# Patient Record
Sex: MALE | Race: WHITE | NOT HISPANIC OR LATINO | Employment: OTHER | URBAN - METROPOLITAN AREA
[De-identification: names, ages, dates, MRNs, and addresses within clinical notes are randomized per-mention and may not be internally consistent; named-entity substitution may affect disease eponyms.]

---

## 2017-01-25 RX ORDER — FLUTICASONE PROPIONATE AND SALMETEROL XINAFOATE 230; 21 UG/1; UG/1
AEROSOL, METERED RESPIRATORY (INHALATION)
Qty: 36 G | Refills: 3 | Status: SHIPPED | OUTPATIENT
Start: 2017-01-25 | End: 2018-04-16 | Stop reason: SDUPTHER

## 2017-04-11 RX ORDER — ZOLPIDEM TARTRATE 10 MG/1
TABLET ORAL
Qty: 30 TAB | Refills: 2 | Status: SHIPPED
Start: 2017-04-11 | End: 2017-07-10 | Stop reason: SDUPTHER

## 2017-04-24 ENCOUNTER — TELEPHONE (OUTPATIENT)
Dept: MEDICAL GROUP | Facility: MEDICAL CENTER | Age: 67
End: 2017-04-24

## 2017-04-24 DIAGNOSIS — N18.30 CKD (CHRONIC KIDNEY DISEASE) STAGE 3, GFR 30-59 ML/MIN (HCC): ICD-10-CM

## 2017-04-24 NOTE — TELEPHONE ENCOUNTER
----- Message from Melissa Cordero, Med Ass't sent at 4/20/2017 12:29 PM PDT -----  Regarding: Referral needed  Dionicio,  Mr Sims has an appointment with nephrology in May and his insurance requires a referral and authorization for visits. I don't have those things on file. Could you place the request and attach the visits to the referral in Epic? [place] [address] and the ICD 10 code is CKD 3 N18.3. Thank you!    ryan

## 2017-04-24 NOTE — TELEPHONE ENCOUNTER
1. Caller Name: Melissa Cordero  ([facility])                                     Call Back Number: [phone]      Patient approves a detailed voicemail message: N\A    2. SPECIFIC Action To Be Taken: Referral pending, please sign.    3. Diagnosis/Clinical Reason for Request: [id number]    4. Specialty & Provider Name/Lab/Imaging Location: [facility]    5. Is appointment scheduled for requested order/referral: yes - 05/17    Patient informed they will receive a return phone call from the office ONLY if there are any questions before processing their request. Advised to call back if they haven't received a call from the referral department in 5 days.

## 2017-05-11 ENCOUNTER — TELEPHONE (OUTPATIENT)
Dept: MEDICAL GROUP | Facility: MEDICAL CENTER | Age: 67
End: 2017-05-11

## 2017-05-11 DIAGNOSIS — Z12.5 SCREENING FOR PROSTATE CANCER: ICD-10-CM

## 2017-05-11 DIAGNOSIS — N18.30 CKD (CHRONIC KIDNEY DISEASE) STAGE 3, GFR 30-59 ML/MIN (HCC): ICD-10-CM

## 2017-05-11 DIAGNOSIS — I73.9 PERIPHERAL VASCULAR DISEASE (HCC): Chronic | ICD-10-CM

## 2017-05-11 DIAGNOSIS — I10 HYPERTENSION GOAL BP (BLOOD PRESSURE) < 140/80: Chronic | ICD-10-CM

## 2017-05-11 NOTE — TELEPHONE ENCOUNTER
Pt.would like to get general labs ordered. I pended some lab tests, would you please approve if ok?  Thank you,

## 2017-05-11 NOTE — TELEPHONE ENCOUNTER
Future Appointments       Provider Department Center    5/15/2017 9:20 AM Erlin iLnton M.D. [facility]       ESTABLISHED PATIENT PRE-VISIT PLANNING     Note: Patient will not be contacted if there is no indication to call.     1.  Reviewed note from last office visit with PCP and/or other med group provider: Yes    2.  If any orders were placed at last visit, do we have Results/Consult Notes?        •  Labs - Labs were not ordered at last office visit.       •  Imaging - Imaging was not ordered at last office visit.       •  Referrals - Referral ordered, patient has NOT been seen.    3.  Immunizations were updated in [employer] using WebIZ?: Yes       •  Web Iz Recommendations: HEPATITIS A  HEPATITIS B TD VARICELLA (Chicken Pox)  ZOSTAVAX (Shingles)    4.  Patient is due for the following Health Maintenance Topics:   Topic   • IMM ZOSTER VACCINE    • PFT SCREENING-FEV1 AND FEV/FVC RATIO / SPIROMETRY SHOULD BE PERFORMED ANNUALLY      5.  Patient was informed to arrive 15 min prior to their scheduled appointment and bring in their medication bottles and gave the address to [facility].

## 2017-05-12 ENCOUNTER — HOSPITAL ENCOUNTER (OUTPATIENT)
Dept: LAB | Facility: MEDICAL CENTER | Age: 67
End: 2017-05-12
Attending: PHYSICIAN ASSISTANT
Payer: COMMERCIAL

## 2017-05-12 ENCOUNTER — HOSPITAL ENCOUNTER (OUTPATIENT)
Dept: LAB | Facility: MEDICAL CENTER | Age: 67
End: 2017-05-12
Attending: INTERNAL MEDICINE
Payer: COMMERCIAL

## 2017-05-12 DIAGNOSIS — I73.9 PERIPHERAL VASCULAR DISEASE (HCC): Chronic | ICD-10-CM

## 2017-05-12 DIAGNOSIS — I10 ESSENTIAL HYPERTENSION: ICD-10-CM

## 2017-05-12 DIAGNOSIS — I10 HYPERTENSION GOAL BP (BLOOD PRESSURE) < 140/80: Chronic | ICD-10-CM

## 2017-05-12 DIAGNOSIS — Z12.5 SCREENING FOR PROSTATE CANCER: ICD-10-CM

## 2017-05-12 DIAGNOSIS — N18.30 CKD (CHRONIC KIDNEY DISEASE) STAGE 3, GFR 30-59 ML/MIN (HCC): ICD-10-CM

## 2017-05-12 LAB
ALBUMIN SERPL BCP-MCNC: 4.6 G/DL (ref 3.2–4.9)
ALBUMIN/GLOB SERPL: 1.4 G/DL
ALP SERPL-CCNC: 93 U/L (ref 30–99)
ALT SERPL-CCNC: 22 U/L (ref 2–50)
ANION GAP SERPL CALC-SCNC: 6 MMOL/L (ref 0–11.9)
ANION GAP SERPL CALC-SCNC: 9 MMOL/L (ref 0–11.9)
AST SERPL-CCNC: 30 U/L (ref 12–45)
BASOPHILS # BLD AUTO: 0.6 % (ref 0–1.8)
BASOPHILS # BLD: 0.06 K/UL (ref 0–0.12)
BILIRUB SERPL-MCNC: 0.7 MG/DL (ref 0.1–1.5)
BUN SERPL-MCNC: 24 MG/DL (ref 8–22)
BUN SERPL-MCNC: 24 MG/DL (ref 8–22)
CALCIUM SERPL-MCNC: 9.5 MG/DL (ref 8.5–10.5)
CALCIUM SERPL-MCNC: 9.6 MG/DL (ref 8.5–10.5)
CHLORIDE SERPL-SCNC: 104 MMOL/L (ref 96–112)
CHLORIDE SERPL-SCNC: 104 MMOL/L (ref 96–112)
CHOLEST SERPL-MCNC: 190 MG/DL (ref 100–199)
CO2 SERPL-SCNC: 29 MMOL/L (ref 20–33)
CO2 SERPL-SCNC: 29 MMOL/L (ref 20–33)
CREAT SERPL-MCNC: 1.3 MG/DL (ref 0.5–1.4)
CREAT SERPL-MCNC: 1.33 MG/DL (ref 0.5–1.4)
CREAT UR-MCNC: 112 MG/DL
EOSINOPHIL # BLD AUTO: 0.14 K/UL (ref 0–0.51)
EOSINOPHIL NFR BLD: 1.3 % (ref 0–6.9)
ERYTHROCYTE [DISTWIDTH] IN BLOOD BY AUTOMATED COUNT: 45.9 FL (ref 35.9–50)
GFR SERPL CREATININE-BSD FRML MDRD: 54 ML/MIN/1.73 M 2
GFR SERPL CREATININE-BSD FRML MDRD: 55 ML/MIN/1.73 M 2
GLOBULIN SER CALC-MCNC: 3.2 G/DL (ref 1.9–3.5)
GLUCOSE SERPL-MCNC: 107 MG/DL (ref 65–99)
GLUCOSE SERPL-MCNC: 107 MG/DL (ref 65–99)
HCT VFR BLD AUTO: 51.4 % (ref 42–52)
HDLC SERPL-MCNC: 34 MG/DL
HGB BLD-MCNC: 16.9 G/DL (ref 14–18)
IMM GRANULOCYTES # BLD AUTO: 0.05 K/UL (ref 0–0.11)
IMM GRANULOCYTES NFR BLD AUTO: 0.5 % (ref 0–0.9)
LDLC SERPL CALC-MCNC: 109 MG/DL
LYMPHOCYTES # BLD AUTO: 1.53 K/UL (ref 1–4.8)
LYMPHOCYTES NFR BLD: 14.4 % (ref 22–41)
MCH RBC QN AUTO: 30.8 PG (ref 27–33)
MCHC RBC AUTO-ENTMCNC: 32.9 G/DL (ref 33.7–35.3)
MCV RBC AUTO: 93.6 FL (ref 81.4–97.8)
MICROALBUMIN UR-MCNC: 7.5 MG/DL
MICROALBUMIN/CREAT UR: 67 MG/G (ref 0–30)
MONOCYTES # BLD AUTO: 0.61 K/UL (ref 0–0.85)
MONOCYTES NFR BLD AUTO: 5.8 % (ref 0–13.4)
NEUTROPHILS # BLD AUTO: 8.21 K/UL (ref 1.82–7.42)
NEUTROPHILS NFR BLD: 77.4 % (ref 44–72)
NRBC # BLD AUTO: 0 K/UL
NRBC BLD AUTO-RTO: 0 /100 WBC
PLATELET # BLD AUTO: 236 K/UL (ref 164–446)
PMV BLD AUTO: 9 FL (ref 9–12.9)
POTASSIUM SERPL-SCNC: 3.8 MMOL/L (ref 3.6–5.5)
POTASSIUM SERPL-SCNC: 3.8 MMOL/L (ref 3.6–5.5)
PROT SERPL-MCNC: 7.8 G/DL (ref 6–8.2)
PSA SERPL-MCNC: 1.26 NG/ML (ref 0–4)
RBC # BLD AUTO: 5.49 M/UL (ref 4.7–6.1)
SODIUM SERPL-SCNC: 139 MMOL/L (ref 135–145)
SODIUM SERPL-SCNC: 142 MMOL/L (ref 135–145)
TRIGL SERPL-MCNC: 237 MG/DL (ref 0–149)
WBC # BLD AUTO: 10.6 K/UL (ref 4.8–10.8)

## 2017-05-12 PROCEDURE — 80061 LIPID PANEL: CPT

## 2017-05-12 PROCEDURE — 80048 BASIC METABOLIC PNL TOTAL CA: CPT

## 2017-05-12 PROCEDURE — 82043 UR ALBUMIN QUANTITATIVE: CPT

## 2017-05-12 PROCEDURE — 80053 COMPREHEN METABOLIC PANEL: CPT

## 2017-05-12 PROCEDURE — 84153 ASSAY OF PSA TOTAL: CPT

## 2017-05-12 PROCEDURE — 36415 COLL VENOUS BLD VENIPUNCTURE: CPT

## 2017-05-12 PROCEDURE — 85025 COMPLETE CBC W/AUTO DIFF WBC: CPT

## 2017-05-12 PROCEDURE — 82570 ASSAY OF URINE CREATININE: CPT

## 2017-05-15 ENCOUNTER — OFFICE VISIT (OUTPATIENT)
Dept: MEDICAL GROUP | Facility: MEDICAL CENTER | Age: 67
End: 2017-05-15
Payer: COMMERCIAL

## 2017-05-15 VITALS
HEIGHT: 69 IN | DIASTOLIC BLOOD PRESSURE: 80 MMHG | HEART RATE: 82 BPM | OXYGEN SATURATION: 91 % | RESPIRATION RATE: 18 BRPM | SYSTOLIC BLOOD PRESSURE: 138 MMHG | BODY MASS INDEX: 27.85 KG/M2 | TEMPERATURE: 97.9 F | WEIGHT: 188 LBS

## 2017-05-15 DIAGNOSIS — I73.9 PERIPHERAL VASCULAR DISEASE (HCC): Chronic | ICD-10-CM

## 2017-05-15 DIAGNOSIS — J30.1 SEASONAL ALLERGIC RHINITIS DUE TO POLLEN: ICD-10-CM

## 2017-05-15 DIAGNOSIS — J42 CHRONIC BRONCHITIS, UNSPECIFIED CHRONIC BRONCHITIS TYPE (HCC): Chronic | ICD-10-CM

## 2017-05-15 DIAGNOSIS — N18.30 CKD (CHRONIC KIDNEY DISEASE) STAGE 3, GFR 30-59 ML/MIN (HCC): ICD-10-CM

## 2017-05-15 PROCEDURE — 99214 OFFICE O/P EST MOD 30 MIN: CPT | Performed by: FAMILY MEDICINE

## 2017-05-15 RX ORDER — FLUTICASONE PROPIONATE 50 MCG
1 SPRAY, SUSPENSION (ML) NASAL DAILY
Qty: 16 G | Refills: 5 | Status: SHIPPED | OUTPATIENT
Start: 2017-05-15 | End: 2018-03-15 | Stop reason: SDUPTHER

## 2017-05-15 NOTE — PROGRESS NOTES
Chief Complaint   Patient presents with   • Results     patient is here for a 6 month follow up and discuss labs     Multiple medical problems    HISTORY OF PRESENT ILLNESS: Patient is a 66 y.o. male established patient who presents today for the following.      1. Seasonal allergic rhinitis due to pollen  Patient has had a 2 week history of nasal congestion.. We discussed the likely etiologies for this. Does not appear to be infectious.    2. Peripheral vascular disease (CMS-HCC)  Chronic and stable problem. Taking medications as prescribed.    3. Chronic bronchitis, unspecified chronic bronchitis type (CMS-HCC)  Patient taking inhalers as prescribed. Doing well. Some shortness of breath with exertion but no abnormal shortness of breath. No chest pain or palpitations. No cough. No sputum production. No URI symptoms. COPD action plan discussed and patient understands.        4. CKD (chronic kidney disease) stage 3, GFR 30-59 ml/min  Reviewed labs with the patient.  Discussed with the patient their chronic kidney disease.  Discussed need to avoid nephrotoxic drugs to include NSAIDs, certain antibiotics and contrast dye. Patient understands. Patient denies any urinary problems. No blood in the urine noted.          No problem-specific assessment & plan notes found for this encounter.      He  has a past medical history of Hypertension; Renal disorder; COPD; and Breath shortness. He also has no past medical history of Backpain or Fall.    Patient Active Problem List    Diagnosis Date Noted   • Nephrolithiasis 01/26/2016   • Skin lesion of face 06/03/2015   • Essential hypertension 11/10/2014   • CKD (chronic kidney disease) stage 3, GFR 30-59 ml/min 11/10/2014   • Mild cognitive impairment with memory loss 12/02/2013   • Peripheral vascular disease (CMS-HCC) 11/12/2012   • Claudication (CMS-HCC) 11/01/2012   • COPD (chronic obstructive pulmonary disease) (CMS-HCC) 06/15/2012   • Hypertension goal BP (blood pressure)  "< 140/80 05/04/2012   • Hydronephrosis of right kidney 02/26/2012       Allergies:Review of patient's allergies indicates no known allergies.    Current Outpatient Prescriptions   Medication Sig Dispense Refill   • fluticasone (FLONASE) 50 MCG/ACT nasal spray Spray 1 Spray in nose every day. 16 g 5   • zolpidem (AMBIEN) 10 MG Tab TAKE 1 TABLET BY MOUTH EVERY DAY AT BEDTIME AS NEEDED FOR SLEEP 30 Tab 2   • ADVAIR -21 MCG/ACT inhaler INHALE 2 PUFFS BY MOUTH TWICE DAILY 36 g 3   • SPIRIVA HANDIHALER 18 MCG Cap INHALE THE CONTENTS OF 1 CAPSULE BY MOUTH EVERY DAY USING HANDIHALER 90 Cap 3   • VENTOLIN  (90 BASE) MCG/ACT Aero Soln inhalation aerosol INHALE 2 PUFFS BY MOUTH EVERY 6 HOURS AS NEEDED FOR PAIN 18 g 11   • losartan (COZAAR) 25 MG Tab Take 1 Tab by mouth every day. 30 Tab 3   • amlodipine (NORVASC) 10 MG Tab TAKE 1 TABLET BY MOUTH EVERY DAY 90 Tab 1   • vardenafil (LEVITRA) 10 MG tablet Take 1 Tab by mouth as needed. 10 Each 3     No current facility-administered medications for this visit.       Social History   Substance Use Topics   • Smoking status: Former Smoker -- 0.50 packs/day for 20 years     Types: Cigarettes     Quit date: 02/17/2012   • Smokeless tobacco: Never Used   • Alcohol Use: 0.0 oz/week     0 Standard drinks or equivalent per week      Comment: glass a wine per month       No family history on file.    Health Maintenance:      Review of Systems   No fever, chills, nausea, vomiting, diarrhea, chest pain or shortness of breath.  See HPI    Exam:  Blood pressure 138/80, pulse 82, temperature 36.6 °C (97.9 °F), resp. rate 18, height 1.753 m (5' 9.02\"), weight 85.276 kg (188 lb), SpO2 91 %. Body mass index is 27.75 kg/(m^2).  Constitutional:  NAD, well appearing.  HEENT:   NC/AT, PERRLA, EOMI, OP clear, no lymphadenopathy, no thyromegaly.  Erythematous and boggy nasal mucosa bilaterally  Cardiovascular: RRR.   No m/r/g. No carotid bruits.       Lungs:   CTAB, no w/r/r, no respiratory " distress.  Abdomen: Soft, NT/ND + BS, no masses, no suprapubic tenderness, no hepatomegaly.  Extremities:  2+ DP and radial pulses bilaterally.  No c/c/e.  Skin:  Warm and dry.    Neurologic: Alert & oriented x 3, CN II-XII grossly intact, strength and sensation grossly intact.  No focal deficits noted.  Psychiatric:  Affect normal, mood normal, judgment normal.    Assessment/Plan:     1. Seasonal allergic rhinitis due to pollen  This is a chronic problem for the patient. Discussed the use of fluticasone. Patient is amenable.    2. Peripheral vascular disease (CMS-HCC)  Chronic and ongoing problem. Continue hypertensive management. Monitor    3. Chronic bronchitis, unspecified chronic bronchitis type (CMS-HCC)  Chronic and stable condition.  No acute exacerbation.  Patient taking inhalers as prescribed. Some shortness of breath with exertion but no abnormal shortness of breath. Continue inhalers. Monitor.  Action plan discussed and understood by the patient.      4. CKD (chronic kidney disease) stage 3, GFR 30-59 ml/min  This is a chronic and stable problem. Labs reviewed with the patient. Discussed avoidance of nephrotoxic drugs. Continue hypertensive management. Continue to monitor.            Followup: No Follow-up on file.    Please note that this dictation was created using voice recognition software. I have made every reasonable attempt to correct obvious errors, but I expect that there are errors of grammar and possibly content that I did not discover before finalizing the note.

## 2017-05-15 NOTE — MR AVS SNAPSHOT
"        Olman Sims   5/15/2017 9:20 AM   Office Visit   MRN: [id number]    Department:  [facility]   Dept Phone:  [phone]    Description:  Male : 1950   Provider:  Erlin Linotn M.D.           Reason for Visit     Results patient is here for a 6 month follow up and discuss labs      Allergies as of 5/15/2017     No Known Allergies      You were diagnosed with     Seasonal allergic rhinitis due to pollen   [[id number]]         Vital Signs     Blood Pressure Pulse Temperature Respirations Height Weight    138/80 mmHg 82 36.6 °C (97.9 °F) 18 1.753 m (5' 9.02\") 85.276 kg (188 lb)    Body Mass Index Oxygen Saturation Smoking Status             27.75 kg/m2 91% Former Smoker         Basic Information     Date Of Birth Sex Race Ethnicity Preferred Language    1950 Male White Non- English      Your appointments     2017 11:00 AM   Established Patient with Erlin Linton M.D.   [facility] (--)    [address]  [address]  [place] [address]   [phone]           You will be receiving a confirmation call a few days before your appointment from our automated call confirmation system.              Problem List              ICD-10-CM Priority Class Noted - Resolved    Hydronephrosis of right kidney N13.30   2012 - Present    Hypertension goal BP (blood pressure) < 140/80 (Chronic) I10   2012 - Present    COPD (chronic obstructive pulmonary disease) (CMS-HCC) (Chronic) J44.9   6/15/2012 - Present    Claudication (CMS-HCC) I73.9   2012 - Present    Peripheral vascular disease (CMS-HCC) (Chronic) I73.9   2012 - Present    Mild cognitive impairment with memory loss G31.84   2013 - Present    Essential hypertension I10   11/10/2014 - Present    CKD (chronic kidney disease) stage 3, GFR 30-59 ml/min N18.3   11/10/2014 - Present    Skin lesion of face L98.9   6/3/2015 - Present    Nephrolithiasis N20.0   2016 - Present   "   Health Maintenance        Date Due Completion Dates    IMM ZOSTER VACCINE 12/8/2017 (Originally 6/22/2012) ---    COLONOSCOPY 7/9/2019 7/9/2014    IMM DTaP/Tdap/Td Vaccine (2 - Td) 8/4/2024 8/4/2014            Current Immunizations     13-VALENT PCV PREVNAR 11/10/2014    Influenza TIV (IM) 12/2/2013, 10/15/2012 10:46 AM    Influenza Vaccine Adult HD 11/14/2016, 11/10/2014    Influenza Vaccine Quad Inj (Pf) 1/13/2016    Pneumococcal polysaccharide vaccine (PPSV-23) 11/12/2012 10:50 AM    Tdap Vaccine 8/4/2014      Below and/or attached are the medications your provider expects you to take. Review all of your home medications and newly ordered medications with your provider and/or pharmacist. Follow medication instructions as directed by your provider and/or pharmacist. Please keep your medication list with you and share with your provider. Update the information when medications are discontinued, doses are changed, or new medications (including over-the-counter products) are added; and carry medication information at all times in the event of emergency situations     Allergies:  No Known Allergies          Medications  Valid as of: May 15, 2017 -  9:53 AM    Generic Name Brand Name Tablet Size Instructions for use    Albuterol Sulfate (Aero Soln) VENTOLIN  (90 BASE) MCG/ACT INHALE 2 PUFFS BY MOUTH EVERY 6 HOURS AS NEEDED FOR PAIN        AmLODIPine Besylate (Tab) NORVASC 10 MG TAKE 1 TABLET BY MOUTH EVERY DAY        Fluticasone Propionate (Suspension) FLONASE 50 MCG/ACT Spray 1 Spray in nose every day.        Fluticasone-Salmeterol (Aerosol) ADVAIR -21 MCG/ACT INHALE 2 PUFFS BY MOUTH TWICE DAILY        Losartan Potassium (Tab) COZAAR 25 MG Take 1 Tab by mouth every day.        Tiotropium Bromide Monohydrate (Cap) SPIRIVA HANDIHALER 18 MCG INHALE THE CONTENTS OF 1 CAPSULE BY MOUTH EVERY DAY USING HANDIHALER        Vardenafil HCl (Tab) LEVITRA 10 MG Take 1 Tab by mouth as needed.        Zolpidem Tartrate  (Tab) AMBIEN 10 MG TAKE 1 TABLET BY MOUTH EVERY DAY AT BEDTIME AS NEEDED FOR SLEEP        .                 Medicines prescribed today were sent to:     [id number] - [place], [place] - [address] AT [facility] [address]    [address] [place] [address]    Phone: [phone] Fax: [phone]    Open 24 Hours?: No      Medication refill instructions:       If your prescription bottle indicates you have medication refills left, it is not necessary to call your provider’s office. Please contact your pharmacy and they will refill your medication.    If your prescription bottle indicates you do not have any refills left, you may request refills at any time through one of the following ways: The online iCrumz system (except Urgent Care), by calling your provider’s office, or by asking your pharmacy to contact your provider’s office with a refill request. Medication refills are processed only during regular business hours and may not be available until the next business day. Your provider may request additional information or to have a follow-up visit with you prior to refilling your medication.   *Please Note: Medication refills are assigned a new Rx number when refilled electronically. Your pharmacy may indicate that no refills were authorized even though a new prescription for the same medication is available at the pharmacy. Please request the medicine by name with the pharmacy before contacting your provider for a refill.           iCrumz Access Code: [email or web address]  Expires: 6/11/2017  7:29 AM    iCrumz  A secure, online tool to manage your health information     [employer]’s iCrumz® is a secure, online tool that connects you to your personalized health information from the privacy of your home -- day or night - making it very easy for you to manage your healthcare. Once the activation process is completed, you can even access your medical information using the iCrumz  dilcia, which is available for free in the Apple Dilcia store or Google Play store.     Ring provides the following levels of access (as shown below):   My Chart Features   [facility] Doctor [facility]  Specialists [facility]  Urgent  Care Non-[facility]  Primary Care  Doctor   Email your healthcare team securely and privately 24/7 X X X    Manage appointments: schedule your next appointment; view details of past/upcoming appointments X      Request prescription refills. X      View recent personal medical records, including lab and immunizations X X X X   View health record, including health history, allergies, medications X X X X   Read reports about your outpatient visits, procedures, consult and ER notes X X X X   See your discharge summary, which is a recap of your hospital and/or ER visit that includes your diagnosis, lab results, and care plan. X X       How to register for Ring:  1. Go to  [email or web address].  2. Click on the Sign Up Now box, which takes you to the New Member Sign Up page. You will need to provide the following information:  a. Enter your Ring Access Code exactly as it appears at the top of this page. (You will not need to use this code after you’ve completed the sign-up process. If you do not sign up before the expiration date, you must request a new code.)   b. Enter your date of birth.   c. Enter your home email address.   d. Click Submit, and follow the next screen’s instructions.  3. Create a Ring ID. This will be your Ring login ID and cannot be changed, so think of one that is secure and easy to remember.  4. Create a Ring password. You can change your password at any time.  5. Enter your Password Reset Question and Answer. This can be used at a later time if you forget your password.   6. Enter your e-mail address. This allows you to receive e-mail notifications when new information is available in Ring.  7. Click Sign Up. You can now view your health  information.    For assistance activating your [employer] account, call [phone]

## 2017-06-12 DIAGNOSIS — I10 ESSENTIAL HYPERTENSION: ICD-10-CM

## 2017-06-12 RX ORDER — AMLODIPINE BESYLATE 10 MG/1
10 TABLET ORAL
Qty: 90 TAB | Refills: 3 | Status: SHIPPED | OUTPATIENT
Start: 2017-06-12 | End: 2018-07-31 | Stop reason: SDUPTHER

## 2017-07-10 RX ORDER — ZOLPIDEM TARTRATE 10 MG/1
TABLET ORAL
Qty: 30 TAB | Refills: 2 | Status: SHIPPED
Start: 2017-07-10 | End: 2017-10-11 | Stop reason: SDUPTHER

## 2017-10-11 ENCOUNTER — OFFICE VISIT (OUTPATIENT)
Dept: MEDICAL GROUP | Facility: MEDICAL CENTER | Age: 67
End: 2017-10-11
Payer: COMMERCIAL

## 2017-10-11 VITALS
RESPIRATION RATE: 20 BRPM | HEART RATE: 91 BPM | TEMPERATURE: 99.4 F | DIASTOLIC BLOOD PRESSURE: 80 MMHG | WEIGHT: 186.6 LBS | BODY MASS INDEX: 27.64 KG/M2 | HEIGHT: 69 IN | SYSTOLIC BLOOD PRESSURE: 140 MMHG | OXYGEN SATURATION: 94 %

## 2017-10-11 DIAGNOSIS — I10 HYPERTENSION GOAL BP (BLOOD PRESSURE) < 140/80: Chronic | ICD-10-CM

## 2017-10-11 DIAGNOSIS — G47.9 SLEEP DISORDER: ICD-10-CM

## 2017-10-11 DIAGNOSIS — R73.01 ELEVATED FASTING BLOOD SUGAR: ICD-10-CM

## 2017-10-11 DIAGNOSIS — I10 ESSENTIAL HYPERTENSION: ICD-10-CM

## 2017-10-11 DIAGNOSIS — E78.1 HYPERTRIGLYCERIDEMIA: ICD-10-CM

## 2017-10-11 DIAGNOSIS — J42 CHRONIC BRONCHITIS, UNSPECIFIED CHRONIC BRONCHITIS TYPE (HCC): Chronic | ICD-10-CM

## 2017-10-11 DIAGNOSIS — J96.11 CHRONIC RESPIRATORY FAILURE WITH HYPOXIA (HCC): ICD-10-CM

## 2017-10-11 PROCEDURE — 99214 OFFICE O/P EST MOD 30 MIN: CPT | Performed by: PHYSICIAN ASSISTANT

## 2017-10-11 RX ORDER — ZOLPIDEM TARTRATE 10 MG/1
TABLET ORAL
Qty: 30 TAB | Refills: 2 | Status: SHIPPED
Start: 2017-10-11 | End: 2018-01-03 | Stop reason: SDUPTHER

## 2017-10-11 ASSESSMENT — PATIENT HEALTH QUESTIONNAIRE - PHQ9: CLINICAL INTERPRETATION OF PHQ2 SCORE: 0

## 2017-10-11 NOTE — PROGRESS NOTES
Subjective:   CC: Olman Sims is a 67 y.o. male here today for establishing care. Pt was under care of Dr. Fields    COPD (chronic obstructive pulmonary disease)  currently on Spiriva and advair, and albuterol rescue inhaler and also nocturnal oxyegen at night 2 L. Deny any new or worsening SOB, no cough, fever or CP    Hypertension goal BP (blood pressure) < 140/80  Pt has known HTN, controlled with current medicines. He denies HA, blurred vision, dizziness, shortness of breath, palpitations, or chest pain, lower extremity edema. BP averages 138-142, DBP in sanjuana 70s.      sleep disorder:  Pt has problem falling sleep at night. Take one Zolpidem 10 mg qhs, otherwise can't fall asleep until 4 am.       Current medicines (including changes today)  Current Outpatient Prescriptions   Medication Sig Dispense Refill   • zolpidem (AMBIEN) 10 MG Tab TAKE 1 TABLET BY MOUTH EVERY DAY AT BEDTIME AS NEEDED FOR SLEEP 30 Tab 2   • losartan (COZAAR) 25 MG Tab TAKE 1 TABLET BY MOUTH EVERY DAY 90 Tab 3   • amlodipine (NORVASC) 10 MG Tab Take 1 Tab by mouth every day. 90 Tab 3   • fluticasone (FLONASE) 50 MCG/ACT nasal spray Spray 1 Spray in nose every day. 16 g 5   • ADVAIR -21 MCG/ACT inhaler INHALE 2 PUFFS BY MOUTH TWICE DAILY 36 g 3   • SPIRIVA HANDIHALER 18 MCG Cap INHALE THE CONTENTS OF 1 CAPSULE BY MOUTH EVERY DAY USING HANDIHALER 90 Cap 3   • VENTOLIN  (90 BASE) MCG/ACT Aero Soln inhalation aerosol INHALE 2 PUFFS BY MOUTH EVERY 6 HOURS AS NEEDED FOR PAIN 18 g 11   • vardenafil (LEVITRA) 10 MG tablet Take 1 Tab by mouth as needed. 10 Each 3     No current facility-administered medications for this visit.          Past medical, surgical, family, and social history are reviewed in Epic chart by me today.   Medications and allergies reviewed in Epic chart by me today.         ROS   No chest pain, no shortness of breath, no abdominal pain  As documented in history of present illness above      "Objective:     Blood pressure 140/80, pulse 91, temperature 37.4 °C (99.4 °F), resp. rate 20, height 1.753 m (5' 9\"), weight 84.6 kg (186 lb 9.6 oz), SpO2 94 %. Body mass index is 27.56 kg/m².   Physical Exam:  Constitutional: Alert, oriented in no acute distress.  Psych: Eye contact is good, speech goal directed, affect calm  Neck: No cervical or supraclavicular lymphadenopathy,Trachea midline, no thyromegaly, no masses  Lungs: distant lung sounds, clear to auscultation bilaterally, no wheez or rhonci  CV: regular rate and rhythm.         Assessment and Plan:   The following treatment plan was discussed    1. Chronic bronchitis, unspecified chronic bronchitis type (CMS-HCC)  In office first pulse ox was 84 % which improved to 94% w/o oxygen  Discussed using oxygen during day if needed     2. Elevated fasting blood sugar    - HEMOGLOBIN A1C; Future  - BASIC METABOLIC PANEL; Future    3. Essential hypertension  Continue current meds  - CBC WITH DIFFERENTIAL; Future  - TSH WITH REFLEX TO FT4; Future  - MICROALBUMIN CREAT RATIO URINE; Future    4. Hypertriglyceridemia  Discussed diet and exercise   - LIPID PROFILE; Future    5. Sleep disorder  -Zolpidem   I have reviewed the Prescription Monitoring Program () today, no inconsistencies    6. Chronic respiratory failure with hypoxia (CMS-HCC)  Continue on nocturnal oxygen. I advised patient to check pulse ox during day and use oxygen during day if pulse ox are low    7. Hypertension goal BP (blood pressure) < 140/80  Controlled, continue current meds.       Followup: Return in about 3 months (around 1/11/2018).         Please note that this dictation was created using voice recognition software. I have made every reasonable attempt to correct obvious errors, but I expect that there are errors of grammar and possibly content that I did not discover before finalizing the note.    "

## 2017-10-11 NOTE — ASSESSMENT & PLAN NOTE
currently on Spiriva and advair, albuterol recue inhaler and also nocturnal oxyegen at night 2 L.

## 2017-12-13 RX ORDER — ALBUTEROL SULFATE 90 UG/1
AEROSOL, METERED RESPIRATORY (INHALATION)
Qty: 18 G | Refills: 0 | Status: SHIPPED | OUTPATIENT
Start: 2017-12-13 | End: 2018-02-20 | Stop reason: SDUPTHER

## 2017-12-29 ENCOUNTER — HOSPITAL ENCOUNTER (OUTPATIENT)
Dept: LAB | Facility: MEDICAL CENTER | Age: 67
End: 2017-12-29
Attending: PHYSICIAN ASSISTANT
Payer: COMMERCIAL

## 2017-12-29 DIAGNOSIS — E78.1 HYPERTRIGLYCERIDEMIA: ICD-10-CM

## 2017-12-29 DIAGNOSIS — R73.01 ELEVATED FASTING BLOOD SUGAR: ICD-10-CM

## 2017-12-29 DIAGNOSIS — I10 ESSENTIAL HYPERTENSION: ICD-10-CM

## 2017-12-29 LAB
ANION GAP SERPL CALC-SCNC: 9 MMOL/L (ref 0–11.9)
BASOPHILS # BLD AUTO: 0.5 % (ref 0–1.8)
BASOPHILS # BLD: 0.05 K/UL (ref 0–0.12)
BUN SERPL-MCNC: 26 MG/DL (ref 8–22)
CALCIUM SERPL-MCNC: 10.1 MG/DL (ref 8.5–10.5)
CHLORIDE SERPL-SCNC: 101 MMOL/L (ref 96–112)
CHOLEST SERPL-MCNC: 217 MG/DL (ref 100–199)
CO2 SERPL-SCNC: 27 MMOL/L (ref 20–33)
CREAT SERPL-MCNC: 1.49 MG/DL (ref 0.5–1.4)
CREAT UR-MCNC: 137.3 MG/DL
EOSINOPHIL # BLD AUTO: 0.13 K/UL (ref 0–0.51)
EOSINOPHIL NFR BLD: 1.3 % (ref 0–6.9)
ERYTHROCYTE [DISTWIDTH] IN BLOOD BY AUTOMATED COUNT: 45.9 FL (ref 35.9–50)
EST. AVERAGE GLUCOSE BLD GHB EST-MCNC: 117 MG/DL
GFR SERPL CREATININE-BSD FRML MDRD: 47 ML/MIN/1.73 M 2
GLUCOSE SERPL-MCNC: 93 MG/DL (ref 65–99)
HBA1C MFR BLD: 5.7 % (ref 0–5.6)
HCT VFR BLD AUTO: 51 % (ref 42–52)
HDLC SERPL-MCNC: 38 MG/DL
HGB BLD-MCNC: 16.7 G/DL (ref 14–18)
IMM GRANULOCYTES # BLD AUTO: 0.05 K/UL (ref 0–0.11)
IMM GRANULOCYTES NFR BLD AUTO: 0.5 % (ref 0–0.9)
LDLC SERPL CALC-MCNC: 143 MG/DL
LYMPHOCYTES # BLD AUTO: 1.76 K/UL (ref 1–4.8)
LYMPHOCYTES NFR BLD: 17.1 % (ref 22–41)
MCH RBC QN AUTO: 30.3 PG (ref 27–33)
MCHC RBC AUTO-ENTMCNC: 32.7 G/DL (ref 33.7–35.3)
MCV RBC AUTO: 92.4 FL (ref 81.4–97.8)
MICROALBUMIN UR-MCNC: 31.7 MG/DL
MICROALBUMIN/CREAT UR: 231 MG/G (ref 0–30)
MONOCYTES # BLD AUTO: 0.63 K/UL (ref 0–0.85)
MONOCYTES NFR BLD AUTO: 6.1 % (ref 0–13.4)
NEUTROPHILS # BLD AUTO: 7.66 K/UL (ref 1.82–7.42)
NEUTROPHILS NFR BLD: 74.5 % (ref 44–72)
NRBC # BLD AUTO: 0 K/UL
NRBC BLD-RTO: 0 /100 WBC
PLATELET # BLD AUTO: 255 K/UL (ref 164–446)
PMV BLD AUTO: 8.8 FL (ref 9–12.9)
POTASSIUM SERPL-SCNC: 4 MMOL/L (ref 3.6–5.5)
RBC # BLD AUTO: 5.52 M/UL (ref 4.7–6.1)
SODIUM SERPL-SCNC: 137 MMOL/L (ref 135–145)
TRIGL SERPL-MCNC: 182 MG/DL (ref 0–149)
TSH SERPL DL<=0.005 MIU/L-ACNC: 2.24 UIU/ML (ref 0.38–5.33)
WBC # BLD AUTO: 10.3 K/UL (ref 4.8–10.8)

## 2017-12-29 PROCEDURE — 85025 COMPLETE CBC W/AUTO DIFF WBC: CPT

## 2017-12-29 PROCEDURE — 80048 BASIC METABOLIC PNL TOTAL CA: CPT

## 2017-12-29 PROCEDURE — 83036 HEMOGLOBIN GLYCOSYLATED A1C: CPT

## 2017-12-29 PROCEDURE — 82570 ASSAY OF URINE CREATININE: CPT

## 2017-12-29 PROCEDURE — 80061 LIPID PANEL: CPT

## 2017-12-29 PROCEDURE — 36415 COLL VENOUS BLD VENIPUNCTURE: CPT

## 2017-12-29 PROCEDURE — 82043 UR ALBUMIN QUANTITATIVE: CPT

## 2017-12-29 PROCEDURE — 84443 ASSAY THYROID STIM HORMONE: CPT

## 2018-01-03 ENCOUNTER — OFFICE VISIT (OUTPATIENT)
Dept: MEDICAL GROUP | Facility: MEDICAL CENTER | Age: 68
End: 2018-01-03
Payer: COMMERCIAL

## 2018-01-03 VITALS
BODY MASS INDEX: 27.67 KG/M2 | OXYGEN SATURATION: 95 % | HEIGHT: 69 IN | HEART RATE: 85 BPM | RESPIRATION RATE: 16 BRPM | SYSTOLIC BLOOD PRESSURE: 130 MMHG | DIASTOLIC BLOOD PRESSURE: 68 MMHG | TEMPERATURE: 98.6 F | WEIGHT: 186.8 LBS

## 2018-01-03 DIAGNOSIS — E78.5 DYSLIPIDEMIA: ICD-10-CM

## 2018-01-03 DIAGNOSIS — J96.11 CHRONIC RESPIRATORY FAILURE WITH HYPOXIA (HCC): ICD-10-CM

## 2018-01-03 DIAGNOSIS — G47.9 SLEEP DISORDER: ICD-10-CM

## 2018-01-03 DIAGNOSIS — N18.30 CKD (CHRONIC KIDNEY DISEASE) STAGE 3, GFR 30-59 ML/MIN (HCC): ICD-10-CM

## 2018-01-03 DIAGNOSIS — R09.81 NASAL CONGESTION: ICD-10-CM

## 2018-01-03 DIAGNOSIS — Z23 NEED FOR VACCINATION: ICD-10-CM

## 2018-01-03 DIAGNOSIS — Z12.5 SCREENING FOR MALIGNANT NEOPLASM OF PROSTATE: ICD-10-CM

## 2018-01-03 DIAGNOSIS — I10 HYPERTENSION GOAL BP (BLOOD PRESSURE) < 140/80: Chronic | ICD-10-CM

## 2018-01-03 DIAGNOSIS — J42 CHRONIC BRONCHITIS, UNSPECIFIED CHRONIC BRONCHITIS TYPE (HCC): Chronic | ICD-10-CM

## 2018-01-03 PROCEDURE — 90732 PPSV23 VACC 2 YRS+ SUBQ/IM: CPT | Performed by: PHYSICIAN ASSISTANT

## 2018-01-03 PROCEDURE — 90472 IMMUNIZATION ADMIN EACH ADD: CPT | Performed by: PHYSICIAN ASSISTANT

## 2018-01-03 PROCEDURE — 99214 OFFICE O/P EST MOD 30 MIN: CPT | Mod: 25 | Performed by: PHYSICIAN ASSISTANT

## 2018-01-03 PROCEDURE — 90471 IMMUNIZATION ADMIN: CPT | Performed by: PHYSICIAN ASSISTANT

## 2018-01-03 PROCEDURE — 90662 IIV NO PRSV INCREASED AG IM: CPT | Performed by: PHYSICIAN ASSISTANT

## 2018-01-03 RX ORDER — ATORVASTATIN CALCIUM 10 MG/1
10 TABLET, FILM COATED ORAL EVERY EVENING
Qty: 90 TAB | Refills: 1 | Status: SHIPPED | OUTPATIENT
Start: 2018-01-03 | End: 2019-04-29 | Stop reason: CLARIF

## 2018-01-03 RX ORDER — ZOLPIDEM TARTRATE 10 MG/1
TABLET ORAL
Qty: 30 TAB | Refills: 2 | Status: SHIPPED | OUTPATIENT
Start: 2018-01-03 | End: 2018-04-03 | Stop reason: SDUPTHER

## 2018-01-03 NOTE — ASSESSMENT & PLAN NOTE
Pt has known HTN, controlled with current medicines, amlodipine 10 mg, losartan 25 mg He denies HA, blurred vision, dizziness, shortness of breath, palpitations, or chest pain, lower extremity edema.

## 2018-01-03 NOTE — ASSESSMENT & PLAN NOTE
The patient has COPD with chronic hypoxia and is currently using 2 L oxygen nocturnal, also sometimes he has to use it during the day because his O2 sat drops. States it is difficult to use oxygen due to the weight of the oxygen tank.  He would like a portable oxygen compressor. Currently also taking Ventolin, Spiriva, advair.

## 2018-01-03 NOTE — PROGRESS NOTES
Subjective:   CC: Olman Sims is a 67 y.o. male here today for Follow-up:    Hypertension goal BP (blood pressure) < 140/80  Pt has known HTN, controlled with current medicines, amlodipine 10 mg, losartan 25 mg He denies HA, blurred vision, dizziness, shortness of breath, palpitations, or chest pain, lower extremity edema.      Dyslipidemia  Pt has dyslipidemia w elevated LDL and Low HDL. Currently not taking any statin.        COPD (chronic obstructive pulmonary disease)  The patient has COPD with chronic hypoxia and is currently using 2 L oxygen nocturnal, also sometimes he has to use it during the day because his O2 sat drops. States it is difficult to use oxygen due to the weight of the oxygen tank. He would like a portable oxygen compressor. Currently also taking Ventolin, Spiriva, advair.     Nasal congestion  Patient has been having nasal congestion for for some time now. Has tried Flonase without any help. States Afrin helps and he takes aspirin 3 times daily. States then nasal congestion gets back his oxygen levels drop low.     Patient has been taking zolpidem 10 MG eery night for sleep. States he cannot fall asleep without medicine. No residual side effects the next day. Does not combine with alcohol.      Current medicines (including changes today)  Current Outpatient Prescriptions   Medication Sig Dispense Refill   • zolpidem (AMBIEN) 10 MG Tab TAKE 1 TABLET BY MOUTH EVERY DAY AT BEDTIME AS NEEDED FOR SLEEP 30 Tab 2   • atorvastatin (LIPITOR) 10 MG Tab Take 1 Tab by mouth every evening. 90 Tab 1   • Misc. Devices Misc Inogen One G4 oxygen compressor, si L nocturnal 1 Device 0   • VENTOLIN  (90 Base) MCG/ACT Aero Soln inhalation aerosol INHALE 2 PUFFS BY MOUTH EVERY 6 HOURS AS NEEDED FOR PAIN 18 g 0   • losartan (COZAAR) 25 MG Tab TAKE 1 TABLET BY MOUTH EVERY DAY 90 Tab 3   • amlodipine (NORVASC) 10 MG Tab Take 1 Tab by mouth every day. 90 Tab 3   • fluticasone (FLONASE) 50 MCG/ACT  "nasal spray Spray 1 Spray in nose every day. 16 g 5   • ADVAIR -21 MCG/ACT inhaler INHALE 2 PUFFS BY MOUTH TWICE DAILY 36 g 3   • SPIRIVA HANDIHALER 18 MCG Cap INHALE THE CONTENTS OF 1 CAPSULE BY MOUTH EVERY DAY USING HANDIHALER 90 Cap 3   • vardenafil (LEVITRA) 10 MG tablet Take 1 Tab by mouth as needed. 10 Each 3     No current facility-administered medications for this visit.          Past medical, surgical, family, and social history are reviewed in [facility] chart by me today.   Medications and allergies reviewed in Epic chart by me today.         ROS   No chest pain,  no abdominal pain  As documented in history of present illness above     Objective:     Blood pressure 130/68, pulse 85, temperature 37 °C (98.6 °F), resp. rate 16, height 1.753 m (5' 9\"), weight 84.7 kg (186 lb 12.8 oz), SpO2 95 %. Body mass index is 27.59 kg/m².   Physical Exam:  Constitutional: Alert, oriented in no acute distress.  Psych: Eye contact is good, speech goal directed, affect calm  Eyes: Conjunctiva non-injected, sclera non-icteric.  Nose: nasal congestion bilaterally,  L >> R  Neck: No cervical or supraclavicular lymphadenopathy,Trachea midline, no thyromegaly, no masses  Lungs: decreased breast sounds,  no wheez or rhonci  CV: regular rate and rhythm.   Ext: no edema, color normal, vascularity normal, temperature normal    Last lab results were reviewed by me today and discussed w patient.      The CVD Risk score (D'Agostino, et al., 2008) failed to calculate for the following reasons:    The patient has a prior MI, stroke, CHF, or peripheral vascular disease diagnosis      Assessment and Plan:   The following treatment plan was discussed    1. Sleep disorder  - zolpidem (AMBIEN) 10 MG Tab; TAKE 1 TABLET BY MOUTH EVERY DAY AT BEDTIME AS NEEDED FOR SLEEP  Dispense: 30 Tab; Refill: 2    2. Dyslipidemia  Going to start patient on atorvastatin 10 MG daily. Denies having any liver issues.  - atorvastatin (LIPITOR) 10 MG Tab; Take 1 " Tab by mouth every evening.  Dispense: 90 Tab; Refill: 1  - LIPID PROFILE; Future    3. Need for vaccination    - INFLUENZA VACCINE, HIGH DOSE (65+ ONLY)  - PNEUMOCOCCAL POLYSACCHARIDE VACCINE 23-VALENT =>1YO SQ/IM    4. Hypertension goal BP (blood pressure) < 140/80    - COMP METABOLIC PANEL; Future  - CBC WITH DIFFERENTIAL; Future    5. CKD (chronic kidney disease) stage 3, GFR 30-59 ml/min    - COMP METABOLIC PANEL; Future    6. Screening for malignant neoplasm of prostate    - PROSTATE SPECIFIC AG SCREENING; Future    7. Chronic bronchitis, unspecified chronic bronchitis type (CMS-HCC)    - Misc. Devices Misc; Inogen One G4 oxygen compressor, si L nocturnal  Dispense: 1 Device; Refill: 0    8. Chronic respiratory failure with hypoxia (CMS-HCC)    - Misc. Devices Misc; Inogen One G4 oxygen compressor, si L nocturnal  Dispense: 1 Device; Refill: 0    9. Nasal congestion    - REFERRAL TO ENT      Followup: Return if symptoms worsen or fail to improve.         Please note that this dictation was created using voice recognition software. I have made every reasonable attempt to correct obvious errors, but I expect that there are errors of grammar and possibly content that I did not discover before finalizing the note.

## 2018-01-05 ENCOUNTER — TELEPHONE (OUTPATIENT)
Dept: MEDICAL GROUP | Facility: MEDICAL CENTER | Age: 68
End: 2018-01-05

## 2018-01-05 NOTE — TELEPHONE ENCOUNTER
Sent order form to [facility] for Inogen One G4, received fax back that they only carry the Inogen One G3..    Left message for pt that we can either order the G3 or he will find somewhere that has the G4 and we can send them the order.

## 2018-01-11 ENCOUNTER — PATIENT MESSAGE (OUTPATIENT)
Dept: MEDICAL GROUP | Facility: MEDICAL CENTER | Age: 68
End: 2018-01-11

## 2018-01-31 RX ORDER — TIOTROPIUM BROMIDE 18 UG/1
CAPSULE ORAL; RESPIRATORY (INHALATION)
Qty: 30 CAP | Refills: 3 | Status: SHIPPED | OUTPATIENT
Start: 2018-01-31 | End: 2019-03-25 | Stop reason: SDUPTHER

## 2018-02-20 RX ORDER — ALBUTEROL SULFATE 90 UG/1
AEROSOL, METERED RESPIRATORY (INHALATION)
Qty: 18 G | Refills: 0 | Status: SHIPPED | OUTPATIENT
Start: 2018-02-20 | End: 2018-04-16 | Stop reason: SDUPTHER

## 2018-03-08 ENCOUNTER — PATIENT OUTREACH (OUTPATIENT)
Dept: HEALTH INFORMATION MANAGEMENT | Facility: OTHER | Age: 68
End: 2018-03-08

## 2018-03-08 NOTE — PROGRESS NOTES
Outcome: Left Message    Please transfer to Patient Outreach Team at [phone] when patient returns call.    WebIZ Checked & Epic Updated:  yes    Attempt # 1

## 2018-03-15 RX ORDER — FLUTICASONE PROPIONATE 50 MCG
1 SPRAY, SUSPENSION (ML) NASAL 2 TIMES DAILY
Qty: 16 G | Refills: 3 | Status: SHIPPED | OUTPATIENT
Start: 2018-03-15 | End: 2018-11-16

## 2018-04-03 DIAGNOSIS — G47.9 SLEEP DISORDER: ICD-10-CM

## 2018-04-03 RX ORDER — ZOLPIDEM TARTRATE 10 MG/1
10 TABLET ORAL NIGHTLY PRN
Qty: 30 TAB | Refills: 0 | Status: SHIPPED
Start: 2018-04-03 | End: 2018-05-07 | Stop reason: SDUPTHER

## 2018-04-16 RX ORDER — FLUTICASONE PROPIONATE AND SALMETEROL XINAFOATE 230; 21 UG/1; UG/1
AEROSOL, METERED RESPIRATORY (INHALATION)
Qty: 36 G | Refills: 6 | Status: SHIPPED | OUTPATIENT
Start: 2018-04-16 | End: 2019-05-29 | Stop reason: SDUPTHER

## 2018-04-16 RX ORDER — ALBUTEROL SULFATE 90 UG/1
AEROSOL, METERED RESPIRATORY (INHALATION)
Qty: 18 G | Refills: 1 | Status: SHIPPED | OUTPATIENT
Start: 2018-04-16 | End: 2018-10-08 | Stop reason: SDUPTHER

## 2018-04-20 DIAGNOSIS — Z01.812 PRE-OPERATIVE LABORATORY EXAMINATION: ICD-10-CM

## 2018-04-20 DIAGNOSIS — Z01.810 PRE-OPERATIVE CARDIOVASCULAR EXAMINATION: ICD-10-CM

## 2018-04-20 LAB
ANION GAP SERPL CALC-SCNC: 8 MMOL/L (ref 0–11.9)
BUN SERPL-MCNC: 18 MG/DL (ref 8–22)
CALCIUM SERPL-MCNC: 9.6 MG/DL (ref 8.5–10.5)
CHLORIDE SERPL-SCNC: 103 MMOL/L (ref 96–112)
CO2 SERPL-SCNC: 28 MMOL/L (ref 20–33)
CREAT SERPL-MCNC: 1.25 MG/DL (ref 0.5–1.4)
ERYTHROCYTE [DISTWIDTH] IN BLOOD BY AUTOMATED COUNT: 47.1 FL (ref 35.9–50)
GLUCOSE SERPL-MCNC: 102 MG/DL (ref 65–99)
HCT VFR BLD AUTO: 47.9 % (ref 42–52)
HGB BLD-MCNC: 16 G/DL (ref 14–18)
MCH RBC QN AUTO: 30.9 PG (ref 27–33)
MCHC RBC AUTO-ENTMCNC: 33.4 G/DL (ref 33.7–35.3)
MCV RBC AUTO: 92.5 FL (ref 81.4–97.8)
PLATELET # BLD AUTO: 224 K/UL (ref 164–446)
PMV BLD AUTO: 8.9 FL (ref 9–12.9)
POTASSIUM SERPL-SCNC: 3.8 MMOL/L (ref 3.6–5.5)
RBC # BLD AUTO: 5.18 M/UL (ref 4.7–6.1)
SODIUM SERPL-SCNC: 139 MMOL/L (ref 135–145)
WBC # BLD AUTO: 9.4 K/UL (ref 4.8–10.8)

## 2018-04-20 PROCEDURE — 93005 ELECTROCARDIOGRAM TRACING: CPT

## 2018-04-20 PROCEDURE — 36415 COLL VENOUS BLD VENIPUNCTURE: CPT

## 2018-04-20 PROCEDURE — 85027 COMPLETE CBC AUTOMATED: CPT

## 2018-04-20 PROCEDURE — 93010 ELECTROCARDIOGRAM REPORT: CPT | Performed by: INTERNAL MEDICINE

## 2018-04-20 PROCEDURE — 80048 BASIC METABOLIC PNL TOTAL CA: CPT

## 2018-04-21 LAB — EKG IMPRESSION: NORMAL

## 2018-05-04 ENCOUNTER — HOSPITAL ENCOUNTER (OUTPATIENT)
Facility: MEDICAL CENTER | Age: 68
End: 2018-05-04
Attending: SPECIALIST | Admitting: SPECIALIST
Payer: COMMERCIAL

## 2018-05-04 VITALS
BODY MASS INDEX: 27.76 KG/M2 | OXYGEN SATURATION: 94 % | HEART RATE: 79 BPM | TEMPERATURE: 98.5 F | HEIGHT: 69 IN | RESPIRATION RATE: 16 BRPM | WEIGHT: 187.39 LBS

## 2018-05-04 DIAGNOSIS — J30.0 CHRONIC VASOMOTOR RHINITIS: ICD-10-CM

## 2018-05-04 DIAGNOSIS — G89.18 ACUTE POSTOPERATIVE PAIN: ICD-10-CM

## 2018-05-04 DIAGNOSIS — J34.3 HYPERTROPHY OF BOTH INFERIOR NASAL TURBINATES: ICD-10-CM

## 2018-05-04 DIAGNOSIS — J34.2 NASAL SEPTAL DEFORMITY: ICD-10-CM

## 2018-05-04 PROCEDURE — 160002 HCHG RECOVERY MINUTES (STAT): Performed by: SPECIALIST

## 2018-05-04 PROCEDURE — 501409 HCHG SPLINT, REUTER BI-VALVE NASAL: Performed by: SPECIALIST

## 2018-05-04 PROCEDURE — A6402 STERILE GAUZE <= 16 SQ IN: HCPCS | Performed by: SPECIALIST

## 2018-05-04 PROCEDURE — 160048 HCHG OR STATISTICAL LEVEL 1-5: Performed by: SPECIALIST

## 2018-05-04 PROCEDURE — 700111 HCHG RX REV CODE 636 W/ 250 OVERRIDE (IP)

## 2018-05-04 PROCEDURE — 700101 HCHG RX REV CODE 250

## 2018-05-04 PROCEDURE — 500331 HCHG COTTONOID, SURG PATTIE: Performed by: SPECIALIST

## 2018-05-04 PROCEDURE — 160029 HCHG SURGERY MINUTES - 1ST 30 MINS LEVEL 4: Performed by: SPECIALIST

## 2018-05-04 PROCEDURE — 500125 HCHG BOVIE, HANDLE: Performed by: SPECIALIST

## 2018-05-04 PROCEDURE — 160036 HCHG PACU - EA ADDL 30 MINS PHASE I: Performed by: SPECIALIST

## 2018-05-04 PROCEDURE — 160009 HCHG ANES TIME/MIN: Performed by: SPECIALIST

## 2018-05-04 PROCEDURE — 501838 HCHG SUTURE GENERAL: Performed by: SPECIALIST

## 2018-05-04 PROCEDURE — 700102 HCHG RX REV CODE 250 W/ 637 OVERRIDE(OP)

## 2018-05-04 PROCEDURE — 160041 HCHG SURGERY MINUTES - EA ADDL 1 MIN LEVEL 4: Performed by: SPECIALIST

## 2018-05-04 PROCEDURE — 110454 HCHG SHELL REV 250: Performed by: SPECIALIST

## 2018-05-04 PROCEDURE — 501404 HCHG SPLINT, NASAL DOYLE AIRWAY: Performed by: SPECIALIST

## 2018-05-04 PROCEDURE — A9270 NON-COVERED ITEM OR SERVICE: HCPCS

## 2018-05-04 PROCEDURE — 502573 HCHG PACK, ENT: Performed by: SPECIALIST

## 2018-05-04 PROCEDURE — 160035 HCHG PACU - 1ST 60 MINS PHASE I: Performed by: SPECIALIST

## 2018-05-04 RX ORDER — LIDOCAINE HYDROCHLORIDE 10 MG/ML
INJECTION, SOLUTION EPIDURAL; INFILTRATION; INTRACAUDAL; PERINEURAL
Status: DISCONTINUED
Start: 2018-05-04 | End: 2018-05-04 | Stop reason: HOSPADM

## 2018-05-04 RX ORDER — BACITRACIN ZINC 500 [USP'U]/G
OINTMENT TOPICAL
Status: DISCONTINUED | OUTPATIENT
Start: 2018-05-04 | End: 2018-05-04 | Stop reason: HOSPADM

## 2018-05-04 RX ORDER — CEFDINIR 300 MG/1
300 CAPSULE ORAL 2 TIMES DAILY
Qty: 14 CAP | Refills: 0 | Status: SHIPPED | OUTPATIENT
Start: 2018-05-04 | End: 2018-11-16

## 2018-05-04 RX ORDER — OXYMETAZOLINE HYDROCHLORIDE 0.05 G/100ML
SPRAY NASAL
Status: DISCONTINUED | OUTPATIENT
Start: 2018-05-04 | End: 2018-05-04 | Stop reason: HOSPADM

## 2018-05-04 RX ORDER — BACITRACIN 50000 [IU]/1
INJECTION, POWDER, FOR SOLUTION INTRAMUSCULAR
Status: DISCONTINUED
Start: 2018-05-04 | End: 2018-05-04 | Stop reason: HOSPADM

## 2018-05-04 RX ORDER — SODIUM CHLORIDE, SODIUM LACTATE, POTASSIUM CHLORIDE, CALCIUM CHLORIDE 600; 310; 30; 20 MG/100ML; MG/100ML; MG/100ML; MG/100ML
INJECTION, SOLUTION INTRAVENOUS CONTINUOUS
Status: DISCONTINUED | OUTPATIENT
Start: 2018-05-04 | End: 2018-05-04 | Stop reason: HOSPADM

## 2018-05-04 RX ORDER — LIDOCAINE HYDROCHLORIDE AND EPINEPHRINE 10; 10 MG/ML; UG/ML
INJECTION, SOLUTION INFILTRATION; PERINEURAL
Status: DISCONTINUED | OUTPATIENT
Start: 2018-05-04 | End: 2018-05-04 | Stop reason: HOSPADM

## 2018-05-04 RX ORDER — OXYMETAZOLINE HYDROCHLORIDE 0.05 G/100ML
SPRAY NASAL
Status: DISCONTINUED
Start: 2018-05-04 | End: 2018-05-04 | Stop reason: HOSPADM

## 2018-05-04 RX ORDER — HYDROCODONE BITARTRATE AND ACETAMINOPHEN 7.5; 325 MG/1; MG/1
1 TABLET ORAL EVERY 6 HOURS PRN
Qty: 16 TAB | Refills: 0 | Status: SHIPPED | OUTPATIENT
Start: 2018-05-04 | End: 2018-05-08

## 2018-05-04 RX ORDER — OXYMETAZOLINE HYDROCHLORIDE 0.05 G/100ML
SPRAY NASAL
Status: COMPLETED
Start: 2018-05-04 | End: 2018-05-04

## 2018-05-04 RX ORDER — EPINEPHRINE 1 MG/ML
INJECTION INTRAMUSCULAR; INTRAVENOUS; SUBCUTANEOUS
Status: DISCONTINUED
Start: 2018-05-04 | End: 2018-05-04 | Stop reason: HOSPADM

## 2018-05-04 RX ORDER — BACITRACIN ZINC 500 [USP'U]/G
OINTMENT TOPICAL
Status: DISCONTINUED
Start: 2018-05-04 | End: 2018-05-04 | Stop reason: HOSPADM

## 2018-05-04 RX ADMIN — SODIUM CHLORIDE, SODIUM LACTATE, POTASSIUM CHLORIDE, CALCIUM CHLORIDE: 600; 310; 30; 20 INJECTION, SOLUTION INTRAVENOUS at 13:30

## 2018-05-04 RX ADMIN — OXYMETAZOLINE HYDROCHLORIDE 2 SPRAY: 5 SPRAY NASAL at 13:15

## 2018-05-04 ASSESSMENT — PAIN SCALES - GENERAL
PAINLEVEL_OUTOF10: 0

## 2018-05-04 NOTE — OP REPORT
DATE OF SERVICE:  05/04/2018    SURGEON:  Jesse Saavedra MD    ASSISTANT:  None.    ANESTHESIA:  General given per endotracheal tube by Dr. Jett.    PREOPERATIVE DIAGNOSES:  Nasal septal deformity, turbinate hypertrophy,   chronic rhinitis.    POSTOPERATIVE DIAGNOSES:  Nasal septal deformity, turbinate hypertrophy,   chronic rhinitis.    NAME OF THE PROCEDURE:  Nasal septoplasty, submucous resection of inferior   turbinates, bilateral.    INDICATIONS:  Patient is a 67-year-old man who has had difficulty with chronic   nasal obstruction and developed a rhinitis medicamentosa from overuse of   decongestant nasal spray.  He was weaned off of the decongestant spray, but   has persistent nasal obstruction complaints.  Septal deformity and turbinate   hypertrophy have been noted on examination.  He presents now for surgical   intervention.    DESCRIPTION OF PROCEDURE:  Patient brought in the operating room, placed in   supine position on operating table.  General anesthesia was induced and the   patient endotracheally intubated without difficulty.  Eyes are protected with   taping and the table was turned 90 degrees.    Approximately 4 mL of 4% cocaine solution applied topically in cottonoid   pledgets both sides of nose.  Vibrissae were trimmed.  The patient has some   small 1-2 mm pustules present along the nasal tip skin.  These were covered   with a Tegaderm in anticipation of surgery on the anterior nose.  A 4 mL of 4%   cocaine solution applied topically and cotton pledgets both sides of the   nose.  Vibrissae were trimmed.  After initial vasoconstriction, the cottonoid   pledgets were removed.  Approximately 12 mL of 1% Xylocaine with 1:100,000   units of epinephrine solution was used to infiltrate the nasal septum and   inferior turbinate areas bilaterally.  Cottonoid pledgets were repositioned in   the nose.  The patient was subsequently draped to expose the face for   surgery.    Following removal of the  cottonoid pledgets, the patient was noted to have   septal deflection posteriorly to the left with spur formation into the middle   meatal area.  There was a spur inferiorly, on the left anteriorly.  The septum   itself deflects anteriorly to the left side as well.  Turbinate hypertrophy   was noted bilaterally, greater on the right side.  No polypoid disease was   noted.    Attention was first directed to the nasal septum where a left hemitransfixion   incision was made.  Anterior and posterior tunnels were created in the   submucoperichondrial and submucoperiosteal planes on the left hand side.  Care   was taken with elevation over the area of the spur formation, both anteriorly   as well as posteriorly.  The bony cartilaginous junction was then divided.    The anterior septal cartilage was taken down sharply off the maxillary crest   with a very small amount of cartilage being excised from the mid to posterior   aspect of the quadrilateral cartilage inferiorly, no more than 2 mm.  A   portion of the maxillary crest, which contributed to the inferior spur   anteriorly was also divided with a 3 mm osteotome and portions were removed   with the Shayla forceps.    The posterior spur was primarily vomeric in nature and Ashia   forceps was used to fracture remove portions of the vomeric bone to remove the   spur.  The portion of the perpendicular plate of the ethmoid was also   fractured and then repositioned into the posterior septum as this was also   contributing to the deflection to the left.  A portion of the vomeric bone was   then repositioned into the posterior septum.    At this point, the septum is noted to fall anatomically in the midline.  The   hemitransfixion incision was closed using the 4-0 chromic suture in a running   locked fashion.  A 4-0 chromic was also used in a running horizontal mattress   type quilting fashion to repose mucosal flaps.    Attention was then directed to the left  inferior turbinate.  The anterior   leading edge was first cauterized and then incised.  Mucosa and submucosa were   elevated from the medial and lateral aspects of the anterior portion of the   inferior turbinate bone.  Small portions of the inferior turbinate bone was   then resected in a piecemeal fashion with Shayla forceps.  The remaining   submucosal tissue was intramurally cauterized.  There was noted to be   hypertrophy and mulberry change along the posterior aspect of the inferior   turbinate and this was also cauterized with suction cautery.  Remaining   turbinate is outfractured.    Attention then directed to the right inferior turbinate.  The anterior leading   edge was again cauterized and then incised.  Mucosa and submucosa were   elevated from the medial and lateral aspects of the anterior portion of the   inferior turbinate bone.  Small portions of the inferior turbinate bone were   then resected in a piecemeal fashion with Shayla forceps.  The remaining   submucosal tissue was intramurally cauterized.  The remaining turbinate was   outfractured.    Stef bivalved intranasal splints were placed bilaterally along the nasal   septum and secured with a 3-0 silk suture.  Surgiflo was then applied to the   inferior meatus area in the region of the submucous resection of the   turbinates.    The procedure was then terminated.  The patient is subsequently awakened from   anesthesia and extubated without difficulty.  He was taken from the operating   room to the recovery area, awake, breathing on his own in stable condition.    There are no apparent complications.  Blood loss during the procedure is felt   to be less than 30 mL.       ____________________________________     MD GUS MANUEL / DOMINIQUE    DD:  05/04/2018 14:55:13  DT:  05/04/2018 15:29:29    D#:  [id number]  Job#:  [id number]

## 2018-05-04 NOTE — OR SURGEON
Immediate Post OP Note    PreOp Diagnosis: NSD, turbinate hypertrophy, chronic rhinitis    PostOp Diagnosis: same    Procedure(s):  SEPTOPLASTY - Wound Class: Clean Contaminated  TURBINATE REDUCTION- RESECTION WITH SUBMUCOSAL APPROACH - Wound Class: Clean Contaminated    Surgeon(s):  Jesse Saavedra M.D.    Anesthesiologist/Type of Anesthesia:  Anesthesiologist: Amari Jett D.O./General    Surgical Staff:  Circulator: Terrence Lopez R.N.; Pushpa Contreras R.N.  Scrub Person: Mo Wilhelm    Specimens removed if any:  * No specimens in log *    Estimated Blood Loss: <30ml  Findings:     Complications: none        5/4/2018 2:48 PM Jesse Saavedra M.D.

## 2018-05-04 NOTE — DISCHARGE INSTRUCTIONS
ACTIVITY: Rest and take it easy for the first 24 hours.  A responsible adult is recommended to remain with you during that time.  It is normal to feel sleepy.  We encourage you to not do anything that requires balance, judgment or coordination.    MILD FLU-LIKE SYMPTOMS ARE NORMAL. YOU MAY EXPERIENCE GENERALIZED MUSCLE ACHES, THROAT IRRITATION, HEADACHE AND/OR SOME NAUSEA.    FOR 24 HOURS DO NOT:  Drive, operate machinery or run household appliances.  Drink beer or alcoholic beverages.   Make important decisions or sign legal documents.    SPECIAL INSTRUCTIONS: *Refer to septoplasty instructions**    DIET: To avoid nausea, slowly advance diet as tolerated, avoiding spicy or greasy foods for the first day.  Add more substantial food to your diet according to your physician's instructions.  Babies can be fed formula or breast milk as soon as they are hungry.  INCREASE FLUIDS AND FIBER TO AVOID CONSTIPATION.    SURGICAL DRESSING/BATHING: *May shower tomorrow**    FOLLOW-UP APPOINTMENT:  A follow-up appointment should be arranged with your doctor in *call to schedule**.    You should CALL YOUR PHYSICIAN if you develop:  Fever greater than 101 degrees F.  Pain not relieved by medication, or persistent nausea or vomiting.  Excessive bleeding (blood soaking through dressing) or unexpected drainage from the wound.  Extreme redness or swelling around the incision site, drainage of pus or foul smelling drainage.  Inability to urinate or empty your bladder within 8 hours.  Problems with breathing or chest pain.    You should call 911 if you develop problems with breathing or chest pain.  If you are unable to contact your doctor or surgical center, you should go to the nearest emergency room or urgent care center.    Physician's telephone #: *Dr. Saavedra [phone]**    If any questions arise, call your doctor.  If your doctor is not available, please feel free to call the Surgical Center at [phone].  The Center is open  Monday through Friday from 7AM to 7PM.  You can also call the HEALTH HOTLINE open 24 hours/day, 7 days/week and speak to a nurse at [phone], or toll free at [phone].    A registered nurse may call you a few days after your surgery to see how you are doing after your procedure.    MEDICATIONS: Resume taking daily medication.  Take prescribed pain medication with food.  If no medication is prescribed, you may take non-aspirin pain medication if needed.  PAIN MEDICATION CAN BE VERY CONSTIPATING.  Take a stool softener or laxative such as senokot, pericolace, or milk of magnesia if needed.    Prescription given for *_____________________________**.  Last pain medication given at *____________________________**.    If your physician has prescribed pain medication that includes Acetaminophen (Tylenol), do not take additional Acetaminophen (Tylenol) while taking the prescribed medication.    Depression / Suicide Risk    As you are discharged from this [facility] facility, it is important to learn how to keep safe from harming yourself.    Recognize the warning signs:  · Abrupt changes in personality, positive or negative- including increase in energy   · Giving away possessions  · Change in eating patterns- significant weight changes-  positive or negative  · Change in sleeping patterns- unable to sleep or sleeping all the time   · Unwillingness or inability to communicate  · Depression  · Unusual sadness, discouragement and loneliness  · Talk of wanting to die  · Neglect of personal appearance   · Rebelliousness- reckless behavior  · Withdrawal from people/activities they love  · Confusion- inability to concentrate     If you or a loved one observes any of these behaviors or has concerns about self-harm, here's what you can do:  · Talk about it- your feelings and reasons for harming yourself  · Remove any means that you might use to hurt yourself (examples: pills, rope, extension cords, firearm)  · Get  professional help from the community (Mental Health, Substance Abuse, psychological counseling)  · Do not be alone:Call your Safe Contact- someone whom you trust who will be there for you.  · Call your local CRISIS HOTLINE 001-2375 or 199-886-5007  · Call your local Children's Mobile Crisis Response Team [place] (320) 306-3723 or www.Digiboo  · Call the toll free National Suicide Prevention Hotlines   · National Suicide Prevention Lifeline 625-945-WRKI (1244)  · National Hope Line Network 800-SUICIDE (755-7843)

## 2018-05-04 NOTE — OR NURSING
1448 Received pt from OR, received report from Dr. Jett. Pt sleeping, VS WNL.     1600 Pt ambulated to BR, gait steady. Pt has scant bleeding from nose,     1615 Pt dressing with assistance from family.     1620 Pt and family given instructions for discharge, evidence of understanding demonstrated.     1625 Pt meets criteria for discharge, pt out to car in WC.

## 2018-05-07 DIAGNOSIS — G47.9 SLEEP DISORDER: ICD-10-CM

## 2018-05-07 NOTE — TELEPHONE ENCOUNTER
Was the patient seen in the last year in this department? Yes     Does patient have an active prescription for medications requested? No     Received Request Via: Pharmacy      scanned in media    Future Appointments       Provider Department Center    5/7/2018 11:56 AM Jennifer kSy P.A.-C. [facility]

## 2018-05-08 RX ORDER — ZOLPIDEM TARTRATE 10 MG/1
10 TABLET ORAL NIGHTLY PRN
Qty: 30 TAB | Refills: 2 | Status: SHIPPED
Start: 2018-05-08 | End: 2018-05-09 | Stop reason: SDUPTHER

## 2018-05-08 NOTE — TELEPHONE ENCOUNTER
Zolpidem 10 mg was sent to pharmacy w 2 refills. Please inform patient he needs appointment for further refill.     Jennifer Sky P.A.-C.

## 2018-05-09 DIAGNOSIS — G47.9 SLEEP DISORDER: ICD-10-CM

## 2018-05-09 RX ORDER — ZOLPIDEM TARTRATE 10 MG/1
10 TABLET ORAL NIGHTLY PRN
Qty: 30 TAB | Refills: 2 | Status: SHIPPED
Start: 2018-05-09 | End: 2018-06-08

## 2018-05-09 NOTE — TELEPHONE ENCOUNTER
Verified with [facility] that they did not receive faxed rx.    Was the patient seen in the last year in this department? Yes     Does patient have an active prescription for medications requested? No     Received Request Via: Pharmacy

## 2018-08-02 DIAGNOSIS — F51.01 PRIMARY INSOMNIA: ICD-10-CM

## 2018-08-02 RX ORDER — ZOLPIDEM TARTRATE 10 MG/1
TABLET ORAL
Refills: 2 | COMMUNITY
Start: 2018-07-05 | End: 2018-08-02 | Stop reason: SDUPTHER

## 2018-08-02 RX ORDER — ZOLPIDEM TARTRATE 10 MG/1
TABLET ORAL
Qty: 30 TAB | Refills: 2 | Status: SHIPPED
Start: 2018-08-02 | End: 2018-09-01

## 2018-08-02 NOTE — TELEPHONE ENCOUNTER
Was the patient seen in the last year in this department? Yes    Does patient have an active prescription for medications requested? Yes    Received Request Via: Pharmacy      in media

## 2018-08-02 NOTE — TELEPHONE ENCOUNTER
Patient should be seen in the office by PCP prior to receiving any further refills, last seen 7 months ago.

## 2018-09-17 ENCOUNTER — PATIENT OUTREACH (OUTPATIENT)
Dept: HEALTH INFORMATION MANAGEMENT | Facility: OTHER | Age: 68
End: 2018-09-17

## 2018-10-05 ENCOUNTER — PATIENT OUTREACH (OUTPATIENT)
Dept: HEALTH INFORMATION MANAGEMENT | Facility: OTHER | Age: 68
End: 2018-10-05

## 2018-11-16 ENCOUNTER — OFFICE VISIT (OUTPATIENT)
Dept: MEDICAL GROUP | Facility: MEDICAL CENTER | Age: 68
End: 2018-11-16
Payer: COMMERCIAL

## 2018-11-16 VITALS
DIASTOLIC BLOOD PRESSURE: 96 MMHG | BODY MASS INDEX: 27.28 KG/M2 | HEIGHT: 69 IN | HEART RATE: 86 BPM | OXYGEN SATURATION: 93 % | SYSTOLIC BLOOD PRESSURE: 150 MMHG | WEIGHT: 184.2 LBS

## 2018-11-16 DIAGNOSIS — I73.9 CLAUDICATION (HCC): ICD-10-CM

## 2018-11-16 DIAGNOSIS — R73.09 ELEVATED GLUCOSE: ICD-10-CM

## 2018-11-16 DIAGNOSIS — I10 HYPERTENSION GOAL BP (BLOOD PRESSURE) < 140/80: Chronic | ICD-10-CM

## 2018-11-16 DIAGNOSIS — N18.30 CKD (CHRONIC KIDNEY DISEASE) STAGE 3, GFR 30-59 ML/MIN (HCC): ICD-10-CM

## 2018-11-16 DIAGNOSIS — I73.9 PERIPHERAL VASCULAR DISEASE (HCC): Chronic | ICD-10-CM

## 2018-11-16 DIAGNOSIS — Z23 NEED FOR INFLUENZA VACCINATION: ICD-10-CM

## 2018-11-16 DIAGNOSIS — E78.5 DYSLIPIDEMIA: ICD-10-CM

## 2018-11-16 DIAGNOSIS — F51.01 PRIMARY INSOMNIA: ICD-10-CM

## 2018-11-16 PROCEDURE — 90662 IIV NO PRSV INCREASED AG IM: CPT | Performed by: PHYSICIAN ASSISTANT

## 2018-11-16 PROCEDURE — 99214 OFFICE O/P EST MOD 30 MIN: CPT | Mod: 25 | Performed by: PHYSICIAN ASSISTANT

## 2018-11-16 PROCEDURE — 90471 IMMUNIZATION ADMIN: CPT | Performed by: PHYSICIAN ASSISTANT

## 2018-11-16 RX ORDER — ZOLPIDEM TARTRATE 10 MG/1
10 TABLET ORAL NIGHTLY PRN
Qty: 30 TAB | Refills: 3 | Status: SHIPPED
Start: 2018-11-16 | End: 2018-12-16

## 2018-11-16 ASSESSMENT — PATIENT HEALTH QUESTIONNAIRE - PHQ9: CLINICAL INTERPRETATION OF PHQ2 SCORE: 0

## 2018-11-16 NOTE — PROGRESS NOTES
Subjective:   Olman Sims is a 68 y.o. male here today for follow up on chronic conditions, due for ambien refill    Hypertension goal BP (blood pressure) < 140/80  Chronic, stable on current, states he takes meds as prescribed, due for labs to check kidney function    Peripheral vascular disease  States taking meds as prescribed, no new symptoms related to this diagnosis    CKD (chronic kidney disease) stage 3, GFR 30-59 ml/min  No urinary symptoms per patient, due for labs    Dyslipidemia  Chronic, stable, due for labs    Primary insomnia  Chronic, stable on current daily ambien, due for refill,  verified, doesn't take with alcohol, denies side effects or adverse reactions       Current medicines (including changes today)  Current Outpatient Prescriptions   Medication Sig Dispense Refill   • zolpidem (AMBIEN) 10 MG Tab Take 1 Tab by mouth at bedtime as needed for Sleep for up to 30 days. 30 Tab 3   • amLODIPine (NORVASC) 10 MG Tab TAKE 1 TABLET BY MOUTH EVERY DAY 90 Tab 0   • SPIRIVA HANDIHALER 18 MCG Cap INHALE THE CONTENTS OF 1 CAPSUL BY MOUTH USING HANDI HALER EVERY DAY 30 Cap 3   • atorvastatin (LIPITOR) 10 MG Tab Take 1 Tab by mouth every evening. 90 Tab 1   • losartan (COZAAR) 25 MG Tab TAKE 1 TABLET BY MOUTH EVERY DAY (Patient taking differently: TAKE 1 TABLET BY MOUTH EVERY DAY PM) 90 Tab 3   • VENTOLIN  (90 Base) MCG/ACT Aero Soln inhalation aerosol INHALE 2 PUFFS BY MOUTH EVERY 6 HOURS AS NEEDED FOR PAIN 1 Inhaler 11   • fluticasone (FLONASE) 50 MCG/ACT nasal spray SHAKE LIQUID AND USE 1 SPRAY IN EACH NOSTRIL EVERY DAY 1 Bottle 11   • ADVAIR -21 MCG/ACT inhaler INHALE 2 PUFFS BY MOUTH TWICE DAILY 36 g 6   • Misc. Devices Misc Inogen One G4 oxygen compressor, si L nocturnal 1 Device 0     No current facility-administered medications for this visit.      He  has a past medical history of Anesthesia; Breath shortness; COPD; Dental disorder; Emphysema of lung (HCC);  "Hypertension; Oxygen dependent; and Renal disorder.    ROS   No fever/chills. No weight change. No headache/dizziness. No focal weakness. No sore throat, nasal congestion, ear pain. No chest pain, no shortness of breath, difficulty breathing. No n/v/d/c or abdominal pain. No urinary complaint. No rash or skin lesion. No joint pain or swelling.       Objective:     Blood pressure 150/96, pulse 86, height 1.753 m (5' 9\"), weight 83.6 kg (184 lb 3.2 oz), SpO2 93 %. Body mass index is 27.2 kg/m².   Physical Exam:  Constitutional: WDWN, NAD  Skin: Warm, dry, good turgor, no rashes in visible areas.  Respiratory: Unlabored respiratory effort, lungs clear to auscultation, no wheezes, no ronchi.  Cardiovascular: Normal S1, S2, no murmur, no leg edema.  Psych: Alert and oriented x3, normal affect and mood.    Assessment and Plan:   The following treatment plan was discussed    1. Primary insomnia  [facility] Aware web site evaluation: I have obtained and reviewed patient utilization report from [facility] pharmacy database prior to writing prescription for controlled substance II, III or IV. Based on the report and my clinical assessment the prescription is medically necessary.     - zolpidem (AMBIEN) 10 MG Tab; Take 1 Tab by mouth at bedtime as needed for Sleep for up to 30 days.  Dispense: 30 Tab; Refill: 3    2. Need for influenza vaccination    - INFLUENZA VACCINE, HIGH DOSE (65+ ONLY)    3. Hypertension goal BP (blood pressure) < 140/80    - COMP METABOLIC PANEL; Future    4. CKD (chronic kidney disease) stage 3, GFR 30-59 ml/min (HCC)      5. Claudication (HCC)    - Lipid Profile; Future    6. Peripheral vascular disease (HCC)    - Lipid Profile; Future    7. Elevated glucose    - HEMOGLOBIN A1C; Future    8. Dyslipidemia        Followup: Return in about 3 months (around 2/16/2019).         "

## 2018-11-16 NOTE — ASSESSMENT & PLAN NOTE
Chronic, stable on current, states he takes meds as prescribed, due for labs to check kidney function

## 2018-11-16 NOTE — ASSESSMENT & PLAN NOTE
Chronic, stable on current daily ambien, due for refill,  verified, doesn't take with alcohol, denies side effects or adverse reactions

## 2018-12-11 DIAGNOSIS — I10 ESSENTIAL HYPERTENSION: ICD-10-CM

## 2018-12-11 DIAGNOSIS — N18.30 CKD (CHRONIC KIDNEY DISEASE) STAGE 3, GFR 30-59 ML/MIN (HCC): ICD-10-CM

## 2018-12-11 RX ORDER — AMLODIPINE BESYLATE 10 MG/1
TABLET ORAL
Qty: 90 TAB | Refills: 0 | Status: SHIPPED | OUTPATIENT
Start: 2018-12-11 | End: 2019-05-16

## 2018-12-11 NOTE — TELEPHONE ENCOUNTER
Was the patient seen in the last year in this department? Yes    Does patient have an active prescription for medications requested? No     Received Request Via: Pharmacy     Jennifer Hunt.

## 2018-12-13 RX ORDER — LOSARTAN POTASSIUM 25 MG/1
TABLET ORAL
Qty: 90 TAB | Refills: 3 | Status: SHIPPED | OUTPATIENT
Start: 2018-12-13 | End: 2019-05-16

## 2019-01-08 ENCOUNTER — HOSPITAL ENCOUNTER (OUTPATIENT)
Dept: LAB | Facility: MEDICAL CENTER | Age: 69
End: 2019-01-08
Attending: PHYSICIAN ASSISTANT
Payer: COMMERCIAL

## 2019-01-08 DIAGNOSIS — I73.9 PERIPHERAL VASCULAR DISEASE (HCC): Chronic | ICD-10-CM

## 2019-01-08 DIAGNOSIS — R73.09 ELEVATED GLUCOSE: ICD-10-CM

## 2019-01-08 DIAGNOSIS — I73.9 CLAUDICATION (HCC): ICD-10-CM

## 2019-01-08 DIAGNOSIS — I10 HYPERTENSION GOAL BP (BLOOD PRESSURE) < 140/80: Chronic | ICD-10-CM

## 2019-01-08 LAB
ALBUMIN SERPL BCP-MCNC: 4.2 G/DL (ref 3.2–4.9)
ALBUMIN/GLOB SERPL: 1.5 G/DL
ALP SERPL-CCNC: 111 U/L (ref 30–99)
ALT SERPL-CCNC: 21 U/L (ref 2–50)
ANION GAP SERPL CALC-SCNC: 14 MMOL/L (ref 0–11.9)
AST SERPL-CCNC: 19 U/L (ref 12–45)
BILIRUB SERPL-MCNC: 1.5 MG/DL (ref 0.1–1.5)
BUN SERPL-MCNC: 20 MG/DL (ref 8–22)
CALCIUM SERPL-MCNC: 9.4 MG/DL (ref 8.5–10.5)
CHLORIDE SERPL-SCNC: 102 MMOL/L (ref 96–112)
CHOLEST SERPL-MCNC: 158 MG/DL (ref 100–199)
CO2 SERPL-SCNC: 27 MMOL/L (ref 20–33)
CREAT SERPL-MCNC: 1.54 MG/DL (ref 0.5–1.4)
EST. AVERAGE GLUCOSE BLD GHB EST-MCNC: 123 MG/DL
GLOBULIN SER CALC-MCNC: 2.8 G/DL (ref 1.9–3.5)
GLUCOSE SERPL-MCNC: 91 MG/DL (ref 65–99)
HBA1C MFR BLD: 5.9 % (ref 0–5.6)
HDLC SERPL-MCNC: 37 MG/DL
LDLC SERPL CALC-MCNC: 91 MG/DL
POTASSIUM SERPL-SCNC: 3.7 MMOL/L (ref 3.6–5.5)
PROT SERPL-MCNC: 7 G/DL (ref 6–8.2)
SODIUM SERPL-SCNC: 143 MMOL/L (ref 135–145)
TRIGL SERPL-MCNC: 150 MG/DL (ref 0–149)

## 2019-01-08 PROCEDURE — 80061 LIPID PANEL: CPT

## 2019-01-08 PROCEDURE — 36415 COLL VENOUS BLD VENIPUNCTURE: CPT

## 2019-01-08 PROCEDURE — 83036 HEMOGLOBIN GLYCOSYLATED A1C: CPT

## 2019-01-08 PROCEDURE — 80053 COMPREHEN METABOLIC PANEL: CPT

## 2019-01-10 ENCOUNTER — OFFICE VISIT (OUTPATIENT)
Dept: MEDICAL GROUP | Facility: MEDICAL CENTER | Age: 69
End: 2019-01-10
Payer: COMMERCIAL

## 2019-01-10 VITALS
HEART RATE: 73 BPM | SYSTOLIC BLOOD PRESSURE: 108 MMHG | HEIGHT: 69 IN | RESPIRATION RATE: 16 BRPM | WEIGHT: 183.6 LBS | BODY MASS INDEX: 27.19 KG/M2 | DIASTOLIC BLOOD PRESSURE: 66 MMHG | OXYGEN SATURATION: 89 %

## 2019-01-10 DIAGNOSIS — I10 ESSENTIAL HYPERTENSION: ICD-10-CM

## 2019-01-10 DIAGNOSIS — I73.9 PERIPHERAL VASCULAR DISEASE (HCC): Chronic | ICD-10-CM

## 2019-01-10 DIAGNOSIS — N18.30 CKD (CHRONIC KIDNEY DISEASE) STAGE 3, GFR 30-59 ML/MIN (HCC): ICD-10-CM

## 2019-01-10 DIAGNOSIS — J42 CHRONIC BRONCHITIS, UNSPECIFIED CHRONIC BRONCHITIS TYPE (HCC): Chronic | ICD-10-CM

## 2019-01-10 DIAGNOSIS — E78.5 DYSLIPIDEMIA: ICD-10-CM

## 2019-01-10 DIAGNOSIS — J96.11 CHRONIC RESPIRATORY FAILURE WITH HYPOXIA (HCC): ICD-10-CM

## 2019-01-10 DIAGNOSIS — Z12.5 SCREENING PSA (PROSTATE SPECIFIC ANTIGEN): ICD-10-CM

## 2019-01-10 DIAGNOSIS — I73.9 CLAUDICATION (HCC): ICD-10-CM

## 2019-01-10 DIAGNOSIS — R73.03 PREDIABETES: ICD-10-CM

## 2019-01-10 PROCEDURE — 99213 OFFICE O/P EST LOW 20 MIN: CPT | Performed by: PHYSICIAN ASSISTANT

## 2019-01-10 ASSESSMENT — PATIENT HEALTH QUESTIONNAIRE - PHQ9: CLINICAL INTERPRETATION OF PHQ2 SCORE: 0

## 2019-01-10 NOTE — PROGRESS NOTES
Subjective:   Olman Sims is a 68 y.o. male here today for follow up on chronic conditions    Prediabetes  A1C at 5.9, diet controlled    COPD (chronic obstructive pulmonary disease)  Taking inhalers as prescribed, no new cough or SOB. Declines PFTs or pulmonology referral at this time.    CKD (chronic kidney disease) stage 3, GFR 30-59 ml/min  Stable, patient states normal urination, not taking any NSAIDs, prefers not to see nephrology if not required    Chronic respiratory failure with hypoxia ([facility]) ([facility])  Uses oxygen at home during the day and night, doesn't take it out of the house, no new SOB, chest pain, fatigue       Current medicines (including changes today)  Current Outpatient Prescriptions   Medication Sig Dispense Refill   • losartan (COZAAR) 25 MG Tab TAKE 1 TABLET BY MOUTH EVERY DAY 90 Tab 3   • amLODIPine (NORVASC) 10 MG Tab TAKE 1 TABLET BY MOUTH EVERY DAY 90 Tab 0   • VENTOLIN  (90 Base) MCG/ACT Aero Soln inhalation aerosol INHALE 2 PUFFS BY MOUTH EVERY 6 HOURS AS NEEDED FOR PAIN 1 Inhaler 11   • fluticasone (FLONASE) 50 MCG/ACT nasal spray SHAKE LIQUID AND USE 1 SPRAY IN EACH NOSTRIL EVERY DAY 1 Bottle 11   • ADVAIR -21 MCG/ACT inhaler INHALE 2 PUFFS BY MOUTH TWICE DAILY 36 g 6   • SPIRIVA HANDIHALER 18 MCG Cap INHALE THE CONTENTS OF 1 CAPSUL BY MOUTH USING HANDI HALER EVERY DAY 30 Cap 3   • atorvastatin (LIPITOR) 10 MG Tab Take 1 Tab by mouth every evening. 90 Tab 1   • Misc. Devices Misc Inogen One G4 oxygen compressor, si L nocturnal 1 Device 0     No current facility-administered medications for this visit.      He  has a past medical history of Anesthesia; Breath shortness; COPD; Dental disorder; Emphysema of lung (HCC); Hypertension; Oxygen dependent; and Renal disorder.    ROS   No fever/chills. No weight change. No headache/dizziness. No focal weakness. No sore throat, nasal congestion, ear pain. No chest pain, no shortness of breath, difficulty breathing.  "No n/v/d/c or abdominal pain. No urinary complaint. No rash or skin lesion. No joint pain or swelling.     Objective:     Blood pressure 108/66, pulse 73, resp. rate 16, height 1.753 m (5' 9\"), weight 83.3 kg (183 lb 9.6 oz), SpO2 89 %. Body mass index is 27.11 kg/m².   Physical Exam:  Constitutional: WDWN, NAD  Skin: Warm, dry, good turgor, no rashes in visible areas.  Neck: Trachea midline, no masses, no thyromegaly. No cervical or supraclavicular lymphadenopathy  Respiratory: Unlabored respiratory effort, lungs clear to auscultation, no wheezes, no ronchi.  Cardiovascular: Normal S1, S2, no murmur, no leg edema.  Psych: Alert and oriented x3, normal affect and mood.    Assessment and Plan:   The following treatment plan was discussed: cont current meds, f/u 3 months    1. Chronic bronchitis, unspecified chronic bronchitis type (HCC)      2. Peripheral vascular disease (HCC)      3. Claudication (HCC)      4. Essential hypertension    - COMP METABOLIC PANEL; Future    5. CKD (chronic kidney disease) stage 3, GFR 30-59 ml/min (HCC)    - COMP METABOLIC PANEL; Future  - MICROALBUMIN CREAT RATIO URINE; Future    6. Chronic respiratory failure with hypoxia (HCC)      7. Dyslipidemia    - Lipid Profile; Future    8. Screening PSA (prostate specific antigen)    - PROSTATE SPECIFIC AG SCREENING; Future    9. Prediabetes        Followup: Return in about 3 months (around 4/10/2019).         "

## 2019-01-10 NOTE — ASSESSMENT & PLAN NOTE
Stable, patient states normal urination, not taking any NSAIDs, prefers not to see nephrology if not required

## 2019-01-10 NOTE — ASSESSMENT & PLAN NOTE
Taking inhalers as prescribed, no new cough or SOB. Declines PFTs or pulmonology referral at this time.

## 2019-01-10 NOTE — ASSESSMENT & PLAN NOTE
Uses oxygen at home during the day and night, doesn't take it out of the house, no new SOB, chest pain, fatigue

## 2019-03-11 DIAGNOSIS — G47.00 INSOMNIA, UNSPECIFIED TYPE: ICD-10-CM

## 2019-03-12 RX ORDER — ZOLPIDEM TARTRATE 10 MG/1
10 TABLET ORAL NIGHTLY PRN
Qty: 30 TAB | Refills: 0 | Status: SHIPPED
Start: 2019-03-12 | End: 2019-04-10 | Stop reason: SDUPTHER

## 2019-03-25 ENCOUNTER — PATIENT MESSAGE (OUTPATIENT)
Dept: MEDICAL GROUP | Facility: MEDICAL CENTER | Age: 69
End: 2019-03-25

## 2019-03-26 RX ORDER — TIOTROPIUM BROMIDE 18 UG/1
CAPSULE ORAL; RESPIRATORY (INHALATION)
Qty: 30 CAP | Refills: 3 | Status: SHIPPED | OUTPATIENT
Start: 2019-03-26 | End: 2019-12-05 | Stop reason: SDUPTHER

## 2019-04-05 ENCOUNTER — HOSPITAL ENCOUNTER (OUTPATIENT)
Dept: LAB | Facility: MEDICAL CENTER | Age: 69
End: 2019-04-05
Attending: PHYSICIAN ASSISTANT
Payer: COMMERCIAL

## 2019-04-05 DIAGNOSIS — E78.5 DYSLIPIDEMIA: ICD-10-CM

## 2019-04-05 DIAGNOSIS — I10 ESSENTIAL HYPERTENSION: ICD-10-CM

## 2019-04-05 DIAGNOSIS — N18.30 CKD (CHRONIC KIDNEY DISEASE) STAGE 3, GFR 30-59 ML/MIN (HCC): ICD-10-CM

## 2019-04-05 DIAGNOSIS — Z12.5 SCREENING PSA (PROSTATE SPECIFIC ANTIGEN): ICD-10-CM

## 2019-04-05 LAB
ALBUMIN SERPL BCP-MCNC: 4.5 G/DL (ref 3.2–4.9)
ALBUMIN/GLOB SERPL: 1.6 G/DL
ALP SERPL-CCNC: 104 U/L (ref 30–99)
ALT SERPL-CCNC: 18 U/L (ref 2–50)
ANION GAP SERPL CALC-SCNC: 9 MMOL/L (ref 0–11.9)
AST SERPL-CCNC: 19 U/L (ref 12–45)
BILIRUB SERPL-MCNC: 1.2 MG/DL (ref 0.1–1.5)
BUN SERPL-MCNC: 17 MG/DL (ref 8–22)
CALCIUM SERPL-MCNC: 9.6 MG/DL (ref 8.5–10.5)
CHLORIDE SERPL-SCNC: 104 MMOL/L (ref 96–112)
CHOLEST SERPL-MCNC: 168 MG/DL (ref 100–199)
CO2 SERPL-SCNC: 29 MMOL/L (ref 20–33)
CREAT SERPL-MCNC: 1.41 MG/DL (ref 0.5–1.4)
CREAT UR-MCNC: 151.9 MG/DL
FASTING STATUS PATIENT QL REPORTED: NORMAL
GLOBULIN SER CALC-MCNC: 2.8 G/DL (ref 1.9–3.5)
GLUCOSE SERPL-MCNC: 89 MG/DL (ref 65–99)
HDLC SERPL-MCNC: 37 MG/DL
LDLC SERPL CALC-MCNC: 97 MG/DL
MICROALBUMIN UR-MCNC: 21.9 MG/DL
MICROALBUMIN/CREAT UR: 144 MG/G (ref 0–30)
POTASSIUM SERPL-SCNC: 4.2 MMOL/L (ref 3.6–5.5)
PROT SERPL-MCNC: 7.3 G/DL (ref 6–8.2)
PSA SERPL-MCNC: 1.56 NG/ML (ref 0–4)
SODIUM SERPL-SCNC: 142 MMOL/L (ref 135–145)
TRIGL SERPL-MCNC: 168 MG/DL (ref 0–149)

## 2019-04-05 PROCEDURE — 80053 COMPREHEN METABOLIC PANEL: CPT

## 2019-04-05 PROCEDURE — 84153 ASSAY OF PSA TOTAL: CPT

## 2019-04-05 PROCEDURE — 80061 LIPID PANEL: CPT

## 2019-04-05 PROCEDURE — 82043 UR ALBUMIN QUANTITATIVE: CPT

## 2019-04-05 PROCEDURE — 36415 COLL VENOUS BLD VENIPUNCTURE: CPT

## 2019-04-05 PROCEDURE — 82570 ASSAY OF URINE CREATININE: CPT

## 2019-04-10 ENCOUNTER — OFFICE VISIT (OUTPATIENT)
Dept: MEDICAL GROUP | Facility: MEDICAL CENTER | Age: 69
End: 2019-04-10
Payer: COMMERCIAL

## 2019-04-10 VITALS
SYSTOLIC BLOOD PRESSURE: 124 MMHG | HEIGHT: 69 IN | BODY MASS INDEX: 26.51 KG/M2 | DIASTOLIC BLOOD PRESSURE: 66 MMHG | TEMPERATURE: 99.4 F | OXYGEN SATURATION: 93 % | HEART RATE: 93 BPM | WEIGHT: 179 LBS | RESPIRATION RATE: 14 BRPM

## 2019-04-10 DIAGNOSIS — G47.00 INSOMNIA, UNSPECIFIED TYPE: ICD-10-CM

## 2019-04-10 DIAGNOSIS — J42 CHRONIC BRONCHITIS, UNSPECIFIED CHRONIC BRONCHITIS TYPE (HCC): Chronic | ICD-10-CM

## 2019-04-10 DIAGNOSIS — I10 HYPERTENSION GOAL BP (BLOOD PRESSURE) < 140/80: Chronic | ICD-10-CM

## 2019-04-10 DIAGNOSIS — F51.01 PRIMARY INSOMNIA: ICD-10-CM

## 2019-04-10 DIAGNOSIS — I73.9 PERIPHERAL VASCULAR DISEASE (HCC): Chronic | ICD-10-CM

## 2019-04-10 DIAGNOSIS — J96.11 CHRONIC RESPIRATORY FAILURE WITH HYPOXIA (HCC): ICD-10-CM

## 2019-04-10 DIAGNOSIS — N18.30 CKD (CHRONIC KIDNEY DISEASE) STAGE 3, GFR 30-59 ML/MIN (HCC): ICD-10-CM

## 2019-04-10 PROCEDURE — 99214 OFFICE O/P EST MOD 30 MIN: CPT | Performed by: PHYSICIAN ASSISTANT

## 2019-04-10 RX ORDER — ZOLPIDEM TARTRATE 10 MG/1
10 TABLET ORAL NIGHTLY PRN
Qty: 30 TAB | Refills: 5 | Status: SHIPPED
Start: 2019-04-10 | End: 2019-05-10

## 2019-04-10 NOTE — ASSESSMENT & PLAN NOTE
Chronic, stable, using meds as prescribed, uses O2 at home, no cough, SOB, difficulty breathing. No recent illness

## 2019-04-10 NOTE — ASSESSMENT & PLAN NOTE
Chronic, stable on current ambien nightly, denies adverse reaction, would like to continue current    Controlled Substance Assessment:     - Is the medication, if previously prescribed, working as intended and expected to treat   the patients symptoms: yes.     - Does the patient have a history of substance abuse: no.     - has the patient demonstrated aberrant behavior or intoxication: no.     - Is there reason to believe that the patient is not using medication as prescribed or diverting the medication: no.     - Is there reason to believe that the patient is currently misusing this medication or addicted to the controlled substance: no.     - Is it necessary to verify that controlled substances, other that those authorized under the treatment plan, are not present in the body of this patient: no.    - Are there any blood or urine test that indicate inappropriate use of the medication or other controlled substances : no.     - I have reviewed the . Does the  report irregular/inconsistent medication use or indicate that the medication is being used inappropriately or that the patient is using another controlled substance not prescribed or known to me: no.     - Is there reason to believe that the patient is using other drugs, including alcohol, prescription or illicit drugs, that may interact negatively with controlled substance or have not been prescribed by a practitioner who is treating the patient: no.     - Are there any known early refill attempts or claims that medication has been lost or stolen: no.     - Are there are any major changes in the patient's mental or physical health that would affect the medical appropriateness of this medication: no.     - Has the patient increased the dose of medication without provider authorization: no.    - Has the patient been reluctant to cooperate with any exam, analysis or test recommended by me: no.     - Has the patient demonstrated reluctance to stop or reduce  use of the medicine: no.     - Has the patient requested or demanded a controlled substance that is likely to be abused or cause dependency or addiction: no.     - Is there any other evidence that the patient is chronically using opioids, misusing, abusing, illegally using or addicted to any drug or failing to comply with the instructions of the practitioner concerning the use of the controlled substance: no    - Are there any other factors that the practitioner determines is necessary to make an informed professional judgment concerning the medical appropriateness of the prescription: no.

## 2019-04-10 NOTE — PROGRESS NOTES
Subjective:   Olman Sims is a 68 y.o. male here today for f/u on chronic conditions    Primary insomnia  Chronic, stable on current ambien nightly, denies adverse reaction, would like to continue current    Controlled Substance Assessment:     - Is the medication, if previously prescribed, working as intended and expected to treat   the patients symptoms: yes.     - Does the patient have a history of substance abuse: no.     - has the patient demonstrated aberrant behavior or intoxication: no.     - Is there reason to believe that the patient is not using medication as prescribed or diverting the medication: no.     - Is there reason to believe that the patient is currently misusing this medication or addicted to the controlled substance: no.     - Is it necessary to verify that controlled substances, other that those authorized under the treatment plan, are not present in the body of this patient: no.    - Are there any blood or urine test that indicate inappropriate use of the medication or other controlled substances : no.     - I have reviewed the . Does the  report irregular/inconsistent medication use or indicate that the medication is being used inappropriately or that the patient is using another controlled substance not prescribed or known to me: no.     - Is there reason to believe that the patient is using other drugs, including alcohol, prescription or illicit drugs, that may interact negatively with controlled substance or have not been prescribed by a practitioner who is treating the patient: no.     - Are there any known early refill attempts or claims that medication has been lost or stolen: no.     - Are there are any major changes in the patient's mental or physical health that would affect the medical appropriateness of this medication: no.     - Has the patient increased the dose of medication without provider authorization: no.    - Has the patient been reluctant to cooperate  with any exam, analysis or test recommended by me: no.     - Has the patient demonstrated reluctance to stop or reduce use of the medicine: no.     - Has the patient requested or demanded a controlled substance that is likely to be abused or cause dependency or addiction: no.     - Is there any other evidence that the patient is chronically using opioids, misusing, abusing, illegally using or addicted to any drug or failing to comply with the instructions of the practitioner concerning the use of the controlled substance: no    - Are there any other factors that the practitioner determines is necessary to make an informed professional judgment concerning the medical appropriateness of the prescription: no.     Hypertension goal BP (blood pressure) < 140/80  Chronic, stable, taking meds as prescribed, no headache or dizziness, no leg swelling, recent kidney function testing stable    COPD (chronic obstructive pulmonary disease)  Chronic, stable, using meds as prescribed, uses O2 at home, no cough, SOB, difficulty breathing. No recent illness    Peripheral vascular disease  Chronic, stable, taking meds as prescribed, no new extremity pain, numbness, skin change    CKD (chronic kidney disease) stage 3, GFR 30-59 ml/min  Chronic, stable, labs up to date    Chronic respiratory failure with hypoxia (CMS[facility]HCC) ([facility])  Using O2 at home         Current medicines (including changes today)  Current Outpatient Prescriptions   Medication Sig Dispense Refill   • zolpidem (AMBIEN) 10 MG Tab Take 1 Tab by mouth at bedtime as needed for Sleep for up to 30 days. 30 Tab 5   • tiotropium (SPIRIVA HANDIHALER) 18 MCG Cap INHALE THE CONTENTS OF 1 CAPSULE BY MOUTH USING HANDI HALER EVERY DAY 30 Cap 3   • losartan (COZAAR) 25 MG Tab TAKE 1 TABLET BY MOUTH EVERY DAY 90 Tab 3   • amLODIPine (NORVASC) 10 MG Tab TAKE 1 TABLET BY MOUTH EVERY DAY 90 Tab 0   • VENTOLIN  (90 Base) MCG/ACT Aero Soln inhalation aerosol INHALE 2 PUFFS BY MOUTH  "EVERY 6 HOURS AS NEEDED FOR PAIN 1 Inhaler 11   • fluticasone (FLONASE) 50 MCG/ACT nasal spray SHAKE LIQUID AND USE 1 SPRAY IN EACH NOSTRIL EVERY DAY 1 Bottle 11   • ADVAIR -21 MCG/ACT inhaler INHALE 2 PUFFS BY MOUTH TWICE DAILY 36 g 6   • atorvastatin (LIPITOR) 10 MG Tab Take 1 Tab by mouth every evening. 90 Tab 1   • Misc. Devices Misc Inogen One G4 oxygen compressor, si L nocturnal 1 Device 0     No current facility-administered medications for this visit.      He  has a past medical history of Anesthesia; Breath shortness; COPD; Dental disorder; Emphysema of lung (HCC); Hypertension; Oxygen dependent; and Renal disorder.    ROS   No fever/chills. No weight change. No headache/dizziness. No focal weakness. No sore throat, nasal congestion, ear pain. No chest pain, no shortness of breath, difficulty breathing. No n/v/d/c or abdominal pain. No urinary complaint. No rash or skin lesion.      Objective:     /66 (BP Location: Right arm, Patient Position: Sitting, BP Cuff Size: Adult)   Pulse 93   Temp 37.4 °C (99.4 °F) (Temporal)   Resp 14   Ht 1.753 m (5' 9\")   Wt 81.2 kg (179 lb)   SpO2 93%  Body mass index is 26.43 kg/m².   Physical Exam:  Constitutional: WDWN, NAD  Skin: Warm, dry, good turgor, no rashes in visible areas.  Psych: Alert and oriented x3, normal affect and mood.    Assessment and Plan:   The following treatment plan was discussed    1. Primary insomnia    - zolpidem (AMBIEN) 10 MG Tab; Take 1 Tab by mouth at bedtime as needed for Sleep for up to 30 days.  Dispense: 30 Tab; Refill: 5  - Consent for Opiate Prescription  - Controlled Substance Treatment Agreement    2. Insomnia, unspecified type    - zolpidem (AMBIEN) 10 MG Tab; Take 1 Tab by mouth at bedtime as needed for Sleep for up to 30 days.  Dispense: 30 Tab; Refill: 5    3. Hypertension goal BP (blood pressure) < 140/80  Cont current    4. Chronic bronchitis, unspecified chronic bronchitis type (HCC)  Cont current    5. " Peripheral vascular disease (HCC)  Cont current    6. CKD (chronic kidney disease) stage 3, GFR 30-59 ml/min (HCC)  Avoid NSAIDs    7. Chronic respiratory failure with hypoxia (HCC)  Cont current      Followup: Return in about 6 months (around 10/10/2019).

## 2019-04-10 NOTE — ASSESSMENT & PLAN NOTE
Chronic, stable, taking meds as prescribed, no headache or dizziness, no leg swelling, recent kidney function testing stable

## 2019-04-29 ENCOUNTER — APPOINTMENT (OUTPATIENT)
Dept: RADIOLOGY | Facility: MEDICAL CENTER | Age: 69
DRG: 981 | End: 2019-04-29
Attending: EMERGENCY MEDICINE
Payer: COMMERCIAL

## 2019-04-29 ENCOUNTER — APPOINTMENT (OUTPATIENT)
Dept: CARDIOLOGY | Facility: MEDICAL CENTER | Age: 69
DRG: 981 | End: 2019-04-29
Attending: INTERNAL MEDICINE
Payer: COMMERCIAL

## 2019-04-29 ENCOUNTER — OFFICE VISIT (OUTPATIENT)
Dept: MEDICAL GROUP | Facility: MEDICAL CENTER | Age: 69
End: 2019-04-29
Payer: COMMERCIAL

## 2019-04-29 ENCOUNTER — HOSPITAL ENCOUNTER (INPATIENT)
Facility: MEDICAL CENTER | Age: 69
LOS: 7 days | DRG: 981 | End: 2019-05-06
Attending: EMERGENCY MEDICINE | Admitting: INTERNAL MEDICINE
Payer: COMMERCIAL

## 2019-04-29 VITALS
DIASTOLIC BLOOD PRESSURE: 74 MMHG | TEMPERATURE: 99.1 F | HEART RATE: 141 BPM | BODY MASS INDEX: 26.16 KG/M2 | OXYGEN SATURATION: 87 % | SYSTOLIC BLOOD PRESSURE: 128 MMHG | WEIGHT: 176.6 LBS | RESPIRATION RATE: 22 BRPM | HEIGHT: 69 IN

## 2019-04-29 DIAGNOSIS — I48.91 ATRIAL FIBRILLATION, UNSPECIFIED TYPE (HCC): ICD-10-CM

## 2019-04-29 DIAGNOSIS — R06.02 SOB (SHORTNESS OF BREATH): ICD-10-CM

## 2019-04-29 DIAGNOSIS — J44.1 ACUTE EXACERBATION OF CHRONIC OBSTRUCTIVE PULMONARY DISEASE (COPD) (HCC): ICD-10-CM

## 2019-04-29 PROBLEM — J96.21 ACUTE ON CHRONIC RESPIRATORY FAILURE WITH HYPOXIA (HCC): Status: ACTIVE | Noted: 2017-10-11

## 2019-04-29 PROBLEM — I24.9 ACUTE CORONARY SYNDROME (HCC): Status: ACTIVE | Noted: 2019-04-29

## 2019-04-29 LAB
ALBUMIN SERPL BCP-MCNC: 3.9 G/DL (ref 3.2–4.9)
ALBUMIN/GLOB SERPL: 1.1 G/DL
ALP SERPL-CCNC: 91 U/L (ref 30–99)
ALT SERPL-CCNC: 35 U/L (ref 2–50)
ANION GAP SERPL CALC-SCNC: 13 MMOL/L (ref 0–11.9)
APPEARANCE UR: ABNORMAL
APTT PPP: 28.9 SEC (ref 24.7–36)
AST SERPL-CCNC: 57 U/L (ref 12–45)
BACTERIA #/AREA URNS HPF: NEGATIVE /HPF
BASOPHILS # BLD AUTO: 0.5 % (ref 0–1.8)
BASOPHILS # BLD: 0.09 K/UL (ref 0–0.12)
BILIRUB SERPL-MCNC: 0.6 MG/DL (ref 0.1–1.5)
BILIRUB UR QL STRIP.AUTO: NEGATIVE
BUN SERPL-MCNC: 32 MG/DL (ref 8–22)
CALCIUM SERPL-MCNC: 9.5 MG/DL (ref 8.5–10.5)
CHLORIDE SERPL-SCNC: 102 MMOL/L (ref 96–112)
CO2 SERPL-SCNC: 25 MMOL/L (ref 20–33)
COLOR UR: YELLOW
CREAT SERPL-MCNC: 1.61 MG/DL (ref 0.5–1.4)
EKG IMPRESSION: NORMAL
EKG IMPRESSION: NORMAL
EOSINOPHIL # BLD AUTO: 0 K/UL (ref 0–0.51)
EOSINOPHIL NFR BLD: 0 % (ref 0–6.9)
EPI CELLS #/AREA URNS HPF: NEGATIVE /HPF
ERYTHROCYTE [DISTWIDTH] IN BLOOD BY AUTOMATED COUNT: 49.1 FL (ref 35.9–50)
GLOBULIN SER CALC-MCNC: 3.7 G/DL (ref 1.9–3.5)
GLUCOSE SERPL-MCNC: 117 MG/DL (ref 65–99)
GLUCOSE UR STRIP.AUTO-MCNC: NEGATIVE MG/DL
HCT VFR BLD AUTO: 47.9 % (ref 42–52)
HGB BLD-MCNC: 15.3 G/DL (ref 14–18)
HYALINE CASTS #/AREA URNS LPF: ABNORMAL /LPF
IMM GRANULOCYTES # BLD AUTO: 0.22 K/UL (ref 0–0.11)
IMM GRANULOCYTES NFR BLD AUTO: 1.2 % (ref 0–0.9)
INR PPP: 1.2 (ref 0.87–1.13)
KETONES UR STRIP.AUTO-MCNC: ABNORMAL MG/DL
LACTATE BLD-SCNC: 2 MMOL/L (ref 0.5–2)
LACTATE BLD-SCNC: 2.7 MMOL/L (ref 0.5–2)
LEUKOCYTE ESTERASE UR QL STRIP.AUTO: ABNORMAL
LV EJECT FRACT  99904: 55
LYMPHOCYTES # BLD AUTO: 1.42 K/UL (ref 1–4.8)
LYMPHOCYTES NFR BLD: 7.6 % (ref 22–41)
MAGNESIUM SERPL-MCNC: 2.1 MG/DL (ref 1.5–2.5)
MCH RBC QN AUTO: 30.4 PG (ref 27–33)
MCHC RBC AUTO-ENTMCNC: 31.9 G/DL (ref 33.7–35.3)
MCV RBC AUTO: 95 FL (ref 81.4–97.8)
MICRO URNS: ABNORMAL
MONOCYTES # BLD AUTO: 1.06 K/UL (ref 0–0.85)
MONOCYTES NFR BLD AUTO: 5.7 % (ref 0–13.4)
NEUTROPHILS # BLD AUTO: 15.91 K/UL (ref 1.82–7.42)
NEUTROPHILS NFR BLD: 85 % (ref 44–72)
NITRITE UR QL STRIP.AUTO: NEGATIVE
NRBC # BLD AUTO: 0 K/UL
NRBC BLD-RTO: 0 /100 WBC
PH UR STRIP.AUTO: 5.5 [PH]
PLATELET # BLD AUTO: 288 K/UL (ref 164–446)
PMV BLD AUTO: 9.3 FL (ref 9–12.9)
POTASSIUM SERPL-SCNC: 3.7 MMOL/L (ref 3.6–5.5)
PROT SERPL-MCNC: 7.6 G/DL (ref 6–8.2)
PROT UR QL STRIP: 100 MG/DL
PROTHROMBIN TIME: 15.3 SEC (ref 12–14.6)
RBC # BLD AUTO: 5.04 M/UL (ref 4.7–6.1)
RBC # URNS HPF: >150 /HPF
RBC UR QL AUTO: ABNORMAL
SODIUM SERPL-SCNC: 140 MMOL/L (ref 135–145)
SP GR UR STRIP.AUTO: 1.02
TROPONIN I SERPL-MCNC: 12.12 NG/ML (ref 0–0.04)
UROBILINOGEN UR STRIP.AUTO-MCNC: 1 MG/DL
WBC # BLD AUTO: 18.7 K/UL (ref 4.8–10.8)
WBC #/AREA URNS HPF: ABNORMAL /HPF

## 2019-04-29 PROCEDURE — 85610 PROTHROMBIN TIME: CPT

## 2019-04-29 PROCEDURE — 99214 OFFICE O/P EST MOD 30 MIN: CPT | Performed by: PHYSICIAN ASSISTANT

## 2019-04-29 PROCEDURE — 99285 EMERGENCY DEPT VISIT HI MDM: CPT

## 2019-04-29 PROCEDURE — 700102 HCHG RX REV CODE 250 W/ 637 OVERRIDE(OP): Performed by: EMERGENCY MEDICINE

## 2019-04-29 PROCEDURE — 80053 COMPREHEN METABOLIC PANEL: CPT

## 2019-04-29 PROCEDURE — 93005 ELECTROCARDIOGRAM TRACING: CPT | Performed by: EMERGENCY MEDICINE

## 2019-04-29 PROCEDURE — 71045 X-RAY EXAM CHEST 1 VIEW: CPT

## 2019-04-29 PROCEDURE — 99223 1ST HOSP IP/OBS HIGH 75: CPT | Performed by: INTERNAL MEDICINE

## 2019-04-29 PROCEDURE — 99254 IP/OBS CNSLTJ NEW/EST MOD 60: CPT | Performed by: INTERNAL MEDICINE

## 2019-04-29 PROCEDURE — 84484 ASSAY OF TROPONIN QUANT: CPT

## 2019-04-29 PROCEDURE — 93308 TTE F-UP OR LMTD: CPT

## 2019-04-29 PROCEDURE — 87040 BLOOD CULTURE FOR BACTERIA: CPT

## 2019-04-29 PROCEDURE — 94760 N-INVAS EAR/PLS OXIMETRY 1: CPT

## 2019-04-29 PROCEDURE — 700102 HCHG RX REV CODE 250 W/ 637 OVERRIDE(OP): Performed by: INTERNAL MEDICINE

## 2019-04-29 PROCEDURE — 93308 TTE F-UP OR LMTD: CPT | Mod: 26 | Performed by: INTERNAL MEDICINE

## 2019-04-29 PROCEDURE — 700111 HCHG RX REV CODE 636 W/ 250 OVERRIDE (IP): Performed by: EMERGENCY MEDICINE

## 2019-04-29 PROCEDURE — 85025 COMPLETE CBC W/AUTO DIFF WBC: CPT

## 2019-04-29 PROCEDURE — 93005 ELECTROCARDIOGRAM TRACING: CPT | Performed by: INTERNAL MEDICINE

## 2019-04-29 PROCEDURE — 700105 HCHG RX REV CODE 258: Performed by: INTERNAL MEDICINE

## 2019-04-29 PROCEDURE — 36415 COLL VENOUS BLD VENIPUNCTURE: CPT

## 2019-04-29 PROCEDURE — 700101 HCHG RX REV CODE 250: Performed by: EMERGENCY MEDICINE

## 2019-04-29 PROCEDURE — 96365 THER/PROPH/DIAG IV INF INIT: CPT

## 2019-04-29 PROCEDURE — 700101 HCHG RX REV CODE 250: Performed by: INTERNAL MEDICINE

## 2019-04-29 PROCEDURE — 304561 HCHG STAT O2

## 2019-04-29 PROCEDURE — 700111 HCHG RX REV CODE 636 W/ 250 OVERRIDE (IP): Performed by: INTERNAL MEDICINE

## 2019-04-29 PROCEDURE — 83735 ASSAY OF MAGNESIUM: CPT

## 2019-04-29 PROCEDURE — 83605 ASSAY OF LACTIC ACID: CPT

## 2019-04-29 PROCEDURE — 81001 URINALYSIS AUTO W/SCOPE: CPT

## 2019-04-29 PROCEDURE — 87086 URINE CULTURE/COLONY COUNT: CPT

## 2019-04-29 PROCEDURE — 85730 THROMBOPLASTIN TIME PARTIAL: CPT

## 2019-04-29 PROCEDURE — 93000 ELECTROCARDIOGRAM COMPLETE: CPT | Performed by: PHYSICIAN ASSISTANT

## 2019-04-29 PROCEDURE — 770020 HCHG ROOM/CARE - TELE (206)

## 2019-04-29 PROCEDURE — A9270 NON-COVERED ITEM OR SERVICE: HCPCS | Performed by: INTERNAL MEDICINE

## 2019-04-29 PROCEDURE — 94640 AIRWAY INHALATION TREATMENT: CPT

## 2019-04-29 PROCEDURE — 700105 HCHG RX REV CODE 258: Performed by: EMERGENCY MEDICINE

## 2019-04-29 PROCEDURE — 96375 TX/PRO/DX INJ NEW DRUG ADDON: CPT

## 2019-04-29 PROCEDURE — A9270 NON-COVERED ITEM OR SERVICE: HCPCS | Performed by: EMERGENCY MEDICINE

## 2019-04-29 RX ORDER — IPRATROPIUM BROMIDE AND ALBUTEROL SULFATE 2.5; .5 MG/3ML; MG/3ML
3 SOLUTION RESPIRATORY (INHALATION)
Status: COMPLETED | OUTPATIENT
Start: 2019-04-29 | End: 2019-04-29

## 2019-04-29 RX ORDER — DILTIAZEM HYDROCHLORIDE 5 MG/ML
10 INJECTION INTRAVENOUS
Status: DISCONTINUED | OUTPATIENT
Start: 2019-04-29 | End: 2019-05-06 | Stop reason: HOSPADM

## 2019-04-29 RX ORDER — AMOXICILLIN 250 MG
2 CAPSULE ORAL 2 TIMES DAILY
Status: DISCONTINUED | OUTPATIENT
Start: 2019-04-29 | End: 2019-05-01

## 2019-04-29 RX ORDER — HEPARIN SODIUM 1000 [USP'U]/ML
3200 INJECTION, SOLUTION INTRAVENOUS; SUBCUTANEOUS PRN
Status: DISCONTINUED | OUTPATIENT
Start: 2019-04-29 | End: 2019-05-01

## 2019-04-29 RX ORDER — DOXYCYCLINE 100 MG/1
100 TABLET ORAL EVERY 12 HOURS
Status: DISCONTINUED | OUTPATIENT
Start: 2019-04-29 | End: 2019-05-01

## 2019-04-29 RX ORDER — PREDNISONE 20 MG/1
40 TABLET ORAL DAILY
Status: DISCONTINUED | OUTPATIENT
Start: 2019-04-29 | End: 2019-05-01

## 2019-04-29 RX ORDER — HEPARIN SODIUM 1000 [USP'U]/ML
6000 INJECTION, SOLUTION INTRAVENOUS; SUBCUTANEOUS ONCE
Status: COMPLETED | OUTPATIENT
Start: 2019-04-29 | End: 2019-04-29

## 2019-04-29 RX ORDER — ATORVASTATIN CALCIUM 10 MG/1
10 TABLET, FILM COATED ORAL EVERY EVENING
Status: DISCONTINUED | OUTPATIENT
Start: 2019-04-29 | End: 2019-04-29

## 2019-04-29 RX ORDER — IPRATROPIUM BROMIDE AND ALBUTEROL SULFATE 2.5; .5 MG/3ML; MG/3ML
3 SOLUTION RESPIRATORY (INHALATION)
Status: DISCONTINUED | OUTPATIENT
Start: 2019-04-29 | End: 2019-05-01

## 2019-04-29 RX ORDER — POLYETHYLENE GLYCOL 3350 17 G/17G
1 POWDER, FOR SOLUTION ORAL
Status: DISCONTINUED | OUTPATIENT
Start: 2019-04-29 | End: 2019-05-01

## 2019-04-29 RX ORDER — AZITHROMYCIN 500 MG/1
500 INJECTION, POWDER, LYOPHILIZED, FOR SOLUTION INTRAVENOUS ONCE
Status: DISCONTINUED | OUTPATIENT
Start: 2019-04-29 | End: 2019-04-29

## 2019-04-29 RX ORDER — BISACODYL 10 MG
10 SUPPOSITORY, RECTAL RECTAL
Status: DISCONTINUED | OUTPATIENT
Start: 2019-04-29 | End: 2019-05-01

## 2019-04-29 RX ORDER — ACETAMINOPHEN 325 MG/1
650 TABLET ORAL EVERY 6 HOURS PRN
Status: DISCONTINUED | OUTPATIENT
Start: 2019-04-29 | End: 2019-05-01

## 2019-04-29 RX ORDER — SODIUM CHLORIDE 9 MG/ML
1000 INJECTION, SOLUTION INTRAVENOUS ONCE
Status: COMPLETED | OUTPATIENT
Start: 2019-04-29 | End: 2019-04-29

## 2019-04-29 RX ORDER — ATORVASTATIN CALCIUM 40 MG/1
40 TABLET, FILM COATED ORAL EVERY EVENING
Status: DISCONTINUED | OUTPATIENT
Start: 2019-04-30 | End: 2019-05-01

## 2019-04-29 RX ORDER — HEPARIN SODIUM 5000 [USP'U]/ML
5000 INJECTION, SOLUTION INTRAVENOUS; SUBCUTANEOUS EVERY 8 HOURS
Status: DISCONTINUED | OUTPATIENT
Start: 2019-04-29 | End: 2019-04-29

## 2019-04-29 RX ORDER — DOXYCYCLINE 100 MG/1
100 TABLET ORAL ONCE
Status: COMPLETED | OUTPATIENT
Start: 2019-04-29 | End: 2019-04-29

## 2019-04-29 RX ORDER — ZOLPIDEM TARTRATE 5 MG/1
10 TABLET ORAL NIGHTLY PRN
Status: DISCONTINUED | OUTPATIENT
Start: 2019-04-29 | End: 2019-05-01

## 2019-04-29 RX ORDER — ASPIRIN 325 MG
325 TABLET ORAL DAILY
Status: DISCONTINUED | OUTPATIENT
Start: 2019-04-30 | End: 2019-04-30

## 2019-04-29 RX ADMIN — PREDNISONE 40 MG: 20 TABLET ORAL at 17:59

## 2019-04-29 RX ADMIN — ENOXAPARIN SODIUM 40 MG: 100 INJECTION SUBCUTANEOUS at 17:59

## 2019-04-29 RX ADMIN — IPRATROPIUM BROMIDE AND ALBUTEROL SULFATE 3 ML: .5; 3 SOLUTION RESPIRATORY (INHALATION) at 23:18

## 2019-04-29 RX ADMIN — HEPARIN SODIUM 6000 UNITS: 1000 INJECTION, SOLUTION INTRAVENOUS; SUBCUTANEOUS at 22:53

## 2019-04-29 RX ADMIN — METOPROLOL TARTRATE 12.5 MG: 25 TABLET ORAL at 21:56

## 2019-04-29 RX ADMIN — IPRATROPIUM BROMIDE AND ALBUTEROL SULFATE 3 ML: .5; 3 SOLUTION RESPIRATORY (INHALATION) at 14:29

## 2019-04-29 RX ADMIN — IPRATROPIUM BROMIDE AND ALBUTEROL SULFATE 3 ML: .5; 3 SOLUTION RESPIRATORY (INHALATION) at 20:23

## 2019-04-29 RX ADMIN — HEPARIN SODIUM 1200 UNITS/HR: 5000 INJECTION, SOLUTION INTRAVENOUS at 22:53

## 2019-04-29 RX ADMIN — ZOLPIDEM TARTRATE 10 MG: 5 TABLET ORAL at 23:01

## 2019-04-29 RX ADMIN — AMPICILLIN AND SULBACTAM 3 G: 2; 1 INJECTION, POWDER, FOR SOLUTION INTRAMUSCULAR; INTRAVENOUS at 16:12

## 2019-04-29 RX ADMIN — SODIUM CHLORIDE 1000 ML: 9 INJECTION, SOLUTION INTRAVENOUS at 18:35

## 2019-04-29 RX ADMIN — DILTIAZEM HYDROCHLORIDE 10 MG: 5 INJECTION INTRAVENOUS at 14:11

## 2019-04-29 RX ADMIN — ATORVASTATIN CALCIUM 10 MG: 10 TABLET, FILM COATED ORAL at 21:56

## 2019-04-29 RX ADMIN — DOXYCYCLINE 100 MG: 100 TABLET, FILM COATED ORAL at 16:11

## 2019-04-29 RX ADMIN — DOXYCYCLINE 100 MG: 100 TABLET, FILM COATED ORAL at 17:58

## 2019-04-29 RX ADMIN — DILTIAZEM HYDROCHLORIDE 30 MG: 30 TABLET, FILM COATED ORAL at 17:58

## 2019-04-29 ASSESSMENT — COPD QUESTIONNAIRES
DURING THE PAST 4 WEEKS HOW MUCH DID YOU FEEL SHORT OF BREATH: SOME OF THE TIME
HAVE YOU SMOKED AT LEAST 100 CIGARETTES IN YOUR ENTIRE LIFE: YES
COPD SCREENING SCORE: 7
DO YOU EVER COUGH UP ANY MUCUS OR PHLEGM?: YES, A FEW DAYS A WEEK OR MONTH

## 2019-04-29 ASSESSMENT — COGNITIVE AND FUNCTIONAL STATUS - GENERAL
MOBILITY SCORE: 23
SUGGESTED CMS G CODE MODIFIER MOBILITY: CI
WALKING IN HOSPITAL ROOM: A LITTLE
SUGGESTED CMS G CODE MODIFIER DAILY ACTIVITY: CH
DAILY ACTIVITIY SCORE: 24

## 2019-04-29 ASSESSMENT — LIFESTYLE VARIABLES
EVER_SMOKED: YES
EVER_SMOKED: YES
ALCOHOL_USE: NO

## 2019-04-29 ASSESSMENT — PATIENT HEALTH QUESTIONNAIRE - PHQ9
1. LITTLE INTEREST OR PLEASURE IN DOING THINGS: NOT AT ALL
SUM OF ALL RESPONSES TO PHQ9 QUESTIONS 1 AND 2: 0
2. FEELING DOWN, DEPRESSED, IRRITABLE, OR HOPELESS: NOT AT ALL

## 2019-04-29 NOTE — ED PROVIDER NOTES
ED Provider Note    Scribed for Gabo Greenberg M.D. by Tj Deng. 4/29/2019  12:50 PM    Primary care provider: Cheyenne Valverde P.A.-C.  Means of arrival: EMS  History obtained from: patient  History limited by: none    CHIEF COMPLAINT  Chief Complaint   Patient presents with   • Cough   • Shortness of Breath       HPI  Olman Sims is a 68 y.o. male with a history of COPD who presents to the Emergency Department for evaluation of atrial fibrillation and complaining of gradually worsening shortness of breath for the last 3 days. He describes his shortness of breath as a feeling of congestion that was not relieved with his rescue inhalers. Patient reports associated cough, chest congestion. He states that his shortness of breath has been gradually worsening over the last 3 days, prompting him to go to urgent care. At urgent care, he was found to be in atrial fibrillation with RVR and transferred to [facility] for evaluation. He is on supplemental oxygen at home and has had to increase his flow over the last few days. He also reports a history of hypertension. Patient denies chest pain, abdominal pain, nausea, vomiting, leg swelling.      REVIEW OF SYSTEMS  Pertinent positives include shortness of breath, cough, congestion. Pertinent negatives include no chest pain, abdominal pain, nausea, vomiting, leg swelling.  All other systems reviewed and negative.    PAST MEDICAL HISTORY   has a past medical history of Anesthesia; Breath shortness; COPD; Dental disorder; Emphysema of lung (HCC); Hypertension; Oxygen dependent; and Renal disorder.    SURGICAL HISTORY   has a past surgical history that includes cystoscopy stent placement (2/24/2012); recovery (4/4/2012); percutaneous nephrostolithotomy (4/5/2012); percutaneous nephrostolithotomy (5/2/2012); recovery (11/1/2012); appendectomy; appendectomy (1987); septoplasty (N/A, 5/4/2018); and turbinate reduction (Bilateral, 5/4/2018).    SOCIAL  "HISTORY  Social History   Substance Use Topics   • Smoking status: Former Smoker     Packs/day: 0.50     Years: 15.00     Types: Cigarettes     Quit date: 2012   • Smokeless tobacco: Never Used   • Alcohol use 0.0 oz/week      Comment: 2 glass wine per month      History   Drug Use No       FAMILY HISTORY  Family History   Problem Relation Age of Onset   • Cancer Maternal Grandfather 65        colon       CURRENT MEDICATIONS  Current Outpatient Prescriptions:   •  zolpidem (AMBIEN) 10 MG Tab, Take 1 Tab by mouth at bedtime as needed for Sleep for up to 30 days., Disp: 30 Tab, Rfl: 5  •  tiotropium (SPIRIVA HANDIHALER) 18 MCG Cap, INHALE THE CONTENTS OF 1 CAPSULE BY MOUTH USING HANDI HALER EVERY DAY, Disp: 30 Cap, Rfl: 3  •  losartan (COZAAR) 25 MG Tab, TAKE 1 TABLET BY MOUTH EVERY DAY, Disp: 90 Tab, Rfl: 3  •  amLODIPine (NORVASC) 10 MG Tab, TAKE 1 TABLET BY MOUTH EVERY DAY, Disp: 90 Tab, Rfl: 0  •  VENTOLIN  (90 Base) MCG/ACT Aero Soln inhalation aerosol, INHALE 2 PUFFS BY MOUTH EVERY 6 HOURS AS NEEDED FOR PAIN, Disp: 1 Inhaler, Rfl: 11  •  fluticasone (FLONASE) 50 MCG/ACT nasal spray, SHAKE LIQUID AND USE 1 SPRAY IN EACH NOSTRIL EVERY DAY, Disp: 1 Bottle, Rfl: 11  •  ADVAIR -21 MCG/ACT inhaler, INHALE 2 PUFFS BY MOUTH TWICE DAILY, Disp: 36 g, Rfl: 6  •  atorvastatin (LIPITOR) 10 MG Tab, Take 1 Tab by mouth every evening., Disp: 90 Tab, Rfl: 1  •  Misc. Devices Misc, Inogen One G4 oxygen compressor, si L nocturnal, Disp: 1 Device, Rfl: 0    ALLERGIES  No Known Allergies    PHYSICAL EXAM  VITAL SIGNS: /68   Pulse (!) 103 Comment: variable   Resp (!) 26   Ht 1.753 m (5' 9\")   Wt 80 kg (176 lb 5.9 oz)   SpO2 97%   BMI 26.05 kg/m²     Constitutional: Well developed, Well nourished, Moderate distress, Non-toxic appearance.   HENT: Normocephalic, Atraumatic, Bilateral external ears normal, Oropharynx moist, No oral exudates.   Eyes: PERRLA, EOMI, Conjunctiva normal, No discharge. "   Neck: No tenderness, Supple, No stridor.   Lymphatic: No lymphadenopathy noted.   Cardiovascular: Irregularly irregular tachycardia.   Thorax & Lungs: Tachypnea with  breath sounds throughout. Moderate respiratory distress, No wheezing, No crackles.   Abdomen: Soft, No tenderness, No masses, No pulsatile masses.   Skin: Warm, Dry, No erythema, No rash.   Extremities:, Mild lower extremity edema. No cyanosis.   Musculoskeletal: No tenderness to palpation or major deformities noted.  Intact distal pulses  Neurologic: Awake, alert. Moves all extremities spontaneously.  Psychiatric: Affect normal, Judgment normal, Mood normal.     LABS  Labs Reviewed   LACTIC ACID - Abnormal; Notable for the following:        Result Value    Lactic Acid 2.7 (*)     All other components within normal limits    Narrative:     Indicate which anticoagulants the patient is on:->NONE   CBC WITH DIFFERENTIAL - Abnormal; Notable for the following:     WBC 18.7 (*)     MCHC 31.9 (*)     Neutrophils-Polys 85.00 (*)     Lymphocytes 7.60 (*)     Immature Granulocytes 1.20 (*)     Neutrophils (Absolute) 15.91 (*)     Monos (Absolute) 1.06 (*)     Immature Granulocytes (abs) 0.22 (*)     All other components within normal limits    Narrative:     Indicate which anticoagulants the patient is on:->NONE   COMP METABOLIC PANEL - Abnormal; Notable for the following:     Anion Gap 13.0 (*)     Glucose 117 (*)     Bun 32 (*)     Creatinine 1.61 (*)     AST(SGOT) 57 (*)     Globulin 3.7 (*)     All other components within normal limits    Narrative:     Indicate which anticoagulants the patient is on:->NONE   URINALYSIS - Abnormal; Notable for the following:     Character Cloudy (*)     Ketones Trace (*)     Protein 100 (*)     Leukocyte Esterase Moderate (*)     Occult Blood Large (*)     All other components within normal limits    Narrative:     Indication for culture:->Emergency Room Patient   PROTHROMBIN TIME - Abnormal; Notable for the  "following:     PT 15.3 (*)     INR 1.20 (*)     All other components within normal limits    Narrative:     Indicate which anticoagulants the patient is on:->NONE   ESTIMATED GFR - Abnormal; Notable for the following:     GFR If  52 (*)     GFR If Non  43 (*)     All other components within normal limits    Narrative:     Indicate which anticoagulants the patient is on:->NONE   URINE MICROSCOPIC (W/UA) - Abnormal; Notable for the following:     WBC 20-50 (*)     RBC >150 (*)     All other components within normal limits    Narrative:     Indication for culture:->Emergency Room Patient   LACTIC ACID   URINE CULTURE(NEW)    Narrative:     Indication for culture:->Emergency Room Patient   BLOOD CULTURE    Narrative:     Per Hospital Policy: Only change Specimen Src: to \"Line\" if  specified by physician order.   BLOOD CULTURE    Narrative:     Per Hospital Policy: Only change Specimen Src: to \"Line\" if  specified by physician order.   MAGNESIUM    Narrative:     Indicate which anticoagulants the patient is on:->NONE   APTT    Narrative:     Indicate which anticoagulants the patient is on:->NONE   All labs reviewed by me.    EKG  Results for orders placed or performed during the hospital encounter of 19   EKG   Result Value Ref Range    Report       [facility] Emergency Dept.    Test Date:  2019  Pt Name:    KEIRA SOTOMAYOR             Department: ER  MRN:        [id number]                      Room:        21  Gender:     Male                         Technician: [id number]  :        1950                   Requested By:ER TRIAGE PROTOCOL  Order #:    [id number]                    Reading MD: LEDA ALFORD MD    Measurements  Intervals                                Axis  Rate:       155                          P:  AZ:                                      QRS:        -60  QRSD:       84                           T:          64  QT:         308  QTc: "        495    Interpretive Statements  ATRIAL FIBRILLATION WITH RAPID V-RATE  PROBABLE INFERIOR INFARCT, OLD  Compared to ECG 2018 11:17:45  Myocardial infarct finding now present  Sinus rhythm no longer present  Left anterior fascicular block no longer present    Electronically Signed On 2019 13:01:43 PDT by LEDA ALFORD MD     EKG (NOW)   Result Value Ref Range    Report       [facility] Emergency Dept.    Test Date:  2019  Pt Name:    KEIRA SOTOMAYOR             Department: ER  MRN:        [id number]                      Room:       [facility]  Gender:     Male                         Technician: [id number]  :        1950                   Requested By:LEDA ALFORD  Order #:    [id number]                    Reading MD: LEDA ALFORD MD    Measurements  Intervals                                Axis  Rate:       101                          P:          78  KY:         160                          QRS:        -52  QRSD:       86                           T:          37  QT:         352  QTc:        457    Interpretive Statements  SINUS TACHYCARDIA  PROBABLE INFERIOR INFARCT, AGE INDETERMINATE  Compared to ECG 2019 12:52:43  Atrial fibrillation no longer present  Myocardial infarct finding still present    Electronically Signed On 2019 17:32:39 PDT by LEDA ALFORD MD       RADIOLOGY  DX-CHEST-PORTABLE (1 VIEW)   Final Result      No acute cardiac or pulmonary abnormality is noted.      EC-ECHOCARDIOGRAM COMPLETE W/O CONT    (Results Pending)   The radiologist's interpretation of all radiological studies have been reviewed by me.    COURSE & MEDICAL DECISION MAKING  Pertinent Labs & Imaging studies reviewed. (See chart for details)    I reviewed the patient's medical records which showed a history of COPD    12:50 PM - Patient seen and examined at bedside. Discussed his evaluation in the ED and admission to the hospital for evaluation of his new  onset atrial fibrillation. Patient verbalizes understanding and agreement to this plan of care. Ordered DX chest, Lactic acid x2, APTT, PT/INR, CBC with differential, CMP, Urinalysis, Urine culture, Blood culture x2, Magnesium to evaluate his symptoms. The differential diagnoses include but are not limited to: new onset atrial fibrillation, COPD exacerbation.     3:46 PM - I discussed the patient's case and the above findings with Dr. Lobato (hospitalist) who agrees to admit the patient.     Decision Making:  Patient with COPD exacerbation atrial for ablation with rapid ventricular response.  Give the patient a breathing treatment, give the patient diltiazem, the patient spontaneously converted.  Give the patient aspirin, discussed the case with the hospitalist for admission the hospital.    DISPOSITION:  Patient will be admitted to hospital in guarded condition.    FINAL IMPRESSION  1. Acute exacerbation of chronic obstructive pulmonary disease (COPD) (HCC)    2. Atrial fibrillation, unspecified type (HCC)          ITj (Scribe), am scribing for, and in the presence of, Gabo Greenberg M.D..    Electronically signed by: Tj Deng (Scribe), 4/29/2019    IGabo M.D. personally performed the services described in this documentation, as scribed by Tj Deng in my presence, and it is both accurate and complete.    The note accurately reflects work and decisions made by me.  Gabo Greenberg  4/29/2019  6:16 PM

## 2019-04-29 NOTE — DISCHARGE PLANNING
Medical Social Work    Referral: Pet at home    Intervention: SW notified that pt lives alone and has a dog that pt is worried about. SW met w/ pt at bedside and pt states that he got a hold of his niece, Gita ([phone]), who is going to take care of the pt's dog. Pt has no other needs at this time.     Plan: SW will remain available as needed.

## 2019-04-29 NOTE — PROGRESS NOTES
Subjective:      Olman Sims is a 68 y.o. male who presents with Shortness of Breath (since friday, difficulty breathing, low o2 sat)          Chief Complaint   Patient presents with   • Shortness of Breath     since friday, difficulty breathing, low o2 sat       HPIPatient presents with complaint of worsening SOB and difficulty breathing. Started on Friday with chest congestion. Continued over the weekend. Denies chest pain. Coughs to get out the chest congestion. No known fever. No sore throat, nasal congestion. No nausea, vomiting or leg swelling. Patient has COPD and is on spiriva and albuterol. No recent travel or known sick contacts.     ROSno recent weight change. No neck pain or stiffness. No rash or skin lesions    Active Ambulatory Problems     Diagnosis Date Noted   • Hydronephrosis of right kidney 02/26/2012   • Hypertension goal BP (blood pressure) < 140/80 05/04/2012   • COPD (chronic obstructive pulmonary disease) ([facility]) 06/15/2012   • Claudication ([facility]) 11/01/2012   • Peripheral vascular disease ([facility]) 11/12/2012   • Mild cognitive impairment with memory loss 12/02/2013   • Essential hypertension 11/10/2014   • CKD (chronic kidney disease) stage 3, GFR 30-59 ml/min ([facility]) 11/10/2014   • Skin lesion of face 06/03/2015   • Nephrolithiasis 01/26/2016   • Chronic respiratory failure with hypoxia ([facility]) 10/11/2017   • Dyslipidemia 01/03/2018   • Nasal septal deformity 05/04/2018   • Hypertrophy of both inferior nasal turbinates 05/04/2018   • Chronic vasomotor rhinitis 05/04/2018   • Primary insomnia 11/16/2018   • Prediabetes 01/10/2019     Resolved Ambulatory Problems     Diagnosis Date Noted   • ARF (acute renal failure) ([facility]) 02/23/2012   • HTN (hypertension), malignant 02/23/2012   • COPD with acute exacerbation ([facility]) 02/23/2012   • Pneumonia LLL 02/27/2012   • Hyperlipemia 11/10/2014     Past Medical History:   Diagnosis Date   • Anesthesia    • Breath shortness    • COPD    • Dental  "disorder    • Emphysema of lung (HCC)    • Hypertension    • Oxygen dependent    • Renal disorder      Current Outpatient Prescriptions   Medication Sig Dispense Refill   • zolpidem (AMBIEN) 10 MG Tab Take 1 Tab by mouth at bedtime as needed for Sleep for up to 30 days. 30 Tab 5   • tiotropium (SPIRIVA HANDIHALER) 18 MCG Cap INHALE THE CONTENTS OF 1 CAPSULE BY MOUTH USING HANDI HALER EVERY DAY 30 Cap 3   • losartan (COZAAR) 25 MG Tab TAKE 1 TABLET BY MOUTH EVERY DAY 90 Tab 3   • amLODIPine (NORVASC) 10 MG Tab TAKE 1 TABLET BY MOUTH EVERY DAY 90 Tab 0   • VENTOLIN  (90 Base) MCG/ACT Aero Soln inhalation aerosol INHALE 2 PUFFS BY MOUTH EVERY 6 HOURS AS NEEDED FOR PAIN 1 Inhaler 11   • fluticasone (FLONASE) 50 MCG/ACT nasal spray SHAKE LIQUID AND USE 1 SPRAY IN EACH NOSTRIL EVERY DAY 1 Bottle 11   • ADVAIR -21 MCG/ACT inhaler INHALE 2 PUFFS BY MOUTH TWICE DAILY 36 g 6   • atorvastatin (LIPITOR) 10 MG Tab Take 1 Tab by mouth every evening. 90 Tab 1   • Misc. Devices Misc Inogen One G4 oxygen compressor, si L nocturnal 1 Device 0     No current facility-administered medications for this visit.    nkda  Former smoker   Objective:     /74 (BP Location: Right arm, Patient Position: Sitting, BP Cuff Size: Adult)   Pulse (!) 141   Temp 37.3 °C (99.1 °F) (Temporal)   Resp (!) 22   Ht 1.753 m (5' 9\")   Wt 80.1 kg (176 lb 9.6 oz)   SpO2 (!) 87%   BMI 26.08 kg/m²      Physical Exam   Constitutional: He is oriented to person, place, and time. He appears well-developed and well-nourished.   Pale, sweating, working hard to breathe   Cardiovascular:   Tachy and irregular   Pulmonary/Chest:   Increased work of breathing, 22-24 breaths per minute   Neurological: He is alert and oriented to person, place, and time.   Skin: Skin is warm. He is diaphoretic.   Psychiatric: His behavior is normal. Judgment and thought content normal.   anxious   Vitals reviewed.            EKG in office: Atrial fibrillation, " rate 150  Assessment/Plan:     1. SOB (shortness of breath)    - EKG - Clinic Performed    2. Atrial fibrillation, unspecified type (HCC)    SOB and new onset a fib. Possible COPD exacerbation vs new onset a fib causing the SOB. ANTONIA called. Patient did take alb inhaler in office. I did not do alb neb because of concern for rate, O2 at 87% on 2 liters nasal cannula (which he is keeping in his mouth)

## 2019-04-29 NOTE — ED TRIAGE NOTES
Congested cough for 2d progressively worsening; went to urgent care clinic and found to be in afib RVR.  Denies CP

## 2019-04-29 NOTE — ED NOTES
States minimal improvement after neb tx.  HR improved.  Positioned for comfort.  Family at bedside.

## 2019-04-30 ENCOUNTER — APPOINTMENT (OUTPATIENT)
Dept: RADIOLOGY | Facility: MEDICAL CENTER | Age: 69
DRG: 981 | End: 2019-04-30
Attending: HOSPITALIST
Payer: COMMERCIAL

## 2019-04-30 PROBLEM — R31.0 GROSS HEMATURIA: Status: ACTIVE | Noted: 2019-04-30

## 2019-04-30 LAB
ANION GAP SERPL CALC-SCNC: 12 MMOL/L (ref 0–11.9)
APTT PPP: 43 SEC (ref 24.7–36)
APTT PPP: 46.3 SEC (ref 24.7–36)
APTT PPP: 51.7 SEC (ref 24.7–36)
APTT PPP: 55.2 SEC (ref 24.7–36)
BASOPHILS # BLD AUTO: 0.2 % (ref 0–1.8)
BASOPHILS # BLD: 0.04 K/UL (ref 0–0.12)
BUN SERPL-MCNC: 31 MG/DL (ref 8–22)
CALCIUM SERPL-MCNC: 9 MG/DL (ref 8.5–10.5)
CHLORIDE SERPL-SCNC: 106 MMOL/L (ref 96–112)
CHOLEST SERPL-MCNC: 134 MG/DL (ref 100–199)
CO2 SERPL-SCNC: 23 MMOL/L (ref 20–33)
CREAT SERPL-MCNC: 1.26 MG/DL (ref 0.5–1.4)
EKG IMPRESSION: NORMAL
EOSINOPHIL # BLD AUTO: 0.01 K/UL (ref 0–0.51)
EOSINOPHIL NFR BLD: 0.1 % (ref 0–6.9)
ERYTHROCYTE [DISTWIDTH] IN BLOOD BY AUTOMATED COUNT: 48.4 FL (ref 35.9–50)
EST. AVERAGE GLUCOSE BLD GHB EST-MCNC: 123 MG/DL
GLUCOSE SERPL-MCNC: 166 MG/DL (ref 65–99)
HBA1C MFR BLD: 5.9 % (ref 0–5.6)
HCT VFR BLD AUTO: 43.7 % (ref 42–52)
HCT VFR BLD AUTO: 44.5 % (ref 42–52)
HDLC SERPL-MCNC: 24 MG/DL
HGB BLD-MCNC: 14.1 G/DL (ref 14–18)
HGB BLD-MCNC: 14.1 G/DL (ref 14–18)
IMM GRANULOCYTES # BLD AUTO: 0.35 K/UL (ref 0–0.11)
IMM GRANULOCYTES NFR BLD AUTO: 1.9 % (ref 0–0.9)
LDLC SERPL CALC-MCNC: 88 MG/DL
LYMPHOCYTES # BLD AUTO: 0.74 K/UL (ref 1–4.8)
LYMPHOCYTES NFR BLD: 4 % (ref 22–41)
MAGNESIUM SERPL-MCNC: 1.9 MG/DL (ref 1.5–2.5)
MCH RBC QN AUTO: 30.4 PG (ref 27–33)
MCHC RBC AUTO-ENTMCNC: 32.3 G/DL (ref 33.7–35.3)
MCV RBC AUTO: 94.2 FL (ref 81.4–97.8)
MONOCYTES # BLD AUTO: 0.43 K/UL (ref 0–0.85)
MONOCYTES NFR BLD AUTO: 2.3 % (ref 0–13.4)
NEUTROPHILS # BLD AUTO: 16.97 K/UL (ref 1.82–7.42)
NEUTROPHILS NFR BLD: 91.5 % (ref 44–72)
NRBC # BLD AUTO: 0 K/UL
NRBC BLD-RTO: 0 /100 WBC
PHOSPHATE SERPL-MCNC: 2.1 MG/DL (ref 2.5–4.5)
PLATELET # BLD AUTO: 284 K/UL (ref 164–446)
PMV BLD AUTO: 9.4 FL (ref 9–12.9)
POTASSIUM SERPL-SCNC: 4.2 MMOL/L (ref 3.6–5.5)
PROCALCITONIN SERPL-MCNC: 13.73 NG/ML
RBC # BLD AUTO: 4.64 M/UL (ref 4.7–6.1)
SODIUM SERPL-SCNC: 141 MMOL/L (ref 135–145)
TRIGL SERPL-MCNC: 108 MG/DL (ref 0–149)
TROPONIN I SERPL-MCNC: 4.7 NG/ML (ref 0–0.04)
TROPONIN I SERPL-MCNC: 6.58 NG/ML (ref 0–0.04)
TROPONIN I SERPL-MCNC: 7.96 NG/ML (ref 0–0.04)
TSH SERPL DL<=0.005 MIU/L-ACNC: 0.58 UIU/ML (ref 0.38–5.33)
WBC # BLD AUTO: 18.5 K/UL (ref 4.8–10.8)

## 2019-04-30 PROCEDURE — 85014 HEMATOCRIT: CPT

## 2019-04-30 PROCEDURE — 83735 ASSAY OF MAGNESIUM: CPT

## 2019-04-30 PROCEDURE — 93010 ELECTROCARDIOGRAM REPORT: CPT | Performed by: INTERNAL MEDICINE

## 2019-04-30 PROCEDURE — 76775 US EXAM ABDO BACK WALL LIM: CPT

## 2019-04-30 PROCEDURE — 84145 PROCALCITONIN (PCT): CPT

## 2019-04-30 PROCEDURE — 700102 HCHG RX REV CODE 250 W/ 637 OVERRIDE(OP): Performed by: HOSPITALIST

## 2019-04-30 PROCEDURE — 94760 N-INVAS EAR/PLS OXIMETRY 1: CPT

## 2019-04-30 PROCEDURE — 83036 HEMOGLOBIN GLYCOSYLATED A1C: CPT

## 2019-04-30 PROCEDURE — 700111 HCHG RX REV CODE 636 W/ 250 OVERRIDE (IP): Performed by: INTERNAL MEDICINE

## 2019-04-30 PROCEDURE — 80048 BASIC METABOLIC PNL TOTAL CA: CPT

## 2019-04-30 PROCEDURE — 85730 THROMBOPLASTIN TIME PARTIAL: CPT | Mod: 91

## 2019-04-30 PROCEDURE — 94640 AIRWAY INHALATION TREATMENT: CPT

## 2019-04-30 PROCEDURE — 700111 HCHG RX REV CODE 636 W/ 250 OVERRIDE (IP): Performed by: HOSPITALIST

## 2019-04-30 PROCEDURE — 84100 ASSAY OF PHOSPHORUS: CPT

## 2019-04-30 PROCEDURE — 84484 ASSAY OF TROPONIN QUANT: CPT | Mod: 91

## 2019-04-30 PROCEDURE — 84443 ASSAY THYROID STIM HORMONE: CPT

## 2019-04-30 PROCEDURE — 700105 HCHG RX REV CODE 258: Performed by: INTERNAL MEDICINE

## 2019-04-30 PROCEDURE — A9270 NON-COVERED ITEM OR SERVICE: HCPCS | Performed by: INTERNAL MEDICINE

## 2019-04-30 PROCEDURE — 99233 SBSQ HOSP IP/OBS HIGH 50: CPT | Performed by: INTERNAL MEDICINE

## 2019-04-30 PROCEDURE — A9270 NON-COVERED ITEM OR SERVICE: HCPCS | Performed by: HOSPITALIST

## 2019-04-30 PROCEDURE — 99233 SBSQ HOSP IP/OBS HIGH 50: CPT | Performed by: HOSPITALIST

## 2019-04-30 PROCEDURE — 85018 HEMOGLOBIN: CPT

## 2019-04-30 PROCEDURE — 85025 COMPLETE CBC W/AUTO DIFF WBC: CPT

## 2019-04-30 PROCEDURE — 770020 HCHG ROOM/CARE - TELE (206)

## 2019-04-30 PROCEDURE — 700102 HCHG RX REV CODE 250 W/ 637 OVERRIDE(OP): Performed by: INTERNAL MEDICINE

## 2019-04-30 PROCEDURE — 700101 HCHG RX REV CODE 250: Performed by: INTERNAL MEDICINE

## 2019-04-30 PROCEDURE — 80061 LIPID PANEL: CPT

## 2019-04-30 RX ORDER — LABETALOL 100 MG/1
100 TABLET, FILM COATED ORAL EVERY 6 HOURS PRN
Status: DISCONTINUED | OUTPATIENT
Start: 2019-04-30 | End: 2019-05-01

## 2019-04-30 RX ORDER — HYDRALAZINE HYDROCHLORIDE 10 MG/1
5 TABLET, FILM COATED ORAL EVERY 6 HOURS PRN
Status: DISCONTINUED | OUTPATIENT
Start: 2019-04-30 | End: 2019-05-01

## 2019-04-30 RX ORDER — HYDRALAZINE HYDROCHLORIDE 20 MG/ML
10 INJECTION INTRAMUSCULAR; INTRAVENOUS ONCE
Status: COMPLETED | OUTPATIENT
Start: 2019-04-30 | End: 2019-04-30

## 2019-04-30 RX ADMIN — HYDRALAZINE HYDROCHLORIDE 10 MG: 20 INJECTION INTRAMUSCULAR; INTRAVENOUS at 00:28

## 2019-04-30 RX ADMIN — DIBASIC SODIUM PHOSPHATE, MONOBASIC POTASSIUM PHOSPHATE AND MONOBASIC SODIUM PHOSPHATE 1 TABLET: 852; 155; 130 TABLET ORAL at 17:24

## 2019-04-30 RX ADMIN — IPRATROPIUM BROMIDE AND ALBUTEROL SULFATE 3 ML: .5; 3 SOLUTION RESPIRATORY (INHALATION) at 02:04

## 2019-04-30 RX ADMIN — DIBASIC SODIUM PHOSPHATE, MONOBASIC POTASSIUM PHOSPHATE AND MONOBASIC SODIUM PHOSPHATE 1 TABLET: 852; 155; 130 TABLET ORAL at 09:32

## 2019-04-30 RX ADMIN — HEPARIN SODIUM 3200 UNITS: 1000 INJECTION, SOLUTION INTRAVENOUS; SUBCUTANEOUS at 06:58

## 2019-04-30 RX ADMIN — ATORVASTATIN CALCIUM 40 MG: 40 TABLET, FILM COATED ORAL at 17:23

## 2019-04-30 RX ADMIN — LABETALOL HYDROCHLORIDE 100 MG: 100 TABLET, FILM COATED ORAL at 19:58

## 2019-04-30 RX ADMIN — IPRATROPIUM BROMIDE AND ALBUTEROL SULFATE 3 ML: .5; 3 SOLUTION RESPIRATORY (INHALATION) at 15:38

## 2019-04-30 RX ADMIN — HEPARIN SODIUM 1450 UNITS/HR: 5000 INJECTION, SOLUTION INTRAVENOUS at 17:19

## 2019-04-30 RX ADMIN — DIBASIC SODIUM PHOSPHATE, MONOBASIC POTASSIUM PHOSPHATE AND MONOBASIC SODIUM PHOSPHATE 1 TABLET: 852; 155; 130 TABLET ORAL at 12:30

## 2019-04-30 RX ADMIN — PREDNISONE 40 MG: 20 TABLET ORAL at 05:12

## 2019-04-30 RX ADMIN — DOXYCYCLINE 100 MG: 100 TABLET, FILM COATED ORAL at 17:23

## 2019-04-30 RX ADMIN — HYDRALAZINE HYDROCHLORIDE 5 MG: 10 TABLET, FILM COATED ORAL at 21:47

## 2019-04-30 RX ADMIN — IPRATROPIUM BROMIDE AND ALBUTEROL SULFATE 3 ML: .5; 3 SOLUTION RESPIRATORY (INHALATION) at 11:07

## 2019-04-30 RX ADMIN — METOPROLOL TARTRATE 12.5 MG: 25 TABLET ORAL at 17:24

## 2019-04-30 RX ADMIN — DIBASIC SODIUM PHOSPHATE, MONOBASIC POTASSIUM PHOSPHATE AND MONOBASIC SODIUM PHOSPHATE 1 TABLET: 852; 155; 130 TABLET ORAL at 20:41

## 2019-04-30 RX ADMIN — DOXYCYCLINE 100 MG: 100 TABLET, FILM COATED ORAL at 05:13

## 2019-04-30 RX ADMIN — ZOLPIDEM TARTRATE 10 MG: 5 TABLET ORAL at 23:05

## 2019-04-30 RX ADMIN — CEFTRIAXONE SODIUM 2 G: 2 INJECTION, POWDER, FOR SOLUTION INTRAMUSCULAR; INTRAVENOUS at 05:12

## 2019-04-30 RX ADMIN — IPRATROPIUM BROMIDE AND ALBUTEROL SULFATE 3 ML: .5; 3 SOLUTION RESPIRATORY (INHALATION) at 07:39

## 2019-04-30 RX ADMIN — METOPROLOL TARTRATE 12.5 MG: 25 TABLET ORAL at 05:12

## 2019-04-30 RX ADMIN — IPRATROPIUM BROMIDE AND ALBUTEROL SULFATE 3 ML: .5; 3 SOLUTION RESPIRATORY (INHALATION) at 19:47

## 2019-04-30 RX ADMIN — ASPIRIN 325 MG: 325 TABLET, FILM COATED ORAL at 05:12

## 2019-04-30 ASSESSMENT — ENCOUNTER SYMPTOMS
SHORTNESS OF BREATH: 0
HEADACHES: 0
ABDOMINAL PAIN: 0
AGITATION: 0
BACK PAIN: 0
WHEEZING: 1
SPEECH CHANGE: 0
SORE THROAT: 0
PALPITATIONS: 0
SEIZURES: 0
EYE REDNESS: 0
MYALGIAS: 0
NAUSEA: 0
STRIDOR: 0
SPUTUM PRODUCTION: 1
SHORTNESS OF BREATH: 1
VOMITING: 0
CHILLS: 0
BLOOD IN STOOL: 0
HEMOPTYSIS: 0
DIZZINESS: 0
DIARRHEA: 0
CONFUSION: 0
HALLUCINATIONS: 0
BRUISES/BLEEDS EASILY: 0
FATIGUE: 1
FLANK PAIN: 0
LOSS OF CONSCIOUSNESS: 0
EYE PAIN: 0
CHEST TIGHTNESS: 0
NERVOUS/ANXIOUS: 0
FOCAL WEAKNESS: 0
LIGHT-HEADEDNESS: 0
FEVER: 0
COUGH: 1
EYE DISCHARGE: 0

## 2019-04-30 NOTE — PROGRESS NOTES
Paged Dr. Lobato regarding critical Trop level of 12.12. Updated physician that pt denies any chest pain or pressure. Dr. Lobato stated she will consult cards and place orders for pt.

## 2019-04-30 NOTE — ASSESSMENT & PLAN NOTE
Initial troponin level greater than 12  No current chest pain  Appreciate cardiology consultation  Continuous hemodynamic monitoring  Heparin drip stopped for hematuria  Cardiac catheterization being considered, possibly on Monday  Continue aspirin, atorvastatin, metoprolol, Cozaar

## 2019-04-30 NOTE — PROGRESS NOTES
Hospital Medicine Daily Progress Note    Date of Service  4/30/2019    Chief Complaint  Shortness of breath    Hospital Course      68-year-old male with past medical history of nephrolithiasis, chronic obstructive pulmonary disease.  Admitted April 29 with shortness of breath due to COPD exacerbation.  Was found to have atrial fibrillation with rapid ventricular response.  Was found to have elevated troponin.  Cardiology consulted plan for cath.           Interval Problem Update  Heart rate .  Systolic blood pressure 155-185.  I am adding hydralazine and labetalol as needed.  Metoprolol 12.5 mg twice daily  We will continue to hold his home losartan 25 mg until his renal function improves and after the anticipated contrast closed from angiogram.  Saturating well on 3-4 L of oxygen.  Continue steroid try to wean off as able.  Hypophosphatemia, I am replacing   Echocardiography EF of 55% without evidence of valvular abnormality.  TSH 0.58  Patient developed hematuria, reports this is chronic.  I reviewed his chart it shows that he has chronic nephrolithiasis with staghorn.  Have seen the urology before.  I am ordering a renal ultrasound.  Hemoglobin 14.1, platelets 284.  I am ordering a CBC to follow on hemoglobin given the hematuria.  Patient is on a heparin drip, high risk for bleeding.  Continue to monitor.  May get a cardiac catheterization tomorrow.  Discussed with patient, patient's nurse and with multidisciplinary team during rounds including , pharmacist and charge nurse.      Consultants/Specialty  Cardiology     Code Status  FULL     Disposition  To be determined    Review of Systems  Review of Systems   Constitutional: Positive for malaise/fatigue. Negative for chills and fever.   HENT: Negative for congestion and sore throat.    Eyes: Negative for pain, discharge and redness.   Respiratory: Positive for cough, sputum production and shortness of breath. Negative for hemoptysis and  stridor.    Cardiovascular: Negative for chest pain, palpitations and leg swelling.   Gastrointestinal: Negative for abdominal pain, blood in stool, diarrhea and vomiting.   Genitourinary: Positive for hematuria. Negative for flank pain and urgency.   Musculoskeletal: Negative for myalgias.   Skin: Negative.    Neurological: Negative for speech change, focal weakness, seizures and loss of consciousness.   Endo/Heme/Allergies: Does not bruise/bleed easily.   Psychiatric/Behavioral: Negative for hallucinations and suicidal ideas. The patient is not nervous/anxious.         Physical Exam  Temp:  [36.7 °C (98 °F)-37.1 °C (98.8 °F)] 37.1 °C (98.7 °F)  Pulse:  [] 96  Resp:  [16-22] 20  BP: (148-185)/(63-96) 162/86  SpO2:  [90 %-97 %] 96 %    Physical Exam   Constitutional: He is oriented to person, place, and time. He appears well-developed and well-nourished.   HENT:   Head: Normocephalic and atraumatic.   Right Ear: External ear normal.   Left Ear: External ear normal.   Eyes: Pupils are equal, round, and reactive to light. Right eye exhibits no discharge. Left eye exhibits no discharge. No scleral icterus.   Neck: Normal range of motion. Neck supple. No JVD present. No tracheal deviation present. No thyromegaly present.   Cardiovascular: Normal rate, regular rhythm and normal heart sounds.  Exam reveals no friction rub.    No murmur heard.  Pulmonary/Chest: Effort normal. No stridor. No respiratory distress. He has wheezes. He has no rales.   Reduced air entry bilaterally.  Wheezing bilaterally.   Abdominal: Soft. He exhibits no distension. There is no tenderness. There is no rebound.   Genitourinary:   Genitourinary Comments: Hematuria, gross   Musculoskeletal: He exhibits no edema, tenderness or deformity.   Neurological: He is alert and oriented to person, place, and time. No cranial nerve deficit. Coordination normal.   Skin: Skin is warm and dry.   Psychiatric: He has a normal mood and affect. His behavior  is normal. Judgment normal.   Nursing note and vitals reviewed.      Fluids    Intake/Output Summary (Last 24 hours) at 04/30/19 1834  Last data filed at 04/30/19 1800   Gross per 24 hour   Intake              828 ml   Output             1700 ml   Net             -872 ml       Laboratory  Recent Labs      04/29/19   1418  04/30/19   0139  04/30/19   1347   WBC  18.7*  18.5*   --    RBC  5.04  4.64*   --    HEMOGLOBIN  15.3  14.1  14.1   HEMATOCRIT  47.9  43.7  44.5   MCV  95.0  94.2   --    MCH  30.4  30.4   --    MCHC  31.9*  32.3*   --    RDW  49.1  48.4   --    PLATELETCT  288  284   --    MPV  9.3  9.4   --      Recent Labs      04/29/19   1418  04/30/19   0139   SODIUM  140  141   POTASSIUM  3.7  4.2   CHLORIDE  102  106   CO2  25  23   GLUCOSE  117*  166*   BUN  32*  31*   CREATININE  1.61*  1.26   CALCIUM  9.5  9.0     Recent Labs      04/29/19   1418  04/30/19   0446  04/30/19   1347  04/30/19   1501   APTT  28.9  51.7*  46.3*  55.2*   INR  1.20*   --    --    --          Recent Labs      04/30/19   0139   TRIGLYCERIDE  108   HDL  24*   LDL  88       Imaging  EC-ECHOCARDIOGRAM LTD W/O CONT   Final Result      DX-CHEST-PORTABLE (1 VIEW)   Final Result      No acute cardiac or pulmonary abnormality is noted.      US-RENAL    (Results Pending)        Assessment/Plan  * COPD (chronic obstructive pulmonary disease) (HCC)- (present on admission)   Assessment & Plan    With exacerbation  Ordered for duonebs, prednisone, RT protocol      Acute on chronic respiratory failure with hypoxia (HCC)- (present on admission)   Assessment & Plan    COPD exacerbation  Rocephin and doxy  Prednisone   Wean O2 as tolerated      Gross hematuria- (present on admission)   Assessment & Plan    Patient developed hematuria, reports this is chronic.    I reviewed his chart it shows that he has chronic nephrolithiasis with staghorn.  Have seen the urology before.    I am ordering a renal ultrasound.     Acute coronary syndrome (HCC)-  (present on admission)   Assessment & Plan    Markedly levated trop in setting of afib RVR   Denies CP  Aspirin, statin, diltazem, heparin drip   Cardiology consulted, cardiac cath if resp status stabilizes  Continuous cardiac monitoring.      Atrial fibrillation (HCC)- (present on admission)   Assessment & Plan    New onset.  Echocardiography EF of 55% without evidence of valvular abnormality.  TSH 0.58   Possible ACS, cardiology consulted  Metoprolol   Heparin infusion  [id number] at least 2      Dyslipidemia- (present on admission)   Assessment & Plan    Check lipid panel  Statin      CKD (chronic kidney disease) stage 3, GFR 30-59 ml/min ([facility])- (present on admission)   Assessment & Plan    Mild rise in creat, likely from A. fib with RVR  Gave gentle IVF, holding ACE inhibitor for now.      Essential hypertension- (present on admission)   Assessment & Plan    Hold home losartan  Metoprolol 12.5 mg twice daily   As needed hydralazine and labetalol  Consider restarting amlodipine and losartan when okay with cardiology          VTE prophylaxis: Heparin drip

## 2019-04-30 NOTE — PROGRESS NOTES
Patient sitting up at side of bed visiting with family, no distress noted. Heparin gtt infusing without difficulty. Patient still having red/bloody urine (no clots noted), MDs aware. Vital signs remain stable. Patient denies having any pain or shortness of breath at this time. Bed alarm on. Call light within patient's reach.

## 2019-04-30 NOTE — CARE PLAN
Problem: Safety  Goal: Will remain free from injury  Outcome: PROGRESSING AS EXPECTED  Pt educated on safety precautions and precautions in place. Treaded socks on, bed locked and in lowest position, belongings and call light within reach. Bed alarm in place and on.        Problem: Knowledge Deficit  Goal: Knowledge of disease process/condition, treatment plan, diagnostic tests, and medications will improve  Outcome: PROGRESSING AS EXPECTED  Patient educated on disease process, treatment plan, medications, and plan of care for today. Patient verbalized understanding and no additional questions at this time.

## 2019-04-30 NOTE — PROGRESS NOTES
Heparin gtt started in initiation phase. Confirmed labs drawn within 24 hrs. Initiated per protocol.     Stat EKG ordered for pt, cardiology Dr. Valverde at bedside consulted pt and reviewed EKG at bedside. Stated he wanted to keep pt NPO at midnight. Due to work of breathing Dr. Valverde states they will reassess pts ability to lay flat for procedure in the morning.

## 2019-04-30 NOTE — ED NOTES
Resp with mild WOB, intermittent pursed lip breathing noted.  Family at bedside.  Pt denies complaints at present.

## 2019-04-30 NOTE — PROGRESS NOTES
Monitor room notified this RN of 8 beats of vtach. Pt has no complaints of chest pain or pressure. Complains of feeling hot, ice pack provided with relief to pt.     Checked with monitor room and pt back in ST, current HR of 102.

## 2019-04-30 NOTE — ASSESSMENT & PLAN NOTE
Severe exacerbation, requiring intubation, he is now been extubated  Transition to prednisone  Inhaled bronchodilators as needed

## 2019-04-30 NOTE — ASSESSMENT & PLAN NOTE
Patient has been extubated  Respiratory status much improved, okay to transfer out of the ICU  Taper steroids for COPD exacerbation

## 2019-04-30 NOTE — PROGRESS NOTES
Hosp Dr. Garcias paged regarding 23 beats of vtach. Orders placed for labs. Pt remains without chest pain/ pressure, work of breathing unchanged.     Monitor room stated patient back in SR.

## 2019-04-30 NOTE — PROGRESS NOTES
Hosp paged for pts BP of 185/96. Updated Dr. Garcias on pt and  ordered one time dose of hydralazine.. Administered dose per orders,will recheck BP.     Pt remains with increase work of breathing, O2 sat 96% on 4L, no new complaints at this time and denies any additional interventions.

## 2019-04-30 NOTE — CARE PLAN
Problem: Infection  Goal: Will remain free from infection    Intervention: Assess signs and symptoms of infection  Appropriate protocols and standards utilized to minimize likelihood of infection and the spread of infection. Proper hand hygiene utilized and pt and family members educated on hand hygiene. Pt on oral ABX       Problem: Knowledge Deficit  Goal: Knowledge of disease process/condition, treatment plan, diagnostic tests, and medications will improve    Intervention: Assess knowledge level of disease process/condition, treatment plan, diagnostic tests, and medications  Pt educated on POC for the day, disease process, upcoming tests, and medication information. Will continue to answer questions and educate pt as necessary.

## 2019-04-30 NOTE — PROGRESS NOTES
Monitor Summary:  SR-ST   (r) PAC, (r) PVC  Run of 8 beats of vtach and 23 beats of vtach  .18/.08/.32

## 2019-04-30 NOTE — PROGRESS NOTES
"Care of patient assumed after report. Assessment completed. Patient alert, awake. Patient states he is feeling \"better\", still having labored breathing. Bed alarm on, call light in reach, fall precautions in place. Discussed plan of care with patient, all questions answered. Heparin gtt rate verified. Patient NPO at this time. Patient having bloody urine, states he had it before the heparin gtt started, on-call MD was made aware last night, will make sure MD is notified during rounds.   "

## 2019-04-30 NOTE — CARE PLAN
Problem: Safety  Goal: Will remain free from injury  Patient up with stand by assist. Treaded slipper socks on. Bed in low/locked position. Call light within patient's reach. Bed alarm on.    Problem: Venous Thromboembolism (VTW)/Deep Vein Thrombosis (DVT) Prevention:  Goal: Patient will participate in Venous Thrombosis (VTE)/Deep Vein Thrombosis (DVT)Prevention Measures    Intervention: Assess and monitor for anticoagulation complications  Heparin gtt infusing per protocol.

## 2019-04-30 NOTE — PROGRESS NOTES
Report received at bedside from day shift RN, pt care assumed, tele box on. Pt aaox4, no signs of distress noted at this time, on 4L of O2 via NC. Patient resting comfortably in bed. POC discussed with pt and verbalizes no questions. Pt denies any additional needs at this time. Bed in lowest position, pt educated on fall risk and verbalized understanding, call light within reach, bed alarm plugged in and on.

## 2019-04-30 NOTE — H&P
Hospital Medicine History & Physical Note    Date of Service  4/30/2019    Primary Care Physician  Cheyenne Valverde P.A.-C.    Consultants  cardiology    Code Status  Full    Chief Complaint  SOB    History of Presenting Illness  68 y.o. male who presented 4/29/2019 with shortness of breath.  Patient has a history of COPD not on oxygen.  3 days ago he developed congestion, productive cough, shortness of breath.  He denies fever chills.  He has home oxygen that he uses intermittently, and has been using it for hypoxia.  He tried to use his rescue inhalers but it did not improve his shortness of breath so he went to urgent care.  He was found with A. efraín RVR.  He denies chest pains or palpitations.  In the ED he was given IV diltiazem and converted to sinus rhythm.  Repeat EKG showed sinus rhythm with Q waves in inferior leads and poor R wave progression.  Labs showed leukocytosis, mild increase in creatinine, elevated lactate.  Troponin was 12.12. Chest x-ray was unremarkable.  He was given antibiotics and duo nebs.    Review of Systems  Review of Systems   Constitutional: Negative for chills and fever.   HENT: Positive for congestion.    Respiratory: Positive for cough, sputum production and shortness of breath.    Cardiovascular: Negative for chest pain and palpitations.   Gastrointestinal: Negative for abdominal pain, nausea and vomiting.   Genitourinary: Negative for dysuria.   Musculoskeletal: Negative for back pain.   Neurological: Negative for dizziness and headaches.       Past Medical History   has a past medical history of Anesthesia; Breath shortness; COPD; Dental disorder; Emphysema of lung (HCC); Hypertension; Oxygen dependent; and Renal disorder.    Surgical History   has a past surgical history that includes cystoscopy stent placement (2/24/2012); recovery (4/4/2012); percutaneous nephrostolithotomy (4/5/2012); percutaneous nephrostolithotomy (5/2/2012); recovery (11/1/2012); appendectomy;  appendectomy (1987); septoplasty (N/A, 5/4/2018); and turbinate reduction (Bilateral, 5/4/2018).     Family History  family history includes Cancer (age of onset: 65) in his maternal grandfather.     Social History   reports that he quit smoking about 7 years ago. His smoking use included Cigarettes. He has a 7.50 pack-year smoking history. He has never used smokeless tobacco. He reports that he drinks alcohol. He reports that he does not use drugs.    Allergies  No Known Allergies    Medications  Prior to Admission Medications   Prescriptions Last Dose Informant Patient Reported? Taking?   ADVAIR -21 MCG/ACT inhaler 4/28/2019 at PM Patient No No   Sig: INHALE 2 PUFFS BY MOUTH TWICE DAILY   VENTOLIN  (90 Base) MCG/ACT Aero Soln inhalation aerosol PRN at PRN Patient No No   Sig: INHALE 2 PUFFS BY MOUTH EVERY 6 HOURS AS NEEDED FOR PAIN   amLODIPine (NORVASC) 10 MG Tab 4/28/2019 at AM Patient No No   Sig: TAKE 1 TABLET BY MOUTH EVERY DAY   losartan (COZAAR) 25 MG Tab 4/28/2019 at AM Patient No No   Sig: TAKE 1 TABLET BY MOUTH EVERY DAY   tiotropium (SPIRIVA HANDIHALER) 18 MCG Cap 4/28/2019 at AM Patient No No   Sig: INHALE THE CONTENTS OF 1 CAPSULE BY MOUTH USING HANDI HALER EVERY DAY   zolpidem (AMBIEN) 10 MG Tab PRN at PRN Patient No No   Sig: Take 1 Tab by mouth at bedtime as needed for Sleep for up to 30 days.      Facility-Administered Medications: None       Physical Exam  Temp:  [36.9 °C (98.4 °F)-37 °C (98.6 °F)] 37 °C (98.6 °F)  Pulse:  [] 101  Resp:  [15-40] 18  BP: (125-161)/(63-76) 161/69  SpO2:  [95 %-97 %] 96 %    Physical Exam   Constitutional: He is oriented to person, place, and time. He appears well-developed.   obesity   HENT:   Head: Normocephalic.   Mouth/Throat: Oropharynx is clear and moist.   Eyes: Conjunctivae are normal. Right eye exhibits no discharge. Left eye exhibits no discharge.   Cardiovascular: Regular rhythm.    Tachycardic  Distant heart sounds   Pulmonary/Chest:  He has wheezes. He has no rales.   tachypneic  Poor air movement   Abdominal: Soft. There is no tenderness. There is no rebound and no guarding.   Musculoskeletal: He exhibits no edema.   Neurological: He is alert and oriented to person, place, and time.   Skin: Skin is warm and dry.   Nursing note and vitals reviewed.      Laboratory:  Recent Labs      04/29/19   1418   WBC  18.7*   RBC  5.04   HEMOGLOBIN  15.3   HEMATOCRIT  47.9   MCV  95.0   MCH  30.4   MCHC  31.9*   RDW  49.1   PLATELETCT  288   MPV  9.3     Recent Labs      04/29/19   1418   SODIUM  140   POTASSIUM  3.7   CHLORIDE  102   CO2  25   GLUCOSE  117*   BUN  32*   CREATININE  1.61*   CALCIUM  9.5     Recent Labs      04/29/19   1418   ALTSGPT  35   ASTSGOT  57*   ALKPHOSPHAT  91   TBILIRUBIN  0.6   GLUCOSE  117*     Recent Labs      04/29/19   1418   APTT  28.9   INR  1.20*             Recent Labs      04/29/19   2004   TROPONINI  12.12*       Urinalysis:    Recent Labs      04/29/19   1440   SPECGRAVITY  1.017   GLUCOSEUR  Negative   KETONES  Trace*   NITRITE  Negative   LEUKESTERAS  Moderate*   WBCURINE  20-50*   RBCURINE  >150*   BACTERIA  Negative   EPITHELCELL  Negative        Imaging:  EC-ECHOCARDIOGRAM LTD W/O CONT   Final Result      DX-CHEST-PORTABLE (1 VIEW)   Final Result      No acute cardiac or pulmonary abnormality is noted.            Assessment/Plan:  I anticipate this patient will require at least two midnights for appropriate medical management, necessitating inpatient admission.    * COPD (chronic obstructive pulmonary disease) (HCC)- (present on admission)   Assessment & Plan    With exacerbation  Ordered for duonebs, prednisone, RT protocol       Acute on chronic respiratory failure with hypoxia (HCC)- (present on admission)   Assessment & Plan    COPD exacerbation, on treatment  Rocephin and doxy, narrow if procalcitonin nomal  Wean O2 as tolerated     Acute coronary syndrome (HCC)   Assessment & Plan    Elevated trop in setting  of afib RVR however trop very high  Denies CP  Aspirin, statin, metop, heparin  Check A1c, lipid panel  Cardiology consulted, cardiac cath if resp status stabilizes  Trend trop and EKG, telemetry     Atrial fibrillation (HCC)   Assessment & Plan    New onset. COPD flare, being treated  TTE ordered  TSH ordered  Possible ACS, cardiology consulted  Continue on metop  Heparin infusion, transition to oral AC when stabilized  [id number] at least 2     Dyslipidemia- (present on admission)   Assessment & Plan    Check lipid panel  Statin     CKD (chronic kidney disease) stage 3, GFR 30-59 ml/min ([facility])- (present on admission)   Assessment & Plan    Mild rise in creat  Gave gentle IVF, recheck BMP tomorrow     Essential hypertension- (present on admission)   Assessment & Plan    Hold home losartan while adjusting metop for afib         VTE prophylaxis: heparin

## 2019-04-30 NOTE — PROGRESS NOTES
Patient's aPTT came back 46.3 after the rebolus and increase in large dose  6 hours prior. Repeating aPTT to double check to make sure it is that low before giving another bolus       Addendum: redraw aPTT came back within therapeutic range, no rate change required

## 2019-04-30 NOTE — PROGRESS NOTES
Pt transported to floor, tele box placed. Pt ambulated to bed, denied pain. Pt placed on 3L nasal canula. Family at bedside. Pt educated on POC. Call light with in reach. Will continue to monitor.

## 2019-04-30 NOTE — PROGRESS NOTES
Cardiology Follow Up Progress Note    Date of Service  4/30/2019    Attending Physician  Sharon Montero M.D.    Chief Complaint   Cough and SOB    HPI  Olman Sims is a 68 y.o. male admitted 4/29/2019 with cough and SOB.  He was noted to have COPD exacerbation also found to be in atrial fibrillation with RVR.  He reverted back into normal sinus rhythm with calcium channel blocker in the emergency room.  Troponin peaked at 12.    Past medical history of COPD, hypertension, renal disorder with stents.    Interim Events  4/30/19: patient sitting up in bed, using accessory muscle to breath on oxygen therapy. Denies any chest pain, sob, or palpitations. Reverted back in NSR with 24 bts nonsustained vt asymptomatic.     Review of Systems  Review of Systems   Constitutional: Positive for fatigue.   Respiratory: Positive for wheezing. Negative for chest tightness and shortness of breath.    Cardiovascular: Negative for chest pain, palpitations and leg swelling.   Gastrointestinal: Negative for blood in stool.   Genitourinary: Positive for hematuria.   Neurological: Negative for dizziness and light-headedness.   Psychiatric/Behavioral: Negative for agitation, behavioral problems and confusion.       Vital signs in last 24 hours  Temp:  [36.7 °C (98 °F)-37 °C (98.6 °F)] 36.7 °C (98 °F)  Pulse:  [] 98  Resp:  [15-40] 22  BP: (125-185)/(63-96) 157/75  SpO2:  [95 %-97 %] 95 %    Physical Exam  Physical Exam   Constitutional: He is oriented to person, place, and time. He appears well-developed and well-nourished. No distress.   HENT:   Head: Normocephalic.   Neck: Normal range of motion. No JVD present.   Cardiovascular: Normal rate, regular rhythm, normal heart sounds and intact distal pulses.    No murmur heard.  Pulmonary/Chest: Accessory muscle usage present. No respiratory distress. He has wheezes.   Abdominal: He exhibits no distension. There is no tenderness.   Musculoskeletal: He exhibits no edema or  tenderness.   Neurological: He is alert and oriented to person, place, and time.   Skin: Skin is warm and dry. No rash noted. He is not diaphoretic.   Psychiatric: His behavior is normal. Judgment normal.   Nursing note and vitals reviewed.      Lab Review  Lab Results   Component Value Date/Time    WBC 18.5 (H) 04/30/2019 01:39 AM    RBC 4.64 (L) 04/30/2019 01:39 AM    HEMOGLOBIN 14.1 04/30/2019 01:39 AM    HEMATOCRIT 43.7 04/30/2019 01:39 AM    MCV 94.2 04/30/2019 01:39 AM    MCH 30.4 04/30/2019 01:39 AM    MCHC 32.3 (L) 04/30/2019 01:39 AM    MPV 9.4 04/30/2019 01:39 AM      Lab Results   Component Value Date/Time    SODIUM 141 04/30/2019 01:39 AM    POTASSIUM 4.2 04/30/2019 01:39 AM    CHLORIDE 106 04/30/2019 01:39 AM    CO2 23 04/30/2019 01:39 AM    GLUCOSE 166 (H) 04/30/2019 01:39 AM    BUN 31 (H) 04/30/2019 01:39 AM    CREATININE 1.26 04/30/2019 01:39 AM      Lab Results   Component Value Date/Time    ASTSGOT 57 (H) 04/29/2019 02:18 PM    ALTSGPT 35 04/29/2019 02:18 PM     Lab Results   Component Value Date/Time    CHOLSTRLTOT 134 04/30/2019 01:39 AM    LDL 88 04/30/2019 01:39 AM    HDL 24 (A) 04/30/2019 01:39 AM    TRIGLYCERIDE 108 04/30/2019 01:39 AM    TROPONINI 6.58 (H) 04/30/2019 07:40 AM             Cardiac Imaging and Procedures Review  EKG:   SINUS RHYTHM   LEFT ANTERIOR FASCICULAR BLOCK   Compared to ECG 04/29/2019 16:04:24   NO SIGNIFICANT CHANGES     Electronically Signed On 4- 0:59:56 PDT by Gurpreet Valverde MD     Echocardiogram:    4/29/19  Technically difficult study incomplete information is obtained.   Normal left ventricular systolic function.  Left ventricular ejection fraction is visually estimated to be 55%.  No evidence of valvular abnormality based on Doppler evaluation.     Cardiac Catheterization:  Pending     Assessment/Plan  No new Assessment & Plan notes have been filed under this hospital service since the last note was generated.  Service: Cardiology    1. Atrial  fibrillation:  - in NSR   - continue metoprolol 12.5mg BID   - on oral anticoagulation with heparin gtt    2. NSTEMI:  - trop peaked at 7.96 trending down   - ECHO: ef 55%  - reduce aspirin 81mg qd, continue atorvastatin 40, metoprolol 12.5 mg twice daily  - continue heparin gtt, monitor very closely in with hematuria. H and H stable. Hematuria was present prior to heparin initiation     3. Nonsustained VT;  - k 4 and mag 2    Future Appointments  Date Time Provider Department Center   10/10/2019 10:00 AM ERIN Flowers         Thank you for allowing me to participate in the care of this patient.  I will continue to follow this patient    Please contact me with any questions.    JORGE Bucio   [facility]

## 2019-04-30 NOTE — CONSULTS
Reason for Consult:  Asked by Dr Lobato to see this patient with elevated troponin new atrial fibrillation  Patient's PCP: Cheyenne Valverde P.A.-C.    CC:   Chief Complaint   Patient presents with   • Cough   • Shortness of Breath       HPI: This is a 68-year-old gentleman with COPD who presents with bronchitis COPD exacerbation primary care is also found to have A. fib with rapid ventricular rate was transferred emergently to the emergency room given need for oxygen and his rapid heart rate this reverted back to sinus rhythm with calcium channel blocker and subsequent blood test shows markedly increased troponin to 12.  Denies any prior heart history he follows up regularly with his primary care he has had a stress test in the past which shows possible inferior infarction versus diaphragm attenuation more likely be prior inferior infarction.  He is still quite wheezy with very tight airways and increased work of breathing with COPD exacerbation so is unable to lie flat    Medications / Drug list prior to admission:  No current facility-administered medications on file prior to encounter.      Current Outpatient Prescriptions on File Prior to Encounter   Medication Sig Dispense Refill   • zolpidem (AMBIEN) 10 MG Tab Take 1 Tab by mouth at bedtime as needed for Sleep for up to 30 days. 30 Tab 5   • tiotropium (SPIRIVA HANDIHALER) 18 MCG Cap INHALE THE CONTENTS OF 1 CAPSULE BY MOUTH USING HANDI HALER EVERY DAY 30 Cap 3   • losartan (COZAAR) 25 MG Tab TAKE 1 TABLET BY MOUTH EVERY DAY 90 Tab 3   • amLODIPine (NORVASC) 10 MG Tab TAKE 1 TABLET BY MOUTH EVERY DAY 90 Tab 0   • VENTOLIN  (90 Base) MCG/ACT Aero Soln inhalation aerosol INHALE 2 PUFFS BY MOUTH EVERY 6 HOURS AS NEEDED FOR PAIN 1 Inhaler 11   • ADVAIR -21 MCG/ACT inhaler INHALE 2 PUFFS BY MOUTH TWICE DAILY 36 g 6       Current list of administered Medications:    Current Facility-Administered Medications:   •  DILTIAZem (CARDIZEM) injection 10 mg, 10  mg, Intravenous, Q5 MIN PRN, Gabo Greenberg M.D., 10 mg at 04/29/19 1411  •  zolpidem (AMBIEN) tablet 10 mg, 10 mg, Oral, HS PRN, Hipolito Lobato M.D.  •  Respiratory Care per Protocol, , Nebulization, Continuous RT, Hipolito Lobato M.D.  •  senna-docusate (PERICOLACE or SENOKOT S) 8.6-50 MG per tablet 2 Tab, 2 Tab, Oral, BID **AND** polyethylene glycol/lytes (MIRALAX) PACKET 1 Packet, 1 Packet, Oral, QDAY PRN **AND** magnesium hydroxide (MILK OF MAGNESIA) suspension 30 mL, 30 mL, Oral, QDAY PRN **AND** bisacodyl (DULCOLAX) suppository 10 mg, 10 mg, Rectal, QDAY PRN, Hipolito Lobato M.D.  •  acetaminophen (TYLENOL) tablet 650 mg, 650 mg, Oral, Q6HRS PRN, Hipolito Lobato M.D.  •  doxycycline monohydrate (ADOXA) tablet 100 mg, 100 mg, Oral, Q12HRS, Hipolito Lobato M.D., 100 mg at 04/29/19 1758  •  predniSONE (DELTASONE) tablet 40 mg, 40 mg, Oral, DAILY, Hipolito Lobato M.D., 40 mg at 04/29/19 1759  •  ipratropium-albuterol (DUONEB) nebulizer solution, 3 mL, Nebulization, Q4HRS (RT), Hipolito Lobato M.D., 3 mL at 04/29/19 2023  •  [START ON 4/30/2019] cefTRIAXone (ROCEPHIN) 2 g in  mL IVPB, 2 g, Intravenous, Q24HRS, Hipolito Lobato M.D.  •  [START ON 4/30/2019] aspirin (ASA) tablet 325 mg, 325 mg, Oral, DAILY, Hipolito Lobato M.D.  •  atorvastatin (LIPITOR) tablet 10 mg, 10 mg, Oral, Q EVENING, Hipolito Lobato M.D., 10 mg at 04/29/19 2156  •  metoprolol (LOPRESSOR) tablet 12.5 mg, 12.5 mg, Oral, TWICE DAILY, Hipolito Lobato M.D., 12.5 mg at 04/29/19 2156  •  heparin injection 6,000 Units, 6,000 Units, Intravenous, Once **AND** heparin injection 3,200 Units, 3,200 Units, Intravenous, PRN **AND** heparin infusion 25,000 units in 500 ml 0.45% nacl, , Intravenous, Continuous **AND** Protocol 440 Heparin Weight Based DO NOT GIVE ANY HEPARIN BOLUS TO STROKE PATIENT, , , CONTINUOUS **AND** Protocol 440 Heparin Weight Based Discontinue Enoxaparin (Lovenox), Dabigatran (Pradaxa), Rivaroxaban (Xarelto), Apixaban (Eliquis), Edoxaban (Savaysa, [place]ix[place]),  "Fondaparinux (Arixtra) and Argatroban prior to heparin administration, , , CONTINUOUS **AND** Protocol 440 Heparin Weight Based Draw baseline aPTT, PT, and platelet count if not already done, , , CONTINUOUS **AND** Protocol 440 Heparin Weight Based Draw aPTT 6 hours after beginning infusion. , , , CONTINUOUS **AND** Protocol 440 Heparin Weight Based Draw Platelet count every three days. Contact MD if platelet is 50% lower than baseline count., , , CONTINUOUS **AND** Protocol 440 Heparin Weight Based Record Patient Data, , , CONTINUOUS **AND** Protocol 440 Heparin Weight Based INSTRUCTIONS, , , CONTINUOUS **AND** Protocol 440 Heparin Weight Based Review aPTT results 6 hours after infusion is begun as detailed, , , CONTINUOUS **AND** Protocol 440 Heparin Weight Based Adjust heparin to maintain aPTT between 55-96 sec, , , CONTINUOUS **AND** Protocol 440 Heparin Weight Based Order aPTT 6 hours after any rate change or hold until aPTT is therapeutic (55-96 seconds), , , CONTINUOUS **AND** Protocol 440 Heparin Weight Based Documentation and verification, , , CONTINUOUS, Hipolito Lobato M.D.    Past Medical History:   Diagnosis Date   • Anesthesia     \"stopped breathing/elevated bp\"2012   • Breath shortness     when he quit smoking/with exertion   • COPD    • Dental disorder     lower partial   • Emphysema of lung (HCC)    • Hypertension    • Oxygen dependent     q hs prn 2 ltr   • Renal disorder     right kidney stone/stent in place       Past Surgical History:   Procedure Laterality Date   • SEPTOPLASTY N/A 5/4/2018    Procedure: SEPTOPLASTY;  Surgeon: Jesse Saavedra M.D.;  Location: SURGERY SAME DAY [facility];  Service: Ent   • TURBINATE REDUCTION Bilateral 5/4/2018    Procedure: TURBINATE REDUCTION- RESECTION WITH SUBMUCOSAL APPROACH;  Surgeon: Jesse Saavedra M.D.;  Location: SURGERY SAME DAY [facility];  Service: Ent   • RECOVERY  11/1/2012    Performed by [facility] at SURGERY SAME DAY [facility] ORS   • " PERCUTANEOUS NEPHROSTOLITHOTOMY  5/2/2012    Performed by KAREN KOLB at SURGERY [facility] ORS   • PERCUTANEOUS NEPHROSTOLITHOTOMY  4/5/2012    Performed by KAREN KOLB at SURGERY [facility] ORS   • RECOVERY  4/4/2012    Performed by THANH, [facility] at SURGERY SAME DAY [facility] ORS   • CYSTOSCOPY STENT PLACEMENT  2/24/2012    Performed by KAREN KOLB at [facility] ORS   • APPENDECTOMY  1987   • APPENDECTOMY         Family History   Problem Relation Age of Onset   • Cancer Maternal Grandfather 65        colon     Patient family history was personally reviewed, no pertinent family history to current presentation    Social History     Social History   • Marital status: Single     Spouse name: N/A   • Number of children: N/A   • Years of education: N/A     Occupational History   • Not on file.     Social History Main Topics   • Smoking status: Former Smoker     Packs/day: 0.50     Years: 15.00     Types: Cigarettes     Quit date: 2/17/2012   • Smokeless tobacco: Never Used   • Alcohol use 0.0 oz/week      Comment: 2 glass wine per month   • Drug use: No   • Sexual activity: No     Other Topics Concern   • Not on file     Social History Narrative   • No narrative on file       ALLERGIES:  No Known Allergies    Review of systems:  A complete review of symptoms was reviewed with patient. This is reviewed in H&P and PMH. ALL OTHERS reviewed and negative    Physical exam:  Patient Vitals for the past 24 hrs:   BP Temp Temp src Pulse Resp SpO2 Height Weight   04/29/19 2154 (!) 161/69 - - (!) 102 - 95 % - -   04/29/19 2024 - - - 99 18 96 % - -   04/29/19 2000 148/63 37 °C (98.6 °F) Temporal 100 20 96 % - 81.5 kg (179 lb 10.8 oz)   04/29/19 1700 152/76 36.9 °C (98.4 °F) Temporal (!) 110 20 95 % - -   04/29/19 1650 - - - (!) 101 (!) 21 95 % - -   04/29/19 1600 - - - 98 (!) 26 95 % - -   04/29/19 1530 - - - 96 15 95 % - -   04/29/19 1500 - - - (!) 107 (!) 40 96 % - -   04/29/19 1431 - - - (!) 124 (!)  "30 95 % - -   19 1400 - - - 100 (!) 28 95 % - -   19 1240 - - - (!) 144 (!) 23 97 % - -   19 1238 125/68 - - (!) 103 (!) 26 97 % - -   19 1237 - - - - - - 1.753 m (5' 9\") 80 kg (176 lb 5.9 oz)       General: Mild respiratory distress with COPD  EYES: no jaundice  HEENT: OP clear   Neck: No bruits No JVD.   CVS:  [RRR. S1 + S2. No M/R/G  Resp: CTAB. No wheezing or crackles/rhonchi.  Abdomen: Soft, NT, ND,  Skin: Grossly nothing acute no obvious rashes  Neurological: Alert, Moves all extremities, no cranial nerve defects on limited exam  Extremities: Pulse 2+ in b/l LE.  no edema. No cyanosis.       Data:  Laboratory studies personally reviewed by me:  Recent Results (from the past 24 hour(s))   EKG    Collection Time: 19 12:52 PM   Result Value Ref Range    Report       [facility] Emergency Dept.    Test Date:  2019  Pt Name:    KEIRA SOTOMAYOR             Department: ER  MRN:        [id number]                      Room:        21  Gender:     Male                         Technician: [id number]  :        1950                   Requested By:ER TRIAGE PROTOCOL  Order #:    [id number]                    Reading MD: LEDA ALFORD MD    Measurements  Intervals                                Axis  Rate:       155                          P:  DC:                                      QRS:        -60  QRSD:       84                           T:          64  QT:         308  QTc:        495    Interpretive Statements  ATRIAL FIBRILLATION WITH RAPID V-RATE  PROBABLE INFERIOR INFARCT, OLD  Compared to ECG 2018 11:17:45  Myocardial infarct finding now present  Sinus rhythm no longer present  Left anterior fascicular block no longer present    Electronically Signed On 2019 13:01:43 PDT by LEDA ALOFRD MD     LACTIC ACID    Collection Time: 19  2:18 PM   Result Value Ref Range    Lactic Acid 2.7 (H) 0.5 - 2.0 mmol/L   CBC WITH DIFFERENTIAL    " Collection Time: 04/29/19  2:18 PM   Result Value Ref Range    WBC 18.7 (H) 4.8 - 10.8 K/uL    RBC 5.04 4.70 - 6.10 M/uL    Hemoglobin 15.3 14.0 - 18.0 g/dL    Hematocrit 47.9 42.0 - 52.0 %    MCV 95.0 81.4 - 97.8 fL    MCH 30.4 27.0 - 33.0 pg    MCHC 31.9 (L) 33.7 - 35.3 g/dL    RDW 49.1 35.9 - 50.0 fL    Platelet Count 288 164 - 446 K/uL    MPV 9.3 9.0 - 12.9 fL    Neutrophils-Polys 85.00 (H) 44.00 - 72.00 %    Lymphocytes 7.60 (L) 22.00 - 41.00 %    Monocytes 5.70 0.00 - 13.40 %    Eosinophils 0.00 0.00 - 6.90 %    Basophils 0.50 0.00 - 1.80 %    Immature Granulocytes 1.20 (H) 0.00 - 0.90 %    Nucleated RBC 0.00 /100 WBC    Neutrophils (Absolute) 15.91 (H) 1.82 - 7.42 K/uL    Lymphs (Absolute) 1.42 1.00 - 4.80 K/uL    Monos (Absolute) 1.06 (H) 0.00 - 0.85 K/uL    Eos (Absolute) 0.00 0.00 - 0.51 K/uL    Baso (Absolute) 0.09 0.00 - 0.12 K/uL    Immature Granulocytes (abs) 0.22 (H) 0.00 - 0.11 K/uL    NRBC (Absolute) 0.00 K/uL   COMP METABOLIC PANEL    Collection Time: 04/29/19  2:18 PM   Result Value Ref Range    Sodium 140 135 - 145 mmol/L    Potassium 3.7 3.6 - 5.5 mmol/L    Chloride 102 96 - 112 mmol/L    Co2 25 20 - 33 mmol/L    Anion Gap 13.0 (H) 0.0 - 11.9    Glucose 117 (H) 65 - 99 mg/dL    Bun 32 (H) 8 - 22 mg/dL    Creatinine 1.61 (H) 0.50 - 1.40 mg/dL    Calcium 9.5 8.5 - 10.5 mg/dL    AST(SGOT) 57 (H) 12 - 45 U/L    ALT(SGPT) 35 2 - 50 U/L    Alkaline Phosphatase 91 30 - 99 U/L    Total Bilirubin 0.6 0.1 - 1.5 mg/dL    Albumin 3.9 3.2 - 4.9 g/dL    Total Protein 7.6 6.0 - 8.2 g/dL    Globulin 3.7 (H) 1.9 - 3.5 g/dL    A-G Ratio 1.1 g/dL   MAGNESIUM    Collection Time: 04/29/19  2:18 PM   Result Value Ref Range    Magnesium 2.1 1.5 - 2.5 mg/dL   APTT    Collection Time: 04/29/19  2:18 PM   Result Value Ref Range    APTT 28.9 24.7 - 36.0 sec   PROTHROMBIN TIME    Collection Time: 04/29/19  2:18 PM   Result Value Ref Range    PT 15.3 (H) 12.0 - 14.6 sec    INR 1.20 (H) 0.87 - 1.13   ESTIMATED GFR     Collection Time: 19  2:18 PM   Result Value Ref Range    GFR If  52 (A) >60 mL/min/1.73 m 2    GFR If Non  43 (A) >60 mL/min/1.73 m 2   URINALYSIS    Collection Time: 19  2:40 PM   Result Value Ref Range    Color Yellow     Character Cloudy (A)     Specific Gravity 1.017 <1.035    Ph 5.5 5.0 - 8.0    Glucose Negative Negative mg/dL    Ketones Trace (A) Negative mg/dL    Protein 100 (A) Negative mg/dL    Bilirubin Negative Negative    Urobilinogen, Urine 1.0 Negative    Nitrite Negative Negative    Leukocyte Esterase Moderate (A) Negative    Occult Blood Large (A) Negative    Micro Urine Req Microscopic    URINE MICROSCOPIC (W/UA)    Collection Time: 19  2:40 PM   Result Value Ref Range    WBC 20-50 (A) /hpf    RBC >150 (A) /hpf    Bacteria Negative None /hpf    Epithelial Cells Negative /hpf    Hyaline Cast 0-2 /lpf   EKG (NOW)    Collection Time: 19  4:04 PM   Result Value Ref Range    Report       [facility] Emergency Dept.    Test Date:  2019  Pt Name:    KEIRA SOTOMAYOR             Department: ER  MRN:        [id number]                      Room:       32  Gender:     Male                         Technician: [id number]  :        1950                   Requested By:LEDA ALFORD  Order #:    [id number]                    Reading MD: LEDA ALFORD MD    Measurements  Intervals                                Axis  Rate:       101                          P:          78  AZ:         160                          QRS:        -52  QRSD:       86                           T:          37  QT:         352  QTc:        457    Interpretive Statements  SINUS TACHYCARDIA  PROBABLE INFERIOR INFARCT, AGE INDETERMINATE  Compared to ECG 2019 12:52:43  Atrial fibrillation no longer present  Myocardial infarct finding still present    Electronically Signed On 2019 17:32:39 PDT by LEDA ALFORD MD     LACTIC ACID     Collection Time: 04/29/19  4:55 PM   Result Value Ref Range    Lactic Acid 2.0 0.5 - 2.0 mmol/L   TROPONIN    Collection Time: 04/29/19  8:04 PM   Result Value Ref Range    Troponin I 12.12 (H) 0.00 - 0.04 ng/mL   EC-ECHOCARDIOGRAM LTD W/O CONT    Collection Time: 04/29/19  8:19 PM   Result Value Ref Range    Left Ventrical Ejection Fraction 55        Imaging:  EC-ECHOCARDIOGRAM LTD W/O CONT   Final Result      DX-CHEST-PORTABLE (1 VIEW)   Final Result      No acute cardiac or pulmonary abnormality is noted.              EKG : personally reviewed by me initially atrial fibrillation with rapid ventricular rate then sinus rhythm with nonspecific IVCD    I read his echocardiogram which is quite limited by limited apical views but grossly normal function as well as RV function no significant valve disease    Personally reviewed the images of his stress test in 2016 and there is likely a inferior infarction less likely diaphragmatic attenuation    All pertinent features of laboratory and imaging reviewed including primary images where applicable      Principal Problem:    Atrial fibrillation (HCC) POA: Unknown  Active Problems:    COPD (chronic obstructive pulmonary disease) ([facility]) (Chronic) POA: Yes      Overview: FEV1 0.79 October 2012. Improved to 1.1 with bronchodilator. Quit smoking       February 2012    Acute on chronic respiratory failure with hypoxia ([facility]) POA: Yes    Essential hypertension POA: Yes      Overview: ICD-10 transition    CKD (chronic kidney disease) stage 3, GFR 30-59 ml/min ([facility]) POA: Yes    Dyslipidemia POA: Yes  Resolved Problems:    * No resolved hospital problems. *      Assessment / Plan:  Elevated troponin/ACS  Likely demand ischemia in the setting of hypoxia with COPD exacerbation A. fib with rapid ventricular rate  Heparin  Statin  Aspirin  EF preserved  Preferably coronary angiogram when his pulmonary status has stabilized given the significant increase in troponin    Newly diagnosed  paroxysmal atrial fibrillation with rapid ventricular rate  Due to COPD exacerbation  Heparin transition to oral anticoagulant depending on further work-up    His primary diagnosis is COPD exacerbation      I personally discussed his case with  Dr Lobato    Future Appointments  Date Time Provider Department Center   10/10/2019 10:00 AM [facility]       It is my pleasure to participate in the care of Mr. Sims.  Please do not hesitate to contact me with questions or concerns.    Gurpreet Valverde MD PhD [facility]  Cardiologist [facility]    4/29/2019    Please note that this dictation was created using voice recognition software. I have worked with consultants from the vendor as well as technical experts from [facility] to optimize the interface. I have made every reasonable attempt to correct obvious errors, but I expect that there are errors of grammar and possibly content I did not discover before finalizing the note.

## 2019-04-30 NOTE — PROGRESS NOTES
· 2 RN skin check complete with ABRAHAM Jones.  · Devices in place: Silicone oxygen tubing.  · Skin assessed under devices intact.  · Confirmed pressure ulcers found on N/A.  · New potential pressure ulcers noted on N/A.   · The following interventions in place linens kept clean and dry, patient encouraged to reposition self frequently, pt able to turn self from side to side, silicone oxygen tubing in use.     Skin is intact, scabbing on bilat upper extremities (rt forearm and lt elbow), scar on rt lower back and scar on buttock, sacram pink and blanching.

## 2019-05-01 ENCOUNTER — APPOINTMENT (OUTPATIENT)
Dept: RADIOLOGY | Facility: MEDICAL CENTER | Age: 69
DRG: 981 | End: 2019-05-01
Attending: INTERNAL MEDICINE
Payer: COMMERCIAL

## 2019-05-01 ENCOUNTER — APPOINTMENT (OUTPATIENT)
Dept: RADIOLOGY | Facility: MEDICAL CENTER | Age: 69
DRG: 981 | End: 2019-05-01
Attending: HOSPITALIST
Payer: COMMERCIAL

## 2019-05-01 LAB
ACTION RANGE TRIGGERED IACRT: YES
ACTION RANGE TRIGGERED IACRT: YES
ANION GAP SERPL CALC-SCNC: 10 MMOL/L (ref 0–11.9)
APTT PPP: 45.5 SEC (ref 24.7–36)
APTT PPP: 59 SEC (ref 24.7–36)
BACTERIA UR CULT: NORMAL
BASE EXCESS BLDA CALC-SCNC: -1 MMOL/L (ref -4–3)
BASE EXCESS BLDA CALC-SCNC: -2 MMOL/L (ref -4–3)
BASE EXCESS BLDA CALC-SCNC: 4 MMOL/L (ref -4–3)
BNP SERPL-MCNC: 102 PG/ML (ref 0–100)
BODY TEMPERATURE: ABNORMAL CENTIGRADE
BODY TEMPERATURE: ABNORMAL DEGREES
BODY TEMPERATURE: ABNORMAL DEGREES
BUN SERPL-MCNC: 35 MG/DL (ref 8–22)
CALCIUM SERPL-MCNC: 9.2 MG/DL (ref 8.5–10.5)
CHLORIDE SERPL-SCNC: 106 MMOL/L (ref 96–112)
CO2 BLDA-SCNC: 27 MMOL/L (ref 20–33)
CO2 BLDA-SCNC: 33 MMOL/L (ref 20–33)
CO2 SERPL-SCNC: 29 MMOL/L (ref 20–33)
CREAT SERPL-MCNC: 1.33 MG/DL (ref 0.5–1.4)
ERYTHROCYTE [DISTWIDTH] IN BLOOD BY AUTOMATED COUNT: 51.8 FL (ref 35.9–50)
GLUCOSE SERPL-MCNC: 115 MG/DL (ref 65–99)
GRAM STN SPEC: NORMAL
HCO3 BLDA-SCNC: 25.3 MMOL/L (ref 17–25)
HCO3 BLDA-SCNC: 26 MMOL/L (ref 17–25)
HCO3 BLDA-SCNC: 31.4 MMOL/L (ref 17–25)
HCT VFR BLD AUTO: 44.9 % (ref 42–52)
HGB BLD-MCNC: 14.2 G/DL (ref 14–18)
HOROWITZ INDEX BLDA+IHG-RTO: 266 MM[HG]
HOROWITZ INDEX BLDA+IHG-RTO: ABNORMAL MM[HG]
INHALED O2 FLOW RATE: 4 L/MIN (ref 2–10)
INST. QUALIFIED PATIENT IIQPT: YES
INST. QUALIFIED PATIENT IIQPT: YES
MAGNESIUM SERPL-MCNC: 2.1 MG/DL (ref 1.5–2.5)
MCH RBC QN AUTO: 30.7 PG (ref 27–33)
MCHC RBC AUTO-ENTMCNC: 31.6 G/DL (ref 33.7–35.3)
MCV RBC AUTO: 97.2 FL (ref 81.4–97.8)
O2/TOTAL GAS SETTING VFR VENT: 0.6 %
O2/TOTAL GAS SETTING VFR VENT: 50 %
PCO2 BLDA: 50 MMHG (ref 26–37)
PCO2 BLDA: 58.3 MMHG (ref 26–37)
PCO2 BLDA: 58.9 MMHG (ref 26–37)
PCO2 TEMP ADJ BLDA: 51.7 MMHG (ref 26–37)
PCO2 TEMP ADJ BLDA: 58.9 MMHG (ref 26–37)
PH BLDA: 7.27 [PH] (ref 7.4–7.5)
PH BLDA: 7.31 [PH] (ref 7.4–7.5)
PH BLDA: 7.33 [PH] (ref 7.4–7.5)
PH TEMP ADJ BLDA: 7.3 [PH] (ref 7.4–7.5)
PH TEMP ADJ BLDA: 7.33 [PH] (ref 7.4–7.5)
PLATELET # BLD AUTO: 299 K/UL (ref 164–446)
PMV BLD AUTO: 9.3 FL (ref 9–12.9)
PO2 BLDA: 133 MMHG (ref 64–87)
PO2 BLDA: 190 MMHG (ref 64–87)
PO2 BLDA: 87.8 MMHG (ref 64–87)
PO2 TEMP ADJ BLDA: 133 MMHG (ref 64–87)
PO2 TEMP ADJ BLDA: 194 MMHG (ref 64–87)
POTASSIUM SERPL-SCNC: 4 MMOL/L (ref 3.6–5.5)
RBC # BLD AUTO: 4.62 M/UL (ref 4.7–6.1)
SAO2 % BLDA: 100 % (ref 93–99)
SAO2 % BLDA: 95 % (ref 93–99)
SAO2 % BLDA: 99 % (ref 93–99)
SIGNIFICANT IND 70042: NORMAL
SIGNIFICANT IND 70042: NORMAL
SITE SITE: NORMAL
SITE SITE: NORMAL
SODIUM SERPL-SCNC: 145 MMOL/L (ref 135–145)
SOURCE SOURCE: NORMAL
SOURCE SOURCE: NORMAL
SPECIMEN DRAWN FROM PATIENT: ABNORMAL
SPECIMEN DRAWN FROM PATIENT: ABNORMAL
WBC # BLD AUTO: 22.4 K/UL (ref 4.8–10.8)

## 2019-05-01 PROCEDURE — 31624 DX BRONCHOSCOPE/LAVAGE: CPT | Performed by: INTERNAL MEDICINE

## 2019-05-01 PROCEDURE — 5A1935Z RESPIRATORY VENTILATION, LESS THAN 24 CONSECUTIVE HOURS: ICD-10-PCS | Performed by: INTERNAL MEDICINE

## 2019-05-01 PROCEDURE — 700105 HCHG RX REV CODE 258: Performed by: INTERNAL MEDICINE

## 2019-05-01 PROCEDURE — 85027 COMPLETE CBC AUTOMATED: CPT

## 2019-05-01 PROCEDURE — 87102 FUNGUS ISOLATION CULTURE: CPT

## 2019-05-01 PROCEDURE — 71045 X-RAY EXAM CHEST 1 VIEW: CPT

## 2019-05-01 PROCEDURE — 85730 THROMBOPLASTIN TIME PARTIAL: CPT | Mod: 91

## 2019-05-01 PROCEDURE — 36600 WITHDRAWAL OF ARTERIAL BLOOD: CPT

## 2019-05-01 PROCEDURE — 92950 HEART/LUNG RESUSCITATION CPR: CPT

## 2019-05-01 PROCEDURE — 99233 SBSQ HOSP IP/OBS HIGH 50: CPT | Performed by: INTERNAL MEDICINE

## 2019-05-01 PROCEDURE — 302136 NUTRITION PUMP: Performed by: INTERNAL MEDICINE

## 2019-05-01 PROCEDURE — 31500 INSERT EMERGENCY AIRWAY: CPT

## 2019-05-01 PROCEDURE — 82803 BLOOD GASES ANY COMBINATION: CPT

## 2019-05-01 PROCEDURE — 700111 HCHG RX REV CODE 636 W/ 250 OVERRIDE (IP): Performed by: FAMILY MEDICINE

## 2019-05-01 PROCEDURE — 87070 CULTURE OTHR SPECIMN AEROBIC: CPT

## 2019-05-01 PROCEDURE — 700101 HCHG RX REV CODE 250: Performed by: HOSPITALIST

## 2019-05-01 PROCEDURE — 99233 SBSQ HOSP IP/OBS HIGH 50: CPT | Performed by: HOSPITALIST

## 2019-05-01 PROCEDURE — 0BH18EZ INSERTION OF ENDOTRACHEAL AIRWAY INTO TRACHEA, VIA NATURAL OR ARTIFICIAL OPENING ENDOSCOPIC: ICD-10-PCS | Performed by: INTERNAL MEDICINE

## 2019-05-01 PROCEDURE — 99292 CRITICAL CARE ADDL 30 MIN: CPT | Mod: 25 | Performed by: INTERNAL MEDICINE

## 2019-05-01 PROCEDURE — 94640 AIRWAY INHALATION TREATMENT: CPT

## 2019-05-01 PROCEDURE — 87205 SMEAR GRAM STAIN: CPT

## 2019-05-01 PROCEDURE — 94002 VENT MGMT INPAT INIT DAY: CPT

## 2019-05-01 PROCEDURE — 700102 HCHG RX REV CODE 250 W/ 637 OVERRIDE(OP): Performed by: INTERNAL MEDICINE

## 2019-05-01 PROCEDURE — 87106 FUNGI IDENTIFICATION YEAST: CPT

## 2019-05-01 PROCEDURE — A9270 NON-COVERED ITEM OR SERVICE: HCPCS | Performed by: INTERNAL MEDICINE

## 2019-05-01 PROCEDURE — 700111 HCHG RX REV CODE 636 W/ 250 OVERRIDE (IP): Performed by: HOSPITALIST

## 2019-05-01 PROCEDURE — 700111 HCHG RX REV CODE 636 W/ 250 OVERRIDE (IP): Performed by: INTERNAL MEDICINE

## 2019-05-01 PROCEDURE — 700101 HCHG RX REV CODE 250: Performed by: INTERNAL MEDICINE

## 2019-05-01 PROCEDURE — 0B9B8ZZ DRAINAGE OF LEFT LOWER LOBE BRONCHUS, VIA NATURAL OR ARTIFICIAL OPENING ENDOSCOPIC: ICD-10-PCS | Performed by: INTERNAL MEDICINE

## 2019-05-01 PROCEDURE — 700102 HCHG RX REV CODE 250 W/ 637 OVERRIDE(OP): Performed by: NURSE PRACTITIONER

## 2019-05-01 PROCEDURE — 31645 BRNCHSC W/THER ASPIR 1ST: CPT | Performed by: INTERNAL MEDICINE

## 2019-05-01 PROCEDURE — 83735 ASSAY OF MAGNESIUM: CPT

## 2019-05-01 PROCEDURE — 99291 CRITICAL CARE FIRST HOUR: CPT | Mod: 25 | Performed by: INTERNAL MEDICINE

## 2019-05-01 PROCEDURE — 80048 BASIC METABOLIC PNL TOTAL CA: CPT

## 2019-05-01 PROCEDURE — 302978 HCHG BRONCHOSCOPY-DIAGNOSTIC

## 2019-05-01 PROCEDURE — 770022 HCHG ROOM/CARE - ICU (200)

## 2019-05-01 PROCEDURE — 302214 INTUBATION BOX: Performed by: INTERNAL MEDICINE

## 2019-05-01 PROCEDURE — 31500 INSERT EMERGENCY AIRWAY: CPT | Performed by: INTERNAL MEDICINE

## 2019-05-01 PROCEDURE — A9270 NON-COVERED ITEM OR SERVICE: HCPCS | Performed by: NURSE PRACTITIONER

## 2019-05-01 PROCEDURE — 83880 ASSAY OF NATRIURETIC PEPTIDE: CPT

## 2019-05-01 PROCEDURE — 700105 HCHG RX REV CODE 258

## 2019-05-01 PROCEDURE — 0B968ZZ DRAINAGE OF RIGHT LOWER LOBE BRONCHUS, VIA NATURAL OR ARTIFICIAL OPENING ENDOSCOPIC: ICD-10-PCS | Performed by: INTERNAL MEDICINE

## 2019-05-01 RX ORDER — FAMOTIDINE 20 MG/1
20 TABLET, FILM COATED ORAL EVERY 12 HOURS
Status: DISCONTINUED | OUTPATIENT
Start: 2019-05-01 | End: 2019-05-02

## 2019-05-01 RX ORDER — IPRATROPIUM BROMIDE AND ALBUTEROL SULFATE 2.5; .5 MG/3ML; MG/3ML
3 SOLUTION RESPIRATORY (INHALATION)
Status: DISCONTINUED | OUTPATIENT
Start: 2019-05-01 | End: 2019-05-02

## 2019-05-01 RX ORDER — ACETAMINOPHEN 325 MG/1
650 TABLET ORAL EVERY 6 HOURS PRN
Status: DISCONTINUED | OUTPATIENT
Start: 2019-05-01 | End: 2019-05-02

## 2019-05-01 RX ORDER — ASPIRIN 81 MG/1
81 TABLET, CHEWABLE ORAL DAILY
Status: DISCONTINUED | OUTPATIENT
Start: 2019-05-02 | End: 2019-05-02

## 2019-05-01 RX ORDER — IPRATROPIUM BROMIDE AND ALBUTEROL SULFATE 2.5; .5 MG/3ML; MG/3ML
3 SOLUTION RESPIRATORY (INHALATION)
Status: DISCONTINUED | OUTPATIENT
Start: 2019-05-01 | End: 2019-05-01

## 2019-05-01 RX ORDER — FAMOTIDINE 20 MG/1
20 TABLET, FILM COATED ORAL EVERY 12 HOURS
Status: DISCONTINUED | OUTPATIENT
Start: 2019-05-01 | End: 2019-05-01

## 2019-05-01 RX ORDER — ROCURONIUM BROMIDE 10 MG/ML
50 INJECTION, SOLUTION INTRAVENOUS ONCE
Status: COMPLETED | OUTPATIENT
Start: 2019-05-01 | End: 2019-05-01

## 2019-05-01 RX ORDER — ETOMIDATE 2 MG/ML
20 INJECTION INTRAVENOUS ONCE
Status: COMPLETED | OUTPATIENT
Start: 2019-05-01 | End: 2019-05-01

## 2019-05-01 RX ORDER — SODIUM CHLORIDE, SODIUM LACTATE, POTASSIUM CHLORIDE, CALCIUM CHLORIDE 600; 310; 30; 20 MG/100ML; MG/100ML; MG/100ML; MG/100ML
INJECTION, SOLUTION INTRAVENOUS
Status: COMPLETED
Start: 2019-05-01 | End: 2019-05-01

## 2019-05-01 RX ORDER — METHYLPREDNISOLONE SODIUM SUCCINATE 40 MG/ML
40 INJECTION, POWDER, LYOPHILIZED, FOR SOLUTION INTRAMUSCULAR; INTRAVENOUS EVERY 8 HOURS
Status: DISCONTINUED | OUTPATIENT
Start: 2019-05-01 | End: 2019-05-04

## 2019-05-01 RX ORDER — HYDRALAZINE HYDROCHLORIDE 20 MG/ML
10 INJECTION INTRAMUSCULAR; INTRAVENOUS EVERY 4 HOURS PRN
Status: DISCONTINUED | OUTPATIENT
Start: 2019-05-01 | End: 2019-05-06 | Stop reason: HOSPADM

## 2019-05-01 RX ORDER — SODIUM CHLORIDE, SODIUM LACTATE, POTASSIUM CHLORIDE, AND CALCIUM CHLORIDE .6; .31; .03; .02 G/100ML; G/100ML; G/100ML; G/100ML
500 INJECTION, SOLUTION INTRAVENOUS ONCE
Status: COMPLETED | OUTPATIENT
Start: 2019-05-01 | End: 2019-05-01

## 2019-05-01 RX ORDER — SODIUM CHLORIDE, SODIUM LACTATE, POTASSIUM CHLORIDE, AND CALCIUM CHLORIDE .6; .31; .03; .02 G/100ML; G/100ML; G/100ML; G/100ML
500 INJECTION, SOLUTION INTRAVENOUS
Status: COMPLETED | OUTPATIENT
Start: 2019-05-01 | End: 2019-05-01

## 2019-05-01 RX ORDER — LABETALOL 100 MG/1
100 TABLET, FILM COATED ORAL EVERY 6 HOURS PRN
Status: DISCONTINUED | OUTPATIENT
Start: 2019-05-01 | End: 2019-05-02

## 2019-05-01 RX ORDER — SODIUM CHLORIDE, SODIUM LACTATE, POTASSIUM CHLORIDE, AND CALCIUM CHLORIDE .6; .31; .03; .02 G/100ML; G/100ML; G/100ML; G/100ML
500 INJECTION, SOLUTION INTRAVENOUS
Status: DISCONTINUED | OUTPATIENT
Start: 2019-05-01 | End: 2019-05-04

## 2019-05-01 RX ORDER — DOXYCYCLINE 100 MG/1
100 TABLET ORAL EVERY 12 HOURS
Status: DISCONTINUED | OUTPATIENT
Start: 2019-05-01 | End: 2019-05-02

## 2019-05-01 RX ORDER — AMLODIPINE BESYLATE 10 MG/1
10 TABLET ORAL
Status: DISCONTINUED | OUTPATIENT
Start: 2019-05-01 | End: 2019-05-01

## 2019-05-01 RX ORDER — DEXMEDETOMIDINE HYDROCHLORIDE 4 UG/ML
0-1.5 INJECTION, SOLUTION INTRAVENOUS CONTINUOUS
Status: DISCONTINUED | OUTPATIENT
Start: 2019-05-01 | End: 2019-05-03

## 2019-05-01 RX ORDER — METHYLPREDNISOLONE SODIUM SUCCINATE 125 MG/2ML
62.5 INJECTION, POWDER, LYOPHILIZED, FOR SOLUTION INTRAMUSCULAR; INTRAVENOUS EVERY 6 HOURS
Status: DISCONTINUED | OUTPATIENT
Start: 2019-05-01 | End: 2019-05-01

## 2019-05-01 RX ORDER — ATORVASTATIN CALCIUM 40 MG/1
40 TABLET, FILM COATED ORAL EVERY EVENING
Status: DISCONTINUED | OUTPATIENT
Start: 2019-05-01 | End: 2019-05-02

## 2019-05-01 RX ORDER — AMLODIPINE BESYLATE 10 MG/1
10 TABLET ORAL
Status: DISCONTINUED | OUTPATIENT
Start: 2019-05-02 | End: 2019-05-02

## 2019-05-01 RX ORDER — ZOLPIDEM TARTRATE 5 MG/1
10 TABLET ORAL NIGHTLY PRN
Status: DISCONTINUED | OUTPATIENT
Start: 2019-05-01 | End: 2019-05-02

## 2019-05-01 RX ORDER — BISACODYL 10 MG
10 SUPPOSITORY, RECTAL RECTAL
Status: DISCONTINUED | OUTPATIENT
Start: 2019-05-01 | End: 2019-05-02

## 2019-05-01 RX ORDER — HEPARIN SODIUM 5000 [USP'U]/ML
5000 INJECTION, SOLUTION INTRAVENOUS; SUBCUTANEOUS EVERY 8 HOURS
Status: DISCONTINUED | OUTPATIENT
Start: 2019-05-01 | End: 2019-05-05

## 2019-05-01 RX ORDER — IPRATROPIUM BROMIDE AND ALBUTEROL SULFATE 2.5; .5 MG/3ML; MG/3ML
3 SOLUTION RESPIRATORY (INHALATION)
Status: DISCONTINUED | OUTPATIENT
Start: 2019-05-01 | End: 2019-05-06 | Stop reason: HOSPADM

## 2019-05-01 RX ORDER — POLYETHYLENE GLYCOL 3350 17 G/17G
1 POWDER, FOR SOLUTION ORAL
Status: DISCONTINUED | OUTPATIENT
Start: 2019-05-01 | End: 2019-05-02

## 2019-05-01 RX ORDER — CLONAZEPAM 1 MG/1
1 TABLET ORAL ONCE
Status: COMPLETED | OUTPATIENT
Start: 2019-05-01 | End: 2019-05-01

## 2019-05-01 RX ORDER — AMOXICILLIN 250 MG
2 CAPSULE ORAL 2 TIMES DAILY
Status: DISCONTINUED | OUTPATIENT
Start: 2019-05-01 | End: 2019-05-02

## 2019-05-01 RX ADMIN — METHYLPREDNISOLONE SODIUM SUCCINATE 62.5 MG: 125 INJECTION, POWDER, FOR SOLUTION INTRAMUSCULAR; INTRAVENOUS at 11:55

## 2019-05-01 RX ADMIN — SENNOSIDES, DOCUSATE SODIUM 2 TABLET: 50; 8.6 TABLET, FILM COATED ORAL at 17:06

## 2019-05-01 RX ADMIN — METHYLPREDNISOLONE SODIUM SUCCINATE 62.5 MG: 125 INJECTION, POWDER, FOR SOLUTION INTRAMUSCULAR; INTRAVENOUS at 09:05

## 2019-05-01 RX ADMIN — IPRATROPIUM BROMIDE AND ALBUTEROL SULFATE 3 ML: .5; 3 SOLUTION RESPIRATORY (INHALATION) at 13:00

## 2019-05-01 RX ADMIN — HYDRALAZINE HYDROCHLORIDE 10 MG: 20 INJECTION INTRAMUSCULAR; INTRAVENOUS at 02:28

## 2019-05-01 RX ADMIN — CEFTRIAXONE SODIUM 2 G: 2 INJECTION, POWDER, FOR SOLUTION INTRAMUSCULAR; INTRAVENOUS at 06:21

## 2019-05-01 RX ADMIN — IPRATROPIUM BROMIDE AND ALBUTEROL SULFATE 3 ML: .5; 3 SOLUTION RESPIRATORY (INHALATION) at 18:55

## 2019-05-01 RX ADMIN — SODIUM CHLORIDE, SODIUM LACTATE, POTASSIUM CHLORIDE, AND CALCIUM CHLORIDE 500 ML: .6; .31; .03; .02 INJECTION, SOLUTION INTRAVENOUS at 13:04

## 2019-05-01 RX ADMIN — PREDNISONE 40 MG: 20 TABLET ORAL at 06:19

## 2019-05-01 RX ADMIN — SODIUM CHLORIDE, POTASSIUM CHLORIDE, SODIUM LACTATE AND CALCIUM CHLORIDE 500 ML: 600; 310; 30; 20 INJECTION, SOLUTION INTRAVENOUS at 17:22

## 2019-05-01 RX ADMIN — METOPROLOL TARTRATE 12.5 MG: 25 TABLET ORAL at 06:18

## 2019-05-01 RX ADMIN — DOXYCYCLINE 100 MG: 100 TABLET, FILM COATED ORAL at 06:18

## 2019-05-01 RX ADMIN — ROCURONIUM BROMIDE 50 MG: 10 INJECTION, SOLUTION INTRAVENOUS at 12:17

## 2019-05-01 RX ADMIN — FENTANYL CITRATE 100 MCG: 50 INJECTION, SOLUTION INTRAMUSCULAR; INTRAVENOUS at 14:09

## 2019-05-01 RX ADMIN — ASPIRIN 81 MG: 81 TABLET, COATED ORAL at 06:19

## 2019-05-01 RX ADMIN — IPRATROPIUM BROMIDE AND ALBUTEROL SULFATE 3 ML: .5; 3 SOLUTION RESPIRATORY (INHALATION) at 07:08

## 2019-05-01 RX ADMIN — FENTANYL CITRATE 100 MCG: 50 INJECTION, SOLUTION INTRAMUSCULAR; INTRAVENOUS at 21:36

## 2019-05-01 RX ADMIN — HEPARIN SODIUM 3200 UNITS: 1000 INJECTION, SOLUTION INTRAVENOUS; SUBCUTANEOUS at 01:07

## 2019-05-01 RX ADMIN — FENTANYL CITRATE 100 MCG: 50 INJECTION, SOLUTION INTRAMUSCULAR; INTRAVENOUS at 15:27

## 2019-05-01 RX ADMIN — DOXYCYCLINE 100 MG: 100 TABLET, FILM COATED ORAL at 17:06

## 2019-05-01 RX ADMIN — ATORVASTATIN CALCIUM 40 MG: 40 TABLET, FILM COATED ORAL at 17:06

## 2019-05-01 RX ADMIN — SODIUM CHLORIDE, POTASSIUM CHLORIDE, SODIUM LACTATE AND CALCIUM CHLORIDE 500 ML: 600; 310; 30; 20 INJECTION, SOLUTION INTRAVENOUS at 13:04

## 2019-05-01 RX ADMIN — FENTANYL CITRATE 200 MCG: 50 INJECTION, SOLUTION INTRAMUSCULAR; INTRAVENOUS at 12:30

## 2019-05-01 RX ADMIN — HEPARIN SODIUM 5000 UNITS: 5000 INJECTION, SOLUTION INTRAVENOUS; SUBCUTANEOUS at 21:28

## 2019-05-01 RX ADMIN — FENTANYL CITRATE 100 MCG: 50 INJECTION, SOLUTION INTRAMUSCULAR; INTRAVENOUS at 16:48

## 2019-05-01 RX ADMIN — FAMOTIDINE 20 MG: 20 TABLET ORAL at 17:06

## 2019-05-01 RX ADMIN — METHYLPREDNISOLONE SODIUM SUCCINATE 40 MG: 40 INJECTION, POWDER, FOR SOLUTION INTRAMUSCULAR; INTRAVENOUS at 21:28

## 2019-05-01 RX ADMIN — HEPARIN SODIUM 1700 UNITS/HR: 5000 INJECTION, SOLUTION INTRAVENOUS at 09:46

## 2019-05-01 RX ADMIN — ETOMIDATE 20 MG: 2 INJECTION INTRAVENOUS at 12:17

## 2019-05-01 RX ADMIN — IPRATROPIUM BROMIDE AND ALBUTEROL SULFATE 3 ML: .5; 3 SOLUTION RESPIRATORY (INHALATION) at 22:54

## 2019-05-01 RX ADMIN — CLONAZEPAM 1 MG: 1 TABLET ORAL at 22:13

## 2019-05-01 ASSESSMENT — PULMONARY FUNCTION TESTS: EPAP_CMH2O: 8

## 2019-05-01 ASSESSMENT — ENCOUNTER SYMPTOMS
CHILLS: 0
HEMOPTYSIS: 0
AGITATION: 0
FATIGUE: 1
COUGH: 1
SHORTNESS OF BREATH: 1
STRIDOR: 0
SORE THROAT: 0
MYALGIAS: 0
EYE REDNESS: 0
VOMITING: 0
ABDOMINAL PAIN: 0
SPUTUM PRODUCTION: 1
NERVOUS/ANXIOUS: 0
LOSS OF CONSCIOUSNESS: 0
CHEST TIGHTNESS: 0
BLOOD IN STOOL: 0
FOCAL WEAKNESS: 0
SEIZURES: 0
EYE PAIN: 0
EYE DISCHARGE: 0
PALPITATIONS: 0
SPEECH CHANGE: 0
WHEEZING: 1
BRUISES/BLEEDS EASILY: 0
DIZZINESS: 0
FEVER: 0
LIGHT-HEADEDNESS: 0
FLANK PAIN: 0
HALLUCINATIONS: 0
CONFUSION: 0
DIARRHEA: 0

## 2019-05-01 NOTE — PROGRESS NOTES
Updated Dr. Montero on ABG results. Critical lab findings, pH 7.27 Co2 58.3. Will place transfer order.

## 2019-05-01 NOTE — PROGRESS NOTES
Hosp Dr. Faith paged regarding sustained systolic blood pressure in the 170's. Orders placed and administered medications per orders.

## 2019-05-01 NOTE — PROGRESS NOTES
Pt increased WOB; RR in mid 30's; requiring 5L to maintain 91%; spoke w/ RN about concern for fatigue; RN spoke w/ Dr about CXR and ABG; blood gas shows respiratory acidosis; pt getting orders placed for transfer to unit.

## 2019-05-01 NOTE — PROGRESS NOTES
2356: Patients aPTT came back at 43.0, drawn at 2137. Called down to lab because lab was not resulted at 2300, said it did not go through processing and they would now. Result came back at 2350.     Repeat aPTT ordered due to delay in time. Called down to lab, result still processing. Will update physician once resulted.

## 2019-05-01 NOTE — PROGRESS NOTES
Report received at bedside from day shift RN, pt care assumed, tele box on. Pt aaox4, moderate work of breathing but improved from yesterday. Patient sitting up in bed. POC discussed with pt and verbalizes no questions. Pt c/o of no pain. Pt denies any additional needs at this time. Bed in lowest position, pt educated on fall risk and verbalized understanding, call light within reach, bed alarm plugged in and on.

## 2019-05-01 NOTE — PROGRESS NOTES
Reason for consultation /admission : Dyspnea with elevated troponin/NSTEMI    Much more dyspnic this AM  Transferred to [facility] earlier  No improvement with BiPAP, about to be intubated  PH 7.2  Still with hematuria with some clot  Was scheduled to undergo cardiac cath today    Review of systems;    On Bi-PAP  Unable to do due to his acute condition      Temp:  [36.1 °C (96.9 °F)-37.2 °C (98.9 °F)] 36.9 °C (98.4 °F)  Pulse:  [] 95  Resp:  [16-48] 44  BP: (155-181)/(66-89) 159/76  SpO2:  [90 %-99 %] 96 %  GENERAL in acute distress, + dyspnic at rest  Head atraumatic, normocephalic  Eyes EOMI  ENT neck supple, no JVD, no carotid bruits or thyromegaly  Lung decreased breath sound, no rales + wheezing  Heart RRR, normal rate, no murmur, gallop or rub  Abd distended, no  mass   Ext no edema  Skin no ecchymosis or petechiae  Musculoskeletal no deformity  Neuro grossly intact  Psych anxious    Monitor reviewed by me showed no significant ventricular or atrial dysrhythmias.    Labs: Cr 1.3, K+ 4  Hb 14  WBC 22  Some of the findings on his EKG on admission is likely from his body habitus and COPD    Assessment and plans    1. NSTEMI  With respiratory failure likely from his pulmonary issue and hematuria, will postpone cath for now  Troponin trended down  No sig ventricular arrhythmias  Hemodynamically stable  Medical Rx for now    2. PAF probably precipitated by hypoxemia  No recurrence last 24+ hours    3.Acute on chronic resprtatory failure  NL EF and no sig valve issue by ECHO    4. Hematuria  Pt has history of kidney stone  Will d/c IV UFH  Prophylactic dose hepari once hematuria improved

## 2019-05-01 NOTE — CONSULTS
Critical Care Consultation    Date of consult: 5/1/2019    Referring Physician  Sharon Montero M.D.    Reason for Consultation  AF RVR, NSTEMI, trop 12; heparin gtt; CKD, resp distress; RRT and placed on BiPAP in [facility]    History of Presenting Illness  68 y.o. male who presented 4/29/2019 with worsening dyspnea.  He has a history of COPD with home chronic oxygen therapy.  He presented with 3 days of cough, dyspnea but denied fevers or chills.  He was found to be in atrial fibrillation with rapid ventricular response.  In the ED he received IV of diltiazem and converted to sinus rhythm.  He was found to have a non-ST elevation myocardial function and has been continued on heparin infusion.  Troponin I enzyme was elevated at 12 and cardiology is following the patient.  He is found to have leukocytosis, acute kidney injury and was admitted to the telemetry floor.  He was placed on empiric antimicrobial therapy and corticosteroid.  Today he was urgently transferred to the intensive care unit for worsening respiratory distress.  He was placed on BiPAP however continued to have increasing respiratory distress.  I semi-urgently intubated the patient for bronchoscopy.  There was copious amount of thick purulent secretions in the airways which were therapeutically lavaged and a BAL was sent for culture.    Code Status  Full Code    Review of Systems  Review of Systems   Unable to perform ROS: Acuity of condition       Past Medical History   has a past medical history of Anesthesia; Breath shortness; COPD; Dental disorder; Emphysema of lung (HCC); Hypertension; Oxygen dependent; and Renal disorder.    Surgical History   has a past surgical history that includes cystoscopy stent placement (2/24/2012); recovery (4/4/2012); percutaneous nephrostolithotomy (4/5/2012); percutaneous nephrostolithotomy (5/2/2012); recovery (11/1/2012); appendectomy; appendectomy (1987); septoplasty (N/A, 5/4/2018); and turbinate reduction  (Bilateral, 5/4/2018).    Family History  family history includes Cancer (age of onset: 65) in his maternal grandfather.    Social History   reports that he quit smoking about 7 years ago. His smoking use included Cigarettes. He has a 7.50 pack-year smoking history. He has never used smokeless tobacco. He reports that he drinks alcohol. He reports that he does not use drugs.    Medications  Home Medications     Reviewed by Saloni Levine (Pharmacy Tech) on 04/29/19 at 1558  Med List Status: Complete   Medication Last Dose Status   ADVAIR -21 MCG/ACT inhaler 4/28/2019 Active   amLODIPine (NORVASC) 10 MG Tab 4/28/2019 Active   losartan (COZAAR) 25 MG Tab 4/28/2019 Active   tiotropium (SPIRIVA HANDIHALER) 18 MCG Cap 4/28/2019 Active   VENTOLIN  (90 Base) MCG/ACT Aero Soln inhalation aerosol PRN Active   zolpidem (AMBIEN) 10 MG Tab PRN Active              Current Facility-Administered Medications   Medication Dose Route Frequency Provider Last Rate Last Dose   • hydrALAZINE (APRESOLINE) injection 10 mg  10 mg Intravenous Q4HRS PRN Trent Faith D.O.   10 mg at 05/01/19 0228   • ipratropium-albuterol (DUONEB) nebulizer solution  3 mL Nebulization 4X/DAY (RT) Sharon Montero M.D.   3 mL at 05/01/19 0708   • amLODIPine (NORVASC) tablet 10 mg  10 mg Oral QDAY Sharon Montero M.D.   Stopped at 05/01/19 0730   • methylPREDNISolone sod succ (SOLU-MEDROL) 125 MG injection 62.5 mg  62.5 mg Intravenous Q6HRS Sharon Montero M.D.   62.5 mg at 05/01/19 0905   • aspirin EC (ECOTRIN) tablet 81 mg  81 mg Oral DAILY Redet Adebayo   81 mg at 05/01/19 0619   • labetalol (NORMODYNE) tablet 100 mg  100 mg Oral Q6HRS PRN Sharon Montero M.D.   100 mg at 04/30/19 1958   • DILTIAZem (CARDIZEM) injection 10 mg  10 mg Intravenous Q5 MIN PRN Gabo Greenberg M.D.   10 mg at 04/29/19 1411   • zolpidem (AMBIEN) tablet 10 mg  10 mg Oral HS PRN Hipolito Lobato M.D.   10 mg at 04/30/19 2308   • Respiratory Care per Protocol    Nebulization Continuous RT Hipolito Lobato M.D.       • senna-docusate (PERICOLACE or SENOKOT S) 8.6-50 MG per tablet 2 Tab  2 Tab Oral BID Hipolito Lobato M.D.   Stopped at 04/30/19 1800    And   • polyethylene glycol/lytes (MIRALAX) PACKET 1 Packet  1 Packet Oral QDAY PRN Hipolito Lobato M.D.        And   • magnesium hydroxide (MILK OF MAGNESIA) suspension 30 mL  30 mL Oral QDAY PRN Hipolito Lobato M.D.        And   • bisacodyl (DULCOLAX) suppository 10 mg  10 mg Rectal QDAY PRN Hipolito Lobato M.D.       • acetaminophen (TYLENOL) tablet 650 mg  650 mg Oral Q6HRS PRN Hipolito Lobato M.D.       • doxycycline monohydrate (ADOXA) tablet 100 mg  100 mg Oral Q12HRS Hipolito Lobato M.D.   100 mg at 05/01/19 0618   • cefTRIAXone (ROCEPHIN) 2 g in  mL IVPB  2 g Intravenous Q24HRS Hipolito Lobato M.D.   Stopped at 05/01/19 0651   • metoprolol (LOPRESSOR) tablet 12.5 mg  12.5 mg Oral TWICE DAILY Hipolito Lobato M.D.   12.5 mg at 05/01/19 0618   • heparin injection 3,200 Units  3,200 Units Intravenous PRN Hipolito Lobato M.D.   3,200 Units at 05/01/19 0107    And   • heparin infusion 25,000 units in 500 ml 0.45% nacl   Intravenous Continuous Hipolito Lobato M.D. 34 mL/hr at 05/01/19 0758 1,700 Units/hr at 05/01/19 0758   • atorvastatin (LIPITOR) tablet 40 mg  40 mg Oral Q EVENING Hipolito Lobato M.D.   40 mg at 04/30/19 1723       Allergies  No Known Allergies    Vital Signs last 24 hours  Temp:  [36.1 °C (96.9 °F)-37.2 °C (98.9 °F)] 36.9 °C (98.4 °F)  Pulse:  [] 108  Resp:  [16-40] 39  BP: (155-181)/(66-89) 159/76  SpO2:  [90 %-99 %] 96 %    Physical Exam  Physical Exam   Constitutional: He is oriented to person, place, and time.   Respiratory distress on BiPAP   HENT:   Head: Normocephalic and atraumatic.   Eyes: Pupils are equal, round, and reactive to light. No scleral icterus.   Neck: Neck supple. No tracheal deviation present.   Cardiovascular:   No murmur heard.  Tachycardic, sinus   Pulmonary/Chest: He is in respiratory distress. He has wheezes. He has  rales.   Abdominal: Soft. He exhibits distension. There is no tenderness.   Musculoskeletal: Normal range of motion. He exhibits no edema.   Neurological: He is alert and oriented to person, place, and time. No cranial nerve deficit.   Skin: Skin is warm and dry.       Fluids    Intake/Output Summary (Last 24 hours) at 19 0915  Last data filed at 19 0900   Gross per 24 hour   Intake          1502.42 ml   Output             1775 ml   Net          -272.58 ml       Laboratory  Recent Results (from the past 48 hour(s))   EKG    Collection Time: 19 12:52 PM   Result Value Ref Range    Report       [facility] Emergency Dept[facility]    Test Date:  2019  Pt Name:    KEIRA SOTOMYAOR             Department: ER  MRN:        [id number]                      Room:       [facility]  Gender:     Male                         Technician: [id number]  :        1950                   Requested By:ER TRIAGE PROTOCOL  Order #:    [id number]                    Reading MD: LEDA ALFORD MD    Measurements  Intervals                                Axis  Rate:       155                          P:  TX:                                      QRS:        -60  QRSD:       84                           T:          64  QT:         308  QTc:        495    Interpretive Statements  ATRIAL FIBRILLATION WITH RAPID V-RATE  PROBABLE INFERIOR INFARCT, OLD  Compared to ECG 2018 11:17:45  Myocardial infarct finding now present  Sinus rhythm no longer present  Left anterior fascicular block no longer present    Electronically Signed On 2019 13:01:43 PDT by LEDA ALFORD MD     BLOOD CULTURE    Collection Time: 19  2:00 PM   Result Value Ref Range    Significant Indicator NEG     Source BLD     Site PERIPHERAL     Culture Result       No Growth  Note: Blood cultures are incubated for 5 days and  are monitored continuously.Positive blood cultures  are called to the RN and reported as soon as  they are  identified.     LACTIC ACID    Collection Time: 04/29/19  2:18 PM   Result Value Ref Range    Lactic Acid 2.7 (H) 0.5 - 2.0 mmol/L   CBC WITH DIFFERENTIAL    Collection Time: 04/29/19  2:18 PM   Result Value Ref Range    WBC 18.7 (H) 4.8 - 10.8 K/uL    RBC 5.04 4.70 - 6.10 M/uL    Hemoglobin 15.3 14.0 - 18.0 g/dL    Hematocrit 47.9 42.0 - 52.0 %    MCV 95.0 81.4 - 97.8 fL    MCH 30.4 27.0 - 33.0 pg    MCHC 31.9 (L) 33.7 - 35.3 g/dL    RDW 49.1 35.9 - 50.0 fL    Platelet Count 288 164 - 446 K/uL    MPV 9.3 9.0 - 12.9 fL    Neutrophils-Polys 85.00 (H) 44.00 - 72.00 %    Lymphocytes 7.60 (L) 22.00 - 41.00 %    Monocytes 5.70 0.00 - 13.40 %    Eosinophils 0.00 0.00 - 6.90 %    Basophils 0.50 0.00 - 1.80 %    Immature Granulocytes 1.20 (H) 0.00 - 0.90 %    Nucleated RBC 0.00 /100 WBC    Neutrophils (Absolute) 15.91 (H) 1.82 - 7.42 K/uL    Lymphs (Absolute) 1.42 1.00 - 4.80 K/uL    Monos (Absolute) 1.06 (H) 0.00 - 0.85 K/uL    Eos (Absolute) 0.00 0.00 - 0.51 K/uL    Baso (Absolute) 0.09 0.00 - 0.12 K/uL    Immature Granulocytes (abs) 0.22 (H) 0.00 - 0.11 K/uL    NRBC (Absolute) 0.00 K/uL   COMP METABOLIC PANEL    Collection Time: 04/29/19  2:18 PM   Result Value Ref Range    Sodium 140 135 - 145 mmol/L    Potassium 3.7 3.6 - 5.5 mmol/L    Chloride 102 96 - 112 mmol/L    Co2 25 20 - 33 mmol/L    Anion Gap 13.0 (H) 0.0 - 11.9    Glucose 117 (H) 65 - 99 mg/dL    Bun 32 (H) 8 - 22 mg/dL    Creatinine 1.61 (H) 0.50 - 1.40 mg/dL    Calcium 9.5 8.5 - 10.5 mg/dL    AST(SGOT) 57 (H) 12 - 45 U/L    ALT(SGPT) 35 2 - 50 U/L    Alkaline Phosphatase 91 30 - 99 U/L    Total Bilirubin 0.6 0.1 - 1.5 mg/dL    Albumin 3.9 3.2 - 4.9 g/dL    Total Protein 7.6 6.0 - 8.2 g/dL    Globulin 3.7 (H) 1.9 - 3.5 g/dL    A-G Ratio 1.1 g/dL   BLOOD CULTURE    Collection Time: 04/29/19  2:18 PM   Result Value Ref Range    Significant Indicator NEG     Source BLD     Site PERIPHERAL     Culture Result       No Growth  Note: Blood cultures are incubated  for 5 days and  are monitored continuously.Positive blood cultures  are called to the RN and reported as soon as  they are identified.     MAGNESIUM    Collection Time: 19  2:18 PM   Result Value Ref Range    Magnesium 2.1 1.5 - 2.5 mg/dL   APTT    Collection Time: 19  2:18 PM   Result Value Ref Range    APTT 28.9 24.7 - 36.0 sec   PROTHROMBIN TIME    Collection Time: 19  2:18 PM   Result Value Ref Range    PT 15.3 (H) 12.0 - 14.6 sec    INR 1.20 (H) 0.87 - 1.13   ESTIMATED GFR    Collection Time: 19  2:18 PM   Result Value Ref Range    GFR If  52 (A) >60 mL/min/1.73 m 2    GFR If Non  43 (A) >60 mL/min/1.73 m 2   URINALYSIS    Collection Time: 19  2:40 PM   Result Value Ref Range    Color Yellow     Character Cloudy (A)     Specific Gravity 1.017 <1.035    Ph 5.5 5.0 - 8.0    Glucose Negative Negative mg/dL    Ketones Trace (A) Negative mg/dL    Protein 100 (A) Negative mg/dL    Bilirubin Negative Negative    Urobilinogen, Urine 1.0 Negative    Nitrite Negative Negative    Leukocyte Esterase Moderate (A) Negative    Occult Blood Large (A) Negative    Micro Urine Req Microscopic    URINE CULTURE(NEW)    Collection Time: 19  2:40 PM   Result Value Ref Range    Significant Indicator NEG     Source UR     Site -     Culture Result No growth at 24 hours.    URINE MICROSCOPIC (W/UA)    Collection Time: 19  2:40 PM   Result Value Ref Range    WBC 20-50 (A) /hpf    RBC >150 (A) /hpf    Bacteria Negative None /hpf    Epithelial Cells Negative /hpf    Hyaline Cast 0-2 /lpf   EKG (NOW)    Collection Time: 19  4:04 PM   Result Value Ref Range    Report       [facility] Emergency Dept.    Test Date:  2019  Pt Name:    KEIRA SOTOMAYOR             Department: ER  MRN:        [id number]                      Room:       T732  Gender:     Male                         Technician: [id number]  :        1950                    Requested By:LEDA ALFORD  Order #:    [id number]                    Reading MD: LEDA ALFORD MD    Measurements  Intervals                                Axis  Rate:       101                          P:          78  CO:         160                          QRS:        -52  QRSD:       86                           T:          37  QT:         352  QTc:        457    Interpretive Statements  SINUS TACHYCARDIA  PROBABLE INFERIOR INFARCT, AGE INDETERMINATE  Compared to ECG 2019 12:52:43  Atrial fibrillation no longer present  Myocardial infarct finding still present    Electronically Signed On 2019 17:32:39 PDT by LEDA ALFORD MD     LACTIC ACID    Collection Time: 19  4:55 PM   Result Value Ref Range    Lactic Acid 2.0 0.5 - 2.0 mmol/L   TROPONIN    Collection Time: 19  8:04 PM   Result Value Ref Range    Troponin I 12.12 (H) 0.00 - 0.04 ng/mL   EC-ECHOCARDIOGRAM LTD W/O CONT    Collection Time: 19  8:19 PM   Result Value Ref Range    Left Ventrical Ejection Fraction 55    EKG    Collection Time: 19 10:35 PM   Result Value Ref Range    Report       [facility]    Test Date:  2019  Pt Name:    KEIRA SOTOMAYOR             Department: 171  MRN:        [id number]                      Room:       [facility]  Gender:     Male                         Technician: [facility]  :        1950                   Requested By:URIEL AVILA  Order #:    [id number]                    Reading MD: Gurpreet Valverde MD    Measurements  Intervals                                Axis  Rate:       99                           P:          83  CO:         172                          QRS:        -64  QRSD:       88                           T:          39  QT:         348  QTc:        447    Interpretive Statements  SINUS RHYTHM  LEFT ANTERIOR FASCICULAR BLOCK  Compared to ECG 2019 16:04:24  NO SIGNIFICANT CHANGES    Electronically Signed On 2019 0:59:56 PDT by Gurpreet  MD Abram     TROPONIN    Collection Time: 04/30/19  1:39 AM   Result Value Ref Range    Troponin I 7.96 (H) 0.00 - 0.04 ng/mL   Basic Metabolic Panel (BMP)    Collection Time: 04/30/19  1:39 AM   Result Value Ref Range    Sodium 141 135 - 145 mmol/L    Potassium 4.2 3.6 - 5.5 mmol/L    Chloride 106 96 - 112 mmol/L    Co2 23 20 - 33 mmol/L    Glucose 166 (H) 65 - 99 mg/dL    Bun 31 (H) 8 - 22 mg/dL    Creatinine 1.26 0.50 - 1.40 mg/dL    Calcium 9.0 8.5 - 10.5 mg/dL    Anion Gap 12.0 (H) 0.0 - 11.9   CBC with Differential    Collection Time: 04/30/19  1:39 AM   Result Value Ref Range    WBC 18.5 (H) 4.8 - 10.8 K/uL    RBC 4.64 (L) 4.70 - 6.10 M/uL    Hemoglobin 14.1 14.0 - 18.0 g/dL    Hematocrit 43.7 42.0 - 52.0 %    MCV 94.2 81.4 - 97.8 fL    MCH 30.4 27.0 - 33.0 pg    MCHC 32.3 (L) 33.7 - 35.3 g/dL    RDW 48.4 35.9 - 50.0 fL    Platelet Count 284 164 - 446 K/uL    MPV 9.4 9.0 - 12.9 fL    Neutrophils-Polys 91.50 (H) 44.00 - 72.00 %    Lymphocytes 4.00 (L) 22.00 - 41.00 %    Monocytes 2.30 0.00 - 13.40 %    Eosinophils 0.10 0.00 - 6.90 %    Basophils 0.20 0.00 - 1.80 %    Immature Granulocytes 1.90 (H) 0.00 - 0.90 %    Nucleated RBC 0.00 /100 WBC    Neutrophils (Absolute) 16.97 (H) 1.82 - 7.42 K/uL    Lymphs (Absolute) 0.74 (L) 1.00 - 4.80 K/uL    Monos (Absolute) 0.43 0.00 - 0.85 K/uL    Eos (Absolute) 0.01 0.00 - 0.51 K/uL    Baso (Absolute) 0.04 0.00 - 0.12 K/uL    Immature Granulocytes (abs) 0.35 (H) 0.00 - 0.11 K/uL    NRBC (Absolute) 0.00 K/uL   PROCALCITONIN    Collection Time: 04/30/19  1:39 AM   Result Value Ref Range    Procalcitonin 13.73 (H) <0.25 ng/mL   TSH WITH REFLEX TO FT4    Collection Time: 04/30/19  1:39 AM   Result Value Ref Range    TSH 0.580 0.380 - 5.330 uIU/mL   Lipid Profile    Collection Time: 04/30/19  1:39 AM   Result Value Ref Range    Cholesterol,Tot 134 100 - 199 mg/dL    Triglycerides 108 0 - 149 mg/dL    HDL 24 (A) >=40 mg/dL    LDL 88 <100 mg/dL   HEMOGLOBIN A1C    Collection  Time: 04/30/19  1:39 AM   Result Value Ref Range    Glycohemoglobin 5.9 (H) 0.0 - 5.6 %    Est Avg Glucose 123 mg/dL   ESTIMATED GFR    Collection Time: 04/30/19  1:39 AM   Result Value Ref Range    GFR If African American >60 >60 mL/min/1.73 m 2    GFR If Non  57 (A) >60 mL/min/1.73 m 2   MAGNESIUM    Collection Time: 04/30/19  1:39 AM   Result Value Ref Range    Magnesium 1.9 1.5 - 2.5 mg/dL   PHOSPHORUS    Collection Time: 04/30/19  1:39 AM   Result Value Ref Range    Phosphorus 2.1 (L) 2.5 - 4.5 mg/dL   APTT    Collection Time: 04/30/19  4:46 AM   Result Value Ref Range    APTT 51.7 (H) 24.7 - 36.0 sec   TROPONIN    Collection Time: 04/30/19  7:40 AM   Result Value Ref Range    Troponin I 6.58 (H) 0.00 - 0.04 ng/mL   TROPONIN    Collection Time: 04/30/19  1:47 PM   Result Value Ref Range    Troponin I 4.70 (H) 0.00 - 0.04 ng/mL   HEMOGLOBIN AND HEMATOCRIT    Collection Time: 04/30/19  1:47 PM   Result Value Ref Range    Hemoglobin 14.1 14.0 - 18.0 g/dL    Hematocrit 44.5 42.0 - 52.0 %   APTT    Collection Time: 04/30/19  1:47 PM   Result Value Ref Range    APTT 46.3 (H) 24.7 - 36.0 sec   APTT    Collection Time: 04/30/19  3:01 PM   Result Value Ref Range    APTT 55.2 (H) 24.7 - 36.0 sec   APTT    Collection Time: 04/30/19  9:37 PM   Result Value Ref Range    APTT 43.0 (H) 24.7 - 36.0 sec   CBC WITHOUT DIFFERENTIAL    Collection Time: 05/01/19 12:10 AM   Result Value Ref Range    WBC 22.4 (H) 4.8 - 10.8 K/uL    RBC 4.62 (L) 4.70 - 6.10 M/uL    Hemoglobin 14.2 14.0 - 18.0 g/dL    Hematocrit 44.9 42.0 - 52.0 %    MCV 97.2 81.4 - 97.8 fL    MCH 30.7 27.0 - 33.0 pg    MCHC 31.6 (L) 33.7 - 35.3 g/dL    RDW 51.8 (H) 35.9 - 50.0 fL    Platelet Count 299 164 - 446 K/uL    MPV 9.3 9.0 - 12.9 fL   Basic Metabolic Panel    Collection Time: 05/01/19 12:10 AM   Result Value Ref Range    Sodium 145 135 - 145 mmol/L    Potassium 4.0 3.6 - 5.5 mmol/L    Chloride 106 96 - 112 mmol/L    Co2 29 20 - 33 mmol/L     Glucose 115 (H) 65 - 99 mg/dL    Bun 35 (H) 8 - 22 mg/dL    Creatinine 1.33 0.50 - 1.40 mg/dL    Calcium 9.2 8.5 - 10.5 mg/dL    Anion Gap 10.0 0.0 - 11.9   MAGNESIUM    Collection Time: 05/01/19 12:10 AM   Result Value Ref Range    Magnesium 2.1 1.5 - 2.5 mg/dL   APTT    Collection Time: 05/01/19 12:10 AM   Result Value Ref Range    APTT 45.5 (H) 24.7 - 36.0 sec   ESTIMATED GFR    Collection Time: 05/01/19 12:10 AM   Result Value Ref Range    GFR If African American >60 >60 mL/min/1.73 m 2    GFR If Non African American 54 (A) >60 mL/min/1.73 m 2   APTT    Collection Time: 05/01/19  6:57 AM   Result Value Ref Range    APTT 59.0 (H) 24.7 - 36.0 sec   ABG - LAB    Collection Time: 05/01/19  7:50 AM   Result Value Ref Range    Ph 7.27 (LL) 7.40 - 7.50    Pco2 58.3 (HH) 26.0 - 37.0 mmHg    Po2 87.8 (H) 64.0 - 87.0 mmHg    O2 Saturation 95.0 93.0 - 99.0 %    Hco3 26 (H) 17 - 25 mmol/L    Base Excess -2 -4 - 3 mmol/L    Body Temp see below Centigrade    O2 Therapy 4.0 2.0 - 10.0 L/min       Imaging  CT-HEAD W/O   Final Result      Normal CT scan of the head without contrast.               INTERPRETING LOCATION:  [address] [place] [address], [facility]      DX-CHEST-PORTABLE (1 VIEW)   Final Result      No acute cardiopulmonary abnormality. No interval change.      DX-CHEST-PORTABLE (1 VIEW)   Final Result         1. Stable lines and tubes.   2. No new consolidation or pleural effusions.      DX-ABDOMEN FOR TUBE PLACEMENT   Final Result      1.  Cortrak nasogastric tube position consistent with intragastric placement.   2.  Right-sided double-J ureteral stent in place.      DX-CHEST-LIMITED (1 VIEW)   Final Result      1.  Interval placement of an endotracheal tube which terminates just above the level of the aortic arch in satisfactory position.   2.  Interval placement of an enteric feeding tube terminates in the right paramedian abdomen.   3.  No acute cardiopulmonary disease.      DX-CHEST-PORTABLE (1 VIEW)   Final Result       Mild bilateral basilar atelectasis and/or consolidation. Underlying infection is possible.      US-RENAL   Final Result         1. Bilateral nephrolithiasis.   2. Caliectasis of the right kidney, chronic. Right kidney is atrophic relative to the left.   3. No left hydronephrosis is identified.   4. Distal end of a pigtail ureteral stent in the bladder      EC-ECHOCARDIOGRAM LTD W/O CONT   Final Result      DX-CHEST-PORTABLE (1 VIEW)   Final Result      No acute cardiac or pulmonary abnormality is noted.          Assessment/Plan  * COPD (chronic obstructive pulmonary disease) ([facility])- (present on admission)   Assessment & Plan    IV corticosteroids, empiric antimicrobial therapy, bronchial dilators, follow  Acutely exacerbated with underlying pneumonia  Follow BAL     NSTEMI (non-ST elevated myocardial infarction) ([facility])- (present on admission)   Assessment & Plan    Will require cardiac catheterization at some point when clinically stable  Cardiology following     Acute on chronic respiratory failure with hypoxia ([facility])- (present on admission)   Assessment & Plan    Failed BiPAP and urgently intubated 5/1  Continue full mechanical ventilatory support, I have adjusted ventilator settings and details will liberate when clinically appropriate, Daily SAT/SBT, A-F bundle and light sedation     Gross hematuria- (present on admission)   Assessment & Plan    History of retained ureteral stent, consider discontinue anticoagulation, reviewed with cardiology  Lauren catheter in place, urology consulted     Atrial fibrillation ([facility])- (present on admission)   Assessment & Plan    Rate control, anticoagulation, cardiology following     CKD (chronic kidney disease) stage 3, GFR 30-59 ml/min ([facility])- (present on admission)   Assessment & Plan    Avoid nephrotoxic agents, monitor electrolytes closely     Essential hypertension- (present on admission)   Assessment & Plan    Glycemic control       Patient is critically ill.  He has been  intubated and placed on full ventilator support.  We will follow hemodynamics closely.  Bronchoscopy was consistent with pneumonia with purulent secretions noted.  I have continued antibiotic therapy and will adjust according to cultures and clinical response.  I have assessed and reassessed the patient throughout the day.  He is at high risk for further ventilatory deterioration and death.  Discussed patient condition and risk of morbidity and/or mortality with Hospitalist, RN, RT and Pharmacy.    The patient remains critically ill.  Critical care time = 75 minutes in directly providing and coordinating critical care and extensive data review.  No time overlap and excludes procedures.

## 2019-05-01 NOTE — DIETARY
"Nutrition Support Assessment:  Day 2 of admit.  Olman Sims is a 68 y.o. male with admitting DX of A-fib ([facility]), COPD (chronic obstructive pulmonary disease) ([facility])     Current problem list:  1. Rapid response this morning, transferred to CICU for BiPAP, then emergently intubated.     Assessment:  Estimated Nutritional Needs based on:   Height: 175.3 cm (5' 9\")  Weight: 78.3 kg (172 lb 9.9 oz)  Ideal Body Weight: 72.6 kg (160 lb)  Percent Ideal Body Weight: 107.9  Body mass index is 25.49 kg/m².     Calculation/Equation: REE per MSJ x1.2 = 1866 kcal/day; RMR per PSU (vent L/min 10, T max/24 hours 37.2) = 1802 kcal/day   Total Calories / day: 1800 - 2000 (Calories / k - 26)  Total Grams Protein / day: 78 - 94 (Grams Protein / k - 1.2)     Evaluation:   1. Pt intubated and unable to take PO diet.  2. Orders for TF to start. Await confirmation of Cortrak placement.   3. Labs and meds reviewed. HA1c 5.9%; prediabetes per non hospital problem list. Solumedrol to start per MAR.  4. Low-carbohydrate TF is appropriate.  5. Metabolic cart study not needed per RD judgment.     Malnutrition Risk: Negative nutritional admit screen.     Recommendations/Plan:  1. Start Diabetisource AC @ 25 ml/hr and advance per protocol to goal rate 65 ml/hr to provide 1872 kcal, 94 grams protein, and 1279 ml free water per day.  2. Fluids per MD.    RD following.           "

## 2019-05-01 NOTE — PROGRESS NOTES
APTT resulted at 45.5, called pharmacy and updated on delay in rebolus/rate change. Pharmacy said to follow protocol for result in maintenance phase and order aPTT for 6 hours from the rate change.     Updated hosp Dr. Faith on delay in results and rate change and stated to follow what Pharmacy said and resume protcol with last result of 45.5. Orders followed by protocol.

## 2019-05-01 NOTE — PROGRESS NOTES
Pt transferred to 605 CIC bed. Family called and updated. This RN left message. Bedside report completed with ICU RN's. Pt has all belongings.

## 2019-05-01 NOTE — PROGRESS NOTES
Cortrak Placement    Tube Team verified patient name and medical record number prior to tube placement.  Cortrak tube (43 inches, 10 Armenian) placed at 70   cm in right nare.  Per Cortrak picture, tube appears to be in the stomach.  Nursing Instructions: Awaiting KUB to confirm placement before use for medications or feeding. Once placement confirmed, flush tube with 30 ml of water, and then remove and save stylet, in patient medication drawer.

## 2019-05-01 NOTE — ASSESSMENT & PLAN NOTE
Chronic hematuria, likely related to renal calculi  Appreciate urology consultation  When more stable, will need treatment for kidney stones and removal of stent  Remove Lauren catheter early on 5/5/2019 for a voiding trial

## 2019-05-01 NOTE — PROGRESS NOTES
Pt intubated and bronched, Pt provided consent prior to procedure.  Time out at 1216, patient participated, all in agreement.  Sedation given at 1218, intubated at 1219, bronch at 1222.  End time at 1228.  Restraints applied bilaterally at 1230.

## 2019-05-01 NOTE — PROCEDURES
"Date of Service: 5/1/2019    Procedure:  Diagnostic and therapeutic bronchoscopy with BAL    Indication: Respiratory failure, ? pneumonia    Physician:  Dr. Jovanni Zhou MD    Post Procedure Diagnosis:  1.  Inflamed airways throughout  2.  Copious thick, purulent secretions in the proximal and distal airways  3.  Therapeutic lavage right lower lobe and left lower lobe segments  4. BAL lower lobe sent for culture    Narrative:  Appropriate consent was obtained and \"time out\" was performed.  A flexible fiberoptic bronchoscope was then inserted through the ETT without difficulty.  All airways were evaluated to the sub-segmental level.  The airway mucosa was inflamed throughout.  There was a moderate to large amount of thick, creamy, purulent secretions throughout the proximal and distal airways.  The right lower lobe and left lower lobe segments as well as the proximal airways were therapeutically lavaged with small aliquots of saline..  No endobronchial lesions were seen.  The bronchoscope was then wedged in a segment of the lower lobe bronchus.  30 cc of saline was instilled with moderate return of purulent BAL fluid.  The BAL specimen will be sent for appropriate culture.  No immediate complications.  EBL = 0.      Jovanni Zhou M.D.        "

## 2019-05-01 NOTE — DISCHARGE PLANNING
Care Transition Team Discharge Planning    Anticipated Discharge Disposition: TBD    Action: Spoke to pt's step-niece, Stephy Allen ([phone]). She reports pt's biological cousin lives in [facility], Robby Shukla (cell [phone], [phone]). Also, Stephy's sister(?) 19yo, Carli Allen will be available for medical team support ([phone]).    Lsw answered questions about AD completion. Once pt is A/O and no longer sedated, he will be able to complete the AD started prior to this admission.    Stephy is aware the medical team will be consulting pt's biological cousin, Robby, for any medical decision making during pt's intubation/sedation period.    Barriers to Discharge: TBD    Plan: f/u w/ medical team

## 2019-05-01 NOTE — PROGRESS NOTES
Pt arrived at 0859 to CICU T605, Dr Zhou updated and at bedside.  Pt has no c/o pain, but c/o SOB, RR 40-48, 1-2 words a breath to speak,  Bilateral expiratory wheezes upper and lower.  A&Ox4.  Pt placed on bipap.

## 2019-05-01 NOTE — CARE PLAN
Problem: Infection  Goal: Will remain free from infection  Outcome: PROGRESSING AS EXPECTED  Patient educated on the signs and symptoms of infection, standard precautions in place, hand hygiene performed and educated pt on importance of infection prevention.     Problem: Discharge Barriers/Planning  Goal: Patient's continuum of care needs will be met  Outcome: PROGRESSING AS EXPECTED  Discussed with patient potential barriers for discharge throughout the hospital stay. Collaborated with the patient to identify support at home and oxygen needs.

## 2019-05-01 NOTE — PROGRESS NOTES
Hospital Medicine Daily Progress Note    Date of Service  5/1/2019    Chief Complaint  Shortness of breath    Hospital Course      68-year-old male with past medical history of nephrolithiasis, chronic obstructive pulmonary disease.  Admitted April 29 with shortness of breath due to COPD exacerbation.  Was found to have atrial fibrillation with rapid ventricular response.  Was found to have elevated troponin.  Cardiology consulted plan for cath.           Interval Problem Update  Heart rate 90s-112.  Worsening respiratory status, tachypneic 30s-mid 40s.  Increased work of breathing paradoxical breathing and the use of accessory muscles of respiration.  Increased oxygen requirement up to 3-5 L.  I am ordering a stat ABG.  ABG shows respiratory acidosis.  I am transferring the patient to the intensive care unit.  I am consulting intensive care to consider BiPAP.  Patient may need to be intubated eventually if does not respond well with BiPAP.  Patient has developed worsening hematuria with a heparin drip.  I reviewed his renal ultrasound bilateral nephrolithiasis.  I am consulting urology.  The patient is still on a heparin drip, his hematuria will likely worsen.  He  will likely need a cardiac catheterization.  Cardiology is following.  Discussed with patient, patient's nurse, intensivist, urology and with multidisciplinary team during rounds including , pharmacist and charge nurse.      Consultants/Specialty  Cardiology   Intensive care  Urology.    Code Status  FULL     Disposition  Transferring to the cardiac intensive care unit    Review of Systems  Review of Systems   Constitutional: Positive for malaise/fatigue. Negative for chills and fever.   HENT: Negative for congestion and sore throat.    Eyes: Negative for pain, discharge and redness.   Respiratory: Positive for cough, sputum production and shortness of breath. Negative for hemoptysis and stridor.    Cardiovascular: Negative for chest pain,  palpitations and leg swelling.   Gastrointestinal: Negative for abdominal pain, blood in stool, diarrhea and vomiting.   Genitourinary: Positive for hematuria (worseing). Negative for flank pain and urgency.   Musculoskeletal: Negative for myalgias.   Skin: Negative.    Neurological: Negative for speech change, focal weakness, seizures and loss of consciousness.   Endo/Heme/Allergies: Does not bruise/bleed easily.   Psychiatric/Behavioral: Negative for hallucinations and suicidal ideas. The patient is not nervous/anxious.         Physical Exam  Temp:  [36.1 °C (96.9 °F)-37.2 °C (98.9 °F)] 36.9 °C (98.4 °F)  Pulse:  [] 95  Resp:  [16-48] 44  BP: (155-181)/(66-89) 159/76  SpO2:  [90 %-99 %] 96 %    Physical Exam   Constitutional: He is oriented to person, place, and time. He appears well-developed and well-nourished.   HENT:   Head: Normocephalic and atraumatic.   Right Ear: External ear normal.   Left Ear: External ear normal.   Eyes: Pupils are equal, round, and reactive to light. Right eye exhibits no discharge. Left eye exhibits no discharge. No scleral icterus.   Neck: Normal range of motion. Neck supple. No JVD present. No tracheal deviation present. No thyromegaly present.   Cardiovascular: Normal rate, regular rhythm and normal heart sounds.  Exam reveals no friction rub.    No murmur heard.  Pulmonary/Chest: Effort normal. No stridor. No respiratory distress. He has wheezes. He has no rales.   Reduced air entry bilaterally.  Wheezing bilaterally.   Abdominal: Soft. He exhibits no distension. There is no tenderness. There is no rebound.   Genitourinary:   Genitourinary Comments: Hematuria, gross   Musculoskeletal: He exhibits no edema, tenderness or deformity.   Neurological: He is alert and oriented to person, place, and time. No cranial nerve deficit. Coordination normal.   Skin: Skin is warm and dry.   Psychiatric: He has a normal mood and affect. His behavior is normal. Judgment normal.   Nursing  note and vitals reviewed.      Fluids    Intake/Output Summary (Last 24 hours) at 05/01/19 0937  Last data filed at 05/01/19 0900   Gross per 24 hour   Intake          1502.42 ml   Output             1775 ml   Net          -272.58 ml       Laboratory  Recent Labs      04/29/19   1418  04/30/19   0139  04/30/19   1347  05/01/19   0010   WBC  18.7*  18.5*   --   22.4*   RBC  5.04  4.64*   --   4.62*   HEMOGLOBIN  15.3  14.1  14.1  14.2   HEMATOCRIT  47.9  43.7  44.5  44.9   MCV  95.0  94.2   --   97.2   MCH  30.4  30.4   --   30.7   MCHC  31.9*  32.3*   --   31.6*   RDW  49.1  48.4   --   51.8*   PLATELETCT  288  284   --   299   MPV  9.3  9.4   --   9.3     Recent Labs      04/29/19   1418  04/30/19   0139  05/01/19   0010   SODIUM  140  141  145   POTASSIUM  3.7  4.2  4.0   CHLORIDE  102  106  106   CO2  25  23  29   GLUCOSE  117*  166*  115*   BUN  32*  31*  35*   CREATININE  1.61*  1.26  1.33   CALCIUM  9.5  9.0  9.2     Recent Labs      04/29/19   1418   04/30/19   2137  05/01/19   0010  05/01/19   0657   APTT  28.9   < >  43.0*  45.5*  59.0*   INR  1.20*   --    --    --    --     < > = values in this interval not displayed.         Recent Labs      04/30/19 0139   TRIGLYCERIDE  108   HDL  24*   LDL  88       Imaging  DX-CHEST-PORTABLE (1 VIEW)   Final Result      Mild bilateral basilar atelectasis and/or consolidation. Underlying infection is possible.      US-RENAL   Final Result         1. Bilateral nephrolithiasis.   2. Caliectasis of the right kidney, chronic. Right kidney is atrophic relative to the left.   3. No left hydronephrosis is identified.   4. Distal end of a pigtail ureteral stent in the bladder      EC-ECHOCARDIOGRAM LTD W/O CONT   Final Result      DX-CHEST-PORTABLE (1 VIEW)   Final Result      No acute cardiac or pulmonary abnormality is noted.           Assessment/Plan  * COPD (chronic obstructive pulmonary disease) (HCC)- (present on admission)   Assessment & Plan    As above and acute on  chronic respiratory failure with hypoxemia     Acute on chronic respiratory failure with hypoxia (HCC)- (present on admission)   Assessment & Plan    COPD exacerbation  Rocephin and doxy  Intravenous steroids  5/1/2019   Worsening respiratory status, tachypneic 30s-mid 40s.  Increased work of breathing paradoxical breathing and the use of accessory muscles of respiration.  Increased oxygen requirement up to 3-5 L.  I am ordering a stat ABG.  ABG shows respiratory acidosis.  I am transferring the patient to the intensive care unit.  I am consulting intensive care to consider BiPAP.  Patient may need to be intubated eventually if does not respond well with BiPAP.     Gross hematuria- (present on admission)   Assessment & Plan    Patient developed hematuria, reports this is chronic.    I reviewed his chart it shows that he has chronic nephrolithiasis with staghorn.  Have seen the urology before.    I reviewed his renal ultrasound bilateral nephrolithiasis.  I am consulting urology.  The patient is still on a heparin drip, his hematuria will likely worsen.       Acute coronary syndrome (HCC)- (present on admission)   Assessment & Plan    Markedly levated trop in setting of afib RVR   Denies CP  Aspirin, statin, diltazem, heparin drip   Cardiology consulted, cardiac cath if resp status stabilizes  Continuous cardiac monitoring.       Atrial fibrillation (HCC)- (present on admission)   Assessment & Plan    New onset.  Echocardiography EF of 55% without evidence of valvular abnormality.  TSH 0.58   Possible ACS, cardiology consulted  Metoprolol   Heparin infusion  [id number] at least 2       Dyslipidemia- (present on admission)   Assessment & Plan    Statin, high intensity     CKD (chronic kidney disease) stage 3, GFR 30-59 ml/min (HCC)- (present on admission)   Assessment & Plan    Mild rise in creat, likely from A. fib with RVR  Gave gentle IVF, holding ACE inhibitor for now.       Essential hypertension- (present on  admission)   Assessment & Plan    Hold home losartan  Metoprolol 12.5 mg twice daily   As needed hydralazine and labetalol  Consider restarting amlodipine and losartan when okay with cardiology           VTE prophylaxis: Heparin drip

## 2019-05-01 NOTE — PROGRESS NOTES
Pharmacist, Jaret contacted in regard to 1400 dose of solumedrol. Per Jaret, we are to hold this dose due to its proximity to his 1200 dose.

## 2019-05-01 NOTE — PROCEDURES
Date: 5/1/2019    Procedure:  Emergent orotracheal intubation    Indication: Respiratory failure    Physician:  Dr. Jovanni Zhou MD    Consent:  Emergent     Procedure:  This patient has developed increasing respiratory failure failing bipap and requires emergent intubation.  RSI given with rocuronium and etomidate.  Using a #4 glidescope, an 8.0 ETT was placed on the first attempt w/o complication.  Tube placement confirmed by direct visualization of placement, end-tidal CO2 monitor color change and equal breath sounds.  No immediate complications.  Vitals remained stable throughout the procedure.  A post-procedure CXR will be reviewed.

## 2019-05-01 NOTE — PROGRESS NOTES
Hospitalists signing off for now as patient is now on ventilator; will sign back on once weans from vent.  D/W Dr. Zhou.

## 2019-05-01 NOTE — PROGRESS NOTES
Cardiology Follow Up Progress Note    Date of Service  5/1/2019    Attending Physician  Sharon Montero M.D.    Chief Complaint   Cough and SOB    HPI  Olman Sims is a 68 y.o. male admitted 4/29/2019 with cough and SOB.  He was noted to have COPD exacerbation also found to be in atrial fibrillation with RVR.  He reverted back into normal sinus rhythm with calcium channel blocker in the emergency room.  Troponin peaked at 12.    Past medical history of COPD, hypertension, renal disorder with stents.    Interim Events  5/1/19: patient was having difficulty breath, blood gases showed decompensated respiratory acidosis. Tachypnic. Rapid response was called and getting transported to icu for bipap. He was also noted to be tachycardiac with pulse 110s.     4/30/19: patient sitting up in bed, using accessory muscle to breath on oxygen therapy. Denies any chest pain, sob, or palpitations. Reverted back in NSR with 24 bts nonsustained vt asymptomatic.     Review of Systems  Review of Systems   Constitutional: Positive for fatigue.   Respiratory: Positive for shortness of breath and wheezing. Negative for chest tightness.    Cardiovascular: Negative for chest pain, palpitations and leg swelling.   Gastrointestinal: Negative for blood in stool.   Genitourinary: Positive for hematuria.   Neurological: Negative for dizziness and light-headedness.   Psychiatric/Behavioral: Negative for agitation, behavioral problems and confusion.       Vital signs in last 24 hours  Temp:  [36.1 °C (96.9 °F)-37.1 °C (98.7 °F)] 36.1 °C (96.9 °F)  Pulse:  [] 112  Resp:  [16-40] 32  BP: (155-181)/(66-89) 172/71  SpO2:  [90 %-99 %] 90 %    Physical Exam  Physical Exam   Constitutional: He is oriented to person, place, and time. He appears well-developed and well-nourished. No distress.   HENT:   Head: Normocephalic.   Neck: Normal range of motion. No JVD present.   Cardiovascular: Normal rate, regular rhythm, normal heart sounds  and intact distal pulses.    No murmur heard.  Pulmonary/Chest: Accessory muscle usage present. Tachypnea noted. No respiratory distress. He has decreased breath sounds. He has wheezes. He has rhonchi.   Abdominal: He exhibits no distension. There is no tenderness.   Musculoskeletal: He exhibits no edema or tenderness.   Neurological: He is alert and oriented to person, place, and time.   Skin: Skin is warm and dry. No rash noted. He is not diaphoretic.   Psychiatric: His behavior is normal. Judgment normal.   Nursing note and vitals reviewed.      Lab Review  Lab Results   Component Value Date/Time    WBC 22.4 (H) 05/01/2019 12:10 AM    RBC 4.62 (L) 05/01/2019 12:10 AM    HEMOGLOBIN 14.2 05/01/2019 12:10 AM    HEMATOCRIT 44.9 05/01/2019 12:10 AM    MCV 97.2 05/01/2019 12:10 AM    MCH 30.7 05/01/2019 12:10 AM    MCHC 31.6 (L) 05/01/2019 12:10 AM    MPV 9.3 05/01/2019 12:10 AM      Lab Results   Component Value Date/Time    SODIUM 145 05/01/2019 12:10 AM    POTASSIUM 4.0 05/01/2019 12:10 AM    CHLORIDE 106 05/01/2019 12:10 AM    CO2 29 05/01/2019 12:10 AM    GLUCOSE 115 (H) 05/01/2019 12:10 AM    BUN 35 (H) 05/01/2019 12:10 AM    CREATININE 1.33 05/01/2019 12:10 AM      Lab Results   Component Value Date/Time    ASTSGOT 57 (H) 04/29/2019 02:18 PM    ALTSGPT 35 04/29/2019 02:18 PM     Lab Results   Component Value Date/Time    CHOLSTRLTOT 134 04/30/2019 01:39 AM    LDL 88 04/30/2019 01:39 AM    HDL 24 (A) 04/30/2019 01:39 AM    TRIGLYCERIDE 108 04/30/2019 01:39 AM    TROPONINI 4.70 (H) 04/30/2019 01:47 PM             Cardiac Imaging and Procedures Review  EKG:   SINUS RHYTHM   LEFT ANTERIOR FASCICULAR BLOCK   Compared to ECG 04/29/2019 16:04:24   NO SIGNIFICANT CHANGES     Electronically Signed On 4- 0:59:56 PDT by Gurpreet Valverde MD     Echocardiogram:    4/29/19  Technically difficult study incomplete information is obtained.   Normal left ventricular systolic function.  Left ventricular ejection fraction  is visually estimated to be 55%.  No evidence of valvular abnormality based on Doppler evaluation.     Cardiac Catheterization:  Pending     Assessment/Plan    1. Atrial fibrillation:  - in NSR   - continue metoprolol 12.5mg BID   - on oral anticoagulation with heparin gtt    2. NSTEMI:  - trop peaked at 12.12 trending down   - ECHO: ef 55%  - reduce aspirin 81mg qd, continue atorvastatin 40, metoprolol 12.5 mg twice daily  - continue heparin gtt, monitor very closely in with hematuria. H and H stable. Hematuria was present prior to heparin initiation     3. Nonsustained VT;  - k 4 and mag 2    4. COPD:  - in respiratory distress, getting transferred to ICU        Future Appointments  Date Time Provider Department Center   10/10/2019 10:00 AM ERIN Flowers       Thank you for allowing me to participate in the care of this patient.  I will continue to follow this patient    Please contact me with any questions.    JORGE Bucio   [facility]

## 2019-05-01 NOTE — PROGRESS NOTES
0725: Assumed care of pt. Pt sitting up side of bed, RR 36-40, feeling short of breath. This RN repositioned pt in bed. Respiratory at bedside. RT concerned for respiratory status as well. RT had care over pt and noticed significant change, pursed lip breathing, tripod position this morning, and pt feeling fatigue.    This RN called rapid response RN to assess pt.  0745: Rapid response RN at bedside assessing pt. Physician at bedside also assessing pt. ABG ordered and Chest xray. This RN addressed current Heparin gtt with physician because urine is still bloody. Physician will look at renal ultrasound results from last night. Pt repositioned in bed and resting. Tachypnea still. Will continue to monitor.

## 2019-05-02 ENCOUNTER — APPOINTMENT (OUTPATIENT)
Dept: RADIOLOGY | Facility: MEDICAL CENTER | Age: 69
DRG: 981 | End: 2019-05-02
Attending: INTERNAL MEDICINE
Payer: COMMERCIAL

## 2019-05-02 PROBLEM — I21.4 NSTEMI (NON-ST ELEVATED MYOCARDIAL INFARCTION) (HCC): Status: ACTIVE | Noted: 2019-04-29

## 2019-05-02 LAB
ACTION RANGE TRIGGERED IACRT: NO
ALBUMIN SERPL BCP-MCNC: 3.2 G/DL (ref 3.2–4.9)
ALBUMIN/GLOB SERPL: 1.1 G/DL
ALP SERPL-CCNC: 75 U/L (ref 30–99)
ALT SERPL-CCNC: 38 U/L (ref 2–50)
ANION GAP SERPL CALC-SCNC: 9 MMOL/L (ref 0–11.9)
ANISOCYTOSIS BLD QL SMEAR: ABNORMAL
AST SERPL-CCNC: 30 U/L (ref 12–45)
BASE EXCESS BLDA CALC-SCNC: 1 MMOL/L (ref -4–3)
BASOPHILS # BLD AUTO: 0 % (ref 0–1.8)
BASOPHILS # BLD: 0 K/UL (ref 0–0.12)
BILIRUB SERPL-MCNC: 0.3 MG/DL (ref 0.1–1.5)
BODY TEMPERATURE: ABNORMAL DEGREES
BUN SERPL-MCNC: 47 MG/DL (ref 8–22)
CALCIUM SERPL-MCNC: 8.9 MG/DL (ref 8.5–10.5)
CHLORIDE SERPL-SCNC: 105 MMOL/L (ref 96–112)
CO2 BLDA-SCNC: 28 MMOL/L (ref 20–33)
CO2 SERPL-SCNC: 28 MMOL/L (ref 20–33)
CREAT SERPL-MCNC: 1.51 MG/DL (ref 0.5–1.4)
CRP SERPL HS-MCNC: 7.26 MG/DL (ref 0–0.75)
EKG IMPRESSION: NORMAL
EOSINOPHIL # BLD AUTO: 0 K/UL (ref 0–0.51)
EOSINOPHIL NFR BLD: 0 % (ref 0–6.9)
ERYTHROCYTE [DISTWIDTH] IN BLOOD BY AUTOMATED COUNT: 50.4 FL (ref 35.9–50)
GLOBULIN SER CALC-MCNC: 2.8 G/DL (ref 1.9–3.5)
GLUCOSE BLD-MCNC: 129 MG/DL (ref 65–99)
GLUCOSE BLD-MCNC: 140 MG/DL (ref 65–99)
GLUCOSE BLD-MCNC: 142 MG/DL (ref 65–99)
GLUCOSE SERPL-MCNC: 205 MG/DL (ref 65–99)
HCO3 BLDA-SCNC: 26.3 MMOL/L (ref 17–25)
HCT VFR BLD AUTO: 40.5 % (ref 42–52)
HGB BLD-MCNC: 12.8 G/DL (ref 14–18)
HOROWITZ INDEX BLDA+IHG-RTO: 327 MM[HG]
HYPOCHROMIA BLD QL SMEAR: ABNORMAL
INST. QUALIFIED PATIENT IIQPT: YES
LYMPHOCYTES # BLD AUTO: 0.44 K/UL (ref 1–4.8)
LYMPHOCYTES NFR BLD: 2.6 % (ref 22–41)
MAGNESIUM SERPL-MCNC: 2.3 MG/DL (ref 1.5–2.5)
MANUAL DIFF BLD: NORMAL
MCH RBC QN AUTO: 30.6 PG (ref 27–33)
MCHC RBC AUTO-ENTMCNC: 31.6 G/DL (ref 33.7–35.3)
MCV RBC AUTO: 96.9 FL (ref 81.4–97.8)
MONOCYTES # BLD AUTO: 0.29 K/UL (ref 0–0.85)
MONOCYTES NFR BLD AUTO: 1.7 % (ref 0–13.4)
MORPHOLOGY BLD-IMP: NORMAL
MYELOCYTES NFR BLD MANUAL: 0.9 %
NEUTROPHILS # BLD AUTO: 16.02 K/UL (ref 1.82–7.42)
NEUTROPHILS NFR BLD: 94.8 % (ref 44–72)
NRBC # BLD AUTO: 0 K/UL
NRBC BLD-RTO: 0 /100 WBC
O2/TOTAL GAS SETTING VFR VENT: 60 %
PCO2 BLDA: 43.1 MMHG (ref 26–37)
PCO2 TEMP ADJ BLDA: 42 MMHG (ref 26–37)
PH BLDA: 7.39 [PH] (ref 7.4–7.5)
PH TEMP ADJ BLDA: 7.4 [PH] (ref 7.4–7.5)
PHOSPHATE SERPL-MCNC: 2.1 MG/DL (ref 2.5–4.5)
PLATELET # BLD AUTO: 261 K/UL (ref 164–446)
PLATELET BLD QL SMEAR: NORMAL
PMV BLD AUTO: 9.5 FL (ref 9–12.9)
PO2 BLDA: 196 MMHG (ref 64–87)
PO2 TEMP ADJ BLDA: 193 MMHG (ref 64–87)
POLYCHROMASIA BLD QL SMEAR: NORMAL
POTASSIUM SERPL-SCNC: 4.3 MMOL/L (ref 3.6–5.5)
PREALB SERPL-MCNC: 12 MG/DL (ref 18–38)
PROCALCITONIN SERPL-MCNC: 3.3 NG/ML
PROT SERPL-MCNC: 6 G/DL (ref 6–8.2)
RBC # BLD AUTO: 4.18 M/UL (ref 4.7–6.1)
RBC BLD AUTO: PRESENT
SAO2 % BLDA: 100 % (ref 93–99)
SODIUM SERPL-SCNC: 142 MMOL/L (ref 135–145)
SPECIMEN DRAWN FROM PATIENT: ABNORMAL
WBC # BLD AUTO: 16.9 K/UL (ref 4.8–10.8)

## 2019-05-02 PROCEDURE — 71045 X-RAY EXAM CHEST 1 VIEW: CPT

## 2019-05-02 PROCEDURE — 700111 HCHG RX REV CODE 636 W/ 250 OVERRIDE (IP): Performed by: INTERNAL MEDICINE

## 2019-05-02 PROCEDURE — 94150 VITAL CAPACITY TEST: CPT

## 2019-05-02 PROCEDURE — 82962 GLUCOSE BLOOD TEST: CPT | Mod: 91

## 2019-05-02 PROCEDURE — A9270 NON-COVERED ITEM OR SERVICE: HCPCS | Performed by: INTERNAL MEDICINE

## 2019-05-02 PROCEDURE — 700102 HCHG RX REV CODE 250 W/ 637 OVERRIDE(OP): Performed by: INTERNAL MEDICINE

## 2019-05-02 PROCEDURE — 94640 AIRWAY INHALATION TREATMENT: CPT

## 2019-05-02 PROCEDURE — 83735 ASSAY OF MAGNESIUM: CPT

## 2019-05-02 PROCEDURE — 84100 ASSAY OF PHOSPHORUS: CPT

## 2019-05-02 PROCEDURE — 80053 COMPREHEN METABOLIC PANEL: CPT

## 2019-05-02 PROCEDURE — 86140 C-REACTIVE PROTEIN: CPT

## 2019-05-02 PROCEDURE — 700105 HCHG RX REV CODE 258: Performed by: INTERNAL MEDICINE

## 2019-05-02 PROCEDURE — 770022 HCHG ROOM/CARE - ICU (200)

## 2019-05-02 PROCEDURE — 82803 BLOOD GASES ANY COMBINATION: CPT

## 2019-05-02 PROCEDURE — 93005 ELECTROCARDIOGRAM TRACING: CPT | Performed by: INTERNAL MEDICINE

## 2019-05-02 PROCEDURE — 84145 PROCALCITONIN (PCT): CPT

## 2019-05-02 PROCEDURE — 99291 CRITICAL CARE FIRST HOUR: CPT | Performed by: INTERNAL MEDICINE

## 2019-05-02 PROCEDURE — 85027 COMPLETE CBC AUTOMATED: CPT

## 2019-05-02 PROCEDURE — 36600 WITHDRAWAL OF ARTERIAL BLOOD: CPT

## 2019-05-02 PROCEDURE — 93010 ELECTROCARDIOGRAM REPORT: CPT | Performed by: INTERNAL MEDICINE

## 2019-05-02 PROCEDURE — 700111 HCHG RX REV CODE 636 W/ 250 OVERRIDE (IP): Performed by: FAMILY MEDICINE

## 2019-05-02 PROCEDURE — 84134 ASSAY OF PREALBUMIN: CPT

## 2019-05-02 PROCEDURE — 94003 VENT MGMT INPAT SUBQ DAY: CPT

## 2019-05-02 PROCEDURE — 99233 SBSQ HOSP IP/OBS HIGH 50: CPT | Performed by: INTERNAL MEDICINE

## 2019-05-02 PROCEDURE — 700101 HCHG RX REV CODE 250: Performed by: INTERNAL MEDICINE

## 2019-05-02 PROCEDURE — 85007 BL SMEAR W/DIFF WBC COUNT: CPT

## 2019-05-02 RX ORDER — ATORVASTATIN CALCIUM 40 MG/1
40 TABLET, FILM COATED ORAL EVERY EVENING
Status: DISCONTINUED | OUTPATIENT
Start: 2019-05-02 | End: 2019-05-06 | Stop reason: HOSPADM

## 2019-05-02 RX ORDER — DEXTROSE MONOHYDRATE 25 G/50ML
25 INJECTION, SOLUTION INTRAVENOUS
Status: DISCONTINUED | OUTPATIENT
Start: 2019-05-02 | End: 2019-05-02

## 2019-05-02 RX ORDER — POLYETHYLENE GLYCOL 3350 17 G/17G
1 POWDER, FOR SOLUTION ORAL
Status: DISCONTINUED | OUTPATIENT
Start: 2019-05-02 | End: 2019-05-06 | Stop reason: HOSPADM

## 2019-05-02 RX ORDER — BUDESONIDE AND FORMOTEROL FUMARATE DIHYDRATE 160; 4.5 UG/1; UG/1
2 AEROSOL RESPIRATORY (INHALATION) 2 TIMES DAILY
Status: DISCONTINUED | OUTPATIENT
Start: 2019-05-02 | End: 2019-05-06 | Stop reason: HOSPADM

## 2019-05-02 RX ORDER — LABETALOL 100 MG/1
100 TABLET, FILM COATED ORAL EVERY 6 HOURS PRN
Status: DISCONTINUED | OUTPATIENT
Start: 2019-05-02 | End: 2019-05-06 | Stop reason: HOSPADM

## 2019-05-02 RX ORDER — FAMOTIDINE 20 MG/1
20 TABLET, FILM COATED ORAL EVERY 12 HOURS
Status: DISCONTINUED | OUTPATIENT
Start: 2019-05-02 | End: 2019-05-03

## 2019-05-02 RX ORDER — BISACODYL 10 MG
10 SUPPOSITORY, RECTAL RECTAL
Status: DISCONTINUED | OUTPATIENT
Start: 2019-05-02 | End: 2019-05-06 | Stop reason: HOSPADM

## 2019-05-02 RX ORDER — AMOXICILLIN 250 MG
2 CAPSULE ORAL 2 TIMES DAILY
Status: DISCONTINUED | OUTPATIENT
Start: 2019-05-02 | End: 2019-05-06 | Stop reason: HOSPADM

## 2019-05-02 RX ORDER — ASPIRIN 81 MG/1
81 TABLET, CHEWABLE ORAL DAILY
Status: DISCONTINUED | OUTPATIENT
Start: 2019-05-03 | End: 2019-05-06 | Stop reason: HOSPADM

## 2019-05-02 RX ORDER — AMLODIPINE BESYLATE 10 MG/1
10 TABLET ORAL
Status: DISCONTINUED | OUTPATIENT
Start: 2019-05-03 | End: 2019-05-06 | Stop reason: HOSPADM

## 2019-05-02 RX ORDER — TIOTROPIUM BROMIDE 18 UG/1
1 CAPSULE ORAL; RESPIRATORY (INHALATION) DAILY
Status: DISCONTINUED | OUTPATIENT
Start: 2019-05-03 | End: 2019-05-06 | Stop reason: HOSPADM

## 2019-05-02 RX ORDER — ALBUTEROL SULFATE 90 UG/1
2 AEROSOL, METERED RESPIRATORY (INHALATION) EVERY 4 HOURS PRN
Status: DISCONTINUED | OUTPATIENT
Start: 2019-05-02 | End: 2019-05-06 | Stop reason: HOSPADM

## 2019-05-02 RX ORDER — FLUTICASONE PROPIONATE AND SALMETEROL XINAFOATE 230; 21 UG/1; UG/1
2 AEROSOL, METERED RESPIRATORY (INHALATION) 2 TIMES DAILY
Status: DISCONTINUED | OUTPATIENT
Start: 2019-05-02 | End: 2019-05-02

## 2019-05-02 RX ORDER — DOXYCYCLINE 100 MG/1
100 TABLET ORAL EVERY 12 HOURS
Status: COMPLETED | OUTPATIENT
Start: 2019-05-02 | End: 2019-05-04

## 2019-05-02 RX ORDER — ZOLPIDEM TARTRATE 5 MG/1
10 TABLET ORAL NIGHTLY PRN
Status: DISCONTINUED | OUTPATIENT
Start: 2019-05-02 | End: 2019-05-06 | Stop reason: HOSPADM

## 2019-05-02 RX ORDER — ACETAMINOPHEN 325 MG/1
650 TABLET ORAL EVERY 6 HOURS PRN
Status: DISCONTINUED | OUTPATIENT
Start: 2019-05-02 | End: 2019-05-06 | Stop reason: HOSPADM

## 2019-05-02 RX ADMIN — DOXYCYCLINE 100 MG: 100 TABLET, FILM COATED ORAL at 16:57

## 2019-05-02 RX ADMIN — FAMOTIDINE 20 MG: 10 INJECTION INTRAVENOUS at 06:00

## 2019-05-02 RX ADMIN — ASPIRIN 81 MG 81 MG: 81 TABLET ORAL at 05:16

## 2019-05-02 RX ADMIN — FAMOTIDINE 20 MG: 20 TABLET ORAL at 16:57

## 2019-05-02 RX ADMIN — CEFTRIAXONE SODIUM 2 G: 2 INJECTION, POWDER, FOR SOLUTION INTRAMUSCULAR; INTRAVENOUS at 05:16

## 2019-05-02 RX ADMIN — ZOLPIDEM TARTRATE 10 MG: 5 TABLET ORAL at 22:56

## 2019-05-02 RX ADMIN — HEPARIN SODIUM 5000 UNITS: 5000 INJECTION, SOLUTION INTRAVENOUS; SUBCUTANEOUS at 05:17

## 2019-05-02 RX ADMIN — IPRATROPIUM BROMIDE AND ALBUTEROL SULFATE 3 ML: .5; 3 SOLUTION RESPIRATORY (INHALATION) at 06:34

## 2019-05-02 RX ADMIN — BUDESONIDE AND FORMOTEROL FUMARATE DIHYDRATE 2 PUFF: 160; 4.5 AEROSOL RESPIRATORY (INHALATION) at 18:11

## 2019-05-02 RX ADMIN — HEPARIN SODIUM 5000 UNITS: 5000 INJECTION, SOLUTION INTRAVENOUS; SUBCUTANEOUS at 20:11

## 2019-05-02 RX ADMIN — IPRATROPIUM BROMIDE AND ALBUTEROL SULFATE 3 ML: .5; 3 SOLUTION RESPIRATORY (INHALATION) at 02:25

## 2019-05-02 RX ADMIN — AMLODIPINE BESYLATE 10 MG: 10 TABLET ORAL at 08:33

## 2019-05-02 RX ADMIN — HYDRALAZINE HYDROCHLORIDE 10 MG: 20 INJECTION INTRAMUSCULAR; INTRAVENOUS at 18:11

## 2019-05-02 RX ADMIN — DOXYCYCLINE 100 MG: 100 TABLET, FILM COATED ORAL at 05:16

## 2019-05-02 RX ADMIN — SODIUM PHOSPHATE, MONOBASIC, MONOHYDRATE AND SODIUM PHOSPHATE, DIBASIC, ANHYDROUS 15 MMOL: 276; 142 INJECTION, SOLUTION INTRAVENOUS at 10:24

## 2019-05-02 RX ADMIN — METHYLPREDNISOLONE SODIUM SUCCINATE 40 MG: 40 INJECTION, POWDER, FOR SOLUTION INTRAMUSCULAR; INTRAVENOUS at 20:11

## 2019-05-02 RX ADMIN — SENNOSIDES, DOCUSATE SODIUM 2 TABLET: 50; 8.6 TABLET, FILM COATED ORAL at 05:16

## 2019-05-02 RX ADMIN — HEPARIN SODIUM 5000 UNITS: 5000 INJECTION, SOLUTION INTRAVENOUS; SUBCUTANEOUS at 12:34

## 2019-05-02 RX ADMIN — METOPROLOL TARTRATE 25 MG: 25 TABLET ORAL at 16:57

## 2019-05-02 RX ADMIN — METOPROLOL TARTRATE 25 MG: 25 TABLET ORAL at 10:19

## 2019-05-02 RX ADMIN — METHYLPREDNISOLONE SODIUM SUCCINATE 40 MG: 40 INJECTION, POWDER, FOR SOLUTION INTRAMUSCULAR; INTRAVENOUS at 05:17

## 2019-05-02 RX ADMIN — METHYLPREDNISOLONE SODIUM SUCCINATE 40 MG: 40 INJECTION, POWDER, FOR SOLUTION INTRAMUSCULAR; INTRAVENOUS at 12:34

## 2019-05-02 RX ADMIN — ATORVASTATIN CALCIUM 40 MG: 40 TABLET, FILM COATED ORAL at 16:57

## 2019-05-02 ASSESSMENT — PULMONARY FUNCTION TESTS: FVC: 1

## 2019-05-02 ASSESSMENT — PATIENT HEALTH QUESTIONNAIRE - PHQ9
SUM OF ALL RESPONSES TO PHQ9 QUESTIONS 1 AND 2: 0
1. LITTLE INTEREST OR PLEASURE IN DOING THINGS: NOT AT ALL
2. FEELING DOWN, DEPRESSED, IRRITABLE, OR HOPELESS: NOT AT ALL

## 2019-05-02 NOTE — CARE PLAN
Problem: Infection  Goal: Will remain free from infection  Outcome: PROGRESSING AS EXPECTED  Pt sputum collected for testing by lab and results were negative for any growth    Problem: Fluid Volume:  Goal: Will maintain balanced intake and output  Outcome: PROGRESSING AS EXPECTED  Pt given 1000 LR during day shift and is peeing appropriately

## 2019-05-02 NOTE — CARE PLAN
Problem: Ventilation Defect:  Goal: Ability to achieve and maintain unassisted ventilation or tolerate decreased levels of ventilator support    Intervention: Support and monitor invasive and noninvasive mechanical ventilation  Adult Ventilation Update    Total Vent Days: 2  24  420  +12  60%  Duo Q4    Patient Lines/Drains/Airways Status    Active Airway     Name: Placement date: Placement time: Site: Days:    Airway ETT 8.0 05/01/19 1219      less than 1              Plateau Pressure (Q Shift): 24.5 (05/01/19 1219)  Static Compliance (ml / cm H2O): 114 (05/02/19 0225)    Sputum/Suction   Cough: Non Productive (05/02/19 0225)  Sputum Amount: Small (05/02/19 0225)  Sputum Color: Clear (05/02/19 0225)  Sputum Consistency: Thin (05/02/19 0225)      Events/Summary/Plan: Continue to monitor

## 2019-05-02 NOTE — PROGRESS NOTES
Monitor Summary     A fib 35**-110    Pt dropped to 35 bpm @ 0107 with a few beats of tachycardia prior    When pt is asleep he sustains low 50s-60 a fib    Pressures maintained stable throughout as well as pt staying asymptomatic

## 2019-05-02 NOTE — PROGRESS NOTES
· 2 RN skin check complete with ABRAHAM Harkins.  · Devices in place Lauren, Lauren temp cable, ET Tube, Cortrac.  · Wound noted on right buttocks, appears old and healing.  Scabs noted on right elbow and forearm, Forearms pink and blanching.  Bruises noted to bilateral forearms. Wound consult placed and wound reported.  · The following interventions in place [employer], q2 turns, floating heels and arms on pillows*

## 2019-05-02 NOTE — PROGRESS NOTES
Critical Care Progress Note    Date of admission  4/29/2019    Chief Complaint  AF RVR, NSTEMI, trop 12; heparin gtt; CKD, resp distress; RRT and placed on BiPAP in [facility]    Hospital Course    68 y.o. male who presented 4/29/2019 with worsening dyspnea.  He has a history of COPD with home chronic oxygen therapy.  He presented with 3 days of cough, dyspnea but denied fevers or chills.  He was found to be in atrial fibrillation with rapid ventricular response.  In the ED he received IV of diltiazem and converted to sinus rhythm.  He was found to have a non-ST elevation myocardial function and has been continued on heparin infusion.  Troponin I enzyme was elevated at 12 and cardiology is following the patient.  He is found to have leukocytosis, acute kidney injury and was admitted to the telemetry floor.  He was placed on empiric antimicrobial therapy and corticosteroid.  Today he was urgently transferred to the intensive care unit for worsening respiratory distress.  He was placed on BiPAP however continued to have increasing respiratory distress.  I semi-urgently intubated the patient for bronchoscopy.  There was copious amount of thick purulent secretions in the airways which were therapeutically lavaged and a BAL was sent for culture.         Interval Problem Update  Reviewed last 24 hour events:   Bronch yesterday after intubation; copious thick purulent secretions   samson TF   I/O =    A/o, nfe   Vent day 2   samson SBT   CXR mild basilar infs   abx day 4; BAL GPC few   PCT 3.3 down from 13.7   Solumedrol 40 q8   Cr up   Mild hematuria   Replace phos    Add SSI       Review of Systems  Review of Systems   Unable to perform ROS: Acuity of condition        Vital Signs for last 24 hours   Temp:  [36.4 °C (97.5 °F)-37.2 °C (99 °F)] 36.4 °C (97.5 °F)  Pulse:  [] 80  Resp:  [17-45] 20  SpO2:  [79 %-100 %] 96 %    Hemodynamic parameters for last 24 hours       Respiratory Information for the last 24 hours  Tomlin Shay  Mode: APVCMV  Rate (breaths/min): 24  Vt Target (mL): 420  PEEP/CPAP: 12  FiO2: 30  P MEAN: 17  Length of Weaning Trial (Hours): 1  Control VTE (exp VT): 493    Physical Exam   Physical Exam   Constitutional: He appears well-developed.   HENT:   Head: Normocephalic.   Eyes: Pupils are equal, round, and reactive to light. No scleral icterus.   Neck: Neck supple. No thyromegaly present.   Cardiovascular: Normal rate.    Pulmonary/Chest:   Diminished, scattered coarse breath sounds with few expiratory wheeze   Abdominal: Soft. He exhibits no distension. There is no tenderness.   Genitourinary:   Genitourinary Comments: Lauren in place, hematuria improving   Musculoskeletal: He exhibits no edema or tenderness.   Neurological: He is alert. No cranial nerve deficit.   Follows commands appropriately   Skin: Skin is warm and dry.       Medications  Current Facility-Administered Medications   Medication Dose Route Frequency Provider Last Rate Last Dose   • hydrALAZINE (APRESOLINE) injection 10 mg  10 mg Intravenous Q4HRS PRN Trent Faith D.O.   10 mg at 05/01/19 0228   • fentaNYL (SUBLIMAZE) injection 100 mcg  100 mcg Intravenous Q15 MIN PRN Jovanni Zhou M.D.   100 mcg at 05/01/19 2136    And   • fentaNYL (SUBLIMAZE) injection 200 mcg  200 mcg Intravenous Q15 MIN PRN Jovanni Zhou M.D.   200 mcg at 05/01/19 1230    And   • fentaNYL (SUBLIMAZE) 50 mcg/mL in 50mL (Continuous Infusion)   Intravenous Continuous Jovanni Zhou M.D.   Stopped at 05/01/19 1200    And   • dexmedetomidine (PRECEDEX) 400 mcg/100mL NS premix infusion  0-1.5 mcg/kg/hr Intravenous Continuous Jovanni Zhou M.D.   Stopped at 05/01/19 1200   • methylPREDNISolone (SOLU-MEDROL) 40 MG injection 40 mg  40 mg Intravenous Q8HRS Jovanni Zhou M.D.   40 mg at 05/02/19 0517   • Respiratory Care per Protocol   Nebulization Continuous RT Jovanni Zhou M.D.       • senna-docusate (PERICOLACE or SENOKOT S) 8.6-50 MG per tablet 2 Tab  2 Tab Enteral Tube  BID Jovanni Zhou M.D.   2 Tab at 05/02/19 0516    And   • polyethylene glycol/lytes (MIRALAX) PACKET 1 Packet  1 Packet Enteral Tube QDAY PRN Jovanni Zhou M.D.        And   • magnesium hydroxide (MILK OF MAGNESIA) suspension 30 mL  30 mL Enteral Tube QDAY PRN Jovanni Zhou M.D.        And   • bisacodyl (DULCOLAX) suppository 10 mg  10 mg Rectal QDAY PRN Jovanni Zhou M.D.       • MD Alert...ICU Electrolyte Replacement per Pharmacy   Other PHARMACY TO DOSE Jovanni Zhou M.D.       • Pharmacy Consult: Enteral tube insertion - review meds/change route/product selection   Other PHARMACY TO DOSE Jovanni Zhou M.D.       • heparin injection 5,000 Units  5,000 Units Subcutaneous Q8HRS Jovanni Zhou M.D.   5,000 Units at 05/02/19 0517   • ipratropium-albuterol (DUONEB) nebulizer solution  3 mL Nebulization Q2HRS PRN (RT) Jovanni Zhou M.D.       • ipratropium-albuterol (DUONEB) nebulizer solution  3 mL Nebulization Q4HRS (RT) Jovanni Zhou M.D.   3 mL at 05/02/19 0634   • amLODIPine (NORVASC) tablet 10 mg  10 mg Enteral Tube QDAY Jovanni Zhou M.D.   10 mg at 05/02/19 0833   • atorvastatin (LIPITOR) tablet 40 mg  40 mg Enteral Tube Q EVENING Jovanni Zhou M.D.   40 mg at 05/01/19 1706   • doxycycline monohydrate (ADOXA) tablet 100 mg  100 mg Enteral Tube Q12HRS Jovanni Zhou M.D.   100 mg at 05/02/19 0516   • famotidine (PEPCID) tablet 20 mg  20 mg Enteral Tube Q12HRS Jovanni Zhou M.D.   20 mg at 05/01/19 1706    Or   • famotidine (PEPCID) injection 20 mg  20 mg Intravenous Q12HRS Jovanni Zhou M.D.   20 mg at 05/02/19 0600   • acetaminophen (TYLENOL) tablet 650 mg  650 mg Enteral Tube Q6HRS PRN Jovanni W Abelardo, M.D.       • labetalol (NORMODYNE) tablet 100 mg  100 mg Enteral Tube Q6HRS PRN Jovanni Zhou M.D.       • zolpidem (AMBIEN) tablet 10 mg  10 mg Enteral Tube HS PRN Jovanni Zhou M.D.       • aspirin (ASA) chewable tab 81 mg  81 mg Enteral Tube DAILY Jovanni Zhou M.D.   81 mg at  05/02/19 0516   • lactated ringer BOLUS infusion  500 mL Intravenous Once PRN Jovanni Zhou M.D.       • DILTIAZem (CARDIZEM) injection 10 mg  10 mg Intravenous Q5 MIN PRN Gabo Greenberg M.D.   10 mg at 04/29/19 1411   • cefTRIAXone (ROCEPHIN) 2 g in  mL IVPB  2 g Intravenous Q24HRS Hipolito Lobato M.D.   Stopped at 05/02/19 0546       Fluids    Intake/Output Summary (Last 24 hours) at 05/02/19 0911  Last data filed at 05/02/19 0800   Gross per 24 hour   Intake          1901.53 ml   Output             1210 ml   Net           691.53 ml       Laboratory  Recent Labs      05/01/19   0750  05/01/19   0925  05/01/19   1316  05/02/19   0402   GXLRA83P  7.27*   --    --    --    [id number]  58.3*   --    --    --    [id number]  87.8*   --    --    --    OGBE0BQU  95.0   --    --    --    ARTHCO3  26*   --    --    --    [id number]  4.0   --    --    --    ARTBE  -2   --    --    --    ISTATAPH   --   7.335*  7.312*  7.394*   ISTATAPCO2   --   58.9*  50.0*  43.1*   ISTATAPO2   --   133*  190*  196*   ISTATATCO2   --   33  27  28   FREBCID3PIH   --   99  100*  100*   ISTATARTHCO3   --   31.4*  25.3*  26.3*   ISTATARTBE   --   4*  -1  1   ISTATTEMP   --   37.0 C  37.8 C  36.4 C   ISTATFIO2   --   50  .60  60   ISTATSPEC   --   Arterial  Arterial  Arterial   ISTATAPHTC   --   7.335*  7.301*  7.403   [id number]   --   133*  194*  193*     Recent Labs      04/30/19   0139  04/30/19   0740  04/30/19   1347  05/01/19   0010   TROPONINI  7.96*  6.58*  4.70*   --    BNPBTYPENAT   --    --    --   102*     Recent Labs      04/30/19   0139  05/01/19   0010 05/02/19   0333   SODIUM  141  145  142   POTASSIUM  4.2  4.0  4.3   CHLORIDE  106  106  105   CO2  23  29  28   BUN  31*  35*  47*   CREATININE  1.26  1.33  1.51*   MAGNESIUM  1.9  2.1  2.3   PHOSPHORUS  2.1*   --   2.1*   CALCIUM  9.0  9.2  8.9     Recent Labs      04/29/19   1418  04/30/19 0139 05/01/19 0010 05/02/19   0333   ALTSGPT  35   --    --   38   ASTSGOT   57*   --    --   30   ALKPHOSPHAT  91   --    --   75   TBILIRUBIN  0.6   --    --   0.3   PREALBUMIN   --    --    --   12.0*   GLUCOSE  117*  166*  115*  205*     Recent Labs      04/29/19   1418  04/30/19   0139  05/01/19   0010  05/02/19   0333   WBC  18.7*  18.5*  22.4*  16.9*   NEUTSPOLYS  85.00*  91.50*   --   94.80*   LYMPHOCYTES  7.60*  4.00*   --   2.60*   MONOCYTES  5.70  2.30   --   1.70   EOSINOPHILS  0.00  0.10   --   0.00   BASOPHILS  0.50  0.20   --   0.00   ASTSGOT  57*   --    --   30   ALTSGPT  35   --    --   38   ALKPHOSPHAT  91   --    --   75   TBILIRUBIN  0.6   --    --   0.3     Recent Labs      04/29/19   1418  04/30/19   0139   04/30/19   1347   04/30/19   2137  05/01/19   0010  05/01/19   0657  05/02/19   0333   RBC  5.04  4.64*   --    --    --    --   4.62*   --   4.18*   HEMOGLOBIN  15.3  14.1   --   14.1   --    --   14.2   --   12.8*   HEMATOCRIT  47.9  43.7   --   44.5   --    --   44.9   --   40.5*   PLATELETCT  288  284   --    --    --    --   299   --   261   PROTHROMBTM  15.3*   --    --    --    --    --    --    --    --    APTT  28.9   --    < >  46.3*   < >  43.0*  45.5*  59.0*   --    INR  1.20*   --    --    --    --    --    --    --    --     < > = values in this interval not displayed.       Imaging  X-Ray:  I have personally reviewed the images and compared with prior images.    Assessment/Plan  * COPD (chronic obstructive pulmonary disease) ([facility])- (present on admission)   Assessment & Plan    IV corticosteroids, empiric antimicrobial therapy, bronchial dilators, follow  Acutely exacerbated with underlying pneumonia  Follow BAL     NSTEMI (non-ST elevated myocardial infarction) (HCC)- (present on admission)   Assessment & Plan    Will require cardiac catheterization at some point when clinically stable  Cardiology following     Acute on chronic respiratory failure with hypoxia (HCC)- (present on admission)   Assessment & Plan    Failed BiPAP and urgently intubated  5/1  Continue full mechanical ventilatory support, I have adjusted ventilator settings and details will liberate when clinically appropriate, Daily SAT/SBT, A-F bundle and light sedation     Gross hematuria- (present on admission)   Assessment & Plan    History of retained ureteral stent, consider discontinue anticoagulation, reviewed with cardiology  Lauren catheter in place, urology consulted     Atrial fibrillation (HCC)- (present on admission)   Assessment & Plan    Rate control, anticoagulation, cardiology following     CKD (chronic kidney disease) stage 3, GFR 30-59 ml/min (HCC)- (present on admission)   Assessment & Plan    Avoid nephrotoxic agents, monitor electrolytes closely     Essential hypertension- (present on admission)   Assessment & Plan    Glycemic control          VTE:  Heparin  Ulcer: H2 Antagonist  Lines: None    I have performed a physical exam and reviewed and updated ROS and Plan today (5/2/2019). In review of yesterday's note (5/1/2019), there are no changes except as documented above.   Patient is critically ill.  Have adjusted ventilator settings.  We will proceed with SAT/SBT and liberate when clinically appropriate.  He is at risk for further deterioration and currently has multiorgan dysfunction.  Discussed patient condition and risk of morbidity and/or mortality with Hospitalist, RN, RT, Pharmacy and QA team  The patient remains critically ill.  Critical care time = 32 minutes in directly providing and coordinating critical care and extensive data review.  No time overlap and excludes procedures.

## 2019-05-02 NOTE — CARE PLAN
Problem: Venous Thromboembolism (VTW)/Deep Vein Thrombosis (DVT) Prevention:  Goal: Patient will participate in Venous Thrombosis (VTE)/Deep Vein Thrombosis (DVT)Prevention Measures  Outcome: PROGRESSING AS EXPECTED  SCDs on    Problem: Respiratory:  Goal: Respiratory status will improve  Outcome: PROGRESSING AS EXPECTED  Pt extubated this shift

## 2019-05-02 NOTE — PROGRESS NOTES
"Urology Progress Note    Patient seen and examined    Overnight Events: None    S: No fevers, chills, nausea or vomiting. Off vent.  OOB to chair    O:   /76   Pulse 74   Temp 36.4 °C (97.5 °F) (Lauren)   Resp (!) 22   Ht 1.753 m (5' 9\")   Wt 78.6 kg (173 lb 4.5 oz)   SpO2 94%   Recent Labs      04/30/19   0139  05/01/19   0010  05/02/19   0333   SODIUM  141  145  142   POTASSIUM  4.2  4.0  4.3   CHLORIDE  106  106  105   CO2  23  29  28   GLUCOSE  166*  115*  205*   BUN  31*  35*  47*   CREATININE  1.26  1.33  1.51*   CALCIUM  9.0  9.2  8.9     Recent Labs      04/30/19   0139 04/30/19   1347  05/01/19   0010  05/02/19   0333   WBC  18.5*   --   22.4*  16.9*   RBC  4.64*   --   4.62*  4.18*   HEMOGLOBIN  14.1  14.1  14.2  12.8*   HEMATOCRIT  43.7  44.5  44.9  40.5*   MCV  94.2   --   97.2  96.9   MCH  30.4   --   30.7  30.6   MCHC  32.3*   --   31.6*  31.6*   RDW  48.4   --   51.8*  50.4*   PLATELETCT  284   --   299  261   MPV  9.4   --   9.3  9.5         Intake/Output Summary (Last 24 hours) at 05/02/19 1437  Last data filed at 05/02/19 1200   Gross per 24 hour   Intake          1508.72 ml   Output             1120 ml   Net           388.72 ml       Exam:  Abdomen soft, benign.    Urine: bloody clear without clots     A/P:    Active Hospital Problems    Diagnosis   • NSTEMI (non-ST elevated myocardial infarction) ([facility]) [I21.4]     Priority: High   • Acute on chronic respiratory failure with hypoxia ([facility]) [J96.21]     Priority: High   • COPD (chronic obstructive pulmonary disease) ([facility]) [J44.9]     Priority: High     FEV1 0.79 October 2012. Improved to 1.1 with bronchodilator. Quit smoking February 2012     • Gross hematuria [R31.0]     Priority: Medium     Urolithiasis     • Atrial fibrillation (HCC) [I48.91]     Priority: Medium   • Dyslipidemia [E78.5]   • CKD (chronic kidney disease) stage 3, GFR 30-59 ml/min (HCC) [N18.3]   • Essential hypertension [I10]     ICD-10 transition         Stable. "     PLAN:  Continue coburn for now  Urology will sign off for now.  Once he is medically stable and cleared, we will plan to treat his stones and remove stent.

## 2019-05-02 NOTE — PROGRESS NOTES
Reason for consultation /admission : Acute coronary syndrome    Respiratory status is much improved  Awake and alert,still intubated but intensivist is considering extubate later today  Denies CP  BP somewhat high  Amlodipine restarted  Hematuria improved, off IV UFH  Hb down slightly (14 to 13)    Urology plan noted    Review of systems;    Unable to perform due to pt's condition    Temp:  [36.4 °C (97.5 °F)-37.2 °C (99 °F)] 36.4 °C (97.5 °F)  Pulse:  [] 80  Resp:  [17-45] 20  SpO2:  [79 %-100 %] 96 %  GENERAL not in acute distress, not dyspnic at rest, intubated  Head atraumatic, normocephalic  Eyes EOMI  ENT neck supple, no JVD, no carotid bruits or thyromegaly  Lung good expansion, distant sound, no rales or wheezing  Heart RRR, normal rate, no murmur, gallop or rub  Abd soft, no tenderness, mass or bruits  Ext no edema  Skin no ecchymosis or petechiae  Musculoskeletal no deformity  Neuro grossly intact  Psych normal mood, normal affect    Monitor reviewed by me showed no significant ventricular or atrial dysrhythmias.    Labs: Cr 1.5, K+ 4.3      Assessment and plans    1. NSTEMI  Stable hemodynamically and rhythm wise  Respiratory failure more pulmonary related than cardiac  Will resume metoprolol. Cont ASA and statin.   No need to resume full dose anticoag from our standpoint.  Plan cath in a few days  Would like input from urology regarding antiplatelet (DAPT)    2. HTN  BP now back up  Amlodipine restarted  Adding metoprolol as above    3. Hematuria  As above    4. Respiratory failure  Normal EF by echo 4/29  Prob more COPD related

## 2019-05-02 NOTE — CARE PLAN
Problem: Nutritional:  Goal: Nutrition support tolerated and meeting greater than 85% of estimated needs  Outcome: MET Date Met: 05/02/19

## 2019-05-02 NOTE — CONSULTS
DATE OF CONSULTATION: 5/1/2019    REQUESTING PHYSICIAN:  Chasity Lockwood M.D.    CONSULTING PHYSICIAN: Urology Taylor Acuña PA-C     REASON FOR CONSULTATION: Gross Hematuria      HISTORY OF PRESENT ILLNESS: This is a 69 y/o male admitted to the hospital service for MI.  He was started on Heparin and developed gross hematuria.  He is a patient of Dr Quiles with history of stones requiring treatment and stent placement in 2012.  Patient was lost to follow up and has retained stent per records on Epic.  STEPHANIE shows bilateral renal stones, no hydronephrosis and retained stent in bladder.  HPI is obtained from past medical records, RN and patients niece as patient is intubated due to respiratory distress.       PAST MEDICAL HISTORY: Obtained from [facility]  Anesthesia; Breath shortness; COPD; Dental disorder; Emphysema of lung (HCC); Hypertension; Oxygen dependent; and Renal disorder.      PAST SURGICAL HISTORY: Obtained from [facility]  cystoscopy stent placement (2/24/2012); recovery (4/4/2012); percutaneous nephrostolithotomy (4/5/2012); percutaneous nephrostolithotomy (5/2/2012); recovery (11/1/2012); appendectomy; appendectomy (1987); septoplasty (N/A, 5/4/2018); and turbinate reduction (Bilateral, 5/4/2018).      MEDICATIONS: Reviewed.     ALLERGIES: No known drug allergies    FAMILY HISTORY: Noncontributory    SOCIAL HISTORY:   Substance Use Topics   • Smoking status: Former Smoker       Packs/day: 0.50       Years: 15.00       Types: Cigarettes       Quit date: 2/17/2012   • Smokeless tobacco: Never Used   • Alcohol use 0.0 oz/week          Comment: 2 glass wine per month       REVIEW OF SYSTEMS:   unobtainable due to condition     PHYSICAL EXAMINATION:   GENERAL:  Well developed, intubated  HEENT:  Normocephalic, atraumatic  NECK: supple  LUNGS: on Vent  ABDOMEN: no distention  : Lauren catheter in place draining bloody urine, no clot  SKIN: warm and dry  NEUROLOGIC: on Vent    LABORATORY DATA: Recent labs were  reviewed.     IMAGING:  STEPHANIE  Impression         1. Bilateral nephrolithiasis.  2. Caliectasis of the right kidney, chronic. Right kidney is atrophic relative to the left.  3. No left hydronephrosis is identified.  4. Distal end of a pigtail ureteral stent in the bladder       IMPRESSION:   Gross hematuria  B/L renal stones without hydronephrosis.  Distal end of ureteral stent in bladder        PLAN:  Coburn catheter placed.  Hand irrigate decreased UOP and prn clot retention.  He may require CBI if coburn clots off  Once patient is medically stable, we will treat stones and remove stent.  Urology will follow      Plan discussed with RN, patients family and Dr Erwin who is aware of this consult and has directed this plan of care.

## 2019-05-02 NOTE — CARE PLAN
Problem: Infection  Goal: Will remain free from infection    Intervention: Assess signs and symptoms of infection  Patient afebrile, wbc elevated, per MD, there appears to be pneumonia.  Patient is not tachycardic, but has been hypotensive twice.  was responsive to two 500ml boluses, both of which resolved the hypotension.      Problem: Pain Management  Goal: Pain level will decrease to patient's comfort goal    Intervention: Follow pain managment plan developed in collaboration with patient and Interdisciplinary Team  Patient and family educated on the use of fentanyl IVP for pain, discomfort, and agitation.  Dose needed 2x on this shift.  Patient has been compliant in alerting staff to worsening anxiety/discomfort.

## 2019-05-03 ENCOUNTER — APPOINTMENT (OUTPATIENT)
Dept: RADIOLOGY | Facility: MEDICAL CENTER | Age: 69
DRG: 981 | End: 2019-05-03
Attending: INTERNAL MEDICINE
Payer: COMMERCIAL

## 2019-05-03 LAB
ACTION RANGE TRIGGERED IACRT: YES
ANION GAP SERPL CALC-SCNC: 6 MMOL/L (ref 0–11.9)
BACTERIA BRONCH AEROBE CULT: NORMAL
BASE EXCESS BLDA CALC-SCNC: 2 MMOL/L (ref -4–3)
BASOPHILS # BLD AUTO: 0.4 % (ref 0–1.8)
BASOPHILS # BLD: 0.08 K/UL (ref 0–0.12)
BODY TEMPERATURE: ABNORMAL DEGREES
BUN SERPL-MCNC: 40 MG/DL (ref 8–22)
CALCIUM SERPL-MCNC: 8.7 MG/DL (ref 8.5–10.5)
CHLORIDE SERPL-SCNC: 109 MMOL/L (ref 96–112)
CO2 BLDA-SCNC: 30 MMOL/L (ref 20–33)
CO2 SERPL-SCNC: 27 MMOL/L (ref 20–33)
CREAT SERPL-MCNC: 1.21 MG/DL (ref 0.5–1.4)
CRP SERPL HS-MCNC: 3.8 MG/DL (ref 0–0.75)
EOSINOPHIL # BLD AUTO: 0 K/UL (ref 0–0.51)
EOSINOPHIL NFR BLD: 0 % (ref 0–6.9)
ERYTHROCYTE [DISTWIDTH] IN BLOOD BY AUTOMATED COUNT: 50 FL (ref 35.9–50)
GLUCOSE BLD-MCNC: 103 MG/DL (ref 65–99)
GLUCOSE BLD-MCNC: 113 MG/DL (ref 65–99)
GLUCOSE BLD-MCNC: 137 MG/DL (ref 65–99)
GLUCOSE BLD-MCNC: 154 MG/DL (ref 65–99)
GLUCOSE SERPL-MCNC: 139 MG/DL (ref 65–99)
GRAM STN SPEC: NORMAL
HCO3 BLDA-SCNC: 28.7 MMOL/L (ref 17–25)
HCT VFR BLD AUTO: 41.6 % (ref 42–52)
HGB BLD-MCNC: 13.4 G/DL (ref 14–18)
HOROWITZ INDEX BLDA+IHG-RTO: 243 MM[HG]
IMM GRANULOCYTES # BLD AUTO: 0.79 K/UL (ref 0–0.11)
IMM GRANULOCYTES NFR BLD AUTO: 3.5 % (ref 0–0.9)
INST. QUALIFIED PATIENT IIQPT: YES
LYMPHOCYTES # BLD AUTO: 0.88 K/UL (ref 1–4.8)
LYMPHOCYTES NFR BLD: 3.9 % (ref 22–41)
MAGNESIUM SERPL-MCNC: 2.3 MG/DL (ref 1.5–2.5)
MCH RBC QN AUTO: 30.9 PG (ref 27–33)
MCHC RBC AUTO-ENTMCNC: 32.2 G/DL (ref 33.7–35.3)
MCV RBC AUTO: 95.9 FL (ref 81.4–97.8)
MONOCYTES # BLD AUTO: 0.65 K/UL (ref 0–0.85)
MONOCYTES NFR BLD AUTO: 2.9 % (ref 0–13.4)
NEUTROPHILS # BLD AUTO: 20.39 K/UL (ref 1.82–7.42)
NEUTROPHILS NFR BLD: 89.3 % (ref 44–72)
NRBC # BLD AUTO: 0 K/UL
NRBC BLD-RTO: 0 /100 WBC
O2/TOTAL GAS SETTING VFR VENT: 28 %
PCO2 BLDA: 50 MMHG (ref 26–37)
PCO2 TEMP ADJ BLDA: 49.6 MMHG (ref 26–37)
PH BLDA: 7.37 [PH] (ref 7.4–7.5)
PH TEMP ADJ BLDA: 7.37 [PH] (ref 7.4–7.5)
PHOSPHATE SERPL-MCNC: 3 MG/DL (ref 2.5–4.5)
PLATELET # BLD AUTO: 295 K/UL (ref 164–446)
PMV BLD AUTO: 9.5 FL (ref 9–12.9)
PO2 BLDA: 68 MMHG (ref 64–87)
PO2 TEMP ADJ BLDA: 67 MMHG (ref 64–87)
POTASSIUM SERPL-SCNC: 4.4 MMOL/L (ref 3.6–5.5)
PREALB SERPL-MCNC: 16 MG/DL (ref 18–38)
RBC # BLD AUTO: 4.34 M/UL (ref 4.7–6.1)
SAO2 % BLDA: 92 % (ref 93–99)
SIGNIFICANT IND 70042: NORMAL
SITE SITE: NORMAL
SODIUM SERPL-SCNC: 142 MMOL/L (ref 135–145)
SOURCE SOURCE: NORMAL
SPECIMEN DRAWN FROM PATIENT: ABNORMAL
TROPONIN I SERPL-MCNC: 1.03 NG/ML (ref 0–0.04)
WBC # BLD AUTO: 22.8 K/UL (ref 4.8–10.8)

## 2019-05-03 PROCEDURE — 99233 SBSQ HOSP IP/OBS HIGH 50: CPT | Performed by: HOSPITALIST

## 2019-05-03 PROCEDURE — 82803 BLOOD GASES ANY COMBINATION: CPT

## 2019-05-03 PROCEDURE — 71045 X-RAY EXAM CHEST 1 VIEW: CPT

## 2019-05-03 PROCEDURE — 99233 SBSQ HOSP IP/OBS HIGH 50: CPT | Performed by: INTERNAL MEDICINE

## 2019-05-03 PROCEDURE — 83735 ASSAY OF MAGNESIUM: CPT

## 2019-05-03 PROCEDURE — 700111 HCHG RX REV CODE 636 W/ 250 OVERRIDE (IP): Performed by: INTERNAL MEDICINE

## 2019-05-03 PROCEDURE — A9270 NON-COVERED ITEM OR SERVICE: HCPCS | Performed by: INTERNAL MEDICINE

## 2019-05-03 PROCEDURE — 700105 HCHG RX REV CODE 258: Performed by: INTERNAL MEDICINE

## 2019-05-03 PROCEDURE — 85025 COMPLETE CBC W/AUTO DIFF WBC: CPT

## 2019-05-03 PROCEDURE — 84134 ASSAY OF PREALBUMIN: CPT

## 2019-05-03 PROCEDURE — 700102 HCHG RX REV CODE 250 W/ 637 OVERRIDE(OP): Performed by: INTERNAL MEDICINE

## 2019-05-03 PROCEDURE — 86140 C-REACTIVE PROTEIN: CPT

## 2019-05-03 PROCEDURE — 84484 ASSAY OF TROPONIN QUANT: CPT

## 2019-05-03 PROCEDURE — 70450 CT HEAD/BRAIN W/O DYE: CPT

## 2019-05-03 PROCEDURE — 700111 HCHG RX REV CODE 636 W/ 250 OVERRIDE (IP)

## 2019-05-03 PROCEDURE — 80048 BASIC METABOLIC PNL TOTAL CA: CPT

## 2019-05-03 PROCEDURE — 700111 HCHG RX REV CODE 636 W/ 250 OVERRIDE (IP): Performed by: FAMILY MEDICINE

## 2019-05-03 PROCEDURE — 84100 ASSAY OF PHOSPHORUS: CPT

## 2019-05-03 PROCEDURE — 770022 HCHG ROOM/CARE - ICU (200)

## 2019-05-03 PROCEDURE — 82962 GLUCOSE BLOOD TEST: CPT | Mod: 91

## 2019-05-03 RX ORDER — LOSARTAN POTASSIUM 25 MG/1
25 TABLET ORAL
Status: DISCONTINUED | OUTPATIENT
Start: 2019-05-03 | End: 2019-05-04

## 2019-05-03 RX ORDER — HALOPERIDOL 5 MG/ML
INJECTION INTRAMUSCULAR
Status: COMPLETED
Start: 2019-05-03 | End: 2019-05-03

## 2019-05-03 RX ORDER — LORAZEPAM 2 MG/ML
1 INJECTION INTRAMUSCULAR ONCE
Status: DISPENSED | OUTPATIENT
Start: 2019-05-03 | End: 2019-05-04

## 2019-05-03 RX ORDER — HALOPERIDOL 5 MG/ML
5 INJECTION INTRAMUSCULAR ONCE
Status: ACTIVE | OUTPATIENT
Start: 2019-05-03 | End: 2019-05-04

## 2019-05-03 RX ADMIN — METHYLPREDNISOLONE SODIUM SUCCINATE 40 MG: 40 INJECTION, POWDER, FOR SOLUTION INTRAMUSCULAR; INTRAVENOUS at 21:34

## 2019-05-03 RX ADMIN — DOXYCYCLINE 100 MG: 100 TABLET, FILM COATED ORAL at 08:06

## 2019-05-03 RX ADMIN — METOPROLOL TARTRATE 25 MG: 25 TABLET ORAL at 17:40

## 2019-05-03 RX ADMIN — HYDRALAZINE HYDROCHLORIDE 10 MG: 20 INJECTION INTRAMUSCULAR; INTRAVENOUS at 10:38

## 2019-05-03 RX ADMIN — BUDESONIDE AND FORMOTEROL FUMARATE DIHYDRATE 2 PUFF: 160; 4.5 AEROSOL RESPIRATORY (INHALATION) at 17:41

## 2019-05-03 RX ADMIN — LOSARTAN POTASSIUM 25 MG: 25 TABLET ORAL at 09:22

## 2019-05-03 RX ADMIN — HEPARIN SODIUM 5000 UNITS: 5000 INJECTION, SOLUTION INTRAVENOUS; SUBCUTANEOUS at 21:34

## 2019-05-03 RX ADMIN — HALOPERIDOL LACTATE 5 MG: 5 INJECTION, SOLUTION INTRAMUSCULAR at 04:00

## 2019-05-03 RX ADMIN — TIOTROPIUM BROMIDE 1 CAPSULE: 18 CAPSULE ORAL; RESPIRATORY (INHALATION) at 08:14

## 2019-05-03 RX ADMIN — SENNOSIDES, DOCUSATE SODIUM 2 TABLET: 50; 8.6 TABLET, FILM COATED ORAL at 08:05

## 2019-05-03 RX ADMIN — ATORVASTATIN CALCIUM 40 MG: 40 TABLET, FILM COATED ORAL at 17:40

## 2019-05-03 RX ADMIN — FAMOTIDINE 20 MG: 10 INJECTION INTRAVENOUS at 06:29

## 2019-05-03 RX ADMIN — ZOLPIDEM TARTRATE 10 MG: 5 TABLET ORAL at 22:46

## 2019-05-03 RX ADMIN — HEPARIN SODIUM 5000 UNITS: 5000 INJECTION, SOLUTION INTRAVENOUS; SUBCUTANEOUS at 14:09

## 2019-05-03 RX ADMIN — BUDESONIDE AND FORMOTEROL FUMARATE DIHYDRATE 2 PUFF: 160; 4.5 AEROSOL RESPIRATORY (INHALATION) at 08:05

## 2019-05-03 RX ADMIN — CEFTRIAXONE SODIUM 2 G: 2 INJECTION, POWDER, FOR SOLUTION INTRAMUSCULAR; INTRAVENOUS at 06:29

## 2019-05-03 RX ADMIN — HEPARIN SODIUM 5000 UNITS: 5000 INJECTION, SOLUTION INTRAVENOUS; SUBCUTANEOUS at 06:29

## 2019-05-03 RX ADMIN — DOXYCYCLINE 100 MG: 100 TABLET, FILM COATED ORAL at 17:40

## 2019-05-03 RX ADMIN — ASPIRIN 81 MG 81 MG: 81 TABLET ORAL at 08:06

## 2019-05-03 RX ADMIN — METHYLPREDNISOLONE SODIUM SUCCINATE 40 MG: 40 INJECTION, POWDER, FOR SOLUTION INTRAMUSCULAR; INTRAVENOUS at 06:29

## 2019-05-03 RX ADMIN — LABETALOL HYDROCHLORIDE 100 MG: 100 TABLET, FILM COATED ORAL at 14:19

## 2019-05-03 RX ADMIN — AMLODIPINE BESYLATE 10 MG: 10 TABLET ORAL at 08:06

## 2019-05-03 RX ADMIN — METHYLPREDNISOLONE SODIUM SUCCINATE 40 MG: 40 INJECTION, POWDER, FOR SOLUTION INTRAMUSCULAR; INTRAVENOUS at 14:09

## 2019-05-03 ASSESSMENT — ENCOUNTER SYMPTOMS
PALPITATIONS: 0
NAUSEA: 0
CHILLS: 0
HEMOPTYSIS: 0
DIZZINESS: 0
VOMITING: 0
COUGH: 0
SPUTUM PRODUCTION: 0
ORTHOPNEA: 0

## 2019-05-03 NOTE — CARE PLAN
Problem: Nutritional:  Goal: Achieve adequate nutritional intake  Patient will consume >50% of most meals over 3-5 days.  Outcome: PROGRESSING AS EXPECTED

## 2019-05-03 NOTE — PROGRESS NOTES
Reason for consultation /admission : NSTEMI    Extubated yesterday  BP has been somewaht high lately  Back on his home dose amlodipine, not yet back on losartan  HR occ low especially during sleep (under 40 at time)  Metoprolol being held  Confused this AM, better after hladol  Denies ETOH  Blood tinged urine  Negative 1700 cc    Review of systems;    General: No fever, chills,   HENT: No ringing in the ears, no toothache or sore throat  Eyes: No blurred vision or double vision  Heart: No palpitation, + orthopnea, no leg swelling  Lung: No productive cough, no hemoptysis  Abdomen: No abdominal pain, no nausea vomiting diarrhea or constipation, no blood in stool  : No dysuria, no frequency or hesitancy, no hematuria  Musculoskeletal: No myalgia, no back pain, some joint pain  Hematology: No easy bruising  Skin: No rash or itching  Neurological: No headache, no new focal weakness or numbness  Psychological: + anxiety   All other review of systems are negative      Temp:  [36.7 °C (98 °F)-37 °C (98.6 °F)] 36.8 °C (98.3 °F)  Pulse:  [] 66  Resp:  [18-37] 24  SpO2:  [88 %-99 %] 94 % on 2 l/min NC  GENERAL mildly dyspnic at rest  Head atraumatic, normocephalic  Eyes EOMI  ENT neck supple, no JVD, no carotid bruits or thyromegaly  Lung decreased breath sound, no rales or wheezing  Heart RRR, normal rate, no murmur, gallop or rub  Abd soft, no tenderness, mass or bruits  Ext no edema  Skin no ecchymosis or petechiae  Musculoskeletal no deformity  Neuro moving all extremity, alertx1  Psych flat affect    Monitor reviewed by me showed no significant ventricular or atrial dysrhythmias.    Labs: Cr 1.2, K+ 4.4    WBC 22K, plt 295K  Hb 13    Assessment and plans    1. NSTEMI  Stable hemodynamically and rhythm wise  Respiratory failure more pulmonary related than cardiac  Metoprolol being held for occ bradycardial  Cont ASA and statin.   No need to resume full dose anticoag from our standpoint.  Plan cath in a few  days  Would like input from urology regarding antiplatelet (DAPT)     2. HTN  BP high on Amlodipine  Will resume losartan  Try resume low dose metoprolol if possible     3. Hematuria  Hb stable, on ASA     4. Respiratory failure  Normal EF by echo 4/29    Prob more COPD related    5. Confusion  Per primary team    6. Leukocytosis  On high dose steroid

## 2019-05-03 NOTE — CARE PLAN
Problem: Infection  Goal: Will remain free from infection  Outcome: PROGRESSING AS EXPECTED  Pt receiving meds to help control COPD exacerbation    Problem: Bowel/Gastric:  Goal: Normal bowel function is maintained or improved  Outcome: PROGRESSING AS EXPECTED  Pt receiving stool softeners and has had BMs

## 2019-05-03 NOTE — ASSESSMENT & PLAN NOTE
FEV1 0.79 October 2012. Improved to 1.1 with bronchodilator. Quit smoking February 2012  IV corticosteroids, empiric antimicrobial therapy, bronchial dilators, follow  Acutely exacerbated with underlying pneumonia  De-escalate to oral prednisone for short course then discontinue  Bronchodilators, RT protocols, follow

## 2019-05-03 NOTE — CARE PLAN
Problem: Safety - Medical Restraint  Goal: Remains free of injury from restraints (Restraint for Interference with Medical Device)  INTERVENTIONS:  1. Determine that other, less restrictive measures have been tried or would not be effective before applying the restraint  2. Evaluate the patient's condition at the time of restraint application  3. Inform patient/family regarding the reason for restraint  4. Q2H: Monitor safety, psychosocial status, comfort, nutrition and hydration   Outcome: PROGRESSING AS EXPECTED  Restraints discontinued this AM after patients confusion cleared

## 2019-05-03 NOTE — CARE PLAN
Problem: Safety  Goal: Will remain free from injury  Outcome: PROGRESSING AS EXPECTED  Patient has been appropriate since this AM when woken for morning assessment. He is now calm and cooperative. He was disoriented at first but was not combative or verbally aggressive in any way as he was before. Restraints were removed. He has continued to be calm and calls appropriately for assistance

## 2019-05-03 NOTE — PROGRESS NOTES
Critical Care Progress Note    Date of admission  4/29/2019    Chief Complaint  AF RVR, NSTEMI, trop 12; heparin gtt; CKD, resp distress; RRT and placed on BiPAP in [facility]    Hospital Course    68 y.o. male who presented 4/29/2019 with worsening dyspnea.  He has a history of COPD with home chronic oxygen therapy.  He presented with 3 days of cough, dyspnea but denied fevers or chills.  He was found to be in atrial fibrillation with rapid ventricular response.  In the ED he received IV of diltiazem and converted to sinus rhythm.  He was found to have a non-ST elevation myocardial function and has been continued on heparin infusion.  Troponin I enzyme was elevated at 12 and cardiology is following the patient.  He is found to have leukocytosis, acute kidney injury and was admitted to the telemetry floor.  He was placed on empiric antimicrobial therapy and corticosteroid.  Today he was urgently transferred to the intensive care unit for worsening respiratory distress.  He was placed on BiPAP however continued to have increasing respiratory distress.  I semi-urgently intubated the patient for bronchoscopy.  There was copious amount of thick purulent secretions in the airways which were therapeutically lavaged and a BAL was sent for culture.         Interval Problem Update  Reviewed last 24 hour events:   Liberated from vent 5/2 after <24 hours intubation   Agitated this am; disoriented; CT head neg; now a/o x 4   No h/o EtOH   SR/SB   Hypertensive   Afebrile   samson PO diet   3 lpm n/c   pepcid - d/c   C3/doxy; day 5 - complete today   IV solumedrol; ? De-esc to PO pred    Yesterday:   Bronch yesterday after intubation; copious thick purulent secretions   samson TF   I/O =    A/o, nfe   Vent day 2   samson SBT   CXR mild basilar infs   abx day 4; BAL GPC few   PCT 3.3 down from 13.7   Solumedrol 40 q8   Cr up   Mild hematuria   Replace phos    Add SSI       Review of Systems  Review of Systems   Unable to perform ROS: Acuity of  condition        Vital Signs for last 24 hours   Temp:  [36.7 °C (98 °F)-37 °C (98.6 °F)] 36.8 °C (98.3 °F)  Pulse:  [] 66  Resp:  [18-37] 24  SpO2:  [88 %-99 %] 94 %    Hemodynamic parameters for last 24 hours       Respiratory Information for the last 24 hours       Physical Exam   Physical Exam   Constitutional: He appears well-developed.   HENT:   Head: Normocephalic.   Eyes: Pupils are equal, round, and reactive to light. No scleral icterus.   Neck: Neck supple. No thyromegaly present.   Cardiovascular: Normal rate.    Pulmonary/Chest:   Diminished, scattered coarse breath sounds with few expiratory wheeze   Abdominal: Soft. He exhibits no distension. There is no tenderness.   Genitourinary:   Genitourinary Comments: Lauren in place, hematuria improving   Musculoskeletal: He exhibits no edema or tenderness.   Neurological: He is alert. No cranial nerve deficit.   Follows commands appropriately   Skin: Skin is warm and dry.       Medications  Current Facility-Administered Medications   Medication Dose Route Frequency Provider Last Rate Last Dose   • haloperidol lactate (HALDOL) injection 5 mg  5 mg Intravenous Once Darell Brito M.D.   Stopped at 05/03/19 0415   • LORazepam (ATIVAN) injection 1 mg  1 mg Intravenous Once Jack Fay M.D.   Stopped at 05/03/19 0515   • losartan (COZAAR) tablet 25 mg  25 mg Oral Q DAY Jamison Cortes M.D.       • acetaminophen (TYLENOL) tablet 650 mg  650 mg Oral Q6HRS PRN Jovanni Zhou M.D.       • amLODIPine (NORVASC) tablet 10 mg  10 mg Oral QDAY Jovanni Zhou M.D.   10 mg at 05/03/19 0806   • aspirin (ASA) chewable tab 81 mg  81 mg Oral DAILY Jovanni Zhou M.D.   Stopped at 05/03/19 0900   • atorvastatin (LIPITOR) tablet 40 mg  40 mg Oral Q EVENING Jovanni Zhou M.D.   40 mg at 05/02/19 1657   • labetalol (NORMODYNE) tablet 100 mg  100 mg Oral Q6HRS PRN Jovanni Zhou M.D.       • magnesium hydroxide (MILK OF MAGNESIA) suspension 30 mL  30 mL Oral QDAY  PRN Jovanni Zhou M.D.        And   • senna-docusate (PERICOLACE or SENOKOT S) 8.6-50 MG per tablet 2 Tab  2 Tab Oral BID Jovanni Zhou M.D.   Stopped at 05/03/19 0900    And   • polyethylene glycol/lytes (MIRALAX) PACKET 1 Packet  1 Packet Oral QDAY PRN Jovanni Zhou M.D.        And   • bisacodyl (DULCOLAX) suppository 10 mg  10 mg Rectal QDAY PRN Jvoanni Zhou M.D.       • famotidine (PEPCID) tablet 20 mg  20 mg Oral Q12HRS Jovanni Zhou M.D.   20 mg at 05/02/19 1657    Or   • famotidine (PEPCID) injection 20 mg  20 mg Intravenous Q12HRS Jovanni Zhou M.D.   20 mg at 05/03/19 0629   • metoprolol (LOPRESSOR) tablet 25 mg  25 mg Oral TWICE DAILY Jovanni Zhou M.D.   Stopped at 05/03/19 0600   • doxycycline monohydrate (ADOXA) tablet 100 mg  100 mg Oral Q12HRS Jovanni Zhou M.D.   Stopped at 05/03/19 0900   • zolpidem (AMBIEN) tablet 10 mg  10 mg Oral HS PRN Jovanni Zhou M.D.   10 mg at 05/02/19 2256   • tiotropium (SPIRIVA) 18 MCG inhalation capsule 1 Cap  1 Cap Inhalation DAILY Jovanni Zhou M.D.   Stopped at 05/03/19 0900   • albuterol inhaler 2 Puff  2 Puff Inhalation Q4HRS PRN Jovanni Zhou M.D.       • budesonide-formoterol (SYMBICORT) 160-4.5 MCG/ACT inhaler 2 Puff  2 Puff Inhalation BID Jovanni Zhou M.D.   Stopped at 05/03/19 0900   • insulin regular (HUMULIN R) injection 2-9 Units  2-9 Units Subcutaneous 4X/DAY ACHS Jovanni Zhou M.D.   Stopped at 05/02/19 1700    And   • glucose 4 g chewable tablet 16 g  16 g Oral Q15 MIN PRN Jovanni Zhou M.D.        And   • DEXTROSE 10% BOLUS 250 mL  250 mL Intravenous Q15 MIN PRN Jovanni Zhou M.D.       • hydrALAZINE (APRESOLINE) injection 10 mg  10 mg Intravenous Q4HRS PRN Trent Faith D.O.   10 mg at 05/02/19 1811   • fentaNYL (SUBLIMAZE) injection 100 mcg  100 mcg Intravenous Q15 MIN PRN Jovanni Zhou M.D.   100 mcg at 05/01/19 2136    And   • fentaNYL (SUBLIMAZE) injection 200 mcg  200 mcg Intravenous Q15 MIN PRN Jovanni Zhou,  M.D.   200 mcg at 05/01/19 1230    And   • fentaNYL (SUBLIMAZE) 50 mcg/mL in 50mL (Continuous Infusion)   Intravenous Continuous Jovanni Zhou M.D.   Stopped at 05/01/19 1200    And   • dexmedetomidine (PRECEDEX) 400 mcg/100mL NS premix infusion  0-1.5 mcg/kg/hr Intravenous Continuous Jovanni Zhou M.D.   Stopped at 05/01/19 1200   • methylPREDNISolone (SOLU-MEDROL) 40 MG injection 40 mg  40 mg Intravenous Q8HRS Jovanni Zhou M.D.   40 mg at 05/03/19 0629   • Respiratory Care per Protocol   Nebulization Continuous RT Jovanni Zhou M.D.       • MD Alert...ICU Electrolyte Replacement per Pharmacy   Other PHARMACY TO DOSE Jovanni Zhou M.D.       • heparin injection 5,000 Units  5,000 Units Subcutaneous Q8HRS Jovanni Zhou M.D.   5,000 Units at 05/03/19 0629   • ipratropium-albuterol (DUONEB) nebulizer solution  3 mL Nebulization Q2HRS PRN (RT) Jovanni Zhou M.D.       • lactated ringer BOLUS infusion  500 mL Intravenous Once PRN Jovanni Zhou M.D.       • DILTIAZem (CARDIZEM) injection 10 mg  10 mg Intravenous Q5 MIN PRN Gabo Greenberg M.D.   10 mg at 04/29/19 1411   • cefTRIAXone (ROCEPHIN) 2 g in  mL IVPB  2 g Intravenous Q24HRS Hipolito Lobato M.D.   Stopped at 05/03/19 0659       Fluids    Intake/Output Summary (Last 24 hours) at 05/03/19 0903  Last data filed at 05/03/19 0600   Gross per 24 hour   Intake            730.2 ml   Output             2500 ml   Net          -1769.8 ml       Laboratory  Recent Labs      05/01/19   0750   05/01/19   1316  05/02/19   0402  05/03/19   0415   [id number]  7.27*   --    --    --    --    [id number]  58.3*   --    --    --    --    [id number]  87.8*   --    --    --    --    [id number]  95.0   --    --    --    --    ARTHCO3  26*   --    --    --    --    [id number]  4.0   --    --    --    --    ARTBE  -2   --    --    --    --    ISTATAPH   --    < >  7.312*  7.394*  7.366*   ISTATAPCO2   --    < >  50.0*  43.1*  50.0*   ISTATAPO2   --    < >  190*  196*  68    ISTATATCO2   --    < >  27  28  30   [id number]   --    < >  100*  100*  92*   ISTATARTHCO3   --    < >  25.3*  26.3*  28.7*   ISTATARTBE   --    < >  -1  1  2   ISTATTEMP   --    < >  37.8 C  36.4 C  36.8 C   ISTATFIO2   --    < >  .60  60  28   ISTATSPEC   --    < >  Arterial  Arterial  Arterial   ISTATAPHTC   --    < >  7.301*  7.403  7.369*   [id number]   --    < >  194*  193*  67    < > = values in this interval not displayed.     Recent Labs      04/30/19   1347  05/01/19   0010   TROPONINI  4.70*   --    BNPBTYPENAT   --   102*     Recent Labs      05/01/19 0010 05/02/19 0333 05/03/19   0430   SODIUM  145  142  142   POTASSIUM  4.0  4.3  4.4   CHLORIDE  106  105  109   CO2  29  28  27   BUN  35*  47*  40*   CREATININE  1.33  1.51*  1.21   MAGNESIUM  2.1  2.3  2.3   PHOSPHORUS   --   2.1*  3.0   CALCIUM  9.2  8.9  8.7     Recent Labs      05/01/19 0010 05/02/19 0333 05/03/19   0430   ALTSGPT   --   38   --    ASTSGOT   --   30   --    ALKPHOSPHAT   --   75   --    TBILIRUBIN   --   0.3   --    PREALBUMIN   --   12.0*  16.0*   GLUCOSE  115*  205*  139*     Recent Labs      05/01/19   0010 05/02/19 0333 05/03/19   0430   WBC  22.4*  16.9*  22.8*   NEUTSPOLYS   --   94.80*  89.30*   LYMPHOCYTES   --   2.60*  3.90*   MONOCYTES   --   1.70  2.90   EOSINOPHILS   --   0.00  0.00   BASOPHILS   --   0.00  0.40   ASTSGOT   --   30   --    ALTSGPT   --   38   --    ALKPHOSPHAT   --   75   --    TBILIRUBIN   --   0.3   --      Recent Labs      04/30/19   2137  05/01/19   0010  05/01/19   0657  05/02/19 0333 05/03/19   0430   RBC   --   4.62*   --   4.18*  4.34*   HEMOGLOBIN   --   14.2   --   12.8*  13.4*   HEMATOCRIT   --   44.9   --   40.5*  41.6*   PLATELETCT   --   299   --   261  295   APTT  43.0*  45.5*  59.0*   --    --        Imaging  X-Ray:  I have personally reviewed the images and compared with prior images.    Assessment/Plan  * Chronic obstructive pulmonary disease with acute  exacerbation (HCC)- (present on admission)   Assessment & Plan    FEV1 0.79 October 2012. Improved to 1.1 with bronchodilator. Quit smoking February 2012  IV corticosteroids, empiric antimicrobial therapy, bronchial dilators, follow  Acutely exacerbated with underlying pneumonia  De-escalate to oral prednisone for short course then discontinue  Bronchodilators, RT protocols, follow     NSTEMI (non-ST elevated myocardial infarction) ([facility])- (present on admission)   Assessment & Plan    Will require cardiac catheterization at some point when clinically stable  Cardiology following     Acute on chronic respiratory failure with hypoxia ([facility])- (present on admission)   Assessment & Plan    Failed BiPAP and urgently intubated 5/1  Liberated 5/2  RT protocols  De-escalate steroids     Gross hematuria- (present on admission)   Assessment & Plan    History of retained ureteral stent, consider discontinue anticoagulation, reviewed with cardiology  Lauren catheter in place, urology consulted     Atrial fibrillation ([facility])- (present on admission)   Assessment & Plan    Rate control, anticoagulation, cardiology following  Remains in SR     Acute renal insufficiency- (present on admission)   Assessment & Plan    Avoid nephrotoxic agents, monitor electrolytes closely     Essential hypertension- (present on admission)   Assessment & Plan    Glycemic control          VTE:  Heparin  Ulcer: H2 Antagonist  Lines: None    I have performed a physical exam and reviewed and updated ROS and Plan today (5/3/2019). In review of yesterday's note (5/2/2019), there are no changes except as documented above.     Discussed patient condition and risk of morbidity and/or mortality with Hospitalist, RN, RT, Pharmacy and QA team

## 2019-05-03 NOTE — PROGRESS NOTES
[facility] Medicine Daily Progress Note    Date of Service  5/3/2019    Chief Complaint  68 y.o. male admitted 4/29/2019 with shortness of breath    Hospital Course    Mr Sims has a history of COPD..  Patient initially visited urgent care on 4/29/2019 for shortness of breath, at urgent care he was found to be in atrial fibrillation with RVR, he was brought to the emergency room by EMS for evaluation.   He received IV diltazem and converted to sinus rhythm.  The patient had an elevated troponin on admission which trended up, on 5/1/19 he was transferred to the ICU for respiratory distress and required intubation. Bronchoscopy was significant for inflamed airways and copious secretions.  He has been treated for COPD exacerbation and pneumonia, was extubated on 5/2/19.  The patietn was started on heparin drip for NSTEMI, this was stopped when he developed gross hematuria.      Interval Problem Update  Episode of severe confusion this morning, after xray technician woke him up, rec'd haldol, stat CT was unremarkable  He has slowly improved, now oriented x 4, no recurrence of agitation  Denies shortness of breath, coughing a little, appears to be working harder to breath this morning  No chest pain, no palpitations  Continued hematuria, no flank pain    Consultants/Specialty  Critical Care, I discussed the patient's condition with Dr. Zhou today on ICU Rounds  Cardiology  Urology    Code Status  Full Code    Disposition  Respiratory status somewhat tenuous, keep in ICU today    Review of Systems  Review of Systems   Constitutional: Negative for chills and malaise/fatigue.   Respiratory: Negative for cough, hemoptysis and sputum production.    Cardiovascular: Negative for chest pain, palpitations and orthopnea.   Gastrointestinal: Negative for nausea and vomiting.   Genitourinary: Positive for hematuria.   Skin: Negative for itching and rash.   Neurological: Negative for dizziness.   All other systems reviewed and  are negative.       Physical Exam  Temp:  [36.7 °C (98 °F)-37 °C (98.6 °F)] 36.8 °C (98.3 °F)  Pulse:  [] 66  Resp:  [18-37] 24  SpO2:  [88 %-99 %] 94 %    Physical Exam   Constitutional: He is oriented to person, place, and time. He appears well-developed and well-nourished.   HENT:   Head: Normocephalic and atraumatic.   Eyes: Conjunctivae and EOM are normal. Right eye exhibits no discharge. Left eye exhibits no discharge.   Cardiovascular: Normal rate, regular rhythm and intact distal pulses.    No murmur heard.  2+ Radial Pulses  Brisk Capillary Refill   Pulmonary/Chest: Effort normal. No respiratory distress. He has wheezes.   Prolonged expiratory phase with end expiratory wheeze   Abdominal: Soft. Bowel sounds are normal. He exhibits no distension. There is no tenderness. There is no rebound.   Musculoskeletal: Normal range of motion. He exhibits no edema.   Neurological: He is alert and oriented to person, place, and time. No cranial nerve deficit.   Skin: Skin is warm and dry. He is not diaphoretic. No erythema.   Skin is warm and well perfused   Psychiatric: He has a normal mood and affect.       Fluids    Intake/Output Summary (Last 24 hours) at 05/03/19 0705  Last data filed at 05/03/19 0600   Gross per 24 hour   Intake            860.2 ml   Output             2575 ml   Net          -1714.8 ml       Laboratory  Recent Labs      05/01/19   0010  05/02/19   0333  05/03/19   0430   WBC  22.4*  16.9*  22.8*   RBC  4.62*  4.18*  4.34*   HEMOGLOBIN  14.2  12.8*  13.4*   HEMATOCRIT  44.9  40.5*  41.6*   MCV  97.2  96.9  95.9   MCH  30.7  30.6  30.9   MCHC  31.6*  31.6*  32.2*   RDW  51.8*  50.4*  50.0   PLATELETCT  299  261  295   MPV  9.3  9.5  9.5     Recent Labs      05/01/19   0010  05/02/19   0333  05/03/19   0430   SODIUM  145  142  142   POTASSIUM  4.0  4.3  4.4   CHLORIDE  106  105  109   CO2  29  28  27   GLUCOSE  115*  205*  139*   BUN  35*  47*  40*   CREATININE  1.33  1.51*  1.21   CALCIUM  9.2   8.9  8.7     Recent Labs      04/30/19   2137  05/01/19   0010  05/01/19   0657   APTT  43.0*  45.5*  59.0*     Recent Labs      05/01/19   0010   BNPBTYPENAT  102*           Imaging  CT-HEAD W/O   Final Result      Normal CT scan of the head without contrast.               INTERPRETING LOCATION:  [address], [place], [address]      DX-CHEST-PORTABLE (1 VIEW)   Final Result      No acute cardiopulmonary abnormality. No interval change.      DX-CHEST-PORTABLE (1 VIEW)   Final Result         1. Stable lines and tubes.   2. No new consolidation or pleural effusions.      DX-ABDOMEN FOR TUBE PLACEMENT   Final Result      1.  Cortrak nasogastric tube position consistent with intragastric placement.   2.  Right-sided double-J ureteral stent in place.      DX-CHEST-LIMITED (1 VIEW)   Final Result      1.  Interval placement of an endotracheal tube which terminates just above the level of the aortic arch in satisfactory position.   2.  Interval placement of an enteric feeding tube terminates in the right paramedian abdomen.   3.  No acute cardiopulmonary disease.      DX-CHEST-PORTABLE (1 VIEW)   Final Result      Mild bilateral basilar atelectasis and/or consolidation. Underlying infection is possible.      US-RENAL   Final Result         1. Bilateral nephrolithiasis.   2. Caliectasis of the right kidney, chronic. Right kidney is atrophic relative to the left.   3. No left hydronephrosis is identified.   4. Distal end of a pigtail ureteral stent in the bladder      EC-ECHOCARDIOGRAM LTD W/O CONT   Final Result      DX-CHEST-PORTABLE (1 VIEW)   Final Result      No acute cardiac or pulmonary abnormality is noted.           Assessment/Plan  * Chronic obstructive pulmonary disease with acute exacerbation (HCC)- (present on admission)   Assessment & Plan    Severe exacerbation, requiring intubation, he is now been extubated  Continue IV Solu-Medrol  Inhaled bronchodilators as needed     NSTEMI (non-ST elevated myocardial  infarction) (HCC)- (present on admission)   Assessment & Plan    Initial troponin level greater than 12  No current chest pain  Appreciate cardiology consultation  Continuous hemodynamic monitoring  Cardiac catheterization being considered  Continue aspirin, atorvastatin, metoprolol, Cozaar       Acute on chronic respiratory failure with hypoxia (HCC)- (present on admission)   Assessment & Plan    Patient has been extubated  Respiratory status still of concern, he appears to be working hard to breathe  Keep in ICU today  Treat COPD exacerbation, wean supplemental oxygen     Gross hematuria- (present on admission)   Assessment & Plan    Chronic hematuria, likely related to renal calculi  Appreciate urology consultation  When more stable, will need treatment for kidney stones and removal of stent     Atrial fibrillation (HCC)- (present on admission)   Assessment & Plan    He is rate controlled  Holding off on anticoagulation with hematuria     Dyslipidemia- (present on admission)   Assessment & Plan    High intensity statin     Acute renal insufficiency- (present on admission)   Assessment & Plan    AM labs reviewed, his GFR is improved to 60     Essential hypertension- (present on admission)   Assessment & Plan    Metoprolol and losartan          VTE prophylaxis: SCD's

## 2019-05-03 NOTE — DIETARY
Nutrition Services: Transition to PO diet    Admit day 4.  Pt extubated 5/2, and TF D/c'd.  Pt now on diabetic diet.  No nutritional issues noted PTA.    Nutrition Representative to see daily for menu options. Snacks/supplements available.  RD will monitor PO intake for adequacy over 3-5 days.

## 2019-05-03 NOTE — ASSESSMENT & PLAN NOTE
History of retained ureteral stent, consider discontinue anticoagulation, reviewed with cardiology  Lauren catheter in place, urology consulted

## 2019-05-04 ENCOUNTER — APPOINTMENT (OUTPATIENT)
Dept: RADIOLOGY | Facility: MEDICAL CENTER | Age: 69
DRG: 981 | End: 2019-05-04
Attending: INTERNAL MEDICINE
Payer: COMMERCIAL

## 2019-05-04 PROBLEM — Z96.0 RETAINED URETERAL STENT: Status: ACTIVE | Noted: 2019-05-04

## 2019-05-04 LAB
ANION GAP SERPL CALC-SCNC: 10 MMOL/L (ref 0–11.9)
BACTERIA BLD CULT: NORMAL
BACTERIA BLD CULT: NORMAL
BASOPHILS # BLD AUTO: 0.2 % (ref 0–1.8)
BASOPHILS # BLD: 0.05 K/UL (ref 0–0.12)
BUN SERPL-MCNC: 42 MG/DL (ref 8–22)
CALCIUM SERPL-MCNC: 8.7 MG/DL (ref 8.5–10.5)
CHLORIDE SERPL-SCNC: 106 MMOL/L (ref 96–112)
CO2 SERPL-SCNC: 28 MMOL/L (ref 20–33)
CREAT SERPL-MCNC: 1.23 MG/DL (ref 0.5–1.4)
EOSINOPHIL # BLD AUTO: 0 K/UL (ref 0–0.51)
EOSINOPHIL NFR BLD: 0 % (ref 0–6.9)
ERYTHROCYTE [DISTWIDTH] IN BLOOD BY AUTOMATED COUNT: 48.8 FL (ref 35.9–50)
GLUCOSE BLD-MCNC: 123 MG/DL (ref 65–99)
GLUCOSE BLD-MCNC: 129 MG/DL (ref 65–99)
GLUCOSE BLD-MCNC: 140 MG/DL (ref 65–99)
GLUCOSE BLD-MCNC: 159 MG/DL (ref 65–99)
GLUCOSE SERPL-MCNC: 137 MG/DL (ref 65–99)
HCT VFR BLD AUTO: 43.6 % (ref 42–52)
HGB BLD-MCNC: 13.5 G/DL (ref 14–18)
IMM GRANULOCYTES # BLD AUTO: 0.76 K/UL (ref 0–0.11)
IMM GRANULOCYTES NFR BLD AUTO: 3.7 % (ref 0–0.9)
LYMPHOCYTES # BLD AUTO: 0.87 K/UL (ref 1–4.8)
LYMPHOCYTES NFR BLD: 4.2 % (ref 22–41)
MAGNESIUM SERPL-MCNC: 2.2 MG/DL (ref 1.5–2.5)
MCH RBC QN AUTO: 29.6 PG (ref 27–33)
MCHC RBC AUTO-ENTMCNC: 31 G/DL (ref 33.7–35.3)
MCV RBC AUTO: 95.6 FL (ref 81.4–97.8)
MONOCYTES # BLD AUTO: 0.52 K/UL (ref 0–0.85)
MONOCYTES NFR BLD AUTO: 2.5 % (ref 0–13.4)
NEUTROPHILS # BLD AUTO: 18.49 K/UL (ref 1.82–7.42)
NEUTROPHILS NFR BLD: 89.4 % (ref 44–72)
NRBC # BLD AUTO: 0 K/UL
NRBC BLD-RTO: 0 /100 WBC
PHOSPHATE SERPL-MCNC: 4 MG/DL (ref 2.5–4.5)
PLATELET # BLD AUTO: 304 K/UL (ref 164–446)
PMV BLD AUTO: 9.4 FL (ref 9–12.9)
POTASSIUM SERPL-SCNC: 4.5 MMOL/L (ref 3.6–5.5)
RBC # BLD AUTO: 4.56 M/UL (ref 4.7–6.1)
SIGNIFICANT IND 70042: NORMAL
SIGNIFICANT IND 70042: NORMAL
SITE SITE: NORMAL
SITE SITE: NORMAL
SODIUM SERPL-SCNC: 144 MMOL/L (ref 135–145)
SOURCE SOURCE: NORMAL
SOURCE SOURCE: NORMAL
WBC # BLD AUTO: 20.7 K/UL (ref 4.8–10.8)

## 2019-05-04 PROCEDURE — 700111 HCHG RX REV CODE 636 W/ 250 OVERRIDE (IP): Performed by: FAMILY MEDICINE

## 2019-05-04 PROCEDURE — 700111 HCHG RX REV CODE 636 W/ 250 OVERRIDE (IP): Performed by: INTERNAL MEDICINE

## 2019-05-04 PROCEDURE — 85025 COMPLETE CBC W/AUTO DIFF WBC: CPT

## 2019-05-04 PROCEDURE — A9270 NON-COVERED ITEM OR SERVICE: HCPCS | Performed by: INTERNAL MEDICINE

## 2019-05-04 PROCEDURE — 99232 SBSQ HOSP IP/OBS MODERATE 35: CPT | Performed by: INTERNAL MEDICINE

## 2019-05-04 PROCEDURE — 99232 SBSQ HOSP IP/OBS MODERATE 35: CPT | Performed by: HOSPITALIST

## 2019-05-04 PROCEDURE — 99233 SBSQ HOSP IP/OBS HIGH 50: CPT | Performed by: INTERNAL MEDICINE

## 2019-05-04 PROCEDURE — 94664 DEMO&/EVAL PT USE INHALER: CPT

## 2019-05-04 PROCEDURE — 700102 HCHG RX REV CODE 250 W/ 637 OVERRIDE(OP): Performed by: INTERNAL MEDICINE

## 2019-05-04 PROCEDURE — 82962 GLUCOSE BLOOD TEST: CPT

## 2019-05-04 PROCEDURE — 84100 ASSAY OF PHOSPHORUS: CPT

## 2019-05-04 PROCEDURE — 83735 ASSAY OF MAGNESIUM: CPT

## 2019-05-04 PROCEDURE — 700105 HCHG RX REV CODE 258: Performed by: INTERNAL MEDICINE

## 2019-05-04 PROCEDURE — 80048 BASIC METABOLIC PNL TOTAL CA: CPT

## 2019-05-04 PROCEDURE — 770020 HCHG ROOM/CARE - TELE (206)

## 2019-05-04 RX ORDER — SODIUM CHLORIDE 9 MG/ML
INJECTION, SOLUTION INTRAVENOUS CONTINUOUS
Status: DISCONTINUED | OUTPATIENT
Start: 2019-05-05 | End: 2019-05-05

## 2019-05-04 RX ORDER — LOSARTAN POTASSIUM 50 MG/1
50 TABLET ORAL
Status: DISCONTINUED | OUTPATIENT
Start: 2019-05-05 | End: 2019-05-06 | Stop reason: HOSPADM

## 2019-05-04 RX ADMIN — HEPARIN SODIUM 5000 UNITS: 5000 INJECTION, SOLUTION INTRAVENOUS; SUBCUTANEOUS at 21:21

## 2019-05-04 RX ADMIN — ATORVASTATIN CALCIUM 40 MG: 40 TABLET, FILM COATED ORAL at 18:01

## 2019-05-04 RX ADMIN — HEPARIN SODIUM 5000 UNITS: 5000 INJECTION, SOLUTION INTRAVENOUS; SUBCUTANEOUS at 14:24

## 2019-05-04 RX ADMIN — ZOLPIDEM TARTRATE 10 MG: 5 TABLET ORAL at 21:33

## 2019-05-04 RX ADMIN — BUDESONIDE AND FORMOTEROL FUMARATE DIHYDRATE 2 PUFF: 160; 4.5 AEROSOL RESPIRATORY (INHALATION) at 05:39

## 2019-05-04 RX ADMIN — HYDRALAZINE HYDROCHLORIDE 10 MG: 20 INJECTION INTRAMUSCULAR; INTRAVENOUS at 21:21

## 2019-05-04 RX ADMIN — AMLODIPINE BESYLATE 10 MG: 10 TABLET ORAL at 07:41

## 2019-05-04 RX ADMIN — BUDESONIDE AND FORMOTEROL FUMARATE DIHYDRATE 2 PUFF: 160; 4.5 AEROSOL RESPIRATORY (INHALATION) at 18:01

## 2019-05-04 RX ADMIN — LOSARTAN POTASSIUM 25 MG: 25 TABLET ORAL at 05:40

## 2019-05-04 RX ADMIN — METOPROLOL TARTRATE 25 MG: 25 TABLET ORAL at 05:40

## 2019-05-04 RX ADMIN — HYDRALAZINE HYDROCHLORIDE 10 MG: 20 INJECTION INTRAMUSCULAR; INTRAVENOUS at 03:14

## 2019-05-04 RX ADMIN — DOXYCYCLINE 100 MG: 100 TABLET, FILM COATED ORAL at 05:40

## 2019-05-04 RX ADMIN — CEFTRIAXONE SODIUM 2 G: 2 INJECTION, POWDER, FOR SOLUTION INTRAMUSCULAR; INTRAVENOUS at 05:40

## 2019-05-04 RX ADMIN — HEPARIN SODIUM 5000 UNITS: 5000 INJECTION, SOLUTION INTRAVENOUS; SUBCUTANEOUS at 05:40

## 2019-05-04 RX ADMIN — METHYLPREDNISOLONE SODIUM SUCCINATE 40 MG: 40 INJECTION, POWDER, FOR SOLUTION INTRAMUSCULAR; INTRAVENOUS at 05:41

## 2019-05-04 RX ADMIN — ASPIRIN 81 MG 81 MG: 81 TABLET ORAL at 05:40

## 2019-05-04 RX ADMIN — METOPROLOL TARTRATE 25 MG: 25 TABLET ORAL at 18:00

## 2019-05-04 RX ADMIN — PREDNISONE 30 MG: 20 TABLET ORAL at 10:39

## 2019-05-04 RX ADMIN — TIOTROPIUM BROMIDE 1 CAPSULE: 18 CAPSULE ORAL; RESPIRATORY (INHALATION) at 05:39

## 2019-05-04 ASSESSMENT — ENCOUNTER SYMPTOMS
ORTHOPNEA: 0
NAUSEA: 0
VOMITING: 0
PALPITATIONS: 0
SPUTUM PRODUCTION: 0
HEMOPTYSIS: 0
CHILLS: 0
COUGH: 0
DIZZINESS: 0

## 2019-05-04 NOTE — CARE PLAN
Problem: Urinary Elimination:  Goal: Ability to reestablish a normal urinary elimination pattern will improve  Outcome: PROGRESSING SLOWER THAN EXPECTED  Discussed with MD Haywood thoughts on discontinuing coburn catheter. Urine is still blood tinged with red flecks but no large clots. Orders to keep coburn in at this time. Urology will be re consulted for thoughts regarding coburn catheter for remainder of pts stay

## 2019-05-04 NOTE — PROGRESS NOTES
Received Bedside report. Assumed care at 1150. This pt is AOx4, ambulatory with SBA, voiding via coburn catheter, denies pain. Patient and RN discussed plan of care: questions answered. Labs noted, assessment complete, patient tolerating diabetic diet. Tele box in place. Pt is on 3.5L of O2 via NC. Call light in place, fall precautions in place, patient educated on importance of calling for assistance. No additional needs at this time. VSS

## 2019-05-04 NOTE — PROGRESS NOTES
Critical Care Progress Note    Date of admission  4/29/2019    Chief Complaint  AF RVR, NSTEMI, trop 12; heparin gtt; CKD, resp distress; RRT and placed on BiPAP in [facility]    Hospital Course    68 y.o. male who presented 4/29/2019 with worsening dyspnea.  He has a history of COPD with home chronic oxygen therapy.  He presented with 3 days of cough, dyspnea but denied fevers or chills.  He was found to be in atrial fibrillation with rapid ventricular response.  In the ED he received IV of diltiazem and converted to sinus rhythm.  He was found to have a non-ST elevation myocardial function and has been continued on heparin infusion.  Troponin I enzyme was elevated at 12 and cardiology is following the patient.  He is found to have leukocytosis, acute kidney injury and was admitted to the telemetry floor.  He was placed on empiric antimicrobial therapy and corticosteroid.  Today he was urgently transferred to the intensive care unit for worsening respiratory distress.  He was placed on BiPAP however continued to have increasing respiratory distress.  I semi-urgently intubated the patient for bronchoscopy.  There was copious amount of thick purulent secretions in the airways which were therapeutically lavaged and a BAL was sent for culture.         Interval Problem Update  Reviewed last 24 hour events:   A/O x 4; no further [place]    Afebrile   Loose stool; samson PO   I/O =    Lauren; pink   Mobilizing   3 lpn n/c   Symbicort/spiriva   C3/doxy done today   Change to PO pred   OK to tele   Cath at some point   ? DAPT with hematuria    Yesterday:   Liberated from vent 5/2 after <24 hours intubation   Agitated this am; disoriented; CT head neg; now a/o x 4   No h/o EtOH   SR/SB   Hypertensive   Afebrile   samson PO diet   3 lpm n/c   pepcid - d/c   C3/doxy; day 5 - complete today   IV solumedrol; ? De-esc to PO pred    Review of Systems  Review of Systems   Unable to perform ROS: Acuity of condition        Vital Signs for last 24  hours   Temp:  [36.7 °C (98 °F)-36.8 °C (98.3 °F)] 36.8 °C (98.2 °F)  Pulse:  [48-87] 64  Resp:  [18-35] 32  BP: (154)/(58) 154/58  SpO2:  [92 %-99 %] 98 %    Hemodynamic parameters for last 24 hours       Respiratory Information for the last 24 hours       Physical Exam   Physical Exam   Constitutional: He appears well-developed.   HENT:   Head: Normocephalic.   Eyes: Pupils are equal, round, and reactive to light. No scleral icterus.   Neck: Neck supple. No thyromegaly present.   Cardiovascular: Normal rate.    Pulmonary/Chest:   Diminished, scattered coarse breath sounds with few expiratory wheeze   Abdominal: Soft. He exhibits no distension. There is no tenderness.   Genitourinary:   Genitourinary Comments: Lauren in place, hematuria improving   Musculoskeletal: He exhibits no edema or tenderness.   Neurological: He is alert. No cranial nerve deficit.   Follows commands appropriately   Skin: Skin is warm and dry.       Medications  Current Facility-Administered Medications   Medication Dose Route Frequency Provider Last Rate Last Dose   • losartan (COZAAR) tablet 25 mg  25 mg Oral Q DAY Jamison Cortes M.D.   25 mg at 05/04/19 0540   • acetaminophen (TYLENOL) tablet 650 mg  650 mg Oral Q6HRS PRN Jovanni Zhou M.D.       • amLODIPine (NORVASC) tablet 10 mg  10 mg Oral QDAY Jovanni Zhou M.D.   10 mg at 05/04/19 0741   • aspirin (ASA) chewable tab 81 mg  81 mg Oral DAILY Jovanni Zhou M.D.   81 mg at 05/04/19 0540   • atorvastatin (LIPITOR) tablet 40 mg  40 mg Oral Q EVENING Jovanni Zhou M.D.   40 mg at 05/03/19 1740   • labetalol (NORMODYNE) tablet 100 mg  100 mg Oral Q6HRS PRN Jovanni Zhou M.D.   100 mg at 05/03/19 1419   • magnesium hydroxide (MILK OF MAGNESIA) suspension 30 mL  30 mL Oral QDAY PRN Jovanni Zhou M.D.        And   • senna-docusate (PERICOLACE or SENOKOT S) 8.6-50 MG per tablet 2 Tab  2 Tab Oral BID Jovanni Zhou M.D.   Stopped at 05/03/19 0900    And   • polyethylene  glycol/lytes (MIRALAX) PACKET 1 Packet  1 Packet Oral QDAY PRN Jovanni Zhou M.D.        And   • bisacodyl (DULCOLAX) suppository 10 mg  10 mg Rectal QDAY PRN Jovanni Zhou M.D.       • metoprolol (LOPRESSOR) tablet 25 mg  25 mg Oral TWICE DAILY Jovanni Zhou M.D.   25 mg at 05/04/19 0540   • zolpidem (AMBIEN) tablet 10 mg  10 mg Oral HS PRN Jovanni Zhou M.D.   10 mg at 05/03/19 2246   • tiotropium (SPIRIVA) 18 MCG inhalation capsule 1 Cap  1 Cap Inhalation DAILY Jovanni Zhou M.D.   1 Cap at 05/04/19 0539   • albuterol inhaler 2 Puff  2 Puff Inhalation Q4HRS PRN Jovanni Zhou M.D.       • budesonide-formoterol (SYMBICORT) 160-4.5 MCG/ACT inhaler 2 Puff  2 Puff Inhalation BID Jovanni Zhou M.D.   2 Puff at 05/04/19 0539   • insulin regular (HUMULIN R) injection 2-9 Units  2-9 Units Subcutaneous 4X/DAY ACHS Jovanni Zhou M.D.   Stopped at 05/03/19 2100    And   • glucose 4 g chewable tablet 16 g  16 g Oral Q15 MIN PRN Jovanni Zhou M.D.        And   • DEXTROSE 10% BOLUS 250 mL  250 mL Intravenous Q15 MIN PRN Jovanni Zhou M.D.       • hydrALAZINE (APRESOLINE) injection 10 mg  10 mg Intravenous Q4HRS PRN Trent Faith D.O.   10 mg at 05/04/19 0314   • methylPREDNISolone (SOLU-MEDROL) 40 MG injection 40 mg  40 mg Intravenous Q8HRS Jovanni Zhou M.D.   40 mg at 05/04/19 0541   • Respiratory Care per Protocol   Nebulization Continuous RT Jovanni Zhou M.D.       • MD Alert...ICU Electrolyte Replacement per Pharmacy   Other PHARMACY TO DOSE Jovanni Zhou M.D.       • heparin injection 5,000 Units  5,000 Units Subcutaneous Q8HRS Jovanni Zhou M.D.   5,000 Units at 05/04/19 0540   • ipratropium-albuterol (DUONEB) nebulizer solution  3 mL Nebulization Q2HRS PRN (RT) Jovanni Zhou M.D.       • lactated ringer BOLUS infusion  500 mL Intravenous Once PRN Jovanni Zhou M.D.       • DILTIAZem (CARDIZEM) injection 10 mg  10 mg Intravenous Q5 MIN PRN Gabo Greenberg M.D.   10 mg at 04/29/19  1411       Fluids    Intake/Output Summary (Last 24 hours) at 05/04/19 0902  Last data filed at 05/04/19 0600   Gross per 24 hour   Intake             1510 ml   Output             2200 ml   Net             -690 ml       Laboratory  Recent Labs      05/01/19   1316  05/02/19   0402  05/03/19   0415   ISTATAPH  7.312*  7.394*  7.366*   ISTATAPCO2  50.0*  43.1*  50.0*   ISTATAPO2  190*  196*  68   ISTATATCO2  27  28  30   JVUMVUY6AZG  100*  100*  92*   ISTATARTHCO3  25.3*  26.3*  28.7*   ISTATARTBE  -1  1  2   ISTATTEMP  37.8 C  36.4 C  36.8 C   ISTATFIO2  .60  60  28   ISTATSPEC  Arterial  Arterial  Arterial   ISTATAPHTC  7.301*  7.403  7.369*   FAWFQEDN0WO  194*  193*  67     Recent Labs      05/03/19   1645   TROPONINI  1.03*     Recent Labs      05/02/19 0333 05/03/19 0430 05/04/19 0312   SODIUM  142  142  144   POTASSIUM  4.3  4.4  4.5   CHLORIDE  105  109  106   CO2  28  27  28   BUN  47*  40*  42*   CREATININE  1.51*  1.21  1.23   MAGNESIUM  2.3  2.3  2.2   PHOSPHORUS  2.1*  3.0  4.0   CALCIUM  8.9  8.7  8.7     Recent Labs      05/02/19   0333  05/03/19   0430  05/04/19   0312   ALTSGPT  38   --    --    ASTSGOT  30   --    --    ALKPHOSPHAT  75   --    --    TBILIRUBIN  0.3   --    --    PREALBUMIN  12.0*  16.0*   --    GLUCOSE  205*  139*  137*     Recent Labs      05/02/19   0333  05/03/19 0430 05/04/19 0312   WBC  16.9*  22.8*  20.7*   NEUTSPOLYS  94.80*  89.30*  89.40*   LYMPHOCYTES  2.60*  3.90*  4.20*   MONOCYTES  1.70  2.90  2.50   EOSINOPHILS  0.00  0.00  0.00   BASOPHILS  0.00  0.40  0.20   ASTSGOT  30   --    --    ALTSGPT  38   --    --    ALKPHOSPHAT  75   --    --    TBILIRUBIN  0.3   --    --      Recent Labs      05/02/19   0333  05/03/19   0430  05/04/19   0312   RBC  4.18*  4.34*  4.56*   HEMOGLOBIN  12.8*  13.4*  13.5*   HEMATOCRIT  40.5*  41.6*  43.6   PLATELETCT  261  295  304       Imaging  X-Ray:  I have personally reviewed the images and compared with prior  images.    Assessment/Plan  * Chronic obstructive pulmonary disease with acute exacerbation (HCC)- (present on admission)   Assessment & Plan    FEV1 0.79 October 2012. Improved to 1.1 with bronchodilator. Quit smoking February 2012  IV corticosteroids, empiric antimicrobial therapy, bronchial dilators, follow  Acutely exacerbated with underlying pneumonia  De-escalate to oral prednisone for short course then discontinue  Bronchodilators, RT protocols, follow     NSTEMI (non-ST elevated myocardial infarction) ([facility])- (present on admission)   Assessment & Plan    Will require cardiac catheterization at some point when clinically stable  Cardiology following     Acute on chronic respiratory failure with hypoxia ([facility])- (present on admission)   Assessment & Plan    Failed BiPAP and urgently intubated 5/1  Liberated 5/2  RT protocols  De-escalate steroids     Gross hematuria- (present on admission)   Assessment & Plan    History of retained ureteral stent, consider discontinue anticoagulation, reviewed with cardiology  Aluren catheter in place, urology consulted     Atrial fibrillation ([facility])- (present on admission)   Assessment & Plan    Rate control, anticoagulation, cardiology following  Remains in SR     Acute renal insufficiency- (present on admission)   Assessment & Plan    Avoid nephrotoxic agents, monitor electrolytes closely     Essential hypertension- (present on admission)   Assessment & Plan    Glycemic control          VTE:  Heparin  Ulcer: H2 Antagonist  Lines: None    I have performed a physical exam and reviewed and updated ROS and Plan today (5/4/2019). In review of yesterday's note (5/3/2019), there are no changes except as documented above.   Complete antibiotics, short course oral prednisone, continue RT protocols.  Pulmonary will follow  Discussed patient condition and risk of morbidity and/or mortality with Hospitalist, RN, RT, Pharmacy and QA team

## 2019-05-04 NOTE — CARE PLAN
Problem: Communication  Goal: The ability to communicate needs accurately and effectively will improve  Outcome: PROGRESSING AS EXPECTED    Intervention: [place] patient and significant other/support system to call light to alert staff of needs   05/04/19 0117   OTHER   Oriented to: All of the Following : Location of Bathroom, Visiting Policy, Unit Routine, Call Light and Bedside Controls, Bedside Rail Policy, Smoking Policy, Rights and Responsibilities, Bedside Report, and Patient Education Notebook     Intervention: Reorient patient to environment as needed   05/04/19 0117   OTHER   Oriented to: All of the Following : Location of Bathroom, Visiting Policy, Unit Routine, Call Light and Bedside Controls, Bedside Rail Policy, Smoking Policy, Rights and Responsibilities, Bedside Report, and Patient Education Notebook     Intervention: Educate patient and significant other/support system about the plan of care, procedures, treatments, medications and allow for questions   05/04/19 0117   OTHER   Pt & Family Have Been Educated on Methods Available to Report Concerns Related to Care, Treatment, Services, and Patient Safety Issues Yes     Intervention: Use communication aids and/or /Language Line as appropriate   05/04/19 0117   Special Assistance Needs   Patient's Primary Language English         Problem: Safety  Goal: Will remain free from falls  Outcome: PROGRESSING AS EXPECTED  Pt remains free from falls, appropriate precautions and education provided. Pt understands importance of calling for assistance when mobilizing.   Intervention: Assess risk factors for falls   05/04/19 0117   OTHER   Risk for Injury-Any positive answers results in the pt being at high risk for fall related injury Not Applicable   Mobility Status Assessment 1-1 Healthcare Provider Required for Assistance with Ambulation & Transfer   History of fall 0   Pt Calls for Assistance Yes     Intervention: Implement fall precautions   05/04/19  0117   OTHER   Environmental Precautions Treaded Slipper Socks on Patient;Personal Belongings, Wastebasket, Call Bell etc. in Easy Reach;Communication Sign for Patients & Families;Bed in Low Position;Mobility Assessed & Appropriate Sign Placed;Report Given to Other Health Care Providers Regarding Fall Risk

## 2019-05-04 NOTE — WOUND TEAM
Wound consult received for right buttock. Patient seen in T708-2. Pt sitting in chair, assisted patient in standing position. Right buttock assessed, appears to be scar tissue. Patient reports falling several weeks ago. No advanced wound care needs at this time.

## 2019-05-04 NOTE — PROGRESS NOTES
Patient was very down and tearful throughout the day saying that he missed his puppy back home.  1650 MD Haywood gave permission to visit the [facility] to see his puppy with RN supervision on a transport monitor  1658 patient transported to [facility]   1711 patient returned to unit with RN and CNA. VSS baring some hypertension in 160-170's. BP on return 150's

## 2019-05-04 NOTE — PROGRESS NOTES
Reason for consultation /admission : NSTEMI    Looks less dyspnic. Able to lie flat  Denies CP  Urine less bloody, almost clear  Would likes to undergo cardiac cath as earliest as possible    Review of systems;    General: No fever, chills, no recent weight change, no weakness or fatigue  HENT: No recent head trauma, no earache or discharge, no hearing loss or ringing in the ears, no toothache or sore throat, no neck pain  Eyes: No redness, no blurred vision or double vision  Heart: No palpitation, no PND or orthopnea, no claudication, no leg swelling  Lung: No productive cough, no hemoptysis  Abdomen: No abdominal pain, no nausea vomiting diarrhea or constipation, no blood in stool  : No dysuria, no frequency or hesitancy, no hematuria  Musculoskeletal: No myalgia, no back pain, some joint pain  Hematology: No easy bruising  Skin: No rash or itching  Neurological: No headache, no new focal weakness or numbness, no new memory loss  Psychological: Denies depression, anxiety or insomnia  All other review of systems are negative      Temp:  [36.7 °C (98 °F)-36.8 °C (98.3 °F)] 36.8 °C (98.2 °F)  Pulse:  [48-87] 64  Resp:  [18-35] 32  BP: (154)/(58) 154/58  SpO2:  [92 %-99 %] 98 %  GENERAL not in acute distress, not dyspnic at rest  Head atraumatic, normocephalic  Eyes EOMI  ENT neck supple, no JVD, no carotid bruits or thyromegaly  Lung good expansion, distant sound, no rales or wheezing  Heart RRR, normal rate, no murmur, gallop or rub  Abd soft, no tenderness, mass or bruits  Ext no edema  Skin no ecchymosis or petechiae  Musculoskeletal no deformity  Neuro grossly intact  Psych normal mood, normal affect    Monitor reviewed by me showed no significant ventricular or atrial dysrhythmias.    Labs: Cr 1.2, K+ 4.5  Hb 13      Assessment and plans    1. NSTEMI  Stable hemodynamically and no sig arrhythmias  On ASA only due to hematuria  Occ bradycardia  On statin  Will arrrange for cath tomorrrow   Risks and benefits of  the procedure were discussed at length.   The patient and available family member understood, accepted the risks and wishes to proceed.     2. HTN  BP somewhat high on 10 mg/d amlodipine and 25 mg/d losartan  HR occ slow preventing use metoprolol  Will increase losartan to 50 mg/d    3. Hematuria  Hb stable    4. Respiratory failure  improved

## 2019-05-04 NOTE — PROGRESS NOTES
Bedside report given to receiving RN. Patient transported with all belongings, chart and medications.

## 2019-05-04 NOTE — PROGRESS NOTES
12 hour chart check  SB-SR-ST 's (mainly in 60-80's)  0.16 / 0.10 / 0.36  -180's PRN labetalol and hydralazine given for hypertension, losartan added to MAR

## 2019-05-04 NOTE — PROGRESS NOTES
· 2 RN skin check complete with ABRAHAM Sultana.  · Devices in place: none.  · Skin assessed under devices: N/A.  · Confirmed pressure ulcers found on: N/A.  · The following interventions in place: skin assessed appropriately, patient turns self in bed, patient ambulatory    Bilateral ears are pink and blanching.  Bilateral elbows are dry and flakey.  Scabbing to patient's right upper chest.  Sacrum is red and blanching.  Right buttock has raised scar tissue with 3 red spots around. Patient states to have had previous fall.  Bilateral heels are pink and blanching.

## 2019-05-05 ENCOUNTER — APPOINTMENT (OUTPATIENT)
Dept: CARDIOLOGY | Facility: MEDICAL CENTER | Age: 69
DRG: 981 | End: 2019-05-05
Attending: INTERNAL MEDICINE
Payer: COMMERCIAL

## 2019-05-05 LAB
ANION GAP SERPL CALC-SCNC: 5 MMOL/L (ref 0–11.9)
ANION GAP SERPL CALC-SCNC: 6 MMOL/L (ref 0–11.9)
BASOPHILS # BLD AUTO: 0.2 % (ref 0–1.8)
BASOPHILS # BLD AUTO: 0.3 % (ref 0–1.8)
BASOPHILS # BLD: 0.03 K/UL (ref 0–0.12)
BASOPHILS # BLD: 0.06 K/UL (ref 0–0.12)
BUN SERPL-MCNC: 39 MG/DL (ref 8–22)
BUN SERPL-MCNC: 39 MG/DL (ref 8–22)
CALCIUM SERPL-MCNC: 8.7 MG/DL (ref 8.5–10.5)
CALCIUM SERPL-MCNC: 8.8 MG/DL (ref 8.5–10.5)
CHLORIDE SERPL-SCNC: 105 MMOL/L (ref 96–112)
CHLORIDE SERPL-SCNC: 107 MMOL/L (ref 96–112)
CO2 SERPL-SCNC: 31 MMOL/L (ref 20–33)
CO2 SERPL-SCNC: 31 MMOL/L (ref 20–33)
CREAT SERPL-MCNC: 1.27 MG/DL (ref 0.5–1.4)
CREAT SERPL-MCNC: 1.29 MG/DL (ref 0.5–1.4)
EKG IMPRESSION: NORMAL
EOSINOPHIL # BLD AUTO: 0.01 K/UL (ref 0–0.51)
EOSINOPHIL # BLD AUTO: 0.01 K/UL (ref 0–0.51)
EOSINOPHIL NFR BLD: 0 % (ref 0–6.9)
EOSINOPHIL NFR BLD: 0.1 % (ref 0–6.9)
ERYTHROCYTE [DISTWIDTH] IN BLOOD BY AUTOMATED COUNT: 46.3 FL (ref 35.9–50)
ERYTHROCYTE [DISTWIDTH] IN BLOOD BY AUTOMATED COUNT: 46.8 FL (ref 35.9–50)
GLUCOSE BLD-MCNC: 130 MG/DL (ref 65–99)
GLUCOSE BLD-MCNC: 149 MG/DL (ref 65–99)
GLUCOSE BLD-MCNC: 84 MG/DL (ref 65–99)
GLUCOSE SERPL-MCNC: 98 MG/DL (ref 65–99)
GLUCOSE SERPL-MCNC: 99 MG/DL (ref 65–99)
HCT VFR BLD AUTO: 41.6 % (ref 42–52)
HCT VFR BLD AUTO: 43 % (ref 42–52)
HGB BLD-MCNC: 13.4 G/DL (ref 14–18)
HGB BLD-MCNC: 14 G/DL (ref 14–18)
IMM GRANULOCYTES # BLD AUTO: 0.52 K/UL (ref 0–0.11)
IMM GRANULOCYTES # BLD AUTO: 0.54 K/UL (ref 0–0.11)
IMM GRANULOCYTES NFR BLD AUTO: 2.6 % (ref 0–0.9)
IMM GRANULOCYTES NFR BLD AUTO: 2.9 % (ref 0–0.9)
LYMPHOCYTES # BLD AUTO: 1.46 K/UL (ref 1–4.8)
LYMPHOCYTES # BLD AUTO: 1.71 K/UL (ref 1–4.8)
LYMPHOCYTES NFR BLD: 7.7 % (ref 22–41)
LYMPHOCYTES NFR BLD: 8.5 % (ref 22–41)
MCH RBC QN AUTO: 30.3 PG (ref 27–33)
MCH RBC QN AUTO: 30.6 PG (ref 27–33)
MCHC RBC AUTO-ENTMCNC: 32.2 G/DL (ref 33.7–35.3)
MCHC RBC AUTO-ENTMCNC: 32.6 G/DL (ref 33.7–35.3)
MCV RBC AUTO: 94.1 FL (ref 81.4–97.8)
MCV RBC AUTO: 94.1 FL (ref 81.4–97.8)
MONOCYTES # BLD AUTO: 1.14 K/UL (ref 0–0.85)
MONOCYTES # BLD AUTO: 1.22 K/UL (ref 0–0.85)
MONOCYTES NFR BLD AUTO: 6 % (ref 0–13.4)
MONOCYTES NFR BLD AUTO: 6.1 % (ref 0–13.4)
NEUTROPHILS # BLD AUTO: 15.73 K/UL (ref 1.82–7.42)
NEUTROPHILS # BLD AUTO: 16.5 K/UL (ref 1.82–7.42)
NEUTROPHILS NFR BLD: 82.5 % (ref 44–72)
NEUTROPHILS NFR BLD: 83.1 % (ref 44–72)
NRBC # BLD AUTO: 0 K/UL
NRBC # BLD AUTO: 0 K/UL
NRBC BLD-RTO: 0 /100 WBC
NRBC BLD-RTO: 0 /100 WBC
PLATELET # BLD AUTO: 284 K/UL (ref 164–446)
PLATELET # BLD AUTO: 315 K/UL (ref 164–446)
PMV BLD AUTO: 9.2 FL (ref 9–12.9)
PMV BLD AUTO: 9.4 FL (ref 9–12.9)
POTASSIUM SERPL-SCNC: 3.9 MMOL/L (ref 3.6–5.5)
POTASSIUM SERPL-SCNC: 4 MMOL/L (ref 3.6–5.5)
RBC # BLD AUTO: 4.42 M/UL (ref 4.7–6.1)
RBC # BLD AUTO: 4.57 M/UL (ref 4.7–6.1)
SODIUM SERPL-SCNC: 142 MMOL/L (ref 135–145)
SODIUM SERPL-SCNC: 143 MMOL/L (ref 135–145)
WBC # BLD AUTO: 18.9 K/UL (ref 4.8–10.8)
WBC # BLD AUTO: 20 K/UL (ref 4.8–10.8)

## 2019-05-05 PROCEDURE — 700102 HCHG RX REV CODE 250 W/ 637 OVERRIDE(OP)

## 2019-05-05 PROCEDURE — B2111ZZ FLUOROSCOPY OF MULTIPLE CORONARY ARTERIES USING LOW OSMOLAR CONTRAST: ICD-10-PCS | Performed by: INTERNAL MEDICINE

## 2019-05-05 PROCEDURE — 85025 COMPLETE CBC W/AUTO DIFF WBC: CPT

## 2019-05-05 PROCEDURE — 92928 PRQ TCAT PLMT NTRAC ST 1 LES: CPT | Mod: RC | Performed by: INTERNAL MEDICINE

## 2019-05-05 PROCEDURE — 700101 HCHG RX REV CODE 250

## 2019-05-05 PROCEDURE — A9270 NON-COVERED ITEM OR SERVICE: HCPCS | Performed by: INTERNAL MEDICINE

## 2019-05-05 PROCEDURE — A9270 NON-COVERED ITEM OR SERVICE: HCPCS

## 2019-05-05 PROCEDURE — 99152 MOD SED SAME PHYS/QHP 5/>YRS: CPT | Performed by: INTERNAL MEDICINE

## 2019-05-05 PROCEDURE — 700105 HCHG RX REV CODE 258: Performed by: INTERNAL MEDICINE

## 2019-05-05 PROCEDURE — 700111 HCHG RX REV CODE 636 W/ 250 OVERRIDE (IP): Performed by: INTERNAL MEDICINE

## 2019-05-05 PROCEDURE — 700102 HCHG RX REV CODE 250 W/ 637 OVERRIDE(OP): Performed by: INTERNAL MEDICINE

## 2019-05-05 PROCEDURE — 770020 HCHG ROOM/CARE - TELE (206)

## 2019-05-05 PROCEDURE — 02703ZZ DILATION OF CORONARY ARTERY, ONE ARTERY, PERCUTANEOUS APPROACH: ICD-10-PCS | Performed by: INTERNAL MEDICINE

## 2019-05-05 PROCEDURE — 4A023N7 MEASUREMENT OF CARDIAC SAMPLING AND PRESSURE, LEFT HEART, PERCUTANEOUS APPROACH: ICD-10-PCS | Performed by: INTERNAL MEDICINE

## 2019-05-05 PROCEDURE — 93458 L HRT ARTERY/VENTRICLE ANGIO: CPT | Mod: 26,59 | Performed by: INTERNAL MEDICINE

## 2019-05-05 PROCEDURE — 36415 COLL VENOUS BLD VENIPUNCTURE: CPT

## 2019-05-05 PROCEDURE — 82962 GLUCOSE BLOOD TEST: CPT | Mod: 91

## 2019-05-05 PROCEDURE — 700111 HCHG RX REV CODE 636 W/ 250 OVERRIDE (IP)

## 2019-05-05 PROCEDURE — 80048 BASIC METABOLIC PNL TOTAL CA: CPT

## 2019-05-05 PROCEDURE — 99233 SBSQ HOSP IP/OBS HIGH 50: CPT | Performed by: INTERNAL MEDICINE

## 2019-05-05 PROCEDURE — 700117 HCHG RX CONTRAST REV CODE 255: Performed by: INTERNAL MEDICINE

## 2019-05-05 PROCEDURE — 93010 ELECTROCARDIOGRAM REPORT: CPT | Performed by: INTERNAL MEDICINE

## 2019-05-05 PROCEDURE — 93005 ELECTROCARDIOGRAM TRACING: CPT | Performed by: INTERNAL MEDICINE

## 2019-05-05 PROCEDURE — B2151ZZ FLUOROSCOPY OF LEFT HEART USING LOW OSMOLAR CONTRAST: ICD-10-PCS | Performed by: INTERNAL MEDICINE

## 2019-05-05 PROCEDURE — 99153 MOD SED SAME PHYS/QHP EA: CPT

## 2019-05-05 RX ORDER — CLOPIDOGREL 300 MG/1
TABLET, FILM COATED ORAL
Status: COMPLETED
Start: 2019-05-05 | End: 2019-05-05

## 2019-05-05 RX ORDER — LIDOCAINE HYDROCHLORIDE 10 MG/ML
INJECTION, SOLUTION INFILTRATION; PERINEURAL
Status: COMPLETED
Start: 2019-05-05 | End: 2019-05-05

## 2019-05-05 RX ORDER — MIDAZOLAM HYDROCHLORIDE 1 MG/ML
INJECTION INTRAMUSCULAR; INTRAVENOUS
Status: COMPLETED
Start: 2019-05-05 | End: 2019-05-05

## 2019-05-05 RX ORDER — BIVALIRUDIN 250 MG/5ML
INJECTION, POWDER, LYOPHILIZED, FOR SOLUTION INTRAVENOUS
Status: COMPLETED
Start: 2019-05-05 | End: 2019-05-05

## 2019-05-05 RX ORDER — VERAPAMIL HYDROCHLORIDE 2.5 MG/ML
INJECTION, SOLUTION INTRAVENOUS
Status: COMPLETED
Start: 2019-05-05 | End: 2019-05-05

## 2019-05-05 RX ORDER — HEPARIN SODIUM 5000 [USP'U]/ML
5000 INJECTION, SOLUTION INTRAVENOUS; SUBCUTANEOUS EVERY 8 HOURS
Status: DISCONTINUED | OUTPATIENT
Start: 2019-05-05 | End: 2019-05-06 | Stop reason: HOSPADM

## 2019-05-05 RX ORDER — CLOPIDOGREL BISULFATE 75 MG/1
75 TABLET ORAL DAILY
Status: DISCONTINUED | OUTPATIENT
Start: 2019-05-06 | End: 2019-05-06 | Stop reason: HOSPADM

## 2019-05-05 RX ORDER — SODIUM CHLORIDE 9 MG/ML
INJECTION, SOLUTION INTRAVENOUS CONTINUOUS
Status: DISPENSED | OUTPATIENT
Start: 2019-05-05 | End: 2019-05-05

## 2019-05-05 RX ORDER — CLOPIDOGREL 300 MG/1
600 TABLET, FILM COATED ORAL ONCE
Status: DISCONTINUED | OUTPATIENT
Start: 2019-05-05 | End: 2019-05-05

## 2019-05-05 RX ORDER — HEPARIN SODIUM,PORCINE 1000/ML
VIAL (ML) INJECTION
Status: COMPLETED
Start: 2019-05-05 | End: 2019-05-05

## 2019-05-05 RX ADMIN — BUDESONIDE AND FORMOTEROL FUMARATE DIHYDRATE 2 PUFF: 160; 4.5 AEROSOL RESPIRATORY (INHALATION) at 05:01

## 2019-05-05 RX ADMIN — TIOTROPIUM BROMIDE 1 CAPSULE: 18 CAPSULE ORAL; RESPIRATORY (INHALATION) at 05:01

## 2019-05-05 RX ADMIN — HEPARIN SODIUM: 1000 INJECTION, SOLUTION INTRAVENOUS; SUBCUTANEOUS at 09:45

## 2019-05-05 RX ADMIN — METOPROLOL TARTRATE 25 MG: 25 TABLET ORAL at 17:25

## 2019-05-05 RX ADMIN — FENTANYL CITRATE 100 MCG: 50 INJECTION, SOLUTION INTRAMUSCULAR; INTRAVENOUS at 10:10

## 2019-05-05 RX ADMIN — VERAPAMIL HYDROCHLORIDE 5 MG: 2.5 INJECTION, SOLUTION INTRAVENOUS at 09:45

## 2019-05-05 RX ADMIN — IOHEXOL 120 ML: 350 INJECTION, SOLUTION INTRAVENOUS at 10:15

## 2019-05-05 RX ADMIN — PREDNISONE 30 MG: 20 TABLET ORAL at 05:01

## 2019-05-05 RX ADMIN — ATORVASTATIN CALCIUM 40 MG: 40 TABLET, FILM COATED ORAL at 17:24

## 2019-05-05 RX ADMIN — METOPROLOL TARTRATE 25 MG: 25 TABLET ORAL at 05:00

## 2019-05-05 RX ADMIN — MIDAZOLAM HYDROCHLORIDE 2 MG: 1 INJECTION, SOLUTION INTRAMUSCULAR; INTRAVENOUS at 10:11

## 2019-05-05 RX ADMIN — BUDESONIDE AND FORMOTEROL FUMARATE DIHYDRATE 2 PUFF: 160; 4.5 AEROSOL RESPIRATORY (INHALATION) at 17:24

## 2019-05-05 RX ADMIN — LOSARTAN POTASSIUM 50 MG: 50 TABLET ORAL at 05:01

## 2019-05-05 RX ADMIN — SODIUM CHLORIDE: 9 INJECTION, SOLUTION INTRAVENOUS at 05:01

## 2019-05-05 RX ADMIN — HEPARIN SODIUM 5000 UNITS: 5000 INJECTION, SOLUTION INTRAVENOUS; SUBCUTANEOUS at 20:13

## 2019-05-05 RX ADMIN — NITROGLYCERIN 10 ML: 20 INJECTION INTRAVENOUS at 09:52

## 2019-05-05 RX ADMIN — HEPARIN SODIUM: 200 INJECTION, SOLUTION INTRAVENOUS at 09:15

## 2019-05-05 RX ADMIN — AMLODIPINE BESYLATE 10 MG: 10 TABLET ORAL at 07:30

## 2019-05-05 RX ADMIN — ASPIRIN 81 MG 81 MG: 81 TABLET ORAL at 05:00

## 2019-05-05 RX ADMIN — LIDOCAINE HYDROCHLORIDE: 10 INJECTION, SOLUTION INFILTRATION; PERINEURAL at 09:45

## 2019-05-05 RX ADMIN — CLOPIDOGREL BISULFATE 600 MG: 300 TABLET, FILM COATED ORAL at 10:30

## 2019-05-05 RX ADMIN — ZOLPIDEM TARTRATE 10 MG: 5 TABLET ORAL at 22:35

## 2019-05-05 RX ADMIN — BIVALIRUDIN 250 MG: 250 INJECTION, POWDER, LYOPHILIZED, FOR SOLUTION INTRAVENOUS at 10:10

## 2019-05-05 ASSESSMENT — ENCOUNTER SYMPTOMS
FALLS: 0
BACK PAIN: 0
DIARRHEA: 0
SHORTNESS OF BREATH: 0
BLURRED VISION: 0
DIZZINESS: 0
SPUTUM PRODUCTION: 0
PALPITATIONS: 0
EYE PAIN: 0
HEARTBURN: 0
VOMITING: 0
COUGH: 0
ORTHOPNEA: 0
HEADACHES: 0
NECK PAIN: 0
NAUSEA: 0
FEVER: 0
ABDOMINAL PAIN: 0
PND: 0
SPEECH CHANGE: 0
SEIZURES: 0
CONSTIPATION: 0
HEMOPTYSIS: 0
WHEEZING: 0
WEIGHT LOSS: 0
WEAKNESS: 0
TREMORS: 0
CHILLS: 0
DEPRESSION: 0

## 2019-05-05 ASSESSMENT — LIFESTYLE VARIABLES: SUBSTANCE_ABUSE: 0

## 2019-05-05 NOTE — RESPIRATORY CARE
COPD EDUCATION by COPD CLINICAL EDUCATOR  5/4/2019  at  5:07 PM by Adelia Ashley     Patient interviewed by COPD education team.  Patient unable to participate in full program.  Short intervention has been conducted. We reviewed our comprehensive packet that includes information about COPD and treatments to control his symptoms. He has been smoke-free since 2012. Congratulations given. He is currently on Advair HFA, Spiriva and ventolin MDI for rescue. He has Oxygen 24/7 at 2.5 lpm.  We reviewed use of his MDI's. Instruction on proper use completed. MDI with a spacer use was completed with return performance.

## 2019-05-05 NOTE — PROGRESS NOTES
Patient refused bed alarm for safety, educated regarding importance.  Education reinforced by ABRAHAM Bello.  Patient continues to refuse. Will continue to monitor.

## 2019-05-05 NOTE — PROGRESS NOTES
Patient was back from cath lab via bed. He is awake , alert and orient x 4. Right radial cath site is clean and dry. Informed of safety and call system. In a sinus rhythm.

## 2019-05-05 NOTE — PROGRESS NOTES
Assumed care of PT from Zara. A&O 4. Pt is in chair eating dinner. On 2L NC. Tele monitor in place, cardiac rhythm being monitored. Call light within reach, bed in lowest position, upper bed rails up. Pt was updated on plan of care for the day. Will continue to monitor.

## 2019-05-05 NOTE — PROGRESS NOTES
Hospital Medicine Daily Progress Note    Date of Service  5/5/2019    Chief Complaint  68 y.o. male admitted 4/29/2019 with shortness of breath    Hospital Course    Mr Sims has a history of COPD and nephrolysis.  He has a retained ureteral stent from prior treatment for his stones.  Patient initially visited urgent care on 4/29/2019 for shortness of breath, at urgent care he was found to be in atrial fibrillation with RVR, he was brought to the emergency room by EMS for evaluation.   He received IV diltazem and converted to sinus rhythm, he was also noted to have an elevated troponin, and was admitted to telemetry.   On 5/1/19 he was transferred to the ICU for respiratory distress and required intubation. Bronchoscopy was significant for inflamed airways and copious secretions.  He has been treated for COPD exacerbation and pneumonia, was extubated on 5/2/19.     Interval Problem Update  5/5.  Patient has been stable overnight.  Stable on the floor.  Status post coronary angiogram today.  Patient was noticed to have normal ejection fraction from angiogram.  Patient does have coronary artery disease and recommend antiplatelet therapy by cardiologist. Patient otherwise denies fever, chills, nausea, vomiting, adb pain, SOB, CP, headache, constipation, diarrhea, cough, or sputum.      Consultants/Specialty  Critical Care, I discussed the patient's condition with Dr. Zhou today on ICU Rounds  Cardiology  Urology    Code Status  Full Code    Disposition  TBD, pending PT OT    Review of Systems  Review of Systems   Constitutional: Negative for chills, fever, malaise/fatigue and weight loss.   HENT: Negative for congestion, ear discharge, ear pain, hearing loss and nosebleeds.    Eyes: Negative for blurred vision and pain.   Respiratory: Negative for cough, hemoptysis, sputum production, shortness of breath and wheezing.    Cardiovascular: Negative for chest pain, palpitations, orthopnea, leg swelling and PND.    Gastrointestinal: Negative for abdominal pain, constipation, diarrhea, heartburn, nausea and vomiting.   Genitourinary: Positive for hematuria. Negative for dysuria and frequency.   Musculoskeletal: Negative for back pain, falls, joint pain and neck pain.   Skin: Negative for itching and rash.   Neurological: Negative for dizziness, tremors, speech change, seizures, weakness and headaches.   Psychiatric/Behavioral: Negative for depression, substance abuse and suicidal ideas.   All other systems reviewed and are negative.       Physical Exam  Temp:  [36.1 °C (97 °F)-36.8 °C (98.2 °F)] 36.4 °C (97.5 °F)  Pulse:  [46-72] 46  Resp:  [16-20] 17  BP: (148-182)/(58-80) 155/69  SpO2:  [92 %-98 %] 97 %    Physical Exam   Constitutional: He is oriented to person, place, and time. He appears well-developed and well-nourished.   HENT:   Head: Normocephalic and atraumatic.   Eyes: Pupils are equal, round, and reactive to light. Conjunctivae and EOM are normal. Right eye exhibits no discharge. Left eye exhibits no discharge.   Neck: Normal range of motion. No JVD present.   Cardiovascular: Normal rate, regular rhythm, normal heart sounds and intact distal pulses.  Exam reveals no gallop and no friction rub.    No murmur heard.  2+ Radial Pulses  Brisk Capillary Refill   Pulmonary/Chest: Effort normal. No respiratory distress. He has wheezes. He has no rales.   Prolonged expiratory phase with end expiratory wheeze   Abdominal: Soft. Bowel sounds are normal. He exhibits no distension. There is no tenderness. There is no rebound and no guarding.   Musculoskeletal: Normal range of motion. He exhibits no edema.   Neurological: He is alert and oriented to person, place, and time. No cranial nerve deficit.   Skin: Skin is warm and dry. He is not diaphoretic. No erythema.   Skin is warm and well perfused   Psychiatric: He has a normal mood and affect. His behavior is normal.   Nursing note and vitals  reviewed.      Fluids    Intake/Output Summary (Last 24 hours) at 05/05/19 1348  Last data filed at 05/05/19 1100   Gross per 24 hour   Intake              240 ml   Output             2250 ml   Net            -2010 ml       Laboratory  Recent Labs      05/04/19 0312 05/05/19 0447 05/05/19   0510   WBC  20.7*  18.9*  20.0*   RBC  4.56*  4.42*  4.57*   HEMOGLOBIN  13.5*  13.4*  14.0   HEMATOCRIT  43.6  41.6*  43.0   MCV  95.6  94.1  94.1   MCH  29.6  30.3  30.6   MCHC  31.0*  32.2*  32.6*   RDW  48.8  46.8  46.3   PLATELETCT  304  284  315   MPV  9.4  9.4  9.2     Recent Labs      05/04/19 0312 05/05/19 0447 05/05/19   0510   SODIUM  144  142  143   POTASSIUM  4.5  3.9  4.0   CHLORIDE  106  105  107   CO2  28  31  31   GLUCOSE  137*  99  98   BUN  42*  39*  39*   CREATININE  1.23  1.29  1.27   CALCIUM  8.7  8.7  8.8                   Imaging  CT-HEAD W/O   Final Result      Normal CT scan of the head without contrast.               INTERPRETING LOCATION:  [address] [facility] [address], [facility], [address]      DX-ABDOMEN FOR TUBE PLACEMENT   Final Result      1.  Cortrak nasogastric tube position consistent with intragastric placement.   2.  Right-sided double-J ureteral stent in place.      DX-CHEST-LIMITED (1 VIEW)   Final Result      1.  Interval placement of an endotracheal tube which terminates just above the level of the aortic arch in satisfactory position.   2.  Interval placement of an enteric feeding tube terminates in the right paramedian abdomen.   3.  No acute cardiopulmonary disease.      DX-CHEST-PORTABLE (1 VIEW)   Final Result      Mild bilateral basilar atelectasis and/or consolidation. Underlying infection is possible.      US-RENAL   Final Result         1. Bilateral nephrolithiasis.   2. Caliectasis of the right kidney, chronic. Right kidney is atrophic relative to the left.   3. No left hydronephrosis is identified.   4. Distal end of a pigtail ureteral stent in the bladder      EC-ECHOCARDIOGRAM LTD W/O  CONT   Final Result      DX-CHEST-PORTABLE (1 VIEW)   Final Result      No acute cardiac or pulmonary abnormality is noted.      CL-LEFT HEART CATHETERIZATION WITH POSSIBLE INTERVENTION    (Results Pending)        Assessment/Plan    Patient underwent coronary angiogram today.  Tolerated.  Results showing coronary artery disease without PCI.  Cardiologist recommend patient to be on dual antiplatelet therapy.  Normal ejection fraction.  Continue PT OT.  May need urologist as outpatient.    * Chronic obstructive pulmonary disease with acute exacerbation (HCC)- (present on admission)   Assessment & Plan    Severe exacerbation, requiring intubation, he is now been extubated  Transition to prednisone  Inhaled bronchodilators as needed     NSTEMI (non-ST elevated myocardial infarction) ([facility])- (present on admission)   Assessment & Plan    Initial troponin level greater than 12  No current chest pain  Appreciate cardiology consultation  Continuous hemodynamic monitoring  Heparin drip stopped for hematuria  Cardiac catheterization being considered, possibly on Monday  Continue aspirin, atorvastatin, metoprolol, Cozaar        Acute on chronic respiratory failure with hypoxia ([facility])- (present on admission)   Assessment & Plan    Patient has been extubated  Respiratory status much improved, okay to transfer out of the ICU  Taper steroids for COPD exacerbation     Gross hematuria- (present on admission)   Assessment & Plan    Chronic hematuria, likely related to renal calculi  Appreciate urology consultation  When more stable, will need treatment for kidney stones and removal of stent  Remove Lauren catheter early on 5/5/2019 for a voiding trial     Atrial fibrillation ([facility])- (present on admission)   Assessment & Plan    He is rate controlled  Holding off on anticoagulation with hematuria     Retained ureteral stent- (present on admission)   Assessment & Plan    Will need to be removed     Dyslipidemia- (present on admission)    Assessment & Plan    High intensity statin     Acute renal insufficiency- (present on admission)   Assessment & Plan    Improved     Essential hypertension- (present on admission)   Assessment & Plan    Metoprolol and losartan          VTE prophylaxis: SCD's      Current Facility-Administered Medications:   •  NS infusion, , Intravenous, Continuous, Jamison Cortes M.D.  •  bivalirudin (ANGIOMAX) 250 mg/50mL NS infusion, 0.2 mg/kg/hr, Intravenous, Continuous, Jamison Cortes M.D., Stopped at 05/05/19 1245  •  [START ON 5/6/2019] clopidogrel (PLAVIX) tablet 75 mg, 75 mg, Oral, DAILY, Jamison Cortes M.D.  •  heparin injection 5,000 Units, 5,000 Units, Subcutaneous, Q8HRS, Natalya Hinds M.D.  •  predniSONE (DELTASONE) tablet 30 mg, 30 mg, Oral, DAILY, Jovanni Zhou M.D., 30 mg at 05/05/19 0501  •  losartan (COZAAR) tablet 50 mg, 50 mg, Oral, Q DAY, Jamison Cortes M.D., 50 mg at 05/05/19 0501  •  acetaminophen (TYLENOL) tablet 650 mg, 650 mg, Oral, Q6HRS PRN, Jovanni Zhou M.D.  •  amLODIPine (NORVASC) tablet 10 mg, 10 mg, Oral, QDAY, Jovanni Zhou M.D., 10 mg at 05/05/19 0730  •  aspirin (ASA) chewable tab 81 mg, 81 mg, Oral, DAILY, Jovanni Zhou M.D., 81 mg at 05/05/19 0500  •  atorvastatin (LIPITOR) tablet 40 mg, 40 mg, Oral, Q EVENING, Jovanni Zhou M.D., 40 mg at 05/04/19 1801  •  labetalol (NORMODYNE) tablet 100 mg, 100 mg, Oral, Q6HRS PRN, Jovanni Zhou M.D., 100 mg at 05/03/19 1419  •  senna-docusate (PERICOLACE or SENOKOT S) 8.6-50 MG per tablet 2 Tab, 2 Tab, Oral, BID, Stopped at 05/03/19 0900 **AND** polyethylene glycol/lytes (MIRALAX) PACKET 1 Packet, 1 Packet, Oral, QDAY PRN **AND** magnesium hydroxide (MILK OF MAGNESIA) suspension 30 mL, 30 mL, Oral, QDAY PRN **AND** bisacodyl (DULCOLAX) suppository 10 mg, 10 mg, Rectal, QDAY PRN, Jovanni Zhou M.D.  •  metoprolol (LOPRESSOR) tablet 25 mg, 25 mg, Oral, TWICE DAILY, Jovanni Zhou M.D., 25 mg at 05/05/19 0500  •   zolpidem (AMBIEN) tablet 10 mg, 10 mg, Oral, HS PRN, Jovanni Zhou M.D., 10 mg at 05/04/19 2133  •  tiotropium (SPIRIVA) 18 MCG inhalation capsule 1 Cap, 1 Cap, Inhalation, DAILY, Jovanni Zhou M.D., 1 Cap at 05/05/19 0501  •  albuterol inhaler 2 Puff, 2 Puff, Inhalation, Q4HRS PRN, Jovanni Zhou M.D.  •  budesonide-formoterol (SYMBICORT) 160-4.5 MCG/ACT inhaler 2 Puff, 2 Puff, Inhalation, BID, Jovanni Zhou M.D., 2 Puff at 05/05/19 0501  •  insulin regular (HUMULIN R) injection 2-9 Units, 2-9 Units, Subcutaneous, 4X/DAY ACHS, Stopped at 05/05/19 0700 **AND** Accu-Chek Q6 if NPO, , , Q AC AND BEDTIME(S) **AND** NOTIFY MD and PharmD, , , Once **AND** glucose 4 g chewable tablet 16 g, 16 g, Oral, Q15 MIN PRN **AND** DEXTROSE 10% BOLUS 250 mL, 250 mL, Intravenous, Q15 MIN PRN, Jovanni Zhou M.D.  •  hydrALAZINE (APRESOLINE) injection 10 mg, 10 mg, Intravenous, Q4HRS PRN, Trent Faith D.O., 10 mg at 05/04/19 2121  •  Respiratory Care per Protocol, , Nebulization, Continuous RT, Jovanni Zhou M.D.  •  ipratropium-albuterol (DUONEB) nebulizer solution, 3 mL, Nebulization, Q2HRS PRN (RT), Jovanni Zhou M.D.  •  DILTIAZem (CARDIZEM) injection 10 mg, 10 mg, Intravenous, Q5 MIN PRN, Gabo Greenberg M.D., 10 mg at 04/29/19 1411

## 2019-05-05 NOTE — PROGRESS NOTES
Lauren catheter with salmon-tinged urine this shift, no noted clots. Lauren catheter discontinued at this time as per order for voiding trial. Pt provided education and states understanding, encouraged to void when feels the urge. Pt tolerated removal well. Will continue to monitor.

## 2019-05-05 NOTE — PROGRESS NOTES
HTN noted this shift, pt denies pain/blurred vision/headache, prn hydralazine administered for HTN. See vital signs and emar for details. Will continue to monitor.

## 2019-05-05 NOTE — PROGRESS NOTES
Received bedside report from RN, pt care assumed, VSS, pt assessment complete. Pt AAOx4, no c/o  pain at this time. No signs of acute distress noted at this time. POC discussed with pt and verbalizes no questions. Pt denies any additional needs at this time. Bed in lowest position, bed alarm on, pt educated on fall risk and verbalized understanding, call light within reach, hourly rounding initiated. In a sinus rhythm.

## 2019-05-05 NOTE — PROGRESS NOTES
Bedside report received, pt care assumed. Pt is awake, oriented x4. Pt educated on plan of care, call bell use, NPO midnight for cath lab in AM- states understanding and questions addressed accordingly. Pt visiting with family at bedside. Will continue to monitor.

## 2019-05-05 NOTE — OP REPORT
Cardiac catheterization report    Procedure date: 5/5/2019      Pre-operative Diagnosis:  1.  Non-ST elevation myocardial infarction  2.  History of recurrent hematuria from nephrolithiasis    Post-operative Diagnosis:   1.  Non-ST elevation myocardial infarction likely secondary to 90% ulcerated eccentric stenosis in the proximal right coronary artery  2.  Normal left ventricle systolic function and wall motion  3.  Status post stenting of the right coronary artery with a 3.5 x 18 mm bare metal Vision stent with no residual stenosis and MAURA-3 flow    Procedure:  1.  Left heart catheterization with left ventriculography  2.  Selective coronary angiography  3.  Percutaneous coronary intervention of the right coronary artery  4.  Supervision of moderate conscious sedation    Complications: None    Description of Procedure:  After informed consent was obtained, the patient was brought to cardiac catheterization laboratory in fasting state.   Shaheed test was carried out on the right hand and was found to be negative.  Right wrist and right groin were then prepped and drapped in sterile fashion.  Versed and fentanyl were used for conscious sedation.  Lidocaine 2% was used to anesthetize the area.  A 6 [place] Terumo sheath was then placed in the right radial artery using Seldinger technique.  A 2.5 mg of verapamil, 100 microgram of nitroglycerine and 3000 units of heparin were administered into the radial sheath.  A 5 [place] TIG catheter was then advanced into the left ventricle.    Left ventricular pressure was then recorded.   Left ventriculography was performed using 16 cc of contrast injected over 2 seconds.  Left heart pullback was then performed.  Selective coronary angiography of the left coronary artery was then performed using the same catheter.   Selective angiography of the right coronary artery (RCA) was performed in multiple views using 6 [place] JR3.5.    After reviewing of the diagnostic angiography, it was  felt that intervention of the RCA artery was indicated.  Bivalrudin was then started for anticoagulation.  A 6 Fr JR 3.5 guide catheter was used.  A 0.014 BMW wire was then advanced pass the stenosis.  The lesion was dilated with a 3x15 mm balloon.  A 3.5x18 mm Vision bare metal stent was deployed and post dilated with 3.5 mm non-compliance balloon upto 18 ATMs.  Bare metal was chosen due to history of recurrent hematuria.  Subsequent angiography showed no significant residual stenosis with MAURA III flow.   The guide wire was subsequently removed and final angiography was performed.  It confirmed good result. The guide catheter was then removed.   The radial sheath was subsequently removed.  Hemostasis was obtained using a Terumo TR wrist band.  The patient was then given a loading dose of clopidogrel.  Bivalirudin was subsequently reduced to low dose infusion.  The patient tolerated procedure well and left cardiac catheterization laboratory in stable condition.    I supervised monitoring the patient under moderate conscious sedation beginning at 9:34 AM until the end of the case at 10:24 AM.    Findings:  There is no gradient across aortic valve.  Left ventricular end-diastolic pressure was 24 mmHg.    Left ventriculography showed normal wall motion.  Overall LV systolic function is normal .  Ejection fraction is calculated to be 70 %.    S[place]gle Coronary artery disease    This is right dominant system.    Left main is large and without flow limiting disease.  It bifurcated into left anterior descending and left circumflex artery.     Left anterior descending artery is large caliber vessel and extends to the apex.  It gives rises to several small diagonal branches.  There is no signficant disease in the left anterior descending artery or its major branches.  The antegrade flow is normal.    Left circumflex artery is large in caliber.   It gives rise to one large obtuse marginal branch.  There is no significant  disease in the LCX system.  The antegrade flow is normal.    Right coronary artery (RCA) is large caliber. It gives rise to a couple very small acute marginal branches, lkarge posterior descending artery and posterolateral branch.  There was eccentric 80-90% stenosis in the proximal of the right coronary artery at the beginning of the case.  Ther antegrade flow is normal.    After intervention, there was 0% residual stenosis in RCA artery with MAURA III flow.      Plans;  Dual antiplatelet therapy for at least one month or upto year if no bleeding.   Continue low-dose bivalirudin infusion for 4 hours.   Risk factor modification.  Limit right wrist movement for 24 hours.      Jamiosn Cortes M.D.

## 2019-05-05 NOTE — DISCHARGE PLANNING
CM met with pt and his cousin Robby at bedside to complete assessment. Pt states that he is anxious to go home, and he lives in [place] in a one story house with 2 steps up to the door. Pt has hm 02 through [employer] at 2L, and he also has an [employer] that he keeps in his car. Pt has a walker that he uses for long distances.   Pt reports that he recently completed an advance directive listing his niece Stephy Antonio, however, we do not yet have this on file.   Pt states that he is not interested in HH or SNF at d/c and feels like he is ready to return home independently, and states that if he does need help Stephy Antonio is able to help. Per chart, PT and OT have been ordered.   Plan: Await PT/OT recs.   Care Transition Team Assessment    Information Source  Orientation : Oriented x 4  Information Given By: Patient  Informant's Name: Olman  Who is responsible for making decisions for patient? : Patient    Readmission Evaluation  Is this a readmission?: No    Elopement Risk  Legal Hold: No  Ambulatory or Self Mobile in Wheelchair: Yes  Disoriented: No  Psychiatric Symptoms: None  History of Wandering: No  Elopement this Admit: No  Vocalizing Wanting to Leave: No  Displays Behaviors, Body Language Wanting to Leave: No-Not at Risk for Elopement  Elopement Risk: Not at Risk for Elopement    Interdisciplinary Discharge Planning  Primary Care Physician: SONI Valverde  Lives with - Patient's Self Care Capacity: Alone and Able to Care For Self  Patient or legal guardian wants to designate a caregiver (see row info): No  Support Systems: Family Member(s)  Housing / Facility: 1 Story House  Do You Take your Prescribed Medications Regularly: Yes  Able to Return to Previous ADL's: Yes  Mobility Issues: No  Prior Services: None  Patient Expects to be Discharged to:: Home  Assistance Needed: Unknown at this Time  Durable Medical Equipment: Home Oxygen, Walker  DME Provider / Phone: Jose G    Discharge Preparedness  What is your plan after  discharge?: Home with help  What are your discharge supports?: Other (comment) (Niece)  Prior Functional Level: Ambulatory, Drives Self, Independent with Activities of Daily Living, Independent with Medication Management, Uses Walker  Difficulity with ADLs: None  Difficulity with IADLs: None    Functional Assesment  Prior Functional Level: Ambulatory, Drives Self, Independent with Activities of Daily Living, Independent with Medication Management, Uses Walker    Finances  Financial Barriers to Discharge: No  Prescription Coverage: Yes    Vision / Hearing Impairment  Vision Impairment : No  Hearing Impairment : No    Values / Beliefs / Concerns  Values / Beliefs Concerns : No    Advance Directive  Advance Directive?: DPOA for Health Care (Pt states he believes he has an advance directive)  Durable Power of  Name and Contact : Stephy Antonio (Niece)    Domestic Abuse  Have you ever been the victim of abuse or violence?: No  Physical Abuse or Sexual Abuse: No  Verbal Abuse or Emotional Abuse: No  Possible Abuse Reported to:: Not Applicable    Psychological Assessment  History of Substance Abuse: None  History of Psychiatric Problems: No  Non-compliant with Treatment: No  Newly Diagnosed Illness: Yes    Discharge Risks or Barriers  Discharge risks or barriers?: No    Anticipated Discharge Information  Anticipated discharge disposition: Home

## 2019-05-05 NOTE — PROGRESS NOTES
Monitor summary:    SB-SR 53-72, bradycardia down to 45 nonsustained while asleep.  Rare PVC  .18/.10/.38

## 2019-05-06 ENCOUNTER — PATIENT OUTREACH (OUTPATIENT)
Dept: HEALTH INFORMATION MANAGEMENT | Facility: OTHER | Age: 69
End: 2019-05-06

## 2019-05-06 VITALS
WEIGHT: 171.96 LBS | HEART RATE: 61 BPM | TEMPERATURE: 97.8 F | RESPIRATION RATE: 18 BRPM | SYSTOLIC BLOOD PRESSURE: 148 MMHG | BODY MASS INDEX: 25.47 KG/M2 | DIASTOLIC BLOOD PRESSURE: 67 MMHG | OXYGEN SATURATION: 89 % | HEIGHT: 69 IN

## 2019-05-06 LAB
ANION GAP SERPL CALC-SCNC: 4 MMOL/L (ref 0–11.9)
BASOPHILS # BLD AUTO: 0.2 % (ref 0–1.8)
BASOPHILS # BLD: 0.03 K/UL (ref 0–0.12)
BUN SERPL-MCNC: 34 MG/DL (ref 8–22)
CALCIUM SERPL-MCNC: 8.3 MG/DL (ref 8.5–10.5)
CHLORIDE SERPL-SCNC: 107 MMOL/L (ref 96–112)
CO2 SERPL-SCNC: 31 MMOL/L (ref 20–33)
CREAT SERPL-MCNC: 1.37 MG/DL (ref 0.5–1.4)
EOSINOPHIL # BLD AUTO: 0.05 K/UL (ref 0–0.51)
EOSINOPHIL NFR BLD: 0.3 % (ref 0–6.9)
ERYTHROCYTE [DISTWIDTH] IN BLOOD BY AUTOMATED COUNT: 47.9 FL (ref 35.9–50)
GLUCOSE BLD-MCNC: 141 MG/DL (ref 65–99)
GLUCOSE BLD-MCNC: 85 MG/DL (ref 65–99)
GLUCOSE SERPL-MCNC: 105 MG/DL (ref 65–99)
HCT VFR BLD AUTO: 40.4 % (ref 42–52)
HGB BLD-MCNC: 13 G/DL (ref 14–18)
IMM GRANULOCYTES # BLD AUTO: 0.33 K/UL (ref 0–0.11)
IMM GRANULOCYTES NFR BLD AUTO: 2 % (ref 0–0.9)
LYMPHOCYTES # BLD AUTO: 1.93 K/UL (ref 1–4.8)
LYMPHOCYTES NFR BLD: 11.6 % (ref 22–41)
MCH RBC QN AUTO: 30.6 PG (ref 27–33)
MCHC RBC AUTO-ENTMCNC: 32.2 G/DL (ref 33.7–35.3)
MCV RBC AUTO: 95.1 FL (ref 81.4–97.8)
MONOCYTES # BLD AUTO: 1.05 K/UL (ref 0–0.85)
MONOCYTES NFR BLD AUTO: 6.3 % (ref 0–13.4)
NEUTROPHILS # BLD AUTO: 13.23 K/UL (ref 1.82–7.42)
NEUTROPHILS NFR BLD: 79.6 % (ref 44–72)
NRBC # BLD AUTO: 0 K/UL
NRBC BLD-RTO: 0 /100 WBC
PLATELET # BLD AUTO: 240 K/UL (ref 164–446)
PMV BLD AUTO: 9.4 FL (ref 9–12.9)
POTASSIUM SERPL-SCNC: 4.4 MMOL/L (ref 3.6–5.5)
RBC # BLD AUTO: 4.25 M/UL (ref 4.7–6.1)
SODIUM SERPL-SCNC: 142 MMOL/L (ref 135–145)
WBC # BLD AUTO: 16.6 K/UL (ref 4.8–10.8)

## 2019-05-06 PROCEDURE — 80048 BASIC METABOLIC PNL TOTAL CA: CPT

## 2019-05-06 PROCEDURE — 85025 COMPLETE CBC W/AUTO DIFF WBC: CPT

## 2019-05-06 PROCEDURE — 700111 HCHG RX REV CODE 636 W/ 250 OVERRIDE (IP): Performed by: INTERNAL MEDICINE

## 2019-05-06 PROCEDURE — 700111 HCHG RX REV CODE 636 W/ 250 OVERRIDE (IP): Performed by: FAMILY MEDICINE

## 2019-05-06 PROCEDURE — A9270 NON-COVERED ITEM OR SERVICE: HCPCS | Performed by: INTERNAL MEDICINE

## 2019-05-06 PROCEDURE — 97162 PT EVAL MOD COMPLEX 30 MIN: CPT

## 2019-05-06 PROCEDURE — 700102 HCHG RX REV CODE 250 W/ 637 OVERRIDE(OP): Performed by: INTERNAL MEDICINE

## 2019-05-06 PROCEDURE — 36415 COLL VENOUS BLD VENIPUNCTURE: CPT

## 2019-05-06 PROCEDURE — 99239 HOSP IP/OBS DSCHRG MGMT >30: CPT | Performed by: INTERNAL MEDICINE

## 2019-05-06 PROCEDURE — 99232 SBSQ HOSP IP/OBS MODERATE 35: CPT | Performed by: INTERNAL MEDICINE

## 2019-05-06 PROCEDURE — 82962 GLUCOSE BLOOD TEST: CPT | Mod: 91

## 2019-05-06 RX ORDER — CLOPIDOGREL BISULFATE 75 MG/1
75 TABLET ORAL DAILY
Qty: 30 TAB | Refills: 1 | Status: SHIPPED | OUTPATIENT
Start: 2019-05-07 | End: 2019-05-06

## 2019-05-06 RX ORDER — ASPIRIN 81 MG/1
81 TABLET, CHEWABLE ORAL DAILY
Qty: 100 TAB | Refills: 3 | Status: SHIPPED | OUTPATIENT
Start: 2019-05-07 | End: 2020-06-17

## 2019-05-06 RX ORDER — ATORVASTATIN CALCIUM 40 MG/1
40 TABLET, FILM COATED ORAL EVERY EVENING
Qty: 30 TAB | Refills: 2 | Status: SHIPPED | OUTPATIENT
Start: 2019-05-06 | End: 2019-05-16 | Stop reason: SDUPTHER

## 2019-05-06 RX ORDER — CLOPIDOGREL BISULFATE 75 MG/1
75 TABLET ORAL DAILY
Qty: 30 TAB | Refills: 11 | Status: SHIPPED | OUTPATIENT
Start: 2019-05-07 | End: 2020-06-03

## 2019-05-06 RX ORDER — CARVEDILOL 12.5 MG/1
12.5 TABLET ORAL 2 TIMES DAILY WITH MEALS
Status: DISCONTINUED | OUTPATIENT
Start: 2019-05-06 | End: 2019-05-06 | Stop reason: HOSPADM

## 2019-05-06 RX ORDER — PREDNISONE 10 MG/1
30 TABLET ORAL DAILY
Qty: 6 TAB | Refills: 0 | Status: SHIPPED | OUTPATIENT
Start: 2019-05-06 | End: 2019-05-08

## 2019-05-06 RX ORDER — BUDESONIDE AND FORMOTEROL FUMARATE DIHYDRATE 160; 4.5 UG/1; UG/1
2 AEROSOL RESPIRATORY (INHALATION) 2 TIMES DAILY
Qty: 1 INHALER | Refills: 1 | Status: SHIPPED | OUTPATIENT
Start: 2019-05-06 | End: 2019-05-06

## 2019-05-06 RX ORDER — CARVEDILOL 12.5 MG/1
12.5 TABLET ORAL 2 TIMES DAILY WITH MEALS
Qty: 60 TAB | Refills: 1 | Status: SHIPPED | OUTPATIENT
Start: 2019-05-06 | End: 2019-05-16 | Stop reason: SDUPTHER

## 2019-05-06 RX ADMIN — LOSARTAN POTASSIUM 50 MG: 50 TABLET ORAL at 05:42

## 2019-05-06 RX ADMIN — PREDNISONE 30 MG: 20 TABLET ORAL at 05:42

## 2019-05-06 RX ADMIN — CLOPIDOGREL BISULFATE 75 MG: 75 TABLET ORAL at 05:43

## 2019-05-06 RX ADMIN — ASPIRIN 81 MG 81 MG: 81 TABLET ORAL at 05:42

## 2019-05-06 RX ADMIN — BUDESONIDE AND FORMOTEROL FUMARATE DIHYDRATE 2 PUFF: 160; 4.5 AEROSOL RESPIRATORY (INHALATION) at 05:44

## 2019-05-06 RX ADMIN — AMLODIPINE BESYLATE 10 MG: 10 TABLET ORAL at 08:43

## 2019-05-06 RX ADMIN — HEPARIN SODIUM 5000 UNITS: 5000 INJECTION, SOLUTION INTRAVENOUS; SUBCUTANEOUS at 05:44

## 2019-05-06 RX ADMIN — TIOTROPIUM BROMIDE 1 CAPSULE: 18 CAPSULE ORAL; RESPIRATORY (INHALATION) at 05:44

## 2019-05-06 RX ADMIN — HYDRALAZINE HYDROCHLORIDE 10 MG: 20 INJECTION INTRAMUSCULAR; INTRAVENOUS at 01:46

## 2019-05-06 RX ADMIN — METOPROLOL TARTRATE 25 MG: 25 TABLET ORAL at 05:42

## 2019-05-06 ASSESSMENT — ENCOUNTER SYMPTOMS
FEVER: 0
COUGH: 1
CHILLS: 0
WHEEZING: 1
DIAPHORESIS: 0
ABDOMINAL PAIN: 0
CHEST TIGHTNESS: 0
LIGHT-HEADEDNESS: 0
ABDOMINAL DISTENTION: 0
FATIGUE: 0
SHORTNESS OF BREATH: 1
DIZZINESS: 0
PALPITATIONS: 0
CONFUSION: 0

## 2019-05-06 ASSESSMENT — GAIT ASSESSMENTS
DISTANCE (FEET): 75
DEVIATION: OTHER (COMMENT)
GAIT LEVEL OF ASSIST: SUPERVISED

## 2019-05-06 ASSESSMENT — COGNITIVE AND FUNCTIONAL STATUS - GENERAL
SUGGESTED CMS G CODE MODIFIER MOBILITY: CI
MOBILITY SCORE: 23
CLIMB 3 TO 5 STEPS WITH RAILING: A LITTLE

## 2019-05-06 NOTE — PROGRESS NOTES
Dr. Campbell notified of patient's BP of 183/88 at 0000 on 5/6 and patient's BP of 196/80 at 0146 on 5/6. Per MD, he will change metoprolol BID to carvedilol BID for blood pressure control.

## 2019-05-06 NOTE — PROGRESS NOTES
Patient discharged to home via car with friend. Discharge paperwork was discussed with the patient. LDAs were removed. Tele monitor disconnected. Pt prescriptions sent to pharmacy. Patient escorted out in wheelchair.

## 2019-05-06 NOTE — CARE PLAN
Problem: Nutritional:  Goal: Achieve adequate nutritional intake  Patient will consume >50% of most meals over 3-5 days.   Outcome: MET Date Met: 05/06/19  Pt on cardiac diet and is consuming >50% for all meals charted per ADLs (5/2-5/5)

## 2019-05-06 NOTE — PROGRESS NOTES
Seen and examined  Taper po prednisone as ordered   Ok to d/c home from our standpoint  Arranging out pt pulm f/u in our clinic, contact info given.   Full note to follow for today

## 2019-05-06 NOTE — PROGRESS NOTES
Patient refused bed strip alarm, educated regarding importance.  Education reinforced by ABRAHAM reyes.  Patient continues to refuse.

## 2019-05-06 NOTE — PROGRESS NOTES
Received Bedside report. Assumed care at 0700. This pt is AOx4, ambulatory with SBA, voiding appropriately, though has some hematuria, denies pain. Patient and RN discussed plan of care: questions answered. Labs noted, assessment complete, patient tolerating cardiac diet. Tele box in place. Pt is on 2L of O2 via NC which is baseline. Call light in place, fall precautions in place, patient educated on importance of calling for assistance. No additional needs at this time.

## 2019-05-06 NOTE — PROGRESS NOTES
Ongoing hematuria noted, small clot noted in urinal. Pt voiding adequate amounts of urine this shift. Pt denies difficulty or urge to void, no bladder distention noted. Will continue to monitor.

## 2019-05-06 NOTE — PROGRESS NOTES
Cardiology Follow Up Progress Note    Date of Service  5/6/2019    Attending Physician  Natalya Hinds M.D.    Chief Complaint   NSTEMI    HPI  Olman Sims is a 68 y.o. male admitted 4/29/2019 with COPD exacerbation, found to have new diagnosis of atrial fibrillation with rapid ventricular response and elevated troponin. He was initially anticoagulated with heparin drip but developed hematuria secondary to nephrolithiasis and has since been off anticoagulation.   His hospitalization has been complicated by respiratory failure requiring intubation, now extubated and on oral steroid course.   He went to cath lab 5/5/19.     Interim Events  Overnight he was hypertensive requiring IV hydral with good response. This morning he is feeling well, very anxious to go home. He denies chest pain with ambulation, his breathing is at his baseline, on 2LO2.     Presures 140s-160s. Telemetry: SB-SR 48-73    Review of Systems  Review of Systems   Constitutional: Negative for chills, diaphoresis, fatigue and fever.   Respiratory: Positive for cough, shortness of breath and wheezing. Negative for chest tightness.    Cardiovascular: Negative for chest pain, palpitations and leg swelling.   Gastrointestinal: Negative for abdominal distention and abdominal pain.   Genitourinary: Positive for hematuria. Negative for difficulty urinating and dysuria.   Neurological: Negative for dizziness and light-headedness.   Psychiatric/Behavioral: Negative for confusion.       Vital signs in last 24 hours  Temp:  [35.9 °C (96.6 °F)-37.1 °C (98.7 °F)] 35.9 °C (96.6 °F)  Pulse:  [46-80] 65  Resp:  [16-20] 16  BP: (140-196)/(62-88) 158/73  SpO2:  [93 %-98 %] 97 %    Physical Exam  Physical Exam   Constitutional: He is oriented to person, place, and time. He appears well-developed and well-nourished. No distress.   HENT:   Head: Normocephalic and atraumatic.   Mouth/Throat: Oropharynx is clear and moist.   Eyes: Conjunctivae are normal. No scleral  icterus.   Neck: Neck supple. No JVD present.   Cardiovascular: Normal rate and regular rhythm.  Exam reveals distant heart sounds.    No murmur heard.  Pulses:       Radial pulses are 2+ on the right side, and 2+ on the left side.        Dorsalis pedis pulses are 2+ on the right side, and 2+ on the left side.   Right wrist with bandage, CDI, movement, sensation intact, no hematoma, oozing, erythema around access site   Pulmonary/Chest: Effort normal. No respiratory distress. He has wheezes (diffuse bilateral). He has no rales.   Abdominal: Soft. Bowel sounds are normal. He exhibits no distension. There is no tenderness.   Genitourinary:   Genitourinary Comments: Urine is dark red, no clots    Musculoskeletal: He exhibits no edema.   Neurological: He is alert and oriented to person, place, and time. No cranial nerve deficit.   Skin: Skin is warm and dry. No pallor.   Psychiatric: Judgment normal.   Vitals reviewed.      Lab Review  Lab Results   Component Value Date/Time    WBC 16.6 (H) 05/06/2019 04:49 AM    RBC 4.25 (L) 05/06/2019 04:49 AM    HEMOGLOBIN 13.0 (L) 05/06/2019 04:49 AM    HEMATOCRIT 40.4 (L) 05/06/2019 04:49 AM    MCV 95.1 05/06/2019 04:49 AM    MCH 30.6 05/06/2019 04:49 AM    MCHC 32.2 (L) 05/06/2019 04:49 AM    MPV 9.4 05/06/2019 04:49 AM      Lab Results   Component Value Date/Time    SODIUM 142 05/06/2019 04:49 AM    POTASSIUM 4.4 05/06/2019 04:49 AM    CHLORIDE 107 05/06/2019 04:49 AM    CO2 31 05/06/2019 04:49 AM    GLUCOSE 105 (H) 05/06/2019 04:49 AM    BUN 34 (H) 05/06/2019 04:49 AM    CREATININE 1.37 05/06/2019 04:49 AM      Lab Results   Component Value Date/Time    ASTSGOT 30 05/02/2019 03:33 AM    ALTSGPT 38 05/02/2019 03:33 AM     Lab Results   Component Value Date/Time    CHOLSTRLTOT 134 04/30/2019 01:39 AM    LDL 88 04/30/2019 01:39 AM    HDL 24 (A) 04/30/2019 01:39 AM    TRIGLYCERIDE 108 04/30/2019 01:39 AM    TROPONINI 1.03 (H) 05/03/2019 04:45 PM           Results for СВЕТЛАНА,  KEIRA MARISCAL (MRN [id number]) as of 5/6/2019 10:42   Ref. Range 4/29/2019 20:04 4/30/2019 01:39 4/30/2019 07:40 4/30/2019 13:47 5/3/2019 16:45   Troponin I Latest Ref Range: 0.00 - 0.04 ng/mL 12.12 (H) 7.96 (H) 6.58 (H) 4.70 (H) 1.03 (H)     Cardiac Imaging and Procedures Review  Echocardiogram:  4/29/19  CONCLUSIONS  Technically difficult study incomplete information is obtained.   Normal left ventricular systolic function.  Left ventricular ejection fraction is visually estimated to be 55%.  No evidence of valvular abnormality based on Doppler evaluation.     Cardiac Catheterization:  5/5/519  Post-operative Diagnosis:   1.  Non-ST elevation microinfarction likely secondary to a 90% ulcerated eccentric stenosis in the proximal right coronary artery  2.  Normal left ventricle systolic function and wall motion  3.  Status post stenting of the right coronary artery with diameter 3.5 x 18 mm vision stent stent with no residual stenosis and MAURA-3 flow    S[place]gle Coronary artery disease     This is right dominant system.     Left main is large and without flow limiting disease.  It bifurcated into left anterior descending and left circumflex artery.      Left anterior descending artery is large caliber vessel and extends to the apex.  It gives rises to several small diagonal branches.  There is no signficant disease in the left anterior descending artery or its major branches.  The antegrade flow is normal.     Left circumflex artery is large in caliber.   It gives rise to one large obtuse marginal branch.  There is no significant disease in the LCX system.  The antegrade flow is normal.     Right coronary artery (RCA) is large caliber. It gives rise to a couple very small acute marginal branches, lkarge posterior descending artery and posterolateral branch.  There was eccentric 80-90% stenosis in the proximal of the right coronary artery at the beginning of the case.  Ther antegrade flow is normal.     After  intervention, there was 0% residual stenosis in RCA artery with MAURA III flow.      Assessment/Plan  Non-ST elevation MI  CAD with 90% stenosis of proximal RCA s/p 3.5x18mm Vision bare metal stent with 0% residual flow  Dyslipidemia  - DAPT: aspirin 81 + plavix 75 for at least 1 mo, 12 mo if no bleeding complications  - HR tolerating bb, replace metop with coreg for better BP control  - atorva 40mg  - educated patient on importance of medication adherence, he is aware. Meds to Beds for 30d supply plavix    HTN, not at goal  Hypertensive urgency  - Losartan 50mg daily  - amlodipine 10mg  - hydral 10mg IV PRN, required last night  - replace metop with coreg for better BP control  - I think his steroid course is not helping, states his breathing is at baseline, would recommend discontinuing this as soon as possible.     Hematuria  Proteinuria  - appreciate Urology and Primary team's management  - will see urology in follow up  - hb stable    Afib, paroxysmal, new diagnosis   - now in sinus rhythm  - beta blocker  - Heparin  ggt dc'd 5/1. Avoiding OAC due to hematuria    COPD exacerbation, now back at baseline  - chronic O2 use at 2L  - see PO steroid recommendation above    Thank you for allowing me to participate in the care of this patient. He is appropriate for discharge at this time.   I will arrange close follow up for BP check given new meds.   Staffed with Dr. Campbell    Future Appointments  Date Time Provider Department Center   5/10/2019 11:20 AM ERIN Flowers   5/14/2019 10:45 AM GIOVANNI Marino [facility] None   10/10/2019 10:00 AM ERIN Flowers

## 2019-05-06 NOTE — PROGRESS NOTES
Hematuria ongoing, pt is taking heparin SQ, plavix, asa. Dr Garcias notified, continue to administer atc medications as per order at this time. Will continue to monitor.

## 2019-05-06 NOTE — PROGRESS NOTES
[facility]    MEDS TO BEDS    Medication DELIVERED TO PATIENT IN ROOM 708 B2      Clopidogrel 75 mg #30 provided at $35.75 copay.     Pt education provided, all questions answered, including but not limited to potential side effects, what to do if a dose is missed, when to contact his physician.  Pt verbalized understanding to take this medication once daily along with an 81mg ASA, with or without regard to food,  for a duration of 12 months unless otherwise directed by cardiology.  Pt understands to begin this medication AFTER discharge from hospital. Pt verbalized understanding that he must refill this medication at his local drug outlet.     All questions answered.  Pt verbalized understanding including when to return to the ED.    Merna SchaeferD

## 2019-05-06 NOTE — PROGRESS NOTES
Spoke with Dionne at scheduling. She was able to schedule a PCP and cardiology appointment for this patient but unable to schedule a urology outpatient appointment. Per Dionne, Urology office stated that they will contact the patient with making an appointment and that they are aware this patient's case.

## 2019-05-06 NOTE — PROGRESS NOTES
Patient continues to have hematuria. Dr. Campbell from cardiology notified. Dr. Hinds, hospitalist notified. Per Dr. Campbell, okay to continue blood thinning medication for this patient.

## 2019-05-06 NOTE — PROGRESS NOTES
HTN noted again this hs, pt denies headache/pain/dizziness- asymptomatic, prn hydralazine administered. See emar and vital signs for details.

## 2019-05-06 NOTE — PROGRESS NOTES
Per Dr. Hinds, hospitalist RN was unable to schedule appointment with urology outpatient. Urology office states that they will call him to schedule an appointment. Per MD, okay to discharge if patient understands. Communicated findings to the patient. Patient indicates understanding and is agreeable.

## 2019-05-06 NOTE — PROGRESS NOTES
Bedside report received, pt care assumed. Pt is a&ox4, visiting with family at bedside. Pt and family educated on plan of care, call bell use- states understanding. Pt declines any pain or any current needs. Pt educated on need for frequent turning/repositing while in bed. Will continue to monitor.

## 2019-05-06 NOTE — PROGRESS NOTES
Critical Care Progress Note    Date of admission  4/29/2019    Chief Complaint  AF RVR, NSTEMI, trop 12; heparin gtt; CKD, resp distress; RRT and placed on BiPAP in [facility]    Hospital Course    68 y.o. male who presented 4/29/2019 with worsening dyspnea.  He has a history of COPD with home chronic oxygen therapy.  He presented with 3 days of cough, dyspnea but denied fevers or chills.  He was found to be in atrial fibrillation with rapid ventricular response.  In the ED he received IV of diltiazem and converted to sinus rhythm.  He was found to have a non-ST elevation myocardial function and has been continued on heparin infusion.  Troponin I enzyme was elevated at 12 and cardiology is following the patient.  He is found to have leukocytosis, acute kidney injury and was admitted to the telemetry floor.  He was placed on empiric antimicrobial therapy and corticosteroid.  Today he was urgently transferred to the intensive care unit for worsening respiratory distress.  He was placed on BiPAP however continued to have increasing respiratory distress.  I semi-urgently intubated the patient for bronchoscopy.  There was copious amount of thick purulent secretions in the airways which were therapeutically lavaged and a BAL was sent for culture.         Interval Problem Update  Reviewed last 24 hour events:  Transferred from ICU  Angiogram this morning with stent to the RCA  No respiratory complaints   No events overnight     Review of Systems  Review of Systems   Unable to perform ROS: Acuity of condition        Vital Signs for last 24 hours   Temp:  [36.1 °C (97 °F)-37.1 °C (98.7 °F)] 37.1 °C (98.7 °F)  Pulse:  [46-67] 62  Resp:  [16-20] 17  BP: (140-177)/(58-76) 167/76  SpO2:  [92 %-98 %] 93 %    Hemodynamic parameters for last 24 hours       Respiratory Information for the last 24 hours       Physical Exam   Physical Exam   Constitutional: He appears well-developed.   HENT:   Head: Normocephalic.   Eyes: Pupils are equal,  round, and reactive to light. No scleral icterus.   Neck: Neck supple. No thyromegaly present.   Cardiovascular: Normal rate.    Pulmonary/Chest:   Diminished, scattered coarse breath sounds with few expiratory wheeze   Abdominal: Soft. He exhibits no distension. There is no tenderness.   Genitourinary:   Genitourinary Comments: Lauren in place, hematuria improving   Musculoskeletal: He exhibits no edema or tenderness.   Neurological: He is alert. No cranial nerve deficit.   Follows commands appropriately   Skin: Skin is warm and dry.       Medications  Current Facility-Administered Medications   Medication Dose Route Frequency Provider Last Rate Last Dose   • [START ON 5/6/2019] clopidogrel (PLAVIX) tablet 75 mg  75 mg Oral DAILY Jamison Cortes M.D.       • heparin injection 5,000 Units  5,000 Units Subcutaneous Q8HRS Natalya Hinds M.D.   5,000 Units at 05/05/19 2013   • predniSONE (DELTASONE) tablet 30 mg  30 mg Oral DAILY Jovanni Zhou M.D.   30 mg at 05/05/19 0501   • losartan (COZAAR) tablet 50 mg  50 mg Oral Q DAY Jamison Cortes M.D.   50 mg at 05/05/19 0501   • acetaminophen (TYLENOL) tablet 650 mg  650 mg Oral Q6HRS PRN Jovanni Zhou M.D.       • amLODIPine (NORVASC) tablet 10 mg  10 mg Oral QDAY Jovanni Zhou M.D.   10 mg at 05/05/19 0730   • aspirin (ASA) chewable tab 81 mg  81 mg Oral DAILY Jovanni Zhou M.D.   81 mg at 05/05/19 0500   • atorvastatin (LIPITOR) tablet 40 mg  40 mg Oral Q EVENING Jovanni Zhou M.D.   40 mg at 05/05/19 1724   • labetalol (NORMODYNE) tablet 100 mg  100 mg Oral Q6HRS PRN Jovanni Zhou M.D.   100 mg at 05/03/19 1419   • magnesium hydroxide (MILK OF MAGNESIA) suspension 30 mL  30 mL Oral QDAY PRN Jovanni Zhou M.D.        And   • senna-docusate (PERICOLACE or SENOKOT S) 8.6-50 MG per tablet 2 Tab  2 Tab Oral BID Jovanni Zhou M.D.   Stopped at 05/03/19 0900    And   • polyethylene glycol/lytes (MIRALAX) PACKET 1 Packet  1 Packet Oral QDAY OTIS AMBROCIO  LORI Zhou        And   • bisacodyl (DULCOLAX) suppository 10 mg  10 mg Rectal QDAY PRN Jovanni Zhou M.D.       • metoprolol (LOPRESSOR) tablet 25 mg  25 mg Oral TWICE DAILY Jovanni Zhou M.D.   25 mg at 05/05/19 1725   • zolpidem (AMBIEN) tablet 10 mg  10 mg Oral HS PRN Jovanni Zhou M.D.   10 mg at 05/05/19 2235   • tiotropium (SPIRIVA) 18 MCG inhalation capsule 1 Cap  1 Cap Inhalation DAILY Jovanni Zhou M.D.   1 Cap at 05/05/19 0501   • albuterol inhaler 2 Puff  2 Puff Inhalation Q4HRS PRN Jovanni Zhou M.D.       • budesonide-formoterol (SYMBICORT) 160-4.5 MCG/ACT inhaler 2 Puff  2 Puff Inhalation BID Jovanni Zhou M.D.   2 Puff at 05/05/19 1724   • insulin regular (HUMULIN R) injection 2-9 Units  2-9 Units Subcutaneous 4X/DAY ACHS Jovanni Zhou M.D.   Stopped at 05/05/19 0700    And   • glucose 4 g chewable tablet 16 g  16 g Oral Q15 MIN PRN Jovanni Zhou M.D.        And   • DEXTROSE 10% BOLUS 250 mL  250 mL Intravenous Q15 MIN PRN Jovanni Zhou M.D.       • hydrALAZINE (APRESOLINE) injection 10 mg  10 mg Intravenous Q4HRS PRN Trent Faith D.O.   10 mg at 05/04/19 2121   • Respiratory Care per Protocol   Nebulization Continuous RT Jovanni Zhou M.D.       • ipratropium-albuterol (DUONEB) nebulizer solution  3 mL Nebulization Q2HRS PRN (RT) Jovanni Zhou M.D.       • DILTIAZem (CARDIZEM) injection 10 mg  10 mg Intravenous Q5 MIN PRN Gabo Greenberg M.D.   10 mg at 04/29/19 1411       Fluids    Intake/Output Summary (Last 24 hours) at 05/05/19 2333  Last data filed at 05/05/19 2200   Gross per 24 hour   Intake              240 ml   Output             2400 ml   Net            -2160 ml       Laboratory  Recent Labs      05/03/19   0415   ISTATAPH  7.366*   ISTATAPCO2  50.0*   ISTATAPO2  68   ISTATATCO2  30   [id number]  92*   ISTATARTHCO3  28.7*   ISTATARTBE  2   ISTATTEMP  36.8 C   ISTATFIO2  28   ISTATSPEC  Arterial   ISTATAPHTC  7.369*   [id number]  67     Recent Labs       05/03/19   1645   TROPONINI  1.03*     Recent Labs      05/03/19   0430 05/04/19 0312 05/05/19 0447 05/05/19   0510   SODIUM  142  144  142  143   POTASSIUM  4.4  4.5  3.9  4.0   CHLORIDE  109  106  105  107   CO2  27  28  31  31   BUN  40*  42*  39*  39*   CREATININE  1.21  1.23  1.29  1.27   MAGNESIUM  2.3  2.2   --    --    PHOSPHORUS  3.0  4.0   --    --    CALCIUM  8.7  8.7  8.7  8.8     Recent Labs      05/03/19   0430 05/04/19 0312 05/05/19 0447 05/05/19   0510   PREALBUMIN  16.0*   --    --    --    GLUCOSE  139*  137*  99  98     Recent Labs      05/04/19 0312 05/05/19 0447 05/05/19   0510   WBC  20.7*  18.9*  20.0*   NEUTSPOLYS  89.40*  83.10*  82.50*   LYMPHOCYTES  4.20*  7.70*  8.50*   MONOCYTES  2.50  6.00  6.10   EOSINOPHILS  0.00  0.10  0.00   BASOPHILS  0.20  0.20  0.30     Recent Labs      05/04/19 0312 05/05/19 0447 05/05/19   0510   RBC  4.56*  4.42*  4.57*   HEMOGLOBIN  13.5*  13.4*  14.0   HEMATOCRIT  43.6  41.6*  43.0   PLATELETCT  304  284  315       Imaging  X-Ray:  I have personally reviewed the images and compared with prior images.    Assessment/Plan  * Chronic obstructive pulmonary disease with acute exacerbation (HCC)- (present on admission)   Assessment & Plan    FEV1 0.79 October 2012. Improved to 1.1 with bronchodilator. Quit smoking February 2012  po prednisone taper , completed course of empiric antimicrobial therapy, bronchial dilators  Acutely exacerbated with underlying pneumonia  Will benefit from pulmonary follow up and repeat PFTs after d.c. On Advair and Spiriva at home      NSTEMI (non-ST elevated myocardial infarction) ([facility])- (present on admission)   Assessment & Plan    S/p  cardiac catheterization with stent to the RCA Cardiology following     Acute on chronic respiratory failure with hypoxia ([facility])- (present on admission)   Assessment & Plan    Failed BiPAP and urgently intubated 5/1  Liberated 5/2  RT protocols       Atrial fibrillation (HCC)-  (present on admission)   Assessment & Plan    Rate control, anticoagulation, cardiology following  Remains in SR          VTE:  Heparin  Ulcer: H2 Antagonist  Lines: None    I have performed a physical exam and reviewed and updated ROS and Plan today (5/5/2019). In review of yesterday's note (5/4/2019), there are no changes except as documented above.     Patrice Mandel. DM   Pulmonary and Critical Care

## 2019-05-06 NOTE — DISCHARGE INSTRUCTIONS
Discharge Instructions    Discharged to home by car with relative. Discharged via wheelchair, hospital escort: Refused.  Special equipment needed: Oxygen    Be sure to schedule a follow-up appointment with your primary care doctor or any specialists as instructed.     Discharge Plan:   Diet Plan: Discussed  Activity Level: Discussed  Confirmed Follow up Appointment: Appointment Scheduled  Confirmed Symptoms Management: Discussed  Medication Reconciliation Updated: Yes  Influenza Vaccine Indication: Not indicated: Previously immunized this influenza season and > 8 years of age    I understand that a diet low in cholesterol, fat, and sodium is recommended for good health. Unless I have been given specific instructions below for another diet, I accept this instruction as my diet prescription.   Other diet: cardiac    Special Instructions: Diagnosis:  Acute Coronary Syndrome (ACS) is a diagnosis that encompasses cardiac-related chest pain and heart attack. ACS occurs when the blood flow to the heart muscle is severely reduced or cut off completely due to a slow process called atherosclerosis.  Atherosclerosis is a disease in which the coronary arteries become narrow from a buildup of fat, cholesterol, and other substances that combine to form plaque. If the plaque breaks, a blood clot will form and block the blood flow to the heart muscle. This lack of blood flow can cause damage or death to the heart muscle which is called a heart attack or Myocardial Infarction (MI). There are two different types of MIs:  ST Elevation Myocardial Infarction or STEMI (the most severe type of heart attack) and Non-ST Elevation Myocardial Infarction or NSTEMI.    Treatment Plan:  · Cardiac Diet  - Low fat, low salt, low cholesterol   · Cardiac Rehab  - Your doctor has ordered you a referral to [facility].  Call [phone] to schedule an appointment.  · Attend my follow-up appointment with my Cardiologist.  · Take my medications as  prescribed by my doctor  · Exercise daily  · Lower my bad cholesterol and raise my good cholesterol, lower my blood pressure and Reduce stress    Medications:  Certain medications are used to treat ACS.  Remember to always take medications as prescribed and never stop talking medications unless told by your doctor.    You have been prescribed the following medicatons:    Aspirin - Aspirin is used as a blood thinning medication and you will require this medication indefinitely.  Anti-platelet/blood thinner - Your Anti-platelet/Blood thinning medication is called clopidogrel, and is used in combination with aspirin to prevent clots from forming in your heart and/or around your stent.  Your doctor will determine how long you need to be on this medicine.  Beta-Blocker - Beta-Blocker carvedilol is used to lower blood pressure and heart rate, and/or helps your heart heal after a heart attack.  Statin - Statin atorvastatin is used to lower cholesterol.    · Is patient discharged on Warfarin / Coumadin?   No     Depression / Suicide Risk    As you are discharged from this [facility] facility, it is important to learn how to keep safe from harming yourself.    Recognize the warning signs:  · Abrupt changes in personality, positive or negative- including increase in energy   · Giving away possessions  · Change in eating patterns- significant weight changes-  positive or negative  · Change in sleeping patterns- unable to sleep or sleeping all the time   · Unwillingness or inability to communicate  · Depression  · Unusual sadness, discouragement and loneliness  · Talk of wanting to die  · Neglect of personal appearance   · Rebelliousness- reckless behavior  · Withdrawal from people/activities they love  · Confusion- inability to concentrate     If you or a loved one observes any of these behaviors or has concerns about self-harm, here's what you can do:  · Talk about it- your feelings and reasons for harming  yourself  · Remove any means that you might use to hurt yourself (examples: pills, rope, extension cords, firearm)  · Get professional help from the community (Mental Health, Substance Abuse, psychological counseling)  · Do not be alone:Call your Safe Contact- someone whom you trust who will be there for you.  · Call your local CRISIS HOTLINE 436-4840 or 358-103-9482  · Call your local Children's Mobile Crisis Response Team [place] (679) 337-7900 or [employer]  · Call the toll free National Suicide Prevention Hotlines   · National Suicide Prevention Lifeline 777-734-HKAG (3091)  · "[employer] Line Network 800-SUICIDE (900-3710)      Coronary Angiogram With Stent  Coronary angiogram with stent placement is a procedure to widen or open a narrow blood vessel of the heart (coronary artery). Arteries may become blocked by cholesterol buildup (plaques) in the lining or wall. When a coronary artery becomes partially blocked, blood flow to that area decreases. This may lead to chest pain or a heart attack (myocardial infarction).  A stent is a small piece of metal that looks like mesh or a spring. Stent placement may be done as treatment for a heart attack or right after a coronary angiogram in which a blocked artery is found.  Let your health care provider know about:  · Any allergies you have.  · All medicines you are taking, including vitamins, herbs, eye drops, creams, and over-the-counter medicines.  · Any problems you or family members have had with anesthetic medicines.  · Any blood disorders you have.  · Any surgeries you have had.  · Any medical conditions you have.  · Whether you are pregnant or may be pregnant.  What are the risks?  Generally, this is a safe procedure. However, problems may occur, including:  · Damage to the heart or its blood vessels.  · A return of blockage.  · Bleeding, infection, or bruising at the insertion site.  · A collection of blood under the skin (hematoma) at the  insertion site.  · A blood clot in another part of the body.  · Kidney injury.  · Allergic reaction to the dye or contrast that is used.  · Bleeding into the abdomen (retroperitoneal bleeding).  What happens before the procedure?  Staying hydrated   Follow instructions from your health care provider about hydration, which may include:  · Up to 2 hours before the procedure - you may continue to drink clear liquids, such as water, clear fruit juice, black coffee, and plain tea.  Eating and drinking restrictions   Follow instructions from your health care provider about eating and drinking, which may include:  · 8 hours before the procedure - stop eating heavy meals or foods such as meat, fried foods, or fatty foods.  · 6 hours before the procedure - stop eating light meals or foods, such as toast or cereal.  · 2 hours before the procedure - stop drinking clear liquids.  Ask your health care provider about:  · Changing or stopping your regular medicines. This is especially important if you are taking diabetes medicines or blood thinners.  · Taking medicines such as ibuprofen. These medicines can thin your blood. Do not take these medicines before your procedure if your health care provider instructs you not to. Generally, aspirin is recommended before a procedure of passing a small, thin tube (catheter) through a blood vessel and into the heart (cardiac catheterization).  What happens during the procedure?  · An IV tube will be inserted into one of your veins.  · You will be given one or more of the following:  ¨ A medicine to help you relax (sedative).  ¨ A medicine to numb the area where the catheter will be inserted into an artery (local anesthetic).  · To reduce your risk of infection:  ¨ Your health care team will wash or sanitize their hands.  ¨ Your skin will be washed with soap.  ¨ Hair may be removed from the area where the catheter will be inserted.  · Using a guide wire, the catheter will be inserted into an  artery. The location may be in your groin, in your wrist, or in the fold of your arm (near your elbow).  · A type of X-ray (fluoroscopy) will be used to help guide the catheter to the opening of the arteries in the heart.  · A dye will be injected into the catheter, and X-rays will be taken. The dye will help to show where any narrowing or blockages are located in the arteries.  · A tiny wire will be guided to the blocked spot, and a balloon will be inflated to make the artery wider.  · The stent will be expanded and will crush the plaques into the wall of the vessel. The stent will hold the area open and improve the blood flow. Most stents have a drug coating to reduce the risk of the stent narrowing over time.  · The artery may be made wider using a drill, laser, or other tools to remove plaques.  · When the blood flow is better, the catheter will be removed. The lining of the artery will grow over the stent, which stays where it was placed.  This procedure may vary among health care providers and hospitals.  What happens after the procedure?  · If the procedure is done through the leg, you will be kept in bed lying flat for about 6 hours. You will be instructed to not bend and not cross your legs.  · The insertion site will be checked frequently.  · The pulse in your foot or wrist will be checked frequently.  · You may have additional blood tests, X-rays, and a test that records the electrical activity of your heart (electrocardiogram, or ECG).  This information is not intended to replace advice given to you by your health care provider. Make sure you discuss any questions you have with your health care provider.  Document Released: 06/23/2004 Document Revised: 08/17/2017 Document Reviewed: 07/23/2017  Elsevier Interactive Patient Education © 2017 [employer]

## 2019-05-06 NOTE — THERAPY
"Physical Therapy Cardiac Rehab Evaluation completed.   Gait: Level Of Assist: Supervised with No Equipment Needed       Plan of Care: Patient with no further skilled PT needs in the acute care setting at this time  Discharge Recommendations: Equipment: No Equipment Needed. Post-acute therapy: see below.    See \"Rehab Therapy-Acute\" Patient Summary Report for complete documentation.    Patient is a 67 YO male that presented with complaints of SOB and was found to be in Afib with RVR and with elevated troponin and is s/p NSTEMI with PCI to RCA. EF 70% via cath. Patient with PMHx significant for COPD, HTN, sedentary lifestyle. He tolerated approximately 75ft x2 of ambulation with no AD and supervision and demonstrated increased WOB, positive talk test, and HR into 90s. Provided patient education regarding cardiac risk factors, home exercise program and appropriate activity progression, and self monitoring via RPE scale/talk test/HR. Patient will benefit from outpatient cardiac rehab once medically appropriate for further education and monitoring during exercise. He is currently at safe level for DC home, no further acute PT needs.  "

## 2019-05-06 NOTE — DISCHARGE SUMMARY
Discharge Summary    CHIEF COMPLAINT ON ADMISSION  Chief Complaint   Patient presents with   • Cough   • Shortness of Breath       Reason for Admission  EMS B=21 SOB     Admission Date  4/29/2019    CODE STATUS  Full Code    HPI & HOSPITAL COURSE  This is a 68 y.o. male here with  a history of COPD and nephrolysis.  He has a retained ureteral stent from prior treatment for his stones.  Patient initially visited urgent care on 4/29/2019 for shortness of breath, at urgent care he was found to be in atrial fibrillation with RVR, he was brought to the emergency room by EMS for evaluation.   He received IV diltazem and converted to sinus rhythm, he was also noted to have an elevated troponin, and was admitted to telemetry.   On 5/1/19 he was transferred to the ICU for respiratory distress and required intubation. Bronchoscopy was significant for inflamed airways and copious secretions.  He has been treated for COPD exacerbation and pneumonia, was extubated on 5/2/19.  Patient also was noticed to have non-STEMI and by cardiologist and recommend coronary angiogram.  Patient received coronary angiogram 5/5/2019 and was noticed to have CAD received PCI.  Patient tolerated procedure and postoperatively patient has no complication.  Patient is currently cleared by cardiology, urology and pulmonology.  Patient is recommended to follow-up with urology as outpatient for gross hematuria which patient said he has this chronically without worsening signs or anemia.  Patient's DAPT was recommended to be continued.  Patient currently stable and really wants to be discharged home.  We contacted urology office and urology Dr. Quiles's  will contact patient in the next 24 hours.    Therefore, he is discharged in good and stable condition to home with close outpatient follow-up.    The patient met 2-midnight criteria for an inpatient stay at the time of discharge.    Discharge Date  5/6/2019    FOLLOW UP ITEMS POST  DISCHARGE  none    DISCHARGE DIAGNOSES      Chronic obstructive pulmonary disease with acute exacerbation (HCC) POA: Yes    Acute on chronic respiratory failure with hypoxia (HCC) POA: Yes    NSTEMI (non-ST elevated myocardial infarction) (HCC) POA: Yes    Atrial fibrillation (HCC) POA: Yes    Gross hematuria POA: Yes    Pneumonia POA: Yes    Essential hypertension POA: Yes    Acute renal insufficiency POA: Yes    Dyslipidemia POA: Yes    Retained ureteral stent POA: Yes    FOLLOW UP  Future Appointments  Date Time Provider Department Center   5/10/2019 11:20 AM ERIN FlowersVICENTE   5/14/2019 10:45 AM GIOVANNI Marino [facility] None   10/10/2019 10:00 AM ERIN Flowers     [facility]  [address]  [place] [address]  [phone]    [facility]  called office and office states that they will call you for your appointment. If you do not hear from them please call to schedule. Thank you     [facility]  [address]  [address]  [facility] [id number] [phone]  Schedule an appointment as soon as possible for a visit  Your doctor has referred you to Cardiac Rehab, which is important for your recovery.  Please call to make an appointment.      MEDICATIONS ON DISCHARGE     Medication List      START taking these medications      Instructions   aspirin 81 MG Chew chewable tablet  Start taking on:  5/7/2019  Commonly known as:  ASA   Take 1 Tab by mouth every day.  Dose:  81 mg     atorvastatin 40 MG Tabs  Commonly known as:  LIPITOR   Take 1 Tab by mouth every evening.  Dose:  40 mg     carvedilol 12.5 MG Tabs  Commonly known as:  COREG   Take 1 Tab by mouth 2 times a day, with meals.  Dose:  12.5 mg     clopidogrel 75 MG Tabs  Start taking on:  5/7/2019  Commonly known as:  PLAVIX   Take 1 Tab by mouth every day.  Dose:  75 mg     predniSONE 10 MG Tabs  Commonly known as:  DELTASONE   Take 3 Tabs by mouth every day for 2  days.  Dose:  30 mg        CONTINUE taking these medications      Instructions   ADVAIR -21 MCG/ACT inhaler  Generic drug:  fluticasone-salmeterol   INHALE 2 PUFFS BY MOUTH TWICE DAILY     amLODIPine 10 MG Tabs  Commonly known as:  NORVASC   TAKE 1 TABLET BY MOUTH EVERY DAY     losartan 25 MG Tabs  Commonly known as:  COZAAR   TAKE 1 TABLET BY MOUTH EVERY DAY     tiotropium 18 MCG Caps  Commonly known as:  SPIRIVA HANDIHALER   INHALE THE CONTENTS OF 1 CAPSULE BY MOUTH USING HANDI HALER EVERY DAY     VENTOLIN  (90 Base) MCG/ACT Aers inhalation aerosol  Generic drug:  albuterol   INHALE 2 PUFFS BY MOUTH EVERY 6 HOURS AS NEEDED FOR PAIN     zolpidem 10 MG Tabs  Commonly known as:  AMBIEN   Take 1 Tab by mouth at bedtime as needed for Sleep for up to 30 days.  Dose:  10 mg            Allergies  No Known Allergies    DIET  Orders Placed This Encounter   Procedures   • Diet Order Cardiac     Standing Status:   Standing     Number of Occurrences:   1     Order Specific Question:   Diet:     Answer:   Cardiac [6]       ACTIVITY  As tolerated.  Weight bearing as tolerated    CONSULTATIONS  Card  Urology  Pulmonology    PROCEDURES  Pre-operative Diagnosis:  1.  Non-ST elevation myocardial infarction  2.  History of recurrent hematuria from nephrolithiasis     Post-operative Diagnosis:   1.  Non-ST elevation microinfarction likely secondary to a 90% ulcerated eccentric stenosis in the proximal right coronary artery  2.  Normal left ventricle systolic function and wall motion  3.  Status post stenting of the right coronary artery with diameter 03 0.5 x 18 mm vision stent stent with no residual stenosis and MAURA-3 flow     Procedure:  1.  Left heart catheterization with left ventriculography  2.  Selective coronary angiography  3.  Percutaneous coronary intervention of the right coronary artery  4.  Supervision of moderate conscious sedation    CT-HEAD W/O   Final Result      Normal CT scan of the head without  contrast.               INTERPRETING LOCATION:  [address], [place], [address]      DX-ABDOMEN FOR TUBE PLACEMENT   Final Result      1.  Cortrak nasogastric tube position consistent with intragastric placement.   2.  Right-sided double-J ureteral stent in place.      DX-CHEST-LIMITED (1 VIEW)   Final Result      1.  Interval placement of an endotracheal tube which terminates just above the level of the aortic arch in satisfactory position.   2.  Interval placement of an enteric feeding tube terminates in the right paramedian abdomen.   3.  No acute cardiopulmonary disease.      DX-CHEST-PORTABLE (1 VIEW)   Final Result      Mild bilateral basilar atelectasis and/or consolidation. Underlying infection is possible.      US-RENAL   Final Result         1. Bilateral nephrolithiasis.   2. Caliectasis of the right kidney, chronic. Right kidney is atrophic relative to the left.   3. No left hydronephrosis is identified.   4. Distal end of a pigtail ureteral stent in the bladder      EC-ECHOCARDIOGRAM LTD W/O CONT   Final Result      DX-CHEST-PORTABLE (1 VIEW)   Final Result      No acute cardiac or pulmonary abnormality is noted.      CL-LEFT HEART CATHETERIZATION WITH POSSIBLE INTERVENTION    (Results Pending)       PE:  Gen: AAOx3, NAD  Eyes: PELLA  Neck: no JVD, no lymphadenopathy  Cardia: RRR, no mrg  Lungs: CTAB, no rales, rhonci or wheezing  Abd: NABS, soft, non extended, no mass  EXT: No C/C/E, peripheral pulse 2+ b/l  Neuro: CN II-XII intact, non focal, reflex 2+ symmetrical  Skin: Intact, no lesion, warm  Psych: Appropriate.      LABORATORY  Lab Results   Component Value Date    SODIUM 142 05/06/2019    POTASSIUM 4.4 05/06/2019    CHLORIDE 107 05/06/2019    CO2 31 05/06/2019    GLUCOSE 105 (H) 05/06/2019    BUN 34 (H) 05/06/2019    CREATININE 1.37 05/06/2019        Lab Results   Component Value Date    WBC 16.6 (H) 05/06/2019    HEMOGLOBIN 13.0 (L) 05/06/2019    HEMATOCRIT 40.4 (L) 05/06/2019    PLATELETCT 240  05/06/2019        Total time of the discharge process exceeds 38 minutes.

## 2019-05-06 NOTE — PROGRESS NOTES
Cardiovascular Nurse Navigator ([phone]) Note:     Reviewed ACS/PCI medications:  Dual Antiplatelet Therapy (DAPT):  aspirin + clopidogrel  Beta-Blocker:  carvedilol  Statin:  atorvastatin  ACEI/ARB:  losartan  Aldosterone blocking agent:   N/A (EF 70%)     Meds to Beds:  Pt referred to Meds to Beds program to obtain clopidogrel prescription prior to discharge. Prescribing provider: SONI Good . Called in to [facility] pharmacy (Thank you!)  Please call [phone] (or [phone] and request 'on-call anti-coag pharmacist' if after hours) if patient has not received medication at time of discharge.     Intensive Cardiac Rehab (ICR) Referral:  Referred on 5/5; has current inpatient orders for nutrition consult & PT for Phase I ICR ICR follow-up and contact info added to AVS:  Yes     Demographics  Patient resides in: [place]  Insurance: [employer]     Inpatient & Discharge Patient Education:  Bedside nursing to continually provide patient education on ACS meds, signs and symptoms to monitor for, and risk factor modification.      Also at discharge please complete the “Acute Coronary Syndrome” special instructions on the AVS.            Orders Placed:     [facility]  Referred     Follow up Appointment  5/14     Thank you and please call with questions.

## 2019-05-10 ENCOUNTER — OFFICE VISIT (OUTPATIENT)
Dept: MEDICAL GROUP | Facility: MEDICAL CENTER | Age: 69
End: 2019-05-10
Payer: COMMERCIAL

## 2019-05-10 VITALS
SYSTOLIC BLOOD PRESSURE: 118 MMHG | WEIGHT: 171.6 LBS | OXYGEN SATURATION: 91 % | HEART RATE: 70 BPM | TEMPERATURE: 98.2 F | HEIGHT: 69 IN | RESPIRATION RATE: 16 BRPM | BODY MASS INDEX: 25.42 KG/M2 | DIASTOLIC BLOOD PRESSURE: 52 MMHG

## 2019-05-10 DIAGNOSIS — Z09 HOSPITAL DISCHARGE FOLLOW-UP: ICD-10-CM

## 2019-05-10 PROCEDURE — 99214 OFFICE O/P EST MOD 30 MIN: CPT | Performed by: PHYSICIAN ASSISTANT

## 2019-05-10 NOTE — PROGRESS NOTES
Subjective:      Olman Sims is a 68 y.o. male who presents with Hospital Follow-up (a little fatigued today)        hospital follow up    HPI  Patient presents for hospital follow. Admitted 4/29 and discharged on 5/6/19. Feeling fatigue but otherwise no chest pain, SOB, dizziness, headache. Taking medications as prescribed. Still waiting for appt with urology. Has appt with cardiology on 5/14. No blood in urine since discharge. No new complaint today.    Discharge note:  HPI & HOSPITAL COURSE  This is a 68 y.o. male here with  a history of COPD and nephrolysis.  He has a retained ureteral stent from prior treatment for his stones.  Patient initially visited urgent care on 4/29/2019 for shortness of breath, at urgent care he was found to be in atrial fibrillation with RVR, he was brought to the emergency room by EMS for evaluation.   He received IV diltazem and converted to sinus rhythm, he was also noted to have an elevated troponin, and was admitted to telemetry.   On 5/1/19 he was transferred to the ICU for respiratory distress and required intubation. Bronchoscopy was significant for inflamed airways and copious secretions.  He has been treated for COPD exacerbation and pneumonia, was extubated on 5/2/19.  Patient also was noticed to have non-STEMI and by cardiologist and recommend coronary angiogram.  Patient received coronary angiogram 5/5/2019 and was noticed to have CAD received PCI.  Patient tolerated procedure and postoperatively patient has no complication.  Patient is currently cleared by cardiology, urology and pulmonology.  Patient is recommended to follow-up with urology as outpatient for gross hematuria which patient said he has this chronically without worsening signs or anemia.  Patient's DAPT was recommended to be continued.  Patient currently stable and really wants to be discharged home.  We contacted urology office and urology Dr. Quiles's  will contact patient in the next  24 hours.        ROS3 pound weight loss from last visit. No fever/chills. No headache/dizziness. No focal weakness. No neck pain or stiffness. No n/v/d/c or abdominal pain. No rash or skin lesion. No chest pain, SOB or difficulty breathing. No joint redness or swelling    Active Ambulatory Problems     Diagnosis Date Noted   • Hydronephrosis of right kidney 02/26/2012   • Pneumonia 02/27/2012   • Hypertension goal BP (blood pressure) < 140/80 05/04/2012   • Chronic obstructive pulmonary disease with acute exacerbation ([facility]) 06/15/2012   • Claudication ([facility]) 11/01/2012   • Peripheral vascular disease ([facility]) 11/12/2012   • Mild cognitive impairment with memory loss 12/02/2013   • Essential hypertension 11/10/2014   • Acute renal insufficiency 11/10/2014   • Skin lesion of face 06/03/2015   • Nephrolithiasis 01/26/2016   • Acute on chronic respiratory failure with hypoxia ([facility]) 10/11/2017   • Dyslipidemia 01/03/2018   • Nasal septal deformity 05/04/2018   • Hypertrophy of both inferior nasal turbinates 05/04/2018   • Chronic vasomotor rhinitis 05/04/2018   • Primary insomnia 11/16/2018   • Prediabetes 01/10/2019   • Atrial fibrillation ([facility]) 04/29/2019   • NSTEMI (non-ST elevated myocardial infarction) ([facility]) 04/29/2019   • Gross hematuria 04/30/2019   • Retained ureteral stent 05/04/2019     Resolved Ambulatory Problems     Diagnosis Date Noted   • ARF (acute renal failure) ([facility]) 02/23/2012   • HTN (hypertension), malignant 02/23/2012   • COPD with acute exacerbation ([facility]) 02/23/2012   • Hyperlipemia 11/10/2014     Past Medical History:   Diagnosis Date   • Anesthesia    • Breath shortness    • COPD    • Dental disorder    • Emphysema of lung ([facility])    • Hypertension    • Oxygen dependent    • Renal disorder      Current Outpatient Prescriptions   Medication Sig Dispense Refill   • clopidogrel (PLAVIX) 75 MG Tab Take 1 Tab by mouth every day. 30 Tab 11   • aspirin (ASA) 81 MG Chew Tab chewable tablet Take 1 Tab by mouth  "every day. 100 Tab 3   • atorvastatin (LIPITOR) 40 MG Tab Take 1 Tab by mouth every evening. 30 Tab 2   • carvedilol (COREG) 12.5 MG Tab Take 1 Tab by mouth 2 times a day, with meals. 60 Tab 1   • zolpidem (AMBIEN) 10 MG Tab Take 1 Tab by mouth at bedtime as needed for Sleep for up to 30 days. 30 Tab 5   • tiotropium (SPIRIVA HANDIHALER) 18 MCG Cap INHALE THE CONTENTS OF 1 CAPSULE BY MOUTH USING HANDI HALER EVERY DAY 30 Cap 3   • losartan (COZAAR) 25 MG Tab TAKE 1 TABLET BY MOUTH EVERY DAY 90 Tab 3   • amLODIPine (NORVASC) 10 MG Tab TAKE 1 TABLET BY MOUTH EVERY DAY 90 Tab 0   • VENTOLIN  (90 Base) MCG/ACT Aero Soln inhalation aerosol INHALE 2 PUFFS BY MOUTH EVERY 6 HOURS AS NEEDED FOR PAIN 1 Inhaler 11   • ADVAIR -21 MCG/ACT inhaler INHALE 2 PUFFS BY MOUTH TWICE DAILY 36 g 6     No current facility-administered medications for this visit.    nkda  Former smoker   Objective:     /52 (BP Location: Right arm, Patient Position: Sitting, BP Cuff Size: Adult)   Pulse 70   Temp 36.8 °C (98.2 °F) (Temporal)   Resp 16   Ht 1.753 m (5' 9\")   Wt 77.8 kg (171 lb 9.6 oz)   SpO2 91%   BMI 25.34 kg/m²      Physical Exam   Constitutional: He is oriented to person, place, and time. He appears well-developed and well-nourished. No distress.   Cardiovascular: Normal rate and regular rhythm.    Pulmonary/Chest: Effort normal and breath sounds normal.   Neurological: He is alert and oriented to person, place, and time.   Skin: Skin is warm and dry. No rash noted. He is not diaphoretic. No pallor.   Psychiatric: He has a normal mood and affect. His behavior is normal. Judgment and thought content normal.   Vitals reviewed.              Assessment/Plan:     1. Hospital discharge follow-up  BP low in office today and patient with fatigue, rec cutting amlodipine dose in 1/2 (5mg) for the next few days. Will continue to check BPs at home and f/u with cardiology on Tuesday. Otherwise continue meds as prescribed. " Advised to call urology regarding appointment. F/u with me one month. ER with chest pain, SOB, difficulty breathing, dizziness or other emergent concern.

## 2019-05-16 ENCOUNTER — OFFICE VISIT (OUTPATIENT)
Dept: CARDIOLOGY | Facility: MEDICAL CENTER | Age: 69
End: 2019-05-16
Payer: COMMERCIAL

## 2019-05-16 VITALS
SYSTOLIC BLOOD PRESSURE: 100 MMHG | WEIGHT: 170 LBS | OXYGEN SATURATION: 93 % | HEIGHT: 69 IN | DIASTOLIC BLOOD PRESSURE: 50 MMHG | BODY MASS INDEX: 25.18 KG/M2 | HEART RATE: 62 BPM

## 2019-05-16 DIAGNOSIS — I48.91 ATRIAL FIBRILLATION, UNSPECIFIED TYPE (HCC): ICD-10-CM

## 2019-05-16 DIAGNOSIS — I10 ESSENTIAL HYPERTENSION: ICD-10-CM

## 2019-05-16 DIAGNOSIS — I25.10 CORONARY ARTERY DISEASE INVOLVING NATIVE CORONARY ARTERY OF NATIVE HEART WITHOUT ANGINA PECTORIS: ICD-10-CM

## 2019-05-16 DIAGNOSIS — I21.4 NSTEMI (NON-ST ELEVATED MYOCARDIAL INFARCTION) (HCC): ICD-10-CM

## 2019-05-16 DIAGNOSIS — E78.5 DYSLIPIDEMIA: ICD-10-CM

## 2019-05-16 DIAGNOSIS — J44.1 CHRONIC OBSTRUCTIVE PULMONARY DISEASE WITH ACUTE EXACERBATION (HCC): ICD-10-CM

## 2019-05-16 DIAGNOSIS — I73.9 PERIPHERAL VASCULAR DISEASE (HCC): Chronic | ICD-10-CM

## 2019-05-16 PROBLEM — R31.0 GROSS HEMATURIA: Status: RESOLVED | Noted: 2019-04-30 | Resolved: 2019-05-16

## 2019-05-16 PROBLEM — R73.03 PREDIABETES: Status: RESOLVED | Noted: 2019-01-10 | Resolved: 2019-05-16

## 2019-05-16 PROBLEM — J96.21 ACUTE ON CHRONIC RESPIRATORY FAILURE WITH HYPOXIA (HCC): Status: RESOLVED | Noted: 2017-10-11 | Resolved: 2019-05-16

## 2019-05-16 LAB — EKG IMPRESSION: NORMAL

## 2019-05-16 PROCEDURE — 99214 OFFICE O/P EST MOD 30 MIN: CPT | Performed by: NURSE PRACTITIONER

## 2019-05-16 PROCEDURE — 93000 ELECTROCARDIOGRAM COMPLETE: CPT | Performed by: INTERNAL MEDICINE

## 2019-05-16 RX ORDER — ATORVASTATIN CALCIUM 40 MG/1
40 TABLET, FILM COATED ORAL EVERY EVENING
Qty: 90 TAB | Refills: 3 | Status: SHIPPED | OUTPATIENT
Start: 2019-05-16 | End: 2020-06-03

## 2019-05-16 RX ORDER — BUDESONIDE AND FORMOTEROL FUMARATE DIHYDRATE 160; 4.5 UG/1; UG/1
AEROSOL RESPIRATORY (INHALATION)
Refills: 1 | COMMUNITY
Start: 2019-05-06 | End: 2020-01-16

## 2019-05-16 RX ORDER — CARVEDILOL 12.5 MG/1
12.5 TABLET ORAL 2 TIMES DAILY WITH MEALS
Qty: 180 TAB | Refills: 3 | Status: SHIPPED | OUTPATIENT
Start: 2019-05-16 | End: 2020-07-28

## 2019-05-16 NOTE — PATIENT INSTRUCTIONS
Stop taking losartan and amlodipine.     Continue carvedilol, increase to 12.5 mg AM and 25 mg PM if your blood pressure trends upward with top number >140.    Continue ASA, plavix and atorvastatin for your recent stent placement in your heart.    Repeat lab work in 3 months, lab slip attached.    We will check a 2 day holter monitor to review your heart rhythm for afib.    Make an appointment with your urologist. #[phone], Dr. Quiles    We have referred you to pulmonary doctor for your lungs.

## 2019-05-16 NOTE — LETTER
"     [facility]   [address], [address]  [place] [address]  Phone: [phone]  Fax: [phone]              Olman Sims  1950    Encounter Date: 5/16/2019    GIOVANNI Marino          PROGRESS NOTE:  Chief Complaint   Patient presents with   • MI (Non ST Segment Elevation MI)     Hospital follow up     Subjective:   Olman Sims is a 68 y.o. male who presents today for hospital follow up for COPD exacerbation due to pneumonia and subsququent NSTEMI with placement of EROS to RCA and afib with RVR.    He is a new patient to our office, he will establish with Dr. Cortes in our office.    Hx of COPD on oxygen at 2L, PAD with prior R iliac stenting in '12, uretal stent, HTN, HLD, and now CAD and afib.    He is overall doing much better. He states his symptoms started with an upper chest cold and then progressed so he presented to urgent care who found him to be in afib with RVR and sent him to the hospital. He decompensated and ended up being intubated for COPD exacerbation with pneumonia. He then was noted to have NSTEMI and was sent for cardiac cath and placement of a EROS to RCA was done. Afib was controlled on medications, he converted to SR. He is unable to take OAC at this time due to hematuria and is pending urology follow up soon.    He has chronic HERMAN, but no other symptoms noted.    Past Medical History:   Diagnosis Date   • Anesthesia     \"stopped breathing/elevated bp\"2012   • Breath shortness     when he quit smoking/with exertion   • CAD (coronary artery disease)    • COPD    • Dental disorder     lower partial   • Emphysema of lung (HCC)    • Hyperlipidemia    • Hypertension    • Oxygen dependent     q hs prn 2 ltr   • Paroxysmal atrial fibrillation (HCC)    • Renal disorder     right kidney stone/stent in place     Past Surgical History:   Procedure Laterality Date   • SEPTOPLASTY N/A 5/4/2018    Procedure: " SEPTOPLASTY;  Surgeon: Jesse Saavedra M.D.;  Location: SURGERY SAME DAY [facility];  Service: Ent   • TURBINATE REDUCTION Bilateral 5/4/2018    Procedure: TURBINATE REDUCTION- RESECTION WITH SUBMUCOSAL APPROACH;  Surgeon: Jesse Saavedra M.D.;  Location: SURGERY SAME DAY [facility];  Service: Ent   • RECOVERY  11/1/2012    Performed by Ir[facility]Recovery Surgery at [facility] SAME DAY [facility] ORS   • PERCUTANEOUS NEPHROSTOLITHOTOMY  5/2/2012    Performed by KAREN KOLB at SURGERY [facility] ORS   • PERCUTANEOUS NEPHROSTOLITHOTOMY  4/5/2012    Performed by KAREN KOLB at [facility] ORS   • RECOVERY  4/4/2012    Performed by SURGERY, IR-RECOVERY at SURGERY SAME DAY [facility] ORS   • CYSTOSCOPY STENT PLACEMENT  2/24/2012    Performed by KAREN KOLB at [facility] ORS   • APPENDECTOMY  1987   • APPENDECTOMY       Family History   Problem Relation Age of Onset   • Cancer Maternal Grandfather 65        colon     Social History     Social History   • Marital status: Single     Spouse name: N/A   • Number of children: N/A   • Years of education: N/A     Occupational History   • Not on file.     Social History Main Topics   • Smoking status: Former Smoker     Packs/day: 0.50     Years: 15.00     Types: Cigarettes     Quit date: 2/17/2012   • Smokeless tobacco: Never Used   • Alcohol use 0.0 oz/week      Comment: 2 glass wine per month   • Drug use: No   • Sexual activity: No     Other Topics Concern   • Not on file     Social History Narrative   • No narrative on file     No Known Allergies  Outpatient Encounter Prescriptions as of 5/16/2019   Medication Sig Dispense Refill   • SYMBICORT 160-4.5 MCG/ACT Aerosol INL 2 PFS PO BID  1   • atorvastatin (LIPITOR) 40 MG Tab Take 1 Tab by mouth every evening. 90 Tab 3   • carvedilol (COREG) 12.5 MG Tab Take 1 Tab by mouth 2 times a day, with meals. 180 Tab 3   • clopidogrel (PLAVIX) 75 MG Tab Take 1 Tab by mouth every day. 30 Tab 11   • aspirin (ASA) 81 MG  "Chew Tab chewable tablet Take 1 Tab by mouth every day. 100 Tab 3   • tiotropium (SPIRIVA HANDIHALER) 18 MCG Cap INHALE THE CONTENTS OF 1 CAPSULE BY MOUTH USING HANDI HALER EVERY DAY 30 Cap 3   • ADVAIR -21 MCG/ACT inhaler INHALE 2 PUFFS BY MOUTH TWICE DAILY 36 g 6   • [DISCONTINUED] atorvastatin (LIPITOR) 40 MG Tab Take 1 Tab by mouth every evening. 30 Tab 2   • [DISCONTINUED] carvedilol (COREG) 12.5 MG Tab Take 1 Tab by mouth 2 times a day, with meals. 60 Tab 1   • [DISCONTINUED] losartan (COZAAR) 25 MG Tab TAKE 1 TABLET BY MOUTH EVERY DAY 90 Tab 3   • [DISCONTINUED] amLODIPine (NORVASC) 10 MG Tab TAKE 1 TABLET BY MOUTH EVERY DAY 90 Tab 0   • VENTOLIN  (90 Base) MCG/ACT Aero Soln inhalation aerosol INHALE 2 PUFFS BY MOUTH EVERY 6 HOURS AS NEEDED FOR PAIN 1 Inhaler 11     No facility-administered encounter medications on file as of 5/16/2019.      Review of Systems   Constitutional: Positive for malaise/fatigue. Negative for fever.   Respiratory: Positive for shortness of breath. Negative for cough.    Cardiovascular: Negative for chest pain, palpitations, orthopnea, claudication, leg swelling and PND.   Gastrointestinal: Negative for abdominal pain.   Musculoskeletal: Negative for myalgias.   Neurological: Negative for dizziness.        Objective:   /50 (BP Location: Left arm, Patient Position: Sitting, BP Cuff Size: Adult)   Pulse 62   Ht 1.753 m (5' 9\")   Wt 77.1 kg (170 lb)   SpO2 93%   BMI 25.10 kg/m²      Physical Exam   Constitutional: He appears well-developed and well-nourished.   HENT:   Head: Normocephalic and atraumatic.   Eyes: EOM are normal.   Neck: No JVD present.   Cardiovascular: Normal rate, regular rhythm, normal heart sounds and intact distal pulses.    Pulmonary/Chest: Effort normal. He has decreased breath sounds in the right lower field and the left lower field.   Oxygen 2L   Musculoskeletal: Normal range of motion. He exhibits no edema.   Skin: Skin is warm and dry. "   Psychiatric: He has a normal mood and affect.   Nursing note and vitals reviewed.      Assessment:     1. Chronic obstructive pulmonary disease with acute exacerbation ([facility])  REFERRAL TO PULMONOLOGY   2. NSTEMI (non-ST elevated myocardial infarction) ([facility])     3. Coronary artery disease involving native coronary artery of native heart without angina pectoris     4. Atrial fibrillation, unspecified type ([facility])  Holter Monitor Study    EKG   5. Dyslipidemia  LIPID PANEL    Comp Metabolic Panel    CBC WITHOUT DIFFERENTIAL   6. Essential hypertension  CBC WITHOUT DIFFERENTIAL   7. Peripheral vascular disease (HCC)  CBC WITHOUT DIFFERENTIAL     Medical Decision Making:  Today's Assessment / Status / Plan:     1. CAD with placement of EROS to RCA  -no angina, chronic HERMAN  -cont asa, plavix (1 year), statin  -follow clinically  -education and coronary anatomy discussed    2. PAF with recent episode of afib RVR  -asymptomatic and rate controlled  -EKG shows SR today  -check 48 holter for afib burden  -most likely cause was due to acute illness  -cont coreg and asa/plavix  -consider OAC once urology clears patient for hematuria    3. HTN  -moderate control  -increase coreg to 12.5 mg QAM and 25 mg QPM if SBP >140 at home  -follow at home    4. HLD  -statin  -LDL goal <70 with CAD and PAD  -repeat lipid and cmp in 3 months    5. PAD with R iliac stenting in '12 by Dr. Vital  -asymptomatic with no claudication  -actually wasn't aware of his iliac stent done  -cont asa, plavix, statin    6. COPD   -chronic oxygen  -refer to pulmonary for follow up    FU in clinic in 3 months with CR with labs and holter    Patient verbalizes understanding and agrees with the plan of care.     Collaborating MD: Josh PADILLA        No Recipients

## 2019-05-17 ASSESSMENT — ENCOUNTER SYMPTOMS
FEVER: 0
ORTHOPNEA: 0
SHORTNESS OF BREATH: 1
COUGH: 0
CLAUDICATION: 0
MYALGIAS: 0
ABDOMINAL PAIN: 0
PALPITATIONS: 0
DIZZINESS: 0
PND: 0

## 2019-05-17 NOTE — PROGRESS NOTES
"Chief Complaint   Patient presents with   • MI (Non ST Segment Elevation MI)     Hospital follow up     Subjective:   Olman Sims is a 68 y.o. male who presents today for hospital follow up for COPD exacerbation due to pneumonia and subsququent NSTEMI with placement of EROS to RCA and afib with RVR.    He is a new patient to our office, he will establish with Dr. Cortes in our office.    Hx of COPD on oxygen at 2L, PAD with prior R iliac stenting in '12, uretal stent, HTN, HLD, and now CAD and afib.    He is overall doing much better. He states his symptoms started with an upper chest cold and then progressed so he presented to urgent care who found him to be in afib with RVR and sent him to the hospital. He decompensated and ended up being intubated for COPD exacerbation with pneumonia. He then was noted to have NSTEMI and was sent for cardiac cath and placement of a EROS to RCA was done. Afib was controlled on medications, he converted to SR. He is unable to take OAC at this time due to hematuria and is pending urology follow up soon.    He has chronic HERMAN, but no other symptoms noted.    Past Medical History:   Diagnosis Date   • Anesthesia     \"stopped breathing/elevated bp\"2012   • Breath shortness     when he quit smoking/with exertion   • CAD (coronary artery disease)    • COPD    • Dental disorder     lower partial   • Emphysema of lung (HCC)    • Hyperlipidemia    • Hypertension    • Oxygen dependent     q hs prn 2 ltr   • Paroxysmal atrial fibrillation (HCC)    • Renal disorder     right kidney stone/stent in place     Past Surgical History:   Procedure Laterality Date   • SEPTOPLASTY N/A 5/4/2018    Procedure: SEPTOPLASTY;  Surgeon: Jesse Saavedra M.D.;  Location: SURGERY SAME DAY [facility];  Service: Ent   • TURBINATE REDUCTION Bilateral 5/4/2018    Procedure: TURBINATE REDUCTION- RESECTION WITH SUBMUCOSAL APPROACH;  Surgeon: Jesse Saavedra M.D.;  Location: SURGERY SAME DAY [facility]" ORS;  Service: Ent   • RECOVERY  11/1/2012    Performed by Ir-Recovery Surgery at SURGERY SAME DAY [facility] ORS   • PERCUTANEOUS NEPHROSTOLITHOTOMY  5/2/2012    Performed by KAREN KOLB at SURGERY [facility] ORS   • PERCUTANEOUS NEPHROSTOLITHOTOMY  4/5/2012    Performed by KAREN KOLB at SURGERY [facility] ORS   • RECOVERY  4/4/2012    Performed by SURGERY, IR-RECOVERY at SURGERY SAME DAY [facility] ORS   • CYSTOSCOPY STENT PLACEMENT  2/24/2012    Performed by KAREN KOLB at SURGERY [facility] ORS   • APPENDECTOMY  1987   • APPENDECTOMY       Family History   Problem Relation Age of Onset   • Cancer Maternal Grandfather 65        colon     Social History     Social History   • Marital status: Single     Spouse name: N/A   • Number of children: N/A   • Years of education: N/A     Occupational History   • Not on file.     Social History Main Topics   • Smoking status: Former Smoker     Packs/day: 0.50     Years: 15.00     Types: Cigarettes     Quit date: 2/17/2012   • Smokeless tobacco: Never Used   • Alcohol use 0.0 oz/week      Comment: 2 glass wine per month   • Drug use: No   • Sexual activity: No     Other Topics Concern   • Not on file     Social History Narrative   • No narrative on file     No Known Allergies  Outpatient Encounter Prescriptions as of 5/16/2019   Medication Sig Dispense Refill   • SYMBICORT 160-4.5 MCG/ACT Aerosol INL 2 PFS PO BID  1   • atorvastatin (LIPITOR) 40 MG Tab Take 1 Tab by mouth every evening. 90 Tab 3   • carvedilol (COREG) 12.5 MG Tab Take 1 Tab by mouth 2 times a day, with meals. 180 Tab 3   • clopidogrel (PLAVIX) 75 MG Tab Take 1 Tab by mouth every day. 30 Tab 11   • aspirin (ASA) 81 MG Chew Tab chewable tablet Take 1 Tab by mouth every day. 100 Tab 3   • tiotropium (SPIRIVA HANDIHALER) 18 MCG Cap INHALE THE CONTENTS OF 1 CAPSULE BY MOUTH USING HANDI HALER EVERY DAY 30 Cap 3   • ADVAIR -21 MCG/ACT inhaler INHALE 2 PUFFS BY MOUTH TWICE DAILY 36 g 6   •  "[DISCONTINUED] atorvastatin (LIPITOR) 40 MG Tab Take 1 Tab by mouth every evening. 30 Tab 2   • [DISCONTINUED] carvedilol (COREG) 12.5 MG Tab Take 1 Tab by mouth 2 times a day, with meals. 60 Tab 1   • [DISCONTINUED] losartan (COZAAR) 25 MG Tab TAKE 1 TABLET BY MOUTH EVERY DAY 90 Tab 3   • [DISCONTINUED] amLODIPine (NORVASC) 10 MG Tab TAKE 1 TABLET BY MOUTH EVERY DAY 90 Tab 0   • VENTOLIN  (90 Base) MCG/ACT Aero Soln inhalation aerosol INHALE 2 PUFFS BY MOUTH EVERY 6 HOURS AS NEEDED FOR PAIN 1 Inhaler 11     No facility-administered encounter medications on file as of 5/16/2019.      Review of Systems   Constitutional: Positive for malaise/fatigue. Negative for fever.   Respiratory: Positive for shortness of breath. Negative for cough.    Cardiovascular: Negative for chest pain, palpitations, orthopnea, claudication, leg swelling and PND.   Gastrointestinal: Negative for abdominal pain.   Musculoskeletal: Negative for myalgias.   Neurological: Negative for dizziness.        Objective:   /50 (BP Location: Left arm, Patient Position: Sitting, BP Cuff Size: Adult)   Pulse 62   Ht 1.753 m (5' 9\")   Wt 77.1 kg (170 lb)   SpO2 93%   BMI 25.10 kg/m²     Physical Exam   Constitutional: He appears well-developed and well-nourished.   HENT:   Head: Normocephalic and atraumatic.   Eyes: EOM are normal.   Neck: No JVD present.   Cardiovascular: Normal rate, regular rhythm, normal heart sounds and intact distal pulses.    Pulmonary/Chest: Effort normal. He has decreased breath sounds in the right lower field and the left lower field.   Oxygen 2L   Musculoskeletal: Normal range of motion. He exhibits no edema.   Skin: Skin is warm and dry.   Psychiatric: He has a normal mood and affect.   Nursing note and vitals reviewed.      Assessment:     1. Chronic obstructive pulmonary disease with acute exacerbation (HCC)  REFERRAL TO PULMONOLOGY   2. NSTEMI (non-ST elevated myocardial infarction) ([facility])     3. Coronary " artery disease involving native coronary artery of native heart without angina pectoris     4. Atrial fibrillation, unspecified type (HCC)  Holter Monitor Study    EKG   5. Dyslipidemia  LIPID PANEL    Comp Metabolic Panel    CBC WITHOUT DIFFERENTIAL   6. Essential hypertension  CBC WITHOUT DIFFERENTIAL   7. Peripheral vascular disease (HCC)  CBC WITHOUT DIFFERENTIAL     Medical Decision Making:  Today's Assessment / Status / Plan:     1. CAD with placement of EROS to RCA  -no angina, chronic HERMAN  -cont asa, plavix (1 year), statin  -follow clinically  -education and coronary anatomy discussed    2. PAF with recent episode of afib RVR  -asymptomatic and rate controlled  -EKG shows SR today  -check 48 holter for afib burden  -most likely cause was due to acute illness  -cont coreg and asa/plavix  -consider OAC once urology clears patient for hematuria    3. HTN  -moderate control  -increase coreg to 12.5 mg QAM and 25 mg QPM if SBP >140 at home  -follow at home    4. HLD  -statin  -LDL goal <70 with CAD and PAD  -repeat lipid and cmp in 3 months    5. PAD with R iliac stenting in '12 by Dr. Vital  -asymptomatic with no claudication  -actually wasn't aware of his iliac stent done  -cont asa, plavix, statin    6. COPD   -chronic oxygen  -refer to pulmonary for follow up    FU in clinic in 3 months with CR with labs and holter    Patient verbalizes understanding and agrees with the plan of care.     Collaborating MD: Josh PADILLA

## 2019-05-21 ENCOUNTER — TELEPHONE (OUTPATIENT)
Dept: CARDIOLOGY | Facility: MEDICAL CENTER | Age: 69
End: 2019-05-21

## 2019-05-22 NOTE — TELEPHONE ENCOUNTER
I called Urology.  Dr. Quiles will be doing the surgery.  I left a message with Trinh at Urology surgery scheduling regarding Era's response with my # to call back for questions.

## 2019-05-22 NOTE — TELEPHONE ENCOUNTER
Received urgent cardiac clearance request from [facility] [place] for pt to proceed with laser treatment of stone with cystoscopy removal and hold anticoagulants for 7 days.     Hx: CAD with EROS to RCA 5/5/19 on DAPT: plavix and ASA, PAF not on oac, HTN, PAD, COPD    To [place]

## 2019-05-25 NOTE — TELEPHONE ENCOUNTER
Clearance form was completed with [facility] PATEL instructions and faxed back to [facility] [place] at [phone]

## 2019-05-28 LAB
FUNGUS SPEC CULT: ABNORMAL
FUNGUS SPEC CULT: ABNORMAL
SIGNIFICANT IND 70042: ABNORMAL
SITE SITE: ABNORMAL
SOURCE SOURCE: ABNORMAL

## 2019-05-30 ENCOUNTER — NON-PROVIDER VISIT (OUTPATIENT)
Dept: CARDIOLOGY | Facility: MEDICAL CENTER | Age: 69
End: 2019-05-30
Payer: COMMERCIAL

## 2019-05-30 DIAGNOSIS — I48.91 ATRIAL FIBRILLATION, UNSPECIFIED TYPE (HCC): ICD-10-CM

## 2019-05-30 PROCEDURE — 93224 XTRNL ECG REC UP TO 48 HRS: CPT | Performed by: INTERNAL MEDICINE

## 2019-05-30 RX ORDER — FLUTICASONE PROPIONATE AND SALMETEROL XINAFOATE 230; 21 UG/1; UG/1
AEROSOL, METERED RESPIRATORY (INHALATION)
Qty: 36 G | Refills: 0 | Status: SHIPPED | OUTPATIENT
Start: 2019-05-30 | End: 2019-11-13 | Stop reason: SDUPTHER

## 2019-06-03 LAB — EKG IMPRESSION: NORMAL

## 2019-06-04 ENCOUNTER — TELEPHONE (OUTPATIENT)
Dept: CARDIOLOGY | Facility: MEDICAL CENTER | Age: 69
End: 2019-06-04

## 2019-06-04 NOTE — TELEPHONE ENCOUNTER
NYLA Marino.  Char Estrada, R.N.             No afib on monitor. Great news. SR with SB noted. FU as planned in August, call for symptoms concerning for symptomatic bradycardia. Continue same medications for now. SC      Call out to pt regarding above. No answer. LVM to call back.

## 2019-06-04 NOTE — TELEPHONE ENCOUNTER
Kenia Estrada, R.N.   Phone Number: [phone]             SC/[place]     Pt is returning call, can be reached at [phone].      Spoke with pt regarding monitor results. He verbalized understanding.

## 2019-06-10 ENCOUNTER — OFFICE VISIT (OUTPATIENT)
Dept: MEDICAL GROUP | Facility: MEDICAL CENTER | Age: 69
End: 2019-06-10
Payer: COMMERCIAL

## 2019-06-10 VITALS
DIASTOLIC BLOOD PRESSURE: 66 MMHG | HEIGHT: 69 IN | OXYGEN SATURATION: 92 % | TEMPERATURE: 98.8 F | RESPIRATION RATE: 16 BRPM | WEIGHT: 171.4 LBS | BODY MASS INDEX: 25.39 KG/M2 | HEART RATE: 76 BPM | SYSTOLIC BLOOD PRESSURE: 132 MMHG

## 2019-06-10 DIAGNOSIS — I48.91 ATRIAL FIBRILLATION, UNSPECIFIED TYPE (HCC): ICD-10-CM

## 2019-06-10 DIAGNOSIS — J44.1 CHRONIC OBSTRUCTIVE PULMONARY DISEASE WITH ACUTE EXACERBATION (HCC): ICD-10-CM

## 2019-06-10 DIAGNOSIS — Z96.0 RETAINED URETERAL STENT: ICD-10-CM

## 2019-06-10 DIAGNOSIS — I10 ESSENTIAL HYPERTENSION: ICD-10-CM

## 2019-06-10 DIAGNOSIS — I21.4 NSTEMI (NON-ST ELEVATED MYOCARDIAL INFARCTION) (HCC): ICD-10-CM

## 2019-06-10 PROCEDURE — 99214 OFFICE O/P EST MOD 30 MIN: CPT | Performed by: PHYSICIAN ASSISTANT

## 2019-06-10 NOTE — ASSESSMENT & PLAN NOTE
Was scheduled with Dr. Quiles to have this removed but may need to wait as he is not able to come of anti-platelets until at least 6 months after cardiac stent. Will f/u with Dr. Quiles

## 2019-06-10 NOTE — PROGRESS NOTES
Subjective:   Olman Sims is a 68 y.o. male here today for one month follow up after recent hospitalization with COPD exacerbation, NSTEMI, stent placement and a fib with rvr. Recent cardiology note reviewed. Patient doing well. Taking meds as prescribed.     Retained ureteral stent  Was scheduled with Dr. Quiles to have this removed but may need to wait as he is not able to come of anti-platelets until at least 6 months after cardiac stent. Will f/u with Dr. Quiles    Chronic obstructive pulmonary disease with acute exacerbation (HCC)  Chronic, feeling back to normal, still using oxygen prn and at night. Hasn't started exercising yet but has a home stationary bike. Referral placed by cardiology for pulmonology.    Essential hypertension  Now stable on current, no dizziness or headache, no chest pain or leg swelling    Atrial fibrillation (HCC)  Recent holter showed no a fib, thought by cardiology to have likely been triggered by illness, has f/u with cardiology in August    NSTEMI (non-ST elevated myocardial infarction) ([facility])  Followed by cardiology, taking meds as prescribed, no chest pain, SOB getting better, using oxygen prn, hasn't started cardiac rehab, has f/u with cardiology in August     Current medicines (including changes today)  Current Outpatient Prescriptions   Medication Sig Dispense Refill   • ADVAIR -21 MCG/ACT inhaler INHALE 2 PUFFS BY MOUTH TWICE DAILY 36 g 0   • SYMBICORT 160-4.5 MCG/ACT Aerosol INL 2 PFS PO BID  1   • atorvastatin (LIPITOR) 40 MG Tab Take 1 Tab by mouth every evening. 90 Tab 3   • carvedilol (COREG) 12.5 MG Tab Take 1 Tab by mouth 2 times a day, with meals. 180 Tab 3   • clopidogrel (PLAVIX) 75 MG Tab Take 1 Tab by mouth every day. 30 Tab 11   • aspirin (ASA) 81 MG Chew Tab chewable tablet Take 1 Tab by mouth every day. 100 Tab 3   • tiotropium (SPIRIVA HANDIHALER) 18 MCG Cap INHALE THE CONTENTS OF 1 CAPSULE BY MOUTH USING HANDI HALER EVERY DAY 30 Cap 3   •  "VENTOLIN  (90 Base) MCG/ACT Aero Soln inhalation aerosol INHALE 2 PUFFS BY MOUTH EVERY 6 HOURS AS NEEDED FOR PAIN 1 Inhaler 11     No current facility-administered medications for this visit.      He  has a past medical history of Anesthesia; Breath shortness; CAD (coronary artery disease); COPD; Dental disorder; Emphysema of lung ([facility]); Hyperlipidemia; Hypertension; Oxygen dependent; Paroxysmal atrial fibrillation ([facility]); PVD (peripheral vascular disease) ([facility]); and Renal disorder.    ROS   No fever/chills. No weight change. No headache/dizziness. No focal weakness. No sore throat, nasal congestion, ear pain. No chest pain, no shortness of breath, difficulty breathing. No n/v/d/c or abdominal pain. No urinary complaint. No rash or skin lesion. No joint pain or swelling.     Objective:     /66 (BP Location: Right arm, Patient Position: Sitting, BP Cuff Size: Adult)   Pulse 76   Temp 37.1 °C (98.8 °F) (Temporal)   Resp 16   Ht 1.753 m (5' 9\")   Wt 77.7 kg (171 lb 6.4 oz)   SpO2 92%  Body mass index is 25.31 kg/m².   Physical Exam:  Constitutional: WDWN, NAD  Skin: Warm, dry, good turgor, no rashes in visible areas.  Respiratory: Unlabored respiratory effort, lungs clear to auscultation, no wheezes, no ronchi.  Cardiovascular: Normal S1, S2,no leg edema.  Psych: Alert and oriented x3, normal affect and mood.    Assessment and Plan:   The following treatment plan was discussed    1. Retained ureteral stent  Will f/u with Dr. Quiles    2. Chronic obstructive pulmonary disease with acute exacerbation ([facility])  Will f/u with pulmonology    3. Essential hypertension      4. Atrial fibrillation, unspecified type ([facility])  Will f/u with cardiology    5. NSTEMI (non-ST elevated myocardial infarction) ([facility])  Will f/u with cardiology      Followup: 3 months         "

## 2019-06-10 NOTE — ASSESSMENT & PLAN NOTE
Chronic, feeling back to normal, still using oxygen prn and at night. Hasn't started exercising yet but has a home stationary bike. Referral placed by cardiology for pulmonology.

## 2019-06-10 NOTE — ASSESSMENT & PLAN NOTE
Followed by cardiology, taking meds as prescribed, no chest pain, SOB getting better, using oxygen prn, hasn't started cardiac rehab, has f/u with cardiology in August

## 2019-06-10 NOTE — ASSESSMENT & PLAN NOTE
Recent holter showed no a fib, thought by cardiology to have likely been triggered by illness, has f/u with cardiology in August

## 2019-06-19 DIAGNOSIS — I10 ESSENTIAL HYPERTENSION: ICD-10-CM

## 2019-06-19 RX ORDER — AMLODIPINE BESYLATE 10 MG/1
TABLET ORAL
Qty: 90 TAB | Refills: 3 | Status: SHIPPED | OUTPATIENT
Start: 2019-06-19 | End: 2020-09-08

## 2019-07-02 ENCOUNTER — HOSPITAL ENCOUNTER (OUTPATIENT)
Dept: RADIOLOGY | Facility: MEDICAL CENTER | Age: 69
End: 2019-07-02
Attending: UROLOGY
Payer: COMMERCIAL

## 2019-07-02 DIAGNOSIS — N20.0 KIDNEY STONE: ICD-10-CM

## 2019-07-02 PROCEDURE — 74018 RADEX ABDOMEN 1 VIEW: CPT

## 2019-08-08 ENCOUNTER — TELEPHONE (OUTPATIENT)
Dept: CARDIOLOGY | Facility: MEDICAL CENTER | Age: 69
End: 2019-08-08

## 2019-08-08 NOTE — TELEPHONE ENCOUNTER
Reminder call made out for pt to have fasting lab work done ordered by [facility] for upcoming appt with CR on 8/14/19.

## 2019-08-14 ENCOUNTER — OFFICE VISIT (OUTPATIENT)
Dept: CARDIOLOGY | Facility: MEDICAL CENTER | Age: 69
End: 2019-08-14
Payer: COMMERCIAL

## 2019-08-14 VITALS
DIASTOLIC BLOOD PRESSURE: 70 MMHG | WEIGHT: 178.6 LBS | HEIGHT: 69 IN | BODY MASS INDEX: 26.45 KG/M2 | SYSTOLIC BLOOD PRESSURE: 160 MMHG | HEART RATE: 58 BPM | OXYGEN SATURATION: 96 %

## 2019-08-14 DIAGNOSIS — I10 ESSENTIAL HYPERTENSION: ICD-10-CM

## 2019-08-14 DIAGNOSIS — E78.5 DYSLIPIDEMIA: ICD-10-CM

## 2019-08-14 DIAGNOSIS — I73.9 PAD (PERIPHERAL ARTERY DISEASE) (HCC): ICD-10-CM

## 2019-08-14 DIAGNOSIS — I25.10 CORONARY ARTERY DISEASE INVOLVING NATIVE CORONARY ARTERY OF NATIVE HEART WITHOUT ANGINA PECTORIS: ICD-10-CM

## 2019-08-14 PROCEDURE — 99214 OFFICE O/P EST MOD 30 MIN: CPT | Performed by: INTERNAL MEDICINE

## 2019-08-14 RX ORDER — ZOLPIDEM TARTRATE 10 MG/1
TABLET ORAL
Refills: 5 | COMMUNITY
Start: 2019-07-20 | End: 2019-10-23 | Stop reason: SDUPTHER

## 2019-08-14 RX ORDER — LOSARTAN POTASSIUM 25 MG/1
TABLET ORAL
Refills: 3 | COMMUNITY
Start: 2019-06-19 | End: 2020-01-14

## 2019-08-14 ASSESSMENT — ENCOUNTER SYMPTOMS
DIZZINESS: 0
MYALGIAS: 0
SHORTNESS OF BREATH: 1
BRUISES/BLEEDS EASILY: 1
PALPITATIONS: 0
BLOOD IN STOOL: 0

## 2019-08-14 NOTE — PROGRESS NOTES
"Chief Complaint   Patient presents with   • Recent MI (End of April 2019)   RCA stent  PAF  HTN    Subjective:   Olman Sims is a 69 y.o. male who presents today for f/u above issues    Admitted the end of April for NSTEMI  Cath showed single vessel disease in the RCA, received 3.5x18 mm stent.  The patient is doing well from cardiac standpoint.   He denies any chest pain, palpitation or heart failure type symptoms.  Denies any side effect from medications.  BP at home mostly in the 140    Past Medical History:   Diagnosis Date   • Anesthesia     \"stopped breathing/elevated bp\"2012   • Breath shortness     when he quit smoking/with exertion   • CAD (coronary artery disease)    • COPD    • Dental disorder     lower partial   • Emphysema of lung (HCC)    • Hyperlipidemia    • Hypertension    • Oxygen dependent     q hs prn 2 ltr   • Paroxysmal atrial fibrillation (HCC)    • PVD (peripheral vascular disease) ([facility])     R iliac artery stent   • Renal disorder     right kidney stone/stent in place     Past Surgical History:   Procedure Laterality Date   • SEPTOPLASTY N/A 5/4/2018    Procedure: SEPTOPLASTY;  Surgeon: Jesse Saavedra M.D.;  Location: SURGERY SAME DAY [facility];  Service: Ent   • TURBINATE REDUCTION Bilateral 5/4/2018    Procedure: TURBINATE REDUCTION- RESECTION WITH SUBMUCOSAL APPROACH;  Surgeon: Jesse Saavedra M.D.;  Location: SURGERY SAME DAY [facility];  Service: Ent   • RECOVERY  11/1/2012    Performed by [facility]Rec[facility] Surgery at [facility] SAME DAY [facility]   • PERCUTANEOUS NEPHROSTOLITHOTOMY  5/2/2012    Performed by KAREN KOLB at [facility] ORS   • PERCUTANEOUS NEPHROSTOLITHOTOMY  4/5/2012    Performed by KAREN KOLB at [facility] ORS   • RECOVERY  4/4/2012    Performed by SURGERY, IR-RECOVERY at [facility] SAME DAY [facility]   • CYSTOSCOPY STENT PLACEMENT  2/24/2012    Performed by KAREN KOLB at [facility] ORS   • APPENDECTOMY  1987   • " APPENDECTOMY     • STENT PLACEMENT     • ZZZ CARDIAC CATH       Family History   Problem Relation Age of Onset   • Cancer Maternal Grandfather 65        colon     Social History     Socioeconomic History   • Marital status: Single     Spouse name: Not on file   • Number of children: Not on file   • Years of education: Not on file   • Highest education level: Not on file   Occupational History   • Not on file   Social Needs   • Financial resource strain: Not on file   • Food insecurity:     Worry: Not on file     Inability: Not on file   • Transportation needs:     Medical: Not on file     Non-medical: Not on file   Tobacco Use   • Smoking status: Former Smoker     Packs/day: 0.50     Years: 15.00     Pack years: 7.50     Types: Cigarettes     Last attempt to quit: 2012     Years since quittin.4   • Smokeless tobacco: Never Used   Substance and Sexual Activity   • Alcohol use: Yes     Alcohol/week: 0.0 oz     Comment: 2 glass wine per month   • Drug use: No   • Sexual activity: Never   Lifestyle   • Physical activity:     Days per week: Not on file     Minutes per session: Not on file   • Stress: Not on file   Relationships   • Social connections:     Talks on phone: Not on file     Gets together: Not on file     Attends [employer] service: Not on file     Active member of club or organization: Not on file     Attends meetings of clubs or organizations: Not on file     Relationship status: Not on file   • Intimate partner violence:     Fear of current or ex partner: Not on file     Emotionally abused: Not on file     Physically abused: Not on file     Forced sexual activity: Not on file   Other Topics Concern   • Not on file   Social History Narrative   • Not on file     No Known Allergies  Outpatient Encounter Medications as of 2019   Medication Sig Dispense Refill   • zolpidem (AMBIEN) 10 MG Tab TK 1 T PO  HS PRF SLP  5   • losartan (COZAAR) 25 MG Tab TK 1 T PO QD  3   • amLODIPine (NORVASC) 10 MG Tab  "TAKE 1 TABLET BY MOUTH EVERY DAY 90 Tab 3   • ADVAIR -21 MCG/ACT inhaler INHALE 2 PUFFS BY MOUTH TWICE DAILY 36 g 0   • SYMBICORT 160-4.5 MCG/ACT Aerosol INL 2 PFS PO BID  1   • atorvastatin (LIPITOR) 40 MG Tab Take 1 Tab by mouth every evening. 90 Tab 3   • carvedilol (COREG) 12.5 MG Tab Take 1 Tab by mouth 2 times a day, with meals. 180 Tab 3   • clopidogrel (PLAVIX) 75 MG Tab Take 1 Tab by mouth every day. 30 Tab 11   • aspirin (ASA) 81 MG Chew Tab chewable tablet Take 1 Tab by mouth every day. 100 Tab 3   • tiotropium (SPIRIVA HANDIHALER) 18 MCG Cap INHALE THE CONTENTS OF 1 CAPSULE BY MOUTH USING HANDI HALER EVERY DAY 30 Cap 3   • VENTOLIN  (90 Base) MCG/ACT Aero Soln inhalation aerosol INHALE 2 PUFFS BY MOUTH EVERY 6 HOURS AS NEEDED FOR PAIN 1 Inhaler 11     No facility-administered encounter medications on file as of 8/14/2019.      Review of Systems   Constitutional: Negative for malaise/fatigue.   HENT: Negative for nosebleeds.    Respiratory: Positive for shortness of breath.         From COPD, use portable O2 at time but not new   Cardiovascular: Negative for chest pain, palpitations and leg swelling.   Gastrointestinal: Negative for blood in stool.   Genitourinary: Negative for hematuria.   Musculoskeletal: Negative for myalgias.   Neurological: Negative for dizziness.   Endo/Heme/Allergies: Bruises/bleeds easily.        Objective:   /70 (BP Location: Left arm, Patient Position: Sitting, BP Cuff Size: Adult)   Pulse (!) 58   Ht 1.753 m (5' 9\")   Wt 81 kg (178 lb 9.6 oz)   SpO2 96%   BMI 26.37 kg/m²     Physical Exam   Constitutional: He is oriented to person, place, and time. No distress.   HENT:   Head: Normocephalic and atraumatic.   Eyes: Right eye exhibits no discharge. Left eye exhibits no discharge.   Neck: No JVD present. No thyromegaly present.   Cardiovascular: Normal rate and regular rhythm. Exam reveals distant heart sounds. Exam reveals no gallop.   No murmur " heard.  Pulmonary/Chest: No respiratory distress.   Abdominal: Soft.   Musculoskeletal: He exhibits no edema.   Neurological: He is alert and oriented to person, place, and time.   Skin: Skin is warm. No erythema.   Psychiatric: He has a normal mood and affect.       Assessment:     1. Coronary artery disease involving native coronary artery of native heart without angina pectoris     2. Dyslipidemia     3. Essential hypertension     4. PAD (peripheral artery disease) ([facility])      Iliac stent 2012       Medical Decision Making:  Today's Assessment / Status / Plan:     The patient's above cardiovascular conditions are stable.  We will obtain lipid profile.  Will continue current medications for now with the plan to simplify his medication (probably taper off either losartan or carvedilol). Will have the patient return for a followup in 6 months with pre-clinic labs. Will be happy to see the patient sooner as needed. Thank you for allowing me to participate in the caring of this patient.

## 2019-08-28 ENCOUNTER — TELEPHONE (OUTPATIENT)
Dept: MEDICAL GROUP | Facility: MEDICAL CENTER | Age: 69
End: 2019-08-28

## 2019-08-28 NOTE — TELEPHONE ENCOUNTER
Received a fax from [facility] regarding pt O2.Requesting Oximetry test,chart notes and cmn. Called and left pt msg to discuss and scheduled an appt with pcp to discuss o2.

## 2019-10-08 ENCOUNTER — HOSPITAL ENCOUNTER (OUTPATIENT)
Dept: LAB | Facility: MEDICAL CENTER | Age: 69
End: 2019-10-08
Attending: NURSE PRACTITIONER
Payer: COMMERCIAL

## 2019-10-08 DIAGNOSIS — I73.9 PERIPHERAL VASCULAR DISEASE (HCC): Chronic | ICD-10-CM

## 2019-10-08 DIAGNOSIS — I10 ESSENTIAL HYPERTENSION: ICD-10-CM

## 2019-10-08 DIAGNOSIS — E78.5 DYSLIPIDEMIA: ICD-10-CM

## 2019-10-08 LAB
ALBUMIN SERPL BCP-MCNC: 4.3 G/DL (ref 3.2–4.9)
ALBUMIN/GLOB SERPL: 1.9 G/DL
ALP SERPL-CCNC: 106 U/L (ref 30–99)
ALT SERPL-CCNC: 22 U/L (ref 2–50)
ANION GAP SERPL CALC-SCNC: 10 MMOL/L (ref 0–11.9)
AST SERPL-CCNC: 22 U/L (ref 12–45)
BILIRUB SERPL-MCNC: 1.1 MG/DL (ref 0.1–1.5)
BUN SERPL-MCNC: 18 MG/DL (ref 8–22)
CALCIUM SERPL-MCNC: 9.4 MG/DL (ref 8.5–10.5)
CHLORIDE SERPL-SCNC: 105 MMOL/L (ref 96–112)
CHOLEST SERPL-MCNC: 120 MG/DL (ref 100–199)
CO2 SERPL-SCNC: 30 MMOL/L (ref 20–33)
CREAT SERPL-MCNC: 1.29 MG/DL (ref 0.5–1.4)
ERYTHROCYTE [DISTWIDTH] IN BLOOD BY AUTOMATED COUNT: 50 FL (ref 35.9–50)
FASTING STATUS PATIENT QL REPORTED: NORMAL
GLOBULIN SER CALC-MCNC: 2.3 G/DL (ref 1.9–3.5)
GLUCOSE SERPL-MCNC: 86 MG/DL (ref 65–99)
HCT VFR BLD AUTO: 49.9 % (ref 42–52)
HDLC SERPL-MCNC: 45 MG/DL
HGB BLD-MCNC: 15.3 G/DL (ref 14–18)
LDLC SERPL CALC-MCNC: 52 MG/DL
MCH RBC QN AUTO: 28.7 PG (ref 27–33)
MCHC RBC AUTO-ENTMCNC: 30.7 G/DL (ref 33.7–35.3)
MCV RBC AUTO: 93.6 FL (ref 81.4–97.8)
PLATELET # BLD AUTO: 204 K/UL (ref 164–446)
PMV BLD AUTO: 9.1 FL (ref 9–12.9)
POTASSIUM SERPL-SCNC: 4.3 MMOL/L (ref 3.6–5.5)
PROT SERPL-MCNC: 6.6 G/DL (ref 6–8.2)
RBC # BLD AUTO: 5.33 M/UL (ref 4.7–6.1)
SODIUM SERPL-SCNC: 145 MMOL/L (ref 135–145)
TRIGL SERPL-MCNC: 115 MG/DL (ref 0–149)
WBC # BLD AUTO: 11.3 K/UL (ref 4.8–10.8)

## 2019-10-08 PROCEDURE — 36415 COLL VENOUS BLD VENIPUNCTURE: CPT

## 2019-10-08 PROCEDURE — 80053 COMPREHEN METABOLIC PANEL: CPT

## 2019-10-08 PROCEDURE — 85027 COMPLETE CBC AUTOMATED: CPT

## 2019-10-08 PROCEDURE — 80061 LIPID PANEL: CPT

## 2019-10-10 ENCOUNTER — OFFICE VISIT (OUTPATIENT)
Dept: MEDICAL GROUP | Facility: MEDICAL CENTER | Age: 69
End: 2019-10-10
Payer: COMMERCIAL

## 2019-10-10 VITALS
RESPIRATION RATE: 14 BRPM | TEMPERATURE: 98.7 F | DIASTOLIC BLOOD PRESSURE: 74 MMHG | HEIGHT: 69 IN | HEART RATE: 80 BPM | OXYGEN SATURATION: 94 % | WEIGHT: 185.4 LBS | SYSTOLIC BLOOD PRESSURE: 140 MMHG | BODY MASS INDEX: 27.46 KG/M2

## 2019-10-10 DIAGNOSIS — E78.5 DYSLIPIDEMIA: ICD-10-CM

## 2019-10-10 DIAGNOSIS — I48.91 ATRIAL FIBRILLATION, UNSPECIFIED TYPE (HCC): ICD-10-CM

## 2019-10-10 DIAGNOSIS — Z96.0 RETAINED URETERAL STENT: ICD-10-CM

## 2019-10-10 DIAGNOSIS — F51.01 PRIMARY INSOMNIA: ICD-10-CM

## 2019-10-10 DIAGNOSIS — I73.9 PERIPHERAL VASCULAR DISEASE (HCC): Chronic | ICD-10-CM

## 2019-10-10 DIAGNOSIS — I10 ESSENTIAL HYPERTENSION: ICD-10-CM

## 2019-10-10 DIAGNOSIS — Z23 NEED FOR VACCINATION: ICD-10-CM

## 2019-10-10 DIAGNOSIS — J44.1 CHRONIC OBSTRUCTIVE PULMONARY DISEASE WITH ACUTE EXACERBATION (HCC): ICD-10-CM

## 2019-10-10 PROCEDURE — 90662 IIV NO PRSV INCREASED AG IM: CPT | Performed by: PHYSICIAN ASSISTANT

## 2019-10-10 PROCEDURE — 90471 IMMUNIZATION ADMIN: CPT | Performed by: PHYSICIAN ASSISTANT

## 2019-10-10 PROCEDURE — 99214 OFFICE O/P EST MOD 30 MIN: CPT | Mod: 25 | Performed by: PHYSICIAN ASSISTANT

## 2019-10-10 NOTE — ASSESSMENT & PLAN NOTE
Chronic, stable on current, using advair and spiriva. Ran out of symbicort but not sure if he needs it. No SOB or cough. symbicort is expensive so he would prefer to stay off for now.

## 2019-10-10 NOTE — ASSESSMENT & PLAN NOTE
Planning to have surgical removal of left stent with Dr. Quiles later this month. Needs instructions from cardiology on when/how to stop aspirin and plavix and when to restart

## 2019-10-10 NOTE — ASSESSMENT & PLAN NOTE
Chronic, stable on current, tries to skip some nights and not take the ambien but doesn't sleep well on those nights. Would like to continue current

## 2019-10-10 NOTE — PROGRESS NOTES
Subjective:   Olman Sims is a 69 y.o. male here today for follow up on chronic conditions. Feeling well overall. Regular f/u with cardiology and urology. Due for colonoscopy but wants to wait until after his kidney/stent issues are resolved.    Peripheral vascular disease (HCC)  No new leg pain, swelling, redness or skin lesion.    Chronic obstructive pulmonary disease with acute exacerbation (HCC)  Chronic, stable on current, using advair and spiriva. Ran out of symbicort but not sure if he needs it. No SOB or cough. symbicort is expensive so he would prefer to stay off for now.    Essential hypertension  Chronic, stable on current, no headache/dizziness    Dyslipidemia  Taking statin as prescribed    Atrial fibrillation (HCC)  On plavix and aspirin, rate controlled, no SOB or dizziness    Retained ureteral stent  Planning to have surgical removal of left stent with Dr. Quiles later this month. Needs instructions from cardiology on when/how to stop aspirin and plavix and when to restart    Primary insomnia  Chronic, stable on current, tries to skip some nights and not take the ambien but doesn't sleep well on those nights. Would like to continue current       Current medicines (including changes today)  Current Outpatient Medications   Medication Sig Dispense Refill   • zolpidem (AMBIEN) 10 MG Tab TK 1 T PO  HS PRF SLP  5   • losartan (COZAAR) 25 MG Tab TK 1 T PO QD  3   • amLODIPine (NORVASC) 10 MG Tab TAKE 1 TABLET BY MOUTH EVERY DAY 90 Tab 3   • ADVAIR -21 MCG/ACT inhaler INHALE 2 PUFFS BY MOUTH TWICE DAILY 36 g 0   • SYMBICORT 160-4.5 MCG/ACT Aerosol INL 2 PFS PO BID  1   • atorvastatin (LIPITOR) 40 MG Tab Take 1 Tab by mouth every evening. 90 Tab 3   • carvedilol (COREG) 12.5 MG Tab Take 1 Tab by mouth 2 times a day, with meals. 180 Tab 3   • clopidogrel (PLAVIX) 75 MG Tab Take 1 Tab by mouth every day. 30 Tab 11   • aspirin (ASA) 81 MG Chew Tab chewable tablet Take 1 Tab by mouth every  "day. 100 Tab 3   • tiotropium (SPIRIVA HANDIHALER) 18 MCG Cap INHALE THE CONTENTS OF 1 CAPSULE BY MOUTH USING HANDI HALER EVERY DAY 30 Cap 3   • VENTOLIN  (90 Base) MCG/ACT Aero Soln inhalation aerosol INHALE 2 PUFFS BY MOUTH EVERY 6 HOURS AS NEEDED FOR PAIN 1 Inhaler 11     No current facility-administered medications for this visit.      He  has a past medical history of Anesthesia, Breath shortness, CAD (coronary artery disease), COPD, Dental disorder, Emphysema of lung ([facility]), Hyperlipidemia, Hypertension, Oxygen dependent, Paroxysmal atrial fibrillation ([facility]), PVD (peripheral vascular disease) ([facility]), and Renal disorder.    ROS   No fever/chills. No weight change. No headache/dizziness. No focal weakness. No sore throat, nasal congestion, ear pain. No chest pain, no shortness of breath, difficulty breathing. No n/v/d/c or abdominal pain. No urinary complaint. No rash or skin lesion.      Objective:     /74 (BP Location: Right arm, Patient Position: Sitting, BP Cuff Size: Adult long)   Pulse 80   Temp 37.1 °C (98.7 °F) (Temporal)   Resp 14   Ht 1.753 m (5' 9\")   Wt 84.1 kg (185 lb 6.4 oz)   SpO2 94%  Body mass index is 27.38 kg/m².   Physical Exam:  Constitutional: WDWN, NAD  Skin: Warm, dry, good turgor, no rashes in visible areas.  Eye: Equal, round and reactive, conjunctiva clear, lids normal.  ENMT: Lips without lesions, good dentition, oropharynx clear.  Neck: Trachea midline, no masses, no thyromegaly. No cervical or supraclavicular lymphadenopathy  Respiratory: Unlabored respiratory effort, lungs clear to auscultation, no wheezes, no ronchi.  Cardiovascular: Normal S1, S2, no murmur, no leg edema.  Psych: Alert and oriented x3, normal affect and mood.    Assessment and Plan:   The following treatment plan was discussed    1. Chronic obstructive pulmonary disease with acute exacerbation (HCC)  Cont current, due for PFTs, discuss at f/u    2. Peripheral vascular disease (HCC)  Cont " current    3. Need for vaccination    - Influenza Vaccine, High Dose (65+ Only)    4. Essential hypertension  Cont current    5. Dyslipidemia  Cont current    6. Atrial fibrillation, unspecified type (HCC)  Cont current    7. Retained ureteral stent  Will f/u with Dr. Quiles    8. Primary insomnia  Cont current      Followup: 3-6 months

## 2019-10-11 ENCOUNTER — TELEPHONE (OUTPATIENT)
Dept: CARDIOLOGY | Facility: MEDICAL CENTER | Age: 69
End: 2019-10-11

## 2019-10-11 NOTE — TELEPHONE ENCOUNTER
Received fax from [facility] (P: [phone] F: [phone]) requesting:    - cardiac clearance for upcoming cystoscopy with removal of stent on 10/24/2019.    - Anti-platelet hold 7 days prior to procedure.    Clearance request relayed to MD for advise.

## 2019-10-11 NOTE — LETTER
PROCEDURE/SURGERY CLEARANCE FORM      Encounter Date: 10/11/2019    Patient: Olmna Sims  YOB: 1950    CARDIOLOGIST:  Jamison Cortes MD   REFERRING DOCTOR:  [facility]    The above patient is evaluated from a cardiovascular standpoint and may proceed at moderate risk stratification to have the following procedure/surgery: Cystoscopy with removal of stent.                                           Additional comments: Hold Plavix as recommended.  Avoid holding Aspirin if possible.                 MD Signature Jamison Cortes MD     Interventional Cardiologist     [facility]

## 2019-10-14 NOTE — TELEPHONE ENCOUNTER
Patient Call   Jamison Cortes M.D.  You 2 minutes ago (9:37 AM)      Moderate risk may hold Plavix   Avoid holding ASA if possible      Letter printed with MD recommendations and faxed to [facility], [phone], completed status.    Called patient and reviewed MD recommendations.  Pt verbalizes understanding and is appreciative of information given.  Pt states no other concerns or questions at this time.

## 2019-10-14 NOTE — ADDENDUM NOTE
Encounter addended by: Jamison Cortes M.D. on: 10/14/2019 9:36 AM   Actions taken: Sign clinical note

## 2019-10-17 ENCOUNTER — HOSPITAL ENCOUNTER (OUTPATIENT)
Dept: LAB | Facility: MEDICAL CENTER | Age: 69
End: 2019-10-17
Attending: UROLOGY
Payer: COMMERCIAL

## 2019-10-17 PROCEDURE — 87086 URINE CULTURE/COLONY COUNT: CPT

## 2019-10-19 LAB
BACTERIA UR CULT: NORMAL
SIGNIFICANT IND 70042: NORMAL
SITE SITE: NORMAL
SOURCE SOURCE: NORMAL

## 2019-10-22 RX ORDER — ALBUTEROL SULFATE 90 UG/1
AEROSOL, METERED RESPIRATORY (INHALATION)
Qty: 1 INHALER | Refills: 6 | Status: SHIPPED | OUTPATIENT
Start: 2019-10-22 | End: 2021-07-06

## 2019-10-23 DIAGNOSIS — F51.01 PRIMARY INSOMNIA: ICD-10-CM

## 2019-10-23 RX ORDER — ZOLPIDEM TARTRATE 10 MG/1
TABLET ORAL
Qty: 30 TAB | Refills: 2 | Status: SHIPPED
Start: 2019-10-23 | End: 2019-11-22

## 2019-10-23 NOTE — TELEPHONE ENCOUNTER
Was the patient seen in the last year in this department? Yes    Does patient have an active prescription for medications requested? Yes    Received Request Via: Pharmacy.         KEIRA SOTOMAYOR     Age: 69  demographics  Data as of: 10/23/2019              NARCOTIC 130    Created with Raphaël 2.2.075%[id number]   SEDATIVE 240       STIMULANT 000       NARxSCORES can range from 000 to 999. The first two digits represent the composite percentile risk based on an overall analysis of prescription drug use. The third digit represents the number of active prescriptions. The distribution of scores in the population is such that approximately 75% fall below 200, 95% fall below 500 and 99% fall below 650. The information on this report is not warranted as accurate or complete. This report is based on the search criteria supplied and the data entered by the dispensing pharmacy. For more information about any prescription, please contact the dispensing pharmacy or the prescriber. NARxSCORES and Reports are intended to aid, not replace medical decision making. None of the information presented should be used as sole justification for providing or refusing to provide medications.       RX GRAPH Grayed out drugs could not be included in score calculations.   [x] Narcotic [x] Sedative [x] Stimulant [x] Buprenorphine [x] Other    Created with Raphaël 2.2.0All Prescribers  Created with Raphaël [id number] - Cheyenne Valverde2 - Jennifer Sylvester3 - Jannie Payne4 - Jesse Saavedra  Morphine MgEq (MME)  Created with Raphaël 2.2.9849296239  Created with Raphaël [id number]  Lorazepam MgEq (LME)  Created with Raphaël 2.2.6332715  Created with Raphaël 2.2.2Twjftbid46/[id number]  *Per CDC guidance, the MME conversion factors prescribed or provided as part of the medication-assisted treatment for opioid use disorder should not be used to benchmark against dosage  thresholds meant for opioids prescribed for pain. Buprenorphine products have no agreed upon morphine equivalency, and as partial opioid agonists, are not expected to be associated with overdose risk in the same dose-dependent manner as doses for full agonist opioids. MME = morphine milligram equivalents. LME = Lorazepam milligram equivalents. mg = dose in milligrams.     Data Analysis   Narcotics (130) 2 months 6 months 1 year 2 years   Prescribers (narcotic, sedative) 1  19 1  12 2  16 4  21   Pharmacies (narcotic, sedative) 1  25 1  16 1  13 1  10   Morphine mg 0  0 0  0 0  0 120  25   Morphine overlap (1) 0  0 0  0 0  0 0  0           Sedatives (240) 2 months 6 months 1 year 2 years   Prescribers (narcotic, sedative) 1  19 1  12 2  16 4  21   Pharmacies (narcotic, sedative) 1  25 1  16 1  13 1  10   Sedative mg 15  20 75  49 165  60 345  71   Sedative overlap (1) 0  0 0  0 2  2 4  2           Stimulants (000)  2 months 6 months 1 year 2 years   Prescribers (stimulant) 0  0 0  0 0  0 0  0   Pharmacies (stimulant) 0  0 0  0 0  0 0  0   Stimulant days 0  0 0  0 0  0 0  0   Stimulant overlap (1) 0  0 0  0 0  0 0  0      (1) Number of days for which a simliar type of medication was prescribed from different prescribers for use on the same day.         Summary   Total Prescriptions: 24   Total Prescribers: 4   Total Pharmacies: 1   Narcotics*    (excluding buprenorphine)  Current Qty: 0   Current MME/day: 0.00   30 Day Avg MME/day: 0.00   Buprenorphine*   Current Qty: 0   Current mg/day: 0.00   30 Day Avg mg/day: 0.00   Sedatives*   Current Qty: 0   Current LME/day: 0.00   30 Day Avg LME/day: 0.45   *Highlighted drugs could not be included in score calculations   PRESCRIPTIONS  Total Prescriptions: 24   Total Private Pay: 0   Fill Date ID Written Drug Qty Days Prescriber Rx # Pharmacy Refill Daily Dose * Pymt Type    09/20/2019  1   04/10/2019  Zolpidem Tartrate 10 MG  Tablet  30.00 30 Hi Camilo  [id number]   [facility] (1628)  5  0.50 L[place]   08/21/2019  1   04/10/2019  Zolpidem Tartrate 10 MG Tablet  30.00 30 Hi Camilo  [id number]   [facility] (1628)  4  0.50 [place]   07/20/2019  1   04/10/2019  Zolpidem Tartrate 10 MG Tablet  30.00 30 Hi Camilo  [id number]   [facility] (1628)  3  0.50 [place]   06/19/2019  1   04/10/2019  Zolpidem Tartrate 10 MG Tablet  30.00 30 Hi Camilo  [id number]   [facility] (1628)  2  0.50 [place]   05/20/2019  1   04/10/2019  Zolpidem Tartrate 10 MG Tablet  30.00 30 Hi Camilo  [id number]   [facility] (1628)  1  0.50 [place]   04/10/2019  1   04/10/2019  Zolpidem Tartrate 10 MG Tablet  30.00 30 Hi Camilo  [id number]   [facility] (1628)  0  0.50 L[place]   03/13/2019  1   03/12/2019  Zolpidem Tartrate 10 MG Tablet  30.00 30 Se Idania  [id number]   [facility] (1628)  [place]  0.50 [place]   02/11/2019  1   11/16/2018  Zolpidem Tartrate 10 MG Tablet  30.00 30 Hi Camilo  [id number]   [facility] (1628)  3  0.50 L[place]   01/10/2019  1   11/16/2018  Zolpidem Tartrate 10 MG Tablet  30.00 30 Hi Camilo  [id number]   [facility] (1628)  2  0.50 L[place]   12/12/2018  1   11/16/2018  Zolpidem Tartrate 10 MG Tablet  30.00 30 Hi Camilo  [id number]   [facility] (1628)  1  0.50 [place]   11/16/2018  1   11/16/2018  Zolpidem Tartrate 10 MG Tablet  30.00 30 Hi Camilo  [id number]   [facility] (1628)  0  0.50 L[place]   10/04/2018  1   08/02/2018  Zolpidem Tartrate 10 MG Tablet  30.00 30 Ma She  [id number]   [facility] (1628)  2  0.50 [place]   09/04/2018  1   08/02/2018  Zolpidem Tartrate 10 MG Tablet  30.00 30 Ma She  [id number]   [facility] (1628)  1  0.50 [place]   08/02/2018  1   08/02/2018  Zolpidem Tartrate 10 MG Tablet  30.00 30 Ma She  [id number]   [facility] (1628)  0  0.50 [place]   07/05/2018  1   05/09/2018  Zolpidem Tartrate 10 MG Tablet  30.00 30 Se Idania  [id number]   [facility] (1628)  2  0.50 [place]   06/06/2018  1   05/09/2018  Zolpidem Tartrate 10 MG Tablet  30.00 30 Se Idania  [id number]   [facility] (1628)  1  0.50 L[place]   05/09/2018  1   05/09/2018  Zolpidem Tartrate 10 MG Tablet  30.00 30 Se Idania  [id number]   [facility] (1628)  0  0.50 L[place]   05/04/2018  1   05/04/2018  Hydrocodone-Acetamin 7.5-325  16.00 4 Th Kil  [id number]   [facility] (1628)  0  30.00 M[place]   04/03/2018  1   04/03/2018  Zolpidem Tartrate 10 MG Tablet  30.00 30 Se Idania  [id number]   [facility] (1628)  0  0.50 L[place]   03/06/2018  1   01/03/2018  Zolpidem Tartrate 10 MG Tablet  30.00 30 Se Idania  [id number]   [facility] (1628)  2  0.50 L[place]   02/06/2018  1   01/03/2018  Zolpidem Tartrate 10 MG Tablet  30.00 30 Se Idania  [id number]   [facility] (1628)  1  0.50 L[place]   01/04/2018  1   01/03/2018  Zolpidem Tartrate 10 MG Tablet  30.00 30 Se Idania  [id number]   [facility] (1628)  0  0.50 L[place]   12/06/2017  1   10/11/2017  Zolpidem Tartrate 10 MG Tablet  30.00 30 Se Idania  [id number]   [facility] (1628)  2  0.50 L[place]   11/07/2017  1   10/11/2017  Zolpidem Tartrate 10 MG Tablet  30.00 30 Se Idania  [id number]   Wal (1628)  1  0.50 L[place]   *Per CDC guidance, the MME conversion factors prescribed or provided as part of the medication-assisted treatment for opioid use disorder should not be used to benchmark against dosage thresholds meant for opioids prescribed for pain. Buprenorphine products have no agreed upon morphine equivalency, and as partial opioid agonists, are not expected to be associated with overdose risk in the same dose-dependent manner as doses for full agonist opioids. MME = morphine milligram equivalents. LME = Lorazepam milligram equivalents. MG = dose in milligrams.   PROVIDERS  Total Providers: 4   Name Address City State Zipcode Phone   Jesse OLMEDO Quentin [address] [place] [place] [address]    Jannie Payne [address] [place] [address]    Cheyenne Valverde [address] [place] [address]    [facility] Hardeep Gambino [address] [place] [address]    PHARMACIES  Total Pharmacies: 1   Name Address City State Zipcode  Phone   [facility] [phone]) [address] [facility] [address] [phone]

## 2019-11-09 ENCOUNTER — HOSPITAL ENCOUNTER (OUTPATIENT)
Dept: RADIOLOGY | Facility: MEDICAL CENTER | Age: 69
End: 2019-11-09
Attending: UROLOGY
Payer: COMMERCIAL

## 2019-11-09 DIAGNOSIS — N20.0 KIDNEY STONE: ICD-10-CM

## 2019-11-09 PROCEDURE — 74018 RADEX ABDOMEN 1 VIEW: CPT

## 2019-11-13 RX ORDER — FLUTICASONE PROPIONATE AND SALMETEROL XINAFOATE 230; 21 UG/1; UG/1
AEROSOL, METERED RESPIRATORY (INHALATION)
Qty: 36 G | Refills: 3 | Status: SHIPPED | OUTPATIENT
Start: 2019-11-13 | End: 2020-11-23

## 2019-11-26 ENCOUNTER — HOSPITAL ENCOUNTER (OUTPATIENT)
Dept: RADIOLOGY | Facility: MEDICAL CENTER | Age: 69
End: 2019-11-26
Attending: UROLOGY
Payer: COMMERCIAL

## 2019-11-26 DIAGNOSIS — N20.0 KIDNEY STONE: ICD-10-CM

## 2019-11-26 PROCEDURE — 74018 RADEX ABDOMEN 1 VIEW: CPT

## 2019-12-04 ENCOUNTER — APPOINTMENT (OUTPATIENT)
Dept: RADIOLOGY | Facility: MEDICAL CENTER | Age: 69
End: 2019-12-04
Attending: EMERGENCY MEDICINE
Payer: COMMERCIAL

## 2019-12-04 ENCOUNTER — HOSPITAL ENCOUNTER (EMERGENCY)
Facility: MEDICAL CENTER | Age: 69
End: 2019-12-04
Attending: EMERGENCY MEDICINE
Payer: COMMERCIAL

## 2019-12-04 VITALS
WEIGHT: 185.41 LBS | DIASTOLIC BLOOD PRESSURE: 74 MMHG | SYSTOLIC BLOOD PRESSURE: 128 MMHG | HEIGHT: 69 IN | BODY MASS INDEX: 27.46 KG/M2 | RESPIRATION RATE: 16 BRPM | TEMPERATURE: 97.9 F | OXYGEN SATURATION: 96 % | HEART RATE: 73 BPM

## 2019-12-04 DIAGNOSIS — R00.2 PALPITATIONS: ICD-10-CM

## 2019-12-04 LAB
ALBUMIN SERPL BCP-MCNC: 4.3 G/DL (ref 3.2–4.9)
ALBUMIN/GLOB SERPL: 1.5 G/DL
ALP SERPL-CCNC: 119 U/L (ref 30–99)
ALT SERPL-CCNC: 19 U/L (ref 2–50)
ANION GAP SERPL CALC-SCNC: 10 MMOL/L (ref 0–11.9)
AST SERPL-CCNC: 16 U/L (ref 12–45)
BASOPHILS # BLD AUTO: 0.3 % (ref 0–1.8)
BASOPHILS # BLD: 0.04 K/UL (ref 0–0.12)
BILIRUB SERPL-MCNC: 0.9 MG/DL (ref 0.1–1.5)
BUN SERPL-MCNC: 17 MG/DL (ref 8–22)
CALCIUM SERPL-MCNC: 9.6 MG/DL (ref 8.5–10.5)
CHLORIDE SERPL-SCNC: 106 MMOL/L (ref 96–112)
CO2 SERPL-SCNC: 25 MMOL/L (ref 20–33)
CREAT SERPL-MCNC: 1.29 MG/DL (ref 0.5–1.4)
EKG IMPRESSION: NORMAL
EOSINOPHIL # BLD AUTO: 0.07 K/UL (ref 0–0.51)
EOSINOPHIL NFR BLD: 0.6 % (ref 0–6.9)
ERYTHROCYTE [DISTWIDTH] IN BLOOD BY AUTOMATED COUNT: 49 FL (ref 35.9–50)
GLOBULIN SER CALC-MCNC: 2.8 G/DL (ref 1.9–3.5)
GLUCOSE SERPL-MCNC: 123 MG/DL (ref 65–99)
HCT VFR BLD AUTO: 50.9 % (ref 42–52)
HGB BLD-MCNC: 16.6 G/DL (ref 14–18)
IMM GRANULOCYTES # BLD AUTO: 0.06 K/UL (ref 0–0.11)
IMM GRANULOCYTES NFR BLD AUTO: 0.5 % (ref 0–0.9)
LYMPHOCYTES # BLD AUTO: 1.31 K/UL (ref 1–4.8)
LYMPHOCYTES NFR BLD: 10.3 % (ref 22–41)
MCH RBC QN AUTO: 29.9 PG (ref 27–33)
MCHC RBC AUTO-ENTMCNC: 32.6 G/DL (ref 33.7–35.3)
MCV RBC AUTO: 91.7 FL (ref 81.4–97.8)
MONOCYTES # BLD AUTO: 0.62 K/UL (ref 0–0.85)
MONOCYTES NFR BLD AUTO: 4.9 % (ref 0–13.4)
NEUTROPHILS # BLD AUTO: 10.6 K/UL (ref 1.82–7.42)
NEUTROPHILS NFR BLD: 83.4 % (ref 44–72)
NRBC # BLD AUTO: 0 K/UL
NRBC BLD-RTO: 0 /100 WBC
PLATELET # BLD AUTO: 270 K/UL (ref 164–446)
PMV BLD AUTO: 8.9 FL (ref 9–12.9)
POTASSIUM SERPL-SCNC: 4 MMOL/L (ref 3.6–5.5)
PROT SERPL-MCNC: 7.1 G/DL (ref 6–8.2)
RBC # BLD AUTO: 5.55 M/UL (ref 4.7–6.1)
SODIUM SERPL-SCNC: 141 MMOL/L (ref 135–145)
TROPONIN T SERPL-MCNC: 13 NG/L (ref 6–19)
WBC # BLD AUTO: 12.7 K/UL (ref 4.8–10.8)

## 2019-12-04 PROCEDURE — 71045 X-RAY EXAM CHEST 1 VIEW: CPT

## 2019-12-04 PROCEDURE — 85025 COMPLETE CBC W/AUTO DIFF WBC: CPT

## 2019-12-04 PROCEDURE — 84484 ASSAY OF TROPONIN QUANT: CPT

## 2019-12-04 PROCEDURE — 99284 EMERGENCY DEPT VISIT MOD MDM: CPT

## 2019-12-04 PROCEDURE — 94760 N-INVAS EAR/PLS OXIMETRY 1: CPT

## 2019-12-04 PROCEDURE — 93005 ELECTROCARDIOGRAM TRACING: CPT | Performed by: EMERGENCY MEDICINE

## 2019-12-04 PROCEDURE — 36415 COLL VENOUS BLD VENIPUNCTURE: CPT

## 2019-12-04 PROCEDURE — 93005 ELECTROCARDIOGRAM TRACING: CPT

## 2019-12-04 PROCEDURE — 80053 COMPREHEN METABOLIC PANEL: CPT

## 2019-12-04 NOTE — ED TRIAGE NOTES
Olmanalecia Mckinley Flom  Chief Complaint   Patient presents with   • Palpitations     Pt to triage in w/c after EKG,  with above complaint. VSS.  No acute distress. Pt states that he has a home pulse ox and has noticed that his HR has been increasing up to 140 and then dropping back down to 60, intermittently since this am.   Pt denies CP, increased SOB, or feeling different.   Pt returned to lobby, educated on triage process, and to inform staff of any changes or concerns.

## 2019-12-04 NOTE — ED PROVIDER NOTES
ED Provider  Scribed for Hank Fish D.O. by Wilfrido Ocampo. 2019  2:54 PM    Means of arrival:Walk-in  History obtained from:Patient  History limited by: None    CHIEF COMPLAINT  Chief Complaint   Patient presents with   • Palpitations       HPI  Olman Sims is a 69 y.o. male who presents with acute tachycardia and suspected atrial fibrillation onset earlier today. Patient has a history of Afib last years and states that earlier today his heart rates was increased to 140 and dropping down to 60 at home. Had a brief episode of shortness of breath last week that has since resolved. Denies any cough, congestion, fevers, or chest pain. Patient takes Plavix and uses oxygen at home.     REVIEW OF SYSTEMS  See HPI for further details. All other systems are negative.     PAST MEDICAL HISTORY   has a past medical history of Anesthesia, Breath shortness, CAD (coronary artery disease), COPD, Dental disorder, Emphysema of lung (HCC), Hyperlipidemia, Hypertension, Oxygen dependent, Paroxysmal atrial fibrillation (HCC), PVD (peripheral vascular disease) ([facility]), and Renal disorder.    SOCIAL HISTORY  Social History     Tobacco Use   • Smoking status: Former Smoker     Packs/day: 0.50     Years: 15.00     Pack years: 7.50     Types: Cigarettes     Last attempt to quit: 2012     Years since quittin.8   • Smokeless tobacco: Never Used   Substance and Sexual Activity   • Alcohol use: Yes     Alcohol/week: 0.0 oz     Comment: 2 glass wine per month   • Drug use: No   • Sexual activity: Never       SURGICAL HISTORY   has a past surgical history that includes cystoscopy stent placement (2012); recovery (2012); percutaneous nephrostolithotomy (2012); percutaneous nephrostolithotomy (2012); recovery (2012); appendectomy; appendectomy (); septoplasty (N/A, 2018); turbinate reduction (Bilateral, 2018); zzz cardiac cath; and stent placement.    CURRENT MEDICATIONS  Home  "Medications     Reviewed by Beulah Tyson R.N. (Registered Nurse) on 12/04/19 at 1355  Med List Status: Partial   Medication Last Dose Status   ADVAIR -21 MCG/ACT inhaler  Active   amLODIPine (NORVASC) 10 MG Tab  Active   aspirin (ASA) 81 MG Chew Tab chewable tablet  Active   atorvastatin (LIPITOR) 40 MG Tab  Active   carvedilol (COREG) 12.5 MG Tab  Active   clopidogrel (PLAVIX) 75 MG Tab  Active   losartan (COZAAR) 25 MG Tab  Active   SYMBICORT 160-4.5 MCG/ACT Aerosol  Active   tiotropium (SPIRIVA HANDIHALER) 18 MCG Cap  Active   VENTOLIN  (90 Base) MCG/ACT Aero Soln inhalation aerosol  Active                ALLERGIES  No Known Allergies    PHYSICAL EXAM  VITAL SIGNS: BP (!) 161/79   Pulse 73   Temp 36.6 °C (97.9 °F) (Temporal)   Resp 16   Ht 1.753 m (5' 9\")   Wt 84.1 kg (185 lb 6.5 oz)   SpO2 94%   BMI 27.38 kg/m²   Constitutional: Alert in no apparent distress.  HENT: No signs of trauma, mucous membranes are moist  Eyes: Conjunctiva normal, Non-icteric.   Neck: Normal range of motion, No tenderness, Supple.  Lymphatic: No lymphadenopathy noted.   Cardiovascular: Regular rate and rhythm, no murmurs.   Thorax & Lungs: Normal breath sounds, No respiratory distress, No wheezing, No chest tenderness.   Abdomen: Bowel sounds normal, Soft, No tenderness, No masses, No pulsatile masses. No peritoneal signs.  Skin: Warm, Dry, normal color.   Back: No bony tenderness, No CVA tenderness.   Extremities: No edema, No tenderness, No cyanosis  Musculoskeletal: Good range of motion in all major joints. No tenderness to palpation or major deformities noted.   Neurologic: Alert and oriented x4, Normal motor function, Normal sensory function, No focal deficits noted.   Psychiatric: Affect normal, Judgment normal, Mood normal.       MEDICAL DECISION MAKING  This is a 69 y.o. male who presents with a history of COPD O2 dependence.  This morning he was checking his pulse oximetry noticed that his pulse rate " was fluctuating.  He was asymptomatic at that time.  He has had no other symptoms throughout the day but his daughter made him come to be checked out.  He does have a history of atrial fibrillation and is on an anticoagulant.  He has had no chest pain no shortness of breath.  His chest x-ray shows no pulmonary edema his EKG shows has been a sinus rhythm and that he is monitor shows a sinus rhythm during his visit here.  The patient may have had intermittent A. fib, other arrhythmias are considered however the patient had no symptoms with that so this does not appear to be life-threatening.  The patient is stable for discharge home.    DIAGNOSTIC STUDIES / PROCEDURES    EKG  12 Lead EKG interpreted by me shown below.      LABS  Results for orders placed or performed during the hospital encounter of 12/04/19   CBC w/ Differential   Result Value Ref Range    WBC 12.7 (H) 4.8 - 10.8 K/uL    RBC 5.55 4.70 - 6.10 M/uL    Hemoglobin 16.6 14.0 - 18.0 g/dL    Hematocrit 50.9 42.0 - 52.0 %    MCV 91.7 81.4 - 97.8 fL    MCH 29.9 27.0 - 33.0 pg    MCHC 32.6 (L) 33.7 - 35.3 g/dL    RDW 49.0 35.9 - 50.0 fL    Platelet Count 270 164 - 446 K/uL    MPV 8.9 (L) 9.0 - 12.9 fL    Neutrophils-Polys 83.40 (H) 44.00 - 72.00 %    Lymphocytes 10.30 (L) 22.00 - 41.00 %    Monocytes 4.90 0.00 - 13.40 %    Eosinophils 0.60 0.00 - 6.90 %    Basophils 0.30 0.00 - 1.80 %    Immature Granulocytes 0.50 0.00 - 0.90 %    Nucleated RBC 0.00 /100 WBC    Neutrophils (Absolute) 10.60 (H) 1.82 - 7.42 K/uL    Lymphs (Absolute) 1.31 1.00 - 4.80 K/uL    Monos (Absolute) 0.62 0.00 - 0.85 K/uL    Eos (Absolute) 0.07 0.00 - 0.51 K/uL    Baso (Absolute) 0.04 0.00 - 0.12 K/uL    Immature Granulocytes (abs) 0.06 0.00 - 0.11 K/uL    NRBC (Absolute) 0.00 K/uL   Complete Metabolic Panel (CMP)   Result Value Ref Range    Sodium 141 135 - 145 mmol/L    Potassium 4.0 3.6 - 5.5 mmol/L    Chloride 106 96 - 112 mmol/L    Co2 25 20 - 33 mmol/L    Anion Gap 10.0 0.0 - 11.9     Glucose 123 (H) 65 - 99 mg/dL    Bun 17 8 - 22 mg/dL    Creatinine 1.29 0.50 - 1.40 mg/dL    Calcium 9.6 8.5 - 10.5 mg/dL    AST(SGOT) 16 12 - 45 U/L    ALT(SGPT) 19 2 - 50 U/L    Alkaline Phosphatase 119 (H) 30 - 99 U/L    Total Bilirubin 0.9 0.1 - 1.5 mg/dL    Albumin 4.3 3.2 - 4.9 g/dL    Total Protein 7.1 6.0 - 8.2 g/dL    Globulin 2.8 1.9 - 3.5 g/dL    A-G Ratio 1.5 g/dL   Troponin STAT   Result Value Ref Range    Troponin T 13 6 - 19 ng/L   ESTIMATED GFR   Result Value Ref Range    GFR If African American >60 >60 mL/min/1.73 m 2    GFR If Non African American 55 (A) >60 mL/min/1.73 m 2   EKG (NOW)   Result Value Ref Range    Report       [facility] Emergency Dept.    Test Date:  2019  Pt Name:    KEIRA SOTOMAYOR             Department: ER  MRN:        [id number]                      Room:  Gender:     Male                         Technician: [id number]  :        1950                   Requested By:ER TRIAGE PROTOCOL  Order #:    [id number]                    Reading MD: LIZETTE MILLER D.O.    Measurements  Intervals                                Axis  Rate:       70                           P:          80  NV:         58                           QRS:        -65  QRSD:       94                           T:          79  QT:         373  QTc:        403    Interpretive Statements  Sinus rhythm  Short NV interval  Probable left atrial enlargement  Left anterior fascicular block  Anteroseptal infarct, old  Minimal ST depression  Compared to ECG 2019 10:52:21  Short NV interval now present  Left anterior fascicular block now present  Sinus bradycardia no longer present  Left-axis devia tion no longer present  Electronically Signed On 2019 16:25:35 PST by LIZETTE MILLER D.O.       All labs reviewed by me.    RADIOLOGY  DX-CHEST-PORTABLE (1 VIEW)   Final Result      No acute cardiopulmonary findings.        The radiologist's interpretations of all radiological  studies have been reviewed by me.        COURSE  Pertinent Labs & Imaging studies reviewed. (See chart for details)    2:54 PM - Patient seen and examined at bedside. Discussed plan of care. Ordered for DX-chest, CBC with diff, CMP, troponin, and EKG to evaluate his symptoms.     4:23 PM - Patient was reevaluated at bedside. Discussed lab and radiology results with the patient and informed them they are reassuring. I cleared the patient for discharge, and they were understanding and agreeable to discharge.     The patient will return for new or worsening symptoms and is stable at the time of discharge.    The patient is referred to a primary physician for blood pressure management, diabetic screening, and for all other preventative health concerns.    DISPOSITION:  Patient will be discharged home in stable condition.    FOLLOW UP:  Cheyenne Valverde P.A.-C.  [address] [facility] [facility]  [address] [place] [id number]  [phone]    In 3 days        OUTPATIENT MEDICATIONS:  Discharge Medication List as of 12/4/2019  4:29 PM          FINAL IMPRESSION  1. Palpitations         Wilfrido MONTILLA (Kary), am scribing for, and in the presence of, Hank Fish D.O..    Electronically signed by: Wilfrido Ocampo (Kary), 12/4/2019    Hank MONTILLA D.O. personally performed the services described in this documentation, as scribed by Wilfrido Ocampo in my presence, and it is both accurate and complete.    C.    The note accurately reflects work and decisions made by me.  Hank Fish  12/4/2019  5:52 PM

## 2019-12-05 RX ORDER — TIOTROPIUM BROMIDE 18 UG/1
CAPSULE ORAL; RESPIRATORY (INHALATION)
Qty: 90 CAP | Refills: 3 | Status: SHIPPED | OUTPATIENT
Start: 2019-12-05 | End: 2020-12-28

## 2019-12-05 NOTE — DISCHARGE INSTRUCTIONS
Return to the emergency room if you develop chest pain, trouble breathing or heart rate sustained over 100 bpm.  Please continue follow-up with your cardiologist and please continue your previous medications.

## 2019-12-06 ENCOUNTER — TELEPHONE (OUTPATIENT)
Dept: MEDICAL GROUP | Facility: MEDICAL CENTER | Age: 69
End: 2019-12-06

## 2019-12-06 NOTE — TELEPHONE ENCOUNTER
DOCUMENTATION OF PAR STATUS:    1. Name of Medication & Dose: Spiriva 18MCG      2. Name of Prescription Coverage Company & phone #: Covermymeds    3. Date Prior Auth Submitted: 12/6/19    4. What information was given to obtain insurance decision? Clinic note    5. Prior Auth Status? Pending    6. Patient Notified: yes

## 2020-01-14 RX ORDER — LOSARTAN POTASSIUM 25 MG/1
25 TABLET ORAL DAILY
Qty: 90 TAB | Refills: 3 | Status: SHIPPED | OUTPATIENT
Start: 2020-01-14 | End: 2021-03-09

## 2020-01-16 DIAGNOSIS — Z01.812 PRE-OPERATIVE LABORATORY EXAMINATION: ICD-10-CM

## 2020-01-16 LAB
ANION GAP SERPL CALC-SCNC: 8 MMOL/L (ref 0–11.9)
BUN SERPL-MCNC: 18 MG/DL (ref 8–22)
CALCIUM SERPL-MCNC: 9.3 MG/DL (ref 8.5–10.5)
CHLORIDE SERPL-SCNC: 104 MMOL/L (ref 96–112)
CO2 SERPL-SCNC: 28 MMOL/L (ref 20–33)
CREAT SERPL-MCNC: 1.38 MG/DL (ref 0.5–1.4)
GLUCOSE SERPL-MCNC: 109 MG/DL (ref 65–99)
POTASSIUM SERPL-SCNC: 4 MMOL/L (ref 3.6–5.5)
SODIUM SERPL-SCNC: 140 MMOL/L (ref 135–145)

## 2020-01-16 PROCEDURE — 36415 COLL VENOUS BLD VENIPUNCTURE: CPT

## 2020-01-16 PROCEDURE — 80048 BASIC METABOLIC PNL TOTAL CA: CPT

## 2020-01-16 PROCEDURE — 87086 URINE CULTURE/COLONY COUNT: CPT

## 2020-01-16 RX ORDER — ZOLPIDEM TARTRATE 5 MG/1
5 TABLET ORAL NIGHTLY PRN
COMMUNITY
End: 2020-02-03 | Stop reason: SDUPTHER

## 2020-01-18 LAB
BACTERIA UR CULT: NORMAL
SIGNIFICANT IND 70042: NORMAL
SITE SITE: NORMAL
SOURCE SOURCE: NORMAL

## 2020-01-22 ENCOUNTER — ANESTHESIA EVENT (OUTPATIENT)
Dept: SURGERY | Facility: MEDICAL CENTER | Age: 70
End: 2020-01-22
Payer: COMMERCIAL

## 2020-01-22 ENCOUNTER — ANESTHESIA (OUTPATIENT)
Dept: SURGERY | Facility: MEDICAL CENTER | Age: 70
End: 2020-01-22
Payer: COMMERCIAL

## 2020-01-22 ENCOUNTER — HOSPITAL ENCOUNTER (OUTPATIENT)
Facility: MEDICAL CENTER | Age: 70
End: 2020-01-22
Attending: UROLOGY | Admitting: UROLOGY
Payer: COMMERCIAL

## 2020-01-22 ENCOUNTER — APPOINTMENT (OUTPATIENT)
Dept: RADIOLOGY | Facility: MEDICAL CENTER | Age: 70
End: 2020-01-22
Attending: UROLOGY
Payer: COMMERCIAL

## 2020-01-22 VITALS
RESPIRATION RATE: 17 BRPM | OXYGEN SATURATION: 96 % | WEIGHT: 187.61 LBS | HEIGHT: 69 IN | DIASTOLIC BLOOD PRESSURE: 68 MMHG | SYSTOLIC BLOOD PRESSURE: 164 MMHG | TEMPERATURE: 98.4 F | BODY MASS INDEX: 27.79 KG/M2 | HEART RATE: 57 BPM

## 2020-01-22 DIAGNOSIS — N20.0 NEPHROLITHIASIS: ICD-10-CM

## 2020-01-22 PROCEDURE — 700111 HCHG RX REV CODE 636 W/ 250 OVERRIDE (IP): Performed by: ANESTHESIOLOGY

## 2020-01-22 PROCEDURE — 501329 HCHG SET, CYSTO IRRIG Y TUR: Performed by: UROLOGY

## 2020-01-22 PROCEDURE — 700101 HCHG RX REV CODE 250: Performed by: ANESTHESIOLOGY

## 2020-01-22 PROCEDURE — 700105 HCHG RX REV CODE 258: Performed by: UROLOGY

## 2020-01-22 PROCEDURE — C1769 GUIDE WIRE: HCPCS | Performed by: UROLOGY

## 2020-01-22 PROCEDURE — 160036 HCHG PACU - EA ADDL 30 MINS PHASE I: Performed by: UROLOGY

## 2020-01-22 PROCEDURE — A9270 NON-COVERED ITEM OR SERVICE: HCPCS | Performed by: ANESTHESIOLOGY

## 2020-01-22 PROCEDURE — 160048 HCHG OR STATISTICAL LEVEL 1-5: Performed by: UROLOGY

## 2020-01-22 PROCEDURE — 500879 HCHG PACK, CYSTO: Performed by: UROLOGY

## 2020-01-22 PROCEDURE — 700102 HCHG RX REV CODE 250 W/ 637 OVERRIDE(OP): Performed by: ANESTHESIOLOGY

## 2020-01-22 PROCEDURE — 160009 HCHG ANES TIME/MIN: Performed by: UROLOGY

## 2020-01-22 PROCEDURE — 160029 HCHG SURGERY MINUTES - 1ST 30 MINS LEVEL 4: Performed by: UROLOGY

## 2020-01-22 PROCEDURE — 160025 RECOVERY II MINUTES (STATS): Performed by: UROLOGY

## 2020-01-22 PROCEDURE — 160041 HCHG SURGERY MINUTES - EA ADDL 1 MIN LEVEL 4: Performed by: UROLOGY

## 2020-01-22 PROCEDURE — 88300 SURGICAL PATH GROSS: CPT

## 2020-01-22 PROCEDURE — 82365 CALCULUS SPECTROSCOPY: CPT

## 2020-01-22 PROCEDURE — C2617 STENT, NON-COR, TEM W/O DEL: HCPCS | Performed by: UROLOGY

## 2020-01-22 PROCEDURE — 160002 HCHG RECOVERY MINUTES (STAT): Performed by: UROLOGY

## 2020-01-22 PROCEDURE — 160035 HCHG PACU - 1ST 60 MINS PHASE I: Performed by: UROLOGY

## 2020-01-22 PROCEDURE — 700101 HCHG RX REV CODE 250: Performed by: UROLOGY

## 2020-01-22 PROCEDURE — 700111 HCHG RX REV CODE 636 W/ 250 OVERRIDE (IP)

## 2020-01-22 PROCEDURE — 160046 HCHG PACU - 1ST 60 MINS PHASE II: Performed by: UROLOGY

## 2020-01-22 PROCEDURE — C1894 INTRO/SHEATH, NON-LASER: HCPCS | Performed by: UROLOGY

## 2020-01-22 DEVICE — STENT UROLOGICAL POLARIS 6X24  ULTRA: Type: IMPLANTABLE DEVICE | Status: FUNCTIONAL

## 2020-01-22 RX ORDER — CELECOXIB 200 MG/1
200 CAPSULE ORAL ONCE
Status: COMPLETED | OUTPATIENT
Start: 2020-01-22 | End: 2020-01-22

## 2020-01-22 RX ORDER — SODIUM CHLORIDE, SODIUM LACTATE, POTASSIUM CHLORIDE, CALCIUM CHLORIDE 600; 310; 30; 20 MG/100ML; MG/100ML; MG/100ML; MG/100ML
INJECTION, SOLUTION INTRAVENOUS CONTINUOUS
Status: DISCONTINUED | OUTPATIENT
Start: 2020-01-22 | End: 2020-01-22 | Stop reason: HOSPADM

## 2020-01-22 RX ORDER — HYDRALAZINE HYDROCHLORIDE 20 MG/ML
5 INJECTION INTRAMUSCULAR; INTRAVENOUS
Status: DISCONTINUED | OUTPATIENT
Start: 2020-01-22 | End: 2020-01-22 | Stop reason: HOSPADM

## 2020-01-22 RX ORDER — HYDRALAZINE HYDROCHLORIDE 20 MG/ML
INJECTION INTRAMUSCULAR; INTRAVENOUS
Status: COMPLETED
Start: 2020-01-22 | End: 2020-01-22

## 2020-01-22 RX ORDER — ONDANSETRON 2 MG/ML
4 INJECTION INTRAMUSCULAR; INTRAVENOUS
Status: DISCONTINUED | OUTPATIENT
Start: 2020-01-22 | End: 2020-01-22 | Stop reason: HOSPADM

## 2020-01-22 RX ORDER — LIDOCAINE HYDROCHLORIDE 20 MG/ML
JELLY TOPICAL
Status: DISCONTINUED | OUTPATIENT
Start: 2020-01-22 | End: 2020-01-22 | Stop reason: HOSPADM

## 2020-01-22 RX ORDER — ONDANSETRON 2 MG/ML
INJECTION INTRAMUSCULAR; INTRAVENOUS PRN
Status: DISCONTINUED | OUTPATIENT
Start: 2020-01-22 | End: 2020-01-22 | Stop reason: SURG

## 2020-01-22 RX ORDER — CEFAZOLIN SODIUM 1 G/3ML
INJECTION, POWDER, FOR SOLUTION INTRAMUSCULAR; INTRAVENOUS PRN
Status: DISCONTINUED | OUTPATIENT
Start: 2020-01-22 | End: 2020-01-22 | Stop reason: SURG

## 2020-01-22 RX ORDER — OXYCODONE HCL 5 MG/5 ML
5 SOLUTION, ORAL ORAL
Status: COMPLETED | OUTPATIENT
Start: 2020-01-22 | End: 2020-01-22

## 2020-01-22 RX ORDER — HALOPERIDOL 5 MG/ML
1 INJECTION INTRAMUSCULAR
Status: DISCONTINUED | OUTPATIENT
Start: 2020-01-22 | End: 2020-01-22 | Stop reason: HOSPADM

## 2020-01-22 RX ORDER — LIDOCAINE HYDROCHLORIDE 10 MG/ML
INJECTION, SOLUTION EPIDURAL; INFILTRATION; INTRACAUDAL; PERINEURAL
Status: COMPLETED
Start: 2020-01-22 | End: 2020-01-22

## 2020-01-22 RX ORDER — OXYCODONE HCL 5 MG/5 ML
10 SOLUTION, ORAL ORAL
Status: COMPLETED | OUTPATIENT
Start: 2020-01-22 | End: 2020-01-22

## 2020-01-22 RX ORDER — HYDROCODONE BITARTRATE AND ACETAMINOPHEN 5; 325 MG/1; MG/1
TABLET ORAL
Qty: 15 TAB | Refills: 0 | Status: SHIPPED | OUTPATIENT
Start: 2020-01-22 | End: 2020-01-27

## 2020-01-22 RX ORDER — ACETAMINOPHEN 500 MG
1000 TABLET ORAL ONCE
Status: COMPLETED | OUTPATIENT
Start: 2020-01-22 | End: 2020-01-22

## 2020-01-22 RX ORDER — DIPHENHYDRAMINE HYDROCHLORIDE 50 MG/ML
12.5 INJECTION INTRAMUSCULAR; INTRAVENOUS
Status: DISCONTINUED | OUTPATIENT
Start: 2020-01-22 | End: 2020-01-22 | Stop reason: HOSPADM

## 2020-01-22 RX ORDER — MEPERIDINE HYDROCHLORIDE 25 MG/ML
6.25 INJECTION INTRAMUSCULAR; INTRAVENOUS; SUBCUTANEOUS
Status: DISCONTINUED | OUTPATIENT
Start: 2020-01-22 | End: 2020-01-22 | Stop reason: HOSPADM

## 2020-01-22 RX ORDER — PHENAZOPYRIDINE HYDROCHLORIDE 200 MG/1
200 TABLET, FILM COATED ORAL 3 TIMES DAILY PRN
Qty: 15 TAB | Refills: 0 | Status: SHIPPED | OUTPATIENT
Start: 2020-01-22 | End: 2020-07-22

## 2020-01-22 RX ADMIN — CEFAZOLIN 2 G: 330 INJECTION, POWDER, FOR SOLUTION INTRAMUSCULAR; INTRAVENOUS at 15:24

## 2020-01-22 RX ADMIN — ONDANSETRON 4 MG: 2 INJECTION INTRAMUSCULAR; INTRAVENOUS at 16:32

## 2020-01-22 RX ADMIN — SODIUM CHLORIDE, POTASSIUM CHLORIDE, SODIUM LACTATE AND CALCIUM CHLORIDE: 600; 310; 30; 20 INJECTION, SOLUTION INTRAVENOUS at 15:40

## 2020-01-22 RX ADMIN — OXYCODONE HYDROCHLORIDE 10 MG: 5 SOLUTION ORAL at 17:48

## 2020-01-22 RX ADMIN — Medication 0.5 ML: at 13:18

## 2020-01-22 RX ADMIN — CELECOXIB 200 MG: 200 CAPSULE ORAL at 13:17

## 2020-01-22 RX ADMIN — MIDAZOLAM HYDROCHLORIDE 2 MG: 1 INJECTION, SOLUTION INTRAMUSCULAR; INTRAVENOUS at 15:32

## 2020-01-22 RX ADMIN — HYDRALAZINE HYDROCHLORIDE 5 MG: 20 INJECTION INTRAMUSCULAR; INTRAVENOUS at 18:03

## 2020-01-22 RX ADMIN — LIDOCAINE HYDROCHLORIDE 40 MG: 20 INJECTION, SOLUTION INTRAVENOUS at 15:49

## 2020-01-22 RX ADMIN — ACETAMINOPHEN 1000 MG: 500 TABLET ORAL at 13:18

## 2020-01-22 RX ADMIN — FENTANYL CITRATE 100 MCG: 50 INJECTION, SOLUTION INTRAMUSCULAR; INTRAVENOUS at 15:49

## 2020-01-22 RX ADMIN — PROPOFOL 200 MG: 10 INJECTION, EMULSION INTRAVENOUS at 15:49

## 2020-01-22 RX ADMIN — SODIUM CHLORIDE, POTASSIUM CHLORIDE, SODIUM LACTATE AND CALCIUM CHLORIDE: 600; 310; 30; 20 INJECTION, SOLUTION INTRAVENOUS at 13:18

## 2020-01-22 RX ADMIN — LIDOCAINE HYDROCHLORIDE 0.5 ML: 10 INJECTION, SOLUTION EPIDURAL; INFILTRATION; INTRACAUDAL at 13:18

## 2020-01-22 RX ADMIN — SODIUM CHLORIDE, POTASSIUM CHLORIDE, SODIUM LACTATE AND CALCIUM CHLORIDE: 600; 310; 30; 20 INJECTION, SOLUTION INTRAVENOUS at 16:06

## 2020-01-22 RX ADMIN — GLYCOPYRROLATE 0.3 MG: 0.2 INJECTION INTRAMUSCULAR; INTRAVENOUS at 16:04

## 2020-01-22 ASSESSMENT — PAIN SCALES - GENERAL: PAIN_LEVEL: 3

## 2020-01-22 NOTE — ANESTHESIA TIME REPORT
Anesthesia Start and Stop Event Times     Date Time Event    1/22/2020 1434 Ready for Procedure     1540 Anesthesia Start        Responsible Staff  01/22/20    Name Role Begin End    Ye Machado M.D. Anesth 1540         Preop Diagnosis (Free Text):  Pre-op Diagnosis     KIDNEY STONR        Preop Diagnosis (Codes):    Post op Diagnosis  Kidney stones      Premium Reason  A. 3PM - 7AM    Comments:

## 2020-01-22 NOTE — OR NURSING
Pre op admission completed.  Pt and niece educated on surgical plan of care, all questions answered.  Bed in low position and call light within reach.  Hourly rounding in place.

## 2020-01-22 NOTE — OR NURSING
Dr. Quiles changed the surgical consent to indicate LEFT sided surgery.  Dr. Quiles signed consent and pt initialed and agreed upon change.  Updated Dr. Quiles that pt had continued his ASA 81 mg qd, last dose today.  No orders received.

## 2020-01-22 NOTE — ANESTHESIA PROCEDURE NOTES
Airway  Date/Time: 1/22/2020 3:48 PM  Performed by: Ye Machado M.D.  Authorized by: eY Machado M.D.     Location:  OR  Urgency:  Elective  Indications for Airway Management:  Anesthesia  Spontaneous Ventilation: absent    Sedation Level:  Deep  Preoxygenated: Yes    Patient Position:  Sniffing  Final Airway Type:  Supraglottic airway  SGA Size:  4  Number of Attempts at Approach:  1

## 2020-01-22 NOTE — ANESTHESIA PREPROCEDURE EVALUATION
Relevant Problems   PULMONARY   (+) Chronic obstructive pulmonary disease with acute exacerbation (HCC)      CARDIAC   (+) Atrial fibrillation (HCC)   (+) Coronary artery disease involving native coronary artery of native heart without angina pectoris   (+) Essential hypertension   (+) NSTEMI (non-ST elevated myocardial infarction) (HCC)   (+) Peripheral vascular disease (HCC)         (+) Hydronephrosis of right kidney   (+) Nephrolithiasis       Physical Exam    Airway   Mallampati: II  TM distance: >3 FB  Neck ROM: full       Cardiovascular - normal exam  Rhythm: regular  Rate: normal  (-) murmur     Dental - normal exam         Pulmonary - normal exam  Breath sounds clear to auscultation     Abdominal    Neurological - normal exam                 Anesthesia Plan    ASA 3   ASA physical status 3 criteria: COPD, CAD/stents (> 3 months) and other (comment)    Plan - general       Airway plan will be LMA        Induction: intravenous    Postoperative Plan: Postoperative administration of opioids is intended.    Pertinent diagnostic labs and testing reviewed    Informed Consent:    Anesthetic plan and risks discussed with patient.    Use of blood products discussed with: patient whom consented to blood products.

## 2020-01-23 LAB — PATHOLOGY CONSULT NOTE: NORMAL

## 2020-01-23 NOTE — ANESTHESIA POSTPROCEDURE EVALUATION
Patient: Olman Sims    Procedure Summary     Date:  01/22/20 Room / Location:  [place] [address] / SURGERY [facility]    Anesthesia Start:  1540 Anesthesia Stop:  1733    Procedures:       CYSTOSCOPY, WITH URETERAL STENT INSERTION (Left Urethra)      URETEROSCOPY (Left Ureter)      LITHOTRIPSY, USING LASER (Left Ureter) Diagnosis:  (KIDNEY STONE)    Surgeon:  Paul Quiles M.D. Responsible Provider:  Ye Machado M.D.    Anesthesia Type:  general ASA Status:  3          Final Anesthesia Type: general  Last vitals  BP   Blood Pressure : (!) 186/77    Temp   36.8 °C (98.2 °F)    Pulse   Pulse: (!) 56   Resp   18    SpO2   92 %      Anesthesia Post Evaluation    Patient location during evaluation: PACU  Patient participation: complete - patient participated  Level of consciousness: awake  Pain score: 3    Airway patency: patent  Anesthetic complications: no  Cardiovascular status: hemodynamically stable  Respiratory status: acceptable  Hydration status: euvolemic    PONV: none           Nurse Pain Score: 0 (NPRS)

## 2020-01-23 NOTE — DISCHARGE INSTRUCTIONS
ACTIVITY: Rest and take it easy for the first 24 hours.  A responsible adult is recommended to remain with you during that time.  It is normal to feel sleepy.  We encourage you to not do anything that requires balance, judgment or coordination.    MILD FLU-LIKE SYMPTOMS ARE NORMAL. YOU MAY EXPERIENCE GENERALIZED MUSCLE ACHES, THROAT IRRITATION, HEADACHE AND/OR SOME NAUSEA.    FOR 24 HOURS DO NOT:  Drive, operate machinery or run household appliances.  Drink beer or alcoholic beverages.   Make important decisions or sign legal documents.    SPECIAL INSTRUCTIONS: Please leave STENT/String in place, do not pull on string    DIET: To avoid nausea, slowly advance diet as tolerated, avoiding spicy or greasy foods for the first day.  Add more substantial food to your diet according to your physician's instructions. INCREASE FLUIDS AND FIBER TO AVOID CONSTIPATION.    SURGICAL DRESSING/BATHING: you may shower    FOLLOW-UP APPOINTMENT:  A follow-up appointment should be arranged with your doctor in 1-2 weeks-call office to schedule call to schedule.    You should CALL YOUR PHYSICIAN if you develop:  Fever greater than 101 degrees F.  Pain not relieved by medication, or persistent nausea or vomiting.  Excessive bleeding (blood soaking through dressing) or unexpected drainage from the wound.  Extreme redness or swelling around the incision site, drainage of pus or foul smelling drainage.  Inability to urinate or empty your bladder within 8 hours.  Problems with breathing or chest pain.    You should call 911 if you develop problems with breathing or chest pain.  If you are unable to contact your doctor or surgical center, you should go to the nearest emergency room or urgent care center.  Physician's telephone #: [phone]    If any questions arise, call your doctor.  If your doctor is not available, please feel free to call the Surgical Center at [phone].  The Center is open Monday through Friday from 7AM to 7PM.   You can also call the HEALTH HOTLINE open 24 hours/day, 7 days/week and speak to a nurse at [phone], or toll free at [phone].    A registered nurse may call you a few days after your surgery to see how you are doing after your procedure.    MEDICATIONS: Resume taking daily medication.  Take prescribed pain medication with food.  If no medication is prescribed, you may take non-aspirin pain medication if needed.  PAIN MEDICATION CAN BE VERY CONSTIPATING.  Take a stool softener or laxative such as senokot, pericolace, or milk of magnesia if needed.    Prescription given for Pyridium and Norco.  Last pain medication given at Oxycodone 10mg at 5:48pm today.    If your physician has prescribed pain medication that includes Acetaminophen (Tylenol), do not take additional Acetaminophen (Tylenol) while taking the prescribed medication.    Depression / Suicide Risk    As you are discharged from this [facility] facility, it is important to learn how to keep safe from harming yourself.    Recognize the warning signs:  · Abrupt changes in personality, positive or negative- including increase in energy   · Giving away possessions  · Change in eating patterns- significant weight changes-  positive or negative  · Change in sleeping patterns- unable to sleep or sleeping all the time   · Unwillingness or inability to communicate  · Depression  · Unusual sadness, discouragement and loneliness  · Talk of wanting to die  · Neglect of personal appearance   · Rebelliousness- reckless behavior  · Withdrawal from people/activities they love  · Confusion- inability to concentrate     If you or a loved one observes any of these behaviors or has concerns about self-harm, here's what you can do:  · Talk about it- your feelings and reasons for harming yourself  · Remove any means that you might use to hurt yourself (examples: pills, rope, extension cords, firearm)  · Get professional help from the community (Mental Health,  Substance Abuse, psychological counseling)  · Do not be alone:Call your Safe Contact- someone whom you trust who will be there for you.  · Call your local CRISIS HOTLINE 078-9376 or 966-103-7634  · Call your local Children's Mobile Crisis Response Team [place] (223) 202-4793 or www.[employer]  · Call the toll free National Suicide Prevention Hotlines   · National Suicide Prevention Lifeline 266-965-CQQX (8118)  · National Hope Line Network 800-SUICIDE (137-4939)

## 2020-01-23 NOTE — OR NURSING
Pt resting with eyes closed, no apparent distress. On 1L O2 via NC. No complaints of pain, no nausea, tolerates sips of clears. Family updated.

## 2020-01-23 NOTE — ANESTHESIA QCDR
2019 [facility] Clinical Data Registry (for Quality Improvement)     Postoperative nausea/vomiting risk protocol (Adult = 18 yrs and Pediatric 3-17 yrs)- (430 and 463)  General inhalation anesthetic (NOT TIVA) with PONV risk factors: Yes  Provision of anti-emetic therapy with at least 2 different classes of agents: Yes   Patient DID NOT receive anti-emetic therapy and reason is documented in Medical Record:  N/A    Multimodal Pain Management- (477)  Non-emergent surgery AND patient age >= 18: Yes  Use of Multimodal Pain Management, two or more drugs and/or interventions, NOT including systemic opioids: Yes  Exception: Documented allergy to multiple classes of analgesics: N/A    Smoking Abstinence (404)  Patient is current smoker (cigarette, pipe, e-cig, marijuanna): No  Elective Surgery:   Abstinence instructions provided prior to day of surgery:   Patient abstained from smoking on day of surgery:     Pre-Op Beta-Blocker in Isolated CABG (44)  Isolated CABG AND patient age >= 18: Yes  Beta-blocker admin within 24 hours of surgical incision: Yes  Exception:of medical reason(s) for not administering beta blocker within 24 hours prior to surgical incision (e.g., not  indicated,other medical reason):     PACU assessment of acute postoperative pain prior to Anesthesia Care End- Applies to Patients Age = 18- (ABG7)  Initial PACU pain score is which of the following: < 7/10  Patient unable to report pain score: N/A    Post-anesthetic transfer of care checklist/protocol to PACU/ICU- (426 and 427)  Upon conclusion of case, patient transferred to which of the following locations: PACU/Non-ICU  Use of transfer checklist/protocol: Yes  Exclusion: Service Performed in Patient Hospital Room (and thus did not require transfer): N/A  Unplanned admission to ICU related to anesthesia service up through end of PACU care- (MD51)  Unplanned admission to ICU (not initially anticipated at anesthesia start time): No

## 2020-01-23 NOTE — OP REPORT
DATE OF SERVICE:  01/22/2020    PREOPERATIVE DIAGNOSIS:  Bilateral nephrolithiasis.    POSTOPERATIVE DIAGNOSIS:  Bilateral nephrolithiasis.    PROCEDURES:  Cystoscopy with left ureterorenoscopy; laser lithotripsy of renal   pelvic and renal calyceal stones, multiple; basket stone extraction of stone   fragments and placement of left ureteral stent.    SURGEON:  Paul Quiles MD    ASSISTANT:  None.    ANESTHESIOLOGIST:  Ye Machado MD    TYPE OF ANESTHESIA:  General.    INDICATIONS:  This 69-year-old male with long history of stones has bilateral   nephrolithiasis and has elected treatment.  He has not responded to   extracorporeal shock wave lithotripsy in the past.  He is taken now for a   ureteroscopic laser lithotripsy.    FINDINGS:  There were multiple stones in the left kidney.  The stones were   treated in upper, mid and lower pole calices.  The stones were fragmented.    Fragments extracted.  Multiple small fragments were left to pass.  Stent was   placed to facilitate passage.    DESCRIPTION OF PROCEDURE:  The patient was identified in the holding area.  He   was taken to the operative suite.  General anesthesia was administered by Dr. Machado by LMA.  Cefazolin was given intravenously.  He was placed in the   dorsal lithotomy position.  He was sterilely prepped and draped.  Timeout was   called.  Correct patient and site of surgery was confirmed.    A 21-Bhutanese cystourethroscope was introduced.  Urethra showed no stricturing.    Prostate was moderately enlarged.  The lumen of the bladder showed no foreign   bodies, tumors or stones.    The left ureteral orifice was cannulated with a sensor wire, which was   positioned overlying stone shadows in the left upper quadrant.  The ureter was   then dilated with an 11/13 ureteral access sheath system (45 cm), initially   just the obturator, then obturator with sheath was advanced into the proximal   ureter on the left.  Using the sheath for access, a  Jasper flexible   disposable ureteroscope was advanced into the kidney.  Upper pole large   volume calyceal stones and renal pelvic stone were identified.    Using initially a 200-micron holmium fiber and subsequently a 365-micron   holmium laser fiber and energy settings of 0.6 joules and 6 Hz, 0.8 joules and   8 Hz, 0.2 joules and 40 Hz,  stones were fragmented into small fragments.    Some of the fragments were collected with a nitinol 0-tip basket and submitted   for analysis.  A separate cluster of stones in the middle pole calyx and a   separate cluster of stones in the lower pole feliz were similarly treated.    These were fragmented down to small size.  No large residual stones were noted   at the end of the procedure.  Because of the numerous small stone fragments   to pass, I elected to place a stent at the end of the procedure.  Through the   sheath, a sensor wire was advanced into the kidney and the sheath offloaded.    The guidewire was backloaded onto the cystoscope and a 6-Telugu x 24 cm   double-J stent with an unlooped string was then placed, this coiled in the   collecting system proximally on the left and coiled in the bladder distally.    String exiting the urethra was taped to the patient's phallus.  Bladder was   emptied.  He was sent to recovery room in stable condition after placing 2%   lidocaine jelly into the urethra.  He suffered no intraoperative   complications.       ____________________________________     MD CHARLOTTE Oliver / DOMINIQUE    DD:  01/22/2020 17:53:14  DT:  01/22/2020 18:08:17    D#:  [id number]  Job#:  [id number]

## 2020-01-23 NOTE — OR SURGEON
Immediate Post OP Note    PreOp Diagnosis: bilateral nephrolithiasis    PostOp Diagnosis: same    Procedure(s):  CYSTOSCOPY, WITH URETERAL STENT INSERTION - Wound Class: Clean Contaminated  URETEROSCOPY - Wound Class: Clean Contaminated  LITHOTRIPSY, USING LASER - Wound Class: Clean Contaminated    Surgeon(s):  Paul Quiles M.D.    Anesthesiologist/Type of Anesthesia:  Anesthesiologist: Ye Machado M.D./General    Surgical Staff:  Circulator: Feli Elizondo R.N.  Laser Staff: Adela Rosa; John Duke Danger  Scrub Person: Angel Phillip    Specimens removed if any:  ID Type Source Tests Collected by Time Destination   A : Left Renal Stone Stone Other PATHOLOGY SPECIMEN Paul Quiles M.D. 1/22/2020  4:51 PM        Estimated Blood Loss: minimal    Findings: mult left renal stones    Complications: none        1/22/2020 5:48 PM Paul Quiles M.D.

## 2020-01-23 NOTE — OR NURSING
Pt arrived in Phase 2 at 1845, transferred to recliner chair, denies pain, nausea or SOB, wearing oxygen at 1 L per NC, personal belongings and niece at bedside, black ureteral stent string taped to penis, pt ambulated to , able to void pink tinged urine in toilet, d/c instructions and RX reviewed with pt and niece, they verbalized understanding of instructions, PIV removed, tip intact, discharged via wheelchair with his home oxygen on and in place at 1925.

## 2020-01-27 LAB
APPEARANCE STONE: NORMAL
COMPN STONE: NORMAL
NUMBER STONE: NORMAL
SIZE STONE: NORMAL MM
SPECIMEN WT: 85 MG

## 2020-02-03 ENCOUNTER — HOSPITAL ENCOUNTER (OUTPATIENT)
Dept: RADIOLOGY | Facility: MEDICAL CENTER | Age: 70
End: 2020-02-03
Attending: UROLOGY
Payer: COMMERCIAL

## 2020-02-03 ENCOUNTER — OFFICE VISIT (OUTPATIENT)
Dept: MEDICAL GROUP | Facility: MEDICAL CENTER | Age: 70
End: 2020-02-03
Payer: COMMERCIAL

## 2020-02-03 VITALS
OXYGEN SATURATION: 92 % | RESPIRATION RATE: 16 BRPM | DIASTOLIC BLOOD PRESSURE: 60 MMHG | HEART RATE: 68 BPM | SYSTOLIC BLOOD PRESSURE: 144 MMHG | HEIGHT: 69 IN | TEMPERATURE: 98.5 F | BODY MASS INDEX: 28.05 KG/M2 | WEIGHT: 189.4 LBS

## 2020-02-03 DIAGNOSIS — N20.0 NEPHROLITHIASIS: ICD-10-CM

## 2020-02-03 DIAGNOSIS — F51.01 PRIMARY INSOMNIA: ICD-10-CM

## 2020-02-03 DIAGNOSIS — N20.0 URIC ACID NEPHROLITHIASIS: ICD-10-CM

## 2020-02-03 DIAGNOSIS — I48.91 ATRIAL FIBRILLATION, UNSPECIFIED TYPE (HCC): ICD-10-CM

## 2020-02-03 DIAGNOSIS — J96.11 CHRONIC RESPIRATORY FAILURE WITH HYPOXIA (HCC): ICD-10-CM

## 2020-02-03 DIAGNOSIS — J44.1 CHRONIC OBSTRUCTIVE PULMONARY DISEASE WITH ACUTE EXACERBATION (HCC): ICD-10-CM

## 2020-02-03 PROCEDURE — 99214 OFFICE O/P EST MOD 30 MIN: CPT | Performed by: PHYSICIAN ASSISTANT

## 2020-02-03 PROCEDURE — 74018 RADEX ABDOMEN 1 VIEW: CPT

## 2020-02-03 RX ORDER — ZOLPIDEM TARTRATE 5 MG/1
5 TABLET ORAL NIGHTLY PRN
Qty: 30 TAB | Refills: 5 | Status: SHIPPED
Start: 2020-02-03 | End: 2020-03-04

## 2020-02-03 ASSESSMENT — PATIENT HEALTH QUESTIONNAIRE - PHQ9: CLINICAL INTERPRETATION OF PHQ2 SCORE: 0

## 2020-02-03 NOTE — ASSESSMENT & PLAN NOTE
Chronic, taking inhalers as prescribed, due for follow up with pulmonology, denies increased cough, work of breathing, SOB

## 2020-02-03 NOTE — PROGRESS NOTES
Subjective:   Olman Sims is a 69 y.o. male here today for follow up on chronic conditions, med refill    Chronic obstructive pulmonary disease with acute exacerbation (HCC)  Chronic, taking inhalers as prescribed, due for follow up with pulmonology, denies increased cough, work of breathing, SOB    Nephrolithiasis  Working with nephrology and urology, has another procedure for stone removal and stent scheduled    Primary insomnia  Chronic, stable,  verified, on no other controlled meds  Controlled Substance Assessment:     - Is the medication, if previously prescribed, working as intended and expected to treat   the patients symptoms: yes.     - Does the patient have a history of substance abuse: no.     - has the patient demonstrated aberrant behavior or intoxication: no.     - Is there reason to believe that the patient is not using medication as prescribed or diverting the medication: no.     - Is there reason to believe that the patient is currently misusing this medication or addicted to the controlled substance: no.     - Is it necessary to verify that controlled substances, other that those authorized under the treatment plan, are not present in the body of this patient: no.    - Are there any blood or urine test that indicate inappropriate use of the medication or other controlled substances : no.     - I have reviewed the . Does the  report irregular/inconsistent medication use or indicate that the medication is being used inappropriately or that the patient is using another controlled substance not prescribed or known to me: no.     - Is there reason to believe that the patient is using other drugs, including alcohol, prescription or illicit drugs, that may interact negatively with controlled substance or have not been prescribed by a practitioner who is treating the patient: no.     - Are there any known early refill attempts or claims that medication has been lost or stolen: no.      - Are there are any major changes in the patient's mental or physical health that would affect the medical appropriateness of this medication: no.     - Has the patient increased the dose of medication without provider authorization: no.    - Has the patient been reluctant to cooperate with any exam, analysis or test recommended by me: no.     - Has the patient demonstrated reluctance to stop or reduce use of the medicine: no.     - Has the patient requested or demanded a controlled substance that is likely to be abused or cause dependency or addiction: no.     - Is there any other evidence that the patient is chronically using opioids, misusing, abusing, illegally using or addicted to any drug or failing to comply with the instructions of the practitioner concerning the use of the controlled substance: no    - Are there any other factors that the practitioner determines is necessary to make an informed professional judgment concerning the medical appropriateness of the prescription: no.     Atrial fibrillation (HCC)  History of, per patient normal rate when he checks at home         Current medicines (including changes today)  Current Outpatient Medications   Medication Sig Dispense Refill   • zolpidem (AMBIEN) 5 MG Tab Take 1 Tab by mouth at bedtime as needed for Sleep for up to 30 days. 30 Tab 5   • phenazopyridine (PYRIDIUM) 200 MG Tab Take 1 Tab by mouth 3 times a day as needed. 15 Tab 0   • losartan (COZAAR) 25 MG Tab Take 1 Tab by mouth every day. Hold for SBP less than 100 (Patient taking differently: Take 25 mg by mouth 1 time daily as needed. Hold for SBP less than 100) 90 Tab 3   • tiotropium (SPIRIVA HANDIHALER) 18 MCG Cap INHALE THE CONTENTS OF 1 CAPSULE BY MOUTH USING HANDI HALER EVERY DAY 90 Cap 3   • ADVAIR -21 MCG/ACT inhaler INHALE 2 PUFFS BY MOUTH TWICE DAILY 36 g 3   • VENTOLIN  (90 Base) MCG/ACT Aero Soln inhalation aerosol INHALE 2 PUFFS BY MOUTH EVERY 6 HOURS AS NEEDED FOR  "PAIN 1 Inhaler 6   • amLODIPine (NORVASC) 10 MG Tab TAKE 1 TABLET BY MOUTH EVERY DAY (Patient taking differently: Take 10 mg by mouth 1 time daily as needed.) 90 Tab 3   • atorvastatin (LIPITOR) 40 MG Tab Take 1 Tab by mouth every evening. 90 Tab 3   • carvedilol (COREG) 12.5 MG Tab Take 1 Tab by mouth 2 times a day, with meals. 180 Tab 3   • clopidogrel (PLAVIX) 75 MG Tab Take 1 Tab by mouth every day. 30 Tab 11   • aspirin (ASA) 81 MG Chew Tab chewable tablet Take 1 Tab by mouth every day. 100 Tab 3     No current facility-administered medications for this visit.      He  has a past medical history of Adverse effect of anesthesia, Anesthesia, Breath shortness, CAD (coronary artery disease), COPD, Dental disorder, Emphysema of lung ([facility]), Hyperlipidemia, Hypertension, Oxygen dependent, Paroxysmal atrial fibrillation ([facility]), PVD (peripheral vascular disease) ([facility]), and Renal disorder.    ROS   No fever/chills. No weight change. No headache/dizziness. No focal weakness. No sore throat, nasal congestion, ear pain. No chest pain. No n/v/d/c or abdominal pain.  No rash or skin lesion. No joint pain or swelling.     Objective:     /60 (BP Location: Right arm, Patient Position: Sitting, BP Cuff Size: Adult long)   Pulse 68   Temp 36.9 °C (98.5 °F) (Temporal)   Resp 16   Ht 1.753 m (5' 9\")   Wt 85.9 kg (189 lb 6.4 oz)   SpO2 92%  Body mass index is 27.97 kg/m².   Physical Exam:  Constitutional: WDWN, NAD  Skin: Warm, dry, good turgor, no rashes in visible areas.  Respiratory: decreased air flow bilat, no wheezing or rhonchi appreciated  Cardiovascular: Normal S1, S2  Psych: Alert and oriented x3, normal affect and mood.    Assessment and Plan:   The following treatment plan was discussed    1. Chronic obstructive pulmonary disease with acute exacerbation (HCC)    - REFERRAL TO PULMONOLOGY    2. Chronic respiratory failure with hypoxia (HCC)    - REFERRAL TO PULMONOLOGY    3. Primary insomnia    - zolpidem " (AMBIEN) 5 MG Tab; Take 1 Tab by mouth at bedtime as needed for Sleep for up to 30 days.  Dispense: 30 Tab; Refill: 5    4. Nephrolithiasis      5. Atrial fibrillation, unspecified type (HCC)        Followup: Return in about 6 months (around 8/3/2020).

## 2020-02-03 NOTE — ASSESSMENT & PLAN NOTE
Chronic, stable,  verified, on no other controlled meds  Controlled Substance Assessment:     - Is the medication, if previously prescribed, working as intended and expected to treat   the patients symptoms: yes.     - Does the patient have a history of substance abuse: no.     - has the patient demonstrated aberrant behavior or intoxication: no.     - Is there reason to believe that the patient is not using medication as prescribed or diverting the medication: no.     - Is there reason to believe that the patient is currently misusing this medication or addicted to the controlled substance: no.     - Is it necessary to verify that controlled substances, other that those authorized under the treatment plan, are not present in the body of this patient: no.    - Are there any blood or urine test that indicate inappropriate use of the medication or other controlled substances : no.     - I have reviewed the . Does the  report irregular/inconsistent medication use or indicate that the medication is being used inappropriately or that the patient is using another controlled substance not prescribed or known to me: no.     - Is there reason to believe that the patient is using other drugs, including alcohol, prescription or illicit drugs, that may interact negatively with controlled substance or have not been prescribed by a practitioner who is treating the patient: no.     - Are there any known early refill attempts or claims that medication has been lost or stolen: no.     - Are there are any major changes in the patient's mental or physical health that would affect the medical appropriateness of this medication: no.     - Has the patient increased the dose of medication without provider authorization: no.    - Has the patient been reluctant to cooperate with any exam, analysis or test recommended by me: no.     - Has the patient demonstrated reluctance to stop or reduce use of the medicine: no.     - Has the  patient requested or demanded a controlled substance that is likely to be abused or cause dependency or addiction: no.     - Is there any other evidence that the patient is chronically using opioids, misusing, abusing, illegally using or addicted to any drug or failing to comply with the instructions of the practitioner concerning the use of the controlled substance: no    - Are there any other factors that the practitioner determines is necessary to make an informed professional judgment concerning the medical appropriateness of the prescription: no.

## 2020-02-12 ENCOUNTER — HOSPITAL ENCOUNTER (OUTPATIENT)
Dept: LAB | Facility: MEDICAL CENTER | Age: 70
End: 2020-02-12
Attending: PHYSICIAN ASSISTANT
Payer: COMMERCIAL

## 2020-02-12 ENCOUNTER — HOSPITAL ENCOUNTER (OUTPATIENT)
Dept: LAB | Facility: MEDICAL CENTER | Age: 70
End: 2020-02-12
Attending: UROLOGY
Payer: COMMERCIAL

## 2020-02-12 LAB
ALBUMIN SERPL BCP-MCNC: 4.3 G/DL (ref 3.2–4.9)
ALBUMIN/GLOB SERPL: 1.8 G/DL
ALP SERPL-CCNC: 96 U/L (ref 30–99)
ALT SERPL-CCNC: 19 U/L (ref 2–50)
ANION GAP SERPL CALC-SCNC: 4 MMOL/L (ref 0–11.9)
APTT PPP: 26 SEC (ref 24.7–36)
AST SERPL-CCNC: 18 U/L (ref 12–45)
BASOPHILS # BLD AUTO: 0.5 % (ref 0–1.8)
BASOPHILS # BLD: 0.04 K/UL (ref 0–0.12)
BILIRUB SERPL-MCNC: 0.6 MG/DL (ref 0.1–1.5)
BUN SERPL-MCNC: 25 MG/DL (ref 8–22)
CALCIUM SERPL-MCNC: 9.3 MG/DL (ref 8.5–10.5)
CHLORIDE SERPL-SCNC: 107 MMOL/L (ref 96–112)
CO2 SERPL-SCNC: 30 MMOL/L (ref 20–33)
CREAT SERPL-MCNC: 1.7 MG/DL (ref 0.5–1.4)
EOSINOPHIL # BLD AUTO: 0.31 K/UL (ref 0–0.51)
EOSINOPHIL NFR BLD: 3.5 % (ref 0–6.9)
ERYTHROCYTE [DISTWIDTH] IN BLOOD BY AUTOMATED COUNT: 50.4 FL (ref 35.9–50)
GLOBULIN SER CALC-MCNC: 2.4 G/DL (ref 1.9–3.5)
GLUCOSE SERPL-MCNC: 110 MG/DL (ref 65–99)
HCT VFR BLD AUTO: 44.6 % (ref 42–52)
HGB BLD-MCNC: 14.4 G/DL (ref 14–18)
IMM GRANULOCYTES # BLD AUTO: 0.05 K/UL (ref 0–0.11)
IMM GRANULOCYTES NFR BLD AUTO: 0.6 % (ref 0–0.9)
INR PPP: 0.94 (ref 0.87–1.13)
LYMPHOCYTES # BLD AUTO: 1.3 K/UL (ref 1–4.8)
LYMPHOCYTES NFR BLD: 14.8 % (ref 22–41)
MCH RBC QN AUTO: 30.6 PG (ref 27–33)
MCHC RBC AUTO-ENTMCNC: 32.3 G/DL (ref 33.7–35.3)
MCV RBC AUTO: 94.9 FL (ref 81.4–97.8)
MONOCYTES # BLD AUTO: 0.58 K/UL (ref 0–0.85)
MONOCYTES NFR BLD AUTO: 6.6 % (ref 0–13.4)
NEUTROPHILS # BLD AUTO: 6.48 K/UL (ref 1.82–7.42)
NEUTROPHILS NFR BLD: 74 % (ref 44–72)
NRBC # BLD AUTO: 0 K/UL
NRBC BLD-RTO: 0 /100 WBC
PLATELET # BLD AUTO: 242 K/UL (ref 164–446)
PMV BLD AUTO: 9.5 FL (ref 9–12.9)
POTASSIUM SERPL-SCNC: 4.4 MMOL/L (ref 3.6–5.5)
PROT SERPL-MCNC: 6.7 G/DL (ref 6–8.2)
PROTHROMBIN TIME: 12.8 SEC (ref 12–14.6)
RBC # BLD AUTO: 4.7 M/UL (ref 4.7–6.1)
SODIUM SERPL-SCNC: 141 MMOL/L (ref 135–145)
WBC # BLD AUTO: 8.8 K/UL (ref 4.8–10.8)

## 2020-02-12 PROCEDURE — 36415 COLL VENOUS BLD VENIPUNCTURE: CPT

## 2020-02-12 PROCEDURE — 85610 PROTHROMBIN TIME: CPT

## 2020-02-12 PROCEDURE — 87086 URINE CULTURE/COLONY COUNT: CPT

## 2020-02-12 PROCEDURE — 85025 COMPLETE CBC W/AUTO DIFF WBC: CPT

## 2020-02-12 PROCEDURE — 80053 COMPREHEN METABOLIC PANEL: CPT

## 2020-02-12 PROCEDURE — 85730 THROMBOPLASTIN TIME PARTIAL: CPT

## 2020-02-14 LAB
BACTERIA UR CULT: NORMAL
SIGNIFICANT IND 70042: NORMAL
SITE SITE: NORMAL
SOURCE SOURCE: NORMAL

## 2020-04-23 NOTE — ADDENDUM NOTE
Encounter addended by: Opal Oseguera R.N. on: 4/23/2020 11:52 AM   Actions taken: Flowsheet accepted

## 2020-06-03 DIAGNOSIS — I25.10 CORONARY ARTERY DISEASE INVOLVING NATIVE CORONARY ARTERY OF NATIVE HEART WITHOUT ANGINA PECTORIS: Primary | ICD-10-CM

## 2020-06-03 DIAGNOSIS — E78.5 DYSLIPIDEMIA: Primary | ICD-10-CM

## 2020-06-03 RX ORDER — CLOPIDOGREL BISULFATE 75 MG/1
TABLET ORAL
Qty: 90 TAB | Refills: 0 | Status: SHIPPED | OUTPATIENT
Start: 2020-06-03 | End: 2020-07-22

## 2020-06-03 RX ORDER — ATORVASTATIN CALCIUM 40 MG/1
TABLET, FILM COATED ORAL
Qty: 90 TAB | Refills: 0 | Status: SHIPPED | OUTPATIENT
Start: 2020-06-03 | End: 2020-09-04

## 2020-07-22 ENCOUNTER — OFFICE VISIT (OUTPATIENT)
Dept: CARDIOLOGY | Facility: MEDICAL CENTER | Age: 70
End: 2020-07-22
Payer: COMMERCIAL

## 2020-07-22 VITALS
HEIGHT: 69 IN | HEART RATE: 72 BPM | SYSTOLIC BLOOD PRESSURE: 170 MMHG | DIASTOLIC BLOOD PRESSURE: 64 MMHG | OXYGEN SATURATION: 93 % | WEIGHT: 190 LBS | BODY MASS INDEX: 28.14 KG/M2

## 2020-07-22 DIAGNOSIS — I25.10 CORONARY ARTERY DISEASE INVOLVING NATIVE CORONARY ARTERY OF NATIVE HEART WITHOUT ANGINA PECTORIS: ICD-10-CM

## 2020-07-22 DIAGNOSIS — E78.5 DYSLIPIDEMIA: ICD-10-CM

## 2020-07-22 DIAGNOSIS — I48.0 PAROXYSMAL ATRIAL FIBRILLATION (HCC): ICD-10-CM

## 2020-07-22 DIAGNOSIS — N20.0 CALCULUS OF KIDNEY: ICD-10-CM

## 2020-07-22 DIAGNOSIS — I73.9 PERIPHERAL VASCULAR DISEASE (HCC): Chronic | ICD-10-CM

## 2020-07-22 PROCEDURE — 99214 OFFICE O/P EST MOD 30 MIN: CPT | Performed by: PHYSICIAN ASSISTANT

## 2020-07-22 ASSESSMENT — ENCOUNTER SYMPTOMS
NAUSEA: 0
PALPITATIONS: 0
SHORTNESS OF BREATH: 1
BRUISES/BLEEDS EASILY: 1
WEIGHT LOSS: 0
DIZZINESS: 0

## 2020-07-22 ASSESSMENT — FIBROSIS 4 INDEX: FIB4 SCORE: 1.16

## 2020-07-22 NOTE — PROGRESS NOTES
[facility] Cardiology Clinic Note    Date of Service:    7/22/2020      Name:   Olman Sims   YOB: 1950  Age:   70 y.o.  male   MRN:   [id number]      Chief Complaint: CAD    Primary Cardiologist:  Dr. Cortes    HPI:  Mr Sims is a 69 y/o fellow with PMH including COPD, HTN, CKD, HLD, CAD s/p BMS to the prox RCA in 5/2019, with preserved LVEF.  He has hx of R iliac stent in 2012.  He was dx with paroxysmal AFIB in 5/2019 during COPD exacerbation, however, has not been on OAC due to hematuria and ongoing urology evaluation.    Interim Events:  He was last seen in cardiology clinic by Dr. Cortes on 8/14/2019.    He has been doing well since his last visit.  Had removal of kidney stones in January, no hematuria since that time.   Denies LE swelling or chest pain    Wearing 2 lpm O2 continuously  Denies palpitations, taking BP and HR 4 times per day  HR lways regular and 70s- 80s.  BP usually 120-130.  He does take his losartan 2-4 times per week  Takes amlodipine of SBP > 150 (about 1 time per week)      Review of Systems   Constitutional: Negative for malaise/fatigue and weight loss.   Respiratory: Positive for shortness of breath.    Cardiovascular: Negative for chest pain, palpitations and leg swelling.   Gastrointestinal: Negative for nausea.   Genitourinary: Negative for hematuria.   Neurological: Negative for dizziness.   Endo/Heme/Allergies: Bruises/bleeds easily.       Past medical, surgical, social, and family history reviewed and unchanged from admission except as noted in assessment and plan.    Medications: Reviewed in MAR  No current facility-administered medications for this visit.      Last reviewed on 7/22/2020  8:44 AM by David Lopez Ass't    No Known Allergies    Physical Exam:  Vitals:    07/22/20 0845   BP: (!) 170/64   Pulse: 72   SpO2: 93%     General: no apparent distress.  Eyes: normal conjunctiva  ENT: OP clear  Neck: no JVD   Lungs: normal  respiratory effort, diminished without crackles, no wheezing or rhonchi.  Heart: normal rate, regular rhythm, no murmur, no rubs or gallops,   EXT: no edema bilateral lower extremities. + bilateral pedal pulses. no cyanosis  Abdomen: soft, non tender, non distended,  Neurological: No focal deficits, no facial asymmetry.  Normal speech.  Psychiatric: Appropriate affect, alert and oriented x 3.   Skin: Warm extremities, no rash.    Labs (personally reviewed):     Lab Results   Component Value Date/Time    SODIUM 141 02/12/2020 11:16 AM    POTASSIUM 4.4 02/12/2020 11:16 AM    CHLORIDE 107 02/12/2020 11:16 AM    CO2 30 02/12/2020 11:16 AM    GLUCOSE 110 (H) 02/12/2020 11:16 AM    BUN 25 (H) 02/12/2020 11:16 AM    CREATININE 1.70 (H) 02/12/2020 11:16 AM     Lab Results   Component Value Date/Time    ALKPHOSPHAT 96 02/12/2020 11:16 AM    ASTSGOT 18 02/12/2020 11:16 AM    ALTSGPT 19 02/12/2020 11:16 AM    TBILIRUBIN 0.6 02/12/2020 11:16 AM      Lab Results   Component Value Date/Time    CHOLSTRLTOT 120 10/08/2019 12:48 PM    LDL 52 10/08/2019 12:48 PM    HDL 45 10/08/2019 12:48 PM    TRIGLYCERIDE 115 10/08/2019 12:48 PM     Lab Results   Component Value Date/Time    BNPBTYPENAT 102 (H) 05/01/2019 12:10 AM         Cardiac Imaging and Procedures Review:      Echo 4/29/19:  CONCLUSIONS  Technically difficult study incomplete information is obtained.   Normal left ventricular systolic function.  Left ventricular ejection fraction is visually estimated to be 55%.  No evidence of valvular abnormality based on Doppler evaluation.     Stress Test 6/21/16:  Report   NUCLEAR IMAGING INTERPRETATION   No evidence of significant jeopardized viable myocardium or prior myocardial    infarction.   Normal left ventricular size, ejection fraction, and wall motion.    [facility] 5/5/2019:  Post-operative Diagnosis:   1.  Non-ST elevation myocardial infarction likely secondary to 90% ulcerated eccentric stenosis in the proximal right coronary  artery  2.  Normal left ventricle systolic function and wall motion  3.  Status post stenting of the right coronary artery with a 3.5 x 18 mm bare metal Vision stent with no residual stenosis and MAURA-3 flow    Holter Monitor 19:  Summary:   Normal sinus rhythm/ sinus bradycardia   No sustained arrhythmias noted. Short runs as above.     Assessment and Medical Decision Makin   Coronary artery disease s/p BMS to the pRCA in 2019.  -   Continue ASA 81 mg for life, may d/c clopidogrel.  -   Continue statin and BB.    2   COPD with chronic hypoxia on 2 lpm continuously.    3   Paroxsymal atrial fibrillation with RVR during COPD exacerbation 2019  -   No reoccurrence,  Monitors HR and rhythm 4 times daily  -   Event monitor in 2019 without recurrent atrial fib/flutter.  -   No indication for OAC at this time.    4   Hematuria with hx of nephrolithiasis.  Improved.    5   Essential hypertension  -   BP elevated today, and seems to be moderately well controlled at home.   -   Gave option to take losartan daily instead of prn, since he was taking it often.  -   He will continue his current regimen:    *  Coreg 12.5 mg BID   *  Losartan 25 for SBP> 140  *  Amlodipine 10 for SBP>150    6   CKD stage III.  Appears stable.    7   HLD. LDL 52 10/2019  -   Continue atorvastatin 40 mg    8   PAD with hx of R iliac stent in .    May follow up annually or as needed.     Dannielle Doan PA-C  [facility]    In collaboration with Dr. Cortes.

## 2020-07-28 DIAGNOSIS — I10 ESSENTIAL HYPERTENSION: Primary | ICD-10-CM

## 2020-07-28 RX ORDER — CARVEDILOL 12.5 MG/1
TABLET ORAL
Qty: 180 TAB | Refills: 3 | Status: SHIPPED | OUTPATIENT
Start: 2020-07-28 | End: 2021-06-01 | Stop reason: SDUPTHER

## 2020-08-03 ENCOUNTER — OFFICE VISIT (OUTPATIENT)
Dept: MEDICAL GROUP | Facility: MEDICAL CENTER | Age: 70
End: 2020-08-03
Payer: COMMERCIAL

## 2020-08-03 VITALS
OXYGEN SATURATION: 92 % | TEMPERATURE: 97.1 F | HEART RATE: 69 BPM | WEIGHT: 193.56 LBS | HEIGHT: 69 IN | SYSTOLIC BLOOD PRESSURE: 122 MMHG | BODY MASS INDEX: 28.67 KG/M2 | DIASTOLIC BLOOD PRESSURE: 68 MMHG

## 2020-08-03 DIAGNOSIS — I73.9 PERIPHERAL VASCULAR DISEASE (HCC): Chronic | ICD-10-CM

## 2020-08-03 DIAGNOSIS — I10 ESSENTIAL HYPERTENSION: ICD-10-CM

## 2020-08-03 DIAGNOSIS — I48.0 PAROXYSMAL ATRIAL FIBRILLATION (HCC): ICD-10-CM

## 2020-08-03 DIAGNOSIS — E78.5 DYSLIPIDEMIA: ICD-10-CM

## 2020-08-03 DIAGNOSIS — Z12.5 SCREENING PSA (PROSTATE SPECIFIC ANTIGEN): ICD-10-CM

## 2020-08-03 DIAGNOSIS — J43.8 OTHER EMPHYSEMA (HCC): ICD-10-CM

## 2020-08-03 DIAGNOSIS — Z12.12 SCREENING FOR COLORECTAL CANCER: ICD-10-CM

## 2020-08-03 DIAGNOSIS — I25.10 CORONARY ARTERY DISEASE INVOLVING NATIVE CORONARY ARTERY OF NATIVE HEART WITHOUT ANGINA PECTORIS: ICD-10-CM

## 2020-08-03 DIAGNOSIS — N18.30 CKD (CHRONIC KIDNEY DISEASE) STAGE 3, GFR 30-59 ML/MIN: ICD-10-CM

## 2020-08-03 DIAGNOSIS — F51.01 PRIMARY INSOMNIA: ICD-10-CM

## 2020-08-03 DIAGNOSIS — Z12.11 SCREENING FOR COLORECTAL CANCER: ICD-10-CM

## 2020-08-03 PROBLEM — Z96.0 RETAINED URETERAL STENT: Status: RESOLVED | Noted: 2019-05-04 | Resolved: 2020-08-03

## 2020-08-03 PROCEDURE — 99214 OFFICE O/P EST MOD 30 MIN: CPT | Performed by: PHYSICIAN ASSISTANT

## 2020-08-03 RX ORDER — ZOLPIDEM TARTRATE 5 MG/1
TABLET ORAL
COMMUNITY
Start: 2020-07-03 | End: 2020-08-03 | Stop reason: SDUPTHER

## 2020-08-03 RX ORDER — ZOLPIDEM TARTRATE 5 MG/1
5 TABLET ORAL NIGHTLY PRN
Qty: 30 TAB | Refills: 5 | Status: SHIPPED | OUTPATIENT
Start: 2020-08-03 | End: 2020-09-02

## 2020-08-03 ASSESSMENT — FIBROSIS 4 INDEX: FIB4 SCORE: 1.16

## 2020-08-03 NOTE — ASSESSMENT & PLAN NOTE
Chronic, stable, using advair and spiriva daily, using 2liter nasal cannula continuous, didn't see pulmonology

## 2020-08-03 NOTE — PROGRESS NOTES
Subjective:   Olman Sims is a 70 y.o. male here today for follow up on chronic conditions, no recent illness or injury, due for labs. Up to date with urology and cardiology. Up to date vaccines.    Peripheral vascular disease (HCC)  Taking statin, bp well controlled, on daily aspirin, managed with cardiology    Other emphysema (HCC)  Chronic, stable, using advair and spiriva daily, using 2liter nasal cannula continuous, didn't see pulmonology    Essential hypertension  Chronic, stable on current, noted improvement in BP after he had his kidney stones/stents removed. No dizziness.     Dyslipidemia  Chronic, taking statin as directed    Atrial fibrillation (HCC)  H/o, normal rate currently, on aspirin, managed with cardiology    Primary insomnia  Chronic, stable on current ambien 5mg nightly, no new concerns,  verified, no other controlled meds or prescribers       Current medicines (including changes today)  Current Outpatient Medications   Medication Sig Dispense Refill   • zolpidem (AMBIEN) 5 MG Tab Take 1 Tab by mouth at bedtime as needed for Sleep for up to 30 days. 30 Tab 5   • carvedilol (COREG) 12.5 MG Tab TAKE 1 TABLET BY MOUTH TWICE DAILY WITH MEALS 180 Tab 3   • aspirin (ASA) 81 MG Chew Tab chewable tablet Take 1 Tab by mouth every day. MUST BE SEEN FOR FURTHER REFILLS, THANK YOU! 100 Tab 0   • atorvastatin (LIPITOR) 40 MG Tab TAKE 1 TABLET BY MOUTH EVERY EVENING 90 Tab 0   • losartan (COZAAR) 25 MG Tab Take 1 Tab by mouth every day. Hold for SBP less than 100 (Patient taking differently: Take 25 mg by mouth 1 time daily as needed. Hold for SBP less than 100) 90 Tab 3   • tiotropium (SPIRIVA HANDIHALER) 18 MCG Cap INHALE THE CONTENTS OF 1 CAPSULE BY MOUTH USING HANDI HALER EVERY DAY 90 Cap 3   • ADVAIR -21 MCG/ACT inhaler INHALE 2 PUFFS BY MOUTH TWICE DAILY 36 g 3   • VENTOLIN  (90 Base) MCG/ACT Aero Soln inhalation aerosol INHALE 2 PUFFS BY MOUTH EVERY 6 HOURS AS NEEDED FOR  "PAIN 1 Inhaler 6   • amLODIPine (NORVASC) 10 MG Tab TAKE 1 TABLET BY MOUTH EVERY DAY (Patient taking differently: Take 10 mg by mouth 1 time daily as needed.) 90 Tab 3     No current facility-administered medications for this visit.      He  has a past medical history of Adverse effect of anesthesia, Anesthesia, Breath shortness, CAD (coronary artery disease), COPD, Dental disorder, Emphysema of lung ([facility]), Hyperlipidemia, Hypertension, Oxygen dependent, Paroxysmal atrial fibrillation ([facility]), PVD (peripheral vascular disease) ([facility]), and Renal disorder.    ROS   No fever/chills. No weight change. No headache/dizziness. No focal weakness. No sore throat, nasal congestion, ear pain. No chest pain, no shortness of breath, difficulty breathing. No n/v/d/c or abdominal pain. No urinary complaint. No rash or skin lesion. No joint pain or swelling.       Objective:     /68 (BP Location: Left arm, Patient Position: Sitting, BP Cuff Size: Adult long)   Pulse 69   Temp 36.2 °C (97.1 °F) (Temporal)   Ht 1.753 m (5' 9\")   Wt 87.8 kg (193 lb 9 oz)   SpO2 92%  Body mass index is 28.58 kg/m².   Physical Exam:  Constitutional: WDWN, NAD  Skin: Warm, dry, good turgor, no rashes in visible areas.  Respiratory: Unlabored respiratory effort, lungs clear to auscultation, no wheezes, no ronchi.  Cardiovascular: Normal S1, S2, no murmur, no leg edema.  Psych: Alert and oriented x3, normal affect and mood.    Assessment and Plan:   The following treatment plan was discussed    1. Primary insomnia    - zolpidem (AMBIEN) 5 MG Tab; Take 1 Tab by mouth at bedtime as needed for Sleep for up to 30 days.  Dispense: 30 Tab; Refill: 5    2. Dyslipidemia    - Lipid Profile; Future    3. Essential hypertension    - Comp Metabolic Panel; Future    4. Coronary artery disease involving native coronary artery of native heart without angina pectoris    - Lipid Profile; Future    5. CKD (chronic kidney disease) stage 3, GFR 30-59 ml/min ([facility])    - " Comp Metabolic Panel; Future    6. Paroxysmal atrial fibrillation (HCC)      7. Screening for colorectal cancer    - REFERRAL TO GI FOR COLONOSCOPY    8. Screening PSA (prostate specific antigen)    - PROSTATE SPECIFIC AG SCREENING; Future    9. Peripheral vascular disease (HCC)      10. Other emphysema (HCC)        Followup: 6 months

## 2020-08-03 NOTE — ASSESSMENT & PLAN NOTE
Chronic, stable on current ambien 5mg nightly, no new concerns,  verified, no other controlled meds or prescribers

## 2020-08-03 NOTE — ASSESSMENT & PLAN NOTE
Chronic, stable on current, noted improvement in BP after he had his kidney stones/stents removed. No dizziness.

## 2020-09-01 DIAGNOSIS — I25.10 CORONARY ARTERY DISEASE INVOLVING NATIVE CORONARY ARTERY OF NATIVE HEART WITHOUT ANGINA PECTORIS: ICD-10-CM

## 2020-09-01 DIAGNOSIS — E78.5 DYSLIPIDEMIA: ICD-10-CM

## 2020-09-04 RX ORDER — CLOPIDOGREL BISULFATE 75 MG/1
TABLET ORAL
Qty: 90 TAB | Refills: 0 | OUTPATIENT
Start: 2020-09-04

## 2020-09-04 RX ORDER — ATORVASTATIN CALCIUM 40 MG/1
TABLET, FILM COATED ORAL
Qty: 90 TAB | Refills: 3 | Status: SHIPPED | OUTPATIENT
Start: 2020-09-04 | End: 2021-05-20 | Stop reason: SDUPTHER

## 2020-09-07 DIAGNOSIS — I10 ESSENTIAL HYPERTENSION: ICD-10-CM

## 2020-09-08 RX ORDER — AMLODIPINE BESYLATE 10 MG/1
TABLET ORAL
Qty: 90 TAB | Refills: 3 | Status: SHIPPED | OUTPATIENT
Start: 2020-09-08 | End: 2021-12-03

## 2020-09-23 DIAGNOSIS — I25.10 CORONARY ARTERY DISEASE INVOLVING NATIVE CORONARY ARTERY OF NATIVE HEART WITHOUT ANGINA PECTORIS: ICD-10-CM

## 2020-09-23 RX ORDER — ASPIRIN 81 MG/1
81 TABLET, CHEWABLE ORAL DAILY
Qty: 90 TAB | Refills: 2 | Status: SHIPPED | OUTPATIENT
Start: 2020-09-23 | End: 2021-07-06

## 2020-10-09 ENCOUNTER — NON-PROVIDER VISIT (OUTPATIENT)
Dept: MEDICAL GROUP | Facility: MEDICAL CENTER | Age: 70
End: 2020-10-09
Payer: COMMERCIAL

## 2020-10-09 DIAGNOSIS — Z23 NEED FOR VACCINATION: ICD-10-CM

## 2020-10-09 PROCEDURE — 90471 IMMUNIZATION ADMIN: CPT | Performed by: PHYSICIAN ASSISTANT

## 2020-10-09 PROCEDURE — 90662 IIV NO PRSV INCREASED AG IM: CPT | Performed by: PHYSICIAN ASSISTANT

## 2020-10-09 NOTE — NON-PROVIDER
"Olman Sims is a 70 y.o. male here for a non-provider visit for:   FLU    Reason for immunization: Annual Flu Vaccine  Immunization records indicate need for vaccine: Yes, confirmed with Epic  Minimum interval has been met for this vaccine: Yes  ABN completed: No    Order and dose verified by: WU  VIS Dated  08/15/2019 was given to patient: Yes  All IAC Questionnaire questions were answered \"No.\"    Patient tolerated injection and no adverse effects were observed or reported: Yes    Pt scheduled for next dose in series: No    "

## 2020-11-23 RX ORDER — FLUTICASONE PROPIONATE AND SALMETEROL XINAFOATE 230; 21 UG/1; UG/1
AEROSOL, METERED RESPIRATORY (INHALATION)
Qty: 36 G | Refills: 3 | Status: SHIPPED | OUTPATIENT
Start: 2020-11-23 | End: 2021-05-04 | Stop reason: SDUPTHER

## 2020-12-28 RX ORDER — TIOTROPIUM BROMIDE 18 UG/1
CAPSULE ORAL; RESPIRATORY (INHALATION)
Qty: 90 CAP | Refills: 1 | Status: SHIPPED | OUTPATIENT
Start: 2020-12-28 | End: 2021-04-06 | Stop reason: SDUPTHER

## 2021-02-03 ENCOUNTER — TELEMEDICINE (OUTPATIENT)
Dept: MEDICAL GROUP | Facility: MEDICAL CENTER | Age: 71
End: 2021-02-03
Payer: COMMERCIAL

## 2021-02-03 VITALS — WEIGHT: 180 LBS | BODY MASS INDEX: 26.66 KG/M2 | OXYGEN SATURATION: 95 % | HEIGHT: 69 IN

## 2021-02-03 DIAGNOSIS — Z87.442 HISTORY OF KIDNEY STONES: ICD-10-CM

## 2021-02-03 DIAGNOSIS — Z86.79 HISTORY OF ATRIAL FIBRILLATION: ICD-10-CM

## 2021-02-03 DIAGNOSIS — I25.2 HISTORY OF NON-ST ELEVATION MYOCARDIAL INFARCTION (NSTEMI): ICD-10-CM

## 2021-02-03 DIAGNOSIS — E78.5 DYSLIPIDEMIA: ICD-10-CM

## 2021-02-03 DIAGNOSIS — I73.9 PERIPHERAL VASCULAR DISEASE (HCC): Chronic | ICD-10-CM

## 2021-02-03 DIAGNOSIS — I10 ESSENTIAL HYPERTENSION: ICD-10-CM

## 2021-02-03 DIAGNOSIS — Z12.5 SCREENING PSA (PROSTATE SPECIFIC ANTIGEN): ICD-10-CM

## 2021-02-03 DIAGNOSIS — N18.30 STAGE 3 CHRONIC KIDNEY DISEASE, UNSPECIFIED WHETHER STAGE 3A OR 3B CKD: ICD-10-CM

## 2021-02-03 DIAGNOSIS — F51.01 PRIMARY INSOMNIA: ICD-10-CM

## 2021-02-03 DIAGNOSIS — R73.09 ELEVATED GLUCOSE: ICD-10-CM

## 2021-02-03 PROCEDURE — 99214 OFFICE O/P EST MOD 30 MIN: CPT | Mod: 95,CR | Performed by: PHYSICIAN ASSISTANT

## 2021-02-03 RX ORDER — ZOLPIDEM TARTRATE 5 MG/1
5 TABLET ORAL
COMMUNITY
Start: 2021-01-03 | End: 2021-02-03 | Stop reason: SDUPTHER

## 2021-02-03 RX ORDER — ZOLPIDEM TARTRATE 5 MG/1
5 TABLET ORAL NIGHTLY PRN
Qty: 30 TAB | Refills: 5 | Status: SHIPPED | OUTPATIENT
Start: 2021-02-03 | End: 2021-03-05

## 2021-02-03 ASSESSMENT — FIBROSIS 4 INDEX: FIB4 SCORE: 1.16

## 2021-02-03 ASSESSMENT — PATIENT HEALTH QUESTIONNAIRE - PHQ9: CLINICAL INTERPRETATION OF PHQ2 SCORE: 0

## 2021-02-03 NOTE — PROGRESS NOTES
Virtual Visit: Established Patient   This visit was conducted via Zoom using secure and encrypted videoconferencing technology. The patient was in a private location in the state of [place].    The patient's identity was confirmed and verbal consent was obtained for this virtual visit.    Subjective:   CC:   Chief Complaint   Patient presents with   • Medication Refill     Zolpidem       Olman Sims is a 70 y.o. male presenting for evaluation and management of:    Essential hypertension  Taking at home 115-120/70s. Taking amlodipine, carvedilol, losartan. No dizziness, headache, leg swelling. No chest pain, SOB or difficulty breathing.     Primary insomnia  Chronic, stable,  verified, no new concerns, request 6 month refill of ambien 5mg, not drinking alcohol, no other controlled meds    Peripheral vascular disease (HCC)  Taking statin, BP meds, aspirin daily. No new symptoms of pain, temp change of extremities, swelling    History of non-ST elevation myocardial infarction (NSTEMI)  No new symptoms of chest pain, SOB. Exercising on stationary bike 1-2 miles a day without dyspnea on exertion    History of kidney stones  None over the past year    Dyslipidemia  Chronic, stable on current statin, due for labs    History of atrial fibrillation  Managed with cardiology. Taken off OAC d/t no futher episodes. Patient monitors his pulse, BP and oxygen daily.      ROS   Denies any recent fevers or chills. No nausea or vomiting. No chest pains or shortness of breath.     No Known Allergies    Current medicines (including changes today)  Current Outpatient Medications   Medication Sig Dispense Refill   • zolpidem (AMBIEN) 5 MG Tab Take 1 Tab by mouth at bedtime as needed for Sleep for up to 30 days. FOR SLEEP. 30 Tab 5   • tiotropium (SPIRIVA HANDIHALER) 18 MCG Cap INHALE THE CONTENTS OF 1 CAPSULE VIA HANDIHALER BY MOUTH EVERY DAY 90 Cap 1   • ADVAIR -21 MCG/ACT inhaler INHALE 2 PUFFS BY MOUTH TWICE DAILY  36 g 3   • aspirin (ASA) 81 MG Chew Tab chewable tablet Take 1 Tab by mouth every day. MUST BE SEEN FOR FURTHER REFILLS, THANK YOU! 90 Tab 2   • amLODIPine (NORVASC) 10 MG Tab TAKE 1 TABLET BY MOUTH EVERY DAY 90 Tab 3   • atorvastatin (LIPITOR) 40 MG Tab TAKE 1 TABLET BY MOUTH EVERY EVENING 90 Tab 3   • carvedilol (COREG) 12.5 MG Tab TAKE 1 TABLET BY MOUTH TWICE DAILY WITH MEALS 180 Tab 3   • losartan (COZAAR) 25 MG Tab Take 1 Tab by mouth every day. Hold for SBP less than 100 (Patient taking differently: Take 25 mg by mouth 1 time daily as needed. Hold for SBP less than 100) 90 Tab 3   • VENTOLIN  (90 Base) MCG/ACT Aero Soln inhalation aerosol INHALE 2 PUFFS BY MOUTH EVERY 6 HOURS AS NEEDED FOR PAIN 1 Inhaler 6     No current facility-administered medications for this visit.        Patient Active Problem List    Diagnosis Date Noted   • Coronary artery disease involving native coronary artery of native heart without angina pectoris 05/16/2019     Priority: Medium   • History of atrial fibrillation 04/29/2019     Priority: Medium   • History of non-ST elevation myocardial infarction (NSTEMI) 04/29/2019     Priority: Medium   • Dyslipidemia 01/03/2018     Priority: Medium   • Essential hypertension 11/10/2014     Priority: Medium   • Peripheral vascular disease (HCC) 11/12/2012     Priority: Medium   • Other emphysema (HCC) 06/15/2012     Priority: Medium   • Primary insomnia 11/16/2018     Priority: Low   • Nasal septal deformity 05/04/2018     Priority: Low   • Hypertrophy of both inferior nasal turbinates 05/04/2018     Priority: Low   • Chronic vasomotor rhinitis 05/04/2018     Priority: Low   • Mild cognitive impairment with memory loss 12/02/2013     Priority: Low   • History of kidney stones 02/03/2021       Family History   Problem Relation Age of Onset   • Cancer Maternal Grandfather 65        colon       He  has a past medical history of Adverse effect of anesthesia, Anesthesia, Breath shortness, CAD  "(coronary artery disease), COPD, Dental disorder, Emphysema of lung (HCC), Hyperlipidemia, Hypertension, Oxygen dependent, Paroxysmal atrial fibrillation (HCC), PVD (peripheral vascular disease) ([facility]), and Renal disorder.  He  has a past surgical history that includes cystoscopy stent placement (2/24/2012); recovery (4/4/2012); percutaneous nephrostolithotomy (4/5/2012); percutaneous nephrostolithotomy (5/2/2012); recovery (11/1/2012); appendectomy; appendectomy (1987); septoplasty (N/A, 5/4/2018); turbinate reduction (Bilateral, 5/4/2018); zzz cardiac cath; stent placement; pr cystoscopy,insert ureteral stent (Left, 1/22/2020); pr cysto/uretero/pyeloscopy, dx (Left, 1/22/2020); and lasertripsy (Left, 1/22/2020).       Objective:   Ht 1.753 m (5' 9\")   Wt 81.6 kg (180 lb) Comment: Pt reported  SpO2 95%   BMI 26.58 kg/m²     Physical Exam:  Constitutional: Alert, no distress, well-groomed.  Skin: No rashes in visible areas.  Eye: Round. Conjunctiva clear, lids normal. No icterus.   ENMT: Lips pink without lesions, good dentition, moist mucous membranes. Phonation normal.  Neck: No masses, no thyromegaly. Moves freely without pain.  Respiratory: Unlabored respiratory effort, no cough or audible wheeze  Psych: Alert and oriented x3, normal affect and mood.       Assessment and Plan:   The following treatment plan was discussed:     1. Essential hypertension  - Comp Metabolic Panel; Future    2. History of kidney stones    3. Screening PSA (prostate specific antigen)  - PROSTATE SPECIFIC AG SCREENING; Future    4. Dyslipidemia  - Lipid Profile; Future    5. Elevated glucose  - HEMOGLOBIN A1C; Future    6. Stage 3 chronic kidney disease, unspecified whether stage 3a or 3b CKD  - MICROALBUMIN CREAT RATIO URINE; Future    7. Primary insomnia  - zolpidem (AMBIEN) 5 MG Tab; Take 1 Tab by mouth at bedtime as needed for Sleep for up to 30 days. FOR SLEEP.  Dispense: 30 Tab; Refill: 5    8. Peripheral vascular disease " (HCC)    9. History of non-ST elevation myocardial infarction (NSTEMI)    10. History of atrial fibrillation        Follow-up: 6 months and as needed

## 2021-02-03 NOTE — ASSESSMENT & PLAN NOTE
Taking at home 115-120/70s. Taking amlodipine, carvedilol, losartan. No dizziness, headache, leg swelling. No chest pain, SOB or difficulty breathing.

## 2021-02-03 NOTE — ASSESSMENT & PLAN NOTE
Chronic, stable,  verified, no new concerns, request 6 month refill of ambien 5mg, not drinking alcohol, no other controlled meds

## 2021-02-03 NOTE — ASSESSMENT & PLAN NOTE
Taking statin, BP meds, aspirin daily. No new symptoms of pain, temp change of extremities, swelling

## 2021-02-03 NOTE — ASSESSMENT & PLAN NOTE
Managed with cardiology. Taken off OAC d/t no futher episodes. Patient monitors his pulse, BP and oxygen daily.

## 2021-02-03 NOTE — ASSESSMENT & PLAN NOTE
No new symptoms of chest pain, SOB. Exercising on stationary bike 1-2 miles a day without dyspnea on exertion

## 2021-03-03 DIAGNOSIS — Z23 NEED FOR VACCINATION: ICD-10-CM

## 2021-03-11 ENCOUNTER — IMMUNIZATION (OUTPATIENT)
Dept: FAMILY PLANNING/WOMEN'S HEALTH CLINIC | Facility: IMMUNIZATION CENTER | Age: 71
End: 2021-03-11
Attending: INTERNAL MEDICINE
Payer: COMMERCIAL

## 2021-03-11 DIAGNOSIS — Z23 ENCOUNTER FOR VACCINATION: Primary | ICD-10-CM

## 2021-03-11 DIAGNOSIS — Z23 NEED FOR VACCINATION: ICD-10-CM

## 2021-03-11 PROCEDURE — 91300 PFIZER SARS-COV-2 VACCINE: CPT

## 2021-03-11 PROCEDURE — 0001A PFIZER SARS-COV-2 VACCINE: CPT

## 2021-04-02 ENCOUNTER — IMMUNIZATION (OUTPATIENT)
Dept: FAMILY PLANNING/WOMEN'S HEALTH CLINIC | Facility: IMMUNIZATION CENTER | Age: 71
End: 2021-04-02
Attending: INTERNAL MEDICINE
Payer: COMMERCIAL

## 2021-04-02 DIAGNOSIS — Z23 ENCOUNTER FOR VACCINATION: Primary | ICD-10-CM

## 2021-04-02 PROCEDURE — 91300 PFIZER SARS-COV-2 VACCINE: CPT

## 2021-04-02 PROCEDURE — 0002A PFIZER SARS-COV-2 VACCINE: CPT

## 2021-04-06 ENCOUNTER — PATIENT MESSAGE (OUTPATIENT)
Dept: MEDICAL GROUP | Facility: MEDICAL CENTER | Age: 71
End: 2021-04-06

## 2021-04-07 RX ORDER — TIOTROPIUM BROMIDE 18 UG/1
CAPSULE ORAL; RESPIRATORY (INHALATION)
Qty: 90 CAPSULE | Refills: 1 | Status: SHIPPED | OUTPATIENT
Start: 2021-04-07 | End: 2021-10-20 | Stop reason: SDUPTHER

## 2021-05-04 ENCOUNTER — PATIENT MESSAGE (OUTPATIENT)
Dept: MEDICAL GROUP | Facility: MEDICAL CENTER | Age: 71
End: 2021-05-04

## 2021-05-04 RX ORDER — FLUTICASONE PROPIONATE AND SALMETEROL XINAFOATE 230; 21 UG/1; UG/1
2 AEROSOL, METERED RESPIRATORY (INHALATION) 2 TIMES DAILY
Qty: 36 G | Refills: 3 | Status: SHIPPED | OUTPATIENT
Start: 2021-05-04 | End: 2022-02-03

## 2021-05-19 ENCOUNTER — PATIENT MESSAGE (OUTPATIENT)
Dept: CARDIOLOGY | Facility: MEDICAL CENTER | Age: 71
End: 2021-05-19

## 2021-05-19 DIAGNOSIS — E78.5 DYSLIPIDEMIA: ICD-10-CM

## 2021-05-20 RX ORDER — ATORVASTATIN CALCIUM 40 MG/1
40 TABLET, FILM COATED ORAL EVERY EVENING
Qty: 90 TABLET | Refills: 0 | Status: SHIPPED | OUTPATIENT
Start: 2021-05-20 | End: 2021-07-23 | Stop reason: SDUPTHER

## 2021-05-20 RX ORDER — ATORVASTATIN CALCIUM 40 MG/1
40 TABLET, FILM COATED ORAL EVERY EVENING
Qty: 90 TABLET | Refills: 0 | Status: SHIPPED | OUTPATIENT
Start: 2021-05-20 | End: 2021-05-20 | Stop reason: SDUPTHER

## 2021-06-01 ENCOUNTER — PATIENT MESSAGE (OUTPATIENT)
Dept: CARDIOLOGY | Facility: MEDICAL CENTER | Age: 71
End: 2021-06-01

## 2021-06-01 DIAGNOSIS — I10 ESSENTIAL HYPERTENSION: ICD-10-CM

## 2021-06-03 RX ORDER — CARVEDILOL 12.5 MG/1
TABLET ORAL
Qty: 180 TABLET | Refills: 0 | Status: SHIPPED | OUTPATIENT
Start: 2021-06-03 | End: 2021-07-23 | Stop reason: SDUPTHER

## 2021-07-05 ENCOUNTER — PATIENT MESSAGE (OUTPATIENT)
Dept: CARDIOLOGY | Facility: MEDICAL CENTER | Age: 71
End: 2021-07-05

## 2021-07-07 ENCOUNTER — TELEPHONE (OUTPATIENT)
Dept: CARDIOLOGY | Facility: MEDICAL CENTER | Age: 71
End: 2021-07-07

## 2021-07-07 NOTE — TELEPHONE ENCOUNTER
PT scheduled.   SLC    ----- Message from Kimberley Cui R.N. sent at 7/6/2021  7:19 PM PDT -----  Pt requesting to schedule [facility]

## 2021-07-07 NOTE — TELEPHONE ENCOUNTER
LM for PT to call back, but will reach to PT again to schedule.   SLC    Regarding: Non-Urgent Medical Question  Contact: [phone]  ----- Message from Kimberley Cui R.N. sent at 7/6/2021  7:19 PM PDT -----       ----- Message from Olman Sims to Dannielle Doan P.A.-C. sent at 7/5/2021 10:14 AM -----   I am trying to schedule an appointment with you but I am having trouble with the [facility] automated phone system.

## 2021-07-23 ENCOUNTER — OFFICE VISIT (OUTPATIENT)
Dept: CARDIOLOGY | Facility: MEDICAL CENTER | Age: 71
End: 2021-07-23
Payer: COMMERCIAL

## 2021-07-23 VITALS
HEART RATE: 62 BPM | DIASTOLIC BLOOD PRESSURE: 80 MMHG | BODY MASS INDEX: 26.36 KG/M2 | WEIGHT: 178 LBS | OXYGEN SATURATION: 91 % | HEIGHT: 69 IN | SYSTOLIC BLOOD PRESSURE: 158 MMHG

## 2021-07-23 DIAGNOSIS — E78.5 DYSLIPIDEMIA: ICD-10-CM

## 2021-07-23 DIAGNOSIS — Z86.79 HISTORY OF ATRIAL FIBRILLATION: ICD-10-CM

## 2021-07-23 DIAGNOSIS — I10 ESSENTIAL HYPERTENSION: ICD-10-CM

## 2021-07-23 PROCEDURE — 99213 OFFICE O/P EST LOW 20 MIN: CPT | Performed by: PHYSICIAN ASSISTANT

## 2021-07-23 RX ORDER — ATORVASTATIN CALCIUM 40 MG/1
40 TABLET, FILM COATED ORAL EVERY EVENING
Qty: 90 TABLET | Refills: 3 | Status: SHIPPED | OUTPATIENT
Start: 2021-07-23 | End: 2022-08-16 | Stop reason: SDUPTHER

## 2021-07-23 RX ORDER — CARVEDILOL 12.5 MG/1
TABLET ORAL
Qty: 180 TABLET | Refills: 3 | Status: SHIPPED | OUTPATIENT
Start: 2021-07-23 | End: 2022-08-16 | Stop reason: SDUPTHER

## 2021-07-23 ASSESSMENT — ENCOUNTER SYMPTOMS
SHORTNESS OF BREATH: 1
PALPITATIONS: 0
NAUSEA: 0
BRUISES/BLEEDS EASILY: 1
DIZZINESS: 0
WEIGHT LOSS: 0

## 2021-07-23 ASSESSMENT — FIBROSIS 4 INDEX: FIB4 SCORE: 1.18

## 2021-07-23 NOTE — PROGRESS NOTES
[facility] Cardiology Clinic Note    Date of Service:    7/23/2021      Name:   Olman Sims   YOB: 1950  Age:   70 y.o.  male   MRN:   [id number]      Chief Complaint: CAD    Primary Cardiologist:  Dr. Cortes --> Dr Valverde    HPI:  Mr Sims is a 71 y/o fellow with PMH including COPD, HTN, CKD, HLD, CAD s/p BMS to the prox RCA in 5/2019, with preserved LVEF.  He has hx of R iliac stent in 2012.  He was dx with paroxysmal AFIB in 5/2019 during COPD exacerbation on O2 2lpm, however, has not been on OAC due to hematuria and ongoing urology evaluation.    Interim Events:  He was last seen in cardiology clinic by me in 7/2020.  He is here for annual follow up.    Home -110s    Stationary bike daily 1 mile per day  Total gym 3 times per week.    No CP or palpitations with his workouts.  Breathing is ok, doing well on current inhalers.    Review of Systems   Constitutional: Negative for malaise/fatigue and weight loss.   Respiratory: Positive for shortness of breath.    Cardiovascular: Negative for chest pain, palpitations and leg swelling.   Gastrointestinal: Negative for nausea.   Genitourinary: Negative for hematuria.   Neurological: Negative for dizziness.   Endo/Heme/Allergies: Bruises/bleeds easily.       Past medical, surgical, social, and family history reviewed and unchanged from admission except as noted in assessment and plan.    Medications: Reviewed in MAR  No current facility-administered medications for this visit.     Last reviewed on 7/23/2021  1:15 PM by Di Gonzalez, Med Ass't    No Known Allergies    Physical Exam:  Vitals:    07/23/21 1312   BP: 158/80   Pulse: 62   SpO2: 91%      General: no apparent distress.  Lungs: normal respiratory effort, diminished without crackles, no wheezing or rhonchi.  Heart: normal rate, regular rhythm, no murmur, no rubs or gallops,   EXT: no edema bilateral lower extremities. + bilateral pedal pulses. no  cyanosis  Neurological: No focal deficits, no facial asymmetry.  Normal speech.  Psychiatric: Appropriate affect, alert and oriented x 3.   Skin: Warm extremities, no rash.    Labs (personally reviewed):     Lab Results   Component Value Date/Time    SODIUM 141 02/12/2020 11:16 AM    POTASSIUM 4.4 02/12/2020 11:16 AM    CHLORIDE 107 02/12/2020 11:16 AM    CO2 30 02/12/2020 11:16 AM    GLUCOSE 110 (H) 02/12/2020 11:16 AM    BUN 25 (H) 02/12/2020 11:16 AM    CREATININE 1.70 (H) 02/12/2020 11:16 AM     Lab Results   Component Value Date/Time    ALKPHOSPHAT 96 02/12/2020 11:16 AM    ASTSGOT 18 02/12/2020 11:16 AM    ALTSGPT 19 02/12/2020 11:16 AM    TBILIRUBIN 0.6 02/12/2020 11:16 AM      Lab Results   Component Value Date/Time    CHOLSTRLTOT 120 10/08/2019 12:48 PM    LDL 52 10/08/2019 12:48 PM    HDL 45 10/08/2019 12:48 PM    TRIGLYCERIDE 115 10/08/2019 12:48 PM     Lab Results   Component Value Date/Time    BNPBTYPENAT 102 (H) 05/01/2019 12:10 AM         Cardiac Imaging and Procedures Review:      Echo 4/29/19:  CONCLUSIONS  Technically difficult study incomplete information is obtained.   Normal left ventricular systolic function.  Left ventricular ejection fraction is visually estimated to be 55%.  No evidence of valvular abnormality based on Doppler evaluation.     Stress Test 6/21/16:  Report   NUCLEAR IMAGING INTERPRETATION   No evidence of significant jeopardized viable myocardium or prior myocardial    infarction.   Normal left ventricular size, ejection fraction, and wall motion.    [facility] 5/5/2019:  Post-operative Diagnosis:   1.  Non-ST elevation myocardial infarction likely secondary to 90% ulcerated eccentric stenosis in the proximal right coronary artery  2.  Normal left ventricle systolic function and wall motion  3.  Status post stenting of the right coronary artery with a 3.5 x 18 mm bare metal Vision stent with no residual stenosis and MAURA-3 flow    Holter Monitor 5/30/19:  Summary:   Normal sinus  rhythm/ sinus bradycardia   No sustained arrhythmias noted. Short runs as above.     Assessment and Medical Decision Makin   Coronary artery disease s/p BMS to the pRCA in 2019.  -   Continue ASA 81 mg for life  -   Continue statin and BB.    2   COPD with chronic hypoxia on 2 lpm at night and when he needs to walk distances.    3   Paroxsymal atrial fibrillation with RVR during COPD exacerbation 2019  -   No reoccurrence,  Monitors HR   -   Event monitor in 2019 without recurrent atrial fib/flutter.  -   No indication for OAC at this time.    4   Hematuria with hx of nephrolithiasis.  Improved.    5   Essential hypertension  -   BP elevated today, and seems to be moderately well controlled at home.   -   Recommended he take his losartan today given his elevated BP in the office.  -   He will continue his current regimen:    *  Coreg 12.5 mg BID   *  Losartan 25 for SBP> 140  *  Amlodipine 10 for SBP>150    6   CKD stage III.  Appears stable.    7   HLD. LDL 52 10/2019  -   Continue atorvastatin 40 mg    8   PAD with hx of R iliac stent in .    Repeat labs pending from PCP.   May follow up with us annually or as needed.    Dannielle Doan PA-C  [facility]

## 2021-07-27 ENCOUNTER — PATIENT MESSAGE (OUTPATIENT)
Dept: MEDICAL GROUP | Facility: MEDICAL CENTER | Age: 71
End: 2021-07-27

## 2021-07-27 ENCOUNTER — HOSPITAL ENCOUNTER (OUTPATIENT)
Dept: LAB | Facility: MEDICAL CENTER | Age: 71
End: 2021-07-27
Attending: PHYSICIAN ASSISTANT
Payer: COMMERCIAL

## 2021-07-27 DIAGNOSIS — R73.09 ELEVATED GLUCOSE: ICD-10-CM

## 2021-07-27 DIAGNOSIS — E78.5 DYSLIPIDEMIA: ICD-10-CM

## 2021-07-27 DIAGNOSIS — I10 ESSENTIAL HYPERTENSION: ICD-10-CM

## 2021-07-27 DIAGNOSIS — N18.30 STAGE 3 CHRONIC KIDNEY DISEASE, UNSPECIFIED WHETHER STAGE 3A OR 3B CKD: ICD-10-CM

## 2021-07-27 DIAGNOSIS — Z12.5 SCREENING PSA (PROSTATE SPECIFIC ANTIGEN): ICD-10-CM

## 2021-07-27 LAB
CREAT UR-MCNC: 160.62 MG/DL
EST. AVERAGE GLUCOSE BLD GHB EST-MCNC: 117 MG/DL
HBA1C MFR BLD: 5.7 % (ref 4–5.6)
MICROALBUMIN UR-MCNC: 5.6 MG/DL
MICROALBUMIN/CREAT UR: 35 MG/G (ref 0–30)

## 2021-07-27 PROCEDURE — 84153 ASSAY OF PSA TOTAL: CPT

## 2021-07-27 PROCEDURE — 82570 ASSAY OF URINE CREATININE: CPT

## 2021-07-27 PROCEDURE — 82043 UR ALBUMIN QUANTITATIVE: CPT

## 2021-07-27 PROCEDURE — 80053 COMPREHEN METABOLIC PANEL: CPT

## 2021-07-27 PROCEDURE — 36415 COLL VENOUS BLD VENIPUNCTURE: CPT

## 2021-07-27 PROCEDURE — 83036 HEMOGLOBIN GLYCOSYLATED A1C: CPT

## 2021-07-27 PROCEDURE — 80061 LIPID PANEL: CPT

## 2021-07-27 RX ORDER — LOSARTAN POTASSIUM 25 MG/1
25 TABLET ORAL
Qty: 90 TABLET | Refills: 3 | Status: SHIPPED | OUTPATIENT
Start: 2021-07-27 | End: 2022-08-16

## 2021-07-27 NOTE — PATIENT COMMUNICATION
Was the patient seen in the last year in this department? Yes   Does patient have an active prescription for medications requested? No   Received Request Via: Patient

## 2021-07-28 LAB
ALBUMIN SERPL BCP-MCNC: 4.3 G/DL (ref 3.2–4.9)
ALBUMIN/GLOB SERPL: 1.6 G/DL
ALP SERPL-CCNC: 156 U/L (ref 30–99)
ALT SERPL-CCNC: 25 U/L (ref 2–50)
ANION GAP SERPL CALC-SCNC: 13 MMOL/L (ref 7–16)
AST SERPL-CCNC: 29 U/L (ref 12–45)
BILIRUB SERPL-MCNC: 1.2 MG/DL (ref 0.1–1.5)
BUN SERPL-MCNC: 18 MG/DL (ref 8–22)
CALCIUM SERPL-MCNC: 9.2 MG/DL (ref 8.5–10.5)
CHLORIDE SERPL-SCNC: 105 MMOL/L (ref 96–112)
CHOLEST SERPL-MCNC: 113 MG/DL (ref 100–199)
CO2 SERPL-SCNC: 25 MMOL/L (ref 20–33)
CREAT SERPL-MCNC: 1.34 MG/DL (ref 0.5–1.4)
FASTING STATUS PATIENT QL REPORTED: NORMAL
GLOBULIN SER CALC-MCNC: 2.7 G/DL (ref 1.9–3.5)
GLUCOSE SERPL-MCNC: 99 MG/DL (ref 65–99)
HDLC SERPL-MCNC: 35 MG/DL
LDLC SERPL CALC-MCNC: 56 MG/DL
POTASSIUM SERPL-SCNC: 4.4 MMOL/L (ref 3.6–5.5)
PROT SERPL-MCNC: 7 G/DL (ref 6–8.2)
PSA SERPL-MCNC: 2.17 NG/ML (ref 0–4)
SODIUM SERPL-SCNC: 143 MMOL/L (ref 135–145)
TRIGL SERPL-MCNC: 111 MG/DL (ref 0–149)

## 2021-08-03 ENCOUNTER — OFFICE VISIT (OUTPATIENT)
Dept: MEDICAL GROUP | Facility: MEDICAL CENTER | Age: 71
End: 2021-08-03
Payer: COMMERCIAL

## 2021-08-03 VITALS
TEMPERATURE: 98.3 F | HEART RATE: 61 BPM | DIASTOLIC BLOOD PRESSURE: 60 MMHG | HEIGHT: 69 IN | SYSTOLIC BLOOD PRESSURE: 148 MMHG | OXYGEN SATURATION: 93 % | BODY MASS INDEX: 26.98 KG/M2 | WEIGHT: 182.2 LBS

## 2021-08-03 DIAGNOSIS — R73.03 PREDIABETES: ICD-10-CM

## 2021-08-03 DIAGNOSIS — F51.01 PRIMARY INSOMNIA: ICD-10-CM

## 2021-08-03 DIAGNOSIS — Z12.11 SCREENING FOR COLORECTAL CANCER: ICD-10-CM

## 2021-08-03 DIAGNOSIS — J43.8 OTHER EMPHYSEMA (HCC): ICD-10-CM

## 2021-08-03 DIAGNOSIS — Z12.12 SCREENING FOR COLORECTAL CANCER: ICD-10-CM

## 2021-08-03 DIAGNOSIS — N18.31 STAGE 3A CHRONIC KIDNEY DISEASE: ICD-10-CM

## 2021-08-03 DIAGNOSIS — Z23 NEED FOR VACCINATION: ICD-10-CM

## 2021-08-03 PROCEDURE — 99214 OFFICE O/P EST MOD 30 MIN: CPT | Mod: 25 | Performed by: PHYSICIAN ASSISTANT

## 2021-08-03 PROCEDURE — 90750 HZV VACC RECOMBINANT IM: CPT | Performed by: PHYSICIAN ASSISTANT

## 2021-08-03 PROCEDURE — 90471 IMMUNIZATION ADMIN: CPT | Performed by: PHYSICIAN ASSISTANT

## 2021-08-03 RX ORDER — ZOLPIDEM TARTRATE 5 MG/1
5 TABLET ORAL
COMMUNITY
Start: 2021-07-03 | End: 2021-08-03 | Stop reason: SDUPTHER

## 2021-08-03 RX ORDER — ZOLPIDEM TARTRATE 5 MG/1
5 TABLET ORAL NIGHTLY PRN
Qty: 30 TABLET | Refills: 5 | Status: SHIPPED | OUTPATIENT
Start: 2021-08-03 | End: 2021-09-02

## 2021-08-03 ASSESSMENT — FIBROSIS 4 INDEX: FIB4 SCORE: 1.65

## 2021-08-03 NOTE — ASSESSMENT & PLAN NOTE
Using oxygen with exertion - 2 liters nasal cannula prn during the day. At night 2 liters nasal cannula. Last overnight testing about 9 years ago. Using inhalers as prescribed. Over due for pulmonology consult and probable PFTs and overnight oximetry. Agrees to referral.

## 2021-08-03 NOTE — PROGRESS NOTES
Subjective:   Olman Sims is a 71 y.o. male here today for f/u chronic conditions, lab review, med refill. No new concerns. No recent illness or injury.     Other emphysema (HCC)  Using oxygen with exertion - 2 liters nasal cannula prn during the day. At night 2 liters nasal cannula. Last overnight testing about 9 years ago. Using inhalers as prescribed. Over due for pulmonology consult and probable PFTs and overnight oximetry. Agrees to referral.    Prediabetes  5.7, diet controlled, doing well    Stage 3a chronic kidney disease (HCC)  Chronic, stable, labs up to date, no NSAIDS, BP well controlled, denies urinary symptoms    Primary insomnia  Chronic, stable on current, due for refill, no new concerns,  verified       Current medicines (including changes today)  Current Outpatient Medications   Medication Sig Dispense Refill   • zolpidem (AMBIEN) 5 MG Tab Take 1 tablet by mouth at bedtime as needed for Sleep for up to 30 days. 30 tablet 5   • losartan (COZAAR) 25 MG Tab Take 1 tablet by mouth 1 time a day as needed. Hold for SBP less than 100 90 tablet 3   • atorvastatin (LIPITOR) 40 MG Tab Take 1 tablet by mouth every evening. 90 tablet 3   • carvedilol (COREG) 12.5 MG Tab TAKE 1 TABLET BY MOUTH TWICE DAILY WITH MEALS 180 tablet 3   • aspirin (ASA) 81 MG Chew Tab chewable tablet Chew 1 tablet every day. MUST BE SEEN FOR FURTHER REFILLS, THANK YOU! 90 tablet 0   • VENTOLIN  (90 Base) MCG/ACT Aero Soln inhalation aerosol INHALE 2 PUFFS BY MOUTH EVERY 6 HOURS AS NEEDED 18 g 0   • fluticasone-salmeterol (ADVAIR HFA) 230-21 MCG/ACT inhaler Inhale 2 Puffs 2 times a day. 36 g 3   • tiotropium (SPIRIVA HANDIHALER) 18 MCG Cap INHALE THE CONTENTS OF 1 CAPSULE VIA HANDIHALER BY MOUTH EVERY DAY 90 capsule 1   • amLODIPine (NORVASC) 10 MG Tab TAKE 1 TABLET BY MOUTH EVERY DAY 90 Tab 3     No current facility-administered medications for this visit.     He  has a past medical history of Adverse effect of  "anesthesia, Anesthesia, Breath shortness, CAD (coronary artery disease), COPD, Dental disorder, Emphysema of lung (HCC), Hyperlipidemia, Hypertension, Oxygen dependent, Paroxysmal atrial fibrillation (HCC), PVD (peripheral vascular disease) (HCC), and Renal disorder.    ROS   No fever/chills. No weight change. No headache/dizziness. No focal weakness. No sore throat, nasal congestion, ear pain. No chest pain, no shortness of breath, difficulty breathing. No n/v/d/c or abdominal pain. No urinary complaint. No rash or skin lesion. No joint pain or swelling.       Objective:     /60 (BP Location: Left leg, Patient Position: Sitting, BP Cuff Size: Adult)   Pulse 61   Temp 36.8 °C (98.3 °F) (Temporal)   Ht 1.753 m (5' 9\")   Wt 82.6 kg (182 lb 3.2 oz)   SpO2 93%  Body mass index is 26.91 kg/m².   Physical Exam:  Constitutional: WDWN, NAD  Skin: Warm, dry, good turgor, no rashes in visible areas.  Respiratory: Unlabored respiratory effort, lungs clear to auscultation, no wheezes, no ronchi.  Cardiovascular: Normal S1, S2,  no leg edema.  Psych: Alert and oriented x3, normal affect and mood.      Assessment and Plan:   The following treatment plan was discussed    1. Other emphysema (HCC)    - REFERRAL TO PULMONARY AND SLEEP MEDICINE    2. Need for vaccination    - Shingrix Vaccine    3. Screening for colorectal cancer    - REFERRAL TO GI FOR COLONOSCOPY    4. Primary insomnia    - zolpidem (AMBIEN) 5 MG Tab; Take 1 tablet by mouth at bedtime as needed for Sleep for up to 30 days.  Dispense: 30 tablet; Refill: 5    5. Prediabetes      6. Stage 3a chronic kidney disease (HCC)        Followup:6 months, cont current meds         "

## 2021-08-05 ENCOUNTER — PATIENT MESSAGE (OUTPATIENT)
Dept: MEDICAL GROUP | Facility: MEDICAL CENTER | Age: 71
End: 2021-08-05

## 2021-08-10 NOTE — PATIENT COMMUNICATION
Phone Number Called: [phone] (home)      Call outcome: Did not leave a detailed message. Requested patient to call back.    Message: Regarding his oxygen, through [employer]

## 2021-08-20 NOTE — PATIENT COMMUNICATION
I called and spoke with pt to check the status on his oxygen.  Per pt, he has not heard anything from [facility].  I informed pt that Cheyenne has placed a referral to Pulmonology on 8/3/21, pt was given the number to [facility] referral's department.  Advised pt to call the referral department to check the status on his referral since it has been almost 2 weeks since his referral has been placed.  Informed pt to call back if he has any further questions.

## 2021-10-03 DIAGNOSIS — I25.10 CORONARY ARTERY DISEASE INVOLVING NATIVE CORONARY ARTERY OF NATIVE HEART WITHOUT ANGINA PECTORIS: ICD-10-CM

## 2021-10-05 RX ORDER — ASPIRIN 81 MG/1
TABLET, CHEWABLE ORAL
Qty: 90 TABLET | Refills: 3 | Status: SHIPPED | OUTPATIENT
Start: 2021-10-05 | End: 2022-04-05 | Stop reason: SDUPTHER

## 2021-10-20 RX ORDER — TIOTROPIUM BROMIDE 18 UG/1
CAPSULE ORAL; RESPIRATORY (INHALATION)
Qty: 90 CAPSULE | Refills: 1 | Status: SHIPPED | OUTPATIENT
Start: 2021-10-20 | End: 2022-02-03

## 2021-12-03 DIAGNOSIS — I10 ESSENTIAL HYPERTENSION: ICD-10-CM

## 2021-12-03 RX ORDER — AMLODIPINE BESYLATE 10 MG/1
TABLET ORAL
Qty: 90 TABLET | Refills: 3 | Status: SHIPPED | OUTPATIENT
Start: 2021-12-03 | End: 2022-05-02 | Stop reason: SDUPTHER

## 2021-12-03 NOTE — TELEPHONE ENCOUNTER
Received request via: Pharmacy    Was the patient seen in the last year in this department? Yes    Does the patient have an active prescription (recently filled or refills available) for medication(s) requested? No     Future Appointments       Provider Department Center    1/31/2022 2:30 PM Dannielle Amaya M.D. [facility]     2/3/2022 10:00 AM Cheyenne Valverde P.A.-C. [facility]

## 2021-12-07 ENCOUNTER — OFFICE VISIT (OUTPATIENT)
Dept: MEDICAL GROUP | Facility: MEDICAL CENTER | Age: 71
End: 2021-12-07
Payer: COMMERCIAL

## 2021-12-07 VITALS
WEIGHT: 176.8 LBS | OXYGEN SATURATION: 90 % | SYSTOLIC BLOOD PRESSURE: 160 MMHG | TEMPERATURE: 97 F | HEIGHT: 69 IN | BODY MASS INDEX: 26.19 KG/M2 | HEART RATE: 65 BPM | DIASTOLIC BLOOD PRESSURE: 60 MMHG

## 2021-12-07 DIAGNOSIS — H61.23 BILATERAL IMPACTED CERUMEN: ICD-10-CM

## 2021-12-07 DIAGNOSIS — Z23 NEED FOR VACCINATION: ICD-10-CM

## 2021-12-07 PROCEDURE — 90471 IMMUNIZATION ADMIN: CPT | Performed by: PHYSICIAN ASSISTANT

## 2021-12-07 PROCEDURE — 99213 OFFICE O/P EST LOW 20 MIN: CPT | Mod: 25 | Performed by: PHYSICIAN ASSISTANT

## 2021-12-07 PROCEDURE — 90662 IIV NO PRSV INCREASED AG IM: CPT | Performed by: PHYSICIAN ASSISTANT

## 2021-12-07 PROCEDURE — 90750 HZV VACC RECOMBINANT IM: CPT | Performed by: PHYSICIAN ASSISTANT

## 2021-12-07 PROCEDURE — 90472 IMMUNIZATION ADMIN EACH ADD: CPT | Performed by: PHYSICIAN ASSISTANT

## 2021-12-07 RX ORDER — ZOLPIDEM TARTRATE 5 MG/1
5 TABLET ORAL
COMMUNITY
Start: 2021-11-04 | End: 2022-02-03 | Stop reason: SDUPTHER

## 2021-12-07 ASSESSMENT — FIBROSIS 4 INDEX: FIB4 SCORE: 1.65

## 2021-12-07 NOTE — PROGRESS NOTES
"Subjective     Olman Sims is a 71 y.o. male who presents with Ear Fullness ((L) ear)          Chief Complaint   Patient presents with   • Ear Fullness     (L) ear       HPIbilat ear fullness, tried otc drops, may have helped? No pain. No other concerns today    ROS           Objective     /60 (BP Location: Left arm, Patient Position: Sitting, BP Cuff Size: Adult long)   Pulse 65   Temp 36.1 °C (97 °F) (Temporal)   Ht 1.753 m (5' 9\")   Wt 80.2 kg (176 lb 12.8 oz)   SpO2 90%   BMI 26.11 kg/m²      Physical Exam  Vitals and nursing note reviewed.   Constitutional:       General: He is not in acute distress.     Appearance: Normal appearance. He is normal weight. He is not ill-appearing, toxic-appearing or diaphoretic.   HENT:      Right Ear: There is impacted cerumen.      Left Ear: There is impacted cerumen.   Skin:     General: Skin is warm and dry.      Coloration: Skin is not pale.      Findings: No rash.   Neurological:      General: No focal deficit present.      Mental Status: He is alert and oriented to person, place, and time.   Psychiatric:         Mood and Affect: Mood normal.         Behavior: Behavior normal.         Thought Content: Thought content normal.         Judgment: Judgment normal.                             Assessment & Plan        1. Bilateral impacted cerumen    2. Need for vaccination    - Shingrix Vaccine  - Influenza Vaccine, High Dose (65+ Only)     removed with warm water irrigation - TMs normal appearance bilat           "

## 2022-01-17 ENCOUNTER — PATIENT MESSAGE (OUTPATIENT)
Dept: MEDICAL GROUP | Facility: MEDICAL CENTER | Age: 72
End: 2022-01-17

## 2022-01-17 DIAGNOSIS — N18.31 STAGE 3A CHRONIC KIDNEY DISEASE: ICD-10-CM

## 2022-01-17 DIAGNOSIS — R73.03 PREDIABETES: ICD-10-CM

## 2022-01-28 ENCOUNTER — HOSPITAL ENCOUNTER (OUTPATIENT)
Dept: LAB | Facility: MEDICAL CENTER | Age: 72
End: 2022-01-28
Attending: PHYSICIAN ASSISTANT
Payer: COMMERCIAL

## 2022-01-28 DIAGNOSIS — N18.31 STAGE 3A CHRONIC KIDNEY DISEASE: ICD-10-CM

## 2022-01-28 DIAGNOSIS — R73.03 PREDIABETES: ICD-10-CM

## 2022-01-28 LAB
ALBUMIN SERPL BCP-MCNC: 4.5 G/DL (ref 3.2–4.9)
ALBUMIN/GLOB SERPL: 1.7 G/DL
ALP SERPL-CCNC: 154 U/L (ref 30–99)
ALT SERPL-CCNC: 28 U/L (ref 2–50)
ANION GAP SERPL CALC-SCNC: 11 MMOL/L (ref 7–16)
AST SERPL-CCNC: 24 U/L (ref 12–45)
BILIRUB SERPL-MCNC: 1.5 MG/DL (ref 0.1–1.5)
BUN SERPL-MCNC: 21 MG/DL (ref 8–22)
CALCIUM SERPL-MCNC: 9.6 MG/DL (ref 8.5–10.5)
CHLORIDE SERPL-SCNC: 102 MMOL/L (ref 96–112)
CO2 SERPL-SCNC: 28 MMOL/L (ref 20–33)
CREAT SERPL-MCNC: 1.28 MG/DL (ref 0.5–1.4)
EST. AVERAGE GLUCOSE BLD GHB EST-MCNC: 123 MG/DL
GLOBULIN SER CALC-MCNC: 2.7 G/DL (ref 1.9–3.5)
GLUCOSE SERPL-MCNC: 104 MG/DL (ref 65–99)
HBA1C MFR BLD: 5.9 % (ref 4–5.6)
POTASSIUM SERPL-SCNC: 4.9 MMOL/L (ref 3.6–5.5)
PROT SERPL-MCNC: 7.2 G/DL (ref 6–8.2)
SODIUM SERPL-SCNC: 141 MMOL/L (ref 135–145)

## 2022-01-28 PROCEDURE — 80053 COMPREHEN METABOLIC PANEL: CPT

## 2022-01-28 PROCEDURE — 36415 COLL VENOUS BLD VENIPUNCTURE: CPT

## 2022-01-28 PROCEDURE — 83036 HEMOGLOBIN GLYCOSYLATED A1C: CPT

## 2022-01-31 ENCOUNTER — OFFICE VISIT (OUTPATIENT)
Dept: SLEEP MEDICINE | Facility: MEDICAL CENTER | Age: 72
End: 2022-01-31
Payer: COMMERCIAL

## 2022-01-31 VITALS
WEIGHT: 177.25 LBS | DIASTOLIC BLOOD PRESSURE: 72 MMHG | SYSTOLIC BLOOD PRESSURE: 170 MMHG | HEART RATE: 70 BPM | OXYGEN SATURATION: 93 % | HEIGHT: 69 IN | BODY MASS INDEX: 26.25 KG/M2

## 2022-01-31 DIAGNOSIS — J96.11 CHRONIC RESPIRATORY FAILURE WITH HYPOXIA (HCC): ICD-10-CM

## 2022-01-31 DIAGNOSIS — J44.9 CHRONIC OBSTRUCTIVE PULMONARY DISEASE, UNSPECIFIED COPD TYPE (HCC): ICD-10-CM

## 2022-01-31 DIAGNOSIS — I10 HYPERTENSION, UNSPECIFIED TYPE: ICD-10-CM

## 2022-01-31 DIAGNOSIS — Z87.891 FORMER SMOKER: ICD-10-CM

## 2022-01-31 PROCEDURE — 99204 OFFICE O/P NEW MOD 45 MIN: CPT | Performed by: INTERNAL MEDICINE

## 2022-01-31 ASSESSMENT — ENCOUNTER SYMPTOMS
SORE THROAT: 0
DIAPHORESIS: 0
CHILLS: 0
HEMOPTYSIS: 0
PND: 0
NECK PAIN: 0
WHEEZING: 0
DEPRESSION: 0
ORTHOPNEA: 0
FEVER: 0
MYALGIAS: 0
FOCAL WEAKNESS: 0
EYE DISCHARGE: 0
CHEST TIGHTNESS: 0
DIARRHEA: 0
SINUS PAIN: 0
ABDOMINAL PAIN: 0
PHOTOPHOBIA: 0
HEARTBURN: 0
HEADACHES: 0
DYSPNEA AT REST: 0
SHORTNESS OF BREATH: 1
PALPITATIONS: 0
COUGH: 0
STRIDOR: 0
BACK PAIN: 0
DIZZINESS: 0
EYE REDNESS: 0
NAUSEA: 0
TREMORS: 0
BLURRED VISION: 0
WEAKNESS: 0
SPEECH CHANGE: 0
FALLS: 0
SPUTUM PRODUCTION: 0
VOMITING: 0
WEIGHT LOSS: 0
CLAUDICATION: 0
EYE PAIN: 0
CONSTIPATION: 0
DOUBLE VISION: 0

## 2022-01-31 ASSESSMENT — FIBROSIS 4 INDEX: FIB4 SCORE: 1.29

## 2022-01-31 NOTE — Clinical Note
I saw Luis today in pulmonary clinic. BP was quite high initially at 196/66. Told him I would reach out to you all. He tells me his measurement at home was systolic in 120s.

## 2022-01-31 NOTE — PROGRESS NOTES
"Chief Complaint   Patient presents with   • Establish Care     Referred by Cheyenne Valverde PA-C for other emphysema   • Other     PFT 10/24/21       HPI: This patient is a 71 y.o. male presenting for evaluation of COPD c/b chronic hypoxic respiratory failure. PMHx is significant for HTN not well controlled, HLP and CAD. Pt also has a hx of AF. He was dx with COPD in 2012 with severe airflow obstruction FEV1 post BD of 1.07L (30% pred), hyperinflation (% pred) and air trapping (RV of 280% pred) and reduced DLCO of 48% pred. He has been on O2 at @2lPM pulsed with activity and 2lPM continuous at night since that time. He does not recall being tx for acute exacerbation and current rx includes advair , spiriva handihaler and prn CECILY which he uses infrequently. He is a former smoker with only at most a 15 pk year history and quit in 2012 when he was dx with COPD. He had a grandmother with COPD whom was also a smoker. Pt is retired mainly from working in computers; no known occupational exposures. He does feel dyspnea has improved since 2012 and he exercises on a stationary bike regularly, MMRC 1-2. He has minimal cough. No CT chest but cXR from 12/2019 was unremarkable. He has never been test for alpha-one-antitrypsin def.     Past Medical History:   Diagnosis Date   • Adverse effect of anesthesia     \"stopped breathing/elevated bp\"2012   • Anesthesia     \"stopped breathing/elevated bp\"2012   • Atrial fibrillation (HCC)    • Breath shortness     when he quit smoking/with exertion   • CAD (coronary artery disease)    • COPD    • Dental disorder     lower partial   • Emphysema of lung (HCC)    • Hyperlipidemia    • Hypertension    • Kidney stone    • Oxygen dependent     q hs prn 2 ltr   • Paroxysmal atrial fibrillation (HCC)    • PVD (peripheral vascular disease) ([facility])     R iliac artery stent   • Renal disorder     right kidney stone/stent in place       Social History     Socioeconomic History   • Marital " status: Single     Spouse name: Not on file   • Number of children: Not on file   • Years of education: Not on file   • Highest education level: Not on file   Occupational History   • Not on file   Tobacco Use   • Smoking status: Former Smoker     Packs/day: 0.50     Years: 15.00     Pack years: 7.50     Types: Cigarettes     Quit date: 2012     Years since quittin.9   • Smokeless tobacco: Never Used   Vaping Use   • Vaping Use: Never used   Substance and Sexual Activity   • Alcohol use: Yes     Alcohol/week: 0.0 oz     Comment: rarely   • Drug use: No   • Sexual activity: Never   Other Topics Concern   • Not on file   Social History Narrative   • Not on file     Social Determinants of Health     Financial Resource Strain:    • Difficulty of Paying Living Expenses: Not on file   Food Insecurity:    • Worried About Running Out of Food in the Last Year: Not on file   • Ran Out of Food in the Last Year: Not on file   Transportation Needs:    • Lack of Transportation (Medical): Not on file   • Lack of Transportation (Non-Medical): Not on file   Physical Activity:    • Days of Exercise per Week: Not on file   • Minutes of Exercise per Session: Not on file   Stress:    • Feeling of Stress : Not on file   Social Connections:    • Frequency of Communication with Friends and Family: Not on file   • Frequency of Social Gatherings with Friends and Family: Not on file   • Attends Hindu Services: Not on file   • Active Member of Clubs or Organizations: Not on file   • Attends Club or Organization Meetings: Not on file   • Marital Status: Not on file   Intimate Partner Violence:    • Fear of Current or Ex-Partner: Not on file   • Emotionally Abused: Not on file   • Physically Abused: Not on file   • Sexually Abused: Not on file   Housing Stability:    • Unable to Pay for Housing in the Last Year: Not on file   • Number of Places Lived in the Last Year: Not on file   • Unstable Housing in the Last Year: Not on file        Family History   Problem Relation Age of Onset   • Cancer Maternal Grandfather 65        colon       Current Outpatient Medications on File Prior to Visit   Medication Sig Dispense Refill   • zolpidem (AMBIEN) 5 MG Tab Take 5 mg by mouth at bedtime as needed.     • amLODIPine (NORVASC) 10 MG Tab TAKE 1 TABLET BY MOUTH EVERY DAY 90 Tablet 3   • tiotropium (SPIRIVA HANDIHALER) 18 MCG Cap INHALE THE CONTENTS OF 1 CAPSULE VIA HANDIHALER BY MOUTH EVERY DAY 90 Capsule 1   • aspirin (ASA) 81 MG Chew Tab chewable tablet CHEW AND SWALLOW 1 TABLET BY MOUTH EVERY DAY 90 Tablet 3   • losartan (COZAAR) 25 MG Tab Take 1 tablet by mouth 1 time a day as needed. Hold for SBP less than 100 90 tablet 3   • atorvastatin (LIPITOR) 40 MG Tab Take 1 tablet by mouth every evening. 90 tablet 3   • carvedilol (COREG) 12.5 MG Tab TAKE 1 TABLET BY MOUTH TWICE DAILY WITH MEALS 180 tablet 3   • VENTOLIN  (90 Base) MCG/ACT Aero Soln inhalation aerosol INHALE 2 PUFFS BY MOUTH EVERY 6 HOURS AS NEEDED 18 g 0   • fluticasone-salmeterol (ADVAIR HFA) 230-21 MCG/ACT inhaler Inhale 2 Puffs 2 times a day. 36 g 3     No current facility-administered medications on file prior to visit.       Allergies: Patient has no known allergies.    ROS:   Review of Systems   Constitutional: Negative for chills, diaphoresis, fever, malaise/fatigue and weight loss.   HENT: Negative for congestion, ear discharge, ear pain, hearing loss, nosebleeds, sinus pain, sore throat and tinnitus.    Eyes: Negative for blurred vision, double vision, photophobia, pain, discharge and redness.   Respiratory: Positive for shortness of breath. Negative for cough, hemoptysis, sputum production, wheezing and stridor.    Cardiovascular: Negative for chest pain, palpitations, orthopnea, claudication, leg swelling and PND.   Gastrointestinal: Negative for abdominal pain, constipation, diarrhea, heartburn, nausea and vomiting.   Genitourinary: Negative for dysuria and urgency.  "  Musculoskeletal: Negative for back pain, falls, joint pain, myalgias and neck pain.   Skin: Negative for itching and rash.   Neurological: Negative for dizziness, tremors, speech change, focal weakness, weakness and headaches.   Endo/Heme/Allergies: Negative for environmental allergies.   Psychiatric/Behavioral: Negative for depression.       BP (!) 170/72 (BP Location: Right arm, Patient Position: Sitting, BP Cuff Size: Adult)   Pulse 70   Ht 1.753 m (5' 9\")   Wt 80.4 kg (177 lb 4 oz)   SpO2 93%     Physical Exam:  Physical Exam  Vitals reviewed.   Constitutional:       General: He is not in acute distress.     Appearance: Normal appearance. He is normal weight.   HENT:      Head: Normocephalic and atraumatic.      Right Ear: External ear normal.      Left Ear: External ear normal.      Nose: Nose normal. No congestion.      Mouth/Throat:      Mouth: Mucous membranes are moist.      Pharynx: Oropharynx is clear. No oropharyngeal exudate.   Eyes:      General: No scleral icterus.     Extraocular Movements: Extraocular movements intact.      Conjunctiva/sclera: Conjunctivae normal.      Pupils: Pupils are equal, round, and reactive to light.   Cardiovascular:      Rate and Rhythm: Normal rate and regular rhythm.      Heart sounds: Normal heart sounds. No murmur heard.  No gallop.    Pulmonary:      Effort: Pulmonary effort is normal. No respiratory distress.      Breath sounds: No wheezing or rales.      Comments: Decreased bs throughout  Abdominal:      General: There is no distension.      Palpations: Abdomen is soft.   Musculoskeletal:         General: Normal range of motion.      Cervical back: Normal range of motion and neck supple.      Right lower leg: No edema.      Left lower leg: No edema.   Skin:     General: Skin is warm and dry.      Findings: No rash.   Neurological:      Mental Status: He is alert and oriented to person, place, and time.      Cranial Nerves: No cranial nerve deficit. "   Psychiatric:         Mood and Affect: Mood normal.         Behavior: Behavior normal.         PFTs as reviewed by me personally: as per hPI    Imaging as reviewed by me personally: as per hPI    Assessment:  1. Chronic obstructive pulmonary disease, unspecified COPD type (HCC)  PULMONARY FUNCTION TESTS -Test requested: Complete Pulmonary Function Test    ALPHA-1 ANTITRYPSIN PHENOTYPE   2. Chronic respiratory failure with hypoxia (HCC)     3. Former smoker     4. Hypertension, unspecified type         Plan:  1.  Chronic diagnosis but new to me.  Airflow obstruction was quite severe for relative minimal smoking history.  Given obstructive lung disease out of proportion to tobacco use, I do think testing for alpha-1 antitrypsin deficiency is indicated as it would not change our management of his disease.  In the meantime I have ordered updated pulmonary function testing and we will give him a sample of Trelegy 100 today to see if triple therapy once daily dosing is as effective and more convenient.  In the meantime, he is tobacco free, up-to-date on vaccines and compliant with support oxygen.  2.  Patient is compliant with and benefiting from supplemental oxygen at 2 L pulsed with activity and continuous at night.  Pending updated pulmonary function testing consider referral to pulmonary rehab.  3.  See my discussion above regarding pulmonary function abnormalities out of proportion to tobacco history.  Alpha-1 antitrypsin phenotype ordered.  4.  Chronic diagnosis and poorly controlled.  He is followed by cardiology.  I will CC cardiology and primary care.  Return in about 6 months (around 7/31/2022) for PFTs, labs .

## 2022-02-03 ENCOUNTER — OFFICE VISIT (OUTPATIENT)
Dept: MEDICAL GROUP | Facility: MEDICAL CENTER | Age: 72
End: 2022-02-03
Payer: COMMERCIAL

## 2022-02-03 VITALS
WEIGHT: 177 LBS | HEART RATE: 60 BPM | OXYGEN SATURATION: 93 % | DIASTOLIC BLOOD PRESSURE: 66 MMHG | HEIGHT: 69 IN | SYSTOLIC BLOOD PRESSURE: 158 MMHG | TEMPERATURE: 97.8 F | BODY MASS INDEX: 26.22 KG/M2

## 2022-02-03 DIAGNOSIS — I25.10 CORONARY ARTERY DISEASE INVOLVING NATIVE CORONARY ARTERY OF NATIVE HEART WITHOUT ANGINA PECTORIS: ICD-10-CM

## 2022-02-03 DIAGNOSIS — E78.5 DYSLIPIDEMIA: ICD-10-CM

## 2022-02-03 DIAGNOSIS — J43.8 OTHER EMPHYSEMA (HCC): ICD-10-CM

## 2022-02-03 DIAGNOSIS — F51.01 PRIMARY INSOMNIA: ICD-10-CM

## 2022-02-03 DIAGNOSIS — I10 ESSENTIAL HYPERTENSION: ICD-10-CM

## 2022-02-03 PROCEDURE — 99214 OFFICE O/P EST MOD 30 MIN: CPT | Performed by: PHYSICIAN ASSISTANT

## 2022-02-03 RX ORDER — ZOLPIDEM TARTRATE 5 MG/1
5 TABLET ORAL
Qty: 30 TABLET | Refills: 3 | Status: SHIPPED | OUTPATIENT
Start: 2022-02-03 | End: 2022-03-05

## 2022-02-03 ASSESSMENT — FIBROSIS 4 INDEX: FIB4 SCORE: 1.29

## 2022-02-03 ASSESSMENT — PATIENT HEALTH QUESTIONNAIRE - PHQ9: CLINICAL INTERPRETATION OF PHQ2 SCORE: 0

## 2022-02-03 NOTE — PROGRESS NOTES
Subjective:   Olman Sims is a 71 y.o. male here today for follow up on chronic conditions    Chief Complaint   Patient presents with   • Follow-Up     x6 month FV    • Medication Refill     Ambien        Coronary artery disease involving native coronary artery of native heart without angina pectoris  No new symptoms, states he is taking medications as prescribed    Dyslipidemia  Chronic, taking statin as prescribed    Essential hypertension  Chronic, stable on current, taking meds as prescribed    Other emphysema (HCC)  Working with pulmonology, new inhaler, will f/u as scheduled    Primary insomnia  Chronic, stable on current,  verified, requests refill of his ambien 5mg       Current medicines (including changes today)  Current Outpatient Medications   Medication Sig Dispense Refill   • zolpidem (AMBIEN) 5 MG Tab Take 1 Tablet by mouth at bedtime as needed for up to 30 days. 30 Tablet 3   • Fluticasone-Umeclidin-Vilant (TRELEGY ELLIPTA) 100-62.5-25 MCG/INH AEROSOL POWDER, BREATH ACTIVATED inhalation Inhale 1 Inhalation every day. 1 Each 0   • amLODIPine (NORVASC) 10 MG Tab TAKE 1 TABLET BY MOUTH EVERY DAY 90 Tablet 3   • aspirin (ASA) 81 MG Chew Tab chewable tablet CHEW AND SWALLOW 1 TABLET BY MOUTH EVERY DAY 90 Tablet 3   • losartan (COZAAR) 25 MG Tab Take 1 tablet by mouth 1 time a day as needed. Hold for SBP less than 100 90 tablet 3   • atorvastatin (LIPITOR) 40 MG Tab Take 1 tablet by mouth every evening. 90 tablet 3   • carvedilol (COREG) 12.5 MG Tab TAKE 1 TABLET BY MOUTH TWICE DAILY WITH MEALS 180 tablet 3     No current facility-administered medications for this visit.     He  has a past medical history of Adverse effect of anesthesia, Anesthesia, Atrial fibrillation (HCC), Breath shortness, CAD (coronary artery disease), COPD, Dental disorder, Emphysema of lung (HCC), Hyperlipidemia, Hypertension, Kidney stone, Oxygen dependent, Paroxysmal atrial fibrillation (HCC), PVD (peripheral  "vascular disease) (HCC), and Renal disorder.    ROS   No fever/chills. No weight change. No headache/dizziness. No focal weakness. No sore throat, nasal congestion, ear pain. No chest pain, no shortness of breath, difficulty breathing. No n/v/d/c or abdominal pain. No urinary complaint. No rash or skin lesion. No joint pain or swelling.     Objective:     /66 (BP Location: Right arm, Patient Position: Sitting, BP Cuff Size: Adult long)   Pulse 60   Temp 36.6 °C (97.8 °F) (Temporal)   Ht 1.753 m (5' 9\")   Wt 80.3 kg (177 lb)   SpO2 93%  Body mass index is 26.14 kg/m².   Physical Exam:  Constitutional: WDWN, NAD  Skin: Warm, dry, good turgor, no rashes in visible areas.  Respiratory: Unlabored respiratory effort, lungs clear to auscultation, no wheezes, no ronchi.  Cardiovascular: Normal S1, S2, no murmur, no leg edema.  Psych: Alert and oriented x3, normal affect and mood.    Assessment and Plan:   The following treatment plan was discussed    1. Primary insomnia    - zolpidem (AMBIEN) 5 MG Tab; Take 1 Tablet by mouth at bedtime as needed for up to 30 days.  Dispense: 30 Tablet; Refill: 3    2. Coronary artery disease involving native coronary artery of native heart without angina pectoris      3. Dyslipidemia      4. Essential hypertension      5. Other emphysema (HCC)        Followup: 3-6 months         "

## 2022-02-09 ENCOUNTER — HOSPITAL ENCOUNTER (OUTPATIENT)
Dept: LAB | Facility: MEDICAL CENTER | Age: 72
End: 2022-02-09
Attending: INTERNAL MEDICINE
Payer: COMMERCIAL

## 2022-02-09 DIAGNOSIS — J44.9 CHRONIC OBSTRUCTIVE PULMONARY DISEASE, UNSPECIFIED COPD TYPE (HCC): ICD-10-CM

## 2022-02-09 PROCEDURE — 36415 COLL VENOUS BLD VENIPUNCTURE: CPT

## 2022-02-09 PROCEDURE — 82104 ALPHA-1-ANTITRYPSIN PHENO: CPT

## 2022-02-09 PROCEDURE — 82103 ALPHA-1-ANTITRYPSIN TOTAL: CPT

## 2022-02-13 LAB
A1AT PHENOTYP SERPL-IMP: NORMAL
A1AT SERPL-MCNC: 170 MG/DL (ref 90–200)

## 2022-02-17 NOTE — TELEPHONE ENCOUNTER
Pharmacy message: ZERO refills remain on this prescription. Your patient is requesting advance approval of refills for this medication to PREVENT ANY MISSED DOSES    Have we ever prescribed this med? Yes.  If yes, what date? 02/17/22    Last OV: 01/31/22 with Dr Amaya    Next OV: 07/28/22 with Dr Amaya    DX:     Medications:   Requested Prescriptions     Pending Prescriptions Disp Refills   • TRELEGY ELLIPTA 100-62.5-25 MCG/INH AEROSOL POWDER, BREATH ACTIVATED inhalation [Pharmacy Med Name: TRELEGY ELLIPTA 100-62.5MCG INH 30P] 60 Each      Sig: INHALE 1 PUFF BY MOUTH EVERY DAY

## 2022-03-02 ENCOUNTER — PATIENT MESSAGE (OUTPATIENT)
Dept: MEDICAL GROUP | Facility: MEDICAL CENTER | Age: 72
End: 2022-03-02
Payer: COMMERCIAL

## 2022-03-02 RX ORDER — ALBUTEROL SULFATE 90 UG/1
AEROSOL, METERED RESPIRATORY (INHALATION)
Qty: 18 G | Refills: 0 | Status: SHIPPED | OUTPATIENT
Start: 2022-03-02 | End: 2022-07-07 | Stop reason: SDUPTHER

## 2022-03-02 NOTE — TELEPHONE ENCOUNTER
From: Olman Sims  To: Physician Assistant Cheyenne Valverde  Sent: 3/2/2022 2:35 PM PST  Subject: Prescription     I need a refill prescription of my Ventolin inhaler from [facility].

## 2022-03-02 NOTE — PATIENT COMMUNICATION
Received request via: Pharmacy    Was the patient seen in the last year in this department? Yes    Does the patient have an active prescription (recently filled or refills available) for medication(s) requested? No     Future Appointments       Provider Department Center    7/28/2022 8:30 AM [id number] [facility]     7/28/2022 9:50 AM Dannielle Amaya M.D. [facility]     8/3/2022 10:00 AM Cheyenne Valverde P.A.-C. [facility]

## 2022-03-22 SDOH — ECONOMIC STABILITY: HOUSING INSECURITY
IN THE LAST 12 MONTHS, WAS THERE A TIME WHEN YOU DID NOT HAVE A STEADY PLACE TO SLEEP OR SLEPT IN A SHELTER (INCLUDING NOW)?: NO

## 2022-03-22 SDOH — HEALTH STABILITY: MENTAL HEALTH
STRESS IS WHEN SOMEONE FEELS TENSE, NERVOUS, ANXIOUS, OR CAN'T SLEEP AT NIGHT BECAUSE THEIR MIND IS TROUBLED. HOW STRESSED ARE YOU?: NOT AT ALL

## 2022-03-22 SDOH — ECONOMIC STABILITY: TRANSPORTATION INSECURITY
IN THE PAST 12 MONTHS, HAS LACK OF TRANSPORTATION KEPT YOU FROM MEETINGS, WORK, OR FROM GETTING THINGS NEEDED FOR DAILY LIVING?: NO

## 2022-03-22 SDOH — HEALTH STABILITY: PHYSICAL HEALTH: ON AVERAGE, HOW MANY DAYS PER WEEK DO YOU ENGAGE IN MODERATE TO STRENUOUS EXERCISE (LIKE A BRISK WALK)?: 7 DAYS

## 2022-03-22 SDOH — ECONOMIC STABILITY: FOOD INSECURITY: WITHIN THE PAST 12 MONTHS, YOU WORRIED THAT YOUR FOOD WOULD RUN OUT BEFORE YOU GOT MONEY TO BUY MORE.: NEVER TRUE

## 2022-03-22 SDOH — ECONOMIC STABILITY: INCOME INSECURITY: IN THE LAST 12 MONTHS, WAS THERE A TIME WHEN YOU WERE NOT ABLE TO PAY THE MORTGAGE OR RENT ON TIME?: NO

## 2022-03-22 SDOH — ECONOMIC STABILITY: TRANSPORTATION INSECURITY
IN THE PAST 12 MONTHS, HAS THE LACK OF TRANSPORTATION KEPT YOU FROM MEDICAL APPOINTMENTS OR FROM GETTING MEDICATIONS?: NO

## 2022-03-22 SDOH — ECONOMIC STABILITY: FOOD INSECURITY: WITHIN THE PAST 12 MONTHS, THE FOOD YOU BOUGHT JUST DIDN'T LAST AND YOU DIDN'T HAVE MONEY TO GET MORE.: NEVER TRUE

## 2022-03-22 SDOH — ECONOMIC STABILITY: HOUSING INSECURITY: IN THE LAST 12 MONTHS, HOW MANY PLACES HAVE YOU LIVED?: 1

## 2022-03-22 SDOH — HEALTH STABILITY: PHYSICAL HEALTH: ON AVERAGE, HOW MANY MINUTES DO YOU ENGAGE IN EXERCISE AT THIS LEVEL?: 20 MIN

## 2022-03-22 SDOH — ECONOMIC STABILITY: INCOME INSECURITY: HOW HARD IS IT FOR YOU TO PAY FOR THE VERY BASICS LIKE FOOD, HOUSING, MEDICAL CARE, AND HEATING?: NOT HARD AT ALL

## 2022-03-22 SDOH — ECONOMIC STABILITY: TRANSPORTATION INSECURITY
IN THE PAST 12 MONTHS, HAS LACK OF RELIABLE TRANSPORTATION KEPT YOU FROM MEDICAL APPOINTMENTS, MEETINGS, WORK OR FROM GETTING THINGS NEEDED FOR DAILY LIVING?: NO

## 2022-03-22 ASSESSMENT — SOCIAL DETERMINANTS OF HEALTH (SDOH)
HOW MANY DRINKS CONTAINING ALCOHOL DO YOU HAVE ON A TYPICAL DAY WHEN YOU ARE DRINKING: PATIENT DECLINED
HOW OFTEN DO YOU HAVE SIX OR MORE DRINKS ON ONE OCCASION: NEVER
WITHIN THE PAST 12 MONTHS, YOU WORRIED THAT YOUR FOOD WOULD RUN OUT BEFORE YOU GOT THE MONEY TO BUY MORE: NEVER TRUE
IN A TYPICAL WEEK, HOW MANY TIMES DO YOU TALK ON THE PHONE WITH FAMILY, FRIENDS, OR NEIGHBORS?: MORE THAN THREE TIMES A WEEK
HOW OFTEN DO YOU ATTEND CHURCH OR RELIGIOUS SERVICES?: PATIENT DECLINED
DO YOU BELONG TO ANY CLUBS OR ORGANIZATIONS SUCH AS CHURCH GROUPS UNIONS, FRATERNAL OR ATHLETIC GROUPS, OR SCHOOL GROUPS?: NO
HOW OFTEN DO YOU GET TOGETHER WITH FRIENDS OR RELATIVES?: PATIENT DECLINED
DO YOU BELONG TO ANY CLUBS OR ORGANIZATIONS SUCH AS CHURCH GROUPS UNIONS, FRATERNAL OR ATHLETIC GROUPS, OR SCHOOL GROUPS?: NO
HOW OFTEN DO YOU GET TOGETHER WITH FRIENDS OR RELATIVES?: PATIENT DECLINED
HOW HARD IS IT FOR YOU TO PAY FOR THE VERY BASICS LIKE FOOD, HOUSING, MEDICAL CARE, AND HEATING?: NOT HARD AT ALL
HOW OFTEN DO YOU ATTEND CHURCH OR RELIGIOUS SERVICES?: PATIENT DECLINED
IN A TYPICAL WEEK, HOW MANY TIMES DO YOU TALK ON THE PHONE WITH FAMILY, FRIENDS, OR NEIGHBORS?: MORE THAN THREE TIMES A WEEK
HOW OFTEN DO YOU HAVE A DRINK CONTAINING ALCOHOL: NEVER

## 2022-03-22 ASSESSMENT — LIFESTYLE VARIABLES
HOW OFTEN DO YOU HAVE A DRINK CONTAINING ALCOHOL: NEVER
HOW MANY STANDARD DRINKS CONTAINING ALCOHOL DO YOU HAVE ON A TYPICAL DAY: PATIENT DECLINED
HOW OFTEN DO YOU HAVE SIX OR MORE DRINKS ON ONE OCCASION: NEVER

## 2022-03-23 ENCOUNTER — OFFICE VISIT (OUTPATIENT)
Dept: MEDICAL GROUP | Facility: PHYSICIAN GROUP | Age: 72
End: 2022-03-23
Payer: COMMERCIAL

## 2022-03-23 VITALS
WEIGHT: 178 LBS | HEART RATE: 67 BPM | TEMPERATURE: 97.8 F | BODY MASS INDEX: 26.36 KG/M2 | OXYGEN SATURATION: 98 % | SYSTOLIC BLOOD PRESSURE: 170 MMHG | DIASTOLIC BLOOD PRESSURE: 78 MMHG | RESPIRATION RATE: 20 BRPM | HEIGHT: 69 IN

## 2022-03-23 DIAGNOSIS — I10 ESSENTIAL HYPERTENSION: ICD-10-CM

## 2022-03-23 DIAGNOSIS — J43.8 OTHER EMPHYSEMA (HCC): ICD-10-CM

## 2022-03-23 DIAGNOSIS — R74.8 ELEVATED ALKALINE PHOSPHATASE LEVEL: ICD-10-CM

## 2022-03-23 DIAGNOSIS — N18.31 STAGE 3A CHRONIC KIDNEY DISEASE: ICD-10-CM

## 2022-03-23 DIAGNOSIS — F51.01 PRIMARY INSOMNIA: ICD-10-CM

## 2022-03-23 DIAGNOSIS — I73.9 PERIPHERAL VASCULAR DISEASE (HCC): Chronic | ICD-10-CM

## 2022-03-23 PROCEDURE — 99214 OFFICE O/P EST MOD 30 MIN: CPT | Performed by: FAMILY MEDICINE

## 2022-03-23 RX ORDER — ZOLPIDEM TARTRATE 5 MG/1
TABLET ORAL
COMMUNITY
Start: 2022-03-03 | End: 2022-04-05 | Stop reason: SDUPTHER

## 2022-03-23 RX ORDER — TRAZODONE HYDROCHLORIDE 50 MG/1
50 TABLET ORAL NIGHTLY
Qty: 30 TABLET | Refills: 3 | Status: SHIPPED | OUTPATIENT
Start: 2022-03-23 | End: 2022-08-05

## 2022-03-23 ASSESSMENT — ENCOUNTER SYMPTOMS
FEVER: 0
BLURRED VISION: 0
DIZZINESS: 0
MYALGIAS: 0
PALPITATIONS: 0
SHORTNESS OF BREATH: 0
VOMITING: 0
INSOMNIA: 1
HEADACHES: 0
SORE THROAT: 0
CHILLS: 0
DOUBLE VISION: 0
COUGH: 0
NAUSEA: 0

## 2022-03-23 NOTE — ASSESSMENT & PLAN NOTE
Chronic  Not at goal  Needs short f/u   Pt is to take bp meds as directed  Pt will monitor and return with logs  Discusses risks of elevated bp  ER if concerns

## 2022-03-23 NOTE — PROGRESS NOTES
Subjective:     CC: est care    HPI:   Olman presents today with     1) est care new pmd  2) BP/HTN  Pt states I have white coat  Pt reports not taking his BP meds?  No sx  Has cuff at home  3) insomnia, states this is biggest concern  Wants to cont zolpidem  Asked if aware of ADRs, states no  Discussed ADRs and BEERs list with risks of falls and death/head injury  Still would like to cont.  Has tried melatonin  Willing to try trazadone     4) COPD, uses 2l at night    5) elevated ALP  Not on list, needs repeat and eval    Problem   Elevated Alkaline Phosphatase Level    Elevated  Needs repeat       Stage 3a Chronic Kidney Disease (Hcc)   Essential Hypertension    ICD-10 transition     Peripheral Vascular Disease (Hcc)    Angioplasty and stents Dr. Vital November 2012     Other Emphysema (Hcc)    FEV1 0.79 October 2012. Improved to 1.1 with bronchodilator. Quit smoking February 2012         Current Outpatient Medications Ordered in Epic   Medication Sig Dispense Refill   • zolpidem (AMBIEN) 5 MG Tab      • traZODone (DESYREL) 50 MG Tab Take 1 Tablet by mouth every evening. 30 Tablet 3   • VENTOLIN  (90 Base) MCG/ACT Aero Soln inhalation aerosol INHALE 2 PUFFS BY MOUTH EVERY 6 HOURS AS NEEDED 18 g 0   • TRELEGY ELLIPTA 100-62.5-25 MCG/INH AEROSOL POWDER, BREATH ACTIVATED inhalation INHALE 1 PUFF BY MOUTH EVERY DAY 1 Each 6   • amLODIPine (NORVASC) 10 MG Tab TAKE 1 TABLET BY MOUTH EVERY DAY 90 Tablet 3   • aspirin (ASA) 81 MG Chew Tab chewable tablet CHEW AND SWALLOW 1 TABLET BY MOUTH EVERY DAY 90 Tablet 3   • losartan (COZAAR) 25 MG Tab Take 1 tablet by mouth 1 time a day as needed. Hold for SBP less than 100 90 tablet 3   • atorvastatin (LIPITOR) 40 MG Tab Take 1 tablet by mouth every evening. 90 tablet 3   • carvedilol (COREG) 12.5 MG Tab TAKE 1 TABLET BY MOUTH TWICE DAILY WITH MEALS 180 tablet 3     No current Epic-ordered facility-administered medications on file.     ROS:  Review of Systems  "  Constitutional: Negative for chills and fever.   HENT: Negative for ear pain and sore throat.    Eyes: Negative for blurred vision and double vision.   Respiratory: Negative for cough and shortness of breath.    Cardiovascular: Negative for chest pain and palpitations.   Gastrointestinal: Negative for nausea and vomiting.   Genitourinary: Negative for dysuria and hematuria.   Musculoskeletal: Negative for myalgias.   Neurological: Negative for dizziness and headaches.   Psychiatric/Behavioral: The patient has insomnia.        Objective:     Exam:  BP (!) 170/78 (BP Location: Left arm, Patient Position: Sitting, BP Cuff Size: Adult)   Pulse 67   Temp 36.6 °C (97.8 °F) (Temporal)   Resp 20   Ht 1.753 m (5' 9\")   Wt 80.7 kg (178 lb)   SpO2 98%   BMI 26.29 kg/m²  Body mass index is 26.29 kg/m².    Physical Exam  Constitutional:       General: He is not in acute distress.     Appearance: Normal appearance. He is not ill-appearing, toxic-appearing or diaphoretic.   Eyes:      General: No scleral icterus.        Right eye: No discharge.         Left eye: No discharge.      Extraocular Movements: Extraocular movements intact.      Conjunctiva/sclera: Conjunctivae normal.      Pupils: Pupils are equal, round, and reactive to light.   Cardiovascular:      Rate and Rhythm: Normal rate and regular rhythm.   Pulmonary:      Effort: Pulmonary effort is normal. No respiratory distress.      Breath sounds: Normal breath sounds.   Neurological:      Mental Status: He is alert.      Coordination: Coordination normal.      Gait: Gait normal.         Assessment & Plan:     71 y.o. male with the following -     Problem List Items Addressed This Visit     Peripheral vascular disease (HCC) (Chronic)     Chronic stable  No new symptoms reported  Cont BP control          Other emphysema (HCC)     Chronic stable  Cont f/u with pulm  Cont inhalers   o2 at night         Essential hypertension     Chronic  Not at goal  Needs short f/u "   Pt is to take bp meds as directed  Pt will monitor and return with logs  Discusses risks of elevated bp  ER if concerns         Primary insomnia    Relevant Medications    traZODone (DESYREL) 50 MG Tab    Other Relevant Orders    Controlled Substance Treatment Agreement    Stage 3a chronic kidney disease (HCC)     Chronic stable  Needs monitoring  Discussed no nsaids and hydration         Elevated alkaline phosphatase level    Relevant Orders    GAMMA GT (GGT)    ALKALINE PHOSPHATASE        Return in about 2 weeks (around 4/6/2022) for f/u bp.    Please note that this dictation was created using voice recognition software. I have made every reasonable attempt to correct obvious errors, but I expect that there are errors of grammar and possibly content that I did not discover before finalizing the note.

## 2022-03-31 ENCOUNTER — HOSPITAL ENCOUNTER (OUTPATIENT)
Dept: LAB | Facility: MEDICAL CENTER | Age: 72
End: 2022-03-31
Attending: FAMILY MEDICINE
Payer: COMMERCIAL

## 2022-03-31 DIAGNOSIS — R74.8 ELEVATED ALKALINE PHOSPHATASE LEVEL: ICD-10-CM

## 2022-03-31 LAB
ALP SERPL-CCNC: 140 U/L (ref 30–99)
GGT SERPL-CCNC: 9 U/L (ref 7–51)

## 2022-03-31 PROCEDURE — 36415 COLL VENOUS BLD VENIPUNCTURE: CPT

## 2022-03-31 PROCEDURE — 84075 ASSAY ALKALINE PHOSPHATASE: CPT

## 2022-03-31 PROCEDURE — 82977 ASSAY OF GGT: CPT

## 2022-04-04 ENCOUNTER — TELEPHONE (OUTPATIENT)
Dept: MEDICAL GROUP | Facility: PHYSICIAN GROUP | Age: 72
End: 2022-04-04
Payer: COMMERCIAL

## 2022-04-05 ENCOUNTER — OFFICE VISIT (OUTPATIENT)
Dept: MEDICAL GROUP | Facility: PHYSICIAN GROUP | Age: 72
End: 2022-04-05
Payer: COMMERCIAL

## 2022-04-05 VITALS
WEIGHT: 180 LBS | HEART RATE: 60 BPM | HEIGHT: 69 IN | TEMPERATURE: 97.6 F | SYSTOLIC BLOOD PRESSURE: 152 MMHG | DIASTOLIC BLOOD PRESSURE: 90 MMHG | RESPIRATION RATE: 16 BRPM | OXYGEN SATURATION: 98 % | BODY MASS INDEX: 26.66 KG/M2

## 2022-04-05 DIAGNOSIS — I25.10 CORONARY ARTERY DISEASE INVOLVING NATIVE CORONARY ARTERY OF NATIVE HEART WITHOUT ANGINA PECTORIS: ICD-10-CM

## 2022-04-05 DIAGNOSIS — F51.01 PRIMARY INSOMNIA: ICD-10-CM

## 2022-04-05 DIAGNOSIS — I10 ESSENTIAL HYPERTENSION: ICD-10-CM

## 2022-04-05 PROCEDURE — 99213 OFFICE O/P EST LOW 20 MIN: CPT | Performed by: FAMILY MEDICINE

## 2022-04-05 RX ORDER — LOSARTAN POTASSIUM 50 MG/1
50 TABLET ORAL DAILY
Qty: 90 TABLET | Refills: 3 | Status: SHIPPED | OUTPATIENT
Start: 2022-04-05 | End: 2022-09-16 | Stop reason: SDUPTHER

## 2022-04-05 RX ORDER — ZOLPIDEM TARTRATE 5 MG/1
5 TABLET ORAL NIGHTLY PRN
Qty: 30 TABLET | Refills: 3 | Status: SHIPPED | OUTPATIENT
Start: 2022-04-05 | End: 2022-08-03

## 2022-04-05 RX ORDER — ASPIRIN 81 MG/1
81 TABLET, CHEWABLE ORAL DAILY
Qty: 90 TABLET | Refills: 3 | Status: SHIPPED | OUTPATIENT
Start: 2022-04-05 | End: 2023-04-07 | Stop reason: SDUPTHER

## 2022-04-05 ASSESSMENT — ENCOUNTER SYMPTOMS
VOMITING: 0
INSOMNIA: 1
SHORTNESS OF BREATH: 0
NAUSEA: 0
FEVER: 0
DIZZINESS: 0
HEADACHES: 0
COUGH: 0
MYALGIAS: 0
SORE THROAT: 0
PALPITATIONS: 0
CHILLS: 0
BLURRED VISION: 0
DOUBLE VISION: 0

## 2022-04-05 NOTE — PROGRESS NOTES
Subjective:     CC: bp f/u and insomnia    HPI:   Olman presents today with   bp logs from home all wnl    bp in office still >150/90  bp med retill to different pharmacy    Tried trazadone, did not help  Pt aware of risks beneftis alteratives  Would like to cont sleep aid      Problem   Primary Insomnia   Essential Hypertension    ICD-10 transition         Current Outpatient Medications Ordered in Epic   Medication Sig Dispense Refill   • losartan (COZAAR) 50 MG Tab Take 1 Tablet by mouth every day. 90 Tablet 3   • zolpidem (AMBIEN) 5 MG Tab Take 1 Tablet by mouth at bedtime as needed for Sleep for up to 120 days. 30 Tablet 3   • aspirin (ASA) 81 MG Chew Tab chewable tablet Chew 1 Tablet every day. CHEW AND SWALLOW 90 Tablet 3   • traZODone (DESYREL) 50 MG Tab Take 1 Tablet by mouth every evening. 30 Tablet 3   • VENTOLIN  (90 Base) MCG/ACT Aero Soln inhalation aerosol INHALE 2 PUFFS BY MOUTH EVERY 6 HOURS AS NEEDED 18 g 0   • TRELEGY ELLIPTA 100-62.5-25 MCG/INH AEROSOL POWDER, BREATH ACTIVATED inhalation INHALE 1 PUFF BY MOUTH EVERY DAY 1 Each 6   • amLODIPine (NORVASC) 10 MG Tab TAKE 1 TABLET BY MOUTH EVERY DAY 90 Tablet 3   • losartan (COZAAR) 25 MG Tab Take 1 tablet by mouth 1 time a day as needed. Hold for SBP less than 100 90 tablet 3   • atorvastatin (LIPITOR) 40 MG Tab Take 1 tablet by mouth every evening. 90 tablet 3   • carvedilol (COREG) 12.5 MG Tab TAKE 1 TABLET BY MOUTH TWICE DAILY WITH MEALS 180 tablet 3     No current Epic-ordered facility-administered medications on file.     ROS:  Review of Systems   Constitutional: Negative for chills and fever.   HENT: Negative for ear pain and sore throat.    Eyes: Negative for blurred vision and double vision.   Respiratory: Negative for cough and shortness of breath.    Cardiovascular: Negative for chest pain and palpitations.   Gastrointestinal: Negative for nausea and vomiting.   Genitourinary: Negative for dysuria and hematuria.   Musculoskeletal:  "Negative for myalgias.   Neurological: Negative for dizziness and headaches.   Psychiatric/Behavioral: The patient has insomnia.        Objective:     Exam:  /90 (BP Location: Left arm, Patient Position: Sitting, BP Cuff Size: Adult)   Pulse 60   Temp 36.4 °C (97.6 °F) (Temporal)   Resp 16   Ht 1.753 m (5' 9\")   Wt 81.6 kg (180 lb)   SpO2 98%   BMI 26.58 kg/m²  Body mass index is 26.58 kg/m².    Physical Exam  Constitutional:       General: He is not in acute distress.     Appearance: Normal appearance. He is not ill-appearing, toxic-appearing or diaphoretic.   HENT:      Head: Normocephalic and atraumatic.   Eyes:      General: No scleral icterus.        Right eye: No discharge.         Left eye: No discharge.      Extraocular Movements: Extraocular movements intact.      Conjunctiva/sclera: Conjunctivae normal.      Pupils: Pupils are equal, round, and reactive to light.   Cardiovascular:      Pulses: Normal pulses.      Heart sounds: Normal heart sounds. No murmur heard.  Pulmonary:      Effort: Pulmonary effort is normal. No respiratory distress.      Breath sounds: Normal breath sounds.   Neurological:      Mental Status: He is alert.      Coordination: Coordination normal.      Gait: Gait normal.         Assessment & Plan:     71 y.o. male with the following -     Problem List Items Addressed This Visit     Essential hypertension     Chronic, not at goal  Inc to 50mg  Cont to monitor  Discussed red flags and holding parameters  rtc for check up or drop off logs         Relevant Medications    losartan (COZAAR) 50 MG Tab    Primary insomnia     Chronic  Tried alternatives  Aware of risks  Would like to cont.  Refill sent         Relevant Medications    zolpidem (AMBIEN) 5 MG Tab    Coronary artery disease involving native coronary artery of native heart without angina pectoris    Relevant Medications    losartan (COZAAR) 50 MG Tab    aspirin (ASA) 81 MG Chew Tab chewable tablet        Return in " about 6 months (around 10/5/2022), or if symptoms worsen or fail to improve, for f/u bp.    Please note that this dictation was created using voice recognition software. I have made every reasonable attempt to correct obvious errors, but I expect that there are errors of grammar and possibly content that I did not discover before finalizing the note.

## 2022-04-05 NOTE — ASSESSMENT & PLAN NOTE
Chronic, not at goal  Inc to 50mg  Cont to monitor  Discussed red flags and holding parameters  rtc for check up or drop off logs

## 2022-05-02 DIAGNOSIS — I10 ESSENTIAL HYPERTENSION: ICD-10-CM

## 2022-05-02 RX ORDER — AMLODIPINE BESYLATE 10 MG/1
10 TABLET ORAL
Qty: 90 TABLET | Refills: 0 | Status: SHIPPED | OUTPATIENT
Start: 2022-05-02 | End: 2022-08-16

## 2022-07-07 ENCOUNTER — PATIENT MESSAGE (OUTPATIENT)
Dept: SLEEP MEDICINE | Facility: MEDICAL CENTER | Age: 72
End: 2022-07-07
Payer: COMMERCIAL

## 2022-07-07 RX ORDER — ALBUTEROL SULFATE 90 UG/1
AEROSOL, METERED RESPIRATORY (INHALATION)
Qty: 1 EACH | Refills: 11 | Status: SHIPPED | OUTPATIENT
Start: 2022-07-07

## 2022-07-28 ENCOUNTER — NON-PROVIDER VISIT (OUTPATIENT)
Dept: SLEEP MEDICINE | Facility: MEDICAL CENTER | Age: 72
End: 2022-07-28
Attending: INTERNAL MEDICINE
Payer: COMMERCIAL

## 2022-07-28 VITALS — HEIGHT: 69 IN | WEIGHT: 168 LBS | BODY MASS INDEX: 24.88 KG/M2

## 2022-07-28 DIAGNOSIS — J44.9 CHRONIC OBSTRUCTIVE PULMONARY DISEASE, UNSPECIFIED COPD TYPE (HCC): ICD-10-CM

## 2022-07-28 PROCEDURE — 94729 DIFFUSING CAPACITY: CPT | Performed by: INTERNAL MEDICINE

## 2022-07-28 PROCEDURE — 94726 PLETHYSMOGRAPHY LUNG VOLUMES: CPT | Performed by: INTERNAL MEDICINE

## 2022-07-28 PROCEDURE — 94060 EVALUATION OF WHEEZING: CPT | Performed by: INTERNAL MEDICINE

## 2022-07-28 ASSESSMENT — PULMONARY FUNCTION TESTS
FVC_PREDICTED: 4.11
FEV1/FVC_PERCENT_LLN: 64
FEV1/FVC_PERCENT_CHANGE: 5
FEV1/FVC: 31
FEV1/FVC_PERCENT_PREDICTED: 40
FEV1_PERCENT_PREDICTED: 26
FVC: 2.73
FEV1/FVC_PERCENT_PREDICTED: 38
FEV1_PERCENT_PREDICTED: 26
FEV1/FVC: 29
FEV1/FVC_PREDICTED: 76
FEV1: 0.84
FVC_PERCENT_PREDICTED: 69
FEV1_PREDICTED: 3.11
FEV1_PERCENT_CHANGE: 0
FVC_PERCENT_PREDICTED: 66
FEV1/FVC_PERCENT_CHANGE: 0
FEV1_PERCENT_CHANGE: -4
FVC: 2.86
FEV1/FVC_PERCENT_PREDICTED: 39
FEV1/FVC_PERCENT_PREDICTED: 38
FEV1/FVC: 30.77
FEV1/FVC_PERCENT_PREDICTED: 76
FVC_LLN: 3.43
FEV1/FVC: 29
FEV1_LLN: 2.60
FEV1: 0.83

## 2022-07-28 NOTE — PROCEDURES
Technician: Madiha Ramos RRT, CPFT  Good patient effort & cooperation.  The results of this test meet the ATS/ERS standards for acceptability & reproducibility.  Test was performed on the [employer] Body Plethysmograph-Elite DX system.  Predicted equations for Spirometry are GLI-2012, ITS for lung volumes, and GLI-2017 for DLCO.  The DLCO was uncorrected for Hgb.  A bronchodilator of Ventolin HFA -2puffs via spacer administered.  DLCO performed during dilation period. IVC is less than 90%.    Interpretation:  1. There is a very severe obstructive ventilatory defect on spirometry.  There is no significant response to bronchodilators.  2.  Lung volumes are consistent with air trapping and hyperinflation.  3.  There is a moderate diffusion impairment. The reduced DLCO and normal DL/VA ratio is consistent with obstructive airway disease and underestimation of the alveolar volumes due to maldistribution of ventilation.  4.  The elevated total lung capacity compared to alveolar volume is indicative of poorly communicating airspaces, i.e. dead space ventilation.  5.  Flow volume loop is consistent with the above interpretation of a very severe obstructive ventilatory defect.  6.  Compared to prior testing in 2012, FEV1 has remained essentially stable from 0.79 L to 0.83 L, or 26% predicted.  7.  Based on this study, patient meets PFT criteria for bronchoscopic lung volume reduction.  Consider further work-up if clinically appropriate.    IJose Antonio MD, am the author of this note (interpretation of PFT).  __________  Jose Antonio Ocampo MD  Pulmonary and Critical Care Medicine  Former[facility]

## 2022-07-29 ENCOUNTER — OFFICE VISIT (OUTPATIENT)
Dept: SLEEP MEDICINE | Facility: MEDICAL CENTER | Age: 72
End: 2022-07-29
Payer: COMMERCIAL

## 2022-07-29 ENCOUNTER — HOSPITAL ENCOUNTER (OUTPATIENT)
Dept: RADIOLOGY | Facility: MEDICAL CENTER | Age: 72
End: 2022-07-29
Attending: PHYSICIAN ASSISTANT
Payer: COMMERCIAL

## 2022-07-29 VITALS
DIASTOLIC BLOOD PRESSURE: 80 MMHG | OXYGEN SATURATION: 93 % | WEIGHT: 168 LBS | RESPIRATION RATE: 16 BRPM | SYSTOLIC BLOOD PRESSURE: 124 MMHG | HEIGHT: 69 IN | HEART RATE: 55 BPM | BODY MASS INDEX: 24.88 KG/M2

## 2022-07-29 DIAGNOSIS — J96.11 CHRONIC RESPIRATORY FAILURE WITH HYPOXIA (HCC): ICD-10-CM

## 2022-07-29 DIAGNOSIS — J44.9 CHRONIC OBSTRUCTIVE PULMONARY DISEASE, UNSPECIFIED COPD TYPE (HCC): ICD-10-CM

## 2022-07-29 DIAGNOSIS — Z87.891 FORMER SMOKER: ICD-10-CM

## 2022-07-29 PROCEDURE — 71250 CT THORAX DX C-: CPT

## 2022-07-29 PROCEDURE — 99214 OFFICE O/P EST MOD 30 MIN: CPT | Performed by: PHYSICIAN ASSISTANT

## 2022-07-29 ASSESSMENT — ENCOUNTER SYMPTOMS
SINUS PAIN: 0
WEIGHT LOSS: 0
TREMORS: 0
SORE THROAT: 0
HEADACHES: 0
COUGH: 1
CHILLS: 0
INSOMNIA: 1
ORTHOPNEA: 0
SHORTNESS OF BREATH: 1
FEVER: 0
PALPITATIONS: 0
WHEEZING: 0
HEARTBURN: 0
SPUTUM PRODUCTION: 1
DIZZINESS: 0

## 2022-07-29 ASSESSMENT — COPD QUESTIONNAIRES
QUESTION4_WALKINCLINE: 4
QUESTION8_ENERGYLEVEL: 3
QUESTION3_CHESTTIGHTNESS: NO TIGHTNESS AT ALL
QUESTION2_PHLEGM: 3
QUESTION6_LEAVINGHOUSE: 1
QUESTION5_HOMEACTIVITIES: 1
MMRC DYSPNEA SCALE: I GET SHORT OF BREATH WHEN HURRYING ON LEVEL GROUND OR WALKING UP A SLIGHT HILL
QUESTION7_SLEEPQUALITY: SLEEPS VERY SOUNDLY
QUESTION1_COUGHFREQUENCY: 3
TOTAL_EXACERBATIONS_PASTYEAR: 0 OR 1 WITHOUT HOSPITALIZATION
CAT_TOTALSCORE: 15

## 2022-07-29 ASSESSMENT — PATIENT HEALTH QUESTIONNAIRE - PHQ9: CLINICAL INTERPRETATION OF PHQ2 SCORE: 0

## 2022-07-29 NOTE — PROGRESS NOTES
CC:  Exertional dyspnea    HPI:  Olman Sims is a 72 y.o. year old male here today for follow-up on COPD and chronic respiratory failure with hypoxia.  He was diagnosed with COPD in 2012, previously seen in clinic by Dr. Dannielle Amaya on 1/31/2022.  He is a former smoker with reported 15-pack-year history and quit date in 2012.    Pertinent past medical history includes mild cognitive impairment with memory loss, essential hypertension, vasomotor rhinitis, primary insomnia, atrial fibrillation, non-STEMI.      Reviewed in clinic vitals including /80, HR 55, oxygen saturation 93% room air with exertion.     Reviewed home medication regimen including Trelegy 100 mcg, Ventolin inhaler which patient reports not requiring at current time. Oxygen use to recover from exertion and at night at 2 L.  Zolpidem for sleep.    No recent imaging, chest x-ray obtained 12/4/2019 demonstrating no acute cardiopulmonary disease.    Pulmonary function testing obtained 7/28/2022 demonstrating FEV1 of 0.8% predicted, FVC of 2.86 L or 69% predicted, FEV1/FVC ratio of 29, residual volume 286% predicted, % predicted and DLCO 55% predicted. Per pulmonologist interpretation very severe obstructive ventilatory defect, severe dilated response lung volumes consistent with air trapping and hyperinflation, moderate diffusion impairment, based on study patient needs PFT criteria for BLVR, consider further work-up if indicated.     Review of Systems   Constitutional: Negative for chills, fever, malaise/fatigue and weight loss.   HENT: Positive for congestion. Negative for hearing loss, nosebleeds, sinus pain, sore throat and tinnitus.    Eyes:        Presc glasses    Respiratory: Positive for cough (occasional ), sputum production (clear ) and shortness of breath (with walking alot, heavy exertion ). Negative for wheezing.    Cardiovascular: Negative for chest pain, palpitations, orthopnea and leg swelling.  "  Gastrointestinal: Negative for heartburn.        Lower dentures, no difficulty swallowing    Neurological: Negative for dizziness, tremors and headaches.   Psychiatric/Behavioral: The patient has insomnia (falling asleep ).        Past Medical History:   Diagnosis Date   • Adverse effect of anesthesia     \"stopped breathing/elevated bp\"2012   • Anesthesia     \"stopped breathing/elevated bp\"2012   • Atrial fibrillation (HCC)    • Breath shortness     when he quit smoking/with exertion   • CAD (coronary artery disease)    • COPD    • Dental disorder     lower partial   • Emphysema of lung (HCC)    • Hyperlipidemia    • Hypertension    • Kidney stone    • Oxygen dependent     q hs prn 2 ltr   • Paroxysmal atrial fibrillation (HCC)    • PVD (peripheral vascular disease) ([facility])     R iliac artery stent   • Renal disorder     right kidney stone/stent in place       Past Surgical History:   Procedure Laterality Date   • CO CYSTOSCOPY,INSERT URETERAL STENT Left 1/22/2020    Procedure: CYSTOSCOPY, WITH URETERAL STENT INSERTION;  Surgeon: Paul Quiles M.D.;  Location: [facility];  Service: Urology   • CO CYSTO/URETERO/PYELOSCOPY, DX Left 1/22/2020    Procedure: URETEROSCOPY;  Surgeon: Paul Quiles M.D.;  Location: [facility];  Service: Urology   • LASERTRIPSY Left 1/22/2020    Procedure: LITHOTRIPSY, USING LASER;  Surgeon: Paul Quiles M.D.;  Location: [facility];  Service: Urology   • SEPTOPLASTY N/A 5/4/2018    Procedure: SEPTOPLASTY;  Surgeon: Jesse Saavedra M.D.;  Location: SURGERY SAME DAY [facility];  Service: Ent   • TURBINATE REDUCTION Bilateral 5/4/2018    Procedure: TURBINATE REDUCTION- RESECTION WITH SUBMUCOSAL APPROACH;  Surgeon: Jesse Saavedra M.D.;  Location: SURGERY SAME DAY [facility];  Service: Ent   • RECOVERY  11/1/2012    Performed by [facility] at SURGERY SAME DAY [facility]   • PERCUTANEOUS NEPHROSTOLITHOTOMY  5/2/2012    Performed by " KAREN KOLB at SURGERY [facility] ORS   • PERCUTANEOUS NEPHROSTOLITHOTOMY  4/5/2012    Performed by KAREN KOLB at SURGERY [facility] ORS   • RECOVERY  4/4/2012    Performed by GARY LAW at SURGERY SAME DAY [facility] ORS   • CYSTOSCOPY STENT PLACEMENT  2/24/2012    Performed by KAREN KOLB at SURGERY [facility] ORS   • APPENDECTOMY  1987   • APPENDECTOMY     • STENT PLACEMENT      heart   • ZZZ CARDIAC CATH         Family History   Problem Relation Age of Onset   • Cancer Maternal Grandfather 65        colon       Social History     Socioeconomic History   • Marital status: Single     Spouse name: Not on file   • Number of children: Not on file   • Years of education: Not on file   • Highest education level: Associate degree: academic program   Occupational History   • Not on file   Tobacco Use   • Smoking status: Former Smoker     Packs/day: 0.50     Years: 15.00     Pack years: 7.50     Types: Cigarettes     Quit date: 2/17/2012     Years since quitting: 10.4   • Smokeless tobacco: Never Used   Vaping Use   • Vaping Use: Never used   Substance and Sexual Activity   • Alcohol use: Yes     Alcohol/week: 0.0 oz     Comment: rarely   • Drug use: No   • Sexual activity: Never   Other Topics Concern   • Not on file   Social History Narrative   • Not on file     Social Determinants of Health     Financial Resource Strain: Low Risk    • Difficulty of Paying Living Expenses: Not hard at all   Food Insecurity: No Food Insecurity   • Worried About Running Out of Food in the Last Year: Never true   • Ran Out of Food in the Last Year: Never true   Transportation Needs: No Transportation Needs   • Lack of Transportation (Medical): No   • Lack of Transportation (Non-Medical): No   Physical Activity: Insufficiently Active   • Days of Exercise per Week: 7 days   • Minutes of Exercise per Session: 20 min   Stress: No Stress Concern Present   • Feeling of Stress : Not at all   Social Connections: Unknown   •  "Frequency of Communication with Friends and Family: More than three times a week   • Frequency of Social Gatherings with Friends and Family: Patient refused   • Attends [employer] Services: Patient refused   • Active Member of Clubs or Organizations: No   • Attends Club or Organization Meetings: Not on file   • Marital Status:    Intimate Partner Violence: Not on file   Housing Stability: Low Risk    • Unable to Pay for Housing in the Last Year: No   • Number of Places Lived in the Last Year: 1   • Unstable Housing in the Last Year: No       Allergies as of 07/29/2022   • (No Known Allergies)        @Vital signs for this encounter:  /80   Pulse (!) 55   Resp 16   Ht 1.753 m (5' 9\")   Wt 76.2 kg (168 lb)   SpO2 93%     Current medications as of today   Current Outpatient Medications   Medication Sig Dispense Refill   • VENTOLIN  (90 Base) MCG/ACT Aero Soln inhalation aerosol INHALE 2 PUFFS BY MOUTH EVERY 6 HOURS AS NEEDED 1 Each 11   • amLODIPine (NORVASC) 10 MG Tab Take 1 Tablet by mouth every day. 90 Tablet 0   • Fluticasone-Umeclidin-Vilant (TRELEGY ELLIPTA) 100-62.5-25 MCG/INH AEROSOL POWDER, BREATH ACTIVATED inhalation Inhale 1 Inhalation every day. Rinse mouth after use 3 Each 3   • losartan (COZAAR) 50 MG Tab Take 1 Tablet by mouth every day. 90 Tablet 3   • zolpidem (AMBIEN) 5 MG Tab Take 1 Tablet by mouth at bedtime as needed for Sleep for up to 120 days. 30 Tablet 3   • aspirin (ASA) 81 MG Chew Tab chewable tablet Chew 1 Tablet every day. CHEW AND SWALLOW 90 Tablet 3   • atorvastatin (LIPITOR) 40 MG Tab Take 1 tablet by mouth every evening. 90 tablet 3   • carvedilol (COREG) 12.5 MG Tab TAKE 1 TABLET BY MOUTH TWICE DAILY WITH MEALS 180 tablet 3   • traZODone (DESYREL) 50 MG Tab Take 1 Tablet by mouth every evening. 30 Tablet 3   • TRELEGY ELLIPTA 100-62.5-25 MCG/INH AEROSOL POWDER, BREATH ACTIVATED inhalation INHALE 1 PUFF BY MOUTH EVERY DAY 1 Each 6   • losartan (COZAAR) 25 MG Tab " Take 1 tablet by mouth 1 time a day as needed. Hold for SBP less than 100 90 tablet 3     No current facility-administered medications for this visit.         Physical Exam:   Gen:           Alert and oriented, No apparent distress. Mood and affect appropriate, normal interaction with provider.  Eyes:          sclere white, conjunctive moist.  Hearing:     Grossly intact.  Dentition:    Poor dentition, lower dentures.  Oropharynx:   Tongue normal, posterior pharynx without erythema or exudate.  Neck:        Supple, trachea midline, no masses.  Respiratory Effort: No intercostal retractions or use of accessory muscles.   Lung Auscultation:      Decreased bilaterally; no rales, rhonchi or wheezing.  CV:            Regular rate and rhythm. No edema. No murmurs, rubs or gallops.  Digits, Nails, Ext: No clubbing, cyanosis, petechiae, or nodes.   Skin:        No rashes, lesions or ulcers noted on exposed skin surfaces.                     Assessment:  1. Chronic obstructive pulmonary disease, unspecified COPD type ([facility])  Exercise Test for Bronchospasm / 6-Minute Walk    CT-CHEST (THORAX) W/O   2. Chronic respiratory failure with hypoxia ([facility])     3. Former smoker         Immunizations:    Flu:12/7/21  Pneumovax 23:1/3/18  Prevnar 13:11/10/14    Plan:    72-year-old male with COPD and chronic respiratory failure, follow-up PFT and CT results.    COPD: Reviewed pulmonary function testing, patient appears to be a candidate for  VR work-up, place order for specialized CT to evaluate. Brief discussion regarding valve reduction procedure.  Place order for 6-minute walk test as well.    Chronic respiratory failure with hypoxia: O2 2 L with exertion and at night.    Former smoker: Quit date in 2012 with reported 15 pack year history, remains abstinent of tobacco.    Follow-up in 3 months, review results sooner if needed.       This dictation was created using voice recognition software. The accuracy of the dictation is limited  to the abilities of the software. I expect there may be some errors of grammar and possibly content.

## 2022-07-29 NOTE — PATIENT INSTRUCTIONS
1-reviewed pulmonary function testing   2-discussed lung valve reduction procedure  3-follow up would include 6 min walk test which was ordered  4-detailed CT scan for valve procedure to see if candidate, let xray people know  4- get CT scan and will review next steps at 3 month appointment

## 2022-08-05 ENCOUNTER — OFFICE VISIT (OUTPATIENT)
Dept: MEDICAL GROUP | Facility: PHYSICIAN GROUP | Age: 72
End: 2022-08-05
Payer: COMMERCIAL

## 2022-08-05 VITALS
DIASTOLIC BLOOD PRESSURE: 50 MMHG | HEIGHT: 69 IN | HEART RATE: 64 BPM | WEIGHT: 168 LBS | BODY MASS INDEX: 24.88 KG/M2 | SYSTOLIC BLOOD PRESSURE: 150 MMHG | TEMPERATURE: 98.6 F | OXYGEN SATURATION: 91 %

## 2022-08-05 DIAGNOSIS — F51.01 PRIMARY INSOMNIA: ICD-10-CM

## 2022-08-05 DIAGNOSIS — R74.8 ELEVATED ALKALINE PHOSPHATASE LEVEL: ICD-10-CM

## 2022-08-05 DIAGNOSIS — N18.31 STAGE 3A CHRONIC KIDNEY DISEASE: ICD-10-CM

## 2022-08-05 PROCEDURE — 99213 OFFICE O/P EST LOW 20 MIN: CPT | Performed by: FAMILY MEDICINE

## 2022-08-05 RX ORDER — ZOLPIDEM TARTRATE 5 MG/1
5 TABLET ORAL NIGHTLY PRN
Qty: 30 TABLET | Refills: 2 | Status: SHIPPED | OUTPATIENT
Start: 2022-08-05 | End: 2022-08-16 | Stop reason: SDUPTHER

## 2022-08-05 ASSESSMENT — ENCOUNTER SYMPTOMS
SHORTNESS OF BREATH: 0
DIZZINESS: 0
INSOMNIA: 1
BLURRED VISION: 0
FEVER: 0
MYALGIAS: 0
SORE THROAT: 0
HEADACHES: 0
COUGH: 0
DOUBLE VISION: 0
PALPITATIONS: 0
ABDOMINAL PAIN: 0
CHILLS: 0
VOMITING: 0
NAUSEA: 0

## 2022-08-05 NOTE — PROGRESS NOTES
Subjective:     CC: med refill and labs    HPI:   Olman presents today with     Pt needs ALP work up, no abd pains    Insomnia, would like to cont zolpidem   Aware of ADRs and risks    No problems updated.    Current Outpatient Medications Ordered in Epic   Medication Sig Dispense Refill   • zolpidem (AMBIEN) 5 MG Tab Take 1 Tablet by mouth at bedtime as needed for Sleep for up to 90 days. 30 Tablet 2   • VENTOLIN  (90 Base) MCG/ACT Aero Soln inhalation aerosol INHALE 2 PUFFS BY MOUTH EVERY 6 HOURS AS NEEDED 1 Each 11   • amLODIPine (NORVASC) 10 MG Tab Take 1 Tablet by mouth every day. 90 Tablet 0   • Fluticasone-Umeclidin-Vilant (TRELEGY ELLIPTA) 100-62.5-25 MCG/INH AEROSOL POWDER, BREATH ACTIVATED inhalation Inhale 1 Inhalation every day. Rinse mouth after use 3 Each 3   • losartan (COZAAR) 50 MG Tab Take 1 Tablet by mouth every day. 90 Tablet 3   • aspirin (ASA) 81 MG Chew Tab chewable tablet Chew 1 Tablet every day. CHEW AND SWALLOW 90 Tablet 3   • atorvastatin (LIPITOR) 40 MG Tab Take 1 tablet by mouth every evening. 90 tablet 3   • carvedilol (COREG) 12.5 MG Tab TAKE 1 TABLET BY MOUTH TWICE DAILY WITH MEALS 180 tablet 3   • losartan (COZAAR) 25 MG Tab Take 1 tablet by mouth 1 time a day as needed. Hold for SBP less than 100 90 tablet 3     No current Epic-ordered facility-administered medications on file.     ROS:  Review of Systems   Constitutional: Negative for chills and fever.   HENT: Negative for ear pain and sore throat.    Eyes: Negative for blurred vision and double vision.   Respiratory: Negative for cough and shortness of breath.    Cardiovascular: Negative for chest pain and palpitations.   Gastrointestinal: Negative for abdominal pain, nausea and vomiting.   Genitourinary: Negative for dysuria and hematuria.   Musculoskeletal: Negative for myalgias.   Neurological: Negative for dizziness and headaches.   Psychiatric/Behavioral: The patient has insomnia.        Objective:     Exam:  BP (!)  "150/50 (BP Location: Left arm, Patient Position: Sitting, BP Cuff Size: Adult)   Pulse 64   Temp 37 °C (98.6 °F) (Temporal)   Ht 1.753 m (5' 9\")   Wt 76.2 kg (168 lb)   SpO2 91%   BMI 24.81 kg/m²  Body mass index is 24.81 kg/m².    Physical Exam  Constitutional:       Appearance: Normal appearance.   HENT:      Head: Normocephalic and atraumatic.   Eyes:      General: No scleral icterus.        Right eye: No discharge.         Left eye: No discharge.      Extraocular Movements: Extraocular movements intact.      Conjunctiva/sclera: Conjunctivae normal.      Pupils: Pupils are equal, round, and reactive to light.   Cardiovascular:      Pulses: Normal pulses.      Heart sounds: Normal heart sounds. No murmur heard.  Pulmonary:      Effort: Pulmonary effort is normal. No respiratory distress.      Breath sounds: Normal breath sounds.   Abdominal:      General: There is no distension.      Tenderness: There is no abdominal tenderness. There is no guarding.   Neurological:      Mental Status: He is alert.      Coordination: Coordination normal.      Gait: Gait normal.             Assessment & Plan:     72 y.o. male with the following -     Problem List Items Addressed This Visit     Primary insomnia    Relevant Medications    zolpidem (AMBIEN) 5 MG Tab    Stage 3a chronic kidney disease (HCC)    Elevated alkaline phosphatase level    Relevant Orders    PTH INTACT (PTH ONLY)    CALCIUM (CA)    CREATININE    ALBUMIN    VITAMIN D,25 HYDROXY    TSH WITH REFLEX TO FT4    SPEP W/REFLEX TO AARON, A, G, M    PROTEIN ELECTROPHORESIS, UR        No follow-ups on file.    Please note that this dictation was created using voice recognition software. I have made every reasonable attempt to correct obvious errors, but I expect that there are errors of grammar and possibly content that I did not discover before finalizing the note.      "

## 2022-08-09 ENCOUNTER — HOSPITAL ENCOUNTER (OUTPATIENT)
Dept: LAB | Facility: MEDICAL CENTER | Age: 72
End: 2022-08-09
Attending: FAMILY MEDICINE
Payer: COMMERCIAL

## 2022-08-09 DIAGNOSIS — R74.8 ELEVATED ALKALINE PHOSPHATASE LEVEL: ICD-10-CM

## 2022-08-09 LAB
25(OH)D3 SERPL-MCNC: 23 NG/ML (ref 30–100)
ALBUMIN SERPL BCP-MCNC: 4 G/DL (ref 3.2–4.9)
CALCIUM SERPL-MCNC: 9.5 MG/DL (ref 8.5–10.5)
CREAT SERPL-MCNC: 1.5 MG/DL (ref 0.5–1.4)
GFR SERPLBLD CREATININE-BSD FMLA CKD-EPI: 50 ML/MIN/1.73 M 2
PTH-INTACT SERPL-MCNC: 28.6 PG/ML (ref 14–72)
TSH SERPL DL<=0.005 MIU/L-ACNC: 2.14 UIU/ML (ref 0.38–5.33)

## 2022-08-09 PROCEDURE — 82565 ASSAY OF CREATININE: CPT

## 2022-08-09 PROCEDURE — 82306 VITAMIN D 25 HYDROXY: CPT

## 2022-08-09 PROCEDURE — 84443 ASSAY THYROID STIM HORMONE: CPT

## 2022-08-09 PROCEDURE — 84156 ASSAY OF PROTEIN URINE: CPT

## 2022-08-09 PROCEDURE — 86334 IMMUNOFIX E-PHORESIS SERUM: CPT

## 2022-08-09 PROCEDURE — 84155 ASSAY OF PROTEIN SERUM: CPT

## 2022-08-09 PROCEDURE — 82310 ASSAY OF CALCIUM: CPT

## 2022-08-09 PROCEDURE — 82784 ASSAY IGA/IGD/IGG/IGM EACH: CPT

## 2022-08-09 PROCEDURE — 84166 PROTEIN E-PHORESIS/URINE/CSF: CPT

## 2022-08-09 PROCEDURE — 36415 COLL VENOUS BLD VENIPUNCTURE: CPT

## 2022-08-09 PROCEDURE — 86335 IMMUNFIX E-PHORSIS/URINE/CSF: CPT

## 2022-08-09 PROCEDURE — 84165 PROTEIN E-PHORESIS SERUM: CPT

## 2022-08-09 PROCEDURE — 82040 ASSAY OF SERUM ALBUMIN: CPT

## 2022-08-09 PROCEDURE — 83970 ASSAY OF PARATHORMONE: CPT

## 2022-08-10 DIAGNOSIS — R91.1 LUNG NODULE: ICD-10-CM

## 2022-08-12 LAB
ALBUMIN SERPL ELPH-MCNC: 3.74 G/DL (ref 3.75–5.01)
ALPHA1 GLOB SERPL ELPH-MCNC: 0.42 G/DL (ref 0.19–0.46)
ALPHA2 GLOB SERPL ELPH-MCNC: 0.9 G/DL (ref 0.48–1.05)
B-GLOBULIN SERPL ELPH-MCNC: 0.85 G/DL (ref 0.48–1.1)
GAMMA GLOB SERPL ELPH-MCNC: 0.99 G/DL (ref 0.62–1.51)
IGA SERPL-MCNC: 145 MG/DL (ref 68–408)
IGG SERPL-MCNC: 775 MG/DL (ref 768–1632)
IGM SERPL-MCNC: 61 MG/DL (ref 35–263)
INTERPRETATION SERPL IFE-IMP: ABNORMAL
MONOCLON BAND OBS SERPL: ABNORMAL
PATHOLOGY STUDY: ABNORMAL
PROT SERPL-MCNC: 6.9 G/DL (ref 6.3–8.2)

## 2022-08-14 LAB
ALBUMIN 24H MFR UR ELPH: 100 %
ALPHA1 GLOB 24H MFR UR ELPH: 0 %
ALPHA2 GLOB 24H MFR UR ELPH: 0 %
B-GLOBULIN 24H MFR UR ELPH: 0 %
COLLECT DURATION TIME SPEC: NORMAL HRS
EER MONOCLONAL PROTEIN STUDY, 24 HOUR U Q5964: NORMAL
GAMMA GLOB 24H MFR UR ELPH: 0 %
INTERPRETATION UR IFE-IMP: NORMAL
M PROTEIN 24H MFR UR ELPH: 0 %
M PROTEIN 24H UR ELPH-MRATE: NORMAL MG/24 HRS
PROT 24H UR-MRATE: NORMAL MG/D (ref 40–150)
PROT UR-MCNC: 18 MG/DL
SPECIMEN VOL ?TM UR: NORMAL ML

## 2022-08-16 ENCOUNTER — OFFICE VISIT (OUTPATIENT)
Dept: MEDICAL GROUP | Facility: PHYSICIAN GROUP | Age: 72
End: 2022-08-16
Payer: COMMERCIAL

## 2022-08-16 VITALS
BODY MASS INDEX: 24.73 KG/M2 | OXYGEN SATURATION: 92 % | TEMPERATURE: 97.5 F | DIASTOLIC BLOOD PRESSURE: 66 MMHG | HEART RATE: 56 BPM | HEIGHT: 69 IN | SYSTOLIC BLOOD PRESSURE: 140 MMHG | WEIGHT: 167 LBS

## 2022-08-16 DIAGNOSIS — I10 ESSENTIAL HYPERTENSION: ICD-10-CM

## 2022-08-16 DIAGNOSIS — E78.5 DYSLIPIDEMIA: ICD-10-CM

## 2022-08-16 DIAGNOSIS — F51.01 PRIMARY INSOMNIA: ICD-10-CM

## 2022-08-16 DIAGNOSIS — Z76.89 ENCOUNTER TO ESTABLISH CARE: ICD-10-CM

## 2022-08-16 DIAGNOSIS — R74.8 ELEVATED ALKALINE PHOSPHATASE LEVEL: ICD-10-CM

## 2022-08-16 DIAGNOSIS — E55.9 HYPOVITAMINOSIS D: ICD-10-CM

## 2022-08-16 PROCEDURE — 99214 OFFICE O/P EST MOD 30 MIN: CPT | Performed by: STUDENT IN AN ORGANIZED HEALTH CARE EDUCATION/TRAINING PROGRAM

## 2022-08-16 RX ORDER — ZOLPIDEM TARTRATE 5 MG/1
5 TABLET ORAL NIGHTLY PRN
Qty: 30 TABLET | Refills: 2 | Status: SHIPPED | OUTPATIENT
Start: 2022-08-16 | End: 2022-11-14

## 2022-08-16 RX ORDER — ERGOCALCIFEROL 1.25 MG/1
50000 CAPSULE ORAL
Qty: 6 CAPSULE | Refills: 0 | Status: ON HOLD | OUTPATIENT
Start: 2022-08-16 | End: 2023-05-23

## 2022-08-16 RX ORDER — CARVEDILOL 12.5 MG/1
TABLET ORAL
Qty: 180 TABLET | Refills: 3 | Status: SHIPPED | OUTPATIENT
Start: 2022-08-16 | End: 2023-08-15 | Stop reason: SDUPTHER

## 2022-08-16 RX ORDER — ATORVASTATIN CALCIUM 40 MG/1
40 TABLET, FILM COATED ORAL EVERY EVENING
Qty: 90 TABLET | Refills: 3 | Status: SHIPPED | OUTPATIENT
Start: 2022-08-16 | End: 2023-08-15 | Stop reason: SDUPTHER

## 2022-08-16 NOTE — PROGRESS NOTES
Subjective:     CC:  Diagnoses of Hypovitaminosis D, Elevated alkaline phosphatase level, Essential hypertension, Encounter to establish care, Dyslipidemia, and Primary insomnia were pertinent to this visit.    HISTORY OF THE PRESENT ILLNESS: Patient is a 72 y.o. male. This pleasant patient is here today to establish care. His/her prior PCP was Dr. Angel Yost MD.    1.  Elevated alkaline phosphatase  2.  Hypovitaminosis D  No report of RUQ pain.  Patient does not supplement vitamin D.    3.  Essential hypertension  Patient has been prescribed losartan 50 mg daily and amlodipine 10 mg daily.  Sometimes he takes one, sometimes he takes the other, and sometimes he takes both.  He is consistent with his carvedilol 12.5 mg twice daily.    4.  Dyslipidemia  Longstanding.  Patient is fully compliant with his atorvastatin 40 mg every evening.  Reports no side effects to treatment.    5.  Primary insomnia  Longstanding.  Patient has been treated with zolpidem 5 mg nightly.  He reports that he is able to fall asleep and stay asleep well while using this medication and that he wakes refreshed in the morning.    Active Diagnosis:    Patient Active Problem List   Diagnosis    Other emphysema (HCC)    Peripheral vascular disease (HCC)    Mild cognitive impairment with memory loss    Essential hypertension    Dyslipidemia    Nasal septal deformity    Hypertrophy of both inferior nasal turbinates    Chronic vasomotor rhinitis    Primary insomnia    Prediabetes    History of atrial fibrillation    History of non-ST elevation myocardial infarction (NSTEMI)    Coronary artery disease involving native coronary artery of native heart without angina pectoris    History of kidney stones    Stage 3a chronic kidney disease (HCC)    Elevated alkaline phosphatase level    Hypovitaminosis D      Current Outpatient Medications Ordered in Epic   Medication Sig Dispense Refill    vitamin D2, Ergocalciferol, (DRISDOL) 1.25 MG (81029 UT) Cap  "capsule Take 1 Capsule by mouth every 7 days. 6 Capsule 0    carvedilol (COREG) 12.5 MG Tab TAKE 1 TABLET BY MOUTH TWICE DAILY WITH MEALS 180 Tablet 3    atorvastatin (LIPITOR) 40 MG Tab Take 1 Tablet by mouth every evening. 90 Tablet 3    zolpidem (AMBIEN) 5 MG Tab Take 1 Tablet by mouth at bedtime as needed for Sleep for up to 90 days. 30 Tablet 2    VENTOLIN  (90 Base) MCG/ACT Aero Soln inhalation aerosol INHALE 2 PUFFS BY MOUTH EVERY 6 HOURS AS NEEDED 1 Each 11    Fluticasone-Umeclidin-Vilant (TRELEGY ELLIPTA) 100-62.5-25 MCG/INH AEROSOL POWDER, BREATH ACTIVATED inhalation Inhale 1 Inhalation every day. Rinse mouth after use 3 Each 3    losartan (COZAAR) 50 MG Tab Take 1 Tablet by mouth every day. 90 Tablet 3    aspirin (ASA) 81 MG Chew Tab chewable tablet Chew 1 Tablet every day. CHEW AND SWALLOW 90 Tablet 3     No current Epic-ordered facility-administered medications on file.     ROS:   Gen: No fevers/chills, no changes in weight  HEENT: No changes in vision/hearing, sore throat.  Pulm: No cough, unexplained SOB.  CV: No chest pain/pressure, no palpitations  GI: No nausea/vomiting, no diarrhea  : No dysuria/nocturia  MSk: No myalgias  Skin: No rash/skin changes  Neuro: No headaches, no numbness/tingling  Heme/Lymph: no easy bruising      Objective:     Exam: BP (!) 140/66 (BP Location: Right arm, Patient Position: Sitting, BP Cuff Size: Adult)   Pulse (!) 56   Temp 36.4 °C (97.5 °F) (Temporal)   Ht 1.753 m (5' 9\")   Wt 75.8 kg (167 lb)   SpO2 92%  Body mass index is 24.66 kg/m².    General: Normal appearing. No distress.  HEENT: Normocephalic. Eyes conjunctiva clear lids without ptosis, pupils equal and reactive to light accommodation. Ears normal shape and contour, canals are clear bilaterally, tympanic membranes are benign, nasal mucosa benign, oropharynx is without erythema, edema or exudates.   Neck: Supple without JVD. Thyroid is not enlarged.  Pulmonary: Clear, diminished to ausculation.  " Normal effort. No rales, ronchi, or wheezing.  Cardiovascular: Regular rate and rhythm without murmur.  Heart sounds are distant.  Radial pulses are intact and equal bilaterally.  Abdomen: Nondistended.  No RUQ tenderness to palpation.  Neurologic: Grossly nonfocal.  CN II through XII intact.  Lymph: No cervical or supraclavicular lymph nodes are palpable  Skin: Warm and dry.  No obvious lesions.  Musculoskeletal: Normal gait. No extremity cyanosis, clubbing, or edema.  Psych: Normal mood and affect. Alert and oriented x3. Judgment and insight are normal.    A chaperone was offered to the patient during today's exam. Patient declined chaperone.    Labs:   1/28/2022:  -CMP showing elevated fasting glucose 104, GFR 56, alkaline phosphatase 154    3/31/2022:  -Alkaline phosphatase elevated at 140    8/9/2022:  -Vitamin D level 23.  -TSH 2.14  -SPEP showing albumin 3.74  -Creatinine 1.5, GFR 50    Assessment & Plan:   72 y.o. male with the following -    1. Hypovitaminosis D  2. Elevated Alk Phosphatase level.  - Chronic.  -Elevated alkaline phosphatase likely the result of low vitamin D.  - vitamin D2, Ergocalciferol, (DRISDOL) 1.25 MG (74507 UT) Cap capsule; Take 1 Capsule by mouth every 7 days.  Dispense: 6 Capsule; Refill: 0  -I have recommended a daily supplement containing 800 units of vitamin D to be taken each day with dinner.    3.  Essential hypertension  -Chronic.  -I have negotiated with the patient and he agrees to take his losartan consistently.  -Continue losartan 50 mg daily.  Advised the patient to take this consistently.  -Continue carvedilol 12.5 mg twice daily.  Dispense 180.  Refill 3.  -DC amlodipine 10 mg daily.    -I have asked the patient to provide a blood pressure log at the end of next week.    4.  Dyslipidemia  -Chronic, stable.  No side effects of treatment.  -Continue atorvastatin 40 mg every evening.  Dispense 90.  Refill 3.    5.  Primary insomnia  -Chronic, stable.  -PDMP  reviewed.  -Continue zolpidem 5 mg nightly.  Dispense 30.  Refill 2.    Return in about 6 months (around 2/16/2023).  Per patient preference and to follow-up on #1 through 5 above.    Please note that this dictation was created using voice recognition software. I have made every reasonable attempt to correct obvious errors, but I expect that there are errors of grammar and possibly content that I did not discover before finalizing the note.      Patricio Hernandez PA-C 8/16/2022

## 2022-08-16 NOTE — PATIENT INSTRUCTIONS
1) Obtain a vitamin D supplement with 800 U of vitamin D. Take this daily with dinner.    2) Discontinue Amlodipine for now. Take the Losartan 50 mg daily. Send me a blood pressure at the end of next week.

## 2022-09-07 ENCOUNTER — TELEPHONE (OUTPATIENT)
Dept: MEDICAL GROUP | Facility: PHYSICIAN GROUP | Age: 72
End: 2022-09-07

## 2022-09-08 NOTE — PROGRESS NOTES
Patient returns a blood pressure log to the .  7 blood pressures average 7 days review average systolic of 122 with no diastolic greater than 90.    Patient continues with carvedilol 12.5 mg twice daily, losartan 50 mg daily.    Patricio Hernandez PA-C

## 2022-09-16 DIAGNOSIS — I10 ESSENTIAL HYPERTENSION: ICD-10-CM

## 2022-09-16 RX ORDER — LOSARTAN POTASSIUM 50 MG/1
50 TABLET ORAL DAILY
Qty: 90 TABLET | Refills: 3 | Status: SHIPPED | OUTPATIENT
Start: 2022-09-16 | End: 2023-05-05 | Stop reason: SDUPTHER

## 2022-11-02 ENCOUNTER — HOSPITAL ENCOUNTER (OUTPATIENT)
Dept: RADIOLOGY | Facility: MEDICAL CENTER | Age: 72
End: 2022-11-02
Attending: PHYSICIAN ASSISTANT
Payer: COMMERCIAL

## 2022-11-02 DIAGNOSIS — R91.1 LUNG NODULE: ICD-10-CM

## 2022-11-02 PROCEDURE — 71250 CT THORAX DX C-: CPT

## 2022-11-08 ENCOUNTER — OFFICE VISIT (OUTPATIENT)
Dept: SLEEP MEDICINE | Facility: MEDICAL CENTER | Age: 72
End: 2022-11-08
Payer: COMMERCIAL

## 2022-11-08 ENCOUNTER — NON-PROVIDER VISIT (OUTPATIENT)
Dept: SLEEP MEDICINE | Facility: MEDICAL CENTER | Age: 72
End: 2022-11-08
Attending: PHYSICIAN ASSISTANT
Payer: COMMERCIAL

## 2022-11-08 VITALS
OXYGEN SATURATION: 92 % | HEIGHT: 69 IN | BODY MASS INDEX: 24.88 KG/M2 | RESPIRATION RATE: 16 BRPM | HEART RATE: 72 BPM | SYSTOLIC BLOOD PRESSURE: 122 MMHG | WEIGHT: 168 LBS | DIASTOLIC BLOOD PRESSURE: 76 MMHG

## 2022-11-08 DIAGNOSIS — J96.11 CHRONIC RESPIRATORY FAILURE WITH HYPOXIA (HCC): ICD-10-CM

## 2022-11-08 DIAGNOSIS — Z87.891 FORMER SMOKER: ICD-10-CM

## 2022-11-08 DIAGNOSIS — J44.9 CHRONIC OBSTRUCTIVE PULMONARY DISEASE, UNSPECIFIED COPD TYPE (HCC): ICD-10-CM

## 2022-11-08 DIAGNOSIS — R91.1 LUNG NODULE: ICD-10-CM

## 2022-11-08 PROCEDURE — 99214 OFFICE O/P EST MOD 30 MIN: CPT | Performed by: PHYSICIAN ASSISTANT

## 2022-11-08 PROCEDURE — 94618 PULMONARY STRESS TESTING: CPT | Performed by: INTERNAL MEDICINE

## 2022-11-08 ASSESSMENT — 6 MINUTE WALK TEST (6MWT)
AMBULATES WITH O2: WITHOUT O2
SAO2 AT 6 MIN: 94
PERCEIVED FATIGUE AT 4 MIN: 0
NUMBER OF RESTS: 0
BLOOD PRESSURE: LEFT ARM
SAO2 AT 4 MIN: 93
PERCEIVED FATIGUE AT 1 MIN: 0
HEART RATE AT 6 MIN: 78
PERCEIVED FATIGUE AT 3 MIN: 0
COMMENTS: 2 LPM 02
PERCEIVED BREATHLESSNESS AT 2 MIN: 3
SAO2 AT 5 MIN: 95
COMMENTS: 2 LPM 02
HEART RATE AT 3 MIN: 78
HEART RATE AT 2 MIN: 70
PERCEIVED FATIGUE AT 6 MIN: 0
PERCEIVED FATIGUE AT 5 MIN: 0
PERCEIVED BREATHLESSNESS AT 1 MIN: 3
PERCEIVED BREATHLESSNESS AT 4 MIN: 3
SAO2 AT 1 MIN: 90
PERCEIVED FATIGUE AT 1 MIN: 0
PERCEIVED FATIGUE AT 2 MIN: 0
SITTING BLOOD PRESSURE: 152/74
SAO2 AT 2 MIN: 92
HEART RATE AT 1 MIN: 67
COMMENTS: 2 LPM 02
BLOOD PRESSURE AT 1 MIN: 160/80
PERCEIVED FATIGUE AT 2 MIN: 0
HEART RATE AT 5 MIN: 69
HEART RATE AT 4 MIN: 76
PERCEIVED BREATHLESSNESS AT 1 MIN: 3
O2 SAT PERCENT ROOM AIR: 90
PERCEIVED BREATHLESSNESS AT 5 MIN: 3
TOTAL REST TIME: 0
PERCEIVED BREATHLESSNESS AT 3 MIN: 3
HEART RATE AT 1 MIN: 82
SAO2 AT 1 MIN: 92
SAO2 AT 2 MIN: 90
HEART RATE AT 2 MIN: 72
SAO2 AT 3 MIN: 86
PERCEIVED BREATHLESSNESS AT 2 MIN: 3
PERCEIVED BREATHLESSNESS AT 6 MIN: 3
PERCENT OF NORMAL WALKED: 34
COMMENTS: 2 LPM 02
HEART RATE: 57

## 2022-11-08 ASSESSMENT — ENCOUNTER SYMPTOMS
SHORTNESS OF BREATH: 1
DIZZINESS: 0
ORTHOPNEA: 0
PALPITATIONS: 0
WHEEZING: 0
SORE THROAT: 0
WEIGHT LOSS: 0
TREMORS: 0
CHILLS: 0
COUGH: 1
HEADACHES: 0
FEVER: 0
HEARTBURN: 0
SINUS PAIN: 0
INSOMNIA: 1
SPUTUM PRODUCTION: 1

## 2022-11-08 NOTE — PATIENT INSTRUCTIONS
1-indications rescue inhaler use:   Increased shortness of breath   Increased work of breathing   Increased coughing  2-reviewed CT results, will review at teams   3-encouraged increased activity-stationary bike  4-continued benefit with trelegy  5-not requiring rescue inhaler  6-follow up in 6 months

## 2022-11-08 NOTE — PROGRESS NOTES
CC: CT and 6-minute walk test results, COPD    HPI:  Olman Sims is a 72 y.o. year old male here today for follow-up on PFT and CT results and COPD follow-up. Last seen in clinic 7/29/2022 by Ijeoma Jones PA-C.  Patient is a former smoker with reported 25-pack-year history and quit date in 2012.     Pertinent past medical history includes peripheral vascular disease, mild cognitive impairment with memory loss, hypertrophy of inferior nasal turbinates, chronic vasomotor rhinitis, coronary artery disease, history of NSTEMI, atrial fibrillation, stage IIIa chronic kidney disease.    Reviewed in clinic vitals including /76, HR 72, O2 sat 92% on 2 L and BMI of 24.81 kg/m².    Reviewed home medication regimen including Trelegy 100 mcg, Ventolin, as needed Ambien managed by primary.  Patient reports he has not been requiring rescue inhaler.    Reviewed most recent imaging including chest CT obtained 11/2/2022, previous 7 mm nodule has decreased in size to 4 mm consistent with inflammatory or infectious process.  No new or enlarging pulmonary nodules.  Emphysematous changes.  Patient did have mild consolidation in the inferior lateral lingula and left lower lobe and inferior medial right upper lobe.  Diffuse bronchial wall thickening.  Atherosclerotic changes.  No lymphadenopathy.  He is not symptomatic.    Echocardiogram obtained 4/29/2019 demonstrated technically difficult study, normal left ventricular chamber size, mild to moderate concentric left ventricular hypertrophy, LVEF estimated 55%.  Normal right ventricle size and systolic function, right ventricle and all valves not well visualized, unable to estimate pulmonary artery pressure due to inadequate tricuspid regurgitant jet.    Pulmonary function testing obtained 7/28/2022 demonstrating FEV1 of 0.8% predicted, FVC of 2.86 L or 69% predicted, FEV1/FVC ratio of 29, residual volume 286% predicted, % predicted and DLCO 55% predicted. Per  "pulmonologist interpretation very severe obstructive ventilatory defect, severe dilated response lung volumes consistent with air trapping and hyperinflation, moderate diffusion impairment, based on study patient meets PFT criteria for BLVR, consider further work-up if indicated.     6-minute walk test was obtained 11/8/2022 demonstrating ability to walk 600 feet or 183 meters which is 34% predicted.  Patient did require 2 L O2 for decreased O2 sats to 86% at minute 3.  O2 2 L with exertion recommended (he is on 2 L ATC).    Review of Systems   Constitutional:  Negative for chills, fever, malaise/fatigue and weight loss.   HENT:  Positive for congestion. Negative for hearing loss, nosebleeds, sinus pain, sore throat and tinnitus.    Eyes:         Presc glasses   Respiratory:  Positive for cough (occasional), sputum production (clear) and shortness of breath (with activity). Negative for wheezing.    Cardiovascular:  Negative for chest pain, palpitations, orthopnea and leg swelling.   Gastrointestinal:  Negative for heartburn.        Lower dentures, no swallowing issues   Neurological:  Negative for dizziness, tremors and headaches.   Psychiatric/Behavioral:  The patient has insomnia (mostly falling asleep).      Past Medical History:   Diagnosis Date    Adverse effect of anesthesia     \"stopped breathing/elevated bp\"2012    Anesthesia     \"stopped breathing/elevated bp\"2012    Atrial fibrillation (HCC)     Breath shortness     when he quit smoking/with exertion    CAD (coronary artery disease)     COPD     Dental disorder     lower partial    Emphysema of lung (HCC)     Hyperlipidemia     Hypertension     Kidney stone     Oxygen dependent     q hs prn 2 ltr    Paroxysmal atrial fibrillation (HCC)     PVD (peripheral vascular disease) (HCC)     R iliac artery stent    Renal disorder     right kidney stone/stent in place       Past Surgical History:   Procedure Laterality Date    CO CYSTOSCOPY,INSERT URETERAL STENT " Left 1/22/2020    Procedure: CYSTOSCOPY, WITH URETERAL STENT INSERTION;  Surgeon: Karen Quiles M.D.;  Location: [facility] [place] [facility];  Service: Urology    ND CYSTO/URETERO/PYELOSCOPY, DX Left 1/22/2020    Procedure: URETEROSCOPY;  Surgeon: Karen Quiles M.D.;  Location: [facility] [place] [facility];  Service: Urology    LASERTRIPSY Left 1/22/2020    Procedure: LITHOTRIPSY, USING LASER;  Surgeon: Karen Quiles M.D.;  Location: [facility] [place] [facility];  Service: Urology    SEPTOPLASTY N/A 5/4/2018    Procedure: SEPTOPLASTY;  Surgeon: Jesse Saavedra M.D.;  Location: SURGERY [facility] DAY [facility];  Service: Ent    TURBINATE REDUCTION Bilateral 5/4/2018    Procedure: TURBINATE REDUCTION- RESECTION WITH SUBMUCOSAL APPROACH;  Surgeon: Jesse Saavedra M.D.;  Location: SURGERY SAME DAY [facility];  Service: Ent    RECOVERY  11/1/2012    Performed by [facility] at [facility]ERY SAME DAY [facility]    PERCUTANEOUS NEPHROSTOLITHOTOMY  5/2/2012    Performed by KAREN QUILES at SURGERY [place] [facility]    PERCUTANEOUS NEPHROSTOLITHOTOMY  4/5/2012    Performed by KAREN QUILES at SURGERY [place] [facility]    RECOVERY  4/4/2012    Performed by SURGERY, IR-RECOVERY at SUR[facility]ERY SAME DAY [facility]    CYSTOSCOPY STENT PLACEMENT  2/24/2012    Performed by KAREN QUILES at SURGERY [place] [facility]    APPENDECTOMY  1987    APPENDECTOMY      STENT PLACEMENT      heart    ZZZ CARDIAC CATH         Family History   Problem Relation Age of Onset    Cancer Maternal Grandfather 65        colon       Social History     Socioeconomic History    Marital status: Single     Spouse name: Not on file    Number of children: Not on file    Years of education: Not on file    Highest education level: Associate degree: academic program   Occupational History    Not on file   Tobacco Use    Smoking status: Former     Packs/day: 0.50     Years: 15.00     Pack years: 7.50     Types: Cigarettes     Quit date: 2/17/2012     Years since quitting:  "10.7    Smokeless tobacco: Never   Vaping Use    Vaping Use: Never used   Substance and Sexual Activity    Alcohol use: Yes     Alcohol/week: 0.0 oz     Comment: rarely    Drug use: No    Sexual activity: Never   Other Topics Concern    Not on file   Social History Narrative    Not on file     Social Determinants of Health     Financial Resource Strain: Low Risk     Difficulty of Paying Living Expenses: Not hard at all   Food Insecurity: No Food Insecurity    Worried About Running Out of Food in the Last Year: Never true    Ran Out of Food in the Last Year: Never true   Transportation Needs: No Transportation Needs    Lack of Transportation (Medical): No    Lack of Transportation (Non-Medical): No   Physical Activity: Insufficiently Active    Days of Exercise per Week: 7 days    Minutes of Exercise per Session: 20 min   Stress: No Stress Concern Present    Feeling of Stress : Not at all   Social Connections: Unknown    Frequency of Communication with Friends and Family: More than three times a week    Frequency of Social Gatherings with Friends and Family: Patient refused    Attends Muslim Services: Patient refused    Active Member of Clubs or Organizations: No    Attends Club or Organization Meetings: Not on file    Marital Status:    Intimate Partner Violence: Not on file   Housing Stability: Low Risk     Unable to Pay for Housing in the Last Year: No    Number of Places Lived in the Last Year: 1    Unstable Housing in the Last Year: No       Allergies as of 11/08/2022    (No Known Allergies)        @Vital signs for this encounter:  BP (!) 160/80   Pulse 72   Resp 16   Ht 1.753 m (5' 9\")   Wt 76.2 kg (168 lb)   SpO2 92%     Current medications as of today   Current Outpatient Medications   Medication Sig Dispense Refill    losartan (COZAAR) 50 MG Tab Take 1 Tablet by mouth every day. 90 Tablet 3    vitamin D2, Ergocalciferol, (DRISDOL) 1.25 MG (96892 UT) Cap capsule Take 1 Capsule by mouth every 7 " days. 6 Capsule 0    carvedilol (COREG) 12.5 MG Tab TAKE 1 TABLET BY MOUTH TWICE DAILY WITH MEALS 180 Tablet 3    atorvastatin (LIPITOR) 40 MG Tab Take 1 Tablet by mouth every evening. 90 Tablet 3    zolpidem (AMBIEN) 5 MG Tab Take 1 Tablet by mouth at bedtime as needed for Sleep for up to 90 days. 30 Tablet 2    VENTOLIN  (90 Base) MCG/ACT Aero Soln inhalation aerosol INHALE 2 PUFFS BY MOUTH EVERY 6 HOURS AS NEEDED 1 Each 11    Fluticasone-Umeclidin-Vilant (TRELEGY ELLIPTA) 100-62.5-25 MCG/INH AEROSOL POWDER, BREATH ACTIVATED inhalation Inhale 1 Inhalation every day. Rinse mouth after use 3 Each 3    aspirin (ASA) 81 MG Chew Tab chewable tablet Chew 1 Tablet every day. CHEW AND SWALLOW 90 Tablet 3     No current facility-administered medications for this visit.         Physical Exam:   Gen:           Alert and oriented, No apparent distress. Mood and affect   appropriate, normal interaction with provider.  Eyes:          sclere white, conjunctive moist.  Hearing:     Grossly intact.  Dentition:    Lower dentures  Oropharynx:   Tongue normal, posterior pharynx without erythema or exudate.  Neck:        Supple, trachea midline, no masses.  Respiratory Effort: No intercostal retractions or use of accessory muscles.   Lung Auscultation:     Decreased bilaterally; no rales, rhonchi or wheezing.  CV:            Regular rate and rhythm. No edema. No murmurs, rubs or gallops.  Digits, Nails, Ext: No clubbing, cyanosis, petechiae, or nodes.   Skin:        No rashes, lesions or ulcers noted on exposed skin surfaces.                     Assessment:  1. Chronic obstructive pulmonary disease, unspecified COPD type (HCC)        2. Chronic respiratory failure with hypoxia (HCC)        3. Lung nodule        4. Former smoker            Immunizations:    Flu: 10/18/2022  Pneumovax 23: 1/3/2018, 11/12/2012  Prevnar 13: 11/10/2014  Pfizer SARS-CoV-2 vaccine: 10/18/2022, 5/6/2022, 10/11/2021, 4/2/2021,  3/11/2021    Plan:  72-year-old male here to follow-up on 6-minute walk test and CT results.      Former smoker:  Complete and/or continued abstinence advised.    Lung nodule: Reviewed CT results.  Follow up chest imaging is improved with decrease in nodule size from 7 mm to 4 mm.  Patient denies new or increased symptoms.  Recommended follow up is 1 year.    COPD: Patient states benefit with Trelegy use, has not been requiring short acting bronchodilator.  Reviewed ideal oral care to minimize risk of thrush.  Reviewed indications for albuterol use.  Reviewed 6-minute walk test.  Patient declines referral to pulmonary rehab.  Will increase activity, use stationary bike at home.    Chronic respiratory failure with hypoxia: Patient continues to use and benefit from use of oxygen at 2 L ATC.    Follow-up in 6 months, sooner if needed.    This dictation was created using voice recognition software. The accuracy of the dictation is limited to the abilities of the software. I expect there may be some errors of grammar and possibly content.

## 2022-11-08 NOTE — PROCEDURES
6 MINUTE WALK TEST    Patient walked a total distance of 600 feet. Patient achieved about 34% of predicted. Patient stopped zero times.  Patient walked 5 laps of 120' up and back.  Patient dropped saturations at 86% at minute 3. Patient was started on 2lt with improvement in saturations.     Recommendations: patient will need supplemental oxygen at exertion with 2 lit.    Dong Oneal MD FACP  Pulmonary/Critical Care

## 2023-01-25 ENCOUNTER — OFFICE VISIT (OUTPATIENT)
Dept: URGENT CARE | Facility: CLINIC | Age: 73
End: 2023-01-25
Payer: COMMERCIAL

## 2023-01-25 ENCOUNTER — HOSPITAL ENCOUNTER (OUTPATIENT)
Dept: RADIOLOGY | Facility: MEDICAL CENTER | Age: 73
End: 2023-01-25
Attending: NURSE PRACTITIONER
Payer: COMMERCIAL

## 2023-01-25 VITALS
OXYGEN SATURATION: 94 % | SYSTOLIC BLOOD PRESSURE: 150 MMHG | HEART RATE: 84 BPM | BODY MASS INDEX: 23.25 KG/M2 | TEMPERATURE: 98.9 F | WEIGHT: 157 LBS | HEIGHT: 69 IN | RESPIRATION RATE: 22 BRPM | DIASTOLIC BLOOD PRESSURE: 60 MMHG

## 2023-01-25 DIAGNOSIS — K40.90 RIGHT GROIN HERNIA: ICD-10-CM

## 2023-01-25 LAB
APPEARANCE UR: CLEAR
BILIRUB UR STRIP-MCNC: NEGATIVE MG/DL
COLOR UR AUTO: NORMAL
GLUCOSE UR STRIP.AUTO-MCNC: NEGATIVE MG/DL
KETONES UR STRIP.AUTO-MCNC: NEGATIVE MG/DL
LEUKOCYTE ESTERASE UR QL STRIP.AUTO: NORMAL
NITRITE UR QL STRIP.AUTO: POSITIVE
PH UR STRIP.AUTO: 6 [PH] (ref 5–8)
PROT UR QL STRIP: 30 MG/DL
RBC UR QL AUTO: NORMAL
SP GR UR STRIP.AUTO: 1.02
UROBILINOGEN UR STRIP-MCNC: 0.2 MG/DL

## 2023-01-25 PROCEDURE — 99213 OFFICE O/P EST LOW 20 MIN: CPT | Performed by: NURSE PRACTITIONER

## 2023-01-25 PROCEDURE — 81002 URINALYSIS NONAUTO W/O SCOPE: CPT | Performed by: NURSE PRACTITIONER

## 2023-01-25 PROCEDURE — 76857 US EXAM PELVIC LIMITED: CPT

## 2023-01-25 ASSESSMENT — ENCOUNTER SYMPTOMS
PSYCHIATRIC NEGATIVE: 1
CHANGE IN BOWEL HABIT: 0
EYES NEGATIVE: 1
FEVER: 0
VISUAL CHANGE: 0
GASTROINTESTINAL NEGATIVE: 1
MUSCULOSKELETAL NEGATIVE: 1
NEUROLOGICAL NEGATIVE: 1
CONSTITUTIONAL NEGATIVE: 1
RESPIRATORY NEGATIVE: 1
CARDIOVASCULAR NEGATIVE: 1

## 2023-01-25 NOTE — PROGRESS NOTES
"Subjective:   Olman Sims is a 72 y.o. male who presents for Groin Pain (X1 wk)      Patient presents with right groin pain that began one week ago, Wednesday.  Patient states that he could feel a lump in his right groin, which he began icing. He states that the lump has reduced in size significantly now, and the pain has improved.  He reports that the pain is worse at night, but has improved since onset.  He denies any difficulty with urination or pain with urination. He denies any difficulty with bowel movements, and has approximately two per day.  He denies any abdominal pain, fever or chills.  No nausea or vomiting.     Groin Pain  This is a new problem. Episode onset: one week ago. The problem occurs intermittently. The problem has been gradually improving. Pertinent negatives include no change in bowel habit, fever, urinary symptoms or visual change. Nothing aggravates the symptoms. He has tried ice and rest for the symptoms. The treatment provided moderate relief.     Review of Systems   Constitutional: Negative.  Negative for fever.   HENT: Negative.     Eyes: Negative.    Respiratory: Negative.     Cardiovascular: Negative.    Gastrointestinal: Negative.  Negative for change in bowel habit.   Genitourinary: Negative.         Right groin pain   Musculoskeletal: Negative.    Skin: Negative.    Neurological: Negative.    Endo/Heme/Allergies: Negative.    Psychiatric/Behavioral: Negative.       Medications, Allergies, and current problem list reviewed today in Epic.     Objective:     BP (!) 150/60 (BP Location: Left arm, Patient Position: Sitting, BP Cuff Size: Adult)   Pulse 84   Temp 37.2 °C (98.9 °F) (Temporal)   Resp (!) 22   Ht 1.753 m (5' 9\")   Wt 71.2 kg (157 lb)   SpO2 94%     Physical Exam  Vitals reviewed.   Constitutional:       Appearance: Normal appearance.   HENT:      Head: Normocephalic and atraumatic.      Nose: Nose normal.      Mouth/Throat:      Mouth: Mucous membranes are " moist.      Pharynx: Oropharynx is clear.   Eyes:      Extraocular Movements: Extraocular movements intact.      Conjunctiva/sclera: Conjunctivae normal.      Pupils: Pupils are equal, round, and reactive to light.   Cardiovascular:      Rate and Rhythm: Normal rate and regular rhythm.      Pulses: Normal pulses.      Heart sounds: Normal heart sounds.   Pulmonary:      Effort: Pulmonary effort is normal.      Breath sounds: Normal breath sounds.   Abdominal:      General: Abdomen is flat. Bowel sounds are normal.      Palpations: Abdomen is soft.      Hernia: A hernia is present. Hernia is present in the right inguinal area.   Genitourinary:     Penis: Normal.       Testes: Cremasteric reflex is present.      Epididymis:      Left: Normal.          Comments: There is a right inguinal hernia, approximately 4 inches, and firm.  This is nonpainful to palpation.  Difficult for this provider to reduce, but no pain with any manipulation or palpation.    Musculoskeletal:         General: Normal range of motion.      Cervical back: Normal range of motion and neck supple.   Skin:     General: Skin is warm and dry.      Capillary Refill: Capillary refill takes less than 2 seconds.   Neurological:      General: No focal deficit present.      Mental Status: He is alert and oriented to person, place, and time.   Psychiatric:         Mood and Affect: Mood normal.         Behavior: Behavior normal.       Assessment/Plan:     Diagnosis and associated orders:     1. Right groin hernia  Referral to General Surgery    US-INGUINAL HERNIA         Comments/MDM:     Patient was scheduled at 3:15 today for an ultrasound, and urgent referral was placed to general surgery for patient.  Discussed with patient today, the pathophysiology of inguinal hernia and the signs and symptoms in which they are considered an emergency.  Patient has mild tenderness with his heria at this time, and will present to the ER if he experiences increase in pain,  change in bowel movements, nausea, fever or chills.  In the interim, I will call him with the results of his ultrasound and any further treatment recommendations at that time.  As long as he remains appropriate for outpatient treatment, he will follow the plan of getting the imaging and surgical consult.           Differential diagnosis, natural history, supportive care, and indications for immediate follow-up discussed.    Advised the patient to follow-up with the primary care physician for recheck, reevaluation, and consideration of further management.    Please note that this dictation was created using voice recognition software. I have made a reasonable attempt to correct obvious errors, but I expect that there are errors of grammar and possibly content that I did not discover before finalizing the note.    This note was electronically signed by JORGE Estrada

## 2023-02-06 ENCOUNTER — TELEMEDICINE (OUTPATIENT)
Dept: MEDICAL GROUP | Facility: PHYSICIAN GROUP | Age: 73
End: 2023-02-06
Payer: COMMERCIAL

## 2023-02-06 VITALS
SYSTOLIC BLOOD PRESSURE: 124 MMHG | WEIGHT: 154 LBS | HEIGHT: 69 IN | DIASTOLIC BLOOD PRESSURE: 60 MMHG | BODY MASS INDEX: 22.81 KG/M2

## 2023-02-06 DIAGNOSIS — F51.01 PRIMARY INSOMNIA: ICD-10-CM

## 2023-02-06 PROCEDURE — 99213 OFFICE O/P EST LOW 20 MIN: CPT | Mod: 95 | Performed by: STUDENT IN AN ORGANIZED HEALTH CARE EDUCATION/TRAINING PROGRAM

## 2023-02-06 RX ORDER — ZOLPIDEM TARTRATE 5 MG/1
5 TABLET ORAL
COMMUNITY
Start: 2023-01-07 | End: 2023-02-06 | Stop reason: SDUPTHER

## 2023-02-06 RX ORDER — ZOLPIDEM TARTRATE 5 MG/1
5 TABLET ORAL
Qty: 90 TABLET | Refills: 0 | Status: SHIPPED | OUTPATIENT
Start: 2023-02-06 | End: 2023-05-05 | Stop reason: SDUPTHER

## 2023-02-06 ASSESSMENT — PATIENT HEALTH QUESTIONNAIRE - PHQ9: CLINICAL INTERPRETATION OF PHQ2 SCORE: 0

## 2023-02-06 NOTE — PROGRESS NOTES
CC:  The encounter diagnosis was Primary insomnia.    This evaluation was conducted via Zoom using secure and encrypted videoconferencing technology. The patient was in their home in the [facility].    The patient's identity was confirmed and verbal consent was obtained for this virtual visit.      HISTORY OF THE PRESENT ILLNESS: Patient is a 72 y.o. male. This pleasant patient is here today to discuss:    1. Primary insomnia  Zolpidem 5 mg nightly.  Patient reports that he falls asleep well while using this medication.  He still gets up 2-3 times per night.  He feels more refreshed when using this medication than without in the morning.  He is careful to abstain from alcohol when using this medication.  He is aware that there is an increased risk of falls with this medication and feels that the benefits outweigh the risks.    Active Diagnosis:    Patient Active Problem List   Diagnosis    Other emphysema (HCC)    Peripheral vascular disease (HCC)    Mild cognitive impairment with memory loss    Essential hypertension    Dyslipidemia    Nasal septal deformity    Hypertrophy of both inferior nasal turbinates    Chronic vasomotor rhinitis    Primary insomnia    Prediabetes    History of atrial fibrillation    History of non-ST elevation myocardial infarction (NSTEMI)    Coronary artery disease involving native coronary artery of native heart without angina pectoris    History of kidney stones    Stage 3a chronic kidney disease (HCC)    Elevated alkaline phosphatase level    Hypovitaminosis D      Current Outpatient Medications Ordered in Epic   Medication Sig Dispense Refill    zolpidem (AMBIEN) 5 MG Tab Take 1 Tablet by mouth at bedtime as needed for Sleep for up to 90 days. 90 Tablet 0    losartan (COZAAR) 50 MG Tab Take 1 Tablet by mouth every day. 90 Tablet 3    vitamin D2, Ergocalciferol, (DRISDOL) 1.25 MG (78283 UT) Cap capsule Take 1 Capsule by mouth every 7 days. 6 Capsule 0    carvedilol (COREG) 12.5 MG  "Tab TAKE 1 TABLET BY MOUTH TWICE DAILY WITH MEALS 180 Tablet 3    atorvastatin (LIPITOR) 40 MG Tab Take 1 Tablet by mouth every evening. 90 Tablet 3    VENTOLIN  (90 Base) MCG/ACT Aero Soln inhalation aerosol INHALE 2 PUFFS BY MOUTH EVERY 6 HOURS AS NEEDED 1 Each 11    Fluticasone-Umeclidin-Vilant (TRELEGY ELLIPTA) 100-62.5-25 MCG/INH AEROSOL POWDER, BREATH ACTIVATED inhalation Inhale 1 Inhalation every day. Rinse mouth after use 3 Each 3    aspirin (ASA) 81 MG Chew Tab chewable tablet Chew 1 Tablet every day. CHEW AND SWALLOW 90 Tablet 3     No current Epic-ordered facility-administered medications on file.     ROS:   See HPI.    Objective:     Exam: /60   Ht 1.753 m (5' 9\")   Wt 69.9 kg (154 lb)  Body mass index is 22.74 kg/m².    General: Normal appearing. No distress.  neurologic: Grossly nonfocal by limited passive exam.  Skin: No obvious lesions.  Musculoskeletal: No extremity cyanosis, clubbing, or edema.  Psych: Normal mood and affect.     A chaperone was offered to the patient during today's exam. Patient declined chaperone.      Assessment & Plan:   72 y.o. male with the following -    Labs:   1/28/2022:  -CMP showing GFR 56, alk phos 154-    1. Primary insomnia  -Chronic, stable.  Well treated without side effect.  -PDMP reviewed.  - zolpidem (AMBIEN) 5 MG Tab; Take 1 Tablet by mouth at bedtime as needed for Sleep for up to 90 days.  Dispense: 90 Tablet; Refill: 0  - Controlled Substance Treatment Agreement    Return in about 3 months (around 5/6/2023).  For #1 above.    Please note that this dictation was created using voice recognition software. I have made every reasonable attempt to correct obvious errors, but I expect that there are errors of grammar and possibly content that I did not discover before finalizing the note.      Patricio Hernandez PA-C 2/6/2023  "

## 2023-02-14 ENCOUNTER — OFFICE VISIT (OUTPATIENT)
Dept: SURGICAL ONCOLOGY | Facility: MEDICAL CENTER | Age: 73
End: 2023-02-14
Payer: COMMERCIAL

## 2023-02-14 VITALS
TEMPERATURE: 99.1 F | HEIGHT: 69 IN | HEART RATE: 88 BPM | WEIGHT: 163 LBS | SYSTOLIC BLOOD PRESSURE: 144 MMHG | BODY MASS INDEX: 24.14 KG/M2 | OXYGEN SATURATION: 91 % | DIASTOLIC BLOOD PRESSURE: 60 MMHG

## 2023-02-14 DIAGNOSIS — K40.20 NON-RECURRENT BILATERAL INGUINAL HERNIA WITHOUT OBSTRUCTION OR GANGRENE: ICD-10-CM

## 2023-02-14 PROCEDURE — 99203 OFFICE O/P NEW LOW 30 MIN: CPT | Performed by: SURGERY

## 2023-02-14 ASSESSMENT — ENCOUNTER SYMPTOMS
GASTROINTESTINAL NEGATIVE: 1
COUGH: 0
WEIGHT LOSS: 0
SHORTNESS OF BREATH: 1
HEMOPTYSIS: 0
CARDIOVASCULAR NEGATIVE: 1
DIAPHORESIS: 0
WHEEZING: 0
FEVER: 0
SPUTUM PRODUCTION: 0
CHILLS: 0
EYES NEGATIVE: 1

## 2023-02-14 NOTE — H&P
"Subjective:   2/14/2023  1:20 PM  Primary care physician: Patricio Hernandez P.A.-C.    Chief Complaint:   Chief Complaint   Patient presents with    New Patient     Diagnosis: No diagnosis found.    History of presenting illness:  Olman Sims is a pleasant 72 y.o. male who presents with right groin pain.  Patient has intermittent very mild right groin pain and had this worked up with ultrasound which showed him to have bilateral small reducible inguinal hernias.  The patient states that he gets a occasional twinge of pain in his right groin.  He has never had pain on the left side.  Of note he has multiple medical comorbidities including COPD requiring 2 L of oxygen at all times.  He is here today to discuss management of his hernias.    Past Medical History:   Diagnosis Date    Adverse effect of anesthesia     \"stopped breathing/elevated bp\"2012    Anesthesia     \"stopped breathing/elevated bp\"2012    Atrial fibrillation (HCC)     Breath shortness     when he quit smoking/with exertion    CAD (coronary artery disease)     COPD     Dental disorder     lower partial    Emphysema of lung (HCC)     Hyperlipidemia     Hypertension     Kidney stone     Oxygen dependent     q hs prn 2 ltr    Paroxysmal atrial fibrillation (HCC)     PVD (peripheral vascular disease) (HCC)     R iliac artery stent    Renal disorder     right kidney stone/stent in place     Past Surgical History:   Procedure Laterality Date    UT CYSTOSCOPY,INSERT URETERAL STENT Left 1/22/2020    Procedure: CYSTOSCOPY, WITH URETERAL STENT INSERTION;  Surgeon: Paul Quiles M.D.;  Location: [facility];  Service: Urology    UT CYSTO/URETERO/PYELOSCOPY, DX Left 1/22/2020    Procedure: URETEROSCOPY;  Surgeon: Paul Quiles M.D.;  Location: [facility];  Service: Urology    LASERTRIPSY Left 1/22/2020    Procedure: LITHOTRIPSY, USING LASER;  Surgeon: Paul Quiles M.D.;  Location: [facility];  Service: Urology    " SEPTOPLASTY N/A 5/4/2018    Procedure: SEPTOPLASTY;  Surgeon: Jesse Saavedra M.D.;  Location: SURGERY SAME DAY [facility];  Service: Ent    TURBINATE REDUCTION Bilateral 5/4/2018    Procedure: TURBINATE REDUCTION- RESECTION WITH SUBMUCOSAL APPROACH;  Surgeon: Jesse Saavedra M.D.;  Location: SURGERY SAME DAY [facility];  Service: Ent    RECOVERY  11/1/2012    Performed by [facility] at SURGERY SAME DAY [facility]    PERCUTANEOUS NEPHROSTOLITHOTOMY  5/2/2012    Performed by KAREN KOLB at SURGERY [facility]    PERCUTANEOUS NEPHROSTOLITHOTOMY  4/5/2012    Performed by KAERN KOLB at SURGERY [facility]    RECOVERY  4/4/2012    Performed by SURGERY, IR-RECOVERY at SURGERY SAME DAY [facility]    CYSTOSCOPY STENT PLACEMENT  2/24/2012    Performed by KAREN KOLB at SURGERY [facility]    APPENDECTOMY  1987    APPENDECTOMY      STENT PLACEMENT      heart    ZZZ CARDIAC CATH       No Known Allergies  Outpatient Encounter Medications as of 2/14/2023   Medication Sig Dispense Refill    zolpidem (AMBIEN) 5 MG Tab Take 1 Tablet by mouth at bedtime as needed for Sleep for up to 90 days. 90 Tablet 0    losartan (COZAAR) 50 MG Tab Take 1 Tablet by mouth every day. 90 Tablet 3    vitamin D2, Ergocalciferol, (DRISDOL) 1.25 MG (49500 UT) Cap capsule Take 1 Capsule by mouth every 7 days. 6 Capsule 0    carvedilol (COREG) 12.5 MG Tab TAKE 1 TABLET BY MOUTH TWICE DAILY WITH MEALS 180 Tablet 3    atorvastatin (LIPITOR) 40 MG Tab Take 1 Tablet by mouth every evening. 90 Tablet 3    VENTOLIN  (90 Base) MCG/ACT Aero Soln inhalation aerosol INHALE 2 PUFFS BY MOUTH EVERY 6 HOURS AS NEEDED 1 Each 11    Fluticasone-Umeclidin-Vilant (TRELEGY ELLIPTA) 100-62.5-25 MCG/INH AEROSOL POWDER, BREATH ACTIVATED inhalation Inhale 1 Inhalation every day. Rinse mouth after use 3 Each 3    aspirin (ASA) 81 MG Chew Tab chewable tablet Chew 1 Tablet every day. CHEW AND SWALLOW 90 Tablet 3     No  facility-administered encounter medications on file as of 2023.     Social History     Socioeconomic History    Marital status: Single     Spouse name: Not on file    Number of children: Not on file    Years of education: Not on file    Highest education level: Associate degree: academic program   Occupational History    Not on file   Tobacco Use    Smoking status: Former     Packs/day: 1.00     Years: 25.00     Pack years: 25.00     Types: Cigarettes     Quit date: 2012     Years since quittin.0    Smokeless tobacco: Never   Vaping Use    Vaping Use: Never used   Substance and Sexual Activity    Alcohol use: Yes     Alcohol/week: 0.0 oz     Comment: rarely    Drug use: No    Sexual activity: Never   Other Topics Concern    Not on file   Social History Narrative    Not on file     Social Determinants of Health     Financial Resource Strain: Low Risk     Difficulty of Paying Living Expenses: Not hard at all   Food Insecurity: No Food Insecurity    Worried About Running Out of Food in the Last Year: Never true    Ran Out of Food in the Last Year: Never true   Transportation Needs: No Transportation Needs    Lack of Transportation (Medical): No    Lack of Transportation (Non-Medical): No   Physical Activity: Insufficiently Active    Days of Exercise per Week: 7 days    Minutes of Exercise per Session: 20 min   Stress: No Stress Concern Present    Feeling of Stress : Not at all   Social Connections: Unknown    Frequency of Communication with Friends and Family: More than three times a week    Frequency of Social Gatherings with Friends and Family: Patient refused    Attends [employer] Services: Patient refused    Active Member of Clubs or Organizations: No    Attends Club or Organization Meetings: Not on file    Marital Status:    Intimate Partner Violence: Not on file   Housing Stability: Low Risk     Unable to Pay for Housing in the Last Year: No    Number of Places Lived in the Last Year: 1     "Unstable Housing in the Last Year: No      Social History     Tobacco Use   Smoking Status Former    Packs/day: 1.00    Years: 25.00    Pack years: 25.00    Types: Cigarettes    Quit date: 2012    Years since quittin.0   Smokeless Tobacco Never     Social History     Substance and Sexual Activity   Alcohol Use Yes    Alcohol/week: 0.0 oz    Comment: rarely     Social History     Substance and Sexual Activity   Drug Use No      Family History   Problem Relation Age of Onset    Cancer Maternal Grandfather 65        colon         Review of Systems   Constitutional:  Negative for chills, diaphoresis, fever, malaise/fatigue and weight loss.   HENT: Negative.     Eyes: Negative.    Respiratory:  Positive for shortness of breath. Negative for cough, hemoptysis, sputum production and wheezing.    Cardiovascular: Negative.    Gastrointestinal: Negative.    Skin: Negative.       Objective:   BP (!) 144/60 (BP Location: Right arm, Patient Position: Sitting, BP Cuff Size: Adult)   Pulse 88   Temp 37.3 °C (99.1 °F) (Temporal)   Ht 1.753 m (5' 9\")   Wt 73.9 kg (163 lb)   SpO2 91%   BMI 24.07 kg/m²     Physical Exam  Constitutional:       Appearance: Normal appearance. He is not ill-appearing.   HENT:      Head: Normocephalic and atraumatic.   Eyes:      General: No scleral icterus.     Extraocular Movements: Extraocular movements intact.      Pupils: Pupils are equal, round, and reactive to light.   Cardiovascular:      Rate and Rhythm: Normal rate and regular rhythm.   Pulmonary:      Effort: Pulmonary effort is normal.      Breath sounds: Wheezing present.   Abdominal:      Hernia: A hernia is present. Hernia is present in the left inguinal area and right inguinal area.      Comments: Both reducible   Neurological:      Mental Status: He is alert.           Imaging  A 3 x 0.9 cm hernia is seen in the right inguinal canal. The hernia contains no bowel loops. It is worse for follow-up maneuver. It is partially " reducible.     A similar fat-containing hernia is seen in the left inguinal region. It measures 1.9 x 1.1 cm. It also contains fat. It is partially reducible.     No bowel-containing hernias. No mass lesions or fluid collections.     IMPRESSION:     Bilateral fat-containing inguinal hernias, right greater than left. Both are partially reducible.           Diagnosis:     No diagnosis found.    Medical Decision Making:  Today's Assessment / Status / Plan:     Patient has bilateral inguinal hernias.  He only has occasional symptoms on his right side.  They are certainly not interfering with his current activities.  Given his multiple medical comorbidities I think he would be better off just living with this.  He is happy with this plan.  He was given my card and told to call should he have any troubles.  Greater than 30 minutes were spent with the patient.  This included review of outside records and imaging, counseling of the patient and coordination of care.

## 2023-02-23 ENCOUNTER — TELEPHONE (OUTPATIENT)
Dept: MEDICAL GROUP | Facility: PHYSICIAN GROUP | Age: 73
End: 2023-02-23

## 2023-02-23 NOTE — TELEPHONE ENCOUNTER
Pt brought in a consent form for sleeping pills. I left the forms on [employer] desk Please call pt with any questions

## 2023-03-16 ENCOUNTER — TELEPHONE (OUTPATIENT)
Dept: SURGICAL ONCOLOGY | Facility: MEDICAL CENTER | Age: 73
End: 2023-03-16
Payer: COMMERCIAL

## 2023-03-16 NOTE — TELEPHONE ENCOUNTER
1401 03/16/23  Pt was called as he lvm earlier in the day asking for pain medication. Pt identity was verified. Pt was informed that because Dr. Lilly did not do a surgery on him, he will not be prescribed pain medication. Pt was informed that if he is in a lot of pain to present to the ER. Pt stated that he is not in a lot of pain. Pt was advised to take Tylenol or ibuprofen. Pt stated that Dalila has not been working and that he is upset he cannot get any pain medication. Pt would like to be seen again by a physician. Pt was informed that because he has bilateral hernias he was referred to [facility] and given the phone number there. The pt mentioned twice more that he was saddened about not getting pain medication prescribed.   Lore ROSS   Pt showered oral care done pt sitting in recliner RN aware

## 2023-03-24 ENCOUNTER — OFFICE VISIT (OUTPATIENT)
Dept: URGENT CARE | Facility: CLINIC | Age: 73
End: 2023-03-24
Payer: COMMERCIAL

## 2023-03-24 VITALS
WEIGHT: 155 LBS | HEART RATE: 94 BPM | DIASTOLIC BLOOD PRESSURE: 68 MMHG | SYSTOLIC BLOOD PRESSURE: 178 MMHG | BODY MASS INDEX: 22.96 KG/M2 | RESPIRATION RATE: 18 BRPM | HEIGHT: 69 IN | TEMPERATURE: 98.7 F | OXYGEN SATURATION: 96 %

## 2023-03-24 DIAGNOSIS — R10.31 RIGHT GROIN PAIN: ICD-10-CM

## 2023-03-24 DIAGNOSIS — K40.20 BILATERAL INGUINAL HERNIA WITHOUT OBSTRUCTION OR GANGRENE, RECURRENCE NOT SPECIFIED: ICD-10-CM

## 2023-03-24 PROCEDURE — 99214 OFFICE O/P EST MOD 30 MIN: CPT | Performed by: FAMILY MEDICINE

## 2023-03-24 RX ORDER — HYDROCODONE BITARTRATE AND ACETAMINOPHEN 5; 325 MG/1; MG/1
1 TABLET ORAL EVERY 8 HOURS PRN
Qty: 10 TABLET | Refills: 0 | Status: SHIPPED | OUTPATIENT
Start: 2023-03-24 | End: 2023-03-29

## 2023-03-24 ASSESSMENT — ENCOUNTER SYMPTOMS
VOMITING: 0
MYALGIAS: 0
EYE DISCHARGE: 0
WEIGHT LOSS: 0
NAUSEA: 0
EYE REDNESS: 0

## 2023-03-24 NOTE — PROGRESS NOTES
"Subjective     Luis Sims is a 72 y.o. male who presents with Hernia (X2wks, Right side hernia, pain getting worse)            Patient with known bilateral inguinal hernia as has had right groin pain for 2 weeks. Pain severity 5/10. Walking seems to make symptoms worse.  Laying with his leg straight makes the pain worse.  Hip flexion improves the pain.  Aleve improves the pain. Normal BM and flatus. No other aggravating or alleviating factors.    He has appt with [facility] 4/25/2023      Review of Systems   Constitutional:  Negative for malaise/fatigue and weight loss.   Eyes:  Negative for discharge and redness.   Gastrointestinal:  Negative for nausea and vomiting.   Musculoskeletal:  Negative for joint pain and myalgias.   Skin:  Negative for itching and rash.            Objective     BP (!) 178/68 (BP Location: Left arm, Patient Position: Sitting, BP Cuff Size: Adult)   Pulse 94   Temp 37.1 °C (98.7 °F) (Temporal)   Resp 18   Ht 1.753 m (5' 9\")   Wt 70.3 kg (155 lb)   SpO2 96%   BMI 22.89 kg/m²      Physical Exam  Constitutional:       General: He is not in acute distress.     Appearance: He is well-developed.   HENT:      Head: Normocephalic and atraumatic.   Abdominal:      Comments: Area of perceived pain right groin.  Unable to palpate inguinal hernia.  Bowel sounds.   Skin:     General: Skin is warm and dry.      Findings: No rash.   Neurological:      Mental Status: He is alert.                           Assessment & Plan      Ultrasound inguinal hernia 1/25/2023 reviewed    Urgent care visit 1/25/2023 reviewed  Surgery visit 2/14/2023 reviewed    1. Right groin pain  HYDROcodone-acetaminophen (NORCO) 5-325 MG Tab per tablet        Differential diagnosis, natural history, supportive care, and indications for immediate follow-up were discussed.     Luis has been in pain with this hernia for some time and is requesting opiate pain medication.  We discussed that worsening hernia " pain requires emergent work-up to rule out ischemia..  Fortunately he can get in a position where the pain is relieved.  We also discussed risk of constipation with pain medication as well as contraindication of taking opiate pain medication with his Ambien sleep medicine.  He assures me that he will not do this as well as go to the emergency department with any worsening pain.    F/u surgery

## 2023-03-27 ENCOUNTER — HOSPITAL ENCOUNTER (EMERGENCY)
Facility: MEDICAL CENTER | Age: 73
End: 2023-03-27
Attending: EMERGENCY MEDICINE
Payer: COMMERCIAL

## 2023-03-27 VITALS
HEIGHT: 69 IN | RESPIRATION RATE: 18 BRPM | DIASTOLIC BLOOD PRESSURE: 81 MMHG | HEART RATE: 79 BPM | TEMPERATURE: 98 F | SYSTOLIC BLOOD PRESSURE: 187 MMHG | BODY MASS INDEX: 22.96 KG/M2 | OXYGEN SATURATION: 98 % | WEIGHT: 154.98 LBS

## 2023-03-27 DIAGNOSIS — K40.21 BILATERAL RECURRENT INGUINAL HERNIA WITHOUT OBSTRUCTION OR GANGRENE: ICD-10-CM

## 2023-03-27 PROCEDURE — 99284 EMERGENCY DEPT VISIT MOD MDM: CPT

## 2023-03-27 RX ORDER — HYDROCODONE BITARTRATE AND ACETAMINOPHEN 5; 325 MG/1; MG/1
1 TABLET ORAL EVERY 4 HOURS PRN
Qty: 15 TABLET | Refills: 0 | Status: SHIPPED | OUTPATIENT
Start: 2023-03-27 | End: 2023-03-30

## 2023-03-27 ASSESSMENT — LIFESTYLE VARIABLES: DO YOU DRINK ALCOHOL: NO

## 2023-03-27 NOTE — DISCHARGE INSTRUCTIONS
Please follow-up with Dr. Ford for further evaluation management.  Turn to the emergency department if you have a hernia that is not reducible.  I recommend that you try to place ice on the hernia or bowel, push the bowel back in.  If you are unable to do this please return.

## 2023-03-27 NOTE — ED TRIAGE NOTES
Olman [place]  72 y.o. male  Chief Complaint   Patient presents with    Hernia     Bilateral inguinal hernia  Diagnosed on 1/25 with US at   Pt reports increased pain       Pt to triage via wheelchair for above complaint. Pt up self to scale. Pt reports increased groin pain with movement.  Pt is alert and oriented, speaking in full sentences, follows commands and responds appropriately to questions. Not in any apparent distress. Respirations are even and unlabored.  Pt placed in lobby. Pt educated on triage process. Pt encouraged to alert staff for any changes.  This RN in appropriate PPE during encounter.  Pt placed in mask as well

## 2023-03-27 NOTE — ED PROVIDER NOTES
ED Provider Note    CHIEF COMPLAINT  Chief Complaint   Patient presents with    Hernia     Bilateral inguinal hernia  Diagnosed on 1/25 with US at   Pt reports increased pain       EXTERNAL RECORDS REVIEWED  Outpatient Notes primary care providerRedmon discussing his bilateral inguinal hernias and plan for outpatient evaluation versus living with the pain.    HPI/ROS    OUTSIDE HISTORIAN(S):  Family niece is at bedside    Olman Sims is a 72 y.o. male who presents complaint of hernia.  Was diagnosed with bilateral hernia in the inguinal area on 1/25/2023.  He made up appointment with Dr. Mejia, saw Dr. Nato COSTA states they did not want to do surgery I was not present at the time.  The pain has increased in severity has had intermittent episodes of hernia popping out therefore he called back and he has an appointment in middle April.  The patient is here for pain medication.  Denies fever, shakes, chills, sweats, constipation, dysuria, bloody stool.  He states the pain increases with ambulation and decreases when he sitting or laying down.  He states he is not had any difficulty reducing the hernia and actually has not felt a significant lump in his groin since the initial diagnosis.    PAST MEDICAL HISTORY   has a past medical history of Adverse effect of anesthesia, Anesthesia, Atrial fibrillation ([facility]), Breath shortness, CAD (coronary artery disease), COPD, Dental disorder, Emphysema of lung ([facility]), Hyperlipidemia, Hypertension, Kidney stone, Oxygen dependent, Paroxysmal atrial fibrillation ([facility]), PVD (peripheral vascular disease) ([facility]), and Renal disorder.    SURGICAL HISTORY   has a past surgical history that includes cystoscopy stent placement (2/24/2012); recovery (4/4/2012); percutaneous nephrostolithotomy (4/5/2012); percutaneous nephrostolithotomy (5/2/2012); recovery (11/1/2012); appendectomy; appendectomy (1987); septoplasty (N/A, 5/4/2018); turbinate reduction (Bilateral, 5/4/2018); zzz  "cardiac cath; stent placement; cystoscopy,insert ureteral stent (Left, 2020); cysto/uretero/pyeloscopy, dx (Left, 2020); and lasertripsy (Left, 2020).    FAMILY HISTORY  Family History   Problem Relation Age of Onset    Cancer Maternal Grandfather 65        colon       SOCIAL HISTORY  Social History     Tobacco Use    Smoking status: Former     Packs/day: 1.00     Years: 25.00     Pack years: 25.00     Types: Cigarettes     Quit date: 2012     Years since quittin.1    Smokeless tobacco: Never   Vaping Use    Vaping Use: Never used   Substance and Sexual Activity    Alcohol use: Yes     Alcohol/week: 0.0 oz     Comment: rarely    Drug use: No    Sexual activity: Never       CURRENT MEDICATIONS  Home Medications       Reviewed by Barrett Vyas R.N. (Registered Nurse) on 23 at 1520  Med List Status: Not Addressed     Medication Last Dose Status   aspirin (ASA) 81 MG Chew Tab chewable tablet  Active   atorvastatin (LIPITOR) 40 MG Tab  Active   carvedilol (COREG) 12.5 MG Tab  Active   Fluticasone-Umeclidin-Vilant (TRELEGY ELLIPTA) 100-62.5-25 MCG/INH AEROSOL POWDER, BREATH ACTIVATED inhalation  Active   HYDROcodone-acetaminophen (NORCO) 5-325 MG Tab per tablet  Active   losartan (COZAAR) 50 MG Tab  Active   VENTOLIN  (90 Base) MCG/ACT Aero Soln inhalation aerosol  Active   vitamin D2, Ergocalciferol, (DRISDOL) 1.25 MG (86094 UT) Cap capsule  Active   zolpidem (AMBIEN) 5 MG Tab  Active                    ALLERGIES  No Known Allergies    PHYSICAL EXAM  VITAL SIGNS: BP (!) 194/80   Pulse 83   Temp 37.3 °C (99.2 °F) (Temporal)   Resp 18   Ht 1.753 m (5' 9\")   Wt 70.3 kg (154 lb 15.7 oz)   SpO2 96%   BMI 22.89 kg/m²      Nursing notes and vitals reviewed.  Constitutional: Well developed, Well nourished, No acute distress, Non-toxic appearance.     Abdomen: Bowel sounds normal, Soft, No tenderness, No guarding, No rebound, No masses, No pulsatile masses.   : Circumcised male, no " scrotal edema, tenderness or fullness or suspicion of hernia, bilateral inguinal canal there is no evidence of incarcerated hernia, no tenderness, no palpable mass skin: Warm, Dry, No erythema, No rash.   Neurologic: Normal cremasteric reflex      RADIOLOGY  Reviewed the ultrasound on 1/25/2023 for bilateral inguinal hernias with a fat-containing    COURSE & MEDICAL DECISION MAKING    ED Observation Status? No; Patient does not meet criteria for ED Observation.     INITIAL ASSESSMENT, COURSE AND PLAN  Care Narrative: This is a pleasant 72-year-old gentleman presents for pain from his inguinal hernias bilaterally.  Here in the emergency department has no evidence incarceration or strangulation or hernia.  He is asymptomatic.  I do long conversation with the patient and his niece concerning the need for follow-up with his general surgeon at his appointment.  He states he would like a few pain pills until it happened.  The patient does have a recent prescription for pain medication on 3/24/2020 23-10 tabs L given 15 tabs and stated that he will follow-up with his primary care or surgeon for further evaluation management pain control.  In addition strict return precautions have been given for incarcerated hernia.  I discussed the plan of how to reduce the hernia if he had it.  He understands and was discharged in stable condition.  HTN/IDDM FOLLOW UP:  The patient has known hypertension and is being followed by their primary care doctor    In prescribing controlled substances to this patient, I certify that I have obtained and reviewed the medical history of Olman Sims. I have also made a good garry effort to obtain applicable records from other providers who have treated the patient and records did not demonstrate any increased risk of substance abuse that would prevent me from prescribing controlled substances.     I have conducted a physical exam and documented it. I have reviewed Mr. Sims’s  prescription history as maintained by the [place] Prescription Monitoring Program.     I have assessed the patient’s risk for abuse, dependency, and addiction using the validated Opioid Risk Tool available at https://www.mdcalc.com/ltmskg-btzq-utic-ort-narcotic-abuse.     Given the above, I believe the benefits of controlled substance therapy outweigh the risks. The reasons for prescribing controlled substances include non-narcotic, oral analgesic alternatives have been inadequate for pain control. Accordingly, I have discussed the risk and benefits, treatment plan, and alternative therapies with the patient.     ADDITIONAL PROBLEM LIST  Chronic oxygen use  DISPOSITION AND DISCUSSIONS    FINAL DIAGNOSIS  1. Bilateral recurrent inguinal hernia without obstruction or gangrene      DISPOSITION:  Patient will be discharged home in stable condition.    FOLLOW UP:  No follow-up provider specified.    OUTPATIENT MEDICATIONS:  Discharge Medication List as of 3/27/2023  5:10 PM        START taking these medications    Details   !! HYDROcodone-acetaminophen (NORCO) 5-325 MG Tab per tablet Take 1 Tablet by mouth every four hours as needed (pain) for up to 3 days., Disp-15 Tablet, R-0, Normal       !! - Potential duplicate medications found. Please discuss with provider.          Electronically signed by: Anant Sullivan D.O., 3/27/2023 4:49 PM

## 2023-04-07 DIAGNOSIS — I25.10 CORONARY ARTERY DISEASE INVOLVING NATIVE CORONARY ARTERY OF NATIVE HEART WITHOUT ANGINA PECTORIS: ICD-10-CM

## 2023-04-07 RX ORDER — ASPIRIN 81 MG/1
81 TABLET, CHEWABLE ORAL DAILY
Qty: 90 TABLET | Refills: 3 | Status: SHIPPED | OUTPATIENT
Start: 2023-04-07

## 2023-04-20 ENCOUNTER — PATIENT MESSAGE (OUTPATIENT)
Dept: SLEEP MEDICINE | Facility: MEDICAL CENTER | Age: 73
End: 2023-04-20
Payer: COMMERCIAL

## 2023-04-20 DIAGNOSIS — J44.9 CHRONIC OBSTRUCTIVE PULMONARY DISEASE, UNSPECIFIED COPD TYPE (HCC): ICD-10-CM

## 2023-04-20 NOTE — PATIENT COMMUNICATION
Have we ever prescribed this med? .  If yes, what date? 04/18/22    Last OV: 11/08/022    Next OV: 05/10/23    DX: COPD    Medications: Trelegy

## 2023-04-21 RX ORDER — FLUTICASONE FUROATE, UMECLIDINIUM BROMIDE AND VILANTEROL TRIFENATATE 100; 62.5; 25 UG/1; UG/1; UG/1
1 POWDER RESPIRATORY (INHALATION) DAILY
Qty: 3 EACH | Refills: 3 | Status: SHIPPED | OUTPATIENT
Start: 2023-04-21 | End: 2023-08-04

## 2023-05-03 ENCOUNTER — APPOINTMENT (OUTPATIENT)
Dept: ADMISSIONS | Facility: MEDICAL CENTER | Age: 73
End: 2023-05-03
Attending: SURGERY
Payer: COMMERCIAL

## 2023-05-05 ENCOUNTER — OFFICE VISIT (OUTPATIENT)
Dept: MEDICAL GROUP | Facility: PHYSICIAN GROUP | Age: 73
End: 2023-05-05
Payer: COMMERCIAL

## 2023-05-05 VITALS
BODY MASS INDEX: 22.89 KG/M2 | RESPIRATION RATE: 16 BRPM | OXYGEN SATURATION: 91 % | SYSTOLIC BLOOD PRESSURE: 148 MMHG | HEIGHT: 69 IN | HEART RATE: 81 BPM | TEMPERATURE: 97.9 F | DIASTOLIC BLOOD PRESSURE: 78 MMHG

## 2023-05-05 DIAGNOSIS — I10 ESSENTIAL HYPERTENSION: ICD-10-CM

## 2023-05-05 DIAGNOSIS — N18.31 STAGE 3A CHRONIC KIDNEY DISEASE: ICD-10-CM

## 2023-05-05 DIAGNOSIS — R73.03 PREDIABETES: ICD-10-CM

## 2023-05-05 DIAGNOSIS — Z11.59 ENCOUNTER FOR HEPATITIS C SCREENING TEST FOR LOW RISK PATIENT: ICD-10-CM

## 2023-05-05 DIAGNOSIS — F51.01 PRIMARY INSOMNIA: ICD-10-CM

## 2023-05-05 DIAGNOSIS — E78.5 DYSLIPIDEMIA: ICD-10-CM

## 2023-05-05 DIAGNOSIS — R74.8 ELEVATED ALKALINE PHOSPHATASE LEVEL: ICD-10-CM

## 2023-05-05 PROCEDURE — 99214 OFFICE O/P EST MOD 30 MIN: CPT | Performed by: STUDENT IN AN ORGANIZED HEALTH CARE EDUCATION/TRAINING PROGRAM

## 2023-05-05 RX ORDER — LOSARTAN POTASSIUM 50 MG/1
50 TABLET ORAL DAILY
Qty: 90 TABLET | Refills: 3 | Status: SHIPPED | OUTPATIENT
Start: 2023-05-05 | End: 2023-05-12 | Stop reason: SDUPTHER

## 2023-05-05 RX ORDER — HYDROCODONE BITARTRATE AND ACETAMINOPHEN 5; 325 MG/1; MG/1
TABLET ORAL
COMMUNITY
Start: 2023-04-03 | End: 2023-05-05

## 2023-05-05 RX ORDER — ZOLPIDEM TARTRATE 5 MG/1
5 TABLET ORAL
Qty: 90 TABLET | Refills: 0 | Status: SHIPPED | OUTPATIENT
Start: 2023-05-07 | End: 2023-08-04 | Stop reason: SDUPTHER

## 2023-05-05 NOTE — PROGRESS NOTES
CC:  Diagnoses of Primary insomnia, Prediabetes, Stage 3a chronic kidney disease (HCC), Elevated alkaline phosphatase level, Encounter for hepatitis C screening test for low risk patient, Essential hypertension, and Dyslipidemia were pertinent to this visit.    HISTORY OF THE PRESENT ILLNESS: Patient is a 72 y.o. male. This pleasant patient is here today to discuss:    1. Primary insomnia  Zolpidem 5 mg nightly.  Patient feels that this provides adequate relief of his symptoms.    2. Prediabetes  Patient does not do much exercise secondary to his pulmonary disease.    3. Stage 3a chronic kidney disease (HCC)    4. Elevated alkaline phosphatase level  Patient completed initial course of supplemental vitamin D but has not continue to supplement.    5. Encounter for hepatitis C screening test for low risk patient  Not at increased risk.    6. Essential hypertension  Losartan 50 mg daily.  Carvedilol 12.5 mg twice daily.  Patient monitors his blood pressure at home and finds that he is in the 1 teens to 120s early in the day and approaches 140 later in the day    7. Dyslipidemia  Atorvastatin 40 mg daily.    Patient reports full compliance.    No myalgias reported.    Active Diagnosis:    Patient Active Problem List   Diagnosis    Other emphysema (HCC)    Peripheral vascular disease (HCC)    Mild cognitive impairment with memory loss    Essential hypertension    Dyslipidemia    Nasal septal deformity    Hypertrophy of both inferior nasal turbinates    Chronic vasomotor rhinitis    Primary insomnia    Prediabetes    History of atrial fibrillation    History of non-ST elevation myocardial infarction (NSTEMI)    Coronary artery disease involving native coronary artery of native heart without angina pectoris    History of kidney stones    Stage 3a chronic kidney disease (HCC)    Elevated alkaline phosphatase level    Hypovitaminosis D      Current Outpatient Medications Ordered in Epic   Medication Sig Dispense Refill     "[START ON 5/7/2023] zolpidem (AMBIEN) 5 MG Tab Take 1 Tablet by mouth at bedtime as needed for Sleep for up to 90 days. 90 Tablet 0    losartan (COZAAR) 50 MG Tab Take 1 Tablet by mouth every day. 90 Tablet 3    fluticasone-umeclidin-vilant (TRELEGY ELLIPTA) 100-62.5-25 MCG/ACT AEROSOL POWDER, BREATH ACTIVATED inhalation Inhale 1 Inhalation. every day. Rinse mouth after use 3 Each 3    aspirin (ASA) 81 MG Chew Tab chewable tablet Chew 1 Tablet every day. CHEW AND SWALLOW 90 Tablet 3    vitamin D2, Ergocalciferol, (DRISDOL) 1.25 MG (79324 UT) Cap capsule Take 1 Capsule by mouth every 7 days. 6 Capsule 0    carvedilol (COREG) 12.5 MG Tab TAKE 1 TABLET BY MOUTH TWICE DAILY WITH MEALS 180 Tablet 3    atorvastatin (LIPITOR) 40 MG Tab Take 1 Tablet by mouth every evening. 90 Tablet 3    VENTOLIN  (90 Base) MCG/ACT Aero Soln inhalation aerosol INHALE 2 PUFFS BY MOUTH EVERY 6 HOURS AS NEEDED 1 Each 11    Fluticasone-Umeclidin-Vilant (TRELEGY ELLIPTA) 100-62.5-25 MCG/INH AEROSOL POWDER, BREATH ACTIVATED inhalation Inhale 1 Inhalation every day. Rinse mouth after use 3 Each 3     No current Epic-ordered facility-administered medications on file.     ROS:   See HPI.    Objective:     Exam: BP (!) 148/78 (BP Location: Left arm, Patient Position: Sitting, BP Cuff Size: Adult)   Pulse 81   Temp 36.6 °C (97.9 °F) (Temporal)   Resp 16   Ht 1.753 m (5' 9\")   SpO2 91%  Body mass index is 22.89 kg/m².    Repeat blood pressure at the end of the patient's visit remains unchanged.    General: Normal appearing. No distress.  neurologic: Grossly nonfocal.    Skin: Warm and dry.  No obvious lesions.  Musculoskeletal: No extremity cyanosis, clubbing, or edema.  Psych: Normal mood and affect.             Assessment & Plan:   72 y.o. male with the following -    Labs:   1/28/2022:  -CMP, elevated glucose 104, GFR 56, alkaline phosphatase 154.    3/31/2022:  -Alkaline phosphatase 140    8/9/2022:  -Vitamin D level 23    1. Primary " insomnia  -Chronic, stable.  -We discussed the increased risk of falls associated with this type of medication.  Patient feels that benefits outweigh the risks.  -Controlled substance agreement from 2/23/2023 reviewed.  -PDMP reviewed.  - zolpidem (AMBIEN) 5 MG Tab; Take 1 Tablet by mouth at bedtime as needed for Sleep for up to 90 days.  Dispense: 90 Tablet; Refill: 0    2. Prediabetes  -Chronic, stable.  - Comp Metabolic Panel; Future  - HEMOGLOBIN A1C; Future    3. Stage 3a chronic kidney disease (HCC)  -Chronic, stable.  - Comp Metabolic Panel; Future    4. Elevated alkaline phosphatase level  -Chronic, likely due to vitamin D deficiency.  -[id number] units of vitamin D3 recommended daily.  OTC.  - Comp Metabolic Panel; Future    5. Encounter for hepatitis C screening test for low risk patient  - HEP C VIRUS ANTIBODY; Future    6. Essential hypertension  -Chronic, stable.  -Continue carvedilol 12.5 mg twice daily.  -Continue losartan (COZAAR) 50 MG Tab; Take 1 Tablet by mouth every day.  Dispense: 90 Tablet; Refill: 3  -Patient has been asked to return with a 7-day blood pressure log at his next visit in 3 months time    7. Dyslipidemia  -Chronic, stable.  -Continue atorvastatin 40 mg daily.  - Lipid Profile; Future    Return in about 3 months (around 8/5/2023).  For #1 and 6 above.    Please note that this dictation was created using voice recognition software. I have made every reasonable attempt to correct obvious errors, but I expect that there are errors of grammar and possibly content that I did not discover before finalizing the note.      Patricio Hernandez PA-C 5/5/2023

## 2023-05-10 ENCOUNTER — OFFICE VISIT (OUTPATIENT)
Dept: SLEEP MEDICINE | Facility: MEDICAL CENTER | Age: 73
End: 2023-05-10
Attending: PHYSICIAN ASSISTANT
Payer: COMMERCIAL

## 2023-05-10 VITALS
WEIGHT: 154 LBS | BODY MASS INDEX: 22.81 KG/M2 | SYSTOLIC BLOOD PRESSURE: 130 MMHG | HEART RATE: 60 BPM | RESPIRATION RATE: 16 BRPM | DIASTOLIC BLOOD PRESSURE: 80 MMHG | HEIGHT: 69 IN | OXYGEN SATURATION: 93 %

## 2023-05-10 DIAGNOSIS — J96.11 CHRONIC RESPIRATORY FAILURE WITH HYPOXIA (HCC): ICD-10-CM

## 2023-05-10 DIAGNOSIS — J44.9 CHRONIC OBSTRUCTIVE PULMONARY DISEASE, UNSPECIFIED COPD TYPE (HCC): ICD-10-CM

## 2023-05-10 DIAGNOSIS — Z87.891 FORMER SMOKER: ICD-10-CM

## 2023-05-10 DIAGNOSIS — Z87.891 PERSONAL HISTORY OF NICOTINE DEPENDENCE: ICD-10-CM

## 2023-05-10 PROCEDURE — 99213 OFFICE O/P EST LOW 20 MIN: CPT | Performed by: PHYSICIAN ASSISTANT

## 2023-05-10 PROCEDURE — 3075F SYST BP GE 130 - 139MM HG: CPT | Performed by: PHYSICIAN ASSISTANT

## 2023-05-10 PROCEDURE — 99212 OFFICE O/P EST SF 10 MIN: CPT | Performed by: PHYSICIAN ASSISTANT

## 2023-05-10 PROCEDURE — 3079F DIAST BP 80-89 MM HG: CPT | Performed by: PHYSICIAN ASSISTANT

## 2023-05-10 ASSESSMENT — ENCOUNTER SYMPTOMS
SINUS PAIN: 0
HEADACHES: 0
COUGH: 1
INSOMNIA: 1
SORE THROAT: 0
TREMORS: 0
ORTHOPNEA: 0
FEVER: 0
SPUTUM PRODUCTION: 1
CHILLS: 0
PALPITATIONS: 0
WHEEZING: 0
DIZZINESS: 0
WEIGHT LOSS: 0
SHORTNESS OF BREATH: 1
HEARTBURN: 0

## 2023-05-10 NOTE — PROGRESS NOTES
"CC: Follow-up    HPI:  Olman Sims is a 72 y.o. year old male here today for follow-up on COPD and chronic respiratory failure with hypoxia.  Last seen in clinic 11/8/2022 by Ijeoma Jones PA-C.  He is a former smoker 25-pack-year history and quit date in 2012.    Pertinent past medical history includes peripheral vascular disease, mild cognitive impairment with memory loss, hypertrophy of inferior nasal turbinates, chronic vasomotor rhinitis, coronary artery disease, history of NSTEMI, atrial fibrillation, stage IIIa chronic kidney disease.    Reviewed in clinic vitals  /80   Pulse 60   Resp 16   Ht 1.753 m (5' 9\")   Wt 69.9 kg (154 lb)   SpO2 93% on 2 L O2    Reviewed home medication regimen including Trelegy 100 mcg, Ventolin, as needed Ambien managed by primary.  Patient reports he has been requiring rescue inhaler approximately once per week.  Uses O2 at 2 L ATC.    Reviewed most recent imaging  including chest CT obtained 11/2/2022, previous 7 mm nodule has decreased in size to 4 mm consistent with inflammatory or infectious process.  No new or enlarging pulmonary nodules. Emphysematous changes.  Patient did have mild consolidation in the inferior lateral lingula and left lower lobe and inferior medial right upper lobe.  Diffuse bronchial wall thickening.  Atherosclerotic changes.  No lymphadenopathy.  He is not symptomatic.     Echocardiogram obtained 4/29/2019 demonstrated technically difficult study, normal left ventricular chamber size, mild to moderate concentric left ventricular hypertrophy, LVEF estimated 55%.  Normal right ventricle size and systolic function, right ventricle and all valves not well visualized, unable to estimate pulmonary artery pressure due to inadequate tricuspid regurgitant jet.     Pulmonary function testing obtained 7/28/2022 demonstrating FEV1 of 0.8% predicted, FVC of 2.86 L or 69% predicted, FEV1/FVC ratio of 29, residual volume 286% predicted, TLC " "134% predicted and DLCO 55% predicted. Per pulmonologist interpretation very severe obstructive ventilatory defect, severe dilated response lung volumes consistent with air trapping and hyperinflation, moderate diffusion impairment, based on study patient meets PFT criteria for BLVR, consider further work-up if indicated.      6-minute walk test was obtained 11/8/2022 demonstrating ability to walk 600 feet or 183 meters which is 34% predicted.  Patient did require 2 L O2 for decreased O2 sats to 86% at minute 3.  O2 2 L with exertion recommended (he is on 2 L ATC).      Review of Systems   Constitutional:  Negative for chills, fever, malaise/fatigue and weight loss.   HENT:  Positive for congestion. Negative for hearing loss, nosebleeds, sinus pain, sore throat and tinnitus.    Eyes:         Presc glasses    Respiratory:  Positive for cough, sputum production (clear) and shortness of breath (mild with exertion like bicycle riding). Negative for wheezing.    Cardiovascular:  Negative for chest pain, palpitations, orthopnea and leg swelling.   Gastrointestinal:  Negative for heartburn.        Lower dentures, no swallowing issues   Neurological:  Negative for dizziness, tremors and headaches.   Psychiatric/Behavioral:  The patient has insomnia (xolpidem helps fall asleep but awakens about 3 x per night).        Past Medical History:   Diagnosis Date    Adverse effect of anesthesia     \"stopped breathing/elevated bp\"2012    Anesthesia     \"stopped breathing/elevated bp\"2012    Atrial fibrillation (HCC)     Breath shortness     when he quit smoking/with exertion    CAD (coronary artery disease)     COPD     Dental disorder     lower partial    Emphysema of lung (HCC)     Hyperlipidemia     Hypertension     Kidney stone     Oxygen dependent     q hs prn 2 ltr    Paroxysmal atrial fibrillation (HCC)     PVD (peripheral vascular disease) ([facility])     R iliac artery stent    Renal disorder     right kidney stone/stent in place "       Past Surgical History:   Procedure Laterality Date    NH CYSTOSCOPY,INSERT URETERAL STENT Left 1/22/2020    Procedure: CYSTOSCOPY, WITH URETERAL STENT INSERTION;  Surgeon: Karen Quiles M.D.;  Location: SURGERY [facility];  Service: Urology    NH CYSTO/URETERO/PYELOSCOPY, DX Left 1/22/2020    Procedure: URETEROSCOPY;  Surgeon: Karen Quiles M.D.;  Location: SURGERY [facility];  Service: Urology    LASERTRIPSY Left 1/22/2020    Procedure: LITHOTRIPSY, USING LASER;  Surgeon: Karen Quiles M.D.;  Location: SURGERY [facility];  Service: Urology    SEPTOPLASTY N/A 5/4/2018    Procedure: SEPTOPLASTY;  Surgeon: Jesse Saavedra M.D.;  Location: SURGERY SAME DAY [facility];  Service: Ent    TURBINATE REDUCTION Bilateral 5/4/2018    Procedure: TURBINATE REDUCTION- RESECTION WITH SUBMUCOSAL APPROACH;  Surgeon: Jesse Saavedra M.D.;  Location: SURGERY SAME DAY [facility];  Service: Ent    RECOVERY  11/1/2012    Performed by Ir-Recovery Surgery at SURGERY SAME DAY [facility]S    PERCUTANEOUS NEPHROSTOLITHOTOMY  5/2/2012    Performed by KAREN QUILES at [facility]    PERCUTANEOUS NEPHROSTOLITHOTOMY  4/5/2012    Performed by KAREN QUILES at [facility]    RECOVERY  4/4/2012    Performed by SURGERY, IR-RECOVERY at SURGERY SAME DAY [facility] ORS    CYSTOSCOPY STENT PLACEMENT  2/24/2012    Performed by KAREN QUILES at [facility]    APPENDECTOMY  1987    APPENDECTOMY      STENT PLACEMENT      heart    ZZZ CARDIAC CATH         Family History   Problem Relation Age of Onset    Cancer Maternal Grandfather 65        colon       Social History     Socioeconomic History    Marital status: Single     Spouse name: Not on file    Number of children: Not on file    Years of education: Not on file    Highest education level: Associate degree: academic program   Occupational History    Not on file   Tobacco Use    Smoking status: Former     Packs/day: 1.00     Years:  25.00     Pack years: 25.00     Types: Cigarettes     Quit date: 2012     Years since quittin.2    Smokeless tobacco: Never   Vaping Use    Vaping Use: Never used   Substance and Sexual Activity    Alcohol use: Yes     Alcohol/week: 0.0 oz     Comment: rarely    Drug use: No    Sexual activity: Never   Other Topics Concern    Not on file   Social History Narrative    Not on file     Social Determinants of Health     Financial Resource Strain: Not on file   Food Insecurity: Not on file   Transportation Needs: Not on file   Physical Activity: Insufficiently Active (3/22/2022)    Exercise Vital Sign     Days of Exercise per Week: 7 days     Minutes of Exercise per Session: 20 min   Stress: Not on file   Social Connections: Not on file   Intimate Partner Violence: Not on file   Housing Stability: Not on file       Allergies as of 05/10/2023    (No Known Allergies)          Current medications as of today   Current Outpatient Medications   Medication Sig Dispense Refill    zolpidem (AMBIEN) 5 MG Tab Take 1 Tablet by mouth at bedtime as needed for Sleep for up to 90 days. 90 Tablet 0    losartan (COZAAR) 50 MG Tab Take 1 Tablet by mouth every day. 90 Tablet 3    fluticasone-umeclidin-vilant (TRELEGY ELLIPTA) 100-62.5-25 MCG/ACT AEROSOL POWDER, BREATH ACTIVATED inhalation Inhale 1 Inhalation. every day. Rinse mouth after use 3 Each 3    aspirin (ASA) 81 MG Chew Tab chewable tablet Chew 1 Tablet every day. CHEW AND SWALLOW 90 Tablet 3    vitamin D2, Ergocalciferol, (DRISDOL) 1.25 MG (08142 UT) Cap capsule Take 1 Capsule by mouth every 7 days. 6 Capsule 0    carvedilol (COREG) 12.5 MG Tab TAKE 1 TABLET BY MOUTH TWICE DAILY WITH MEALS 180 Tablet 3    atorvastatin (LIPITOR) 40 MG Tab Take 1 Tablet by mouth every evening. 90 Tablet 3    VENTOLIN  (90 Base) MCG/ACT Aero Soln inhalation aerosol INHALE 2 PUFFS BY MOUTH EVERY 6 HOURS AS NEEDED 1 Each 11    Fluticasone-Umeclidin-Vilant (TRELEGY ELLIPTA) 100-62.5-25  MCG/INH AEROSOL POWDER, BREATH ACTIVATED inhalation Inhale 1 Inhalation every day. Rinse mouth after use 3 Each 3     No current facility-administered medications for this visit.         Physical Exam:   Gen:           Alert and oriented, No apparent distress. Mood and affect appropriate, normal interaction with provider.  Eyes:          sclere white, conjunctive moist.  Hearing:     Grossly intact.  Dentition:    Lower dentures, fair upper dentition  Oropharynx:   Tongue normal, posterior pharynx without erythema or exudate.  Neck:        Supple, trachea midline, no masses.  Respiratory Effort: No intercostal retractions or use of accessory muscles.   Lung Auscultation:      Decreased bilaterally; no rales, rhonchi or wheezing.  CV:            Regular rate and rhythm. No edema. No murmurs, rubs or gallops.  Digits, Nails, Ext: No clubbing, cyanosis, petechiae, or nodes.   Skin:        No rashes, lesions or ulcers noted on exposed skin surfaces.                 Immunizations:  Flu: 10/18/2022  Pneumovax 23: 1/3/2018, 11/12/2012  Prevnar 13: 11/10/2014  Pfizer booster SARS CoV2 vaccine: 10/18/2022   Pfizer SARS-CoV-2 vaccine: 5/6/2022, 10/11/2021, 4/2/2021, 3/11/2021    Assessment / Plan:   1. Personal history of nicotine dependence  - REFERRAL TO LUNG CANCER SCREENING PROGRAM    2. Chronic obstructive pulmonary disease, unspecified COPD type (HCC)    Reports pending inguinal hernia repair.  Managing well on current regimen, continue current regimen.  Last pulmonary function testing 7/28/2022, consider updating.  No refills needed today.    3. Chronic respiratory failure with hypoxia (HCC)    Using 2 L O2 ATC, currently on an [employer] POC and may need order for replacement.  Patient to let us know.    4. Former smoker    Former smoker:  Complete and/or continued abstinence advised.   Reported quit date in 2012, referral to lung cancer screening program.    Follow-up:   Return in about 6 months (around  11/10/2023).      This dictation was created using voice recognition software. The accuracy of the dictation is limited to the abilities of the software. I expect there may be some errors of grammar and possibly content.

## 2023-05-10 NOTE — PATIENT INSTRUCTIONS
1-pending inguinal hernia repair  2-patient is on [employer] POC may need order for replacement  3-referral to lung cancer screening program  4-no refills today  5-follow up in six months, sooner if needed

## 2023-05-11 ENCOUNTER — PRE-ADMISSION TESTING (OUTPATIENT)
Dept: ADMISSIONS | Facility: MEDICAL CENTER | Age: 73
End: 2023-05-11
Attending: SURGERY
Payer: COMMERCIAL

## 2023-05-11 VITALS — WEIGHT: 150 LBS | HEIGHT: 69 IN | BODY MASS INDEX: 22.22 KG/M2

## 2023-05-12 DIAGNOSIS — I10 ESSENTIAL HYPERTENSION: ICD-10-CM

## 2023-05-12 RX ORDER — LOSARTAN POTASSIUM 50 MG/1
50 TABLET ORAL DAILY
Qty: 30 TABLET | Refills: 0 | Status: SHIPPED | OUTPATIENT
Start: 2023-05-12 | End: 2023-06-23

## 2023-05-23 ENCOUNTER — HOSPITAL ENCOUNTER (OUTPATIENT)
Facility: MEDICAL CENTER | Age: 73
End: 2023-05-23
Attending: SURGERY | Admitting: SURGERY
Payer: COMMERCIAL

## 2023-05-23 ENCOUNTER — ANESTHESIA EVENT (OUTPATIENT)
Dept: SURGERY | Facility: MEDICAL CENTER | Age: 73
End: 2023-05-23
Payer: COMMERCIAL

## 2023-05-23 ENCOUNTER — ANESTHESIA (OUTPATIENT)
Dept: SURGERY | Facility: MEDICAL CENTER | Age: 73
End: 2023-05-23
Payer: COMMERCIAL

## 2023-05-23 VITALS
WEIGHT: 145.5 LBS | HEART RATE: 55 BPM | RESPIRATION RATE: 18 BRPM | DIASTOLIC BLOOD PRESSURE: 58 MMHG | SYSTOLIC BLOOD PRESSURE: 138 MMHG | TEMPERATURE: 97.4 F | BODY MASS INDEX: 21.55 KG/M2 | HEIGHT: 69 IN | OXYGEN SATURATION: 93 %

## 2023-05-23 DIAGNOSIS — G89.18 POSTOPERATIVE PAIN: ICD-10-CM

## 2023-05-23 LAB
ANION GAP SERPL CALC-SCNC: 12 MMOL/L (ref 7–16)
BUN SERPL-MCNC: 23 MG/DL (ref 8–22)
CALCIUM SERPL-MCNC: 9.8 MG/DL (ref 8.5–10.5)
CHLORIDE SERPL-SCNC: 101 MMOL/L (ref 96–112)
CO2 SERPL-SCNC: 24 MMOL/L (ref 20–33)
CREAT SERPL-MCNC: 1.6 MG/DL (ref 0.5–1.4)
EKG IMPRESSION: NORMAL
ERYTHROCYTE [DISTWIDTH] IN BLOOD BY AUTOMATED COUNT: 49.4 FL (ref 35.9–50)
GFR SERPLBLD CREATININE-BSD FMLA CKD-EPI: 46 ML/MIN/1.73 M 2
GLUCOSE SERPL-MCNC: 96 MG/DL (ref 65–99)
HCT VFR BLD AUTO: 34.4 % (ref 42–52)
HGB BLD-MCNC: 11 G/DL (ref 14–18)
MCH RBC QN AUTO: 30.7 PG (ref 27–33)
MCHC RBC AUTO-ENTMCNC: 32 G/DL (ref 32.3–36.5)
MCV RBC AUTO: 96.1 FL (ref 81.4–97.8)
PLATELET # BLD AUTO: 239 K/UL (ref 164–446)
PMV BLD AUTO: 8.5 FL (ref 9–12.9)
POTASSIUM SERPL-SCNC: 3.8 MMOL/L (ref 3.6–5.5)
RBC # BLD AUTO: 3.58 M/UL (ref 4.7–6.1)
SODIUM SERPL-SCNC: 137 MMOL/L (ref 135–145)
WBC # BLD AUTO: 8.8 K/UL (ref 4.8–10.8)

## 2023-05-23 PROCEDURE — C1781 MESH (IMPLANTABLE): HCPCS | Performed by: SURGERY

## 2023-05-23 PROCEDURE — 160025 RECOVERY II MINUTES (STATS): Performed by: SURGERY

## 2023-05-23 PROCEDURE — 160029 HCHG SURGERY MINUTES - 1ST 30 MINS LEVEL 4: Performed by: SURGERY

## 2023-05-23 PROCEDURE — 160035 HCHG PACU - 1ST 60 MINS PHASE I: Performed by: SURGERY

## 2023-05-23 PROCEDURE — 700105 HCHG RX REV CODE 258: Performed by: ANESTHESIOLOGY

## 2023-05-23 PROCEDURE — 93005 ELECTROCARDIOGRAM TRACING: CPT | Performed by: SURGERY

## 2023-05-23 PROCEDURE — 36415 COLL VENOUS BLD VENIPUNCTURE: CPT

## 2023-05-23 PROCEDURE — 160046 HCHG PACU - 1ST 60 MINS PHASE II: Performed by: SURGERY

## 2023-05-23 PROCEDURE — 160048 HCHG OR STATISTICAL LEVEL 1-5: Performed by: SURGERY

## 2023-05-23 PROCEDURE — 700101 HCHG RX REV CODE 250: Performed by: ANESTHESIOLOGY

## 2023-05-23 PROCEDURE — 160002 HCHG RECOVERY MINUTES (STAT): Performed by: SURGERY

## 2023-05-23 PROCEDURE — 160009 HCHG ANES TIME/MIN: Performed by: SURGERY

## 2023-05-23 PROCEDURE — 99100 ANES PT EXTEME AGE<1 YR&>70: CPT | Performed by: ANESTHESIOLOGY

## 2023-05-23 PROCEDURE — 93010 ELECTROCARDIOGRAM REPORT: CPT | Performed by: INTERNAL MEDICINE

## 2023-05-23 PROCEDURE — 700102 HCHG RX REV CODE 250 W/ 637 OVERRIDE(OP): Performed by: ANESTHESIOLOGY

## 2023-05-23 PROCEDURE — 700101 HCHG RX REV CODE 250: Performed by: SURGERY

## 2023-05-23 PROCEDURE — 80048 BASIC METABOLIC PNL TOTAL CA: CPT

## 2023-05-23 PROCEDURE — 00840 ANES IPER PX LOWER ABD NOS: CPT | Performed by: ANESTHESIOLOGY

## 2023-05-23 PROCEDURE — A9270 NON-COVERED ITEM OR SERVICE: HCPCS | Performed by: ANESTHESIOLOGY

## 2023-05-23 PROCEDURE — 700111 HCHG RX REV CODE 636 W/ 250 OVERRIDE (IP): Performed by: ANESTHESIOLOGY

## 2023-05-23 PROCEDURE — 85027 COMPLETE CBC AUTOMATED: CPT

## 2023-05-23 PROCEDURE — 160041 HCHG SURGERY MINUTES - EA ADDL 1 MIN LEVEL 4: Performed by: SURGERY

## 2023-05-23 PROCEDURE — 502714 HCHG ROBOTIC SURGERY SERVICES: Performed by: SURGERY

## 2023-05-23 DEVICE — MESH OVITEX 6 X 10CM RESORBABLE (1/EA): Type: IMPLANTABLE DEVICE | Status: FUNCTIONAL

## 2023-05-23 RX ORDER — MIDAZOLAM HYDROCHLORIDE 1 MG/ML
INJECTION INTRAMUSCULAR; INTRAVENOUS PRN
Status: DISCONTINUED | OUTPATIENT
Start: 2023-05-23 | End: 2023-05-23 | Stop reason: HOSPADM

## 2023-05-23 RX ORDER — HYDRALAZINE HYDROCHLORIDE 20 MG/ML
5 INJECTION INTRAMUSCULAR; INTRAVENOUS ONCE
Status: COMPLETED | OUTPATIENT
Start: 2023-05-23 | End: 2023-05-23

## 2023-05-23 RX ORDER — EPHEDRINE SULFATE 50 MG/ML
5 INJECTION, SOLUTION INTRAVENOUS
Status: DISCONTINUED | OUTPATIENT
Start: 2023-05-23 | End: 2023-05-23 | Stop reason: HOSPADM

## 2023-05-23 RX ORDER — OXYCODONE HCL 5 MG/5 ML
5 SOLUTION, ORAL ORAL
Status: COMPLETED | OUTPATIENT
Start: 2023-05-23 | End: 2023-05-23

## 2023-05-23 RX ORDER — GABAPENTIN 300 MG/1
300 CAPSULE ORAL ONCE
Status: COMPLETED | OUTPATIENT
Start: 2023-05-23 | End: 2023-05-23

## 2023-05-23 RX ORDER — HYDROMORPHONE HYDROCHLORIDE 1 MG/ML
0.2 INJECTION, SOLUTION INTRAMUSCULAR; INTRAVENOUS; SUBCUTANEOUS
Status: DISCONTINUED | OUTPATIENT
Start: 2023-05-23 | End: 2023-05-23 | Stop reason: HOSPADM

## 2023-05-23 RX ORDER — POLYETHYLENE GLYCOL 3350 17 G/17G
17 POWDER, FOR SOLUTION ORAL DAILY
Qty: 7 EACH | Refills: 0 | Status: SHIPPED | OUTPATIENT
Start: 2023-05-23 | End: 2023-05-30

## 2023-05-23 RX ORDER — ROCURONIUM BROMIDE 10 MG/ML
INJECTION, SOLUTION INTRAVENOUS PRN
Status: DISCONTINUED | OUTPATIENT
Start: 2023-05-23 | End: 2023-05-23 | Stop reason: SURG

## 2023-05-23 RX ORDER — CEFAZOLIN SODIUM 1 G/3ML
INJECTION, POWDER, FOR SOLUTION INTRAMUSCULAR; INTRAVENOUS PRN
Status: DISCONTINUED | OUTPATIENT
Start: 2023-05-23 | End: 2023-05-23 | Stop reason: SURG

## 2023-05-23 RX ORDER — LIDOCAINE HYDROCHLORIDE 20 MG/ML
INJECTION, SOLUTION EPIDURAL; INFILTRATION; INTRACAUDAL; PERINEURAL PRN
Status: DISCONTINUED | OUTPATIENT
Start: 2023-05-23 | End: 2023-05-23 | Stop reason: SURG

## 2023-05-23 RX ORDER — SODIUM CHLORIDE, SODIUM LACTATE, POTASSIUM CHLORIDE, CALCIUM CHLORIDE 600; 310; 30; 20 MG/100ML; MG/100ML; MG/100ML; MG/100ML
INJECTION, SOLUTION INTRAVENOUS
Status: DISCONTINUED | OUTPATIENT
Start: 2023-05-23 | End: 2023-05-23 | Stop reason: SURG

## 2023-05-23 RX ORDER — METOPROLOL TARTRATE 1 MG/ML
1 INJECTION, SOLUTION INTRAVENOUS
Status: DISCONTINUED | OUTPATIENT
Start: 2023-05-23 | End: 2023-05-23 | Stop reason: HOSPADM

## 2023-05-23 RX ORDER — HYDROMORPHONE HYDROCHLORIDE 1 MG/ML
0.1 INJECTION, SOLUTION INTRAMUSCULAR; INTRAVENOUS; SUBCUTANEOUS
Status: DISCONTINUED | OUTPATIENT
Start: 2023-05-23 | End: 2023-05-23 | Stop reason: HOSPADM

## 2023-05-23 RX ORDER — HYDRALAZINE HYDROCHLORIDE 20 MG/ML
INJECTION INTRAMUSCULAR; INTRAVENOUS PRN
Status: DISCONTINUED | OUTPATIENT
Start: 2023-05-23 | End: 2023-05-23 | Stop reason: SURG

## 2023-05-23 RX ORDER — OXYCODONE HCL 5 MG/5 ML
10 SOLUTION, ORAL ORAL
Status: COMPLETED | OUTPATIENT
Start: 2023-05-23 | End: 2023-05-23

## 2023-05-23 RX ORDER — OXYCODONE HYDROCHLORIDE AND ACETAMINOPHEN 5; 325 MG/1; MG/1
1-2 TABLET ORAL EVERY 6 HOURS PRN
Qty: 12 TABLET | Refills: 0 | Status: SHIPPED | OUTPATIENT
Start: 2023-05-23 | End: 2023-05-26

## 2023-05-23 RX ORDER — AMOXICILLIN AND CLAVULANATE POTASSIUM 875; 125 MG/1; MG/1
1 TABLET, FILM COATED ORAL 2 TIMES DAILY
Qty: 14 TABLET | Refills: 0 | Status: SHIPPED | OUTPATIENT
Start: 2023-05-23 | End: 2023-05-30

## 2023-05-23 RX ORDER — ONDANSETRON 2 MG/ML
4 INJECTION INTRAMUSCULAR; INTRAVENOUS
Status: DISCONTINUED | OUTPATIENT
Start: 2023-05-23 | End: 2023-05-23 | Stop reason: HOSPADM

## 2023-05-23 RX ORDER — HALOPERIDOL 5 MG/ML
1 INJECTION INTRAMUSCULAR
Status: DISCONTINUED | OUTPATIENT
Start: 2023-05-23 | End: 2023-05-23 | Stop reason: HOSPADM

## 2023-05-23 RX ORDER — HYDROMORPHONE HYDROCHLORIDE 1 MG/ML
0.4 INJECTION, SOLUTION INTRAMUSCULAR; INTRAVENOUS; SUBCUTANEOUS
Status: DISCONTINUED | OUTPATIENT
Start: 2023-05-23 | End: 2023-05-23 | Stop reason: HOSPADM

## 2023-05-23 RX ORDER — ACETAMINOPHEN 500 MG
1000 TABLET ORAL ONCE
Status: COMPLETED | OUTPATIENT
Start: 2023-05-23 | End: 2023-05-23

## 2023-05-23 RX ORDER — HYDRALAZINE HYDROCHLORIDE 20 MG/ML
5 INJECTION INTRAMUSCULAR; INTRAVENOUS
Status: DISCONTINUED | OUTPATIENT
Start: 2023-05-23 | End: 2023-05-23 | Stop reason: HOSPADM

## 2023-05-23 RX ORDER — DEXAMETHASONE SODIUM PHOSPHATE 4 MG/ML
INJECTION, SOLUTION INTRA-ARTICULAR; INTRALESIONAL; INTRAMUSCULAR; INTRAVENOUS; SOFT TISSUE PRN
Status: DISCONTINUED | OUTPATIENT
Start: 2023-05-23 | End: 2023-05-23 | Stop reason: SURG

## 2023-05-23 RX ORDER — SODIUM CHLORIDE, SODIUM LACTATE, POTASSIUM CHLORIDE, CALCIUM CHLORIDE 600; 310; 30; 20 MG/100ML; MG/100ML; MG/100ML; MG/100ML
INJECTION, SOLUTION INTRAVENOUS CONTINUOUS
Status: DISCONTINUED | OUTPATIENT
Start: 2023-05-23 | End: 2023-05-23 | Stop reason: HOSPADM

## 2023-05-23 RX ORDER — DIPHENHYDRAMINE HYDROCHLORIDE 50 MG/ML
12.5 INJECTION INTRAMUSCULAR; INTRAVENOUS
Status: DISCONTINUED | OUTPATIENT
Start: 2023-05-23 | End: 2023-05-23 | Stop reason: HOSPADM

## 2023-05-23 RX ORDER — EPHEDRINE SULFATE 50 MG/ML
INJECTION, SOLUTION INTRAVENOUS PRN
Status: DISCONTINUED | OUTPATIENT
Start: 2023-05-23 | End: 2023-05-23 | Stop reason: HOSPADM

## 2023-05-23 RX ORDER — BUPIVACAINE HYDROCHLORIDE AND EPINEPHRINE 5; 5 MG/ML; UG/ML
INJECTION, SOLUTION EPIDURAL; INTRACAUDAL; PERINEURAL
Status: DISCONTINUED | OUTPATIENT
Start: 2023-05-23 | End: 2023-05-23 | Stop reason: HOSPADM

## 2023-05-23 RX ORDER — KETOROLAC TROMETHAMINE 30 MG/ML
INJECTION, SOLUTION INTRAMUSCULAR; INTRAVENOUS PRN
Status: DISCONTINUED | OUTPATIENT
Start: 2023-05-23 | End: 2023-05-23 | Stop reason: SURG

## 2023-05-23 RX ADMIN — CEFAZOLIN 2 G: 1 INJECTION, POWDER, FOR SOLUTION INTRAMUSCULAR; INTRAVENOUS at 12:58

## 2023-05-23 RX ADMIN — EPHEDRINE SULFATE 5 MG: 50 INJECTION, SOLUTION INTRAVENOUS at 14:00

## 2023-05-23 RX ADMIN — FENTANYL CITRATE 25 MCG: 50 INJECTION, SOLUTION INTRAMUSCULAR; INTRAVENOUS at 13:50

## 2023-05-23 RX ADMIN — HYDRALAZINE HYDROCHLORIDE 5 MG: 20 INJECTION, SOLUTION INTRAMUSCULAR; INTRAVENOUS at 13:07

## 2023-05-23 RX ADMIN — SUGAMMADEX 130 MG: 100 INJECTION, SOLUTION INTRAVENOUS at 14:27

## 2023-05-23 RX ADMIN — LIDOCAINE HYDROCHLORIDE 50 MG: 20 INJECTION, SOLUTION EPIDURAL; INFILTRATION; INTRACAUDAL at 14:28

## 2023-05-23 RX ADMIN — PROPOFOL 10 MG: 10 INJECTION, EMULSION INTRAVENOUS at 13:09

## 2023-05-23 RX ADMIN — ROCURONIUM BROMIDE 40 MG: 50 INJECTION, SOLUTION INTRAVENOUS at 12:47

## 2023-05-23 RX ADMIN — LIDOCAINE HYDROCHLORIDE 50 MG: 20 INJECTION, SOLUTION EPIDURAL; INFILTRATION; INTRACAUDAL at 12:47

## 2023-05-23 RX ADMIN — KETOROLAC TROMETHAMINE 15 MG: 30 INJECTION, SOLUTION INTRAMUSCULAR; INTRAVENOUS at 14:27

## 2023-05-23 RX ADMIN — SODIUM CHLORIDE, POTASSIUM CHLORIDE, SODIUM LACTATE AND CALCIUM CHLORIDE: 600; 310; 30; 20 INJECTION, SOLUTION INTRAVENOUS at 12:42

## 2023-05-23 RX ADMIN — PROPOFOL 120 MG: 10 INJECTION, EMULSION INTRAVENOUS at 12:47

## 2023-05-23 RX ADMIN — OXYCODONE HYDROCHLORIDE 5 MG: 5 SOLUTION ORAL at 14:50

## 2023-05-23 RX ADMIN — FENTANYL CITRATE 25 MCG: 50 INJECTION, SOLUTION INTRAMUSCULAR; INTRAVENOUS at 14:36

## 2023-05-23 RX ADMIN — SODIUM CHLORIDE, POTASSIUM CHLORIDE, SODIUM LACTATE AND CALCIUM CHLORIDE: 600; 310; 30; 20 INJECTION, SOLUTION INTRAVENOUS at 14:13

## 2023-05-23 RX ADMIN — GABAPENTIN 300 MG: 300 CAPSULE ORAL at 11:07

## 2023-05-23 RX ADMIN — DEXAMETHASONE SODIUM PHOSPHATE 8 MG: 4 INJECTION INTRA-ARTICULAR; INTRALESIONAL; INTRAMUSCULAR; INTRAVENOUS; SOFT TISSUE at 13:22

## 2023-05-23 RX ADMIN — ACETAMINOPHEN 1000 MG: 500 TABLET, FILM COATED ORAL at 11:07

## 2023-05-23 RX ADMIN — FENTANYL CITRATE 75 MCG: 50 INJECTION, SOLUTION INTRAMUSCULAR; INTRAVENOUS at 13:08

## 2023-05-23 RX ADMIN — EPHEDRINE SULFATE 10 MG: 50 INJECTION, SOLUTION INTRAVENOUS at 13:38

## 2023-05-23 RX ADMIN — ROCURONIUM BROMIDE 10 MG: 50 INJECTION, SOLUTION INTRAVENOUS at 13:47

## 2023-05-23 RX ADMIN — MIDAZOLAM 2 MG: 1 INJECTION, SOLUTION INTRAMUSCULAR; INTRAVENOUS at 12:45

## 2023-05-23 RX ADMIN — HYDRALAZINE HYDROCHLORIDE 5 MG: 20 INJECTION, SOLUTION INTRAMUSCULAR; INTRAVENOUS at 11:07

## 2023-05-23 ASSESSMENT — PAIN DESCRIPTION - PAIN TYPE
TYPE: ACUTE PAIN

## 2023-05-23 NOTE — OR NURSING
Arrived from PACU AXO, Pt's VSS; denies N/V; states pain is at tolerable level. Incisions CDI to abdomen.     D/c orders received. IV dc'd. Pt changed into clothing with assistance. Discharge reviewed, Pt and family verbalized understanding and questions answered. Patient states ready to d/c home. Pt dc'd in w/c with CNA.

## 2023-05-23 NOTE — OP REPORT
DATE OF SERVICE:  05/23/2023     PREOPERATIVE DIAGNOSIS:  Bilateral inguinal hernias.     POSTOPERATIVE DIAGNOSES:  1.  Bilateral inguinal hernias.  2.  Chronic groin infection.     PROCEDURE:  Robotic bilateral inguinal hernia repairs with biologic mesh   placements.     SURGEON:  Garland Mehta MD     ASSISTANT:  SONI Chaudhary     ANESTHESIA:  General endotracheal anesthesia.     ESTIMATED BLOOD LOSS:  10 mL.     SPECIMENS:  None.     COMPLICATIONS:  None.     CONDITION:  Stable.     INDICATIONS FOR PROCEDURE:  This is a 72-year-old male who presents with   bilateral reducible inguinal hernias.  Risks, benefits and alternatives of   hernia repairs with mesh were explained to the patient.  He presents now for   elective surgery.     OPERATIVE FINDINGS:  Bilateral indirect inguinal hernias reduced and repaired   with preperitoneal biologic meshes in place, right groin containing a chronic   pocket of purulent material and granulation tissue.  No fistula or other   connection most consistent with a chronic infected lymph node, area completely   drained with hemostasis.     OPERATIVE TECHNIQUE:  After informed consent was obtained, the patient was   taken to the operating room and placed in supine position.  After adequate   general endotracheal anesthesia was achieved, the abdomen was prepped and   draped in sterile fashion.  Operation was begun by making an upper midline   incision through which an 8 mm robotic port was introduced into the abdomen   using Optiview technique.  After pneumoperitoneum was achieved, 2 additional   robotic ports were placed in the right and left upper quadrants.  All trocars   were placed with 0.5% Marcaine with epinephrine for local anesthesia.  The   patient was positioned and the da Basilia Xi robotic system was docked.  We then   began by opening the preperitoneal space on the right and left lower   quadrants.  We dissected into the preperitoneal spaces, identifying Abhishek's    ligament and the epigastric vessels.  Lateral spaces were cleared from meshes   and the pubic midline was identified as well.  We then during our dissection   and encountered a chronic pocket of purulent material and granulation tissue   in the right groin adjacent to the internal inguinal ring.  This was widely   opened, debrided, irrigated, and we could find no fistulous connections to the   surface or any bowel involvement.  This appeared most consistent with a   chronic infected lymph node that did not appear to be any other pathology   present.  Due to the presence of an infected node; however, we decided that   the patient although due to the contamination present placing plastic meshes   was contraindicated in this setting.  We therefore selected 2 bioabsorbable   with absorbable suture reinforcement meshes.  These were placed in each   internal ring overlapping the defects.  These were tacked down with 2-0   Stratafix sutures with hemostasis, which appeared adequately closed the defect   and complete the repair.  Finally, we used Stratafix sutures to close the   peritoneal flaps bilaterally and needles were all retrieved.  Pneumoperitoneum   was reduced.  Trocars were all removed.  Skin incisions were closed with 4-0   Monocryl and Dermabond.  The patient was returned to the PACU in stable   condition.  All instrument counts were correct at the end of the procedure.    Case required an assistant due to the complexity of the procedure.  The assistant helped with trocar placements, camera operation, robotic operation, retraction, mesh placements, wound closures, and operative decision making.         ______________________________  MD JAYLA Hill/ERIN    DD:  05/23/2023 15:15  DT:  05/23/2023 15:56    Job#:  [id number]

## 2023-05-23 NOTE — DISCHARGE INSTRUCTIONS
HOME CARE INSTRUCTIONS    ACTIVITY: Rest and take it easy for the first 24 hours.  A responsible adult is recommended to remain with you during that time.  It is normal to feel sleepy.  We encourage you to not do anything that requires balance, judgment or coordination.    FOR 24 HOURS DO NOT:  Drive, operate machinery or run household appliances.  Drink beer or alcoholic beverages.  Make important decisions or sign legal documents.    SPECIAL INSTRUCTIONS: Laparoscopic Inguinal Hernia Repair Care After  This sheet gives you information about how to care for yourself after your procedure. Your health care provider may also give you more specific instructions. If you have problems or questions, contact your health care provider.  What can I expect after the procedure?  After the procedure, it is common to have:  Pain.  Swelling and bruising around the incision area.  Scrotal swelling, in men.  Some fluid or blood draining from your incisions.  Follow these instructions at home:  Incision care  Follow instructions from your health care provider about how to take care of your incisions. Make sure you:  Wash your hands with soap and water before you change your bandage (dressing). If soap and water are not available, use hand .  Change your dressing as told by your health care provider.  Leave stitches (sutures), skin glue, or adhesive strips in place. These skin closures may need to stay in place for 2 weeks or longer. If adhesive strip edges start to loosen and curl up, you may trim the loose edges. Do not remove adhesive strips completely unless your health care provider tells you to do that.  Check your incision area every day for signs of infection. Check for:  More redness, swelling, or pain.  More fluid or blood.  Warmth.  Pus or a bad smell.  Wear loose, soft clothing while your incisions heal.  Driving  Do not drive or use heavy machinery while taking prescription pain medicine.  Do not drive for 24  hours if you were given a medicine to help you relax (sedative) during your procedure.  Activity  Do not lift anything that is heavier than 10 lb (4.5 kg), or the limit that you are told, until your health care provider says that it is safe.  Ask your health care provider what activities are safe for you. A lot of activity during the first week after surgery can increase pain and swelling. For 1 week after your procedure:  Avoid activities that take a lot of effort, such as exercise or sports.  You may walk and climb stairs as needed for daily activity, but avoid long walks or climbing stairs for exercise.  Managing pain and swelling  Put ice on painful or swollen areas:  Put ice in a plastic bag.  Place a towel between your skin and the bag.  Leave the ice on for 20 minutes, 2-3 times a day.  General instructions  Do not take baths, swim, or use a hot tub until your health care provider approves. Ask your health care provider if you may take showers. You may only be allowed to take sponge baths.  Take over-the-counter and prescription medicines only as told by your health care provider.  To prevent or treat constipation while you are taking prescription pain medicine, your health care provider may recommend that you:  Drink enough fluid to keep your urine pale yellow.  Take over-the-counter or prescription medicines.  Eat foods that are high in fiber, such as fresh fruits and vegetables, whole grains, and beans.  Limit foods that are high in fat and processed sugars, such as fried and sweet foods.  Do not use any products that contain nicotine or tobacco, such as cigarettes and e-cigarettes. If you need help quitting, ask your health care provider.  Drink enough fluid to keep your urine pale yellow.  Keep all follow-up visits as told by your health care provider. This is important.  Contact a health care provider if:  You have more redness, swelling, or pain around your incisions or your groin area.  You have more  swelling in your scrotum.  You have more fluid or blood coming from your incisions.  Your incisions feel warm to the touch.  You have severe pain and medicines do not help.  You have abdominal pain or swelling.  You cannot eat or drink without vomiting.  You cannot urinate or pass a bowel movement.  You faint.  You feel dizzy.  You have nausea and vomiting.  You have a fever.  Get help right away if:  You have pus or a bad smell coming from your incisions.  You have chest pain.  You have problems breathing.  Summary  Pain, swelling, and bruising are common after the procedure.  Check your incision area every day for signs of infection, such as more redness, swelling, or pain.  Put ice on painful or swollen areas for 20 minutes, 2-3 times a day.      DIET: To avoid nausea, slowly advance diet as tolerated, avoiding spicy or greasy foods for the first day.  Add more substantial food to your diet according to your physician's instructions. INCREASE FLUIDS AND FIBER TO AVOID CONSTIPATION.    SURGICAL DRESSING/BATHING: Ok to shower. No submerging in water for 7 days.    MEDICATIONS: Resume taking daily medication.  Take prescribed pain medication with food.  If no medication is prescribed, you may take non-aspirin pain medication if needed.  PAIN MEDICATION CAN BE VERY CONSTIPATING.  Take a stool softener or laxative such as senokot, pericolace, or milk of magnesia if needed.    Prescription given for Augmentin, Percocet and Miralax sent to your [facility].      Last pain medication given at 11:07am Tylenol 1000mg and Gabapentin 300mg and 2:50pm Oxycodone 5mg..    A follow-up appointment should be arranged with your doctor in 1-2 weeks; call to schedule.    You should CALL YOUR PHYSICIAN if you develop:  Fever greater than 101 degrees F.  Pain not relieved by medication, or persistent nausea or vomiting.  Excessive bleeding (blood soaking through dressing) or unexpected drainage from the wound.  Extreme redness  or swelling around the incision site, drainage of pus or foul smelling drainage.  Inability to urinate or empty your bladder within 8 hours.  Problems with breathing or chest pain.    You should call 911 if you develop problems with breathing or chest pain.  If you are unable to contact your doctor or surgical center, you should go to the nearest emergency room or urgent care center.      Physician's telephone #: [phone] Dr Mehta    MILD FLU-LIKE SYMPTOMS ARE NORMAL.  YOU MAY EXPERIENCE GENERALIZED MUSCLE ACHES, THROAT IRRITATION, HEADACHE AND/OR SOME NAUSEA.    If any questions arise, call your doctor.  If your doctor is not available, please feel free to call the Surgical Center at [phone].  The Center is open Monday through Friday from 7AM to 7PM.      A registered nurse may call you a few days after your surgery to see how you are doing after your procedure.    You may also receive a survey in the mail within the next two weeks and we ask that you take a few moments to complete the survey and return it to us.  Our goal is to provide you with very good care and we value your comments.     Depression / Suicide Risk    As you are discharged from this [facility] facility, it is important to learn how to keep safe from harming yourself.    Recognize the warning signs:  Abrupt changes in personality, positive or negative- including increase in energy   Giving away possessions  Change in eating patterns- significant weight changes-  positive or negative  Change in sleeping patterns- unable to sleep or sleeping all the time   Unwillingness or inability to communicate  Depression  Unusual sadness, discouragement and loneliness  Talk of wanting to die  Neglect of personal appearance   Rebelliousness- reckless behavior  Withdrawal from people/activities they love  Confusion- inability to concentrate     If you or a loved one observes any of these behaviors or has concerns about self-harm, here's what you  can do:  Talk about it- your feelings and reasons for harming yourself  Remove any means that you might use to hurt yourself (examples: pills, rope, extension cords, firearm)  Get professional help from the community (Mental Health, Substance Abuse, psychological counseling)  Do not be alone:Call your Safe Contact- someone whom you trust who will be there for you.  Call your local CRISIS HOTLINE 696-9669 or 337-414-9091  Call your local Children's Mobile Crisis Response Team [place] (781) 651-3188 or www.[employer]  Call the toll free National Suicide Prevention Hotlines   National Suicide Prevention Lifeline 168-362-SVFK (3994)  National Hope Line Network 800-SUICIDE (611-0626)    I acknowledge receipt and understanding of these Home Care instructions.

## 2023-05-23 NOTE — OR NURSING
1436.Patient arrived from OR in stable condition. Received report from OR nurse and anesthesiologist. VSS. Dressing is CDI    1445 Relative Amberle updated on patients condition. All questions and concerns were addressed.     1530 Transported patient to phase II in stable condition

## 2023-05-23 NOTE — ANESTHESIA PROCEDURE NOTES
Airway    Date/Time: 5/23/2023 12:49 PM    Performed by: Jones Anton M.D.  Authorized by: Jones Anton M.D.    Location:  OR  Urgency:  Elective  Indications for Airway Management:  Anesthesia      Spontaneous Ventilation: absent    Sedation Level:  Deep  Preoxygenated: Yes    Patient Position:  Sniffing  Final Airway Type:  Endotracheal airway  Final Endotracheal Airway:  ETT  Cuffed: Yes    Technique Used for Successful ETT Placement:  Direct laryngoscopy    Insertion Site:  Oral  Blade Type:  Simeon  Laryngoscope Blade/Videolaryngoscope Blade Size:  3  ETT Size (mm):  8.0  Measured from:  Teeth  ETT to Teeth (cm):  22  Placement Verified by: auscultation and capnometry    Cormack-Lehane Classification:  Grade I - full view of glottis  Number of Attempts at Approach:  1         CRRT STATUS NOTE    DATA:  Time:  4:40 PM  Pressures WNL:  YES  Filter Status:  WDL, new today.  Problems Reported/Alarms Noted:  None reported nor noted.   Supplies Present:  YES    ASSESSMENT:  Patient Net Fluid Balance:  Today thus far -1.1L.   Vital Signs:  On epi and norepi. HR 90. /63 (83). RR 27. Spo2 98% on min vent settings. T 97.7F o  Labs:  K 4.2, Cr 0.87. BUN 46. CO2 20. WBC 25.2.   Goals of Therapy: net - 0-50 as BPs allow. Meeting goals of therapy.     INTERVENTIONS:   Returned blood as filter was due for change at 0900. Biomed at bedside and rezeroed scales due to ongoing sensitivites. New circuit up and running by 0943. Machine continues to need hair ties and mat to decrease flow alarms.     PLAN:  Continue CRRT to meet goals of therapy.   Contact CRRT RN [phone] with concerns.

## 2023-05-23 NOTE — ANESTHESIA TIME REPORT
Anesthesia Start and Stop Event Times     Date Time Event    5/23/2023 1239 Ready for Procedure     1242 Anesthesia Start     1438 Anesthesia Stop        Responsible Staff  05/23/23    Name Role Begin End    Jones Anton M.D. Anesth 1242 1438        Overtime Reason:  no overtime (within assigned shift)    Comments:

## 2023-05-24 NOTE — ANESTHESIA POSTPROCEDURE EVALUATION
Patient: Olman Sims    Procedure Summary     Date: 05/23/23 Room / Location: [place] [address] / SURGERY [facility]    Anesthesia Start: 1242 Anesthesia Stop: 1438    Procedure: ROBOTIC BILATERAL INGUINAL HERNIA REPAIR (Bilateral: Abdomen) Diagnosis: (BILATERAL INGUINAL HERNIA)    Surgeons: Garland Mehta M.D. Responsible Provider: Jones Anton M.D.    Anesthesia Type: general ASA Status: 3          Final Anesthesia Type: general  Last vitals  BP   Blood Pressure : 138/58    Temp   36.3 °C (97.4 °F)    Pulse   (!) 55   Resp   18    SpO2   93 %      Anesthesia Post Evaluation    Patient location during evaluation: PACU  Patient participation: complete - patient participated  Level of consciousness: awake and alert    Airway patency: patent  Anesthetic complications: no  Cardiovascular status: hemodynamically stable  Respiratory status: acceptable  Hydration status: euvolemic    PONV: none          No notable events documented.     Nurse Pain Score: 0 (NPRS)

## 2023-06-13 ENCOUNTER — HOSPITAL ENCOUNTER (OUTPATIENT)
Dept: LAB | Facility: MEDICAL CENTER | Age: 73
End: 2023-06-13
Attending: STUDENT IN AN ORGANIZED HEALTH CARE EDUCATION/TRAINING PROGRAM
Payer: COMMERCIAL

## 2023-06-13 DIAGNOSIS — R74.8 ELEVATED ALKALINE PHOSPHATASE LEVEL: ICD-10-CM

## 2023-06-13 DIAGNOSIS — E78.5 DYSLIPIDEMIA: ICD-10-CM

## 2023-06-13 DIAGNOSIS — R73.03 PREDIABETES: ICD-10-CM

## 2023-06-13 DIAGNOSIS — Z11.59 ENCOUNTER FOR HEPATITIS C SCREENING TEST FOR LOW RISK PATIENT: ICD-10-CM

## 2023-06-13 DIAGNOSIS — N18.31 STAGE 3A CHRONIC KIDNEY DISEASE: ICD-10-CM

## 2023-06-13 LAB
ALBUMIN SERPL BCP-MCNC: 3.9 G/DL (ref 3.2–4.9)
ALBUMIN/GLOB SERPL: 1.3 G/DL
ALP SERPL-CCNC: 101 U/L (ref 30–99)
ALT SERPL-CCNC: 21 U/L (ref 2–50)
ANION GAP SERPL CALC-SCNC: 12 MMOL/L (ref 7–16)
AST SERPL-CCNC: 15 U/L (ref 12–45)
BILIRUB SERPL-MCNC: 0.4 MG/DL (ref 0.1–1.5)
BUN SERPL-MCNC: 19 MG/DL (ref 8–22)
CALCIUM ALBUM COR SERPL-MCNC: 9.2 MG/DL (ref 8.5–10.5)
CALCIUM SERPL-MCNC: 9.1 MG/DL (ref 8.5–10.5)
CHLORIDE SERPL-SCNC: 106 MMOL/L (ref 96–112)
CHOLEST SERPL-MCNC: 119 MG/DL (ref 100–199)
CO2 SERPL-SCNC: 27 MMOL/L (ref 20–33)
CREAT SERPL-MCNC: 1.76 MG/DL (ref 0.5–1.4)
EST. AVERAGE GLUCOSE BLD GHB EST-MCNC: 111 MG/DL
FASTING STATUS PATIENT QL REPORTED: NORMAL
GFR SERPLBLD CREATININE-BSD FMLA CKD-EPI: 41 ML/MIN/1.73 M 2
GLOBULIN SER CALC-MCNC: 2.9 G/DL (ref 1.9–3.5)
GLUCOSE SERPL-MCNC: 100 MG/DL (ref 65–99)
HBA1C MFR BLD: 5.5 % (ref 4–5.6)
HCV AB SER QL: NORMAL
HDLC SERPL-MCNC: 50 MG/DL
LDLC SERPL CALC-MCNC: 56 MG/DL
POTASSIUM SERPL-SCNC: 4.4 MMOL/L (ref 3.6–5.5)
PROT SERPL-MCNC: 6.8 G/DL (ref 6–8.2)
SODIUM SERPL-SCNC: 145 MMOL/L (ref 135–145)
TRIGL SERPL-MCNC: 63 MG/DL (ref 0–149)

## 2023-06-13 PROCEDURE — 86803 HEPATITIS C AB TEST: CPT

## 2023-06-13 PROCEDURE — 83036 HEMOGLOBIN GLYCOSYLATED A1C: CPT

## 2023-06-13 PROCEDURE — 80053 COMPREHEN METABOLIC PANEL: CPT

## 2023-06-13 PROCEDURE — 36415 COLL VENOUS BLD VENIPUNCTURE: CPT

## 2023-06-13 PROCEDURE — 80061 LIPID PANEL: CPT

## 2023-06-21 ENCOUNTER — OFFICE VISIT (OUTPATIENT)
Dept: URGENT CARE | Facility: CLINIC | Age: 73
End: 2023-06-21
Payer: COMMERCIAL

## 2023-06-21 VITALS
RESPIRATION RATE: 16 BRPM | HEIGHT: 69 IN | OXYGEN SATURATION: 96 % | BODY MASS INDEX: 22.22 KG/M2 | HEART RATE: 60 BPM | WEIGHT: 150 LBS | TEMPERATURE: 98.6 F

## 2023-06-21 DIAGNOSIS — I10 ESSENTIAL HYPERTENSION: ICD-10-CM

## 2023-06-21 PROCEDURE — 99214 OFFICE O/P EST MOD 30 MIN: CPT | Performed by: REGISTERED NURSE

## 2023-06-21 ASSESSMENT — ENCOUNTER SYMPTOMS
SORE THROAT: 0
ABDOMINAL PAIN: 0
FOCAL WEAKNESS: 0
HEADACHES: 0
SHORTNESS OF BREATH: 0
FEVER: 0
ORTHOPNEA: 0
TINGLING: 0
VOMITING: 0
EYE PAIN: 0
NAUSEA: 0
COUGH: 0
WHEEZING: 0
DIARRHEA: 0
CHILLS: 0

## 2023-06-21 ASSESSMENT — FIBROSIS 4 INDEX: FIB4 SCORE: 0.96

## 2023-06-21 NOTE — PROGRESS NOTES
Subjective:   Olman Sims is a 72 y.o. male who presents for Hypertension (This morning 177/74 / started Monday )    5 days ago talked to primary care provider who doubled his losartan to 100mg daily, additionally takes carvedilol. At that time patient started a log, typically running 150-160/70's. Yesterday, PCP told him to make an appointment with him so he can add second medication, provider was out of the office today so patient came here.    Review of Systems   Constitutional:  Negative for chills and fever.   HENT:  Negative for congestion, ear pain and sore throat.    Eyes:  Negative for pain.   Respiratory:  Negative for cough, shortness of breath and wheezing.    Cardiovascular:  Negative for chest pain, orthopnea and leg swelling.   Gastrointestinal:  Negative for abdominal pain, diarrhea, nausea and vomiting.   Genitourinary:  Negative for dysuria.   Skin:  Negative for rash.   Neurological:  Negative for tingling, focal weakness and headaches.       No Known Allergies    Patient Active Problem List    Diagnosis Date Noted    Hypovitaminosis D 08/16/2022    Elevated alkaline phosphatase level 03/23/2022    Stage 3a chronic kidney disease (HCC) 08/03/2021    History of kidney stones 02/03/2021    Coronary artery disease involving native coronary artery of native heart without angina pectoris 05/16/2019    History of atrial fibrillation 04/29/2019    History of non-ST elevation myocardial infarction (NSTEMI) 04/29/2019    Prediabetes 01/10/2019    Primary insomnia 11/16/2018    Nasal septal deformity 05/04/2018    Hypertrophy of both inferior nasal turbinates 05/04/2018    Chronic vasomotor rhinitis 05/04/2018    Dyslipidemia 01/03/2018    Essential hypertension 11/10/2014    Mild cognitive impairment with memory loss 12/02/2013    Peripheral vascular disease (HCC) 11/12/2012    Other emphysema (HCC) 06/15/2012       Current Outpatient Medications Ordered in Epic   Medication Sig Dispense Refill     Cholecalciferol (D 5000) 125 MCG (5000 UT) Tab Take 5,000 Units by mouth every morning.      losartan (COZAAR) 50 MG Tab Take 1 Tablet by mouth every day. 30 Tablet 0    zolpidem (AMBIEN) 5 MG Tab Take 1 Tablet by mouth at bedtime as needed for Sleep for up to 90 days. 90 Tablet 0    fluticasone-umeclidin-vilant (TRELEGY ELLIPTA) 100-62.5-25 MCG/ACT AEROSOL POWDER, BREATH ACTIVATED inhalation Inhale 1 Inhalation. every day. Rinse mouth after use 3 Each 3    aspirin (ASA) 81 MG Chew Tab chewable tablet Chew 1 Tablet every day. CHEW AND SWALLOW 90 Tablet 3    carvedilol (COREG) 12.5 MG Tab TAKE 1 TABLET BY MOUTH TWICE DAILY WITH MEALS 180 Tablet 3    atorvastatin (LIPITOR) 40 MG Tab Take 1 Tablet by mouth every evening. 90 Tablet 3    VENTOLIN  (90 Base) MCG/ACT Aero Soln inhalation aerosol INHALE 2 PUFFS BY MOUTH EVERY 6 HOURS AS NEEDED 1 Each 11     No current [facility]-ordered facility-administered medications on file.       Past Surgical History:   Procedure Laterality Date    INGUINAL HERNIA REPAIR ROBOTIC XI Bilateral 5/23/2023    Procedure: ROBOTIC BILATERAL INGUINAL HERNIA REPAIR;  Surgeon: Garland Mehta M.D.;  Location: [facility];  Service: Gen Robotic    MO CYSTOSCOPY,INSERT URETERAL STENT Left 1/22/2020    Procedure: CYSTOSCOPY, WITH URETERAL STENT INSERTION;  Surgeon: Paul Quiles M.D.;  Location: [facility];  Service: Urology    MO CYSTO/URETERO/PYELOSCOPY, DX Left 1/22/2020    Procedure: URETEROSCOPY;  Surgeon: Paul Quiles M.D.;  Location: [facility];  Service: Urology    LASERTRIPSY Left 1/22/2020    Procedure: LITHOTRIPSY, USING LASER;  Surgeon: Paul Quiles M.D.;  Location: [facility];  Service: Urology    SEPTOPLASTY N/A 5/4/2018    Procedure: SEPTOPLASTY;  Surgeon: Jesse Saavedra M.D.;  Location: SURGERY SAME DAY [facility];  Service: Ent    TURBINATE REDUCTION Bilateral 5/4/2018    Procedure: TURBINATE REDUCTION- RESECTION WITH  "SUBMUCOSAL APPROACH;  Surgeon: Jesse Saavedra M.D.;  Location: SURGERY SAME DAY [facility];  Service: Ent    RECOVERY  2012    Performed by Ir-Recovery Surgery at SURGERY SAME DAY [facility]    PERCUTANEOUS NEPHROSTOLITHOTOMY  2012    Performed by KAREN KOLB at SURGERY [facility]    PERCUTANEOUS NEPHROSTOLITHOTOMY  2012    Performed by KAREN KOLB at SURGERY [facility]    RECOVERY  2012    Performed by SURGERY, IR-RECOVERY at SURGERY SAME DAY [facility] ORS    CYSTOSCOPY STENT PLACEMENT  2012    Performed by KAREN KOLB at SURGERY [facility] ORS    APPENDECTOMY  1987    APPENDECTOMY      STENT PLACEMENT      heart    ZZZ CARDIAC CATH         Social History     Tobacco Use    Smoking status: Former     Packs/day: 1.00     Years: 25.00     Pack years: 25.00     Types: Cigarettes     Quit date: 2012     Years since quittin.3    Smokeless tobacco: Never   Vaping Use    Vaping Use: Never used   Substance Use Topics    Alcohol use: Yes     Comment: occasional    Drug use: No       family history includes Cancer (age of onset: 65) in his maternal grandfather.     Problem list, medications, and allergies reviewed by myself today in Epic.     Objective:   Pulse 60   Temp 37 °C (98.6 °F) (Temporal)   Resp 16   Ht 1.753 m (5' 9\")   Wt 68 kg (150 lb)   SpO2 96%   BMI 22.15 kg/m² BP: 180/78    Physical Exam  Vitals and nursing note reviewed.   Constitutional:       General: He is not in acute distress.     Appearance: Normal appearance. He is well-developed. He is not diaphoretic.      Comments: Well appearing and nontoxic.  Speaking in full sentences and acting appropriately. Is wearing nasal cannula with 2L o2   HENT:      Head: Normocephalic.      Right Ear: Hearing normal.      Left Ear: Hearing normal.      Nose: Nose normal.      Mouth/Throat:      Mouth: Mucous membranes are moist.   Eyes:      General:         Right eye: No discharge.         Left eye: No " discharge.      Conjunctiva/sclera: Conjunctivae normal.   Cardiovascular:      Rate and Rhythm: Normal rate and regular rhythm.      Pulses: Normal pulses.      Heart sounds: Normal heart sounds.   Pulmonary:      Effort: Pulmonary effort is normal. No respiratory distress.      Breath sounds: Decreased breath sounds present.   Abdominal:      General: Abdomen is flat.      Palpations: Abdomen is soft.   Musculoskeletal:         General: Normal range of motion.      Cervical back: Normal range of motion and neck supple.      Right lower leg: No edema.      Left lower leg: No edema.   Skin:     General: Skin is warm and dry.      Capillary Refill: Capillary refill takes less than 2 seconds.   Neurological:      General: No focal deficit present.      Mental Status: He is alert and oriented to person, place, and time.      Cranial Nerves: Cranial nerves 2-12 are intact.      Sensory: Sensation is intact.      Motor: Motor function is intact.      Coordination: Coordination is intact.      Gait: Gait is intact.   Psychiatric:         Mood and Affect: Mood normal.         Assessment/Plan:     Diagnosis and associated orders:   1. Essential hypertension           Comments/MDM:     Hypertension, currently uncontrolled, 180/78, on losartan and carvedilol.  His dosage of losartan was doubled 5 days ago.  He is asymptomatic at this time, no red flag signs or symptoms  Discussed that we will not add medication at this time given change 5 days ago of losartan  Neurologically intact, overall unremarkable exam but does have decreased breath sounds but this is likely baseline given underlying cardiopulmonary disease  Has appointment to follow-up with primary care per PCP request  Discussed continuing with current log  Blood pressure friendly diet, DASH  Strict ER precautions         Differential diagnosis, natural history, supportive care, and indications for immediate follow-up discussed.    Return to clinic or go to ED if  symptoms worsen or persist. Indications for ED discussed at length. Patient/Parent/Guardian voices understanding. Follow-up with your primary care provider in 3-5 days. Red flag symptoms discussed. All side effects of medication discussed including allergic response, GI upset, tendon injury, rash, sedation etc.    I personally reviewed prior external notes and test results pertinent to today's visit as well as additional imaging and testing completed in clinic today.     Please note that this dictation was created using voice recognition software. I have made every reasonable attempt to correct obvious errors, but I expect that there are errors of grammar and possibly content that I did not discover before finalizing the note.    This note was electronically signed by GIOVANNI Jiménez

## 2023-06-23 ENCOUNTER — OFFICE VISIT (OUTPATIENT)
Dept: MEDICAL GROUP | Facility: PHYSICIAN GROUP | Age: 73
End: 2023-06-23
Payer: COMMERCIAL

## 2023-06-23 VITALS
WEIGHT: 159.2 LBS | HEART RATE: 86 BPM | RESPIRATION RATE: 16 BRPM | TEMPERATURE: 98.7 F | OXYGEN SATURATION: 98 % | HEIGHT: 69 IN | BODY MASS INDEX: 23.58 KG/M2 | DIASTOLIC BLOOD PRESSURE: 76 MMHG | SYSTOLIC BLOOD PRESSURE: 186 MMHG

## 2023-06-23 DIAGNOSIS — I16.0 HYPERTENSIVE URGENCY: ICD-10-CM

## 2023-06-23 DIAGNOSIS — I10 ESSENTIAL HYPERTENSION: ICD-10-CM

## 2023-06-23 PROCEDURE — 3078F DIAST BP <80 MM HG: CPT | Performed by: STUDENT IN AN ORGANIZED HEALTH CARE EDUCATION/TRAINING PROGRAM

## 2023-06-23 PROCEDURE — 99214 OFFICE O/P EST MOD 30 MIN: CPT | Performed by: STUDENT IN AN ORGANIZED HEALTH CARE EDUCATION/TRAINING PROGRAM

## 2023-06-23 PROCEDURE — 3077F SYST BP >= 140 MM HG: CPT | Performed by: STUDENT IN AN ORGANIZED HEALTH CARE EDUCATION/TRAINING PROGRAM

## 2023-06-23 RX ORDER — AMLODIPINE BESYLATE 10 MG/1
10 TABLET ORAL EVERY MORNING
Qty: 30 TABLET | Refills: 2 | Status: SHIPPED | OUTPATIENT
Start: 2023-06-23 | End: 2023-06-23

## 2023-06-23 RX ORDER — POLYETHYLENE GLYCOL 3350
POWDER (GRAM) MISCELLANEOUS
COMMUNITY
End: 2023-08-04

## 2023-06-23 RX ORDER — LOSARTAN POTASSIUM 100 MG/1
100 TABLET ORAL EVERY EVENING
Qty: 90 EACH | Refills: 1 | Status: SHIPPED | OUTPATIENT
Start: 2023-06-23 | End: 2023-12-11 | Stop reason: SDUPTHER

## 2023-06-23 RX ORDER — ATORVASTATIN CALCIUM 40 MG/1
TABLET, FILM COATED ORAL
COMMUNITY
End: 2023-06-23

## 2023-06-23 RX ORDER — CLONIDINE HYDROCHLORIDE 0.1 MG/1
0.1 TABLET ORAL ONCE
Status: COMPLETED | OUTPATIENT
Start: 2023-06-23 | End: 2023-06-23

## 2023-06-23 RX ORDER — ERGOCALCIFEROL 1.25 MG/1
CAPSULE ORAL
COMMUNITY
End: 2023-06-23

## 2023-06-23 RX ORDER — AMLODIPINE BESYLATE 10 MG/1
10 TABLET ORAL
Qty: 30 TABLET | Refills: 2 | Status: SHIPPED | OUTPATIENT
Start: 2023-06-23 | End: 2023-09-13 | Stop reason: SDUPTHER

## 2023-06-23 RX ADMIN — CLONIDINE HYDROCHLORIDE 0.1 MG: 0.1 TABLET ORAL at 09:58

## 2023-06-23 ASSESSMENT — FIBROSIS 4 INDEX: FIB4 SCORE: 0.97

## 2023-06-23 NOTE — PROGRESS NOTES
CC:  Diagnoses of Hypertensive urgency and Essential hypertension were pertinent to this visit.    HISTORY OF THE PRESENT ILLNESS: Patient is a 73 y.o. male. This pleasant patient is here today to discuss:    1. Hypertensive urgency  2. Essential hypertension  Mr. Sims presents today for blood pressure follow-up.  In the interval since his last visit he has reported frequent blood pressures with 160/170 systolic.  He has increased his losartan from 50 to 100 mg daily and reports full compliance.  He has also been taking carvedilol 12.5 mg twice daily.    He presents today 238/80.  No headaches or visual disturbances, chest pain/pressure/palpitations.  No recent orthopnea, ankle edema or increased shortness of breath.  Urinary output seems normal to him.  Overall, he feels quite well today does not wish to go to ER.    Active Diagnosis:    Patient Active Problem List   Diagnosis    Other emphysema (HCC)    Peripheral vascular disease (HCC)    Mild cognitive impairment with memory loss    Essential hypertension    Dyslipidemia    Nasal septal deformity    Hypertrophy of both inferior nasal turbinates    Chronic vasomotor rhinitis    Primary insomnia    Prediabetes    History of atrial fibrillation    History of non-ST elevation myocardial infarction (NSTEMI)    Coronary artery disease involving native coronary artery of native heart without angina pectoris    History of kidney stones    Stage 3a chronic kidney disease (HCC)    Elevated alkaline phosphatase level    Hypovitaminosis D      Current Outpatient Medications Ordered in Epic   Medication Sig Dispense Refill    Polyethylene Glycol 3350 Powder polyethylene glycol 3350 17 gram oral powder packet   MIX AND DRINK 1 PACKET BY MOUTH EVERY DAY FOR 7 DAYS      losartan (COZAAR) 100 MG Tab Take 1 Tablet by mouth every evening. 90 Each 1    amLODIPine (NORVASC) 10 MG Tab Take 1 Tablet by mouth every morning on an empty stomach. 30 Tablet 2    Cholecalciferol (D  "5000) 125 MCG (5000 UT) Tab Take 5,000 Units by mouth every morning.      zolpidem (AMBIEN) 5 MG Tab Take 1 Tablet by mouth at bedtime as needed for Sleep for up to 90 days. 90 Tablet 0    fluticasone-umeclidin-vilant (TRELEGY ELLIPTA) 100-62.5-25 MCG/ACT AEROSOL POWDER, BREATH ACTIVATED inhalation Inhale 1 Inhalation. every day. Rinse mouth after use 3 Each 3    aspirin (ASA) 81 MG Chew Tab chewable tablet Chew 1 Tablet every day. CHEW AND SWALLOW 90 Tablet 3    carvedilol (COREG) 12.5 MG Tab TAKE 1 TABLET BY MOUTH TWICE DAILY WITH MEALS 180 Tablet 3    atorvastatin (LIPITOR) 40 MG Tab Take 1 Tablet by mouth every evening. 90 Tablet 3    VENTOLIN  (90 Base) MCG/ACT Aero Soln inhalation aerosol INHALE 2 PUFFS BY MOUTH EVERY 6 HOURS AS NEEDED 1 Each 11     No current [facility]-ordered facility-administered medications on file.     ROS:   See HPI.    Objective:     Exam: BP (!) 186/76 (BP Location: Left arm, Patient Position: Sitting, BP Cuff Size: Adult)   Pulse 86   Temp 37.1 °C (98.7 °F) (Temporal)   Resp 16   Ht 1.753 m (5' 9\")   Wt 72.2 kg (159 lb 3.2 oz)   SpO2 98%  Body mass index is 23.51 kg/m².    Repeat blood pressure after sitting 5 minutes 216/70.    Repeat blood pressure 30 minutes after receiving 0.1 mg clonidine 186/76.    General: Normal appearing. No distress.  Pulmonary: Clear to ausculation.  Normal effort. No rales, ronchi, or wheezing.  Cardiovascular: Regular rate and rhythm without murmur.   neurologic: Grossly nonfocal.    Skin: Warm and dry.  No obvious lesions.  Musculoskeletal: No extremity cyanosis, clubbing, or edema.  Psych: Normal mood and affect.         Assessment & Plan:   73 y.o. male with the following -    Labs:   6/13/2023:  -CMP showing glucose 100, creatinine 1.76, GFR 41, alkaline phosphatase 101.    1. Hypertensive urgency  2. Essential hypertension  -Chronic with exacerbation.  -clonidine 0.1 mg now.  -Continue Losartan 100 mg daily  -Continue carvedilol 12.5 mg twice " daily.  -Start Amlodipine 10 mg daily, first dose as soon as he picks it up.D90 R1.  -Return for blood pressure check on Monday.  -Pt received instructions on what symptoms he should present to ER for.    Return in about 2 weeks (around 7/7/2023).    Please note that this dictation was created using voice recognition software. I have made every reasonable attempt to correct obvious errors, but I expect that there are errors of grammar and possibly content that I did not discover before finalizing the note.      Patricio Hernandez PA-C 6/23/2023

## 2023-06-23 NOTE — PATIENT INSTRUCTIONS
For your elevated blood pressure:    Immediately report to ER for any chest pain/pressure/palpitations, tractable headaches or stroke symptoms.    -Continue losartan 100 mg to be taken each evening.    -Start amlodipine 10 mg daily.  Take your first dose as soon as you pick it up today and take it each morning thereafter.    -Continue carvedilol 12.5 mg twice daily.    -Return for a blood pressure check on Monday.

## 2023-06-29 ENCOUNTER — NON-PROVIDER VISIT (OUTPATIENT)
Dept: MEDICAL GROUP | Facility: PHYSICIAN GROUP | Age: 73
End: 2023-06-29
Payer: COMMERCIAL

## 2023-06-29 VITALS — SYSTOLIC BLOOD PRESSURE: 176 MMHG | DIASTOLIC BLOOD PRESSURE: 54 MMHG

## 2023-06-29 PROCEDURE — 99999 PR NO CHARGE: CPT | Performed by: STUDENT IN AN ORGANIZED HEALTH CARE EDUCATION/TRAINING PROGRAM

## 2023-06-29 NOTE — PROGRESS NOTES
Luis Sims is a 73 y.o. male here for a non-provider visit for BP follow up    If abnormal was an in office provider notified today (if so, indicate provider)? Yes    Routed to PCP? Yes

## 2023-07-03 NOTE — PROGRESS NOTES
Subjective:     CC:  The encounter diagnosis was Lung nodule.    HISTORY OF THE PRESENT ILLNESS: Patient is a 73 y.o. male. This pleasant patient is here today to establish care in the Lung Nodule Clinic and discuss right upper lobe pulmonary nodule. His PCP is SONI Barrios, and he was referred by his previous pulmonologist, JORGE Jaimes.    Pulmonary nodule- he was first noted to have a 7mm irregular nodular opacity of the right upper lung on 7/29/22 when he had a CT for respiratory failure.  He had a follow-up CT on 11/2/22 that revealed that the 7mm nodule decreased in size to 4mm, consistent with likely inflammatory or infectious process.  His pulmonologist on 11/8/22 planned for yearly follow-up imaging of this nodule and referred Luis to ensure this follow-up.    History of smoking- former smoker who quite smoking in 2012.  He has 25 pack year history.  No significant second hand smoke exposure.  He worked with computers.  No significant exposure to dust, fumes, solvents, gas, radiation, chemical, asbestos, radon or TB exposure.  He initially lived in [place] and [place] before moving to [place].  He has been hospitalized in the past for COPD exacerbations and pneumonia and was on a ventilator 2/2022.  He also was intubated when having afib in the past.  He currently using 2L of supplemental O2.  No recent increase in need for supplemental O2.  Denies cough but has rare clear sputum for the last year without recent changes.  Denies hemoptysis.  Sputum is not malodorous.  Denies wheezing and only has SOB with extreme exertion.  No abnormal weight loss (he lost his appetite with his hernia, but appetite has improved since hernia was repaired).  He started exercising about 5 weeks ago and has noticed improvement in his baseline breathing since he started exercising and is able to walk to the bathroom without SOB.    Allergies: Patient has no known allergies.    Current Outpatient Medications  "Ordered in [facility]   Medication Sig Dispense Refill    Polyethylene Glycol 3350 Powder polyethylene glycol 3350 17 gram oral powder packet   MIX AND DRINK 1 PACKET BY MOUTH EVERY DAY FOR 7 DAYS      losartan (COZAAR) 100 MG Tab Take 1 Tablet by mouth every evening. 90 Each 1    amLODIPine (NORVASC) 10 MG Tab Take 1 Tablet by mouth every morning on an empty stomach. 30 Tablet 2    Cholecalciferol (D 5000) 125 MCG (5000 UT) Tab Take 5,000 Units by mouth every morning.      zolpidem (AMBIEN) 5 MG Tab Take 1 Tablet by mouth at bedtime as needed for Sleep for up to 90 days. 90 Tablet 0    fluticasone-umeclidin-vilant (TRELEGY ELLIPTA) 100-62.5-25 MCG/ACT AEROSOL POWDER, BREATH ACTIVATED inhalation Inhale 1 Inhalation. every day. Rinse mouth after use 3 Each 3    aspirin (ASA) 81 MG Chew Tab chewable tablet Chew 1 Tablet every day. CHEW AND SWALLOW 90 Tablet 3    carvedilol (COREG) 12.5 MG Tab TAKE 1 TABLET BY MOUTH TWICE DAILY WITH MEALS 180 Tablet 3    atorvastatin (LIPITOR) 40 MG Tab Take 1 Tablet by mouth every evening. 90 Tablet 3    VENTOLIN  (90 Base) MCG/ACT Aero Soln inhalation aerosol INHALE 2 PUFFS BY MOUTH EVERY 6 HOURS AS NEEDED 1 Each 11    chlorthalidone (HYGROTON) 25 MG Tab Take 1 Tablet by mouth every day. (Patient not taking: Reported on 7/5/2023) 30 Tablet 2     No current Epic-ordered facility-administered medications on file.       Past Medical History:   Diagnosis Date    Adverse effect of anesthesia     \"stopped breathing/elevated bp\"2012    Anesthesia     \"stopped breathing/elevated bp\"2012    Atrial fibrillation (HCC)     Breath shortness     when he quit smoking/with exertion    CAD (coronary artery disease)     COPD     Dental disorder     lower partial    Emphysema of lung (HCC)     Hyperlipidemia     Hypertension     Kidney stone     Oxygen dependent     q hs prn 2 ltr    Paroxysmal atrial fibrillation (HCC)     PVD (peripheral vascular disease) (HCC)     R iliac artery stent    Renal " disorder     right kidney stone/stent in place       Past Surgical History:   Procedure Laterality Date    INGUINAL HERNIA REPAIR ROBOTIC XI Bilateral 2023    Procedure: ROBOTIC BILATERAL INGUINAL HERNIA REPAIR;  Surgeon: Garland Mehta M.D.;  Location: [facility];  Service: Gen Robotic    ID CYSTOSCOPY,INSERT URETERAL STENT Left 2020    Procedure: CYSTOSCOPY, WITH URETERAL STENT INSERTION;  Surgeon: Karen Quiles M.D.;  Location: SURGERY [facility];  Service: Urology    ID CYSTO/URETERO/PYELOSCOPY, DX Left 2020    Procedure: URETEROSCOPY;  Surgeon: Karen Quiles M.D.;  Location: SURGERY [facility];  Service: Urology    LASERTRIPSY Left 2020    Procedure: LITHOTRIPSY, USING LASER;  Surgeon: Karen Quiles M.D.;  Location: SURGERY [facility];  Service: Urology    SEPTOPLASTY N/A 2018    Procedure: SEPTOPLASTY;  Surgeon: Jesse Saavedra M.D.;  Location: SURGERY SAME DAY [facility];  Service: Ent    TURBINATE REDUCTION Bilateral 2018    Procedure: TURBINATE REDUCTION- RESECTION WITH SUBMUCOSAL APPROACH;  Surgeon: Jesse Saavedra M.D.;  Location: SURGERY SAME DAY [facility];  Service: Ent    RECOVERY  2012    Performed by Ir-Recovery Surgery at SURGERY SAME DAY [facility]    PERCUTANEOUS NEPHROSTOLITHOTOMY  2012    Performed by KAREN QUILES at [facility]    PERCUTANEOUS NEPHROSTOLITHOTOMY  2012    Performed by KAREN QUILES at SURGERY [facility]    RECOVERY  2012    Performed by SURGERY, IR-RECOVERY at SURGERY SAME DAY [facility]    CYSTOSCOPY STENT PLACEMENT  2012    Performed by KAREN QUILES at SURGERY [facility] ORS    APPENDECTOMY  1987    APPENDECTOMY      STENT PLACEMENT      heart    Z CARDIAC CATH         Social History     Tobacco Use    Smoking status: Former     Packs/day: 1.00     Years: 25.00     Pack years: 25.00     Types: Cigarettes     Quit date: 2012     Years since quittin.3  "   Smokeless tobacco: Never   Vaping Use    Vaping Use: Never used   Substance Use Topics    Alcohol use: Yes     Comment: occasional    Drug use: No       Social History     Social History Narrative    Not on file       Family History   Problem Relation Age of Onset    Lung Disease Maternal Grandmother         empysema- smoker    Cancer Maternal Grandfather 65        colon       ROS:   Gen: no fevers/chills, no changes in weight  Pulm: per hpi  CV: no chest pain, no palpitations  GI: no nausea/vomiting, no diarrhea        Objective:     Exam: BP (!) 148/86 (BP Location: Right arm, Patient Position: Sitting, BP Cuff Size: Adult)   Pulse 61   Resp 16   Ht 1.753 m (5' 9\")   Wt 68.5 kg (151 lb)   SpO2 96%  Body mass index is 22.3 kg/m².    General: Normal appearing. No distress.  HEENT: Normocephalic.    Eyes: Eyes conjunctiva clear lids without ptosis  Neck: Supple. Thyroid is not enlarged.  Pulmonary: Clear to ausculation.  Normal effort. No rales, ronchi, or wheezing.  Cardiovascular: Regular rate and rhythm without murmur.   Abdomen: Soft, nontender, nondistended. Normal bowel sounds.   Neurologic: No gross motor deficits  Lymph: No cervical or supraclavicular lymph nodes are palpable  Skin: Warm and dry.  No obvious lesions.  Psych: Normal mood and affect. Alert and oriented. Judgment and insight is normal.      Assessment & Plan:   73 y.o. male with the following -    1. Lung nodule  Chronic, asymptomatic.  His nodule is improving and reduced in size from 7/29/22 to 11/2/22.  We reviewed his imaging together today.  We discussed that the nodule is acting benign, but given his smoking history and the ground glass opacities noted on his CT 11/2/22, recommended repeat CT to be done around 11/23 to ensure continued improvement and stability.  Imaging ordered.  Pt to return for imaging review and to discuss possible enrollment in the Lung Cancer Screening program next visit, if imaging is improved or stable. " Encouraged continued smoking cessation. Patient expressed understanding and agreement with plan.  - CT-CHEST (THORAX) W/O; Future      Return in about 5 months (around 11/20/2023) for lung nodule follow-up after CT has been done.  Pt to be seen in Lung Nodule Clinic.    Please note that this dictation was created using voice recognition software. I have made every reasonable attempt to correct obvious errors, but I expect that there are errors of grammar and possibly content that I did not discover before finalizing the note.

## 2023-07-05 ENCOUNTER — OFFICE VISIT (OUTPATIENT)
Dept: SLEEP MEDICINE | Facility: MEDICAL CENTER | Age: 73
End: 2023-07-05
Attending: FAMILY MEDICINE
Payer: COMMERCIAL

## 2023-07-05 VITALS
RESPIRATION RATE: 16 BRPM | DIASTOLIC BLOOD PRESSURE: 86 MMHG | SYSTOLIC BLOOD PRESSURE: 148 MMHG | HEART RATE: 61 BPM | OXYGEN SATURATION: 96 % | HEIGHT: 69 IN | WEIGHT: 151 LBS | BODY MASS INDEX: 22.36 KG/M2

## 2023-07-05 DIAGNOSIS — R91.1 LUNG NODULE: ICD-10-CM

## 2023-07-05 DIAGNOSIS — I10 ESSENTIAL HYPERTENSION: ICD-10-CM

## 2023-07-05 PROCEDURE — 3077F SYST BP >= 140 MM HG: CPT | Performed by: FAMILY MEDICINE

## 2023-07-05 PROCEDURE — 99212 OFFICE O/P EST SF 10 MIN: CPT | Performed by: FAMILY MEDICINE

## 2023-07-05 PROCEDURE — 3079F DIAST BP 80-89 MM HG: CPT | Performed by: FAMILY MEDICINE

## 2023-07-05 PROCEDURE — 99214 OFFICE O/P EST MOD 30 MIN: CPT | Performed by: FAMILY MEDICINE

## 2023-07-05 RX ORDER — CHLORTHALIDONE 25 MG/1
25 TABLET ORAL DAILY
Qty: 30 TABLET | Refills: 2 | Status: SHIPPED | OUTPATIENT
Start: 2023-07-05 | End: 2023-07-06

## 2023-07-05 ASSESSMENT — FIBROSIS 4 INDEX: FIB4 SCORE: 0.97

## 2023-07-06 ENCOUNTER — OFFICE VISIT (OUTPATIENT)
Dept: MEDICAL GROUP | Facility: PHYSICIAN GROUP | Age: 73
End: 2023-07-06
Payer: COMMERCIAL

## 2023-07-06 VITALS
HEIGHT: 69 IN | BODY MASS INDEX: 23.4 KG/M2 | DIASTOLIC BLOOD PRESSURE: 48 MMHG | SYSTOLIC BLOOD PRESSURE: 148 MMHG | HEART RATE: 58 BPM | OXYGEN SATURATION: 95 % | WEIGHT: 158 LBS | TEMPERATURE: 98.4 F

## 2023-07-06 DIAGNOSIS — I10 ESSENTIAL HYPERTENSION: ICD-10-CM

## 2023-07-06 PROCEDURE — 3078F DIAST BP <80 MM HG: CPT | Performed by: STUDENT IN AN ORGANIZED HEALTH CARE EDUCATION/TRAINING PROGRAM

## 2023-07-06 PROCEDURE — 3077F SYST BP >= 140 MM HG: CPT | Performed by: STUDENT IN AN ORGANIZED HEALTH CARE EDUCATION/TRAINING PROGRAM

## 2023-07-06 PROCEDURE — 99213 OFFICE O/P EST LOW 20 MIN: CPT | Performed by: STUDENT IN AN ORGANIZED HEALTH CARE EDUCATION/TRAINING PROGRAM

## 2023-07-06 ASSESSMENT — FIBROSIS 4 INDEX: FIB4 SCORE: 0.97

## 2023-07-06 NOTE — PROGRESS NOTES
CC:  The encounter diagnosis was Essential hypertension.    HISTORY OF THE PRESENT ILLNESS: Patient is a 73 y.o. male. This pleasant patient is here today to discuss:    1. Essential hypertension  Losartan 100 mg daily.  Amlodipine 10 mg daily.    Carvedilol 12.5 mg twice daily.  Patient reports full compliance.  His home blood pressure log from the past 5 days shows systolics mostly in the 120s with an occasional low 130s.    Active Diagnosis:    Patient Active Problem List   Diagnosis    Other emphysema (HCC)    Peripheral vascular disease (HCC)    Mild cognitive impairment with memory loss    Essential hypertension    Dyslipidemia    Nasal septal deformity    Hypertrophy of both inferior nasal turbinates    Chronic vasomotor rhinitis    Primary insomnia    Prediabetes    History of atrial fibrillation    History of non-ST elevation myocardial infarction (NSTEMI)    Coronary artery disease involving native coronary artery of native heart without angina pectoris    History of kidney stones    Stage 3a chronic kidney disease (HCC)    Elevated alkaline phosphatase level    Hypovitaminosis D    Lung nodule      Current Outpatient Medications Ordered in Epic   Medication Sig Dispense Refill    losartan (COZAAR) 100 MG Tab Take 1 Tablet by mouth every evening. 90 Each 1    amLODIPine (NORVASC) 10 MG Tab Take 1 Tablet by mouth every morning on an empty stomach. 30 Tablet 2    Cholecalciferol (D 5000) 125 MCG (5000 UT) Tab Take 5,000 Units by mouth every morning.      zolpidem (AMBIEN) 5 MG Tab Take 1 Tablet by mouth at bedtime as needed for Sleep for up to 90 days. 90 Tablet 0    aspirin (ASA) 81 MG Chew Tab chewable tablet Chew 1 Tablet every day. CHEW AND SWALLOW 90 Tablet 3    carvedilol (COREG) 12.5 MG Tab TAKE 1 TABLET BY MOUTH TWICE DAILY WITH MEALS 180 Tablet 3    atorvastatin (LIPITOR) 40 MG Tab Take 1 Tablet by mouth every evening. 90 Tablet 3    VENTOLIN  (90 Base) MCG/ACT Aero Soln inhalation aerosol  "INHALE 2 PUFFS BY MOUTH EVERY 6 HOURS AS NEEDED 1 Each 11    Polyethylene Glycol 3350 Powder polyethylene glycol 3350 17 gram oral powder packet   MIX AND DRINK 1 PACKET BY MOUTH EVERY DAY FOR 7 DAYS (Patient not taking: Reported on 7/6/2023)      fluticasone-umeclidin-vilant (TRELEGY ELLIPTA) 100-62.5-25 MCG/ACT AEROSOL POWDER, BREATH ACTIVATED inhalation Inhale 1 Inhalation. every day. Rinse mouth after use (Patient not taking: Reported on 7/6/2023) 3 Each 3     No current Epic-ordered facility-administered medications on file.     ROS:   See HPI.    Objective:     Exam: BP (!) 148/48 (BP Location: Left arm, Patient Position: Sitting, BP Cuff Size: Adult)   Pulse (!) 58   Temp 36.9 °C (98.4 °F) (Temporal)   Ht 1.753 m (5' 9\")   Wt 71.7 kg (158 lb)   SpO2 95%  Body mass index is 23.33 kg/m².    General: Normal appearing. No distress.  Pulmonary: Clear to ausculation.  Normal effort. No rales, ronchi, or wheezing.  Patient noted to be on portable oxygen.  Cardiovascular: Regular rate and rhythm without murmur.  Heart sounds are muffled.  neurologic: Grossly nonfocal.    Skin: Warm and dry.  No obvious lesions.  Musculoskeletal: No extremity cyanosis, clubbing, or edema.  Psych: Normal mood and affect.         Assessment & Plan:   73 y.o. male with the following -    Labs:   No pertinent labs.    1. Essential hypertension  -Chronic, much improved from his previous visit.  -Continue losartan 100 mg daily.  -Continue amlodipine 10 mg daily.  -Continue carvedilol 12.5 mg twice daily.  -Hold chlorthalidone for now.    Return if symptoms worsen or fail to improve.  And as scheduled ointment next month.    Please note that this dictation was created using voice recognition software. I have made every reasonable attempt to correct obvious errors, but I expect that there are errors of grammar and possibly content that I did not discover before finalizing the note.      Patricio Hernandez PA-C 7/6/2023  "

## 2023-08-04 ENCOUNTER — DOCUMENTATION (OUTPATIENT)
Dept: HEALTH INFORMATION MANAGEMENT | Facility: OTHER | Age: 73
End: 2023-08-04

## 2023-08-04 ENCOUNTER — OFFICE VISIT (OUTPATIENT)
Dept: MEDICAL GROUP | Facility: PHYSICIAN GROUP | Age: 73
End: 2023-08-04
Payer: COMMERCIAL

## 2023-08-04 VITALS
BODY MASS INDEX: 24.85 KG/M2 | DIASTOLIC BLOOD PRESSURE: 60 MMHG | WEIGHT: 167.8 LBS | SYSTOLIC BLOOD PRESSURE: 130 MMHG | TEMPERATURE: 97.6 F | HEIGHT: 69 IN | HEART RATE: 50 BPM | OXYGEN SATURATION: 94 %

## 2023-08-04 DIAGNOSIS — F51.01 PRIMARY INSOMNIA: ICD-10-CM

## 2023-08-04 DIAGNOSIS — I10 ESSENTIAL HYPERTENSION: ICD-10-CM

## 2023-08-04 PROCEDURE — 3078F DIAST BP <80 MM HG: CPT | Performed by: STUDENT IN AN ORGANIZED HEALTH CARE EDUCATION/TRAINING PROGRAM

## 2023-08-04 PROCEDURE — 3075F SYST BP GE 130 - 139MM HG: CPT | Performed by: STUDENT IN AN ORGANIZED HEALTH CARE EDUCATION/TRAINING PROGRAM

## 2023-08-04 PROCEDURE — 99214 OFFICE O/P EST MOD 30 MIN: CPT | Performed by: STUDENT IN AN ORGANIZED HEALTH CARE EDUCATION/TRAINING PROGRAM

## 2023-08-04 RX ORDER — ZOLPIDEM TARTRATE 5 MG/1
5 TABLET ORAL
Qty: 90 TABLET | Refills: 0 | Status: SHIPPED | OUTPATIENT
Start: 2023-08-04 | End: 2023-10-04 | Stop reason: SDUPTHER

## 2023-08-04 ASSESSMENT — FIBROSIS 4 INDEX: FIB4 SCORE: 0.97

## 2023-08-04 NOTE — PROGRESS NOTES
CC:  Diagnoses of Essential hypertension and Primary insomnia were pertinent to this visit.    HISTORY OF THE PRESENT ILLNESS: Patient is a 73 y.o. male. This pleasant patient is here today to discuss:    1. Essential hypertension  Losartan 100 mg daily.    Amlodipine 10 mg daily.  Patient reports full compliance.    No dependent edema reported.    Patient's home blood measurements show him consistently in the 120s systolic.    2. Primary insomnia  Ambien 5 mg nightly.  Patient feels that he falls asleep and stays asleep adequately, wakes well rested.    Active Diagnosis:    Patient Active Problem List   Diagnosis    Other emphysema (HCC)    Peripheral vascular disease (HCC)    Mild cognitive impairment with memory loss    Essential hypertension    Dyslipidemia    Nasal septal deformity    Hypertrophy of both inferior nasal turbinates    Chronic vasomotor rhinitis    Primary insomnia    Prediabetes    History of atrial fibrillation    History of non-ST elevation myocardial infarction (NSTEMI)    Coronary artery disease involving native coronary artery of native heart without angina pectoris    History of kidney stones    Stage 3a chronic kidney disease (HCC)    Elevated alkaline phosphatase level    Hypovitaminosis D    Lung nodule      Current Outpatient Medications Ordered in Epic   Medication Sig Dispense Refill    zolpidem (AMBIEN) 5 MG Tab Take 1 Tablet by mouth at bedtime as needed for Sleep for up to 90 days. 90 Tablet 0    losartan (COZAAR) 100 MG Tab Take 1 Tablet by mouth every evening. 90 Each 1    amLODIPine (NORVASC) 10 MG Tab Take 1 Tablet by mouth every morning on an empty stomach. 30 Tablet 2    aspirin (ASA) 81 MG Chew Tab chewable tablet Chew 1 Tablet every day. CHEW AND SWALLOW 90 Tablet 3    carvedilol (COREG) 12.5 MG Tab TAKE 1 TABLET BY MOUTH TWICE DAILY WITH MEALS 180 Tablet 3    atorvastatin (LIPITOR) 40 MG Tab Take 1 Tablet by mouth every evening. 90 Tablet 3    VENTOLIN  (90 Base)  "MCG/ACT Aero Soln inhalation aerosol INHALE 2 PUFFS BY MOUTH EVERY 6 HOURS AS NEEDED 1 Each 11     No current [facility]-ordered facility-administered medications on file.     ROS:   See HPI.    Objective:     Exam: /60 (BP Location: Left arm, Patient Position: Sitting, BP Cuff Size: Adult)   Pulse (!) 50   Temp 36.4 °C (97.6 °F) (Temporal)   Ht 1.753 m (5' 9\")   Wt 76.1 kg (167 lb 12.8 oz)   SpO2 94%  Body mass index is 24.78 kg/m².    General: Normal appearing. No distress.  neurologic: Grossly nonfocal.    Skin: Warm and dry.  No obvious lesions.  Musculoskeletal: No extremity cyanosis, clubbing, or edema.  Psych: Normal mood and affect.             Assessment & Plan:   73 y.o. male with the following -    Labs:   6/13/2023:  -CMP showing glucose 100, creatinine 1.76, GFR 41, alk phos 101.  -Hemoglobin A1c 5.5%    1. Essential hypertension  -Chronic, stable.  Well-controlled without side effect.  -Continue losartan 100 mg daily.  -Continue amlodipine 10 mg daily.    2. Primary insomnia  -Chronic.  -Increased risk of falls discussed with patient today.  He feels that benefits outweigh risks.  -Trolled substance agreement from 2/23/2023 reviewed.  -PDMP reviewed.  - zolpidem (AMBIEN) 5 MG Tab; Take 1 Tablet by mouth at bedtime as needed for Sleep for up to 90 days.  Dispense: 90 Tablet; Refill: 0      Return in about 3 months (around 11/4/2023).  For #2 above.    Please note that this dictation was created using voice recognition software. I have made every reasonable attempt to correct obvious errors, but I expect that there are errors of grammar and possibly content that I did not discover before finalizing the note.      Patricio Hernandez PA-C 8/4/2023  "

## 2023-08-15 DIAGNOSIS — E78.5 DYSLIPIDEMIA: ICD-10-CM

## 2023-08-15 DIAGNOSIS — I10 ESSENTIAL HYPERTENSION: ICD-10-CM

## 2023-08-16 RX ORDER — CARVEDILOL 12.5 MG/1
TABLET ORAL
Qty: 180 TABLET | Refills: 1 | Status: SHIPPED | OUTPATIENT
Start: 2023-08-16 | End: 2024-03-06 | Stop reason: SDUPTHER

## 2023-08-16 RX ORDER — ATORVASTATIN CALCIUM 40 MG/1
40 TABLET, FILM COATED ORAL EVERY EVENING
Qty: 90 TABLET | Refills: 1 | Status: SHIPPED | OUTPATIENT
Start: 2023-08-16 | End: 2024-03-06 | Stop reason: SDUPTHER

## 2023-10-01 SDOH — ECONOMIC STABILITY: INCOME INSECURITY: HOW HARD IS IT FOR YOU TO PAY FOR THE VERY BASICS LIKE FOOD, HOUSING, MEDICAL CARE, AND HEATING?: NOT HARD AT ALL

## 2023-10-01 SDOH — HEALTH STABILITY: PHYSICAL HEALTH: ON AVERAGE, HOW MANY DAYS PER WEEK DO YOU ENGAGE IN MODERATE TO STRENUOUS EXERCISE (LIKE A BRISK WALK)?: 7 DAYS

## 2023-10-01 SDOH — ECONOMIC STABILITY: FOOD INSECURITY: WITHIN THE PAST 12 MONTHS, THE FOOD YOU BOUGHT JUST DIDN'T LAST AND YOU DIDN'T HAVE MONEY TO GET MORE.: NEVER TRUE

## 2023-10-01 SDOH — ECONOMIC STABILITY: HOUSING INSECURITY: IN THE LAST 12 MONTHS, HOW MANY PLACES HAVE YOU LIVED?: 1

## 2023-10-01 SDOH — ECONOMIC STABILITY: FOOD INSECURITY: WITHIN THE PAST 12 MONTHS, YOU WORRIED THAT YOUR FOOD WOULD RUN OUT BEFORE YOU GOT MONEY TO BUY MORE.: NEVER TRUE

## 2023-10-01 SDOH — ECONOMIC STABILITY: INCOME INSECURITY: IN THE LAST 12 MONTHS, WAS THERE A TIME WHEN YOU WERE NOT ABLE TO PAY THE MORTGAGE OR RENT ON TIME?: NO

## 2023-10-01 SDOH — HEALTH STABILITY: PHYSICAL HEALTH: ON AVERAGE, HOW MANY MINUTES DO YOU ENGAGE IN EXERCISE AT THIS LEVEL?: 20 MIN

## 2023-10-01 ASSESSMENT — SOCIAL DETERMINANTS OF HEALTH (SDOH)
IN A TYPICAL WEEK, HOW MANY TIMES DO YOU TALK ON THE PHONE WITH FAMILY, FRIENDS, OR NEIGHBORS?: MORE THAN THREE TIMES A WEEK
IN A TYPICAL WEEK, HOW MANY TIMES DO YOU TALK ON THE PHONE WITH FAMILY, FRIENDS, OR NEIGHBORS?: MORE THAN THREE TIMES A WEEK
HOW OFTEN DO YOU ATTENT MEETINGS OF THE CLUB OR ORGANIZATION YOU BELONG TO?: NEVER
HOW OFTEN DO YOU ATTEND CHURCH OR RELIGIOUS SERVICES?: NEVER
DO YOU BELONG TO ANY CLUBS OR ORGANIZATIONS SUCH AS CHURCH GROUPS UNIONS, FRATERNAL OR ATHLETIC GROUPS, OR SCHOOL GROUPS?: NO
HOW HARD IS IT FOR YOU TO PAY FOR THE VERY BASICS LIKE FOOD, HOUSING, MEDICAL CARE, AND HEATING?: NOT HARD AT ALL
HOW OFTEN DO YOU HAVE A DRINK CONTAINING ALCOHOL: MONTHLY OR LESS
HOW OFTEN DO YOU ATTEND CHURCH OR RELIGIOUS SERVICES?: NEVER
HOW OFTEN DO YOU HAVE SIX OR MORE DRINKS ON ONE OCCASION: NEVER
HOW OFTEN DO YOU GET TOGETHER WITH FRIENDS OR RELATIVES?: ONCE A WEEK
DO YOU BELONG TO ANY CLUBS OR ORGANIZATIONS SUCH AS CHURCH GROUPS UNIONS, FRATERNAL OR ATHLETIC GROUPS, OR SCHOOL GROUPS?: NO
WITHIN THE PAST 12 MONTHS, YOU WORRIED THAT YOUR FOOD WOULD RUN OUT BEFORE YOU GOT THE MONEY TO BUY MORE: NEVER TRUE
HOW OFTEN DO YOU ATTENT MEETINGS OF THE CLUB OR ORGANIZATION YOU BELONG TO?: NEVER
HOW OFTEN DO YOU GET TOGETHER WITH FRIENDS OR RELATIVES?: ONCE A WEEK
HOW MANY DRINKS CONTAINING ALCOHOL DO YOU HAVE ON A TYPICAL DAY WHEN YOU ARE DRINKING: PATIENT DOES NOT DRINK

## 2023-10-01 ASSESSMENT — LIFESTYLE VARIABLES
HOW OFTEN DO YOU HAVE A DRINK CONTAINING ALCOHOL: MONTHLY OR LESS
HOW MANY STANDARD DRINKS CONTAINING ALCOHOL DO YOU HAVE ON A TYPICAL DAY: PATIENT DOES NOT DRINK
SKIP TO QUESTIONS 9-10: 1
AUDIT-C TOTAL SCORE: 1
HOW OFTEN DO YOU HAVE SIX OR MORE DRINKS ON ONE OCCASION: NEVER

## 2023-10-04 ENCOUNTER — OFFICE VISIT (OUTPATIENT)
Dept: MEDICAL GROUP | Facility: PHYSICIAN GROUP | Age: 73
End: 2023-10-04
Payer: COMMERCIAL

## 2023-10-04 ENCOUNTER — HOSPITAL ENCOUNTER (OUTPATIENT)
Facility: MEDICAL CENTER | Age: 73
End: 2023-10-04
Attending: STUDENT IN AN ORGANIZED HEALTH CARE EDUCATION/TRAINING PROGRAM
Payer: COMMERCIAL

## 2023-10-04 VITALS
TEMPERATURE: 98.6 F | HEIGHT: 69 IN | OXYGEN SATURATION: 93 % | WEIGHT: 174.2 LBS | SYSTOLIC BLOOD PRESSURE: 138 MMHG | BODY MASS INDEX: 25.8 KG/M2 | DIASTOLIC BLOOD PRESSURE: 52 MMHG | HEART RATE: 59 BPM

## 2023-10-04 DIAGNOSIS — J43.8 OTHER EMPHYSEMA (HCC): ICD-10-CM

## 2023-10-04 DIAGNOSIS — Z51.81 THERAPEUTIC DRUG MONITORING: ICD-10-CM

## 2023-10-04 DIAGNOSIS — F51.01 PRIMARY INSOMNIA: ICD-10-CM

## 2023-10-04 DIAGNOSIS — Z23 FLU VACCINE NEED: ICD-10-CM

## 2023-10-04 DIAGNOSIS — E78.5 DYSLIPIDEMIA: ICD-10-CM

## 2023-10-04 DIAGNOSIS — I10 ESSENTIAL HYPERTENSION: ICD-10-CM

## 2023-10-04 PROCEDURE — 3078F DIAST BP <80 MM HG: CPT | Performed by: STUDENT IN AN ORGANIZED HEALTH CARE EDUCATION/TRAINING PROGRAM

## 2023-10-04 PROCEDURE — G0481 DRUG TEST DEF 8-14 CLASSES: HCPCS

## 2023-10-04 PROCEDURE — 90662 IIV NO PRSV INCREASED AG IM: CPT | Performed by: STUDENT IN AN ORGANIZED HEALTH CARE EDUCATION/TRAINING PROGRAM

## 2023-10-04 PROCEDURE — 90471 IMMUNIZATION ADMIN: CPT | Performed by: STUDENT IN AN ORGANIZED HEALTH CARE EDUCATION/TRAINING PROGRAM

## 2023-10-04 PROCEDURE — 3075F SYST BP GE 130 - 139MM HG: CPT | Performed by: STUDENT IN AN ORGANIZED HEALTH CARE EDUCATION/TRAINING PROGRAM

## 2023-10-04 PROCEDURE — 99214 OFFICE O/P EST MOD 30 MIN: CPT | Mod: 25 | Performed by: STUDENT IN AN ORGANIZED HEALTH CARE EDUCATION/TRAINING PROGRAM

## 2023-10-04 RX ORDER — ZOLPIDEM TARTRATE 5 MG/1
5 TABLET ORAL
Qty: 30 TABLET | Refills: 0 | Status: SHIPPED | OUTPATIENT
Start: 2023-12-03 | End: 2023-11-15

## 2023-10-04 RX ORDER — ZOLPIDEM TARTRATE 5 MG/1
5 TABLET ORAL
Qty: 30 TABLET | Refills: 0 | Status: SHIPPED | OUTPATIENT
Start: 2023-11-03 | End: 2023-12-06

## 2023-10-04 ASSESSMENT — ENCOUNTER SYMPTOMS
ABDOMINAL PAIN: 0
PALPITATIONS: 0
VOMITING: 0
SHORTNESS OF BREATH: 0
NAUSEA: 0
BLURRED VISION: 0
CHILLS: 0
DIARRHEA: 0
FEVER: 0
HEADACHES: 0
DIZZINESS: 0

## 2023-10-04 ASSESSMENT — FIBROSIS 4 INDEX: FIB4 SCORE: 0.97

## 2023-10-04 NOTE — PROGRESS NOTES
Subjective:     CC:    Chief Complaint   Patient presents with    Establish Care        HISTORY OF THE PRESENT ILLNESS: Patient is a 73 y.o. male. This pleasant patient is here today to establish care and discuss chronic conditions. Prior PCP was SONI Barrios.    Problem   History of Atrial Fibrillation    Remote history of atrial fibrillation in 2019 in the setting of an acute COPD exacerbation.  He has had no recurrences.  He did follow with cardiology for a few years after this.  Oral anticoagulant was not indicated at that time.     Primary Insomnia    This is a chronic condition. Takes Ambien 5 mg nightly. Symptoms are well controlled with this.     Dyslipidemia    Lab Results   Component Value Date/Time    CHOLSTRLTOT 119 06/13/2023 0926    TRIGLYCERIDE 63 06/13/2023 0926    HDL 50 06/13/2023 0926    LDL 56 06/13/2023 0926     On atorvastatin 40 mg daily     Essential Hypertension    This is a chronic condition.  Current Meds:   - losartan 100 mg daily  - carvedilol 12.5 mg BID  - amlodipine 10 mg daily  Side effects: none  Home BP Log: Typically 120s/60s  Associated symptoms: Denies chest pain, shortness of breath, headaches, dizziness/lightheadedness        Other Emphysema (Hcc)    This is a chronic condition.  Managed by pulmonology.  Patient is on Trelegy inhaler daily and albuterol as needed.  He is on supplemental oxygen at night and as needed throughout the day.   Symptoms currently controlled.            Past Medical History: Diagnoses of Essential hypertension, Other emphysema (HCC), Dyslipidemia, Primary insomnia, Therapeutic drug monitoring, Flu vaccine need, and History of atrial fibrillation were pertinent to this visit.    Current Outpatient Medications   Medication Sig    fluticasone-umeclidinium-vilanterol (TRELEGY ELLIPTA) 100-62.5-25 mcg/act inhaler Inhale 1 Puff.    amLODIPine (NORVASC) 10 MG Tab Take 1 Tablet by mouth every morning on an empty stomach.    carvedilol (COREG) 12.5 MG  Tab TAKE 1 TABLET BY MOUTH TWICE DAILY WITH MEALS    atorvastatin (LIPITOR) 40 MG Tab Take 1 Tablet by mouth every evening.    zolpidem (AMBIEN) 5 MG Tab Take 1 Tablet by mouth at bedtime as needed for Sleep for up to 90 days.    losartan (COZAAR) 100 MG Tab Take 1 Tablet by mouth every evening.    aspirin (ASA) 81 MG Chew Tab chewable tablet Chew 1 Tablet every day. CHEW AND SWALLOW    VENTOLIN  (90 Base) MCG/ACT Aero Soln inhalation aerosol INHALE 2 PUFFS BY MOUTH EVERY 6 HOURS AS NEEDED        Social History     Socioeconomic History    Marital status: Single    Highest education level: Some college, no degree   Tobacco Use    Smoking status: Former     Current packs/day: 0.00     Average packs/day: 1 pack/day for 25.0 years (25.0 ttl pk-yrs)     Types: Cigarettes     Start date: 1987     Quit date: 2012     Years since quittin.6    Smokeless tobacco: Never   Vaping Use    Vaping Use: Never used   Substance and Sexual Activity    Alcohol use: Yes     Comment: occasional    Drug use: No    Sexual activity: Never     Social Determinants of Health     Financial Resource Strain: Low Risk  (10/1/2023)    Overall Financial Resource Strain (CARDIA)     Difficulty of Paying Living Expenses: Not hard at all   Food Insecurity: No Food Insecurity (10/1/2023)    Hunger Vital Sign     Worried About Running Out of Food in the Last Year: Never true     Ran Out of Food in the Last Year: Never true   Transportation Needs: No Transportation Needs (10/1/2023)    PRAPARE - Transportation     Lack of Transportation (Medical): No     Lack of Transportation (Non-Medical): No   Physical Activity: Insufficiently Active (10/1/2023)    Exercise Vital Sign     Days of Exercise per Week: 7 days     Minutes of Exercise per Session: 20 min   Stress: No Stress Concern Present (10/1/2023)    [employer] [employer] [place] [employer] - Occupational Stress Questionnaire     Feeling of Stress : Not at all   Social Connections:  "Socially Isolated (10/1/2023)    Social Connection and Isolation Panel [NHANES]     Frequency of Communication with Friends and Family: More than three times a week     Frequency of Social Gatherings with Friends and Family: Once a week     Attends Latter-day Services: Never     Active Member of Clubs or Organizations: No     Attends Club or Organization Meetings: Never     Marital Status:    Housing Stability: Low Risk  (10/1/2023)    Housing Stability Vital Sign     Unable to Pay for Housing in the Last Year: No     Number of Places Lived in the Last Year: 1     Unstable Housing in the Last Year: No       Family History   Problem Relation Age of Onset    Lung Disease Maternal Grandmother         empysema- smoker    Cancer Maternal Grandfather 65        colon         Health Maintenance: Completed      ROS:   Review of Systems   Constitutional:  Negative for chills and fever.   Eyes:  Negative for blurred vision.   Respiratory:  Negative for shortness of breath.    Cardiovascular:  Negative for chest pain and palpitations.   Gastrointestinal:  Negative for abdominal pain, diarrhea, nausea and vomiting.   Genitourinary:  Negative for dysuria.   Neurological:  Negative for dizziness and headaches.       Objective:     Exam: /52 (BP Location: Left arm, Patient Position: Sitting, BP Cuff Size: Adult)   Pulse (!) 59   Temp 37 °C (98.6 °F) (Temporal)   Ht 1.753 m (5' 9\")   Wt 79 kg (174 lb 3.2 oz)   SpO2 93%  Body mass index is 25.72 kg/m².    Physical Exam  Vitals reviewed.   Constitutional:       General: He is not in acute distress.     Appearance: He is not toxic-appearing.   HENT:      Mouth/Throat:      Mouth: Mucous membranes are moist.   Eyes:      Extraocular Movements: Extraocular movements intact.   Cardiovascular:      Rate and Rhythm: Normal rate and regular rhythm.      Heart sounds: No murmur heard.     No friction rub. No gallop.   Pulmonary:      Effort: Pulmonary effort is normal.      " Breath sounds: No wheezing, rhonchi or rales.   Musculoskeletal:      Cervical back: Normal range of motion and neck supple.      Right lower leg: No edema.      Left lower leg: No edema.   Lymphadenopathy:      Cervical: No cervical adenopathy.   Skin:     General: Skin is warm and dry.   Neurological:      Mental Status: He is alert.   Psychiatric:         Mood and Affect: Mood normal.           Labs: Reviewed.    Assessment & Plan:     73 y.o. male with the following -    1. Essential hypertension  Chronic, controlled.  Blood pressure is 138/52.  Continue current regimen.    2. Other emphysema (HCC)  Chronic, stable. Continue follow up with pulmonology as directed.    3. Dyslipidemia  Chronic, controlled. He is on a statin for primary prevention and tolerating it well. The last cholesterol panel was within normal limits. Continue current dosing.    4. Primary insomnia  Chronic, stable.  Continue Ambien 5 mg nightly as needed.  Controlled substance agreement updated today.  UDS done today.  Patient provided with additional refills to allow for 90 days.  Patient understands that he will need to follow-up in 3 months for additional refills.  - Controlled Substance Treatment Agreement  - Pain Management Screen; Future    5. Therapeutic drug monitoring  - Controlled Substance Treatment Agreement  - Pain Management Screen; Future    6. Flu vaccine need  - INFLUENZA VACCINE, HIGH DOSE (65+ ONLY)          Return in about 3 months (around 1/4/2024) for ambien.    Please note that this dictation was created using voice recognition software. I have made every reasonable attempt to correct obvious errors, but I expect that there are errors of grammar and possibly content that I did not discover before finalizing the note.

## 2023-10-07 LAB
1OH-MIDAZOLAM UR QL SCN: NOT DETECTED
6MAM UR QL: NOT DETECTED
7AMINOCLONAZEPAM UR QL: NOT DETECTED
A-OH ALPRAZ UR QL: NOT DETECTED
ALPRAZ UR QL: NOT DETECTED
AMPHET UR QL SCN: NOT DETECTED
ANNOTATION COMMENT IMP: NORMAL
ANNOTATION COMMENT IMP: NORMAL
BARBITURATES UR QL: NOT DETECTED
BUPRENORPHINE UR QL: NOT DETECTED
BZE UR QL: NOT DETECTED
CARBOXYTHC UR QL: NOT DETECTED
CARISOPRODOL UR QL: NOT DETECTED
CLONAZEPAM UR QL: NOT DETECTED
CODEINE UR QL: NOT DETECTED
DIAZEPAM UR QL: NOT DETECTED
ETHYL GLUCURONIDE UR QL: NOT DETECTED
FENTANYL UR QL: NOT DETECTED
GABAPENTIN UR QL: NOT DETECTED
HYDROCODONE UR QL: NOT DETECTED
HYDROMORPHONE UR QL: NOT DETECTED
LORAZEPAM UR QL: NOT DETECTED
MDA UR QL: NOT DETECTED
MDEA UR QL: NOT DETECTED
MDMA UR QL: NOT DETECTED
MEPERIDINE UR QL: NOT DETECTED
METHADONE UR QL: NOT DETECTED
METHAMPHET UR QL: NOT DETECTED
MIDAZOLAM UR QL SCN: NOT DETECTED
MORPHINE UR QL: NOT DETECTED
NALOXONE UR QL SCN: NOT DETECTED
NORBUPRENORPHINE UR QL CFM: NOT DETECTED
NORDIAZEPAM UR QL: NOT DETECTED
NORFENTANYL UR QL: NOT DETECTED
NORHYDROCODONE UR QL CFM: NOT DETECTED
NOROXYCODONE UR QL CFM: NOT DETECTED
NOROXYMORPH CO100 Q0458: NOT DETECTED
OXAZEPAM UR QL: NOT DETECTED
OXYCODONE UR QL: NOT DETECTED
OXYMORPHONE UR QL: NOT DETECTED
PATHOLOGY STUDY: NORMAL
PCP UR QL: NOT DETECTED
PHENTERMINE UR QL: NOT DETECTED
PPAA UR QL: NOT DETECTED
PREGABALIN UR QL SCN: NOT DETECTED
SERVICE CMNT-IMP: NORMAL
TAPENADOL OSULF CO200 Q0473: NOT DETECTED
TAPENTADOL UR QL SCN: NOT DETECTED
TEMAZEPAM UR QL: NOT DETECTED
TRAMADOL UR QL: NOT DETECTED
ZOLPIDEM PHENYL-4-CARB UR QL SCN: PRESENT
ZOLPIDEM UR QL: NOT DETECTED

## 2023-11-10 ENCOUNTER — OFFICE VISIT (OUTPATIENT)
Dept: SLEEP MEDICINE | Facility: MEDICAL CENTER | Age: 73
End: 2023-11-10
Attending: NURSE PRACTITIONER
Payer: COMMERCIAL

## 2023-11-10 VITALS
DIASTOLIC BLOOD PRESSURE: 60 MMHG | HEIGHT: 69 IN | WEIGHT: 172.9 LBS | SYSTOLIC BLOOD PRESSURE: 120 MMHG | RESPIRATION RATE: 18 BRPM | BODY MASS INDEX: 25.61 KG/M2 | HEART RATE: 63 BPM | OXYGEN SATURATION: 92 %

## 2023-11-10 DIAGNOSIS — J44.9 CHRONIC OBSTRUCTIVE PULMONARY DISEASE, UNSPECIFIED COPD TYPE (HCC): ICD-10-CM

## 2023-11-10 DIAGNOSIS — J96.11 CHRONIC RESPIRATORY FAILURE WITH HYPOXIA (HCC): ICD-10-CM

## 2023-11-10 PROCEDURE — 3074F SYST BP LT 130 MM HG: CPT | Performed by: NURSE PRACTITIONER

## 2023-11-10 PROCEDURE — 99214 OFFICE O/P EST MOD 30 MIN: CPT | Performed by: NURSE PRACTITIONER

## 2023-11-10 PROCEDURE — 3078F DIAST BP <80 MM HG: CPT | Performed by: NURSE PRACTITIONER

## 2023-11-10 PROCEDURE — 94761 N-INVAS EAR/PLS OXIMETRY MLT: CPT | Performed by: NURSE PRACTITIONER

## 2023-11-10 PROCEDURE — 99212 OFFICE O/P EST SF 10 MIN: CPT | Performed by: NURSE PRACTITIONER

## 2023-11-10 ASSESSMENT — FIBROSIS 4 INDEX: FIB4 SCORE: 0.97

## 2023-11-10 NOTE — PROGRESS NOTES
"Chief Complaint   Patient presents with    Follow-Up     LAST SEEN 07/05/2023 VALERIA POWERS  6MO. F/V LUNG NODULE CT RESULT       HPI:  Olman Sims is a 73 y.o. year old male here today for follow-up on COPD and chronic respiratory failure with hypoxia.  Last seen 5/10/2023.  Is a former smoker with a 25-pack-year history quit in 2012.  Medical history includes PVD, coronary artery disease with a history of NSTEMI, A-fib, and CKD stage III.    Currently patient is using Trelegy and Ventolin as needed.  Patient also uses supplemental oxygen continuously at 2 L/min.  Does have a home oxygen concentrator that is greater than 5 years old and he also has a Inogen portable oxygen concentrator device.  He states the Inogen is starting to malfunction and grow louder with use.  He would like to requalify for omental oxygen during the day.  He does have difficulty moving while standing so he would like to requalify for portable oxygen concentrator.  Patient is not currently using a nebulizer or rescue inhaler right now, but does have occasional morning cough with congestion.  He has no difficulty expectorating mucus.  He denies any exacerbations since last visit.    Ambulatory multiple oximetry done in clinic today shows room air SPO2 of 93% at rest which dropped to 86% with ambulation.  He was then placed on supplemental oxygen at 2 L/min.  SPO2 remained 93% with ambulation.    ROS: As per HPI and otherwise negative if not stated.    Past Medical History:   Diagnosis Date    Adverse effect of anesthesia     \"stopped breathing/elevated bp\"2012    Anesthesia     \"stopped breathing/elevated bp\"2012    Atrial fibrillation (HCC)     Breath shortness     when he quit smoking/with exertion    CAD (coronary artery disease)     COPD     Dental disorder     lower partial    Emphysema of lung (HCC)     Hyperlipidemia     Hypertension     Kidney stone     Oxygen dependent     q hs prn 2 ltr    Paroxysmal atrial " fibrillation (HCC)     PVD (peripheral vascular disease) (HCC)     R iliac artery stent    Renal disorder     right kidney stone/stent in place       Past Surgical History:   Procedure Laterality Date    INGUINAL HERNIA REPAIR ROBOTIC XI Bilateral 5/23/2023    Procedure: ROBOTIC BILATERAL INGUINAL HERNIA REPAIR;  Surgeon: Garland Mehta M.D.;  Location: [facility];  Service: Gen Robotic    NV CYSTOSCOPY,INSERT URETERAL STENT Left 1/22/2020    Procedure: CYSTOSCOPY, WITH URETERAL STENT INSERTION;  Surgeon: Karen Quiles M.D.;  Location: [facility];  Service: Urology    NV CYSTO/URETERO/PYELOSCOPY, DX Left 1/22/2020    Procedure: URETEROSCOPY;  Surgeon: Karen Quiles M.D.;  Location: [facility];  Service: Urology    LASERTRIPSY Left 1/22/2020    Procedure: LITHOTRIPSY, USING LASER;  Surgeon: Karen Quiles M.D.;  Location: [facility];  Service: Urology    SEPTOPLASTY N/A 5/4/2018    Procedure: SEPTOPLASTY;  Surgeon: Jesse Saavedra M.D.;  Location: SURGERY [facility] DAY [facility];  Service: Ent    TURBINATE REDUCTION Bilateral 5/4/2018    Procedure: TURBINATE REDUCTION- RESECTION WITH SUBMUCOSAL APPROACH;  Surgeon: Jesse Saavedra M.D.;  Location: [facility]ERY [facility];  Service: Ent    RECOVERY  11/1/2012    Performed by [facility] at [facility]ERY [facility] DAY [facility]    PERCUTANEOUS NEPHROSTOLITHOTOMY  5/2/2012    Performed by KAREN QUILES at [facility]    PERCUTANEOUS NEPHROSTOLITHOTOMY  4/5/2012    Performed by KAREN QUILES at [facility]    RECOVERY  4/4/2012    Performed by SURGERY, IR-RECOVERY at [facility]GERY [facility] DAY [facility]    CYSTOSCOPY STENT PLACEMENT  2/24/2012    Performed by KAREN QUILES at [facility]    APPENDECTOMY  1987    APPENDECTOMY      STENT PLACEMENT      heart    ZZZ CARDIAC CATH         Family History   Problem Relation Age of Onset    Lung Disease Maternal Grandmother          "empysema- smoker    Cancer Maternal Grandfather 65        colon       Allergies as of 11/10/2023    (No Known Allergies)        Vitals:  /60 (BP Location: Left arm, Patient Position: Sitting, BP Cuff Size: Adult)   Pulse 63   Resp 18   Ht 1.753 m (5' 9\")   Wt 78.4 kg (172 lb 14.4 oz)   SpO2 92%     Current medications as of today   Current Outpatient Medications   Medication Sig Dispense Refill    fluticasone-umeclidinium-vilanterol (TRELEGY ELLIPTA) 100-62.5-25 mcg/act inhaler Inhale 1 Puff.      zolpidem (AMBIEN) 5 MG Tab Take 1 Tablet by mouth at bedtime as needed for Sleep for up to 30 days. 30 Tablet 0    [START ON 12/3/2023] zolpidem (AMBIEN) 5 MG Tab Take 1 Tablet by mouth at bedtime as needed for Sleep for up to 30 days. 30 Tablet 0    amLODIPine (NORVASC) 10 MG Tab Take 1 Tablet by mouth every morning on an empty stomach. 90 Tablet 1    carvedilol (COREG) 12.5 MG Tab TAKE 1 TABLET BY MOUTH TWICE DAILY WITH MEALS 180 Tablet 1    atorvastatin (LIPITOR) 40 MG Tab Take 1 Tablet by mouth every evening. 90 Tablet 1    losartan (COZAAR) 100 MG Tab Take 1 Tablet by mouth every evening. 90 Each 1    aspirin (ASA) 81 MG Chew Tab chewable tablet Chew 1 Tablet every day. CHEW AND SWALLOW 90 Tablet 3    VENTOLIN  (90 Base) MCG/ACT Aero Soln inhalation aerosol INHALE 2 PUFFS BY MOUTH EVERY 6 HOURS AS NEEDED 1 Each 11     No current facility-administered medications for this visit.         Physical Exam:   Gen:           Alert and oriented, No apparent distress. Mood and affect appropriate, normal interaction with examiner.  Eyes:          PERRL, EOM intact, sclere white, conjunctive moist.  Ears:          Not examined.   Hearing:     Grossly intact.  Nose:          Normal, no lesions or deformities.  Dentition:    Good dentition.  Oropharynx:   Tongue normal, posterior pharynx without erythema or exudate.  Neck:        Supple, trachea midline, no masses.  Respiratory Effort: No intercostal retractions " or use of accessory muscles.   Lung Auscultation:      Clear to auscultation bilaterally; no rales, rhonchi or wheezing.  CV:            Regular rate and rhythm. No murmurs, rubs or gallops.  Abd:           Not examined.   Lymphadenopathy: Not examined.  Gait and Station: Normal.  Digits and Nails: No clubbing, cyanosis, petechiae, or nodes.   Cranial Nerves: II-XII grossly intact.  Skin:        No rashes, lesions or ulcers noted.               Ext:           No cyanosis or edema.      Assessment:  1. Chronic obstructive pulmonary disease, unspecified COPD type (HCC)  Multiple Oximetry    DME O2 New Set Up    Multiple Oximetry      2. Chronic respiratory failure with hypoxia (HCC)  Multiple Oximetry    DME O2 New Set Up    Multiple Oximetry        Plan:  Continue Trelegy and albuterol as needed.  Patient also follows lung cancer screening program with updated CT pending.  Patient currently asymptomatic.  Refill medications as needed.  Requalify patient for supplemental oxygen to be used at nighttime and with exertion.  Previous oxygen was ordered greater than 5 years old.  Patient needs to update oxygen order.  He will continue to benefit from supplemental oxygen with ambulation at nighttime.  Order will be sent to vital care.  Recommend patient receive portable oxygen concentrator as he has difficulty maneuvering large tanks.  Please titrate on portable oxygen concentrator to keep SPO2 greater than 88%.  Clinic, ambulatory multiple oximetry showed that patient requires 2 L/min with ambulation.    Please note that this dictation was created using voice recognition software. I have made every reasonable attempt to correct obvious errors, but it is possible there are errors of grammar and possibly content that I did not discover before finalizing the note.

## 2023-11-10 NOTE — PROCEDURES
Multi-Ox Readings  Multi Ox #1 Room air   O2 sat % at rest 93   O2 sat % on exertion 86   O2 sat average on exertion     Multi Ox #2 2 LPM   O2 sat % at rest 94   O2 sat % on exertion 92   O2 sat average on exertion       Oxygen Use     Oxygen Frequency     Duration of need     Is the patient mobile within the home?     CPAP Use?     BIPAP Use?     Servo Titration

## 2023-11-13 ENCOUNTER — HOSPITAL ENCOUNTER (OUTPATIENT)
Dept: RADIOLOGY | Facility: MEDICAL CENTER | Age: 73
End: 2023-11-13
Attending: FAMILY MEDICINE
Payer: COMMERCIAL

## 2023-11-13 DIAGNOSIS — R91.1 LUNG NODULE: ICD-10-CM

## 2023-11-13 PROCEDURE — 71250 CT THORAX DX C-: CPT

## 2023-11-15 ENCOUNTER — OFFICE VISIT (OUTPATIENT)
Dept: SLEEP MEDICINE | Facility: MEDICAL CENTER | Age: 73
End: 2023-11-15
Attending: INTERNAL MEDICINE
Payer: COMMERCIAL

## 2023-11-15 VITALS
HEIGHT: 69 IN | HEART RATE: 58 BPM | SYSTOLIC BLOOD PRESSURE: 132 MMHG | BODY MASS INDEX: 24.44 KG/M2 | DIASTOLIC BLOOD PRESSURE: 46 MMHG | WEIGHT: 165 LBS | OXYGEN SATURATION: 92 %

## 2023-11-15 DIAGNOSIS — J44.9 CHRONIC OBSTRUCTIVE PULMONARY DISEASE, UNSPECIFIED COPD TYPE (HCC): ICD-10-CM

## 2023-11-15 DIAGNOSIS — J96.11 CHRONIC RESPIRATORY FAILURE WITH HYPOXIA (HCC): ICD-10-CM

## 2023-11-15 DIAGNOSIS — Z87.891 FORMER SMOKER: ICD-10-CM

## 2023-11-15 DIAGNOSIS — R93.89 ABNORMAL CT OF THE CHEST: ICD-10-CM

## 2023-11-15 PROCEDURE — 99214 OFFICE O/P EST MOD 30 MIN: CPT | Performed by: INTERNAL MEDICINE

## 2023-11-15 PROCEDURE — 3075F SYST BP GE 130 - 139MM HG: CPT | Performed by: INTERNAL MEDICINE

## 2023-11-15 PROCEDURE — 99212 OFFICE O/P EST SF 10 MIN: CPT | Performed by: INTERNAL MEDICINE

## 2023-11-15 PROCEDURE — 3078F DIAST BP <80 MM HG: CPT | Performed by: INTERNAL MEDICINE

## 2023-11-15 RX ORDER — IPRATROPIUM BROMIDE AND ALBUTEROL SULFATE 2.5; .5 MG/3ML; MG/3ML
3 SOLUTION RESPIRATORY (INHALATION) EVERY 4 HOURS PRN
Qty: 120 ML | Refills: 11 | Status: SHIPPED | OUTPATIENT
Start: 2023-11-15

## 2023-11-15 ASSESSMENT — ENCOUNTER SYMPTOMS
CHILLS: 0
SINUS PAIN: 0
BACK PAIN: 0
NECK PAIN: 0
WHEEZING: 0
SORE THROAT: 0
DIARRHEA: 0
DIAPHORESIS: 0
HEADACHES: 0
SHORTNESS OF BREATH: 0
ABDOMINAL PAIN: 0
HEMOPTYSIS: 0
WEAKNESS: 0
NAUSEA: 0
FEVER: 0
EYE REDNESS: 0
SPUTUM PRODUCTION: 0
WEIGHT LOSS: 0
DEPRESSION: 0
DIZZINESS: 0
TREMORS: 0
ORTHOPNEA: 0
EYE PAIN: 0
MYALGIAS: 0
SPEECH CHANGE: 0
FOCAL WEAKNESS: 0
VOMITING: 0
FALLS: 0
COUGH: 0
STRIDOR: 0
HEARTBURN: 0
EYE DISCHARGE: 0
DOUBLE VISION: 0
BLURRED VISION: 0
CONSTIPATION: 0
CLAUDICATION: 0
PND: 0
PALPITATIONS: 0
PHOTOPHOBIA: 0

## 2023-11-15 ASSESSMENT — FIBROSIS 4 INDEX: FIB4 SCORE: 0.97

## 2023-11-15 NOTE — PROGRESS NOTES
"Chief Complaint   Patient presents with    Follow-Up     LAST SEEN 11/10/23, CT CHEST 11/13/23         HPI: This patient is a 73 y.o. male whom is followed in our clinic for COPD complicated by chronic hypoxic respiratory failure last seen by Art PATEL on 11/10/2023.PMHx is significant for HTN not well controlled, HLP and CAD. Pt also has a hx of AF. He was dx with COPD in 2012 with severe airflow obstruction FEV1 post BD of 1.07L (30% pred), hyperinflation (% pred) and air trapping (RV of 280% pred) and reduced DLCO of 48% pred. He has been on O2 at @2lPM continuously since that time.  More recent pulmonary function testing from 7/22 showed an FEV1 of 0.84 L or 26% predicted with a ratio of 31.  No bronchodilator response.  He continues to have hyperinflation with total lung capacity 134% predicted and severe air trapping with residual volume of 286% predicted.  DLCO is 55% predicted.  He is currently on Trelegy 100 and albuterol as needed.  He does not participate in any formal airway clearance.He is a former smoker with only at most a 15 pk year history and quit in 2012 when he was dx with COPD.  Alpha-1 antitrypsin levels were normal in 2022.  He has been followed in our clinic for pulmonary nodules that have waxed and waned since 2022.  Most recent CT chest from 11/13/2023 showed a new right upper lobe infiltrative mass of 4 cm in size but clearance of groundglass opacity in the medial portion of the right upper lobe seen in 11/2022.  He denies any acute symptoms.  No fevers, chills, night sweats, weight loss.  Functionally he is mMRC 2-3.    Past Medical History:   Diagnosis Date    Adverse effect of anesthesia     \"stopped breathing/elevated bp\"2012    Anesthesia     \"stopped breathing/elevated bp\"2012    Atrial fibrillation (HCC)     Breath shortness     when he quit smoking/with exertion    CAD (coronary artery disease)     COPD     Dental disorder     lower partial    Emphysema of lung " (HCC)     Hyperlipidemia     Hypertension     Kidney stone     Oxygen dependent     q hs prn 2 ltr    Paroxysmal atrial fibrillation (HCC)     PVD (peripheral vascular disease) (HCC)     R iliac artery stent    Renal disorder     right kidney stone/stent in place       Social History     Socioeconomic History    Marital status: Single     Spouse name: Not on file    Number of children: Not on file    Years of education: Not on file    Highest education level: Some college, no degree   Occupational History    Not on file   Tobacco Use    Smoking status: Former     Current packs/day: 0.00     Average packs/day: 1 pack/day for 25.0 years (25.0 ttl pk-yrs)     Types: Cigarettes     Start date: 1987     Quit date: 2012     Years since quittin.7    Smokeless tobacco: Never   Vaping Use    Vaping Use: Never used   Substance and Sexual Activity    Alcohol use: Yes     Comment: occasional    Drug use: No    Sexual activity: Never   Other Topics Concern    Not on file   Social History Narrative    Not on file     Social Determinants of Health     Financial Resource Strain: Low Risk  (10/1/2023)    Overall Financial Resource Strain (CARDIA)     Difficulty of Paying Living Expenses: Not hard at all   Food Insecurity: No Food Insecurity (10/1/2023)    Hunger Vital Sign     Worried About Running Out of Food in the Last Year: Never true     Ran Out of Food in the Last Year: Never true   Transportation Needs: No Transportation Needs (10/1/2023)    PRAPARE - Transportation     Lack of Transportation (Medical): No     Lack of Transportation (Non-Medical): No   Physical Activity: Insufficiently Active (10/1/2023)    Exercise Vital Sign     Days of Exercise per Week: 7 days     Minutes of Exercise per Session: 20 min   Stress: No Stress Concern Present (10/1/2023)    [place] [employer] - Occupational Stress Questionnaire     Feeling of Stress : Not at all   Social Connections: Socially Isolated  (10/1/2023)    Social Connection and Isolation Panel [NHANES]     Frequency of Communication with Friends and Family: More than three times a week     Frequency of Social Gatherings with Friends and Family: Once a week     Attends [employer] Services: Never     Active Member of Clubs or Organizations: No     Attends Club or Organization Meetings: Never     Marital Status:    Intimate Partner Violence: Not on file   Housing Stability: Low Risk  (10/1/2023)    Housing Stability Vital Sign     Unable to Pay for Housing in the Last Year: No     Number of Places Lived in the Last Year: 1     Unstable Housing in the Last Year: No       Family History   Problem Relation Age of Onset    Lung Disease Maternal Grandmother         empysema- smoker    Cancer Maternal Grandfather 65        colon       Current Outpatient Medications on File Prior to Visit   Medication Sig Dispense Refill    VENTOLIN  (90 Base) MCG/ACT Aero Soln inhalation aerosol INHALE 2 PUFFS BY MOUTH EVERY 6 HOURS AS NEEDED 1 Each 11    zolpidem (AMBIEN) 5 MG Tab Take 1 Tablet by mouth at bedtime as needed for Sleep for up to 30 days. 30 Tablet 0    amLODIPine (NORVASC) 10 MG Tab Take 1 Tablet by mouth every morning on an empty stomach. 90 Tablet 1    carvedilol (COREG) 12.5 MG Tab TAKE 1 TABLET BY MOUTH TWICE DAILY WITH MEALS 180 Tablet 1    atorvastatin (LIPITOR) 40 MG Tab Take 1 Tablet by mouth every evening. 90 Tablet 1    losartan (COZAAR) 100 MG Tab Take 1 Tablet by mouth every evening. 90 Each 1    aspirin (ASA) 81 MG Chew Tab chewable tablet Chew 1 Tablet every day. CHEW AND SWALLOW 90 Tablet 3     No current facility-administered medications on file prior to visit.       Patient has no known allergies.      ROS:   Review of Systems   Constitutional:  Negative for chills, diaphoresis, fever, malaise/fatigue and weight loss.   HENT:  Negative for congestion, ear discharge, ear pain, hearing loss, nosebleeds, sinus pain, sore throat and  "tinnitus.    Eyes:  Negative for blurred vision, double vision, photophobia, pain, discharge and redness.   Respiratory:  Negative for cough, hemoptysis, sputum production, shortness of breath, wheezing and stridor.    Cardiovascular:  Negative for chest pain, palpitations, orthopnea, claudication, leg swelling and PND.   Gastrointestinal:  Negative for abdominal pain, constipation, diarrhea, heartburn, nausea and vomiting.   Genitourinary:  Negative for dysuria and urgency.   Musculoskeletal:  Negative for back pain, falls, joint pain, myalgias and neck pain.   Skin:  Negative for itching and rash.   Neurological:  Negative for dizziness, tremors, speech change, focal weakness, weakness and headaches.   Endo/Heme/Allergies:  Negative for environmental allergies.   Psychiatric/Behavioral:  Negative for depression.        /46 (BP Location: Right arm, Patient Position: Sitting, BP Cuff Size: Adult)   Pulse (!) 58   Ht 1.753 m (5' 9\")   Wt 74.8 kg (165 lb)   SpO2 92%   Physical Exam  Vitals reviewed.   Constitutional:       General: He is not in acute distress.     Appearance: Normal appearance. He is normal weight.   HENT:      Head: Normocephalic and atraumatic.      Right Ear: External ear normal.      Left Ear: External ear normal.      Nose: Nose normal. No congestion.      Mouth/Throat:      Mouth: Mucous membranes are moist.      Pharynx: Oropharynx is clear. No oropharyngeal exudate.   Eyes:      General: No scleral icterus.     Extraocular Movements: Extraocular movements intact.      Conjunctiva/sclera: Conjunctivae normal.      Pupils: Pupils are equal, round, and reactive to light.   Cardiovascular:      Rate and Rhythm: Normal rate and regular rhythm.      Heart sounds: Normal heart sounds. No murmur heard.     No gallop.   Pulmonary:      Effort: Pulmonary effort is normal. No respiratory distress.      Breath sounds: No wheezing or rales.      Comments: Decreased air movement " throughout  Abdominal:      General: There is no distension.      Palpations: Abdomen is soft.   Musculoskeletal:         General: Normal range of motion.      Cervical back: Normal range of motion and neck supple.      Right lower leg: No edema.      Left lower leg: No edema.   Skin:     General: Skin is warm and dry.      Findings: No rash.   Neurological:      Mental Status: He is alert and oriented to person, place, and time.      Cranial Nerves: No cranial nerve deficit.   Psychiatric:         Mood and Affect: Mood normal.         Behavior: Behavior normal.         PFTs as reviewed by me personally: As per HPI  Imaging as reviewed by me personally: As per HPI    Assessment:  1. Chronic obstructive pulmonary disease, unspecified COPD type (HCC)  DME Nebulizer    ipratropium-albuterol (DUONEB) 0.5-2.5 (3) MG/3ML nebulizer solution    fluticasone-umeclidinium-vilanterol (TRELEGY ELLIPTA) 100-62.5-25 mcg/act inhaler      2. Chronic respiratory failure with hypoxia ([facility])        3. Abnormal CT of the chest  CT-CHEST (THORAX) W/O      4. Former smoker            Plan:  Chronic, severe and has progressed since 2021 despite being tobacco free with normal alpha-1 antitrypsin levels.  Continue Trelegy 100 and short acting bronchodilators.  We will start formal airway clearance given CT findings.  He is tobacco free and up-to-date on vaccines.  Chronic and oxygen needs are stable at 2 L/min.  Patient is compliant with and benefiting from therapy.  Patient has had waxing waning pulmonary nodules since 2022 and this area in the right upper lobe looks more consistent with infiltrate to me however we will plan on short-term follow-up in the next 8 weeks after a trial of more aggressive airway clearance as per above.  Patient is agreeable to this.  Tobacco free for 11 years.  Return in about 3 months (around 2/15/2024) for CT chest .

## 2023-11-30 NOTE — ASSESSMENT & PLAN NOTE
Continue antihypertensive  Monitor BP   Pt has known HTN, controlled with current medicines. He denies HA, blurred vision, dizziness, shortness of breath, palpitations, or chest pain, lower extremity edema. BP averages 138-142, DBP in sanjuana 70s.

## 2023-12-06 DIAGNOSIS — F51.01 PRIMARY INSOMNIA: ICD-10-CM

## 2023-12-06 RX ORDER — ZOLPIDEM TARTRATE 5 MG/1
5 TABLET ORAL
Qty: 30 TABLET | Refills: 0 | Status: SHIPPED | OUTPATIENT
Start: 2023-12-06 | End: 2024-01-04 | Stop reason: SDUPTHER

## 2023-12-11 ENCOUNTER — PATIENT MESSAGE (OUTPATIENT)
Dept: MEDICAL GROUP | Facility: PHYSICIAN GROUP | Age: 73
End: 2023-12-11
Payer: COMMERCIAL

## 2023-12-11 RX ORDER — LOSARTAN POTASSIUM 100 MG/1
100 TABLET ORAL EVERY EVENING
Qty: 90 EACH | Refills: 1 | Status: SHIPPED | OUTPATIENT
Start: 2023-12-11

## 2024-01-01 ENCOUNTER — APPOINTMENT (OUTPATIENT)
Dept: RADIOLOGY | Facility: MEDICAL CENTER | Age: 74
DRG: 656 | End: 2024-01-01
Attending: INTERNAL MEDICINE
Payer: COMMERCIAL

## 2024-01-01 ENCOUNTER — APPOINTMENT (OUTPATIENT)
Dept: CARDIOLOGY | Facility: MEDICAL CENTER | Age: 74
DRG: 656 | End: 2024-01-01
Attending: INTERNAL MEDICINE
Payer: COMMERCIAL

## 2024-01-01 ENCOUNTER — APPOINTMENT (OUTPATIENT)
Dept: RADIOLOGY | Facility: MEDICAL CENTER | Age: 74
DRG: 656 | End: 2024-01-01
Attending: UROLOGY
Payer: COMMERCIAL

## 2024-01-01 ENCOUNTER — APPOINTMENT (OUTPATIENT)
Dept: RADIOLOGY | Facility: MEDICAL CENTER | Age: 74
DRG: 656 | End: 2024-01-01
Attending: STUDENT IN AN ORGANIZED HEALTH CARE EDUCATION/TRAINING PROGRAM
Payer: COMMERCIAL

## 2024-01-01 ENCOUNTER — ANESTHESIA EVENT (OUTPATIENT)
Dept: SURGERY | Facility: MEDICAL CENTER | Age: 74
DRG: 656 | End: 2024-01-01
Payer: COMMERCIAL

## 2024-01-01 ENCOUNTER — APPOINTMENT (OUTPATIENT)
Dept: RADIOLOGY | Facility: MEDICAL CENTER | Age: 74
DRG: 656 | End: 2024-01-01
Payer: COMMERCIAL

## 2024-01-01 ENCOUNTER — APPOINTMENT (OUTPATIENT)
Dept: RADIOLOGY | Facility: MEDICAL CENTER | Age: 74
DRG: 656 | End: 2024-01-01
Attending: HOSPITALIST
Payer: COMMERCIAL

## 2024-01-01 ENCOUNTER — HOSPITAL ENCOUNTER (INPATIENT)
Facility: MEDICAL CENTER | Age: 74
LOS: 18 days | DRG: 656 | End: 2024-07-19
Attending: STUDENT IN AN ORGANIZED HEALTH CARE EDUCATION/TRAINING PROGRAM | Admitting: STUDENT IN AN ORGANIZED HEALTH CARE EDUCATION/TRAINING PROGRAM
Payer: COMMERCIAL

## 2024-01-01 ENCOUNTER — ANESTHESIA (OUTPATIENT)
Dept: SURGERY | Facility: MEDICAL CENTER | Age: 74
DRG: 656 | End: 2024-01-01
Payer: COMMERCIAL

## 2024-01-01 ENCOUNTER — APPOINTMENT (OUTPATIENT)
Dept: RADIOLOGY | Facility: MEDICAL CENTER | Age: 74
DRG: 656 | End: 2024-01-01
Attending: SURGERY
Payer: COMMERCIAL

## 2024-01-01 ENCOUNTER — APPOINTMENT (OUTPATIENT)
Dept: RADIOLOGY | Facility: MEDICAL CENTER | Age: 74
DRG: 656 | End: 2024-01-01
Attending: NURSE PRACTITIONER
Payer: COMMERCIAL

## 2024-01-01 ENCOUNTER — ANESTHESIA EVENT (OUTPATIENT)
Dept: ANESTHESIOLOGY | Facility: MEDICAL CENTER | Age: 74
DRG: 656 | End: 2024-01-01
Payer: COMMERCIAL

## 2024-01-01 ENCOUNTER — APPOINTMENT (OUTPATIENT)
Dept: RADIOLOGY | Facility: MEDICAL CENTER | Age: 74
DRG: 656 | End: 2024-01-01
Attending: ANESTHESIOLOGY
Payer: COMMERCIAL

## 2024-01-01 VITALS
BODY MASS INDEX: 30.4 KG/M2 | DIASTOLIC BLOOD PRESSURE: 87 MMHG | TEMPERATURE: 97.4 F | SYSTOLIC BLOOD PRESSURE: 130 MMHG | WEIGHT: 205.25 LBS | HEART RATE: 98 BPM | OXYGEN SATURATION: 90 % | RESPIRATION RATE: 26 BRPM | HEIGHT: 69 IN

## 2024-01-01 LAB
ABO + RH BLD: NORMAL
ABO GROUP BLD: ABNORMAL
ABO GROUP BLD: NORMAL
ALBUMIN SERPL BCP-MCNC: 1.7 G/DL (ref 3.2–4.9)
ALBUMIN SERPL BCP-MCNC: 1.9 G/DL (ref 3.2–4.9)
ALBUMIN SERPL BCP-MCNC: 2 G/DL (ref 3.2–4.9)
ALBUMIN SERPL BCP-MCNC: 2 G/DL (ref 3.2–4.9)
ALBUMIN SERPL BCP-MCNC: 3 G/DL (ref 3.2–4.9)
ALBUMIN SERPL BCP-MCNC: 3 G/DL (ref 3.2–4.9)
ALBUMIN SERPL BCP-MCNC: 3.1 G/DL (ref 3.2–4.9)
ALBUMIN SERPL BCP-MCNC: 3.2 G/DL (ref 3.2–4.9)
ALBUMIN SERPL BCP-MCNC: 3.3 G/DL (ref 3.2–4.9)
ALBUMIN SERPL BCP-MCNC: 3.3 G/DL (ref 3.2–4.9)
ALBUMIN SERPL BCP-MCNC: 3.4 G/DL (ref 3.2–4.9)
ALBUMIN SERPL BCP-MCNC: 3.5 G/DL (ref 3.2–4.9)
ALBUMIN/GLOB SERPL: 0.8 G/DL
ALBUMIN/GLOB SERPL: 0.8 G/DL
ALBUMIN/GLOB SERPL: 1 G/DL
ALBUMIN/GLOB SERPL: 1 G/DL
ALBUMIN/GLOB SERPL: 1.1 G/DL
ALBUMIN/GLOB SERPL: 1.1 G/DL
ALP SERPL-CCNC: 104 U/L (ref 30–99)
ALP SERPL-CCNC: 43 U/L (ref 30–99)
ALP SERPL-CCNC: 50 U/L (ref 30–99)
ALP SERPL-CCNC: 57 U/L (ref 30–99)
ALP SERPL-CCNC: 61 U/L (ref 30–99)
ALP SERPL-CCNC: 91 U/L (ref 30–99)
ALT SERPL-CCNC: 11 U/L (ref 2–50)
ALT SERPL-CCNC: 15 U/L (ref 2–50)
ALT SERPL-CCNC: 16 U/L (ref 2–50)
ALT SERPL-CCNC: 17 U/L (ref 2–50)
ALT SERPL-CCNC: 19 U/L (ref 2–50)
ALT SERPL-CCNC: 9 U/L (ref 2–50)
ANION GAP SERPL CALC-SCNC: 11 MMOL/L (ref 7–16)
ANION GAP SERPL CALC-SCNC: 12 MMOL/L (ref 7–16)
ANION GAP SERPL CALC-SCNC: 12 MMOL/L (ref 7–16)
ANION GAP SERPL CALC-SCNC: 13 MMOL/L (ref 7–16)
ANION GAP SERPL CALC-SCNC: 14 MMOL/L (ref 7–16)
ANION GAP SERPL CALC-SCNC: 16 MMOL/L (ref 7–16)
ANION GAP SERPL CALC-SCNC: 17 MMOL/L (ref 7–16)
ANION GAP SERPL CALC-SCNC: 17 MMOL/L (ref 7–16)
ANION GAP SERPL CALC-SCNC: 19 MMOL/L (ref 7–16)
ANION GAP SERPL CALC-SCNC: 19 MMOL/L (ref 7–16)
ANION GAP SERPL CALC-SCNC: 21 MMOL/L (ref 7–16)
ANION GAP SERPL CALC-SCNC: 22 MMOL/L (ref 7–16)
ANION GAP SERPL CALC-SCNC: 9 MMOL/L (ref 7–16)
ANISOCYTOSIS BLD QL SMEAR: ABNORMAL
APTT PPP: 26.1 SEC (ref 24.7–36)
APTT PPP: 29.6 SEC (ref 24.7–36)
ARTERIAL PATENCY WRIST A: ABNORMAL
ARTERIAL PATENCY WRIST A: ABNORMAL
AST SERPL-CCNC: 22 U/L (ref 12–45)
AST SERPL-CCNC: 30 U/L (ref 12–45)
AST SERPL-CCNC: 33 U/L (ref 12–45)
AST SERPL-CCNC: 40 U/L (ref 12–45)
AST SERPL-CCNC: 49 U/L (ref 12–45)
AST SERPL-CCNC: 49 U/L (ref 12–45)
BACTERIA BLD CULT: ABNORMAL
BACTERIA BLD CULT: ABNORMAL
BACTERIA BLD CULT: NORMAL
BACTERIA SPEC RESP CULT: ABNORMAL
BARCODED ABORH UBTYP: 600
BARCODED ABORH UBTYP: 7300
BARCODED ABORH UBTYP: 8400
BARCODED PRD CODE UBPRD: ABNORMAL
BARCODED PRD CODE UBPRD: ABNORMAL
BARCODED PRD CODE UBPRD: NORMAL
BARCODED UNIT NUM UBUNT: ABNORMAL
BARCODED UNIT NUM UBUNT: ABNORMAL
BARCODED UNIT NUM UBUNT: NORMAL
BASE EXCESS BLDA CALC-SCNC: 3 MMOL/L (ref -4–3)
BASE EXCESS BLDA CALC-SCNC: 4 MMOL/L (ref -4–3)
BASE EXCESS BLDA CALC-SCNC: 5 MMOL/L (ref -4–3)
BASE EXCESS BLDV CALC-SCNC: 12 MMOL/L
BASOPHILS # BLD AUTO: 0 % (ref 0–1.8)
BASOPHILS # BLD AUTO: 0 % (ref 0–1.8)
BASOPHILS # BLD AUTO: 0.1 % (ref 0–1.8)
BASOPHILS # BLD AUTO: 0.2 % (ref 0–1.8)
BASOPHILS # BLD: 0 K/UL (ref 0–0.12)
BASOPHILS # BLD: 0 K/UL (ref 0–0.12)
BASOPHILS # BLD: 0.01 K/UL (ref 0–0.12)
BASOPHILS # BLD: 0.01 K/UL (ref 0–0.12)
BASOPHILS # BLD: 0.02 K/UL (ref 0–0.12)
BASOPHILS # BLD: 0.02 K/UL (ref 0–0.12)
BASOPHILS # BLD: 0.03 K/UL (ref 0–0.12)
BASOPHILS # BLD: 0.04 K/UL (ref 0–0.12)
BASOPHILS # BLD: 0.04 K/UL (ref 0–0.12)
BASOPHILS # BLD: 0.06 K/UL (ref 0–0.12)
BILIRUB SERPL-MCNC: 0.3 MG/DL (ref 0.1–1.5)
BILIRUB SERPL-MCNC: 0.5 MG/DL (ref 0.1–1.5)
BLD GP AB SCN SERPL QL: ABNORMAL
BLD GP AB SCN SERPL QL: NORMAL
BODY FLD TYPE: NORMAL
BODY TEMPERATURE: 36.3 CENTIGRADE
BODY TEMPERATURE: 36.7 CENTIGRADE
BODY TEMPERATURE: ABNORMAL DEGREES
BODY TEMPERATURE: ABNORMAL DEGREES
BREATHS SETTING VENT: 16
BREATHS SETTING VENT: 18
BUN SERPL-MCNC: 100 MG/DL (ref 8–22)
BUN SERPL-MCNC: 105 MG/DL (ref 8–22)
BUN SERPL-MCNC: 106 MG/DL (ref 8–22)
BUN SERPL-MCNC: 107 MG/DL (ref 8–22)
BUN SERPL-MCNC: 109 MG/DL (ref 8–22)
BUN SERPL-MCNC: 112 MG/DL (ref 8–22)
BUN SERPL-MCNC: 114 MG/DL (ref 8–22)
BUN SERPL-MCNC: 119 MG/DL (ref 8–22)
BUN SERPL-MCNC: 122 MG/DL (ref 8–22)
BUN SERPL-MCNC: 129 MG/DL (ref 8–22)
BUN SERPL-MCNC: 131 MG/DL (ref 8–22)
BUN SERPL-MCNC: 134 MG/DL (ref 8–22)
BUN SERPL-MCNC: 31 MG/DL (ref 8–22)
BUN SERPL-MCNC: 36 MG/DL (ref 8–22)
BUN SERPL-MCNC: 42 MG/DL (ref 8–22)
BUN SERPL-MCNC: 47 MG/DL (ref 8–22)
BUN SERPL-MCNC: 50 MG/DL (ref 8–22)
BUN SERPL-MCNC: 51 MG/DL (ref 8–22)
BUN SERPL-MCNC: 56 MG/DL (ref 8–22)
BUN SERPL-MCNC: 66 MG/DL (ref 8–22)
BUN SERPL-MCNC: 87 MG/DL (ref 8–22)
BUN SERPL-MCNC: 88 MG/DL (ref 8–22)
BUN SERPL-MCNC: 93 MG/DL (ref 8–22)
BUN SERPL-MCNC: 95 MG/DL (ref 8–22)
BUN SERPL-MCNC: 95 MG/DL (ref 8–22)
BURR CELLS BLD QL SMEAR: NORMAL
BURR CELLS BLD QL SMEAR: NORMAL
CA-I SERPL-SCNC: 0.8 MMOL/L (ref 1.1–1.3)
CA-I SERPL-SCNC: 0.9 MMOL/L (ref 1.1–1.3)
CA-I SERPL-SCNC: 1.1 MMOL/L (ref 1.1–1.3)
CALCIUM ALBUM COR SERPL-MCNC: 8 MG/DL (ref 8.5–10.5)
CALCIUM ALBUM COR SERPL-MCNC: 8.1 MG/DL (ref 8.5–10.5)
CALCIUM ALBUM COR SERPL-MCNC: 8.2 MG/DL (ref 8.5–10.5)
CALCIUM ALBUM COR SERPL-MCNC: 8.2 MG/DL (ref 8.5–10.5)
CALCIUM ALBUM COR SERPL-MCNC: 8.3 MG/DL (ref 8.5–10.5)
CALCIUM ALBUM COR SERPL-MCNC: 8.4 MG/DL (ref 8.5–10.5)
CALCIUM ALBUM COR SERPL-MCNC: 8.6 MG/DL (ref 8.5–10.5)
CALCIUM ALBUM COR SERPL-MCNC: 9.1 MG/DL (ref 8.5–10.5)
CALCIUM ALBUM COR SERPL-MCNC: 9.2 MG/DL (ref 8.5–10.5)
CALCIUM ALBUM COR SERPL-MCNC: 9.4 MG/DL (ref 8.5–10.5)
CALCIUM SERPL-MCNC: 6.4 MG/DL (ref 8.5–10.5)
CALCIUM SERPL-MCNC: 6.4 MG/DL (ref 8.5–10.5)
CALCIUM SERPL-MCNC: 6.6 MG/DL (ref 8.5–10.5)
CALCIUM SERPL-MCNC: 6.7 MG/DL (ref 8.5–10.5)
CALCIUM SERPL-MCNC: 6.8 MG/DL (ref 8.5–10.5)
CALCIUM SERPL-MCNC: 6.8 MG/DL (ref 8.5–10.5)
CALCIUM SERPL-MCNC: 7.1 MG/DL (ref 8.5–10.5)
CALCIUM SERPL-MCNC: 7.3 MG/DL (ref 8.5–10.5)
CALCIUM SERPL-MCNC: 7.4 MG/DL (ref 8.5–10.5)
CALCIUM SERPL-MCNC: 7.5 MG/DL (ref 8.5–10.5)
CALCIUM SERPL-MCNC: 7.6 MG/DL (ref 8.5–10.5)
CALCIUM SERPL-MCNC: 7.6 MG/DL (ref 8.5–10.5)
CALCIUM SERPL-MCNC: 8.4 MG/DL (ref 8.5–10.5)
CALCIUM SERPL-MCNC: 8.6 MG/DL (ref 8.5–10.5)
CALCIUM SERPL-MCNC: 8.7 MG/DL (ref 8.5–10.5)
CALCIUM SERPL-MCNC: 8.8 MG/DL (ref 8.5–10.5)
CALCIUM SERPL-MCNC: 8.8 MG/DL (ref 8.5–10.5)
CALCIUM SERPL-MCNC: 8.9 MG/DL (ref 8.5–10.5)
CALCIUM SERPL-MCNC: 9.1 MG/DL (ref 8.5–10.5)
CFT BLD TEG: 5.2 MIN (ref 4.6–9.1)
CFT P HPASE BLD TEG: 4.9 MIN (ref 4.3–8.3)
CHLORIDE SERPL-SCNC: 101 MMOL/L (ref 96–112)
CHLORIDE SERPL-SCNC: 102 MMOL/L (ref 96–112)
CHLORIDE SERPL-SCNC: 103 MMOL/L (ref 96–112)
CHLORIDE SERPL-SCNC: 104 MMOL/L (ref 96–112)
CHLORIDE SERPL-SCNC: 104 MMOL/L (ref 96–112)
CHLORIDE SERPL-SCNC: 105 MMOL/L (ref 96–112)
CHLORIDE SERPL-SCNC: 106 MMOL/L (ref 96–112)
CHLORIDE SERPL-SCNC: 107 MMOL/L (ref 96–112)
CHLORIDE SERPL-SCNC: 108 MMOL/L (ref 96–112)
CHLORIDE SERPL-SCNC: 108 MMOL/L (ref 96–112)
CHLORIDE SERPL-SCNC: 110 MMOL/L (ref 96–112)
CHLORIDE SERPL-SCNC: 110 MMOL/L (ref 96–112)
CHLORIDE SERPL-SCNC: 84 MMOL/L (ref 96–112)
CHLORIDE SERPL-SCNC: 84 MMOL/L (ref 96–112)
CHLORIDE SERPL-SCNC: 85 MMOL/L (ref 96–112)
CHLORIDE SERPL-SCNC: 85 MMOL/L (ref 96–112)
CHLORIDE SERPL-SCNC: 92 MMOL/L (ref 96–112)
CHLORIDE SERPL-SCNC: 95 MMOL/L (ref 96–112)
CHLORIDE SERPL-SCNC: 96 MMOL/L (ref 96–112)
CHLORIDE SERPL-SCNC: 96 MMOL/L (ref 96–112)
CHLORIDE SERPL-SCNC: 97 MMOL/L (ref 96–112)
CHLORIDE SERPL-SCNC: 99 MMOL/L (ref 96–112)
CHLORIDE SERPL-SCNC: 99 MMOL/L (ref 96–112)
CLOT ANGLE BLD TEG: 79.7 DEGREES (ref 63–78)
CLOT LYSIS 30M P MA LENFR BLD TEG: 0.1 % (ref 0–2.6)
CO2 BLDA-SCNC: 32 MMOL/L (ref 20–33)
CO2 BLDA-SCNC: 32 MMOL/L (ref 20–33)
CO2 SERPL-SCNC: 17 MMOL/L (ref 20–33)
CO2 SERPL-SCNC: 18 MMOL/L (ref 20–33)
CO2 SERPL-SCNC: 18 MMOL/L (ref 20–33)
CO2 SERPL-SCNC: 20 MMOL/L (ref 20–33)
CO2 SERPL-SCNC: 21 MMOL/L (ref 20–33)
CO2 SERPL-SCNC: 22 MMOL/L (ref 20–33)
CO2 SERPL-SCNC: 22 MMOL/L (ref 20–33)
CO2 SERPL-SCNC: 23 MMOL/L (ref 20–33)
CO2 SERPL-SCNC: 24 MMOL/L (ref 20–33)
CO2 SERPL-SCNC: 25 MMOL/L (ref 20–33)
CO2 SERPL-SCNC: 26 MMOL/L (ref 20–33)
CO2 SERPL-SCNC: 30 MMOL/L (ref 20–33)
CO2 SERPL-SCNC: 34 MMOL/L (ref 20–33)
CO2 SERPL-SCNC: 34 MMOL/L (ref 20–33)
CO2 SERPL-SCNC: 35 MMOL/L (ref 20–33)
COMPONENT R 8504R: ABNORMAL
COMPONENT R 8504R: ABNORMAL
COMPONENT R 8504R: NORMAL
CREAT FLD-MCNC: 5.06 MG/DL
CREAT SERPL-MCNC: 2.8 MG/DL (ref 0.5–1.4)
CREAT SERPL-MCNC: 2.83 MG/DL (ref 0.5–1.4)
CREAT SERPL-MCNC: 3.05 MG/DL (ref 0.5–1.4)
CREAT SERPL-MCNC: 3.28 MG/DL (ref 0.5–1.4)
CREAT SERPL-MCNC: 3.46 MG/DL (ref 0.5–1.4)
CREAT SERPL-MCNC: 3.51 MG/DL (ref 0.5–1.4)
CREAT SERPL-MCNC: 3.78 MG/DL (ref 0.5–1.4)
CREAT SERPL-MCNC: 3.88 MG/DL (ref 0.5–1.4)
CREAT SERPL-MCNC: 4.33 MG/DL (ref 0.5–1.4)
CREAT SERPL-MCNC: 4.34 MG/DL (ref 0.5–1.4)
CREAT SERPL-MCNC: 4.51 MG/DL (ref 0.5–1.4)
CREAT SERPL-MCNC: 4.58 MG/DL (ref 0.5–1.4)
CREAT SERPL-MCNC: 4.65 MG/DL (ref 0.5–1.4)
CREAT SERPL-MCNC: 4.7 MG/DL (ref 0.5–1.4)
CREAT SERPL-MCNC: 4.71 MG/DL (ref 0.5–1.4)
CREAT SERPL-MCNC: 5.4 MG/DL (ref 0.5–1.4)
CREAT SERPL-MCNC: 5.48 MG/DL (ref 0.5–1.4)
CREAT SERPL-MCNC: 5.66 MG/DL (ref 0.5–1.4)
CREAT SERPL-MCNC: 5.96 MG/DL (ref 0.5–1.4)
CREAT SERPL-MCNC: 6.45 MG/DL (ref 0.5–1.4)
CREAT SERPL-MCNC: 6.52 MG/DL (ref 0.5–1.4)
CREAT SERPL-MCNC: 6.87 MG/DL (ref 0.5–1.4)
CREAT SERPL-MCNC: 8.46 MG/DL (ref 0.5–1.4)
CREAT SERPL-MCNC: 8.49 MG/DL (ref 0.5–1.4)
CREAT SERPL-MCNC: 8.74 MG/DL (ref 0.5–1.4)
CT.EXTRINSIC BLD ROTEM: 0.8 MIN (ref 0.8–2.1)
DELSYS IDSYS: ABNORMAL
DELSYS IDSYS: ABNORMAL
EKG IMPRESSION: NORMAL
END TIDAL CARBON DIOXIDE IECO2: 32 MMHG
END TIDAL CARBON DIOXIDE IECO2: 35 MMHG
EOSINOPHIL # BLD AUTO: 0 K/UL (ref 0–0.51)
EOSINOPHIL # BLD AUTO: 0.01 K/UL (ref 0–0.51)
EOSINOPHIL # BLD AUTO: 0.01 K/UL (ref 0–0.51)
EOSINOPHIL # BLD AUTO: 0.05 K/UL (ref 0–0.51)
EOSINOPHIL NFR BLD: 0 % (ref 0–6.9)
EOSINOPHIL NFR BLD: 0.1 % (ref 0–6.9)
EOSINOPHIL NFR BLD: 0.3 % (ref 0–6.9)
ERYTHROCYTE [DISTWIDTH] IN BLOOD BY AUTOMATED COUNT: 49 FL (ref 35.9–50)
ERYTHROCYTE [DISTWIDTH] IN BLOOD BY AUTOMATED COUNT: 49.1 FL (ref 35.9–50)
ERYTHROCYTE [DISTWIDTH] IN BLOOD BY AUTOMATED COUNT: 49.3 FL (ref 35.9–50)
ERYTHROCYTE [DISTWIDTH] IN BLOOD BY AUTOMATED COUNT: 50.4 FL (ref 35.9–50)
ERYTHROCYTE [DISTWIDTH] IN BLOOD BY AUTOMATED COUNT: 50.4 FL (ref 35.9–50)
ERYTHROCYTE [DISTWIDTH] IN BLOOD BY AUTOMATED COUNT: 50.8 FL (ref 35.9–50)
ERYTHROCYTE [DISTWIDTH] IN BLOOD BY AUTOMATED COUNT: 51.2 FL (ref 35.9–50)
ERYTHROCYTE [DISTWIDTH] IN BLOOD BY AUTOMATED COUNT: 51.3 FL (ref 35.9–50)
ERYTHROCYTE [DISTWIDTH] IN BLOOD BY AUTOMATED COUNT: 51.6 FL (ref 35.9–50)
ERYTHROCYTE [DISTWIDTH] IN BLOOD BY AUTOMATED COUNT: 51.7 FL (ref 35.9–50)
ERYTHROCYTE [DISTWIDTH] IN BLOOD BY AUTOMATED COUNT: 52 FL (ref 35.9–50)
ERYTHROCYTE [DISTWIDTH] IN BLOOD BY AUTOMATED COUNT: 52.1 FL (ref 35.9–50)
ERYTHROCYTE [DISTWIDTH] IN BLOOD BY AUTOMATED COUNT: 52.4 FL (ref 35.9–50)
ERYTHROCYTE [DISTWIDTH] IN BLOOD BY AUTOMATED COUNT: 52.5 FL (ref 35.9–50)
ERYTHROCYTE [DISTWIDTH] IN BLOOD BY AUTOMATED COUNT: 55.1 FL (ref 35.9–50)
ERYTHROCYTE [DISTWIDTH] IN BLOOD BY AUTOMATED COUNT: 55.3 FL (ref 35.9–50)
ERYTHROCYTE [DISTWIDTH] IN BLOOD BY AUTOMATED COUNT: 55.6 FL (ref 35.9–50)
ERYTHROCYTE [DISTWIDTH] IN BLOOD BY AUTOMATED COUNT: 56 FL (ref 35.9–50)
ERYTHROCYTE [DISTWIDTH] IN BLOOD BY AUTOMATED COUNT: 57.1 FL (ref 35.9–50)
ERYTHROCYTE [DISTWIDTH] IN BLOOD BY AUTOMATED COUNT: 57.1 FL (ref 35.9–50)
ERYTHROCYTE [DISTWIDTH] IN BLOOD BY AUTOMATED COUNT: 57.2 FL (ref 35.9–50)
ERYTHROCYTE [DISTWIDTH] IN BLOOD BY AUTOMATED COUNT: 58 FL (ref 35.9–50)
FERRITIN SERPL-MCNC: 102 NG/ML (ref 22–322)
FLUAV RNA SPEC QL NAA+PROBE: NEGATIVE
FLUBV RNA SPEC QL NAA+PROBE: NEGATIVE
GFR SERPLBLD CREATININE-BSD FMLA CKD-EPI: 10 ML/MIN/1.73 M 2
GFR SERPLBLD CREATININE-BSD FMLA CKD-EPI: 10 ML/MIN/1.73 M 2
GFR SERPLBLD CREATININE-BSD FMLA CKD-EPI: 11 ML/MIN/1.73 M 2
GFR SERPLBLD CREATININE-BSD FMLA CKD-EPI: 12 ML/MIN/1.73 M 2
GFR SERPLBLD CREATININE-BSD FMLA CKD-EPI: 12 ML/MIN/1.73 M 2
GFR SERPLBLD CREATININE-BSD FMLA CKD-EPI: 13 ML/MIN/1.73 M 2
GFR SERPLBLD CREATININE-BSD FMLA CKD-EPI: 14 ML/MIN/1.73 M 2
GFR SERPLBLD CREATININE-BSD FMLA CKD-EPI: 14 ML/MIN/1.73 M 2
GFR SERPLBLD CREATININE-BSD FMLA CKD-EPI: 16 ML/MIN/1.73 M 2
GFR SERPLBLD CREATININE-BSD FMLA CKD-EPI: 16 ML/MIN/1.73 M 2
GFR SERPLBLD CREATININE-BSD FMLA CKD-EPI: 18 ML/MIN/1.73 M 2
GFR SERPLBLD CREATININE-BSD FMLA CKD-EPI: 18 ML/MIN/1.73 M 2
GFR SERPLBLD CREATININE-BSD FMLA CKD-EPI: 19 ML/MIN/1.73 M 2
GFR SERPLBLD CREATININE-BSD FMLA CKD-EPI: 21 ML/MIN/1.73 M 2
GFR SERPLBLD CREATININE-BSD FMLA CKD-EPI: 23 ML/MIN/1.73 M 2
GFR SERPLBLD CREATININE-BSD FMLA CKD-EPI: 23 ML/MIN/1.73 M 2
GFR SERPLBLD CREATININE-BSD FMLA CKD-EPI: 6 ML/MIN/1.73 M 2
GFR SERPLBLD CREATININE-BSD FMLA CKD-EPI: 8 ML/MIN/1.73 M 2
GFR SERPLBLD CREATININE-BSD FMLA CKD-EPI: 8 ML/MIN/1.73 M 2
GFR SERPLBLD CREATININE-BSD FMLA CKD-EPI: 9 ML/MIN/1.73 M 2
GFR SERPLBLD CREATININE-BSD FMLA CKD-EPI: 9 ML/MIN/1.73 M 2
GLOBULIN SER CALC-MCNC: 1.7 G/DL (ref 1.9–3.5)
GLOBULIN SER CALC-MCNC: 1.9 G/DL (ref 1.9–3.5)
GLOBULIN SER CALC-MCNC: 2.4 G/DL (ref 1.9–3.5)
GLOBULIN SER CALC-MCNC: 2.4 G/DL (ref 1.9–3.5)
GLOBULIN SER CALC-MCNC: 3.1 G/DL (ref 1.9–3.5)
GLOBULIN SER CALC-MCNC: 3.1 G/DL (ref 1.9–3.5)
GLUCOSE BLD STRIP.AUTO-MCNC: 103 MG/DL (ref 65–99)
GLUCOSE BLD STRIP.AUTO-MCNC: 103 MG/DL (ref 65–99)
GLUCOSE BLD STRIP.AUTO-MCNC: 110 MG/DL (ref 65–99)
GLUCOSE BLD STRIP.AUTO-MCNC: 114 MG/DL (ref 65–99)
GLUCOSE BLD STRIP.AUTO-MCNC: 116 MG/DL (ref 65–99)
GLUCOSE BLD STRIP.AUTO-MCNC: 119 MG/DL (ref 65–99)
GLUCOSE BLD STRIP.AUTO-MCNC: 119 MG/DL (ref 65–99)
GLUCOSE BLD STRIP.AUTO-MCNC: 120 MG/DL (ref 65–99)
GLUCOSE BLD STRIP.AUTO-MCNC: 120 MG/DL (ref 65–99)
GLUCOSE BLD STRIP.AUTO-MCNC: 124 MG/DL (ref 65–99)
GLUCOSE BLD STRIP.AUTO-MCNC: 126 MG/DL (ref 65–99)
GLUCOSE BLD STRIP.AUTO-MCNC: 129 MG/DL (ref 65–99)
GLUCOSE BLD STRIP.AUTO-MCNC: 129 MG/DL (ref 65–99)
GLUCOSE BLD STRIP.AUTO-MCNC: 132 MG/DL (ref 65–99)
GLUCOSE BLD STRIP.AUTO-MCNC: 133 MG/DL (ref 65–99)
GLUCOSE BLD STRIP.AUTO-MCNC: 137 MG/DL (ref 65–99)
GLUCOSE BLD STRIP.AUTO-MCNC: 143 MG/DL (ref 65–99)
GLUCOSE BLD STRIP.AUTO-MCNC: 144 MG/DL (ref 65–99)
GLUCOSE BLD STRIP.AUTO-MCNC: 145 MG/DL (ref 65–99)
GLUCOSE BLD STRIP.AUTO-MCNC: 149 MG/DL (ref 65–99)
GLUCOSE BLD STRIP.AUTO-MCNC: 150 MG/DL (ref 65–99)
GLUCOSE BLD STRIP.AUTO-MCNC: 151 MG/DL (ref 65–99)
GLUCOSE BLD STRIP.AUTO-MCNC: 153 MG/DL (ref 65–99)
GLUCOSE BLD STRIP.AUTO-MCNC: 153 MG/DL (ref 65–99)
GLUCOSE BLD STRIP.AUTO-MCNC: 154 MG/DL (ref 65–99)
GLUCOSE BLD STRIP.AUTO-MCNC: 155 MG/DL (ref 65–99)
GLUCOSE BLD STRIP.AUTO-MCNC: 157 MG/DL (ref 65–99)
GLUCOSE BLD STRIP.AUTO-MCNC: 158 MG/DL (ref 65–99)
GLUCOSE BLD STRIP.AUTO-MCNC: 160 MG/DL (ref 65–99)
GLUCOSE BLD STRIP.AUTO-MCNC: 160 MG/DL (ref 65–99)
GLUCOSE BLD STRIP.AUTO-MCNC: 162 MG/DL (ref 65–99)
GLUCOSE BLD STRIP.AUTO-MCNC: 164 MG/DL (ref 65–99)
GLUCOSE BLD STRIP.AUTO-MCNC: 166 MG/DL (ref 65–99)
GLUCOSE BLD STRIP.AUTO-MCNC: 168 MG/DL (ref 65–99)
GLUCOSE BLD STRIP.AUTO-MCNC: 176 MG/DL (ref 65–99)
GLUCOSE BLD STRIP.AUTO-MCNC: 185 MG/DL (ref 65–99)
GLUCOSE BLD STRIP.AUTO-MCNC: 195 MG/DL (ref 65–99)
GLUCOSE BLD STRIP.AUTO-MCNC: 198 MG/DL (ref 65–99)
GLUCOSE BLD STRIP.AUTO-MCNC: 214 MG/DL (ref 65–99)
GLUCOSE BLD STRIP.AUTO-MCNC: 227 MG/DL (ref 65–99)
GLUCOSE BLD STRIP.AUTO-MCNC: 257 MG/DL (ref 65–99)
GLUCOSE BLD STRIP.AUTO-MCNC: 86 MG/DL (ref 65–99)
GLUCOSE SERPL-MCNC: 115 MG/DL (ref 65–99)
GLUCOSE SERPL-MCNC: 115 MG/DL (ref 65–99)
GLUCOSE SERPL-MCNC: 118 MG/DL (ref 65–99)
GLUCOSE SERPL-MCNC: 124 MG/DL (ref 65–99)
GLUCOSE SERPL-MCNC: 130 MG/DL (ref 65–99)
GLUCOSE SERPL-MCNC: 136 MG/DL (ref 65–99)
GLUCOSE SERPL-MCNC: 138 MG/DL (ref 65–99)
GLUCOSE SERPL-MCNC: 138 MG/DL (ref 65–99)
GLUCOSE SERPL-MCNC: 147 MG/DL (ref 65–99)
GLUCOSE SERPL-MCNC: 149 MG/DL (ref 65–99)
GLUCOSE SERPL-MCNC: 150 MG/DL (ref 65–99)
GLUCOSE SERPL-MCNC: 152 MG/DL (ref 65–99)
GLUCOSE SERPL-MCNC: 156 MG/DL (ref 65–99)
GLUCOSE SERPL-MCNC: 156 MG/DL (ref 65–99)
GLUCOSE SERPL-MCNC: 158 MG/DL (ref 65–99)
GLUCOSE SERPL-MCNC: 160 MG/DL (ref 65–99)
GLUCOSE SERPL-MCNC: 161 MG/DL (ref 65–99)
GLUCOSE SERPL-MCNC: 163 MG/DL (ref 65–99)
GLUCOSE SERPL-MCNC: 164 MG/DL (ref 65–99)
GLUCOSE SERPL-MCNC: 181 MG/DL (ref 65–99)
GLUCOSE SERPL-MCNC: 195 MG/DL (ref 65–99)
GLUCOSE SERPL-MCNC: 200 MG/DL (ref 65–99)
GLUCOSE SERPL-MCNC: 87 MG/DL (ref 65–99)
GLUCOSE SERPL-MCNC: 93 MG/DL (ref 65–99)
GLUCOSE SERPL-MCNC: 95 MG/DL (ref 65–99)
GRAM STN SPEC: ABNORMAL
GRAM STN SPEC: NORMAL
HAV IGM SERPL QL IA: NORMAL
HBV CORE IGM SER QL: NORMAL
HBV SURFACE AB SERPL IA-ACNC: <3.5 MIU/ML (ref 0–10)
HBV SURFACE AG SER QL: NORMAL
HCO3 BLDA-SCNC: 26 MMOL/L (ref 17–25)
HCO3 BLDA-SCNC: 30.4 MMOL/L (ref 17–25)
HCO3 BLDA-SCNC: 30.7 MMOL/L (ref 17–25)
HCO3 BLDV-SCNC: 40 MMOL/L (ref 24–28)
HCT VFR BLD AUTO: 19.7 % (ref 42–52)
HCT VFR BLD AUTO: 19.7 % (ref 42–52)
HCT VFR BLD AUTO: 20.2 % (ref 42–52)
HCT VFR BLD AUTO: 22.6 % (ref 42–52)
HCT VFR BLD AUTO: 22.6 % (ref 42–52)
HCT VFR BLD AUTO: 22.9 % (ref 42–52)
HCT VFR BLD AUTO: 22.9 % (ref 42–52)
HCT VFR BLD AUTO: 23.1 % (ref 42–52)
HCT VFR BLD AUTO: 24.3 % (ref 42–52)
HCT VFR BLD AUTO: 24.9 % (ref 42–52)
HCT VFR BLD AUTO: 25.2 % (ref 42–52)
HCT VFR BLD AUTO: 26.9 % (ref 42–52)
HCT VFR BLD AUTO: 28.3 % (ref 42–52)
HCT VFR BLD AUTO: 29.1 % (ref 42–52)
HCT VFR BLD AUTO: 30 % (ref 42–52)
HCT VFR BLD AUTO: 30.2 % (ref 42–52)
HCT VFR BLD AUTO: 30.8 % (ref 42–52)
HCT VFR BLD AUTO: 31 % (ref 42–52)
HCT VFR BLD AUTO: 32.1 % (ref 42–52)
HCT VFR BLD AUTO: 33.6 % (ref 42–52)
HCT VFR BLD AUTO: 34.8 % (ref 42–52)
HCT VFR BLD AUTO: 37.9 % (ref 42–52)
HCV AB SER QL: NORMAL
HGB BLD-MCNC: 10.3 G/DL (ref 14–18)
HGB BLD-MCNC: 10.3 G/DL (ref 14–18)
HGB BLD-MCNC: 10.5 G/DL (ref 14–18)
HGB BLD-MCNC: 10.9 G/DL (ref 14–18)
HGB BLD-MCNC: 11.2 G/DL (ref 14–18)
HGB BLD-MCNC: 11.5 G/DL (ref 14–18)
HGB BLD-MCNC: 12 G/DL (ref 14–18)
HGB BLD-MCNC: 6.4 G/DL (ref 14–18)
HGB BLD-MCNC: 6.5 G/DL (ref 14–18)
HGB BLD-MCNC: 6.6 G/DL (ref 14–18)
HGB BLD-MCNC: 6.6 G/DL (ref 14–18)
HGB BLD-MCNC: 6.7 G/DL (ref 14–18)
HGB BLD-MCNC: 7.1 G/DL (ref 14–18)
HGB BLD-MCNC: 7.1 G/DL (ref 14–18)
HGB BLD-MCNC: 7.3 G/DL (ref 14–18)
HGB BLD-MCNC: 7.4 G/DL (ref 14–18)
HGB BLD-MCNC: 7.4 G/DL (ref 14–18)
HGB BLD-MCNC: 7.5 G/DL (ref 14–18)
HGB BLD-MCNC: 7.5 G/DL (ref 14–18)
HGB BLD-MCNC: 7.7 G/DL (ref 14–18)
HGB BLD-MCNC: 7.7 G/DL (ref 14–18)
HGB BLD-MCNC: 7.8 G/DL (ref 14–18)
HGB BLD-MCNC: 7.8 G/DL (ref 14–18)
HGB BLD-MCNC: 7.9 G/DL (ref 14–18)
HGB BLD-MCNC: 8 G/DL (ref 14–18)
HGB BLD-MCNC: 8.1 G/DL (ref 14–18)
HGB BLD-MCNC: 8.1 G/DL (ref 14–18)
HGB BLD-MCNC: 8.2 G/DL (ref 14–18)
HGB BLD-MCNC: 8.3 G/DL (ref 14–18)
HGB BLD-MCNC: 8.7 G/DL (ref 14–18)
HGB BLD-MCNC: 8.9 G/DL (ref 14–18)
HGB BLD-MCNC: 9.3 G/DL (ref 14–18)
HGB BLD-MCNC: 9.3 G/DL (ref 14–18)
HGB BLD-MCNC: 9.4 G/DL (ref 14–18)
HGB BLD-MCNC: 9.5 G/DL (ref 14–18)
HGB BLD-MCNC: 9.6 G/DL (ref 14–18)
HGB BLD-MCNC: 9.8 G/DL (ref 14–18)
HGB BLD-MCNC: 9.9 G/DL (ref 14–18)
HOROWITZ INDEX BLDA+IHG-RTO: 171 MM[HG]
HOROWITZ INDEX BLDA+IHG-RTO: 267 MM[HG]
IMM GRANULOCYTES # BLD AUTO: 0.08 K/UL (ref 0–0.11)
IMM GRANULOCYTES # BLD AUTO: 0.08 K/UL (ref 0–0.11)
IMM GRANULOCYTES # BLD AUTO: 0.09 K/UL (ref 0–0.11)
IMM GRANULOCYTES # BLD AUTO: 0.09 K/UL (ref 0–0.11)
IMM GRANULOCYTES # BLD AUTO: 0.1 K/UL (ref 0–0.11)
IMM GRANULOCYTES # BLD AUTO: 0.42 K/UL (ref 0–0.11)
IMM GRANULOCYTES # BLD AUTO: 0.48 K/UL (ref 0–0.11)
IMM GRANULOCYTES # BLD AUTO: 0.66 K/UL (ref 0–0.11)
IMM GRANULOCYTES NFR BLD AUTO: 0.5 % (ref 0–0.9)
IMM GRANULOCYTES NFR BLD AUTO: 0.7 % (ref 0–0.9)
IMM GRANULOCYTES NFR BLD AUTO: 0.9 % (ref 0–0.9)
IMM GRANULOCYTES NFR BLD AUTO: 1.6 % (ref 0–0.9)
IMM GRANULOCYTES NFR BLD AUTO: 1.7 % (ref 0–0.9)
IMM GRANULOCYTES NFR BLD AUTO: 2.2 % (ref 0–0.9)
INHALED O2 FLOW RATE: 93 L/MIN
INHALED O2 FLOW RATE: ABNORMAL L/MIN
INR PPP: 1.14 (ref 0.87–1.13)
INR PPP: 1.29 (ref 0.87–1.13)
INR PPP: 1.4 (ref 0.87–1.13)
IRON SATN MFR SERPL: 12 % (ref 15–55)
IRON SERPL-MCNC: 28 UG/DL (ref 50–180)
LACTATE BLD-SCNC: 0.7 MMOL/L (ref 0.5–2)
LACTATE BLD-SCNC: 0.8 MMOL/L (ref 0.5–2)
LACTATE SERPL-SCNC: 0.9 MMOL/L (ref 0.5–2)
LACTATE SERPL-SCNC: 1 MMOL/L (ref 0.5–2)
LACTATE SERPL-SCNC: 1.7 MMOL/L (ref 0.5–2)
LACTATE SERPL-SCNC: 4.9 MMOL/L (ref 0.5–2)
LV EJECT FRACT  99904: 70
LV EJECT FRACT MOD 4C 99902: 46.91
LYMPHOCYTES # BLD AUTO: 0 K/UL (ref 1–4.8)
LYMPHOCYTES # BLD AUTO: 0.19 K/UL (ref 1–4.8)
LYMPHOCYTES # BLD AUTO: 0.2 K/UL (ref 1–4.8)
LYMPHOCYTES # BLD AUTO: 0.25 K/UL (ref 1–4.8)
LYMPHOCYTES # BLD AUTO: 0.29 K/UL (ref 1–4.8)
LYMPHOCYTES # BLD AUTO: 0.4 K/UL (ref 1–4.8)
LYMPHOCYTES # BLD AUTO: 0.53 K/UL (ref 1–4.8)
LYMPHOCYTES # BLD AUTO: 0.75 K/UL (ref 1–4.8)
LYMPHOCYTES NFR BLD: 0 % (ref 22–41)
LYMPHOCYTES NFR BLD: 0.6 % (ref 22–41)
LYMPHOCYTES NFR BLD: 0.7 % (ref 22–41)
LYMPHOCYTES NFR BLD: 0.8 % (ref 22–41)
LYMPHOCYTES NFR BLD: 1 % (ref 22–41)
LYMPHOCYTES NFR BLD: 1.6 % (ref 22–41)
LYMPHOCYTES NFR BLD: 2.2 % (ref 22–41)
LYMPHOCYTES NFR BLD: 2.6 % (ref 22–41)
LYMPHOCYTES NFR BLD: 3.1 % (ref 22–41)
LYMPHOCYTES NFR BLD: 3.7 % (ref 22–41)
MAGNESIUM SERPL-MCNC: 1.9 MG/DL (ref 1.5–2.5)
MAGNESIUM SERPL-MCNC: 2.1 MG/DL (ref 1.5–2.5)
MAGNESIUM SERPL-MCNC: 2.2 MG/DL (ref 1.5–2.5)
MAGNESIUM SERPL-MCNC: 2.3 MG/DL (ref 1.5–2.5)
MAGNESIUM SERPL-MCNC: 2.3 MG/DL (ref 1.5–2.5)
MAGNESIUM SERPL-MCNC: 2.5 MG/DL (ref 1.5–2.5)
MAGNESIUM SERPL-MCNC: 2.5 MG/DL (ref 1.5–2.5)
MAGNESIUM SERPL-MCNC: 2.7 MG/DL (ref 1.5–2.5)
MANUAL DIFF BLD: NORMAL
MANUAL DIFF BLD: NORMAL
MCF BLD TEG: 68.5 MM (ref 52–69)
MCF.PLATELET INHIB BLD ROTEM: 49.5 MM (ref 15–32)
MCH RBC QN AUTO: 29.2 PG (ref 27–33)
MCH RBC QN AUTO: 29.5 PG (ref 27–33)
MCH RBC QN AUTO: 29.7 PG (ref 27–33)
MCH RBC QN AUTO: 29.9 PG (ref 27–33)
MCH RBC QN AUTO: 30 PG (ref 27–33)
MCH RBC QN AUTO: 30.6 PG (ref 27–33)
MCH RBC QN AUTO: 31.1 PG (ref 27–33)
MCH RBC QN AUTO: 31.2 PG (ref 27–33)
MCH RBC QN AUTO: 31.4 PG (ref 27–33)
MCH RBC QN AUTO: 31.4 PG (ref 27–33)
MCH RBC QN AUTO: 31.5 PG (ref 27–33)
MCH RBC QN AUTO: 31.6 PG (ref 27–33)
MCH RBC QN AUTO: 31.8 PG (ref 27–33)
MCH RBC QN AUTO: 31.8 PG (ref 27–33)
MCH RBC QN AUTO: 31.9 PG (ref 27–33)
MCH RBC QN AUTO: 32 PG (ref 27–33)
MCH RBC QN AUTO: 32.2 PG (ref 27–33)
MCH RBC QN AUTO: 32.3 PG (ref 27–33)
MCHC RBC AUTO-ENTMCNC: 31.3 G/DL (ref 32.3–36.5)
MCHC RBC AUTO-ENTMCNC: 31.7 G/DL (ref 32.3–36.5)
MCHC RBC AUTO-ENTMCNC: 32 G/DL (ref 32.3–36.5)
MCHC RBC AUTO-ENTMCNC: 32.1 G/DL (ref 32.3–36.5)
MCHC RBC AUTO-ENTMCNC: 32.5 G/DL (ref 32.3–36.5)
MCHC RBC AUTO-ENTMCNC: 32.6 G/DL (ref 32.3–36.5)
MCHC RBC AUTO-ENTMCNC: 32.8 G/DL (ref 32.3–36.5)
MCHC RBC AUTO-ENTMCNC: 32.9 G/DL (ref 32.3–36.5)
MCHC RBC AUTO-ENTMCNC: 32.9 G/DL (ref 32.3–36.5)
MCHC RBC AUTO-ENTMCNC: 33.1 G/DL (ref 32.3–36.5)
MCHC RBC AUTO-ENTMCNC: 33.2 G/DL (ref 32.3–36.5)
MCHC RBC AUTO-ENTMCNC: 33.4 G/DL (ref 32.3–36.5)
MCHC RBC AUTO-ENTMCNC: 33.5 G/DL (ref 32.3–36.5)
MCHC RBC AUTO-ENTMCNC: 33.9 G/DL (ref 32.3–36.5)
MCHC RBC AUTO-ENTMCNC: 34 G/DL (ref 32.3–36.5)
MCHC RBC AUTO-ENTMCNC: 34.1 G/DL (ref 32.3–36.5)
MCHC RBC AUTO-ENTMCNC: 34.1 G/DL (ref 32.3–36.5)
MCHC RBC AUTO-ENTMCNC: 34.2 G/DL (ref 32.3–36.5)
MCHC RBC AUTO-ENTMCNC: 34.2 G/DL (ref 32.3–36.5)
MCV RBC AUTO: 88.6 FL (ref 81.4–97.8)
MCV RBC AUTO: 89.1 FL (ref 81.4–97.8)
MCV RBC AUTO: 89.5 FL (ref 81.4–97.8)
MCV RBC AUTO: 90 FL (ref 81.4–97.8)
MCV RBC AUTO: 90.7 FL (ref 81.4–97.8)
MCV RBC AUTO: 91.3 FL (ref 81.4–97.8)
MCV RBC AUTO: 92.3 FL (ref 81.4–97.8)
MCV RBC AUTO: 93.5 FL (ref 81.4–97.8)
MCV RBC AUTO: 93.9 FL (ref 81.4–97.8)
MCV RBC AUTO: 94.4 FL (ref 81.4–97.8)
MCV RBC AUTO: 94.6 FL (ref 81.4–97.8)
MCV RBC AUTO: 94.6 FL (ref 81.4–97.8)
MCV RBC AUTO: 94.8 FL (ref 81.4–97.8)
MCV RBC AUTO: 95 FL (ref 81.4–97.8)
MCV RBC AUTO: 95.1 FL (ref 81.4–97.8)
MCV RBC AUTO: 96.3 FL (ref 81.4–97.8)
MCV RBC AUTO: 97 FL (ref 81.4–97.8)
MCV RBC AUTO: 97.7 FL (ref 81.4–97.8)
MCV RBC AUTO: 97.7 FL (ref 81.4–97.8)
MCV RBC AUTO: 98 FL (ref 81.4–97.8)
MCV RBC AUTO: 99.7 FL (ref 81.4–97.8)
MCV RBC AUTO: 99.7 FL (ref 81.4–97.8)
METAMYELOCYTES NFR BLD MANUAL: 0.8 %
MODE IMODE: ABNORMAL
MODE IMODE: ABNORMAL
MONOCYTES # BLD AUTO: 0.46 K/UL (ref 0–0.85)
MONOCYTES # BLD AUTO: 0.52 K/UL (ref 0–0.85)
MONOCYTES # BLD AUTO: 0.57 K/UL (ref 0–0.85)
MONOCYTES # BLD AUTO: 0.61 K/UL (ref 0–0.85)
MONOCYTES # BLD AUTO: 0.9 K/UL (ref 0–0.85)
MONOCYTES # BLD AUTO: 0.92 K/UL (ref 0–0.85)
MONOCYTES # BLD AUTO: 0.96 K/UL (ref 0–0.85)
MONOCYTES # BLD AUTO: 1.1 K/UL (ref 0–0.85)
MONOCYTES # BLD AUTO: 1.13 K/UL (ref 0–0.85)
MONOCYTES # BLD AUTO: 1.43 K/UL (ref 0–0.85)
MONOCYTES NFR BLD AUTO: 2.3 % (ref 0–13.4)
MONOCYTES NFR BLD AUTO: 2.5 % (ref 0–13.4)
MONOCYTES NFR BLD AUTO: 2.6 % (ref 0–13.4)
MONOCYTES NFR BLD AUTO: 3.3 % (ref 0–13.4)
MONOCYTES NFR BLD AUTO: 3.7 % (ref 0–13.4)
MONOCYTES NFR BLD AUTO: 4.1 % (ref 0–13.4)
MONOCYTES NFR BLD AUTO: 5 % (ref 0–13.4)
MONOCYTES NFR BLD AUTO: 5.8 % (ref 0–13.4)
MONOCYTES NFR BLD AUTO: 6.4 % (ref 0–13.4)
MONOCYTES NFR BLD AUTO: 7 % (ref 0–13.4)
MORPHOLOGY BLD-IMP: NORMAL
MORPHOLOGY BLD-IMP: NORMAL
NEUTROPHILS # BLD AUTO: 11.96 K/UL (ref 1.82–7.42)
NEUTROPHILS # BLD AUTO: 14.05 K/UL (ref 1.82–7.42)
NEUTROPHILS # BLD AUTO: 15.32 K/UL (ref 1.82–7.42)
NEUTROPHILS # BLD AUTO: 18.17 K/UL (ref 1.82–7.42)
NEUTROPHILS # BLD AUTO: 22.89 K/UL (ref 1.82–7.42)
NEUTROPHILS # BLD AUTO: 23.58 K/UL (ref 1.82–7.42)
NEUTROPHILS # BLD AUTO: 27.82 K/UL (ref 1.82–7.42)
NEUTROPHILS # BLD AUTO: 28.34 K/UL (ref 1.82–7.42)
NEUTROPHILS # BLD AUTO: 35.2 K/UL (ref 1.82–7.42)
NEUTROPHILS # BLD AUTO: 8.5 K/UL (ref 1.82–7.42)
NEUTROPHILS NFR BLD: 88.7 % (ref 44–72)
NEUTROPHILS NFR BLD: 89.6 % (ref 44–72)
NEUTROPHILS NFR BLD: 90.8 % (ref 44–72)
NEUTROPHILS NFR BLD: 91.8 % (ref 44–72)
NEUTROPHILS NFR BLD: 92.5 % (ref 44–72)
NEUTROPHILS NFR BLD: 93.2 % (ref 44–72)
NEUTROPHILS NFR BLD: 93.5 % (ref 44–72)
NEUTROPHILS NFR BLD: 94.4 % (ref 44–72)
NEUTROPHILS NFR BLD: 94.8 % (ref 44–72)
NEUTROPHILS NFR BLD: 97.4 % (ref 44–72)
NEUTS BAND NFR BLD MANUAL: 3.4 % (ref 0–10)
NRBC # BLD AUTO: 0 K/UL
NRBC # BLD AUTO: 0.02 K/UL
NRBC # BLD AUTO: 0.02 K/UL
NRBC # BLD AUTO: 0.03 K/UL
NRBC # BLD AUTO: 0.04 K/UL
NRBC # BLD AUTO: 0.1 K/UL
NRBC BLD-RTO: 0 /100 WBC (ref 0–0.2)
NRBC BLD-RTO: 0.1 /100 WBC (ref 0–0.2)
NRBC BLD-RTO: 0.2 /100 WBC (ref 0–0.2)
NRBC BLD-RTO: 0.3 /100 WBC (ref 0–0.2)
NRBC BLD-RTO: 0.3 /100 WBC (ref 0–0.2)
O2/TOTAL GAS SETTING VFR VENT: 30 %
O2/TOTAL GAS SETTING VFR VENT: 35 %
OVALOCYTES BLD QL SMEAR: NORMAL
PA AA BLD-ACNC: ABNORMAL % (ref 0–11)
PA ADP BLD-ACNC: ABNORMAL % (ref 0–17)
PATHOLOGY CONSULT NOTE: NORMAL
PCO2 BLDA: 35.9 MMHG (ref 26–37)
PCO2 BLDA: 52.1 MMHG (ref 26–37)
PCO2 BLDA: 53.5 MMHG (ref 26–37)
PCO2 BLDV: 67.6 MMHG (ref 41–51)
PCO2 TEMP ADJ BLDA: 35.4 MMHG (ref 26–37)
PCO2 TEMP ADJ BLDA: 50.1 MMHG (ref 26–37)
PCO2 TEMP ADJ BLDA: 51.5 MMHG (ref 26–37)
PCO2 TEMP ADJ BLDV: 65.6 MMHG (ref 41–51)
PEEP END EXPIRATORY PRESSURE IPEEP: 8 CMH20
PEEP END EXPIRATORY PRESSURE IPEEP: 8 CMH20
PH BLDA: 7.36 [PH] (ref 7.4–7.5)
PH BLDA: 7.38 [PH] (ref 7.4–7.5)
PH BLDA: 7.48 [PH] (ref 7.4–7.5)
PH BLDV: 7.39 [PH] (ref 7.31–7.45)
PH TEMP ADJ BLDA: 7.38 [PH] (ref 7.4–7.5)
PH TEMP ADJ BLDA: 7.39 [PH] (ref 7.4–7.5)
PH TEMP ADJ BLDA: 7.49 [PH] (ref 7.4–7.5)
PH TEMP ADJ BLDV: 7.4 [PH] (ref 7.31–7.45)
PHOSPHATE SERPL-MCNC: 10 MG/DL (ref 2.5–4.5)
PHOSPHATE SERPL-MCNC: 13 MG/DL (ref 2.5–4.5)
PHOSPHATE SERPL-MCNC: 4.7 MG/DL (ref 2.5–4.5)
PHOSPHATE SERPL-MCNC: 4.8 MG/DL (ref 2.5–4.5)
PHOSPHATE SERPL-MCNC: 5.6 MG/DL (ref 2.5–4.5)
PHOSPHATE SERPL-MCNC: 5.8 MG/DL (ref 2.5–4.5)
PHOSPHATE SERPL-MCNC: 6.5 MG/DL (ref 2.5–4.5)
PHOSPHATE SERPL-MCNC: 6.6 MG/DL (ref 2.5–4.5)
PHOSPHATE SERPL-MCNC: 6.8 MG/DL (ref 2.5–4.5)
PHOSPHATE SERPL-MCNC: 7.4 MG/DL (ref 2.5–4.5)
PHOSPHATE SERPL-MCNC: 8.2 MG/DL (ref 2.5–4.5)
PHOSPHATE SERPL-MCNC: 8.2 MG/DL (ref 2.5–4.5)
PHOSPHATE SERPL-MCNC: 8.7 MG/DL (ref 2.5–4.5)
PLATELET # BLD AUTO: 120 K/UL (ref 164–446)
PLATELET # BLD AUTO: 121 K/UL (ref 164–446)
PLATELET # BLD AUTO: 127 K/UL (ref 164–446)
PLATELET # BLD AUTO: 129 K/UL (ref 164–446)
PLATELET # BLD AUTO: 134 K/UL (ref 164–446)
PLATELET # BLD AUTO: 135 K/UL (ref 164–446)
PLATELET # BLD AUTO: 140 K/UL (ref 164–446)
PLATELET # BLD AUTO: 141 K/UL (ref 164–446)
PLATELET # BLD AUTO: 146 K/UL (ref 164–446)
PLATELET # BLD AUTO: 160 K/UL (ref 164–446)
PLATELET # BLD AUTO: 160 K/UL (ref 164–446)
PLATELET # BLD AUTO: 170 K/UL (ref 164–446)
PLATELET # BLD AUTO: 175 K/UL (ref 164–446)
PLATELET # BLD AUTO: 177 K/UL (ref 164–446)
PLATELET # BLD AUTO: 180 K/UL (ref 164–446)
PLATELET # BLD AUTO: 185 K/UL (ref 164–446)
PLATELET # BLD AUTO: 230 K/UL (ref 164–446)
PLATELET # BLD AUTO: 255 K/UL (ref 164–446)
PLATELET # BLD AUTO: 295 K/UL (ref 164–446)
PLATELET # BLD AUTO: 310 K/UL (ref 164–446)
PLATELET # BLD AUTO: 321 K/UL (ref 164–446)
PLATELET # BLD AUTO: 340 K/UL (ref 164–446)
PLATELET BLD QL SMEAR: NORMAL
PLATELET BLD QL SMEAR: NORMAL
PMV BLD AUTO: 10 FL (ref 9–12.9)
PMV BLD AUTO: 10 FL (ref 9–12.9)
PMV BLD AUTO: 10.2 FL (ref 9–12.9)
PMV BLD AUTO: 10.7 FL (ref 9–12.9)
PMV BLD AUTO: 10.7 FL (ref 9–12.9)
PMV BLD AUTO: 10.8 FL (ref 9–12.9)
PMV BLD AUTO: 11.4 FL (ref 9–12.9)
PMV BLD AUTO: 11.4 FL (ref 9–12.9)
PMV BLD AUTO: 11.7 FL (ref 9–12.9)
PMV BLD AUTO: 12 FL (ref 9–12.9)
PMV BLD AUTO: 12.2 FL (ref 9–12.9)
PMV BLD AUTO: 12.6 FL (ref 9–12.9)
PMV BLD AUTO: 9 FL (ref 9–12.9)
PMV BLD AUTO: 9.1 FL (ref 9–12.9)
PMV BLD AUTO: 9.1 FL (ref 9–12.9)
PMV BLD AUTO: 9.2 FL (ref 9–12.9)
PMV BLD AUTO: 9.3 FL (ref 9–12.9)
PMV BLD AUTO: 9.6 FL (ref 9–12.9)
PMV BLD AUTO: 9.7 FL (ref 9–12.9)
PMV BLD AUTO: 9.8 FL (ref 9–12.9)
PO2 BLDA: 206 MMHG (ref 64–87)
PO2 BLDA: 60 MMHG (ref 64–87)
PO2 BLDA: 80 MMHG (ref 64–87)
PO2 BLDV: 23.8 MMHG (ref 25–40)
PO2 TEMP ADJ BLDA: 204.5 MMHG (ref 64–87)
PO2 TEMP ADJ BLDA: 57 MMHG (ref 64–87)
PO2 TEMP ADJ BLDA: 75 MMHG (ref 64–87)
PO2 TEMP ADJ BLDV: 22.6 MMHG (ref 25–40)
POIKILOCYTOSIS BLD QL SMEAR: NORMAL
POIKILOCYTOSIS BLD QL SMEAR: NORMAL
POTASSIUM SERPL-SCNC: 3.4 MMOL/L (ref 3.6–5.5)
POTASSIUM SERPL-SCNC: 3.5 MMOL/L (ref 3.6–5.5)
POTASSIUM SERPL-SCNC: 3.5 MMOL/L (ref 3.6–5.5)
POTASSIUM SERPL-SCNC: 3.7 MMOL/L (ref 3.6–5.5)
POTASSIUM SERPL-SCNC: 3.7 MMOL/L (ref 3.6–5.5)
POTASSIUM SERPL-SCNC: 3.8 MMOL/L (ref 3.6–5.5)
POTASSIUM SERPL-SCNC: 3.9 MMOL/L (ref 3.6–5.5)
POTASSIUM SERPL-SCNC: 4.1 MMOL/L (ref 3.6–5.5)
POTASSIUM SERPL-SCNC: 4.2 MMOL/L (ref 3.6–5.5)
POTASSIUM SERPL-SCNC: 4.2 MMOL/L (ref 3.6–5.5)
POTASSIUM SERPL-SCNC: 4.3 MMOL/L (ref 3.6–5.5)
POTASSIUM SERPL-SCNC: 4.6 MMOL/L (ref 3.6–5.5)
POTASSIUM SERPL-SCNC: 4.9 MMOL/L (ref 3.6–5.5)
POTASSIUM SERPL-SCNC: 4.9 MMOL/L (ref 3.6–5.5)
POTASSIUM SERPL-SCNC: 5.1 MMOL/L (ref 3.6–5.5)
POTASSIUM SERPL-SCNC: 5.4 MMOL/L (ref 3.6–5.5)
POTASSIUM SERPL-SCNC: 5.5 MMOL/L (ref 3.6–5.5)
POTASSIUM SERPL-SCNC: 5.9 MMOL/L (ref 3.6–5.5)
POTASSIUM SERPL-SCNC: 5.9 MMOL/L (ref 3.6–5.5)
POTASSIUM SERPL-SCNC: 6 MMOL/L (ref 3.6–5.5)
POTASSIUM SERPL-SCNC: 6.6 MMOL/L (ref 3.6–5.5)
POTASSIUM SERPL-SCNC: 6.7 MMOL/L (ref 3.6–5.5)
PROCALCITONIN SERPL-MCNC: 2.99 NG/ML
PROCALCITONIN SERPL-MCNC: 3.06 NG/ML
PRODUCT TYPE UPROD: ABNORMAL
PRODUCT TYPE UPROD: ABNORMAL
PRODUCT TYPE UPROD: NORMAL
PROT SERPL-MCNC: 3.4 G/DL (ref 6–8.2)
PROT SERPL-MCNC: 3.9 G/DL (ref 6–8.2)
PROT SERPL-MCNC: 4.3 G/DL (ref 6–8.2)
PROT SERPL-MCNC: 4.4 G/DL (ref 6–8.2)
PROT SERPL-MCNC: 6.1 G/DL (ref 6–8.2)
PROT SERPL-MCNC: 6.6 G/DL (ref 6–8.2)
PROTHROMBIN TIME: 14.8 SEC (ref 12–14.6)
PROTHROMBIN TIME: 16.3 SEC (ref 12–14.6)
PROTHROMBIN TIME: 17.4 SEC (ref 12–14.6)
RBC # BLD AUTO: 2.13 M/UL (ref 4.7–6.1)
RBC # BLD AUTO: 2.19 M/UL (ref 4.7–6.1)
RBC # BLD AUTO: 2.2 M/UL (ref 4.7–6.1)
RBC # BLD AUTO: 2.38 M/UL (ref 4.7–6.1)
RBC # BLD AUTO: 2.39 M/UL (ref 4.7–6.1)
RBC # BLD AUTO: 2.42 M/UL (ref 4.7–6.1)
RBC # BLD AUTO: 2.47 M/UL (ref 4.7–6.1)
RBC # BLD AUTO: 2.48 M/UL (ref 4.7–6.1)
RBC # BLD AUTO: 2.68 M/UL (ref 4.7–6.1)
RBC # BLD AUTO: 2.76 M/UL (ref 4.7–6.1)
RBC # BLD AUTO: 2.81 M/UL (ref 4.7–6.1)
RBC # BLD AUTO: 2.94 M/UL (ref 4.7–6.1)
RBC # BLD AUTO: 2.98 M/UL (ref 4.7–6.1)
RBC # BLD AUTO: 3.02 M/UL (ref 4.7–6.1)
RBC # BLD AUTO: 3.06 M/UL (ref 4.7–6.1)
RBC # BLD AUTO: 3.09 M/UL (ref 4.7–6.1)
RBC # BLD AUTO: 3.24 M/UL (ref 4.7–6.1)
RBC # BLD AUTO: 3.3 M/UL (ref 4.7–6.1)
RBC # BLD AUTO: 3.31 M/UL (ref 4.7–6.1)
RBC # BLD AUTO: 3.49 M/UL (ref 4.7–6.1)
RBC # BLD AUTO: 3.56 M/UL (ref 4.7–6.1)
RBC # BLD AUTO: 3.8 M/UL (ref 4.7–6.1)
RBC BLD AUTO: PRESENT
RBC BLD AUTO: PRESENT
RH BLD: ABNORMAL
RH BLD: NORMAL
RSV RNA SPEC QL NAA+PROBE: NEGATIVE
SAO2 % BLDA: 89 % (ref 93–99)
SAO2 % BLDA: 95 % (ref 93–99)
SAO2 % BLDA: 97.9 % (ref 93–99)
SAO2 % BLDV: 26.5 %
SARS-COV-2 RNA RESP QL NAA+PROBE: NOTDETECTED
SCCMEC + MECA PNL NOSE NAA+PROBE: NEGATIVE
SIGNIFICANT IND 70042: ABNORMAL
SIGNIFICANT IND 70042: ABNORMAL
SIGNIFICANT IND 70042: NORMAL
SIGNIFICANT IND 70042: NORMAL
SITE SITE: ABNORMAL
SITE SITE: ABNORMAL
SITE SITE: NORMAL
SITE SITE: NORMAL
SODIUM SERPL-SCNC: 131 MMOL/L (ref 135–145)
SODIUM SERPL-SCNC: 133 MMOL/L (ref 135–145)
SODIUM SERPL-SCNC: 133 MMOL/L (ref 135–145)
SODIUM SERPL-SCNC: 134 MMOL/L (ref 135–145)
SODIUM SERPL-SCNC: 134 MMOL/L (ref 135–145)
SODIUM SERPL-SCNC: 137 MMOL/L (ref 135–145)
SODIUM SERPL-SCNC: 137 MMOL/L (ref 135–145)
SODIUM SERPL-SCNC: 138 MMOL/L (ref 135–145)
SODIUM SERPL-SCNC: 140 MMOL/L (ref 135–145)
SODIUM SERPL-SCNC: 141 MMOL/L (ref 135–145)
SODIUM SERPL-SCNC: 142 MMOL/L (ref 135–145)
SODIUM SERPL-SCNC: 142 MMOL/L (ref 135–145)
SODIUM SERPL-SCNC: 143 MMOL/L (ref 135–145)
SODIUM SERPL-SCNC: 144 MMOL/L (ref 135–145)
SODIUM SERPL-SCNC: 148 MMOL/L (ref 135–145)
SOURCE SOURCE: ABNORMAL
SOURCE SOURCE: ABNORMAL
SOURCE SOURCE: NORMAL
SOURCE SOURCE: NORMAL
SPECIMEN DRAWN FROM PATIENT: ABNORMAL
SPECIMEN DRAWN FROM PATIENT: ABNORMAL
SPECIMEN SOURCE: NORMAL
TEG ALGORITHM TGALG: ABNORMAL
TIBC SERPL-MCNC: 240 UG/DL (ref 250–450)
TIDAL VOLUME IVT: 430 ML
TIDAL VOLUME IVT: 430 ML
TOXIC GRANULES BLD QL SMEAR: NORMAL
TRIGL SERPL-MCNC: 186 MG/DL (ref 0–149)
TROPONIN T SERPL-MCNC: 83 NG/L (ref 6–19)
UFH PPP CHRO-ACNC: 0.29 IU/ML
UFH PPP CHRO-ACNC: 0.35 IU/ML
UFH PPP CHRO-ACNC: 0.36 IU/ML
UFH PPP CHRO-ACNC: 0.37 IU/ML
UFH PPP CHRO-ACNC: 0.42 IU/ML
UFH PPP CHRO-ACNC: 0.53 IU/ML
UFH PPP CHRO-ACNC: 0.71 IU/ML
UFH PPP CHRO-ACNC: 0.76 IU/ML
UFH PPP CHRO-ACNC: 0.82 IU/ML
UFH PPP CHRO-ACNC: <0.1 IU/ML
UIBC SERPL-MCNC: 212 UG/DL (ref 110–370)
UNIT STATUS USTAT: ABNORMAL
UNIT STATUS USTAT: ABNORMAL
UNIT STATUS USTAT: NORMAL
WBC # BLD AUTO: 12.8 K/UL (ref 4.8–10.8)
WBC # BLD AUTO: 13.8 K/UL (ref 4.8–10.8)
WBC # BLD AUTO: 14.5 K/UL (ref 4.8–10.8)
WBC # BLD AUTO: 15.4 K/UL (ref 4.8–10.8)
WBC # BLD AUTO: 15.5 K/UL (ref 4.8–10.8)
WBC # BLD AUTO: 17 K/UL (ref 4.8–10.8)
WBC # BLD AUTO: 17.1 K/UL (ref 4.8–10.8)
WBC # BLD AUTO: 17.8 K/UL (ref 4.8–10.8)
WBC # BLD AUTO: 20.4 K/UL (ref 4.8–10.8)
WBC # BLD AUTO: 20.5 K/UL (ref 4.8–10.8)
WBC # BLD AUTO: 20.6 K/UL (ref 4.8–10.8)
WBC # BLD AUTO: 22.9 K/UL (ref 4.8–10.8)
WBC # BLD AUTO: 23.5 K/UL (ref 4.8–10.8)
WBC # BLD AUTO: 24.2 K/UL (ref 4.8–10.8)
WBC # BLD AUTO: 24.9 K/UL (ref 4.8–10.8)
WBC # BLD AUTO: 26.1 K/UL (ref 4.8–10.8)
WBC # BLD AUTO: 26.7 K/UL (ref 4.8–10.8)
WBC # BLD AUTO: 29.5 K/UL (ref 4.8–10.8)
WBC # BLD AUTO: 30.4 K/UL (ref 4.8–10.8)
WBC # BLD AUTO: 32.8 K/UL (ref 4.8–10.8)
WBC # BLD AUTO: 36.7 K/UL (ref 4.8–10.8)
WBC # BLD AUTO: 9.3 K/UL (ref 4.8–10.8)

## 2024-01-01 PROCEDURE — 99233 SBSQ HOSP IP/OBS HIGH 50: CPT | Performed by: HOSPITALIST

## 2024-01-01 PROCEDURE — 700111 HCHG RX REV CODE 636 W/ 250 OVERRIDE (IP): Mod: JZ | Performed by: NURSE PRACTITIONER

## 2024-01-01 PROCEDURE — 80069 RENAL FUNCTION PANEL: CPT

## 2024-01-01 PROCEDURE — 84132 ASSAY OF SERUM POTASSIUM: CPT

## 2024-01-01 PROCEDURE — A9270 NON-COVERED ITEM OR SERVICE: HCPCS | Performed by: INTERNAL MEDICINE

## 2024-01-01 PROCEDURE — 700105 HCHG RX REV CODE 258: Performed by: NURSE PRACTITIONER

## 2024-01-01 PROCEDURE — 700105 HCHG RX REV CODE 258: Performed by: INTERNAL MEDICINE

## 2024-01-01 PROCEDURE — 700105 HCHG RX REV CODE 258: Performed by: HOSPITALIST

## 2024-01-01 PROCEDURE — A9270 NON-COVERED ITEM OR SERVICE: HCPCS | Performed by: STUDENT IN AN ORGANIZED HEALTH CARE EDUCATION/TRAINING PROGRAM

## 2024-01-01 PROCEDURE — 700111 HCHG RX REV CODE 636 W/ 250 OVERRIDE (IP): Performed by: INTERNAL MEDICINE

## 2024-01-01 PROCEDURE — 74018 RADEX ABDOMEN 1 VIEW: CPT

## 2024-01-01 PROCEDURE — 770000 HCHG ROOM/CARE - INTERMEDIATE ICU *

## 2024-01-01 PROCEDURE — 71045 X-RAY EXAM CHEST 1 VIEW: CPT

## 2024-01-01 PROCEDURE — 93010 ELECTROCARDIOGRAM REPORT: CPT | Mod: 76 | Performed by: INTERNAL MEDICINE

## 2024-01-01 PROCEDURE — 86923 COMPATIBILITY TEST ELECTRIC: CPT

## 2024-01-01 PROCEDURE — 85007 BL SMEAR W/DIFF WBC COUNT: CPT

## 2024-01-01 PROCEDURE — A9270 NON-COVERED ITEM OR SERVICE: HCPCS

## 2024-01-01 PROCEDURE — 700102 HCHG RX REV CODE 250 W/ 637 OVERRIDE(OP): Performed by: STUDENT IN AN ORGANIZED HEALTH CARE EDUCATION/TRAINING PROGRAM

## 2024-01-01 PROCEDURE — 700111 HCHG RX REV CODE 636 W/ 250 OVERRIDE (IP): Mod: JZ

## 2024-01-01 PROCEDURE — 99292 CRITICAL CARE ADDL 30 MIN: CPT | Performed by: INTERNAL MEDICINE

## 2024-01-01 PROCEDURE — 5A1935Z RESPIRATORY VENTILATION, LESS THAN 24 CONSECUTIVE HOURS: ICD-10-PCS | Performed by: INTERNAL MEDICINE

## 2024-01-01 PROCEDURE — 85730 THROMBOPLASTIN TIME PARTIAL: CPT

## 2024-01-01 PROCEDURE — 87070 CULTURE OTHR SPECIMN AEROBIC: CPT

## 2024-01-01 PROCEDURE — 770020 HCHG ROOM/CARE - TELE (206)

## 2024-01-01 PROCEDURE — 700111 HCHG RX REV CODE 636 W/ 250 OVERRIDE (IP): Mod: JZ | Performed by: INTERNAL MEDICINE

## 2024-01-01 PROCEDURE — A9270 NON-COVERED ITEM OR SERVICE: HCPCS | Performed by: HOSPITALIST

## 2024-01-01 PROCEDURE — 93005 ELECTROCARDIOGRAM TRACING: CPT | Performed by: INTERNAL MEDICINE

## 2024-01-01 PROCEDURE — 700101 HCHG RX REV CODE 250: Performed by: INTERNAL MEDICINE

## 2024-01-01 PROCEDURE — 99233 SBSQ HOSP IP/OBS HIGH 50: CPT

## 2024-01-01 PROCEDURE — 82330 ASSAY OF CALCIUM: CPT

## 2024-01-01 PROCEDURE — 74176 CT ABD & PELVIS W/O CONTRAST: CPT

## 2024-01-01 PROCEDURE — 97530 THERAPEUTIC ACTIVITIES: CPT

## 2024-01-01 PROCEDURE — 700102 HCHG RX REV CODE 250 W/ 637 OVERRIDE(OP): Performed by: INTERNAL MEDICINE

## 2024-01-01 PROCEDURE — P9016 RBC LEUKOCYTES REDUCED: HCPCS

## 2024-01-01 PROCEDURE — 700102 HCHG RX REV CODE 250 W/ 637 OVERRIDE(OP): Performed by: HOSPITALIST

## 2024-01-01 PROCEDURE — 700111 HCHG RX REV CODE 636 W/ 250 OVERRIDE (IP): Mod: JZ | Performed by: ANESTHESIOLOGY

## 2024-01-01 PROCEDURE — 700102 HCHG RX REV CODE 250 W/ 637 OVERRIDE(OP): Performed by: ANESTHESIOLOGY

## 2024-01-01 PROCEDURE — 99233 SBSQ HOSP IP/OBS HIGH 50: CPT | Performed by: INTERNAL MEDICINE

## 2024-01-01 PROCEDURE — 94640 AIRWAY INHALATION TREATMENT: CPT

## 2024-01-01 PROCEDURE — 93970 EXTREMITY STUDY: CPT

## 2024-01-01 PROCEDURE — A9270 NON-COVERED ITEM OR SERVICE: HCPCS | Performed by: NURSE PRACTITIONER

## 2024-01-01 PROCEDURE — 87077 CULTURE AEROBIC IDENTIFY: CPT

## 2024-01-01 PROCEDURE — 37799 UNLISTED PX VASCULAR SURGERY: CPT

## 2024-01-01 PROCEDURE — 85520 HEPARIN ASSAY: CPT | Mod: 91

## 2024-01-01 PROCEDURE — 36430 TRANSFUSION BLD/BLD COMPNT: CPT

## 2024-01-01 PROCEDURE — 82962 GLUCOSE BLOOD TEST: CPT | Mod: 91

## 2024-01-01 PROCEDURE — 85018 HEMOGLOBIN: CPT

## 2024-01-01 PROCEDURE — C1729 CATH, DRAINAGE: HCPCS | Performed by: STUDENT IN AN ORGANIZED HEALTH CARE EDUCATION/TRAINING PROGRAM

## 2024-01-01 PROCEDURE — 700111 HCHG RX REV CODE 636 W/ 250 OVERRIDE (IP): Mod: JZ | Performed by: HOSPITALIST

## 2024-01-01 PROCEDURE — 700111 HCHG RX REV CODE 636 W/ 250 OVERRIDE (IP): Performed by: ANESTHESIOLOGY

## 2024-01-01 PROCEDURE — 93010 ELECTROCARDIOGRAM REPORT: CPT | Performed by: INTERNAL MEDICINE

## 2024-01-01 PROCEDURE — 85025 COMPLETE CBC W/AUTO DIFF WBC: CPT

## 2024-01-01 PROCEDURE — 700111 HCHG RX REV CODE 636 W/ 250 OVERRIDE (IP): Performed by: HOSPITALIST

## 2024-01-01 PROCEDURE — 85027 COMPLETE CBC AUTOMATED: CPT

## 2024-01-01 PROCEDURE — 36415 COLL VENOUS BLD VENIPUNCTURE: CPT

## 2024-01-01 PROCEDURE — 700111 HCHG RX REV CODE 636 W/ 250 OVERRIDE (IP): Performed by: STUDENT IN AN ORGANIZED HEALTH CARE EDUCATION/TRAINING PROGRAM

## 2024-01-01 PROCEDURE — 770022 HCHG ROOM/CARE - ICU (200)

## 2024-01-01 PROCEDURE — 700101 HCHG RX REV CODE 250

## 2024-01-01 PROCEDURE — 700102 HCHG RX REV CODE 250 W/ 637 OVERRIDE(OP): Performed by: NURSE PRACTITIONER

## 2024-01-01 PROCEDURE — 94664 DEMO&/EVAL PT USE INHALER: CPT

## 2024-01-01 PROCEDURE — 87186 SC STD MICRODIL/AGAR DIL: CPT

## 2024-01-01 PROCEDURE — 85520 HEPARIN ASSAY: CPT

## 2024-01-01 PROCEDURE — 87040 BLOOD CULTURE FOR BACTERIA: CPT

## 2024-01-01 PROCEDURE — 97163 PT EVAL HIGH COMPLEX 45 MIN: CPT

## 2024-01-01 PROCEDURE — 700102 HCHG RX REV CODE 250 W/ 637 OVERRIDE(OP)

## 2024-01-01 PROCEDURE — 99024 POSTOP FOLLOW-UP VISIT: CPT | Performed by: STUDENT IN AN ORGANIZED HEALTH CARE EDUCATION/TRAINING PROGRAM

## 2024-01-01 PROCEDURE — 84100 ASSAY OF PHOSPHORUS: CPT

## 2024-01-01 PROCEDURE — 85018 HEMOGLOBIN: CPT | Mod: 91

## 2024-01-01 PROCEDURE — 97602 WOUND(S) CARE NON-SELECTIVE: CPT

## 2024-01-01 PROCEDURE — 97535 SELF CARE MNGMENT TRAINING: CPT

## 2024-01-01 PROCEDURE — 84478 ASSAY OF TRIGLYCERIDES: CPT

## 2024-01-01 PROCEDURE — 0241U HCHG SARS-COV-2 COVID-19 NFCT DS RESP RNA 4 TRGT MIC: CPT

## 2024-01-01 PROCEDURE — 700117 HCHG RX CONTRAST REV CODE 255: Performed by: STUDENT IN AN ORGANIZED HEALTH CARE EDUCATION/TRAINING PROGRAM

## 2024-01-01 PROCEDURE — 0TCB8ZZ EXTIRPATION OF MATTER FROM BLADDER, VIA NATURAL OR ARTIFICIAL OPENING ENDOSCOPIC: ICD-10-PCS | Performed by: UROLOGY

## 2024-01-01 PROCEDURE — 84145 PROCALCITONIN (PCT): CPT

## 2024-01-01 PROCEDURE — 83735 ASSAY OF MAGNESIUM: CPT

## 2024-01-01 PROCEDURE — 88307 TISSUE EXAM BY PATHOLOGIST: CPT

## 2024-01-01 PROCEDURE — 99291 CRITICAL CARE FIRST HOUR: CPT

## 2024-01-01 PROCEDURE — 76775 US EXAM ABDO BACK WALL LIM: CPT

## 2024-01-01 PROCEDURE — 700111 HCHG RX REV CODE 636 W/ 250 OVERRIDE (IP): Performed by: SURGERY

## 2024-01-01 PROCEDURE — 160002 HCHG RECOVERY MINUTES (STAT): Performed by: STUDENT IN AN ORGANIZED HEALTH CARE EDUCATION/TRAINING PROGRAM

## 2024-01-01 PROCEDURE — P9016 RBC LEUKOCYTES REDUCED: HCPCS | Mod: 91

## 2024-01-01 PROCEDURE — 93306 TTE W/DOPPLER COMPLETE: CPT

## 2024-01-01 PROCEDURE — 86901 BLOOD TYPING SEROLOGIC RH(D): CPT

## 2024-01-01 PROCEDURE — 80048 BASIC METABOLIC PNL TOTAL CA: CPT

## 2024-01-01 PROCEDURE — 83605 ASSAY OF LACTIC ACID: CPT

## 2024-01-01 PROCEDURE — 700105 HCHG RX REV CODE 258

## 2024-01-01 PROCEDURE — 36556 INSERT NON-TUNNEL CV CATH: CPT | Mod: RT | Performed by: SURGERY

## 2024-01-01 PROCEDURE — 94003 VENT MGMT INPAT SUBQ DAY: CPT

## 2024-01-01 PROCEDURE — 92612 ENDOSCOPY SWALLOW (FEES) VID: CPT

## 2024-01-01 PROCEDURE — 80053 COMPREHEN METABOLIC PANEL: CPT

## 2024-01-01 PROCEDURE — 307059 PAD,EAR PROTECTOR: Performed by: INTERNAL MEDICINE

## 2024-01-01 PROCEDURE — 160048 HCHG OR STATISTICAL LEVEL 1-5: Performed by: STUDENT IN AN ORGANIZED HEALTH CARE EDUCATION/TRAINING PROGRAM

## 2024-01-01 PROCEDURE — 99291 CRITICAL CARE FIRST HOUR: CPT | Mod: FS | Performed by: INTERNAL MEDICINE

## 2024-01-01 PROCEDURE — 0TB10ZZ EXCISION OF LEFT KIDNEY, OPEN APPROACH: ICD-10-PCS | Performed by: UROLOGY

## 2024-01-01 PROCEDURE — 700101 HCHG RX REV CODE 250: Performed by: STUDENT IN AN ORGANIZED HEALTH CARE EDUCATION/TRAINING PROGRAM

## 2024-01-01 PROCEDURE — 160039 HCHG SURGERY MINUTES - EA ADDL 1 MIN LEVEL 3: Performed by: UROLOGY

## 2024-01-01 PROCEDURE — 160041 HCHG SURGERY MINUTES - EA ADDL 1 MIN LEVEL 4: Performed by: STUDENT IN AN ORGANIZED HEALTH CARE EDUCATION/TRAINING PROGRAM

## 2024-01-01 PROCEDURE — 83550 IRON BINDING TEST: CPT

## 2024-01-01 PROCEDURE — 86900 BLOOD TYPING SEROLOGIC ABO: CPT

## 2024-01-01 PROCEDURE — 80069 RENAL FUNCTION PANEL: CPT | Mod: 91

## 2024-01-01 PROCEDURE — C2617 STENT, NON-COR, TEM W/O DEL: HCPCS | Performed by: UROLOGY

## 2024-01-01 PROCEDURE — 83540 ASSAY OF IRON: CPT

## 2024-01-01 PROCEDURE — 86923 COMPATIBILITY TEST ELECTRIC: CPT | Mod: 91

## 2024-01-01 PROCEDURE — 700101 HCHG RX REV CODE 250: Performed by: ANESTHESIOLOGY

## 2024-01-01 PROCEDURE — 0T778DZ DILATION OF LEFT URETER WITH INTRALUMINAL DEVICE, VIA NATURAL OR ARTIFICIAL OPENING ENDOSCOPIC: ICD-10-PCS | Performed by: UROLOGY

## 2024-01-01 PROCEDURE — 97112 NEUROMUSCULAR REEDUCATION: CPT

## 2024-01-01 PROCEDURE — 99024 POSTOP FOLLOW-UP VISIT: CPT | Mod: 25 | Performed by: UROLOGY

## 2024-01-01 PROCEDURE — A9270 NON-COVERED ITEM OR SERVICE: HCPCS | Performed by: ANESTHESIOLOGY

## 2024-01-01 PROCEDURE — 700111 HCHG RX REV CODE 636 W/ 250 OVERRIDE (IP)

## 2024-01-01 PROCEDURE — 36245 INS CATH ABD/L-EXT ART 1ST: CPT

## 2024-01-01 PROCEDURE — 93306 TTE W/DOPPLER COMPLETE: CPT | Mod: 26 | Performed by: INTERNAL MEDICINE

## 2024-01-01 PROCEDURE — 30233N1 TRANSFUSION OF NONAUTOLOGOUS RED BLOOD CELLS INTO PERIPHERAL VEIN, PERCUTANEOUS APPROACH: ICD-10-PCS | Performed by: INTERNAL MEDICINE

## 2024-01-01 PROCEDURE — 04LA3DZ OCCLUSION OF LEFT RENAL ARTERY WITH INTRALUMINAL DEVICE, PERCUTANEOUS APPROACH: ICD-10-PCS | Performed by: STUDENT IN AN ORGANIZED HEALTH CARE EDUCATION/TRAINING PROGRAM

## 2024-01-01 PROCEDURE — 700117 HCHG RX CONTRAST REV CODE 255: Performed by: INTERNAL MEDICINE

## 2024-01-01 PROCEDURE — 99024 POSTOP FOLLOW-UP VISIT: CPT | Performed by: UROLOGY

## 2024-01-01 PROCEDURE — 94002 VENT MGMT INPAT INIT DAY: CPT

## 2024-01-01 PROCEDURE — 93005 ELECTROCARDIOGRAM TRACING: CPT | Performed by: HOSPITALIST

## 2024-01-01 PROCEDURE — 85576 BLOOD PLATELET AGGREGATION: CPT

## 2024-01-01 PROCEDURE — 160028 HCHG SURGERY MINUTES - 1ST 30 MINS LEVEL 3: Performed by: UROLOGY

## 2024-01-01 PROCEDURE — 85384 FIBRINOGEN ACTIVITY: CPT

## 2024-01-01 PROCEDURE — 82962 GLUCOSE BLOOD TEST: CPT

## 2024-01-01 PROCEDURE — 87641 MR-STAPH DNA AMP PROBE: CPT

## 2024-01-01 PROCEDURE — 110371 HCHG SHELL REV 272: Performed by: STUDENT IN AN ORGANIZED HEALTH CARE EDUCATION/TRAINING PROGRAM

## 2024-01-01 PROCEDURE — 02HV33Z INSERTION OF INFUSION DEVICE INTO SUPERIOR VENA CAVA, PERCUTANEOUS APPROACH: ICD-10-PCS | Performed by: SURGERY

## 2024-01-01 PROCEDURE — C1894 INTRO/SHEATH, NON-LASER: HCPCS

## 2024-01-01 PROCEDURE — 80048 BASIC METABOLIC PNL TOTAL CA: CPT | Mod: 91

## 2024-01-01 PROCEDURE — 82728 ASSAY OF FERRITIN: CPT

## 2024-01-01 PROCEDURE — 88331 PATH CONSLTJ SURG 1 BLK 1SPC: CPT

## 2024-01-01 PROCEDURE — 31720 CLEARANCE OF AIRWAYS: CPT

## 2024-01-01 PROCEDURE — 160009 HCHG ANES TIME/MIN: Performed by: STUDENT IN AN ORGANIZED HEALTH CARE EDUCATION/TRAINING PROGRAM

## 2024-01-01 PROCEDURE — 700105 HCHG RX REV CODE 258: Performed by: STUDENT IN AN ORGANIZED HEALTH CARE EDUCATION/TRAINING PROGRAM

## 2024-01-01 PROCEDURE — 99152 MOD SED SAME PHYS/QHP 5/>YRS: CPT

## 2024-01-01 PROCEDURE — 86706 HEP B SURFACE ANTIBODY: CPT

## 2024-01-01 PROCEDURE — 90935 HEMODIALYSIS ONE EVALUATION: CPT

## 2024-01-01 PROCEDURE — 770001 HCHG ROOM/CARE - MED/SURG/GYN PRIV*

## 2024-01-01 PROCEDURE — 88305 TISSUE EXAM BY PATHOLOGIST: CPT

## 2024-01-01 PROCEDURE — 86850 RBC ANTIBODY SCREEN: CPT

## 2024-01-01 PROCEDURE — 99498 ADVNCD CARE PLAN ADDL 30 MIN: CPT | Performed by: NURSE PRACTITIONER

## 2024-01-01 PROCEDURE — C1751 CATH, INF, PER/CENT/MIDLINE: HCPCS | Performed by: UROLOGY

## 2024-01-01 PROCEDURE — 85610 PROTHROMBIN TIME: CPT

## 2024-01-01 PROCEDURE — 71275 CT ANGIOGRAPHY CHEST: CPT

## 2024-01-01 PROCEDURE — 700111 HCHG RX REV CODE 636 W/ 250 OVERRIDE (IP): Performed by: NURSE PRACTITIONER

## 2024-01-01 PROCEDURE — 80074 ACUTE HEPATITIS PANEL: CPT

## 2024-01-01 PROCEDURE — 97167 OT EVAL HIGH COMPLEX 60 MIN: CPT

## 2024-01-01 PROCEDURE — 700111 HCHG RX REV CODE 636 W/ 250 OVERRIDE (IP): Mod: JZ | Performed by: STUDENT IN AN ORGANIZED HEALTH CARE EDUCATION/TRAINING PROGRAM

## 2024-01-01 PROCEDURE — 74177 CT ABD & PELVIS W/CONTRAST: CPT

## 2024-01-01 PROCEDURE — 5A1D70Z PERFORMANCE OF URINARY FILTRATION, INTERMITTENT, LESS THAN 6 HOURS PER DAY: ICD-10-PCS | Performed by: UROLOGY

## 2024-01-01 PROCEDURE — 87205 SMEAR GRAM STAIN: CPT

## 2024-01-01 PROCEDURE — 92610 EVALUATE SWALLOWING FUNCTION: CPT

## 2024-01-01 PROCEDURE — 82570 ASSAY OF URINE CREATININE: CPT

## 2024-01-01 PROCEDURE — 84484 ASSAY OF TROPONIN QUANT: CPT

## 2024-01-01 PROCEDURE — 700105 HCHG RX REV CODE 258: Performed by: ANESTHESIOLOGY

## 2024-01-01 PROCEDURE — 82803 BLOOD GASES ANY COMBINATION: CPT

## 2024-01-01 PROCEDURE — 99223 1ST HOSP IP/OBS HIGH 75: CPT | Mod: 25 | Performed by: NURSE PRACTITIONER

## 2024-01-01 PROCEDURE — 85347 COAGULATION TIME ACTIVATED: CPT

## 2024-01-01 PROCEDURE — 94150 VITAL CAPACITY TEST: CPT

## 2024-01-01 PROCEDURE — 50240 NEPHRECTOMY PARTIAL: CPT | Performed by: STUDENT IN AN ORGANIZED HEALTH CARE EDUCATION/TRAINING PROGRAM

## 2024-01-01 PROCEDURE — 82803 BLOOD GASES ANY COMBINATION: CPT | Mod: 91

## 2024-01-01 PROCEDURE — 700101 HCHG RX REV CODE 250: Performed by: HOSPITALIST

## 2024-01-01 PROCEDURE — 99497 ADVNCD CARE PLAN 30 MIN: CPT | Performed by: NURSE PRACTITIONER

## 2024-01-01 PROCEDURE — 160048 HCHG OR STATISTICAL LEVEL 1-5: Performed by: UROLOGY

## 2024-01-01 PROCEDURE — 94799 UNLISTED PULMONARY SVC/PX: CPT

## 2024-01-01 PROCEDURE — 87015 SPECIMEN INFECT AGNT CONCNTJ: CPT

## 2024-01-01 PROCEDURE — 99221 1ST HOSP IP/OBS SF/LOW 40: CPT | Mod: 25 | Performed by: SURGERY

## 2024-01-01 PROCEDURE — 93005 ELECTROCARDIOGRAM TRACING: CPT

## 2024-01-01 PROCEDURE — 160009 HCHG ANES TIME/MIN: Performed by: UROLOGY

## 2024-01-01 PROCEDURE — 99222 1ST HOSP IP/OBS MODERATE 55: CPT | Performed by: STUDENT IN AN ORGANIZED HEALTH CARE EDUCATION/TRAINING PROGRAM

## 2024-01-01 PROCEDURE — C1751 CATH, INF, PER/CENT/MIDLINE: HCPCS | Performed by: STUDENT IN AN ORGANIZED HEALTH CARE EDUCATION/TRAINING PROGRAM

## 2024-01-01 PROCEDURE — C1758 CATHETER, URETERAL: HCPCS | Performed by: UROLOGY

## 2024-01-01 PROCEDURE — 160035 HCHG PACU - 1ST 60 MINS PHASE I: Performed by: STUDENT IN AN ORGANIZED HEALTH CARE EDUCATION/TRAINING PROGRAM

## 2024-01-01 PROCEDURE — 700117 HCHG RX CONTRAST REV CODE 255: Performed by: UROLOGY

## 2024-01-01 PROCEDURE — 85027 COMPLETE CBC AUTOMATED: CPT | Mod: 91

## 2024-01-01 PROCEDURE — 160029 HCHG SURGERY MINUTES - 1ST 30 MINS LEVEL 4: Performed by: STUDENT IN AN ORGANIZED HEALTH CARE EDUCATION/TRAINING PROGRAM

## 2024-01-01 DEVICE — STENT UROLOGICAL POLARIS 6X26 ULTRA: Type: IMPLANTABLE DEVICE | Site: URETER | Status: FUNCTIONAL

## 2024-01-01 RX ORDER — POLYETHYLENE GLYCOL 3350 17 G/17G
1 POWDER, FOR SOLUTION ORAL DAILY
Status: DISCONTINUED | OUTPATIENT
Start: 2024-01-01 | End: 2024-01-01

## 2024-01-01 RX ORDER — MEPERIDINE HYDROCHLORIDE 50 MG/ML
12.5 INJECTION INTRAMUSCULAR; INTRAVENOUS; SUBCUTANEOUS
Status: DISCONTINUED | OUTPATIENT
Start: 2024-01-01 | End: 2024-01-01

## 2024-01-01 RX ORDER — LORAZEPAM 2 MG/ML
1 INJECTION INTRAMUSCULAR
Status: DISCONTINUED | OUTPATIENT
Start: 2024-01-01 | End: 2024-07-20 | Stop reason: HOSPADM

## 2024-01-01 RX ORDER — HALOPERIDOL 5 MG/ML
1 INJECTION INTRAMUSCULAR
Status: DISCONTINUED | OUTPATIENT
Start: 2024-01-01 | End: 2024-01-01

## 2024-01-01 RX ORDER — CARVEDILOL 12.5 MG/1
12.5 TABLET ORAL 2 TIMES DAILY WITH MEALS
Status: DISCONTINUED | OUTPATIENT
Start: 2024-01-01 | End: 2024-01-01

## 2024-01-01 RX ORDER — HEPARIN SODIUM 5000 [USP'U]/100ML
0-30 INJECTION, SOLUTION INTRAVENOUS CONTINUOUS
Status: DISCONTINUED | OUTPATIENT
Start: 2024-01-01 | End: 2024-01-01

## 2024-01-01 RX ORDER — PROTAMINE SULFATE 10 MG/ML
10 INJECTION, SOLUTION INTRAVENOUS ONCE
Status: COMPLETED | OUTPATIENT
Start: 2024-01-01 | End: 2024-01-01

## 2024-01-01 RX ORDER — ASPIRIN 81 MG/1
81 TABLET, CHEWABLE ORAL DAILY
Status: DISCONTINUED | OUTPATIENT
Start: 2024-01-01 | End: 2024-01-01

## 2024-01-01 RX ORDER — OXYCODONE HCL 5 MG/5 ML
5 SOLUTION, ORAL ORAL
Status: DISCONTINUED | OUTPATIENT
Start: 2024-01-01 | End: 2024-01-01 | Stop reason: HOSPADM

## 2024-01-01 RX ORDER — SIMETHICONE 125 MG
125 TABLET,CHEWABLE ORAL 3 TIMES DAILY PRN
Status: DISCONTINUED | OUTPATIENT
Start: 2024-01-01 | End: 2024-07-20 | Stop reason: HOSPADM

## 2024-01-01 RX ORDER — DEXAMETHASONE SODIUM PHOSPHATE 4 MG/ML
INJECTION, SOLUTION INTRA-ARTICULAR; INTRALESIONAL; INTRAMUSCULAR; INTRAVENOUS; SOFT TISSUE PRN
Status: DISCONTINUED | OUTPATIENT
Start: 2024-01-01 | End: 2024-01-01 | Stop reason: SURG

## 2024-01-01 RX ORDER — MIDAZOLAM HYDROCHLORIDE 1 MG/ML
1 INJECTION INTRAMUSCULAR; INTRAVENOUS
Status: DISCONTINUED | OUTPATIENT
Start: 2024-01-01 | End: 2024-01-01

## 2024-01-01 RX ORDER — LACTULOSE 20 G/30ML
30 SOLUTION ORAL EVERY 6 HOURS
Status: DISCONTINUED | OUTPATIENT
Start: 2024-01-01 | End: 2024-01-01

## 2024-01-01 RX ORDER — SIMETHICONE 125 MG
125 TABLET,CHEWABLE ORAL 3 TIMES DAILY PRN
Status: DISCONTINUED | OUTPATIENT
Start: 2024-01-01 | End: 2024-01-01

## 2024-01-01 RX ORDER — EPHEDRINE SULFATE 50 MG/ML
INJECTION, SOLUTION INTRAVENOUS PRN
Status: DISCONTINUED | OUTPATIENT
Start: 2024-01-01 | End: 2024-01-01 | Stop reason: SURG

## 2024-01-01 RX ORDER — HEPARIN SODIUM 1000 [USP'U]/ML
40 INJECTION, SOLUTION INTRAVENOUS; SUBCUTANEOUS PRN
Status: DISCONTINUED | OUTPATIENT
Start: 2024-01-01 | End: 2024-01-01

## 2024-01-01 RX ORDER — DEXTROSE MONOHYDRATE 50 MG/ML
INJECTION, SOLUTION INTRAVENOUS CONTINUOUS
Status: DISCONTINUED | OUTPATIENT
Start: 2024-01-01 | End: 2024-01-01

## 2024-01-01 RX ORDER — AMOXICILLIN 250 MG
2 CAPSULE ORAL EVERY EVENING
Status: DISCONTINUED | OUTPATIENT
Start: 2024-01-01 | End: 2024-01-01

## 2024-01-01 RX ORDER — CALCIUM CHLORIDE 100 MG/ML
INJECTION INTRAVENOUS; INTRAVENTRICULAR PRN
Status: DISCONTINUED | OUTPATIENT
Start: 2024-01-01 | End: 2024-01-01 | Stop reason: SURG

## 2024-01-01 RX ORDER — DIPHENHYDRAMINE HYDROCHLORIDE 50 MG/ML
25 INJECTION INTRAMUSCULAR; INTRAVENOUS EVERY 6 HOURS PRN
Status: DISCONTINUED | OUTPATIENT
Start: 2024-01-01 | End: 2024-01-01

## 2024-01-01 RX ORDER — SODIUM BICARBONATE 650 MG/1
1950 TABLET ORAL 3 TIMES DAILY
Status: DISCONTINUED | OUTPATIENT
Start: 2024-01-01 | End: 2024-01-01

## 2024-01-01 RX ORDER — ATORVASTATIN CALCIUM 40 MG/1
40 TABLET, FILM COATED ORAL EVERY EVENING
Status: DISCONTINUED | OUTPATIENT
Start: 2024-01-01 | End: 2024-01-01

## 2024-01-01 RX ORDER — HYDROMORPHONE HYDROCHLORIDE 1 MG/ML
0.2 INJECTION, SOLUTION INTRAMUSCULAR; INTRAVENOUS; SUBCUTANEOUS
Status: DISCONTINUED | OUTPATIENT
Start: 2024-01-01 | End: 2024-01-01 | Stop reason: HOSPADM

## 2024-01-01 RX ORDER — CARVEDILOL 25 MG/1
25 TABLET ORAL 2 TIMES DAILY WITH MEALS
Status: DISCONTINUED | OUTPATIENT
Start: 2024-01-01 | End: 2024-01-01

## 2024-01-01 RX ORDER — ALBUTEROL SULFATE 90 UG/1
1 AEROSOL, METERED RESPIRATORY (INHALATION) EVERY 6 HOURS PRN
Status: DISCONTINUED | OUTPATIENT
Start: 2024-01-01 | End: 2024-01-01

## 2024-01-01 RX ORDER — CARVEDILOL 12.5 MG/1
12.5 TABLET ORAL 2 TIMES DAILY WITH MEALS
Status: CANCELLED | OUTPATIENT
Start: 2024-01-01

## 2024-01-01 RX ORDER — DIPHENHYDRAMINE HCL 25 MG
12.5 TABLET ORAL EVERY 6 HOURS PRN
Status: DISCONTINUED | OUTPATIENT
Start: 2024-01-01 | End: 2024-01-01

## 2024-01-01 RX ORDER — OXYCODONE HYDROCHLORIDE 5 MG/1
5 TABLET ORAL EVERY 4 HOURS PRN
Status: DISCONTINUED | OUTPATIENT
Start: 2024-01-01 | End: 2024-07-20 | Stop reason: HOSPADM

## 2024-01-01 RX ORDER — OXYCODONE HYDROCHLORIDE 5 MG/1
5 TABLET ORAL
Status: DISCONTINUED | OUTPATIENT
Start: 2024-01-01 | End: 2024-01-01

## 2024-01-01 RX ORDER — GLYCOPYRROLATE 0.2 MG/ML
INJECTION INTRAMUSCULAR; INTRAVENOUS PRN
Status: DISCONTINUED | OUTPATIENT
Start: 2024-01-01 | End: 2024-01-01 | Stop reason: SURG

## 2024-01-01 RX ORDER — ECHINACEA PURPUREA EXTRACT 125 MG
2 TABLET ORAL
Status: DISCONTINUED | OUTPATIENT
Start: 2024-01-01 | End: 2024-07-20 | Stop reason: HOSPADM

## 2024-01-01 RX ORDER — MANNITOL 20 G/100ML
INJECTION, SOLUTION INTRAVENOUS
Status: DISCONTINUED | OUTPATIENT
Start: 2024-01-01 | End: 2024-01-01 | Stop reason: SURG

## 2024-01-01 RX ORDER — FUROSEMIDE 10 MG/ML
80 INJECTION INTRAMUSCULAR; INTRAVENOUS ONCE
Status: DISCONTINUED | OUTPATIENT
Start: 2024-01-01 | End: 2024-01-01

## 2024-01-01 RX ORDER — HYDROMORPHONE HYDROCHLORIDE 1 MG/ML
1 INJECTION, SOLUTION INTRAMUSCULAR; INTRAVENOUS; SUBCUTANEOUS
Status: DISCONTINUED | OUTPATIENT
Start: 2024-01-01 | End: 2024-01-01

## 2024-01-01 RX ORDER — POTASSIUM CHLORIDE 1500 MG/1
20 TABLET, EXTENDED RELEASE ORAL ONCE
Status: COMPLETED | OUTPATIENT
Start: 2024-01-01 | End: 2024-01-01

## 2024-01-01 RX ORDER — FUROSEMIDE 10 MG/ML
80 INJECTION INTRAMUSCULAR; INTRAVENOUS ONCE
Status: COMPLETED | OUTPATIENT
Start: 2024-01-01 | End: 2024-01-01

## 2024-01-01 RX ORDER — IPRATROPIUM BROMIDE AND ALBUTEROL SULFATE 2.5; .5 MG/3ML; MG/3ML
3 SOLUTION RESPIRATORY (INHALATION)
Status: DISCONTINUED | OUTPATIENT
Start: 2024-01-01 | End: 2024-01-01

## 2024-01-01 RX ORDER — MIDAZOLAM HYDROCHLORIDE 1 MG/ML
.5-2 INJECTION INTRAMUSCULAR; INTRAVENOUS PRN
Status: ACTIVE | OUTPATIENT
Start: 2024-01-01 | End: 2024-01-01

## 2024-01-01 RX ORDER — AMIODARONE HYDROCHLORIDE 200 MG/1
400 TABLET ORAL TWICE DAILY
Status: DISCONTINUED | OUTPATIENT
Start: 2024-01-01 | End: 2024-01-01

## 2024-01-01 RX ORDER — SODIUM CHLORIDE 9 MG/ML
INJECTION, SOLUTION INTRAVENOUS CONTINUOUS
Status: DISCONTINUED | OUTPATIENT
Start: 2024-01-01 | End: 2024-01-01

## 2024-01-01 RX ORDER — PAPAVERINE HYDROCHLORIDE 30 MG/ML
INJECTION INTRAMUSCULAR; INTRAVENOUS
Status: DISCONTINUED | OUTPATIENT
Start: 2024-01-01 | End: 2024-01-01 | Stop reason: HOSPADM

## 2024-01-01 RX ORDER — SODIUM CHLORIDE, SODIUM GLUCONATE, SODIUM ACETATE, POTASSIUM CHLORIDE AND MAGNESIUM CHLORIDE 526; 502; 368; 37; 30 MG/100ML; MG/100ML; MG/100ML; MG/100ML; MG/100ML
INJECTION, SOLUTION INTRAVENOUS
Status: DISCONTINUED | OUTPATIENT
Start: 2024-01-01 | End: 2024-01-01 | Stop reason: HOSPADM

## 2024-01-01 RX ORDER — LIDOCAINE HYDROCHLORIDE 40 MG/ML
SOLUTION TOPICAL PRN
Status: DISCONTINUED | OUTPATIENT
Start: 2024-01-01 | End: 2024-01-01 | Stop reason: SURG

## 2024-01-01 RX ORDER — TETRAHYDROZOLINE HCL 0.05 %
1 DROPS OPHTHALMIC (EYE) 4 TIMES DAILY PRN
Status: DISCONTINUED | OUTPATIENT
Start: 2024-01-01 | End: 2024-07-20 | Stop reason: HOSPADM

## 2024-01-01 RX ORDER — GLYCOPYRROLATE 0.2 MG/ML
0.2 INJECTION INTRAMUSCULAR; INTRAVENOUS 3 TIMES DAILY PRN
Status: DISCONTINUED | OUTPATIENT
Start: 2024-01-01 | End: 2024-07-20 | Stop reason: HOSPADM

## 2024-01-01 RX ORDER — AMLODIPINE BESYLATE 10 MG/1
10 TABLET ORAL
Status: DISCONTINUED | OUTPATIENT
Start: 2024-01-01 | End: 2024-01-01

## 2024-01-01 RX ORDER — METOCLOPRAMIDE HYDROCHLORIDE 5 MG/ML
5 INJECTION INTRAMUSCULAR; INTRAVENOUS EVERY 4 HOURS PRN
Status: DISCONTINUED | OUTPATIENT
Start: 2024-01-01 | End: 2024-07-20 | Stop reason: HOSPADM

## 2024-01-01 RX ORDER — MANNITOL 20 G/100ML
INJECTION, SOLUTION INTRAVENOUS
Status: DISCONTINUED | OUTPATIENT
Start: 2024-01-01 | End: 2024-01-01

## 2024-01-01 RX ORDER — ETOMIDATE 2 MG/ML
INJECTION INTRAVENOUS PRN
Status: DISCONTINUED | OUTPATIENT
Start: 2024-01-01 | End: 2024-01-01 | Stop reason: SURG

## 2024-01-01 RX ORDER — FERROUS GLUCONATE 324(38)MG
324 TABLET ORAL
Status: DISCONTINUED | OUTPATIENT
Start: 2024-01-01 | End: 2024-01-01

## 2024-01-01 RX ORDER — NOREPINEPHRINE BITARTRATE 0.03 MG/ML
INJECTION, SOLUTION INTRAVENOUS
Status: COMPLETED
Start: 2024-01-01 | End: 2024-01-01

## 2024-01-01 RX ORDER — CALCIUM GLUCONATE 20 MG/ML
1 INJECTION, SOLUTION INTRAVENOUS ONCE
Status: COMPLETED | OUTPATIENT
Start: 2024-01-01 | End: 2024-01-01

## 2024-01-01 RX ORDER — SODIUM CHLORIDE 450 MG/100ML
INJECTION, SOLUTION INTRAVENOUS CONTINUOUS
Status: DISCONTINUED | OUTPATIENT
Start: 2024-01-01 | End: 2024-01-01

## 2024-01-01 RX ORDER — OXYCODONE HYDROCHLORIDE 10 MG/1
10 TABLET ORAL EVERY 4 HOURS PRN
Status: DISCONTINUED | OUTPATIENT
Start: 2024-01-01 | End: 2024-07-20 | Stop reason: HOSPADM

## 2024-01-01 RX ORDER — IPRATROPIUM BROMIDE AND ALBUTEROL SULFATE 2.5; .5 MG/3ML; MG/3ML
3 SOLUTION RESPIRATORY (INHALATION)
Status: DISCONTINUED | OUTPATIENT
Start: 2024-01-01 | End: 2024-01-01 | Stop reason: HOSPADM

## 2024-01-01 RX ORDER — HYDROMORPHONE HYDROCHLORIDE 1 MG/ML
0.5 INJECTION, SOLUTION INTRAMUSCULAR; INTRAVENOUS; SUBCUTANEOUS
Status: DISCONTINUED | OUTPATIENT
Start: 2024-01-01 | End: 2024-01-01

## 2024-01-01 RX ORDER — AMOXICILLIN 250 MG
2 CAPSULE ORAL ONCE
Status: COMPLETED | OUTPATIENT
Start: 2024-01-01 | End: 2024-01-01

## 2024-01-01 RX ORDER — LIDOCAINE HYDROCHLORIDE 20 MG/ML
INJECTION, SOLUTION EPIDURAL; INFILTRATION; INTRACAUDAL; PERINEURAL PRN
Status: DISCONTINUED | OUTPATIENT
Start: 2024-01-01 | End: 2024-01-01 | Stop reason: SURG

## 2024-01-01 RX ORDER — SODIUM CHLORIDE 9 MG/ML
100 INJECTION, SOLUTION INTRAVENOUS
Status: DISCONTINUED | OUTPATIENT
Start: 2024-01-01 | End: 2024-07-20 | Stop reason: HOSPADM

## 2024-01-01 RX ORDER — OXYCODONE HYDROCHLORIDE 10 MG/1
10 TABLET ORAL
Status: DISCONTINUED | OUTPATIENT
Start: 2024-01-01 | End: 2024-01-01

## 2024-01-01 RX ORDER — BUPIVACAINE HYDROCHLORIDE AND EPINEPHRINE 2.5; 5 MG/ML; UG/ML
INJECTION, SOLUTION EPIDURAL; INFILTRATION; INTRACAUDAL; PERINEURAL PRN
Status: DISCONTINUED | OUTPATIENT
Start: 2024-01-01 | End: 2024-01-01 | Stop reason: SURG

## 2024-01-01 RX ORDER — SODIUM CHLORIDE 9 MG/ML
500 INJECTION, SOLUTION INTRAVENOUS
Status: ACTIVE | OUTPATIENT
Start: 2024-01-01 | End: 2024-01-01

## 2024-01-01 RX ORDER — DIPHENHYDRAMINE HYDROCHLORIDE 50 MG/ML
12.5 INJECTION INTRAMUSCULAR; INTRAVENOUS ONCE
Status: COMPLETED | OUTPATIENT
Start: 2024-01-01 | End: 2024-01-01

## 2024-01-01 RX ORDER — FUROSEMIDE 10 MG/ML
40 INJECTION INTRAMUSCULAR; INTRAVENOUS ONCE
Status: COMPLETED | OUTPATIENT
Start: 2024-01-01 | End: 2024-01-01

## 2024-01-01 RX ORDER — SODIUM CHLORIDE, SODIUM LACTATE, POTASSIUM CHLORIDE, CALCIUM CHLORIDE 600; 310; 30; 20 MG/100ML; MG/100ML; MG/100ML; MG/100ML
1000 INJECTION, SOLUTION INTRAVENOUS ONCE
Status: COMPLETED | OUTPATIENT
Start: 2024-01-01 | End: 2024-01-01

## 2024-01-01 RX ORDER — EPHEDRINE SULFATE 50 MG/ML
5 INJECTION, SOLUTION INTRAVENOUS
Status: DISCONTINUED | OUTPATIENT
Start: 2024-01-01 | End: 2024-01-01

## 2024-01-01 RX ORDER — HYDROMORPHONE HYDROCHLORIDE 1 MG/ML
0.2 INJECTION, SOLUTION INTRAMUSCULAR; INTRAVENOUS; SUBCUTANEOUS
Status: DISCONTINUED | OUTPATIENT
Start: 2024-01-01 | End: 2024-01-01

## 2024-01-01 RX ORDER — BUPIVACAINE HYDROCHLORIDE 2.5 MG/ML
INJECTION, SOLUTION EPIDURAL; INFILTRATION; INTRACAUDAL
Status: DISCONTINUED | OUTPATIENT
Start: 2024-01-01 | End: 2024-01-01 | Stop reason: HOSPADM

## 2024-01-01 RX ORDER — ONDANSETRON 2 MG/ML
INJECTION INTRAMUSCULAR; INTRAVENOUS PRN
Status: DISCONTINUED | OUTPATIENT
Start: 2024-01-01 | End: 2024-01-01 | Stop reason: SURG

## 2024-01-01 RX ORDER — BISACODYL 10 MG
10 SUPPOSITORY, RECTAL RECTAL DAILY
Status: DISCONTINUED | OUTPATIENT
Start: 2024-01-01 | End: 2024-01-01

## 2024-01-01 RX ORDER — LIDOCAINE 4 G/G
1 PATCH TOPICAL EVERY 24 HOURS
Status: DISCONTINUED | OUTPATIENT
Start: 2024-01-01 | End: 2024-07-20 | Stop reason: HOSPADM

## 2024-01-01 RX ORDER — HYDRALAZINE HYDROCHLORIDE 20 MG/ML
20 INJECTION INTRAMUSCULAR; INTRAVENOUS EVERY 6 HOURS PRN
Status: DISCONTINUED | OUTPATIENT
Start: 2024-01-01 | End: 2024-01-01

## 2024-01-01 RX ORDER — AMIODARONE HYDROCHLORIDE 200 MG/1
200 TABLET ORAL DAILY
Status: DISCONTINUED | OUTPATIENT
Start: 2024-07-21 | End: 2024-01-01

## 2024-01-01 RX ORDER — HYDROMORPHONE HYDROCHLORIDE 2 MG/ML
INJECTION, SOLUTION INTRAMUSCULAR; INTRAVENOUS; SUBCUTANEOUS PRN
Status: DISCONTINUED | OUTPATIENT
Start: 2024-01-01 | End: 2024-01-01 | Stop reason: SURG

## 2024-01-01 RX ORDER — ONDANSETRON 2 MG/ML
4 INJECTION INTRAMUSCULAR; INTRAVENOUS EVERY 4 HOURS PRN
Status: DISCONTINUED | OUTPATIENT
Start: 2024-01-01 | End: 2024-07-20 | Stop reason: HOSPADM

## 2024-01-01 RX ORDER — SODIUM CHLORIDE, SODIUM LACTATE, POTASSIUM CHLORIDE, CALCIUM CHLORIDE 600; 310; 30; 20 MG/100ML; MG/100ML; MG/100ML; MG/100ML
INJECTION, SOLUTION INTRAVENOUS CONTINUOUS
Status: ACTIVE | OUTPATIENT
Start: 2024-01-01 | End: 2024-01-01

## 2024-01-01 RX ORDER — PHENYLEPHRINE HYDROCHLORIDE 10 MG/ML
INJECTION, SOLUTION INTRAMUSCULAR; INTRAVENOUS; SUBCUTANEOUS PRN
Status: DISCONTINUED | OUTPATIENT
Start: 2024-01-01 | End: 2024-01-01 | Stop reason: SURG

## 2024-01-01 RX ORDER — SODIUM CHLORIDE, SODIUM LACTATE, POTASSIUM CHLORIDE, AND CALCIUM CHLORIDE .6; .31; .03; .02 G/100ML; G/100ML; G/100ML; G/100ML
250 INJECTION, SOLUTION INTRAVENOUS PRN
Status: DISCONTINUED | OUTPATIENT
Start: 2024-01-01 | End: 2024-01-01

## 2024-01-01 RX ORDER — ROCURONIUM BROMIDE 10 MG/ML
INJECTION, SOLUTION INTRAVENOUS PRN
Status: DISCONTINUED | OUTPATIENT
Start: 2024-01-01 | End: 2024-01-01 | Stop reason: SURG

## 2024-01-01 RX ORDER — DEXAMETHASONE SODIUM PHOSPHATE 4 MG/ML
4 INJECTION, SOLUTION INTRA-ARTICULAR; INTRALESIONAL; INTRAMUSCULAR; INTRAVENOUS; SOFT TISSUE
Status: DISCONTINUED | OUTPATIENT
Start: 2024-01-01 | End: 2024-01-01

## 2024-01-01 RX ORDER — DIPHENHYDRAMINE HYDROCHLORIDE 50 MG/ML
12.5 INJECTION INTRAMUSCULAR; INTRAVENOUS
Status: DISCONTINUED | OUTPATIENT
Start: 2024-01-01 | End: 2024-01-01 | Stop reason: HOSPADM

## 2024-01-01 RX ORDER — ONDANSETRON 2 MG/ML
4 INJECTION INTRAMUSCULAR; INTRAVENOUS
Status: DISCONTINUED | OUTPATIENT
Start: 2024-01-01 | End: 2024-01-01

## 2024-01-01 RX ORDER — CARVEDILOL 12.5 MG/1
12.5 TABLET ORAL ONCE
Status: DISCONTINUED | OUTPATIENT
Start: 2024-01-01 | End: 2024-01-01

## 2024-01-01 RX ORDER — ZOLPIDEM TARTRATE 5 MG/1
5 TABLET ORAL NIGHTLY PRN
Status: DISCONTINUED | OUTPATIENT
Start: 2024-01-01 | End: 2024-07-20 | Stop reason: HOSPADM

## 2024-01-01 RX ORDER — ACETAMINOPHEN 500 MG
1000 TABLET ORAL EVERY 6 HOURS
Status: DISCONTINUED | OUTPATIENT
Start: 2024-01-01 | End: 2024-01-01

## 2024-01-01 RX ORDER — ATROPINE SULFATE 10 MG/ML
2 SOLUTION/ DROPS OPHTHALMIC EVERY 4 HOURS PRN
Status: DISCONTINUED | OUTPATIENT
Start: 2024-01-01 | End: 2024-07-20 | Stop reason: HOSPADM

## 2024-01-01 RX ORDER — HEPARIN SODIUM 1000 [USP'U]/ML
1200 INJECTION, SOLUTION INTRAVENOUS; SUBCUTANEOUS
Status: DISCONTINUED | OUTPATIENT
Start: 2024-01-01 | End: 2024-07-20 | Stop reason: HOSPADM

## 2024-01-01 RX ORDER — HALOPERIDOL 5 MG/ML
1 INJECTION INTRAMUSCULAR EVERY 6 HOURS PRN
Status: DISCONTINUED | OUTPATIENT
Start: 2024-01-01 | End: 2024-01-01

## 2024-01-01 RX ORDER — CEFAZOLIN SODIUM 1 G/3ML
INJECTION, POWDER, FOR SOLUTION INTRAMUSCULAR; INTRAVENOUS PRN
Status: DISCONTINUED | OUTPATIENT
Start: 2024-01-01 | End: 2024-01-01 | Stop reason: SURG

## 2024-01-01 RX ORDER — DEXTROSE MONOHYDRATE 25 G/50ML
25 INJECTION, SOLUTION INTRAVENOUS
Status: DISCONTINUED | OUTPATIENT
Start: 2024-01-01 | End: 2024-01-01

## 2024-01-01 RX ORDER — TETRAHYDROZOLINE HCL 0.05 %
1 DROPS OPHTHALMIC (EYE) 4 TIMES DAILY PRN
COMMUNITY

## 2024-01-01 RX ORDER — DEXTROSE MONOHYDRATE 25 G/50ML
25 INJECTION, SOLUTION INTRAVENOUS ONCE
Status: COMPLETED | OUTPATIENT
Start: 2024-01-01 | End: 2024-01-01

## 2024-01-01 RX ORDER — MIDAZOLAM HYDROCHLORIDE 1 MG/ML
INJECTION INTRAMUSCULAR; INTRAVENOUS
Status: COMPLETED
Start: 2024-01-01 | End: 2024-01-01

## 2024-01-01 RX ORDER — SCOLOPAMINE TRANSDERMAL SYSTEM 1 MG/1
1 PATCH, EXTENDED RELEASE TRANSDERMAL
Status: DISCONTINUED | OUTPATIENT
Start: 2024-01-01 | End: 2024-07-20 | Stop reason: HOSPADM

## 2024-01-01 RX ORDER — SCOLOPAMINE TRANSDERMAL SYSTEM 1 MG/1
1 PATCH, EXTENDED RELEASE TRANSDERMAL
Status: DISCONTINUED | OUTPATIENT
Start: 2024-01-01 | End: 2024-01-01

## 2024-01-01 RX ORDER — OXYCODONE HCL 5 MG/5 ML
5 SOLUTION, ORAL ORAL
Status: DISCONTINUED | OUTPATIENT
Start: 2024-01-01 | End: 2024-01-01

## 2024-01-01 RX ORDER — MIDAZOLAM HYDROCHLORIDE 1 MG/ML
INJECTION INTRAMUSCULAR; INTRAVENOUS PRN
Status: DISCONTINUED | OUTPATIENT
Start: 2024-01-01 | End: 2024-01-01 | Stop reason: SURG

## 2024-01-01 RX ORDER — HYDROMORPHONE HYDROCHLORIDE 1 MG/ML
0.4 INJECTION, SOLUTION INTRAMUSCULAR; INTRAVENOUS; SUBCUTANEOUS
Status: DISCONTINUED | OUTPATIENT
Start: 2024-01-01 | End: 2024-01-01 | Stop reason: HOSPADM

## 2024-01-01 RX ORDER — CALCIUM CARBONATE 500 MG/1
500 TABLET, CHEWABLE ORAL EVERY 8 HOURS PRN
Status: DISCONTINUED | OUTPATIENT
Start: 2024-01-01 | End: 2024-01-01

## 2024-01-01 RX ORDER — ACETAMINOPHEN 325 MG/1
650 TABLET ORAL EVERY 6 HOURS PRN
Status: DISCONTINUED | OUTPATIENT
Start: 2024-01-01 | End: 2024-01-01

## 2024-01-01 RX ORDER — HYDROMORPHONE HYDROCHLORIDE 1 MG/ML
0.5 INJECTION, SOLUTION INTRAMUSCULAR; INTRAVENOUS; SUBCUTANEOUS
Status: DISCONTINUED | OUTPATIENT
Start: 2024-01-01 | End: 2024-07-20 | Stop reason: HOSPADM

## 2024-01-01 RX ORDER — SODIUM BICARBONATE 650 MG/1
1300 TABLET ORAL 3 TIMES DAILY
Status: DISCONTINUED | OUTPATIENT
Start: 2024-01-01 | End: 2024-01-01

## 2024-01-01 RX ORDER — SODIUM CHLORIDE, SODIUM GLUCONATE, SODIUM ACETATE, POTASSIUM CHLORIDE AND MAGNESIUM CHLORIDE 526; 502; 368; 37; 30 MG/100ML; MG/100ML; MG/100ML; MG/100ML; MG/100ML
500 INJECTION, SOLUTION INTRAVENOUS CONTINUOUS
Status: DISCONTINUED | OUTPATIENT
Start: 2024-01-01 | End: 2024-01-01 | Stop reason: HOSPADM

## 2024-01-01 RX ORDER — SODIUM CHLORIDE 9 MG/ML
30 INJECTION, SOLUTION INTRAVENOUS
Status: DISCONTINUED | OUTPATIENT
Start: 2024-01-01 | End: 2024-01-01

## 2024-01-01 RX ORDER — METHYLPREDNISOLONE SODIUM SUCCINATE 40 MG/ML
40 INJECTION, POWDER, LYOPHILIZED, FOR SOLUTION INTRAMUSCULAR; INTRAVENOUS EVERY 12 HOURS
Status: DISCONTINUED | OUTPATIENT
Start: 2024-01-01 | End: 2024-01-01

## 2024-01-01 RX ORDER — DIPHENHYDRAMINE HYDROCHLORIDE 50 MG/ML
12.5 INJECTION INTRAMUSCULAR; INTRAVENOUS
Status: DISCONTINUED | OUTPATIENT
Start: 2024-01-01 | End: 2024-01-01

## 2024-01-01 RX ORDER — HALOPERIDOL 5 MG/ML
1 INJECTION INTRAMUSCULAR
Status: DISCONTINUED | OUTPATIENT
Start: 2024-01-01 | End: 2024-01-01 | Stop reason: HOSPADM

## 2024-01-01 RX ORDER — ACETAMINOPHEN 650 MG/1
975 SUPPOSITORY RECTAL EVERY 6 HOURS
Status: DISCONTINUED | OUTPATIENT
Start: 2024-01-01 | End: 2024-01-01

## 2024-01-01 RX ORDER — THIAMINE HYDROCHLORIDE 100 MG/ML
100 INJECTION, SOLUTION INTRAMUSCULAR; INTRAVENOUS DAILY
Status: DISCONTINUED | OUTPATIENT
Start: 2024-01-01 | End: 2024-01-01

## 2024-01-01 RX ORDER — HYDROMORPHONE HYDROCHLORIDE 1 MG/ML
0.25 INJECTION, SOLUTION INTRAMUSCULAR; INTRAVENOUS; SUBCUTANEOUS
Status: DISCONTINUED | OUTPATIENT
Start: 2024-01-01 | End: 2024-01-01

## 2024-01-01 RX ORDER — HYDROMORPHONE HYDROCHLORIDE 1 MG/ML
1 INJECTION, SOLUTION INTRAMUSCULAR; INTRAVENOUS; SUBCUTANEOUS
Status: DISCONTINUED | OUTPATIENT
Start: 2024-01-01 | End: 2024-01-01 | Stop reason: HOSPADM

## 2024-01-01 RX ORDER — SODIUM CHLORIDE, SODIUM LACTATE, POTASSIUM CHLORIDE, AND CALCIUM CHLORIDE .6; .31; .03; .02 G/100ML; G/100ML; G/100ML; G/100ML
1000 INJECTION, SOLUTION INTRAVENOUS ONCE
Status: COMPLETED | OUTPATIENT
Start: 2024-01-01 | End: 2024-01-01

## 2024-01-01 RX ORDER — METHYLPREDNISOLONE SODIUM SUCCINATE 125 MG/2ML
62.5 INJECTION, POWDER, LYOPHILIZED, FOR SOLUTION INTRAMUSCULAR; INTRAVENOUS EVERY 12 HOURS
Status: DISCONTINUED | OUTPATIENT
Start: 2024-01-01 | End: 2024-01-01

## 2024-01-01 RX ORDER — EPHEDRINE SULFATE 50 MG/ML
10 INJECTION, SOLUTION INTRAVENOUS
Status: DISCONTINUED | OUTPATIENT
Start: 2024-01-01 | End: 2024-01-01

## 2024-01-01 RX ORDER — HEPARIN SODIUM 5000 [USP'U]/ML
5000 INJECTION, SOLUTION INTRAVENOUS; SUBCUTANEOUS EVERY 8 HOURS
Status: DISCONTINUED | OUTPATIENT
Start: 2024-01-01 | End: 2024-01-01

## 2024-01-01 RX ORDER — ONDANSETRON 2 MG/ML
4 INJECTION INTRAMUSCULAR; INTRAVENOUS PRN
Status: ACTIVE | OUTPATIENT
Start: 2024-01-01 | End: 2024-01-01

## 2024-01-01 RX ORDER — PHENYLEPHRINE HCL IN 0.9% NACL 1 MG/10 ML
SYRINGE (ML) INTRAVENOUS PRN
Status: DISCONTINUED | OUTPATIENT
Start: 2024-01-01 | End: 2024-01-01 | Stop reason: SURG

## 2024-01-01 RX ORDER — GLYCOPYRROLATE 1 MG/1
1 TABLET ORAL 3 TIMES DAILY PRN
Status: DISCONTINUED | OUTPATIENT
Start: 2024-01-01 | End: 2024-07-20 | Stop reason: HOSPADM

## 2024-01-01 RX ORDER — LORAZEPAM 2 MG/ML
1 CONCENTRATE ORAL
Status: DISCONTINUED | OUTPATIENT
Start: 2024-01-01 | End: 2024-07-20 | Stop reason: HOSPADM

## 2024-01-01 RX ORDER — FUROSEMIDE 10 MG/ML
60 INJECTION INTRAMUSCULAR; INTRAVENOUS ONCE
Status: COMPLETED | OUTPATIENT
Start: 2024-01-01 | End: 2024-01-01

## 2024-01-01 RX ORDER — GAUZE BANDAGE 2" X 2"
100 BANDAGE TOPICAL DAILY
Status: DISCONTINUED | OUTPATIENT
Start: 2024-01-01 | End: 2024-01-01

## 2024-01-01 RX ORDER — METOPROLOL TARTRATE 1 MG/ML
5 INJECTION, SOLUTION INTRAVENOUS 3 TIMES DAILY PRN
Status: COMPLETED | OUTPATIENT
Start: 2024-01-01 | End: 2024-01-01

## 2024-01-01 RX ORDER — ONDANSETRON 2 MG/ML
4 INJECTION INTRAMUSCULAR; INTRAVENOUS
Status: DISCONTINUED | OUTPATIENT
Start: 2024-01-01 | End: 2024-01-01 | Stop reason: HOSPADM

## 2024-01-01 RX ORDER — MIDAZOLAM HYDROCHLORIDE 1 MG/ML
1 INJECTION INTRAMUSCULAR; INTRAVENOUS
Status: DISCONTINUED | OUTPATIENT
Start: 2024-01-01 | End: 2024-01-01 | Stop reason: HOSPADM

## 2024-01-01 RX ORDER — OXYCODONE HCL 5 MG/5 ML
10 SOLUTION, ORAL ORAL
Status: DISCONTINUED | OUTPATIENT
Start: 2024-01-01 | End: 2024-01-01 | Stop reason: HOSPADM

## 2024-01-01 RX ORDER — NOREPINEPHRINE BITARTRATE 0.03 MG/ML
0-1 INJECTION, SOLUTION INTRAVENOUS CONTINUOUS
Status: DISCONTINUED | OUTPATIENT
Start: 2024-01-01 | End: 2024-01-01

## 2024-01-01 RX ORDER — ACETAMINOPHEN 325 MG/1
650 TABLET ORAL EVERY 6 HOURS
Status: DISCONTINUED | OUTPATIENT
Start: 2024-01-01 | End: 2024-01-01

## 2024-01-01 RX ORDER — VERAPAMIL HYDROCHLORIDE 2.5 MG/ML
INJECTION, SOLUTION INTRAVENOUS
Status: COMPLETED
Start: 2024-01-01 | End: 2024-01-01

## 2024-01-01 RX ORDER — SODIUM CHLORIDE, SODIUM LACTATE, POTASSIUM CHLORIDE, AND CALCIUM CHLORIDE .6; .31; .03; .02 G/100ML; G/100ML; G/100ML; G/100ML
250 INJECTION, SOLUTION INTRAVENOUS ONCE
Status: COMPLETED | OUTPATIENT
Start: 2024-01-01 | End: 2024-01-01

## 2024-01-01 RX ORDER — OXYCODONE HYDROCHLORIDE 5 MG/1
2.5 TABLET ORAL
Status: DISCONTINUED | OUTPATIENT
Start: 2024-01-01 | End: 2024-01-01

## 2024-01-01 RX ORDER — MAGNESIUM SULFATE HEPTAHYDRATE 40 MG/ML
2 INJECTION, SOLUTION INTRAVENOUS ONCE
Status: COMPLETED | OUTPATIENT
Start: 2024-01-01 | End: 2024-01-01

## 2024-01-01 RX ORDER — METOCLOPRAMIDE HYDROCHLORIDE 5 MG/ML
10 INJECTION INTRAMUSCULAR; INTRAVENOUS EVERY 4 HOURS PRN
Status: DISCONTINUED | OUTPATIENT
Start: 2024-01-01 | End: 2024-01-01

## 2024-01-01 RX ORDER — HYDROMORPHONE HYDROCHLORIDE 1 MG/ML
0.4 INJECTION, SOLUTION INTRAMUSCULAR; INTRAVENOUS; SUBCUTANEOUS
Status: DISCONTINUED | OUTPATIENT
Start: 2024-01-01 | End: 2024-01-01

## 2024-01-01 RX ORDER — ONDANSETRON 2 MG/ML
4 INJECTION INTRAMUSCULAR; INTRAVENOUS EVERY 4 HOURS PRN
Status: DISCONTINUED | OUTPATIENT
Start: 2024-01-01 | End: 2024-01-01

## 2024-01-01 RX ORDER — FERROUS GLUCONATE 324(38)MG
324 TABLET ORAL
Status: CANCELLED | OUTPATIENT
Start: 2024-01-01

## 2024-01-01 RX ORDER — SODIUM CHLORIDE 9 MG/ML
INJECTION, SOLUTION INTRAVENOUS CONTINUOUS
Status: ACTIVE | OUTPATIENT
Start: 2024-01-01 | End: 2024-01-01

## 2024-01-01 RX ORDER — DIPHENHYDRAMINE HYDROCHLORIDE 50 MG/ML
12.5 INJECTION INTRAMUSCULAR; INTRAVENOUS EVERY 6 HOURS PRN
Status: DISCONTINUED | OUTPATIENT
Start: 2024-01-01 | End: 2024-01-01

## 2024-01-01 RX ORDER — PREDNISONE 20 MG/1
40 TABLET ORAL DAILY
Status: DISCONTINUED | OUTPATIENT
Start: 2024-01-01 | End: 2024-01-01

## 2024-01-01 RX ORDER — HEPARIN SODIUM 1000 [USP'U]/ML
INJECTION, SOLUTION INTRAVENOUS; SUBCUTANEOUS
Status: COMPLETED
Start: 2024-01-01 | End: 2024-01-01

## 2024-01-01 RX ORDER — LORAZEPAM 2 MG/ML
.5-1 INJECTION INTRAMUSCULAR
Status: COMPLETED | OUTPATIENT
Start: 2024-01-01 | End: 2024-01-01

## 2024-01-01 RX ORDER — MEPERIDINE HYDROCHLORIDE 25 MG/ML
12.5 INJECTION INTRAMUSCULAR; INTRAVENOUS; SUBCUTANEOUS
Status: DISCONTINUED | OUTPATIENT
Start: 2024-01-01 | End: 2024-01-01 | Stop reason: HOSPADM

## 2024-01-01 RX ORDER — SODIUM CHLORIDE, SODIUM GLUCONATE, SODIUM ACETATE, POTASSIUM CHLORIDE AND MAGNESIUM CHLORIDE 526; 502; 368; 37; 30 MG/100ML; MG/100ML; MG/100ML; MG/100ML; MG/100ML
500 INJECTION, SOLUTION INTRAVENOUS CONTINUOUS
Status: DISCONTINUED | OUTPATIENT
Start: 2024-01-01 | End: 2024-01-01

## 2024-01-01 RX ORDER — OXYCODONE HCL 5 MG/5 ML
10 SOLUTION, ORAL ORAL
Status: DISCONTINUED | OUTPATIENT
Start: 2024-01-01 | End: 2024-01-01

## 2024-01-01 RX ORDER — DIAZEPAM 10 MG/2ML
5 INJECTION, SOLUTION INTRAMUSCULAR; INTRAVENOUS ONCE
Status: COMPLETED | OUTPATIENT
Start: 2024-01-01 | End: 2024-01-01

## 2024-01-01 RX ORDER — METHYLPREDNISOLONE SODIUM SUCCINATE 125 MG/2ML
62.5 INJECTION, POWDER, LYOPHILIZED, FOR SOLUTION INTRAMUSCULAR; INTRAVENOUS EVERY 6 HOURS
Status: DISCONTINUED | OUTPATIENT
Start: 2024-01-01 | End: 2024-01-01

## 2024-01-01 RX ORDER — HYDROMORPHONE HYDROCHLORIDE 1 MG/ML
.5-2 INJECTION, SOLUTION INTRAMUSCULAR; INTRAVENOUS; SUBCUTANEOUS
Status: DISCONTINUED | OUTPATIENT
Start: 2024-01-01 | End: 2024-01-01

## 2024-01-01 RX ORDER — ZOLPIDEM TARTRATE 5 MG/1
5 TABLET ORAL NIGHTLY PRN
Status: DISCONTINUED | OUTPATIENT
Start: 2024-01-01 | End: 2024-01-01

## 2024-01-01 RX ORDER — IPRATROPIUM BROMIDE AND ALBUTEROL SULFATE 2.5; .5 MG/3ML; MG/3ML
3 SOLUTION RESPIRATORY (INHALATION) EVERY 4 HOURS PRN
Status: DISCONTINUED | OUTPATIENT
Start: 2024-01-01 | End: 2024-07-20 | Stop reason: HOSPADM

## 2024-01-01 RX ADMIN — CARVEDILOL 12.5 MG: 12.5 TABLET, FILM COATED ORAL at 17:26

## 2024-01-01 RX ADMIN — Medication 100 MG: at 05:24

## 2024-01-01 RX ADMIN — HYDROMORPHONE HYDROCHLORIDE 2 MG: 2 INJECTION INTRAMUSCULAR; INTRAVENOUS; SUBCUTANEOUS at 14:05

## 2024-01-01 RX ADMIN — OXYCODONE HYDROCHLORIDE 5 MG: 5 TABLET ORAL at 03:27

## 2024-01-01 RX ADMIN — VASOPRESSIN 0.03 UNITS/MIN: 20 INJECTION INTRAVENOUS at 19:36

## 2024-01-01 RX ADMIN — AMIODARONE HYDROCHLORIDE 0.5 MG/MIN: 1.8 INJECTION, SOLUTION INTRAVENOUS at 10:23

## 2024-01-01 RX ADMIN — GLYCOPYRROLATE 0.2 MG: 0.2 INJECTION INTRAMUSCULAR; INTRAVENOUS at 14:32

## 2024-01-01 RX ADMIN — FERROUS GLUCONATE 324 MG: 324 TABLET ORAL at 08:39

## 2024-01-01 RX ADMIN — SODIUM BICARBONATE 1300 MG: 650 TABLET ORAL at 09:35

## 2024-01-01 RX ADMIN — SENNOSIDES AND DOCUSATE SODIUM 2 TABLET: 50; 8.6 TABLET ORAL at 17:47

## 2024-01-01 RX ADMIN — FERROUS GLUCONATE 324 MG: 324 TABLET ORAL at 08:19

## 2024-01-01 RX ADMIN — METHYLPREDNISOLONE SODIUM SUCCINATE 62.5 MG: 125 INJECTION, POWDER, FOR SOLUTION INTRAMUSCULAR; INTRAVENOUS at 23:56

## 2024-01-01 RX ADMIN — ROCURONIUM BROMIDE 80 MG: 50 INJECTION, SOLUTION INTRAVENOUS at 15:31

## 2024-01-01 RX ADMIN — SODIUM BICARBONATE 1950 MG: 650 TABLET ORAL at 14:19

## 2024-01-01 RX ADMIN — EPHEDRINE SULFATE 10 MG: 50 INJECTION, SOLUTION INTRAVENOUS at 16:47

## 2024-01-01 RX ADMIN — HEPARIN SODIUM 5000 UNITS: 5000 INJECTION, SOLUTION INTRAVENOUS; SUBCUTANEOUS at 05:48

## 2024-01-01 RX ADMIN — CARVEDILOL 12.5 MG: 12.5 TABLET, FILM COATED ORAL at 18:02

## 2024-01-01 RX ADMIN — CARVEDILOL 12.5 MG: 12.5 TABLET, FILM COATED ORAL at 08:01

## 2024-01-01 RX ADMIN — POLYETHYLENE GLYCOL 3350 1 PACKET: 17 POWDER, FOR SOLUTION ORAL at 05:51

## 2024-01-01 RX ADMIN — EPHEDRINE SULFATE 5 MG: 50 INJECTION, SOLUTION INTRAVENOUS at 16:44

## 2024-01-01 RX ADMIN — SODIUM CHLORIDE: 9 INJECTION, SOLUTION INTRAVENOUS at 16:21

## 2024-01-01 RX ADMIN — SODIUM CHLORIDE: 9 INJECTION, SOLUTION INTRAVENOUS at 17:50

## 2024-01-01 RX ADMIN — FERROUS GLUCONATE 324 MG: 324 TABLET ORAL at 08:02

## 2024-01-01 RX ADMIN — ACETAMINOPHEN 1000 MG: 500 TABLET, FILM COATED ORAL at 12:11

## 2024-01-01 RX ADMIN — FAMOTIDINE 20 MG: 10 INJECTION, SOLUTION INTRAVENOUS at 05:14

## 2024-01-01 RX ADMIN — SODIUM CHLORIDE 250 MG: 9 INJECTION, SOLUTION INTRAVENOUS at 05:27

## 2024-01-01 RX ADMIN — NOREPINEPHRINE BITARTRATE 0.05 MCG/KG/MIN: 1 INJECTION INTRAVENOUS at 08:38

## 2024-01-01 RX ADMIN — SUGAMMADEX 200 MG: 100 INJECTION, SOLUTION INTRAVENOUS at 17:18

## 2024-01-01 RX ADMIN — LORAZEPAM 1 MG: 2 INJECTION INTRAMUSCULAR; INTRAVENOUS at 19:27

## 2024-01-01 RX ADMIN — IOHEXOL 100 ML: 350 INJECTION, SOLUTION INTRAVENOUS at 08:58

## 2024-01-01 RX ADMIN — POLYETHYLENE GLYCOL 3350 1 PACKET: 17 POWDER, FOR SOLUTION ORAL at 10:18

## 2024-01-01 RX ADMIN — ACETAMINOPHEN 1000 MG: 500 TABLET, FILM COATED ORAL at 21:07

## 2024-01-01 RX ADMIN — NOREPINEPHRINE BITARTRATE 0.05 MCG/KG/MIN: 1 INJECTION, SOLUTION, CONCENTRATE INTRAVENOUS at 05:36

## 2024-01-01 RX ADMIN — LACTULOSE 30 ML: 20 SOLUTION ORAL at 22:37

## 2024-01-01 RX ADMIN — INSULIN HUMAN 1 UNITS: 100 INJECTION, SOLUTION PARENTERAL at 11:56

## 2024-01-01 RX ADMIN — AMIODARONE HYDROCHLORIDE 0.5 MG/MIN: 1.8 INJECTION, SOLUTION INTRAVENOUS at 17:39

## 2024-01-01 RX ADMIN — HYDROMORPHONE HYDROCHLORIDE 0.5 MG: 1 INJECTION, SOLUTION INTRAMUSCULAR; INTRAVENOUS; SUBCUTANEOUS at 09:36

## 2024-01-01 RX ADMIN — THIAMINE HYDROCHLORIDE 100 MG: 100 INJECTION, SOLUTION INTRAMUSCULAR; INTRAVENOUS at 05:19

## 2024-01-01 RX ADMIN — SODIUM BICARBONATE 1950 MG: 650 TABLET ORAL at 20:17

## 2024-01-01 RX ADMIN — OMEPRAZOLE 20 MG: 20 CAPSULE, DELAYED RELEASE ORAL at 17:38

## 2024-01-01 RX ADMIN — IPRATROPIUM BROMIDE AND ALBUTEROL SULFATE 3 ML: 2.5; .5 SOLUTION RESPIRATORY (INHALATION) at 07:54

## 2024-01-01 RX ADMIN — HEPARIN SODIUM 12 UNITS/KG/HR: 5000 INJECTION, SOLUTION INTRAVENOUS at 06:02

## 2024-01-01 RX ADMIN — PROPOFOL 140 MG: 10 INJECTION, EMULSION INTRAVENOUS at 13:31

## 2024-01-01 RX ADMIN — INSULIN HUMAN 1 UNITS: 100 INJECTION, SOLUTION PARENTERAL at 12:13

## 2024-01-01 RX ADMIN — AMPICILLIN AND SULBACTAM 3 G: 1; 2 INJECTION, POWDER, FOR SOLUTION INTRAMUSCULAR; INTRAVENOUS at 05:48

## 2024-01-01 RX ADMIN — EPHEDRINE SULFATE 5 MG: 50 INJECTION, SOLUTION INTRAVENOUS at 13:33

## 2024-01-01 RX ADMIN — SODIUM BICARBONATE 1950 MG: 650 TABLET ORAL at 08:31

## 2024-01-01 RX ADMIN — MORPHINE SULFATE 5 MG: 10 INJECTION INTRAVENOUS at 18:16

## 2024-01-01 RX ADMIN — SODIUM CHLORIDE 250 MG: 9 INJECTION, SOLUTION INTRAVENOUS at 17:20

## 2024-01-01 RX ADMIN — OXYCODONE HYDROCHLORIDE 5 MG: 5 TABLET ORAL at 12:11

## 2024-01-01 RX ADMIN — VERAPAMIL HYDROCHLORIDE 2.5 MG: 2.5 INJECTION, SOLUTION INTRAVENOUS at 13:35

## 2024-01-01 RX ADMIN — ZOLPIDEM TARTRATE 5 MG: 5 TABLET ORAL at 22:09

## 2024-01-01 RX ADMIN — ONDANSETRON 4 MG: 2 INJECTION INTRAMUSCULAR; INTRAVENOUS at 05:51

## 2024-01-01 RX ADMIN — ACETAMINOPHEN 1000 MG: 500 TABLET, FILM COATED ORAL at 04:11

## 2024-01-01 RX ADMIN — CEFEPIME 2 G: 2 INJECTION, POWDER, FOR SOLUTION INTRAVENOUS at 14:51

## 2024-01-01 RX ADMIN — METHYLPREDNISOLONE SODIUM SUCCINATE 62.5 MG: 125 INJECTION, POWDER, FOR SOLUTION INTRAMUSCULAR; INTRAVENOUS at 18:13

## 2024-01-01 RX ADMIN — FENTANYL CITRATE 100 MCG: 50 INJECTION, SOLUTION INTRAMUSCULAR; INTRAVENOUS at 17:20

## 2024-01-01 RX ADMIN — SIMETHICONE 125 MG: 125 TABLET, CHEWABLE ORAL at 18:03

## 2024-01-01 RX ADMIN — AMIODARONE HYDROCHLORIDE 400 MG: 200 TABLET ORAL at 15:13

## 2024-01-01 RX ADMIN — MIDAZOLAM HYDROCHLORIDE 2 MG: 2 INJECTION, SOLUTION INTRAMUSCULAR; INTRAVENOUS at 13:13

## 2024-01-01 RX ADMIN — EPHEDRINE SULFATE 10 MG: 50 INJECTION, SOLUTION INTRAVENOUS at 15:34

## 2024-01-01 RX ADMIN — FLUTICASONE FUROATE, UMECLIDINIUM BROMIDE AND VILANTEROL TRIFENATATE 1 PUFF: 100; 62.5; 25 POWDER RESPIRATORY (INHALATION) at 14:42

## 2024-01-01 RX ADMIN — PROPOFOL 25 MG: 10 INJECTION, EMULSION INTRAVENOUS at 15:31

## 2024-01-01 RX ADMIN — ACETAMINOPHEN 1000 MG: 500 TABLET, FILM COATED ORAL at 22:37

## 2024-01-01 RX ADMIN — SENNOSIDES AND DOCUSATE SODIUM 2 TABLET: 50; 8.6 TABLET ORAL at 17:59

## 2024-01-01 RX ADMIN — ONDANSETRON 4 MG: 2 INJECTION INTRAMUSCULAR; INTRAVENOUS at 21:18

## 2024-01-01 RX ADMIN — ONDANSETRON 4 MG: 2 INJECTION INTRAMUSCULAR; INTRAVENOUS at 09:04

## 2024-01-01 RX ADMIN — SODIUM CHLORIDE: 9 INJECTION, SOLUTION INTRAVENOUS at 09:30

## 2024-01-01 RX ADMIN — FLUTICASONE FUROATE, UMECLIDINIUM BROMIDE AND VILANTEROL TRIFENATATE 1 PUFF: 100; 62.5; 25 POWDER RESPIRATORY (INHALATION) at 05:31

## 2024-01-01 RX ADMIN — ROCURONIUM BROMIDE 80 MG: 10 INJECTION, SOLUTION INTRAVENOUS at 13:31

## 2024-01-01 RX ADMIN — LACTULOSE 30 ML: 20 SOLUTION ORAL at 04:08

## 2024-01-01 RX ADMIN — AMIODARONE HYDROCHLORIDE 0.5 MG/MIN: 1.8 INJECTION, SOLUTION INTRAVENOUS at 14:17

## 2024-01-01 RX ADMIN — VASOPRESSIN 0.03 UNITS/MIN: 20 INJECTION INTRAVENOUS at 17:01

## 2024-01-01 RX ADMIN — THIAMINE HYDROCHLORIDE 100 MG: 100 INJECTION, SOLUTION INTRAMUSCULAR; INTRAVENOUS at 05:14

## 2024-01-01 RX ADMIN — PHENYLEPHRINE HYDROCHLORIDE 200 MCG: 10 INJECTION INTRAVENOUS at 15:34

## 2024-01-01 RX ADMIN — FLUTICASONE FUROATE, UMECLIDINIUM BROMIDE AND VILANTEROL TRIFENATATE 1 PUFF: 100; 62.5; 25 POWDER RESPIRATORY (INHALATION) at 06:16

## 2024-01-01 RX ADMIN — IPRATROPIUM BROMIDE AND ALBUTEROL SULFATE 3 ML: 2.5; .5 SOLUTION RESPIRATORY (INHALATION) at 07:31

## 2024-01-01 RX ADMIN — SODIUM BICARBONATE 1950 MG: 650 TABLET ORAL at 15:13

## 2024-01-01 RX ADMIN — HYDROMORPHONE HYDROCHLORIDE: 1 INJECTION, SOLUTION INTRAMUSCULAR; INTRAVENOUS; SUBCUTANEOUS at 17:58

## 2024-01-01 RX ADMIN — ALBUTEROL SULFATE 1 PUFF: 90 AEROSOL, METERED RESPIRATORY (INHALATION) at 18:10

## 2024-01-01 RX ADMIN — MAGNESIUM HYDROXIDE 30 ML: 1200 LIQUID ORAL at 17:17

## 2024-01-01 RX ADMIN — HYDROMORPHONE HYDROCHLORIDE 0.5 MG: 1 INJECTION, SOLUTION INTRAMUSCULAR; INTRAVENOUS; SUBCUTANEOUS at 04:26

## 2024-01-01 RX ADMIN — SODIUM CHLORIDE: 4.5 INJECTION, SOLUTION INTRAVENOUS at 20:57

## 2024-01-01 RX ADMIN — MAGNESIUM SULFATE HEPTAHYDRATE 2 G: 2 INJECTION, SOLUTION INTRAVENOUS at 12:15

## 2024-01-01 RX ADMIN — SENNOSIDES AND DOCUSATE SODIUM 2 TABLET: 50; 8.6 TABLET ORAL at 18:30

## 2024-01-01 RX ADMIN — HEPARIN SODIUM 5000 UNITS: 5000 INJECTION, SOLUTION INTRAVENOUS; SUBCUTANEOUS at 14:09

## 2024-01-01 RX ADMIN — ATORVASTATIN CALCIUM 40 MG: 40 TABLET, FILM COATED ORAL at 17:47

## 2024-01-01 RX ADMIN — OMEPRAZOLE 20 MG: 20 CAPSULE, DELAYED RELEASE ORAL at 05:34

## 2024-01-01 RX ADMIN — ATORVASTATIN CALCIUM 40 MG: 40 TABLET, FILM COATED ORAL at 21:07

## 2024-01-01 RX ADMIN — FLUTICASONE FUROATE, UMECLIDINIUM BROMIDE AND VILANTEROL TRIFENATATE 1 PUFF: 100; 62.5; 25 POWDER RESPIRATORY (INHALATION) at 05:35

## 2024-01-01 RX ADMIN — SENNOSIDES AND DOCUSATE SODIUM 2 TABLET: 50; 8.6 TABLET ORAL at 18:02

## 2024-01-01 RX ADMIN — Medication 100 MG: at 06:15

## 2024-01-01 RX ADMIN — Medication 2.5 MG: at 04:10

## 2024-01-01 RX ADMIN — INSULIN HUMAN 1 UNITS: 100 INJECTION, SOLUTION PARENTERAL at 12:36

## 2024-01-01 RX ADMIN — FAMOTIDINE 20 MG: 10 INJECTION, SOLUTION INTRAVENOUS at 11:37

## 2024-01-01 RX ADMIN — CEFEPIME 2 G: 2 INJECTION, POWDER, FOR SOLUTION INTRAVENOUS at 18:08

## 2024-01-01 RX ADMIN — PROPOFOL 30 MCG/KG/MIN: 10 INJECTION, EMULSION INTRAVENOUS at 00:22

## 2024-01-01 RX ADMIN — AMIODARONE HYDROCHLORIDE 0.5 MG/MIN: 1.8 INJECTION, SOLUTION INTRAVENOUS at 09:43

## 2024-01-01 RX ADMIN — AMLODIPINE BESYLATE 10 MG: 10 TABLET ORAL at 05:48

## 2024-01-01 RX ADMIN — ACETAMINOPHEN 1000 MG: 500 TABLET, FILM COATED ORAL at 05:19

## 2024-01-01 RX ADMIN — METHYLPREDNISOLONE SODIUM SUCCINATE 62.5 MG: 125 INJECTION, POWDER, FOR SOLUTION INTRAMUSCULAR; INTRAVENOUS at 23:25

## 2024-01-01 RX ADMIN — CALCIUM CHLORIDE 1000 MG: 100 INJECTION, SOLUTION INTRAVENOUS at 13:09

## 2024-01-01 RX ADMIN — INSULIN HUMAN 1 UNITS: 100 INJECTION, SOLUTION PARENTERAL at 00:12

## 2024-01-01 RX ADMIN — OXYCODONE HYDROCHLORIDE 5 MG: 5 TABLET ORAL at 05:19

## 2024-01-01 RX ADMIN — BISACODYL 10 MG: 10 SUPPOSITORY RECTAL at 05:28

## 2024-01-01 RX ADMIN — LIDOCAINE HYDROCHLORIDE 50 MG: 20 INJECTION, SOLUTION EPIDURAL; INFILTRATION; INTRACAUDAL at 15:31

## 2024-01-01 RX ADMIN — THIAMINE HYDROCHLORIDE 100 MG: 100 INJECTION, SOLUTION INTRAMUSCULAR; INTRAVENOUS at 05:58

## 2024-01-01 RX ADMIN — ZOLPIDEM TARTRATE 5 MG: 5 TABLET ORAL at 00:05

## 2024-01-01 RX ADMIN — BUPIVACAINE HYDROCHLORIDE AND EPINEPHRINE BITARTRATE 5 ML: 2.5; .0091 INJECTION, SOLUTION EPIDURAL; INFILTRATION; INTRACAUDAL; PERINEURAL at 14:04

## 2024-01-01 RX ADMIN — METHYLPREDNISOLONE SODIUM SUCCINATE 62.5 MG: 125 INJECTION, POWDER, FOR SOLUTION INTRAMUSCULAR; INTRAVENOUS at 17:45

## 2024-01-01 RX ADMIN — DEXTROSE MONOHYDRATE: 50 INJECTION, SOLUTION INTRAVENOUS at 10:45

## 2024-01-01 RX ADMIN — SIMETHICONE 125 MG: 125 TABLET, CHEWABLE ORAL at 05:27

## 2024-01-01 RX ADMIN — SALINE NASAL SPRAY 2 SPRAY: 1.5 SOLUTION NASAL at 12:15

## 2024-01-01 RX ADMIN — FERROUS GLUCONATE 324 MG: 324 TABLET ORAL at 08:10

## 2024-01-01 RX ADMIN — FUROSEMIDE 60 MG: 10 INJECTION, SOLUTION INTRAVENOUS at 11:34

## 2024-01-01 RX ADMIN — METHYLPREDNISOLONE SODIUM SUCCINATE 62.5 MG: 125 INJECTION, POWDER, FOR SOLUTION INTRAMUSCULAR; INTRAVENOUS at 12:27

## 2024-01-01 RX ADMIN — SODIUM CHLORIDE: 4.5 INJECTION, SOLUTION INTRAVENOUS at 12:35

## 2024-01-01 RX ADMIN — OXYCODONE HYDROCHLORIDE 10 MG: 10 TABLET ORAL at 14:19

## 2024-01-01 RX ADMIN — FERROUS GLUCONATE 324 MG: 324 TABLET ORAL at 08:50

## 2024-01-01 RX ADMIN — SIMETHICONE 125 MG: 125 TABLET, CHEWABLE ORAL at 20:53

## 2024-01-01 RX ADMIN — ASPIRIN 81 MG: 81 TABLET, CHEWABLE ORAL at 05:18

## 2024-01-01 RX ADMIN — ATORVASTATIN CALCIUM 40 MG: 40 TABLET, FILM COATED ORAL at 18:30

## 2024-01-01 RX ADMIN — PREDNISONE 40 MG: 20 TABLET ORAL at 05:19

## 2024-01-01 RX ADMIN — FLUTICASONE FUROATE, UMECLIDINIUM BROMIDE AND VILANTEROL TRIFENATATE 1 PUFF: 100; 62.5; 25 POWDER RESPIRATORY (INHALATION) at 04:30

## 2024-01-01 RX ADMIN — FLUTICASONE FUROATE, UMECLIDINIUM BROMIDE AND VILANTEROL TRIFENATATE 1 PUFF: 100; 62.5; 25 POWDER RESPIRATORY (INHALATION) at 05:19

## 2024-01-01 RX ADMIN — FLUTICASONE FUROATE, UMECLIDINIUM BROMIDE AND VILANTEROL TRIFENATATE 1 PUFF: 100; 62.5; 25 POWDER RESPIRATORY (INHALATION) at 05:49

## 2024-01-01 RX ADMIN — METHYLPREDNISOLONE SODIUM SUCCINATE 62.5 MG: 125 INJECTION, POWDER, FOR SOLUTION INTRAMUSCULAR; INTRAVENOUS at 12:09

## 2024-01-01 RX ADMIN — SODIUM CHLORIDE: 4.5 INJECTION, SOLUTION INTRAVENOUS at 06:17

## 2024-01-01 RX ADMIN — PROPOFOL 50 MCG/KG/MIN: 10 INJECTION, EMULSION INTRAVENOUS at 16:50

## 2024-01-01 RX ADMIN — FUROSEMIDE 40 MG: 10 INJECTION, SOLUTION INTRAVENOUS at 11:37

## 2024-01-01 RX ADMIN — MIDAZOLAM HYDROCHLORIDE 1 MG: 2 INJECTION, SOLUTION INTRAMUSCULAR; INTRAVENOUS at 13:15

## 2024-01-01 RX ADMIN — AMLODIPINE BESYLATE 10 MG: 10 TABLET ORAL at 05:41

## 2024-01-01 RX ADMIN — INSULIN HUMAN 1 UNITS: 100 INJECTION, SOLUTION PARENTERAL at 06:16

## 2024-01-01 RX ADMIN — Medication 5 MG: at 17:36

## 2024-01-01 RX ADMIN — SENNOSIDES AND DOCUSATE SODIUM 2 TABLET: 50; 8.6 TABLET ORAL at 18:11

## 2024-01-01 RX ADMIN — OMEPRAZOLE 20 MG: 20 CAPSULE, DELAYED RELEASE ORAL at 05:51

## 2024-01-01 RX ADMIN — ASPIRIN 81 MG: 81 TABLET, CHEWABLE ORAL at 05:48

## 2024-01-01 RX ADMIN — AMIODARONE HYDROCHLORIDE 0.5 MG/MIN: 1.8 INJECTION, SOLUTION INTRAVENOUS at 06:14

## 2024-01-01 RX ADMIN — AMIODARONE HYDROCHLORIDE 0.5 MG/MIN: 1.8 INJECTION, SOLUTION INTRAVENOUS at 20:55

## 2024-01-01 RX ADMIN — EPHEDRINE SULFATE 10 MG: 50 INJECTION, SOLUTION INTRAVENOUS at 17:22

## 2024-01-01 RX ADMIN — HEPARIN SODIUM 14 UNITS/KG/HR: 5000 INJECTION, SOLUTION INTRAVENOUS at 05:47

## 2024-01-01 RX ADMIN — OXYCODONE HYDROCHLORIDE 10 MG: 10 TABLET ORAL at 08:38

## 2024-01-01 RX ADMIN — ACETAMINOPHEN 1000 MG: 500 TABLET, FILM COATED ORAL at 00:09

## 2024-01-01 RX ADMIN — SODIUM BICARBONATE 1950 MG: 650 TABLET ORAL at 09:15

## 2024-01-01 RX ADMIN — PROPOFOL 40 MCG/KG/MIN: 10 INJECTION, EMULSION INTRAVENOUS at 17:33

## 2024-01-01 RX ADMIN — LACTULOSE 30 ML: 20 SOLUTION ORAL at 10:53

## 2024-01-01 RX ADMIN — Medication 5 MG: at 20:17

## 2024-01-01 RX ADMIN — SENNOSIDES AND DOCUSATE SODIUM 2 TABLET: 50; 8.6 TABLET ORAL at 23:57

## 2024-01-01 RX ADMIN — Medication 5 MG: at 18:09

## 2024-01-01 RX ADMIN — METHYLPREDNISOLONE SODIUM SUCCINATE 62.5 MG: 125 INJECTION, POWDER, FOR SOLUTION INTRAMUSCULAR; INTRAVENOUS at 05:15

## 2024-01-01 RX ADMIN — IOHEXOL 120 ML: 300 INJECTION, SOLUTION INTRAVENOUS at 15:30

## 2024-01-01 RX ADMIN — CEFEPIME 2 G: 2 INJECTION, POWDER, FOR SOLUTION INTRAVENOUS at 18:18

## 2024-01-01 RX ADMIN — HEPARIN SODIUM 5000 UNITS: 5000 INJECTION, SOLUTION INTRAVENOUS; SUBCUTANEOUS at 17:48

## 2024-01-01 RX ADMIN — ACETAMINOPHEN 1000 MG: 500 TABLET, FILM COATED ORAL at 11:29

## 2024-01-01 RX ADMIN — HYDROMORPHONE HYDROCHLORIDE 0.5 MG: 1 INJECTION, SOLUTION INTRAMUSCULAR; INTRAVENOUS; SUBCUTANEOUS at 07:08

## 2024-01-01 RX ADMIN — IPRATROPIUM BROMIDE AND ALBUTEROL SULFATE 3 ML: 2.5; .5 SOLUTION RESPIRATORY (INHALATION) at 06:33

## 2024-01-01 RX ADMIN — NOREPINEPHRINE BITARTRATE 0.3 MCG/KG/MIN: 1 INJECTION, SOLUTION, CONCENTRATE INTRAVENOUS at 03:22

## 2024-01-01 RX ADMIN — LACTULOSE 30 ML: 10 SOLUTION ORAL; RECTAL at 04:30

## 2024-01-01 RX ADMIN — SENNOSIDES AND DOCUSATE SODIUM 2 TABLET: 50; 8.6 TABLET ORAL at 17:26

## 2024-01-01 RX ADMIN — ACETAMINOPHEN 1000 MG: 500 TABLET ORAL at 05:27

## 2024-01-01 RX ADMIN — ASPIRIN 81 MG: 81 TABLET, CHEWABLE ORAL at 05:42

## 2024-01-01 RX ADMIN — HEPARIN SODIUM 1200 UNITS: 1000 INJECTION, SOLUTION INTRAVENOUS; SUBCUTANEOUS at 17:19

## 2024-01-01 RX ADMIN — IPRATROPIUM BROMIDE AND ALBUTEROL SULFATE 3 ML: 2.5; .5 SOLUTION RESPIRATORY (INHALATION) at 06:26

## 2024-01-01 RX ADMIN — SODIUM ZIRCONIUM CYCLOSILICATE 10 G: 10 POWDER, FOR SUSPENSION ORAL at 17:34

## 2024-01-01 RX ADMIN — EPHEDRINE SULFATE 10 MG: 50 INJECTION, SOLUTION INTRAVENOUS at 15:56

## 2024-01-01 RX ADMIN — SODIUM ZIRCONIUM CYCLOSILICATE 10 G: 10 POWDER, FOR SUSPENSION ORAL at 15:53

## 2024-01-01 RX ADMIN — BISACODYL 10 MG: 10 SUPPOSITORY RECTAL at 06:16

## 2024-01-01 RX ADMIN — VASOPRESSIN 0.03 UNITS/MIN: 20 INJECTION INTRAVENOUS at 05:39

## 2024-01-01 RX ADMIN — ATORVASTATIN CALCIUM 40 MG: 40 TABLET, FILM COATED ORAL at 17:26

## 2024-01-01 RX ADMIN — NOREPINEPHRINE BITARTRATE 0.05 MCG/KG/MIN: 1 INJECTION, SOLUTION, CONCENTRATE INTRAVENOUS at 08:38

## 2024-01-01 RX ADMIN — BISACODYL 10 MG: 10 SUPPOSITORY RECTAL at 05:52

## 2024-01-01 RX ADMIN — AMIODARONE HYDROCHLORIDE 400 MG: 200 TABLET ORAL at 05:34

## 2024-01-01 RX ADMIN — PREDNISONE 40 MG: 20 TABLET ORAL at 05:24

## 2024-01-01 RX ADMIN — AMIODARONE HYDROCHLORIDE 0.5 MG/MIN: 1.8 INJECTION, SOLUTION INTRAVENOUS at 20:18

## 2024-01-01 RX ADMIN — AMIODARONE HYDROCHLORIDE 0.5 MG/MIN: 1.8 INJECTION, SOLUTION INTRAVENOUS at 12:28

## 2024-01-01 RX ADMIN — BISACODYL 10 MG: 10 SUPPOSITORY RECTAL at 05:49

## 2024-01-01 RX ADMIN — POLYETHYLENE GLYCOL 3350 1 PACKET: 17 POWDER, FOR SOLUTION ORAL at 05:19

## 2024-01-01 RX ADMIN — BISACODYL 10 MG: 10 SUPPOSITORY RECTAL at 06:00

## 2024-01-01 RX ADMIN — SODIUM BICARBONATE 1300 MG: 650 TABLET ORAL at 13:34

## 2024-01-01 RX ADMIN — CEFAZOLIN 2 G: 1 INJECTION, POWDER, FOR SOLUTION INTRAMUSCULAR; INTRAVENOUS at 15:56

## 2024-01-01 RX ADMIN — CALCIUM CHLORIDE 1000 MG: 100 INJECTION, SOLUTION INTRAVENOUS at 22:47

## 2024-01-01 RX ADMIN — SENNOSIDES AND DOCUSATE SODIUM 2 TABLET: 50; 8.6 TABLET ORAL at 18:17

## 2024-01-01 RX ADMIN — SODIUM ZIRCONIUM CYCLOSILICATE 10 G: 10 POWDER, FOR SUSPENSION ORAL at 12:42

## 2024-01-01 RX ADMIN — PIPERACILLIN AND TAZOBACTAM 4.5 G: 4; .5 INJECTION, POWDER, FOR SOLUTION INTRAVENOUS at 09:39

## 2024-01-01 RX ADMIN — POTASSIUM CHLORIDE 20 MEQ: 1500 TABLET, EXTENDED RELEASE ORAL at 11:00

## 2024-01-01 RX ADMIN — CALCIUM CHLORIDE 500 MG: 100 INJECTION, SOLUTION INTRAVENOUS; INTRAVENTRICULAR at 16:23

## 2024-01-01 RX ADMIN — THIAMINE HYDROCHLORIDE 100 MG: 100 INJECTION, SOLUTION INTRAMUSCULAR; INTRAVENOUS at 05:43

## 2024-01-01 RX ADMIN — CALCIUM CHLORIDE 1000 MG: 100 INJECTION, SOLUTION INTRAVENOUS at 09:18

## 2024-01-01 RX ADMIN — MORPHINE SULFATE 10 MG: 10 INJECTION INTRAVENOUS at 19:27

## 2024-01-01 RX ADMIN — AMPICILLIN AND SULBACTAM 3 G: 1; 2 INJECTION, POWDER, FOR SOLUTION INTRAMUSCULAR; INTRAVENOUS at 05:33

## 2024-01-01 RX ADMIN — SODIUM CHLORIDE 250 MG: 9 INJECTION, SOLUTION INTRAVENOUS at 21:27

## 2024-01-01 RX ADMIN — CEFEPIME 2 G: 2 INJECTION, POWDER, FOR SOLUTION INTRAVENOUS at 12:20

## 2024-01-01 RX ADMIN — Medication 5 MG: at 18:11

## 2024-01-01 RX ADMIN — LIDOCAINE HYDROCHLORIDE 50 MG: 20 INJECTION, SOLUTION EPIDURAL; INFILTRATION; INTRACAUDAL at 13:31

## 2024-01-01 RX ADMIN — MORPHINE SULFATE 10 MG: 10 INJECTION INTRAVENOUS at 18:28

## 2024-01-01 RX ADMIN — BISACODYL 10 MG: 10 SUPPOSITORY RECTAL at 05:27

## 2024-01-01 RX ADMIN — HYDROMORPHONE HYDROCHLORIDE 0.5 MG: 1 INJECTION, SOLUTION INTRAMUSCULAR; INTRAVENOUS; SUBCUTANEOUS at 22:01

## 2024-01-01 RX ADMIN — AMIODARONE HYDROCHLORIDE 0.5 MG/MIN: 1.8 INJECTION, SOLUTION INTRAVENOUS at 03:43

## 2024-01-01 RX ADMIN — FLUTICASONE FUROATE, UMECLIDINIUM BROMIDE AND VILANTEROL TRIFENATATE 1 PUFF: 100; 62.5; 25 POWDER RESPIRATORY (INHALATION) at 06:00

## 2024-01-01 RX ADMIN — SODIUM CHLORIDE, POTASSIUM CHLORIDE, SODIUM LACTATE AND CALCIUM CHLORIDE 1000 ML: 600; 310; 30; 20 INJECTION, SOLUTION INTRAVENOUS at 18:46

## 2024-01-01 RX ADMIN — FAMOTIDINE 20 MG: 10 INJECTION, SOLUTION INTRAVENOUS at 18:13

## 2024-01-01 RX ADMIN — IPRATROPIUM BROMIDE AND ALBUTEROL SULFATE 3 ML: 2.5; .5 SOLUTION RESPIRATORY (INHALATION) at 10:06

## 2024-01-01 RX ADMIN — DEXTROSE MONOHYDRATE 25 G: 25 INJECTION, SOLUTION INTRAVENOUS at 05:13

## 2024-01-01 RX ADMIN — ZOLPIDEM TARTRATE 5 MG: 5 TABLET ORAL at 22:38

## 2024-01-01 RX ADMIN — SODIUM ZIRCONIUM CYCLOSILICATE 10 G: 10 POWDER, FOR SUSPENSION ORAL at 11:28

## 2024-01-01 RX ADMIN — LORAZEPAM 0.5 MG: 2 INJECTION INTRAMUSCULAR; INTRAVENOUS at 12:04

## 2024-01-01 RX ADMIN — METHYLPREDNISOLONE SODIUM SUCCINATE 62.5 MG: 125 INJECTION, POWDER, FOR SOLUTION INTRAMUSCULAR; INTRAVENOUS at 09:30

## 2024-01-01 RX ADMIN — SODIUM CHLORIDE, POTASSIUM CHLORIDE, SODIUM LACTATE AND CALCIUM CHLORIDE: 600; 310; 30; 20 INJECTION, SOLUTION INTRAVENOUS at 12:11

## 2024-01-01 RX ADMIN — LIDOCAINE HYDROCHLORIDE 4 ML: 40 SOLUTION TOPICAL at 13:31

## 2024-01-01 RX ADMIN — IPRATROPIUM BROMIDE AND ALBUTEROL SULFATE 3 ML: 2.5; .5 SOLUTION RESPIRATORY (INHALATION) at 15:42

## 2024-01-01 RX ADMIN — OXYCODONE HYDROCHLORIDE 5 MG: 5 TABLET ORAL at 08:14

## 2024-01-01 RX ADMIN — CALCIUM CHLORIDE 500 MG: 100 INJECTION, SOLUTION INTRAVENOUS; INTRAVENTRICULAR at 16:45

## 2024-01-01 RX ADMIN — POLYETHYLENE GLYCOL 3350 1 PACKET: 17 POWDER, FOR SOLUTION ORAL at 06:15

## 2024-01-01 RX ADMIN — HEPARIN SODIUM 5000 UNITS: 5000 INJECTION, SOLUTION INTRAVENOUS; SUBCUTANEOUS at 05:27

## 2024-01-01 RX ADMIN — CALCIUM GLUCONATE 1 G: 20 INJECTION, SOLUTION INTRAVENOUS at 01:44

## 2024-01-01 RX ADMIN — ACETAMINOPHEN 1000 MG: 500 TABLET, FILM COATED ORAL at 23:57

## 2024-01-01 RX ADMIN — OXYCODONE HYDROCHLORIDE 5 MG: 5 TABLET ORAL at 19:35

## 2024-01-01 RX ADMIN — HEPARIN SODIUM 14 UNITS/KG/HR: 5000 INJECTION, SOLUTION INTRAVENOUS at 13:41

## 2024-01-01 RX ADMIN — AMPICILLIN AND SULBACTAM 3 G: 1; 2 INJECTION, POWDER, FOR SOLUTION INTRAMUSCULAR; INTRAVENOUS at 05:29

## 2024-01-01 RX ADMIN — FLUTICASONE FUROATE, UMECLIDINIUM BROMIDE AND VILANTEROL TRIFENATATE 1 PUFF: 100; 62.5; 25 POWDER RESPIRATORY (INHALATION) at 05:50

## 2024-01-01 RX ADMIN — ATORVASTATIN CALCIUM 40 MG: 40 TABLET, FILM COATED ORAL at 17:35

## 2024-01-01 RX ADMIN — FLUTICASONE FUROATE, UMECLIDINIUM BROMIDE AND VILANTEROL TRIFENATATE 1 PUFF: 100; 62.5; 25 POWDER RESPIRATORY (INHALATION) at 06:28

## 2024-01-01 RX ADMIN — SIMETHICONE 125 MG: 125 TABLET, CHEWABLE ORAL at 10:53

## 2024-01-01 RX ADMIN — POLYETHYLENE GLYCOL 3350 1 PACKET: 17 POWDER, FOR SOLUTION ORAL at 04:29

## 2024-01-01 RX ADMIN — LACTULOSE 30 ML: 20 SOLUTION ORAL at 17:26

## 2024-01-01 RX ADMIN — ATORVASTATIN CALCIUM 40 MG: 40 TABLET, FILM COATED ORAL at 17:17

## 2024-01-01 RX ADMIN — ACETAMINOPHEN 1000 MG: 500 TABLET, FILM COATED ORAL at 04:29

## 2024-01-01 RX ADMIN — FERROUS GLUCONATE 324 MG: 324 TABLET ORAL at 09:14

## 2024-01-01 RX ADMIN — Medication 100 MG: at 05:31

## 2024-01-01 RX ADMIN — AMIODARONE HYDROCHLORIDE 0.5 MG/MIN: 1.8 INJECTION, SOLUTION INTRAVENOUS at 23:40

## 2024-01-01 RX ADMIN — ACETAMINOPHEN 1000 MG: 500 TABLET, FILM COATED ORAL at 17:33

## 2024-01-01 RX ADMIN — HYDROCORTISONE SODIUM SUCCINATE 100 MG: 100 INJECTION, POWDER, FOR SOLUTION INTRAMUSCULAR; INTRAVENOUS at 15:35

## 2024-01-01 RX ADMIN — GLYCOPYRROLATE 0.3 MG: 0.2 INJECTION INTRAMUSCULAR; INTRAVENOUS at 17:23

## 2024-01-01 RX ADMIN — CARVEDILOL 12.5 MG: 12.5 TABLET, FILM COATED ORAL at 17:34

## 2024-01-01 RX ADMIN — FUROSEMIDE 80 MG: 10 INJECTION INTRAMUSCULAR; INTRAVENOUS at 17:46

## 2024-01-01 RX ADMIN — LIDOCAINE 1 PATCH: 4 PATCH TOPICAL at 14:19

## 2024-01-01 RX ADMIN — CALCIUM CHLORIDE 1000 MG: 100 INJECTION, SOLUTION INTRAVENOUS at 08:38

## 2024-01-01 RX ADMIN — ASPIRIN 81 MG: 81 TABLET, CHEWABLE ORAL at 05:27

## 2024-01-01 RX ADMIN — OXYCODONE HYDROCHLORIDE 10 MG: 10 TABLET ORAL at 17:41

## 2024-01-01 RX ADMIN — METHYLPREDNISOLONE SODIUM SUCCINATE 62.5 MG: 125 INJECTION, POWDER, FOR SOLUTION INTRAMUSCULAR; INTRAVENOUS at 05:28

## 2024-01-01 RX ADMIN — BISACODYL 10 MG: 10 SUPPOSITORY RECTAL at 17:19

## 2024-01-01 RX ADMIN — INSULIN HUMAN 5 UNITS: 100 INJECTION, SOLUTION PARENTERAL at 01:04

## 2024-01-01 RX ADMIN — ANTACID TABLETS 500 MG: 500 TABLET, CHEWABLE ORAL at 17:58

## 2024-01-01 RX ADMIN — ACETAMINOPHEN 1000 MG: 500 TABLET, FILM COATED ORAL at 18:02

## 2024-01-01 RX ADMIN — METHYLPREDNISOLONE SODIUM SUCCINATE 62.5 MG: 125 INJECTION, POWDER, FOR SOLUTION INTRAMUSCULAR; INTRAVENOUS at 17:14

## 2024-01-01 RX ADMIN — NOREPINEPHRINE BITARTRATE 0.4 MCG/KG/MIN: 1 INJECTION, SOLUTION, CONCENTRATE INTRAVENOUS at 21:59

## 2024-01-01 RX ADMIN — METOCLOPRAMIDE 10 MG: 5 INJECTION, SOLUTION INTRAMUSCULAR; INTRAVENOUS at 10:24

## 2024-01-01 RX ADMIN — ACETAMINOPHEN 1000 MG: 500 TABLET, FILM COATED ORAL at 12:42

## 2024-01-01 RX ADMIN — Medication 100 MG: at 05:34

## 2024-01-01 RX ADMIN — EPHEDRINE SULFATE 5 MG: 50 INJECTION, SOLUTION INTRAVENOUS at 14:16

## 2024-01-01 RX ADMIN — ATORVASTATIN CALCIUM 40 MG: 40 TABLET, FILM COATED ORAL at 17:16

## 2024-01-01 RX ADMIN — SODIUM ZIRCONIUM CYCLOSILICATE 10 G: 10 POWDER, FOR SUSPENSION ORAL at 10:53

## 2024-01-01 RX ADMIN — ACETAMINOPHEN 1000 MG: 500 TABLET, FILM COATED ORAL at 17:59

## 2024-01-01 RX ADMIN — AMIODARONE HYDROCHLORIDE 0.5 MG/MIN: 1.8 INJECTION, SOLUTION INTRAVENOUS at 21:36

## 2024-01-01 RX ADMIN — HEPARIN SODIUM 18 UNITS/KG/HR: 5000 INJECTION, SOLUTION INTRAVENOUS at 18:12

## 2024-01-01 RX ADMIN — CALCIUM CHLORIDE 500 MG: 100 INJECTION, SOLUTION INTRAVENOUS; INTRAVENTRICULAR at 15:34

## 2024-01-01 RX ADMIN — SODIUM CHLORIDE: 9 INJECTION, SOLUTION INTRAVENOUS at 02:21

## 2024-01-01 RX ADMIN — Medication 5 UNITS: at 05:14

## 2024-01-01 RX ADMIN — POLYETHYLENE GLYCOL 3350 1 PACKET: 17 POWDER, FOR SOLUTION ORAL at 06:00

## 2024-01-01 RX ADMIN — PREDNISONE 40 MG: 20 TABLET ORAL at 05:50

## 2024-01-01 RX ADMIN — FLUTICASONE FUROATE, UMECLIDINIUM BROMIDE AND VILANTEROL TRIFENATATE 1 PUFF: 100; 62.5; 25 POWDER RESPIRATORY (INHALATION) at 05:28

## 2024-01-01 RX ADMIN — POLYETHYLENE GLYCOL 3350 1 PACKET: 17 POWDER, FOR SOLUTION ORAL at 04:12

## 2024-01-01 RX ADMIN — FLUTICASONE FUROATE, UMECLIDINIUM BROMIDE AND VILANTEROL TRIFENATATE 1 PUFF: 100; 62.5; 25 POWDER RESPIRATORY (INHALATION) at 05:24

## 2024-01-01 RX ADMIN — OXYCODONE HYDROCHLORIDE 5 MG: 5 TABLET ORAL at 08:27

## 2024-01-01 RX ADMIN — ZOLPIDEM TARTRATE 5 MG: 5 TABLET ORAL at 22:10

## 2024-01-01 RX ADMIN — SODIUM ZIRCONIUM CYCLOSILICATE 10 G: 10 POWDER, FOR SUSPENSION ORAL at 21:34

## 2024-01-01 RX ADMIN — DEXTROSE MONOHYDRATE: 50 INJECTION, SOLUTION INTRAVENOUS at 01:50

## 2024-01-01 RX ADMIN — ATORVASTATIN CALCIUM 40 MG: 40 TABLET, FILM COATED ORAL at 18:02

## 2024-01-01 RX ADMIN — HEPARIN SODIUM 12 UNITS/KG/HR: 5000 INJECTION, SOLUTION INTRAVENOUS at 06:20

## 2024-01-01 RX ADMIN — AMIODARONE HYDROCHLORIDE 0.5 MG/MIN: 1.8 INJECTION, SOLUTION INTRAVENOUS at 01:10

## 2024-01-01 RX ADMIN — GLYCOPYRROLATE 0.3 MG: 0.2 INJECTION INTRAMUSCULAR; INTRAVENOUS at 14:37

## 2024-01-01 RX ADMIN — MIDAZOLAM HYDROCHLORIDE 1 MG: 1 INJECTION INTRAMUSCULAR; INTRAVENOUS at 13:15

## 2024-01-01 RX ADMIN — POLYETHYLENE GLYCOL 3350 1 PACKET: 17 POWDER, FOR SOLUTION ORAL at 05:52

## 2024-01-01 RX ADMIN — METHYLPREDNISOLONE SODIUM SUCCINATE 62.5 MG: 125 INJECTION, POWDER, FOR SOLUTION INTRAMUSCULAR; INTRAVENOUS at 05:26

## 2024-01-01 RX ADMIN — IPRATROPIUM BROMIDE AND ALBUTEROL SULFATE 3 ML: 2.5; .5 SOLUTION RESPIRATORY (INHALATION) at 01:24

## 2024-01-01 RX ADMIN — Medication 5 MG: at 20:10

## 2024-01-01 RX ADMIN — FLUTICASONE FUROATE, UMECLIDINIUM BROMIDE AND VILANTEROL TRIFENATATE 1 PUFF: 100; 62.5; 25 POWDER RESPIRATORY (INHALATION) at 11:00

## 2024-01-01 RX ADMIN — SODIUM CHLORIDE, SODIUM GLUCONATE, SODIUM ACETATE, POTASSIUM CHLORIDE AND MAGNESIUM CHLORIDE: 526; 502; 368; 37; 30 INJECTION, SOLUTION INTRAVENOUS at 16:20

## 2024-01-01 RX ADMIN — FUROSEMIDE 80 MG: 10 INJECTION INTRAMUSCULAR; INTRAVENOUS at 12:05

## 2024-01-01 RX ADMIN — AMPICILLIN AND SULBACTAM 3 G: 1; 2 INJECTION, POWDER, FOR SOLUTION INTRAMUSCULAR; INTRAVENOUS at 10:36

## 2024-01-01 RX ADMIN — ATORVASTATIN CALCIUM 40 MG: 40 TABLET, FILM COATED ORAL at 17:33

## 2024-01-01 RX ADMIN — ZOLPIDEM TARTRATE 5 MG: 5 TABLET ORAL at 22:33

## 2024-01-01 RX ADMIN — LACTULOSE 30 ML: 20 SOLUTION ORAL at 17:59

## 2024-01-01 RX ADMIN — CARVEDILOL 12.5 MG: 12.5 TABLET, FILM COATED ORAL at 11:28

## 2024-01-01 RX ADMIN — LACTULOSE 30 ML: 20 SOLUTION ORAL at 22:49

## 2024-01-01 RX ADMIN — SENNOSIDES AND DOCUSATE SODIUM 2 TABLET: 50; 8.6 TABLET ORAL at 17:45

## 2024-01-01 RX ADMIN — HYDROMORPHONE HYDROCHLORIDE 0.5 MG: 1 INJECTION, SOLUTION INTRAMUSCULAR; INTRAVENOUS; SUBCUTANEOUS at 02:28

## 2024-01-01 RX ADMIN — AMIODARONE HYDROCHLORIDE 1 MG/MIN: 1.8 INJECTION, SOLUTION INTRAVENOUS at 15:23

## 2024-01-01 RX ADMIN — Medication 100 MG: at 05:50

## 2024-01-01 RX ADMIN — NOREPINEPHRINE BITARTRATE 0.25 MCG/KG/MIN: 1 INJECTION, SOLUTION, CONCENTRATE INTRAVENOUS at 09:43

## 2024-01-01 RX ADMIN — METOPROLOL TARTRATE 5 MG: 5 INJECTION INTRAVENOUS at 16:50

## 2024-01-01 RX ADMIN — AMIODARONE HYDROCHLORIDE 0.5 MG/MIN: 1.8 INJECTION, SOLUTION INTRAVENOUS at 13:19

## 2024-01-01 RX ADMIN — METOPROLOL TARTRATE 5 MG: 5 INJECTION INTRAVENOUS at 02:05

## 2024-01-01 RX ADMIN — DIPHENHYDRAMINE HYDROCHLORIDE 12.5 MG: 50 INJECTION, SOLUTION INTRAMUSCULAR; INTRAVENOUS at 20:50

## 2024-01-01 RX ADMIN — EPHEDRINE SULFATE 50 MG: 50 INJECTION, SOLUTION INTRAVENOUS at 17:28

## 2024-01-01 RX ADMIN — INSULIN HUMAN 1 UNITS: 100 INJECTION, SOLUTION PARENTERAL at 17:54

## 2024-01-01 RX ADMIN — AMIODARONE HYDROCHLORIDE 400 MG: 200 TABLET ORAL at 05:51

## 2024-01-01 RX ADMIN — HEPARIN SODIUM 12 UNITS/KG/HR: 5000 INJECTION, SOLUTION INTRAVENOUS at 16:04

## 2024-01-01 RX ADMIN — METHYLPREDNISOLONE SODIUM SUCCINATE 40 MG: 40 INJECTION, POWDER, FOR SOLUTION INTRAMUSCULAR; INTRAVENOUS at 06:04

## 2024-01-01 RX ADMIN — LIDOCAINE 1 PATCH: 4 PATCH TOPICAL at 14:20

## 2024-01-01 RX ADMIN — IPRATROPIUM BROMIDE AND ALBUTEROL SULFATE 3 ML: 2.5; .5 SOLUTION RESPIRATORY (INHALATION) at 04:06

## 2024-01-01 RX ADMIN — SENNOSIDES AND DOCUSATE SODIUM 2 TABLET: 50; 8.6 TABLET ORAL at 17:16

## 2024-01-01 RX ADMIN — GLYCOPYRROLATE 0.2 MG: 0.2 INJECTION INTRAMUSCULAR; INTRAVENOUS at 13:29

## 2024-01-01 RX ADMIN — SODIUM CHLORIDE, POTASSIUM CHLORIDE, SODIUM LACTATE AND CALCIUM CHLORIDE 250 ML: 600; 310; 30; 20 INJECTION, SOLUTION INTRAVENOUS at 08:04

## 2024-01-01 RX ADMIN — HEPARIN SODIUM 14 UNITS/KG/HR: 5000 INJECTION, SOLUTION INTRAVENOUS at 03:43

## 2024-01-01 RX ADMIN — INSULIN HUMAN 1 UNITS: 100 INJECTION, SOLUTION PARENTERAL at 05:48

## 2024-01-01 RX ADMIN — CALCIUM GLUCONATE 1 G: 20 INJECTION, SOLUTION INTRAVENOUS at 03:34

## 2024-01-01 RX ADMIN — Medication 5 MG: at 23:56

## 2024-01-01 RX ADMIN — SODIUM CHLORIDE, POTASSIUM CHLORIDE, SODIUM LACTATE AND CALCIUM CHLORIDE 1000 ML: 600; 310; 30; 20 INJECTION, SOLUTION INTRAVENOUS at 09:27

## 2024-01-01 RX ADMIN — HYDROMORPHONE HYDROCHLORIDE 1 MG: 1 INJECTION, SOLUTION INTRAMUSCULAR; INTRAVENOUS; SUBCUTANEOUS at 23:54

## 2024-01-01 RX ADMIN — FLUTICASONE FUROATE, UMECLIDINIUM BROMIDE AND VILANTEROL TRIFENATATE 1 PUFF: 100; 62.5; 25 POWDER RESPIRATORY (INHALATION) at 05:53

## 2024-01-01 RX ADMIN — AMIODARONE HYDROCHLORIDE 0.5 MG/MIN: 1.8 INJECTION, SOLUTION INTRAVENOUS at 06:25

## 2024-01-01 RX ADMIN — CARVEDILOL 12.5 MG: 12.5 TABLET, FILM COATED ORAL at 08:19

## 2024-01-01 RX ADMIN — FENTANYL CITRATE 25 MCG: 50 INJECTION, SOLUTION INTRAMUSCULAR; INTRAVENOUS at 13:33

## 2024-01-01 RX ADMIN — DEXTROSE MONOHYDRATE: 50 INJECTION, SOLUTION INTRAVENOUS at 05:17

## 2024-01-01 RX ADMIN — FLUTICASONE FUROATE, UMECLIDINIUM BROMIDE AND VILANTEROL TRIFENATATE 1 PUFF: 100; 62.5; 25 POWDER RESPIRATORY (INHALATION) at 08:40

## 2024-01-01 RX ADMIN — METHYLPREDNISOLONE SODIUM SUCCINATE 40 MG: 40 INJECTION, POWDER, FOR SOLUTION INTRAMUSCULAR; INTRAVENOUS at 18:17

## 2024-01-01 RX ADMIN — IPRATROPIUM BROMIDE AND ALBUTEROL SULFATE 3 ML: 2.5; .5 SOLUTION RESPIRATORY (INHALATION) at 22:42

## 2024-01-01 RX ADMIN — Medication 5 MG: at 17:35

## 2024-01-01 RX ADMIN — SODIUM CHLORIDE: 9 INJECTION, SOLUTION INTRAVENOUS at 08:20

## 2024-01-01 RX ADMIN — SIMETHICONE 125 MG: 125 TABLET, CHEWABLE ORAL at 02:32

## 2024-01-01 RX ADMIN — SALINE NASAL SPRAY 2 SPRAY: 1.5 SOLUTION NASAL at 05:59

## 2024-01-01 RX ADMIN — FAMOTIDINE 20 MG: 10 INJECTION, SOLUTION INTRAVENOUS at 05:29

## 2024-01-01 RX ADMIN — CALCIUM CHLORIDE 1000 MG: 100 INJECTION, SOLUTION INTRAVENOUS at 12:08

## 2024-01-01 RX ADMIN — THIAMINE HYDROCHLORIDE 100 MG: 100 INJECTION, SOLUTION INTRAMUSCULAR; INTRAVENOUS at 05:28

## 2024-01-01 RX ADMIN — ALBUTEROL SULFATE 1 PUFF: 90 AEROSOL, METERED RESPIRATORY (INHALATION) at 05:40

## 2024-01-01 RX ADMIN — ZOLPIDEM TARTRATE 5 MG: 5 TABLET ORAL at 23:22

## 2024-01-01 RX ADMIN — AMIODARONE HYDROCHLORIDE 400 MG: 200 TABLET ORAL at 17:16

## 2024-01-01 RX ADMIN — HEPARIN SODIUM 5000 UNITS: 5000 INJECTION, SOLUTION INTRAVENOUS; SUBCUTANEOUS at 05:44

## 2024-01-01 RX ADMIN — OMEPRAZOLE 20 MG: 20 CAPSULE, DELAYED RELEASE ORAL at 06:15

## 2024-01-01 RX ADMIN — SENNOSIDES AND DOCUSATE SODIUM 2 TABLET: 50; 8.6 TABLET ORAL at 17:35

## 2024-01-01 RX ADMIN — Medication 100 MCG: at 17:25

## 2024-01-01 RX ADMIN — SENNOSIDES AND DOCUSATE SODIUM 2 TABLET: 50; 8.6 TABLET ORAL at 17:17

## 2024-01-01 RX ADMIN — METOPROLOL TARTRATE 5 MG: 5 INJECTION INTRAVENOUS at 12:48

## 2024-01-01 RX ADMIN — HEPARIN SODIUM 2000 UNITS: 1000 INJECTION, SOLUTION INTRAVENOUS; SUBCUTANEOUS at 13:35

## 2024-01-01 RX ADMIN — AMIODARONE HYDROCHLORIDE 0.5 MG/MIN: 1.8 INJECTION, SOLUTION INTRAVENOUS at 01:24

## 2024-01-01 RX ADMIN — ATORVASTATIN CALCIUM 40 MG: 40 TABLET, FILM COATED ORAL at 18:11

## 2024-01-01 RX ADMIN — NOREPINEPHRINE BITARTRATE 0.5 MCG/KG/MIN: 1 INJECTION, SOLUTION, CONCENTRATE INTRAVENOUS at 18:07

## 2024-01-01 RX ADMIN — ACETAMINOPHEN 1000 MG: 500 TABLET, FILM COATED ORAL at 17:17

## 2024-01-01 RX ADMIN — OMEPRAZOLE 20 MG: 20 CAPSULE, DELAYED RELEASE ORAL at 13:23

## 2024-01-01 RX ADMIN — ROCURONIUM BROMIDE 20 MG: 10 INJECTION, SOLUTION INTRAVENOUS at 15:45

## 2024-01-01 RX ADMIN — FAMOTIDINE 20 MG: 10 INJECTION, SOLUTION INTRAVENOUS at 05:26

## 2024-01-01 RX ADMIN — SODIUM CHLORIDE: 9 INJECTION, SOLUTION INTRAVENOUS at 12:38

## 2024-01-01 RX ADMIN — BISACODYL 10 MG: 10 SUPPOSITORY RECTAL at 05:34

## 2024-01-01 RX ADMIN — LORAZEPAM 1 MG: 2 INJECTION INTRAMUSCULAR; INTRAVENOUS at 18:21

## 2024-01-01 RX ADMIN — CEFEPIME 2 G: 2 INJECTION, POWDER, FOR SOLUTION INTRAVENOUS at 18:31

## 2024-01-01 RX ADMIN — BISACODYL 10 MG: 10 SUPPOSITORY RECTAL at 05:50

## 2024-01-01 RX ADMIN — SODIUM CHLORIDE, SODIUM GLUCONATE, SODIUM ACETATE, POTASSIUM CHLORIDE AND MAGNESIUM CHLORIDE: 526; 502; 368; 37; 30 INJECTION, SOLUTION INTRAVENOUS at 14:44

## 2024-01-01 RX ADMIN — FLUTICASONE FUROATE, UMECLIDINIUM BROMIDE AND VILANTEROL TRIFENATATE 1 PUFF: 100; 62.5; 25 POWDER RESPIRATORY (INHALATION) at 05:21

## 2024-01-01 RX ADMIN — AMIODARONE HYDROCHLORIDE 0.5 MG/MIN: 1.8 INJECTION, SOLUTION INTRAVENOUS at 08:15

## 2024-01-01 RX ADMIN — METOCLOPRAMIDE 10 MG: 5 INJECTION, SOLUTION INTRAMUSCULAR; INTRAVENOUS at 20:23

## 2024-01-01 RX ADMIN — AMIODARONE HYDROCHLORIDE 0.5 MG/MIN: 1.8 INJECTION, SOLUTION INTRAVENOUS at 05:38

## 2024-01-01 RX ADMIN — AMIODARONE HYDROCHLORIDE 1 MG/MIN: 1.8 INJECTION, SOLUTION INTRAVENOUS at 10:16

## 2024-01-01 RX ADMIN — SODIUM CHLORIDE: 9 INJECTION, SOLUTION INTRAVENOUS at 00:36

## 2024-01-01 RX ADMIN — OXYCODONE HYDROCHLORIDE 10 MG: 10 TABLET ORAL at 08:23

## 2024-01-01 RX ADMIN — AMIODARONE HYDROCHLORIDE 0.5 MG/MIN: 1.8 INJECTION, SOLUTION INTRAVENOUS at 22:43

## 2024-01-01 RX ADMIN — IPRATROPIUM BROMIDE AND ALBUTEROL SULFATE 3 ML: 2.5; .5 SOLUTION RESPIRATORY (INHALATION) at 18:14

## 2024-01-01 RX ADMIN — SODIUM ZIRCONIUM CYCLOSILICATE 10 G: 10 POWDER, FOR SUSPENSION ORAL at 20:43

## 2024-01-01 RX ADMIN — SODIUM CHLORIDE: 9 INJECTION, SOLUTION INTRAVENOUS at 17:38

## 2024-01-01 RX ADMIN — OXYCODONE HYDROCHLORIDE 10 MG: 10 TABLET ORAL at 13:34

## 2024-01-01 RX ADMIN — DEXAMETHASONE SODIUM PHOSPHATE 8 MG: 4 INJECTION INTRA-ARTICULAR; INTRALESIONAL; INTRAMUSCULAR; INTRAVENOUS; SOFT TISSUE at 15:35

## 2024-01-01 RX ADMIN — METHYLPREDNISOLONE SODIUM SUCCINATE 62.5 MG: 125 INJECTION, POWDER, FOR SOLUTION INTRAMUSCULAR; INTRAVENOUS at 11:36

## 2024-01-01 RX ADMIN — FENTANYL CITRATE 25 MCG: 50 INJECTION, SOLUTION INTRAMUSCULAR; INTRAVENOUS at 12:05

## 2024-01-01 RX ADMIN — THIAMINE HYDROCHLORIDE 100 MG: 100 INJECTION, SOLUTION INTRAMUSCULAR; INTRAVENOUS at 05:26

## 2024-01-01 RX ADMIN — DIAZEPAM 5 MG: 10 INJECTION, SOLUTION INTRAMUSCULAR; INTRAVENOUS at 22:11

## 2024-01-01 RX ADMIN — Medication 5 MG: at 18:30

## 2024-01-01 RX ADMIN — Medication 100 MG: at 05:51

## 2024-01-01 RX ADMIN — DEXTROSE MONOHYDRATE 25 G: 25 INJECTION, SOLUTION INTRAVENOUS at 01:01

## 2024-01-01 RX ADMIN — INSULIN HUMAN 1 UNITS: 100 INJECTION, SOLUTION PARENTERAL at 00:06

## 2024-01-01 RX ADMIN — CARVEDILOL 12.5 MG: 12.5 TABLET, FILM COATED ORAL at 19:16

## 2024-01-01 RX ADMIN — SODIUM BICARBONATE 1300 MG: 650 TABLET ORAL at 20:19

## 2024-01-01 RX ADMIN — INSULIN HUMAN 1 UNITS: 100 INJECTION, SOLUTION PARENTERAL at 17:22

## 2024-01-01 RX ADMIN — OXYCODONE HYDROCHLORIDE 10 MG: 10 TABLET ORAL at 15:58

## 2024-01-01 RX ADMIN — POLYETHYLENE GLYCOL 3350 1 PACKET: 17 POWDER, FOR SOLUTION ORAL at 05:49

## 2024-01-01 RX ADMIN — SODIUM CHLORIDE: 9 INJECTION, SOLUTION INTRAVENOUS at 00:32

## 2024-01-01 RX ADMIN — HEPARIN SODIUM 5000 UNITS: 5000 INJECTION, SOLUTION INTRAVENOUS; SUBCUTANEOUS at 15:05

## 2024-01-01 RX ADMIN — HEPARIN SODIUM 5000 UNITS: 5000 INJECTION, SOLUTION INTRAVENOUS; SUBCUTANEOUS at 22:13

## 2024-01-01 RX ADMIN — METHYLPREDNISOLONE SODIUM SUCCINATE 62.5 MG: 125 INJECTION, POWDER, FOR SOLUTION INTRAMUSCULAR; INTRAVENOUS at 17:19

## 2024-01-01 RX ADMIN — CARVEDILOL 12.5 MG: 12.5 TABLET, FILM COATED ORAL at 17:58

## 2024-01-01 RX ADMIN — OMEPRAZOLE 20 MG: 20 CAPSULE, DELAYED RELEASE ORAL at 17:17

## 2024-01-01 RX ADMIN — OXYCODONE HYDROCHLORIDE 5 MG: 5 TABLET ORAL at 17:35

## 2024-01-01 RX ADMIN — SODIUM CHLORIDE: 9 INJECTION, SOLUTION INTRAVENOUS at 10:06

## 2024-01-01 RX ADMIN — ETOMIDATE 10 MG: 2 INJECTION, SOLUTION INTRAVENOUS at 15:31

## 2024-01-01 RX ADMIN — PHENYLEPHRINE HYDROCHLORIDE 100 MCG: 10 INJECTION INTRAVENOUS at 16:44

## 2024-01-01 RX ADMIN — SCOPOLAMINE 1 PATCH: 1.5 PATCH, EXTENDED RELEASE TRANSDERMAL at 18:30

## 2024-01-01 RX ADMIN — FERROUS GLUCONATE 324 MG: 324 TABLET ORAL at 08:32

## 2024-01-01 RX ADMIN — FERROUS GLUCONATE 324 MG: 324 TABLET ORAL at 08:14

## 2024-01-01 RX ADMIN — EPHEDRINE SULFATE 10 MG: 50 INJECTION, SOLUTION INTRAVENOUS at 16:13

## 2024-01-01 RX ADMIN — EPHEDRINE SULFATE 5 MG: 50 INJECTION, SOLUTION INTRAVENOUS at 14:37

## 2024-01-01 RX ADMIN — HUMAN ALBUMIN MICROSPHERES AND PERFLUTREN 3 ML: 10; .22 INJECTION, SOLUTION INTRAVENOUS at 14:45

## 2024-01-01 RX ADMIN — AMIODARONE HYDROCHLORIDE 0.5 MG/MIN: 1.8 INJECTION, SOLUTION INTRAVENOUS at 01:40

## 2024-01-01 RX ADMIN — ONDANSETRON 4 MG: 2 INJECTION INTRAMUSCULAR; INTRAVENOUS at 05:27

## 2024-01-01 RX ADMIN — FERROUS GLUCONATE 324 MG: 324 TABLET ORAL at 07:45

## 2024-01-01 RX ADMIN — MANNITOL: 20 INJECTION, SOLUTION INTRAVENOUS at 15:17

## 2024-01-01 RX ADMIN — SODIUM CHLORIDE: 9 INJECTION, SOLUTION INTRAVENOUS at 22:57

## 2024-01-01 RX ADMIN — OXYCODONE HYDROCHLORIDE 5 MG: 5 TABLET ORAL at 05:23

## 2024-01-01 RX ADMIN — INSULIN HUMAN 1 UNITS: 100 INJECTION, SOLUTION PARENTERAL at 17:41

## 2024-01-01 RX ADMIN — SODIUM CHLORIDE, POTASSIUM CHLORIDE, SODIUM LACTATE AND CALCIUM CHLORIDE: 600; 310; 30; 20 INJECTION, SOLUTION INTRAVENOUS at 21:11

## 2024-01-01 RX ADMIN — PROTAMINE SULFATE 10 MG: 10 INJECTION, SOLUTION INTRAVENOUS at 07:44

## 2024-01-01 RX ADMIN — NOREPINEPHRINE BITARTRATE 0.25 MCG/KG/MIN: 1 INJECTION, SOLUTION, CONCENTRATE INTRAVENOUS at 16:59

## 2024-01-01 RX ADMIN — ONDANSETRON 4 MG: 2 INJECTION INTRAMUSCULAR; INTRAVENOUS at 17:18

## 2024-01-01 RX ADMIN — FUROSEMIDE 80 MG: 10 INJECTION INTRAMUSCULAR; INTRAVENOUS at 09:14

## 2024-01-01 RX ADMIN — HEPARIN SODIUM 5000 UNITS: 5000 INJECTION, SOLUTION INTRAVENOUS; SUBCUTANEOUS at 22:37

## 2024-01-01 RX ADMIN — ATORVASTATIN CALCIUM 40 MG: 40 TABLET, FILM COATED ORAL at 17:59

## 2024-01-01 RX ADMIN — OMEPRAZOLE 20 MG: 20 CAPSULE, DELAYED RELEASE ORAL at 17:47

## 2024-01-01 RX ADMIN — SODIUM CHLORIDE 250 MG: 9 INJECTION, SOLUTION INTRAVENOUS at 12:05

## 2024-01-01 RX ADMIN — ONDANSETRON 4 MG: 2 INJECTION INTRAMUSCULAR; INTRAVENOUS at 22:37

## 2024-01-01 RX ADMIN — AMIODARONE HYDROCHLORIDE 150 MG: 1.5 INJECTION, SOLUTION INTRAVENOUS at 10:02

## 2024-01-01 RX ADMIN — FLUTICASONE FUROATE, UMECLIDINIUM BROMIDE AND VILANTEROL TRIFENATATE 1 PUFF: 100; 62.5; 25 POWDER RESPIRATORY (INHALATION) at 04:12

## 2024-01-01 RX ADMIN — DEXTROSE MONOHYDRATE: 50 INJECTION, SOLUTION INTRAVENOUS at 10:01

## 2024-01-01 RX ADMIN — HEPARIN SODIUM 5000 UNITS: 5000 INJECTION, SOLUTION INTRAVENOUS; SUBCUTANEOUS at 23:00

## 2024-01-01 RX ADMIN — SODIUM CHLORIDE: 9 INJECTION, SOLUTION INTRAVENOUS at 19:38

## 2024-01-01 RX ADMIN — HEPARIN SODIUM 1200 UNITS: 1000 INJECTION, SOLUTION INTRAVENOUS; SUBCUTANEOUS at 17:00

## 2024-01-01 RX ADMIN — CEFEPIME 2 G: 2 INJECTION, POWDER, FOR SOLUTION INTRAVENOUS at 17:38

## 2024-01-01 RX ADMIN — AMLODIPINE BESYLATE 10 MG: 10 TABLET ORAL at 05:27

## 2024-01-01 RX ADMIN — CEFEPIME 2 G: 2 INJECTION, POWDER, FOR SOLUTION INTRAVENOUS at 05:54

## 2024-01-01 RX ADMIN — CEFAZOLIN 2 G: 1 INJECTION, POWDER, FOR SOLUTION INTRAMUSCULAR; INTRAVENOUS at 13:41

## 2024-01-01 RX ADMIN — POLYETHYLENE GLYCOL 3350 1 PACKET: 17 POWDER, FOR SOLUTION ORAL at 05:34

## 2024-01-01 RX ADMIN — METHYLPREDNISOLONE SODIUM SUCCINATE 62.5 MG: 125 INJECTION, POWDER, FOR SOLUTION INTRAMUSCULAR; INTRAVENOUS at 23:55

## 2024-01-01 RX ADMIN — ZOLPIDEM TARTRATE 5 MG: 5 TABLET ORAL at 23:06

## 2024-01-01 RX ADMIN — AMIODARONE HYDROCHLORIDE 0.5 MG/MIN: 1.8 INJECTION, SOLUTION INTRAVENOUS at 12:39

## 2024-01-01 RX ADMIN — ZOLPIDEM TARTRATE 5 MG: 5 TABLET ORAL at 22:07

## 2024-01-01 RX ADMIN — FENTANYL CITRATE 100 MCG: 50 INJECTION, SOLUTION INTRAMUSCULAR; INTRAVENOUS at 13:13

## 2024-01-01 RX ADMIN — AMPICILLIN AND SULBACTAM 3 G: 1; 2 INJECTION, POWDER, FOR SOLUTION INTRAMUSCULAR; INTRAVENOUS at 05:39

## 2024-01-01 RX ADMIN — ACETAMINOPHEN 1000 MG: 500 TABLET, FILM COATED ORAL at 05:48

## 2024-01-01 RX ADMIN — SODIUM BICARBONATE 1950 MG: 650 TABLET ORAL at 20:09

## 2024-01-01 RX ADMIN — SODIUM CHLORIDE: 9 INJECTION, SOLUTION INTRAVENOUS at 03:09

## 2024-01-01 RX ADMIN — SODIUM BICARBONATE 1950 MG: 650 TABLET ORAL at 08:40

## 2024-01-01 ASSESSMENT — ENCOUNTER SYMPTOMS
WEAKNESS: 1
PALPITATIONS: 0
HEADACHES: 0
FEVER: 0
VOMITING: 0
FEVER: 0
FEVER: 0
NERVOUS/ANXIOUS: 1
SPEECH CHANGE: 0
WEAKNESS: 0
GASTROINTESTINAL NEGATIVE: 1
FEVER: 0
DIZZINESS: 0
FEVER: 0
CARDIOVASCULAR NEGATIVE: 1
DIARRHEA: 0
NAUSEA: 0
VOMITING: 0
SPEECH CHANGE: 0
BLURRED VISION: 0
VOMITING: 0
MYALGIAS: 1
ABDOMINAL PAIN: 0
INSOMNIA: 0
BACK PAIN: 1
ABDOMINAL PAIN: 0
NAUSEA: 0
SHORTNESS OF BREATH: 0
PALPITATIONS: 0
VOMITING: 0
CONSTIPATION: 1
SHORTNESS OF BREATH: 0
ABDOMINAL PAIN: 0
HEARTBURN: 0
CHILLS: 0
HEARTBURN: 0
NAUSEA: 0
NAUSEA: 0
DIZZINESS: 0
HEADACHES: 0
EYES NEGATIVE: 1
PALPITATIONS: 0
SHORTNESS OF BREATH: 1
GASTROINTESTINAL NEGATIVE: 1
ABDOMINAL PAIN: 0
ABDOMINAL PAIN: 0
CARDIOVASCULAR NEGATIVE: 1
DOUBLE VISION: 0
SPEECH CHANGE: 0
HEADACHES: 0
CONSTIPATION: 0
ABDOMINAL PAIN: 1
SHORTNESS OF BREATH: 0
DIZZINESS: 0
BLOOD IN STOOL: 0
HEADACHES: 0
WEAKNESS: 0
NAUSEA: 0
PALPITATIONS: 0
CONSTIPATION: 1
FEVER: 0
NERVOUS/ANXIOUS: 0
BLOOD IN STOOL: 0
CONSTIPATION: 1
SPEECH CHANGE: 0
ABDOMINAL PAIN: 0
VOMITING: 0
HEARTBURN: 0
CONSTITUTIONAL NEGATIVE: 1
NECK PAIN: 0
FLANK PAIN: 1
HEADACHES: 0
COUGH: 0
FLANK PAIN: 1
DIZZINESS: 0
MUSCULOSKELETAL NEGATIVE: 1
SHORTNESS OF BREATH: 0
HEADACHES: 0
BACK PAIN: 1
FEVER: 0
PALPITATIONS: 0
PALPITATIONS: 0
TINGLING: 0
NECK PAIN: 0
SHORTNESS OF BREATH: 0
SORE THROAT: 0
HEADACHES: 0
NAUSEA: 1
FEVER: 0
NAUSEA: 1
NERVOUS/ANXIOUS: 0
HEMOPTYSIS: 0
BLOOD IN STOOL: 0
BACK PAIN: 0
ABDOMINAL PAIN: 0
CHILLS: 0
COUGH: 0
DIZZINESS: 0
BACK PAIN: 0
GASTROINTESTINAL NEGATIVE: 1
BACK PAIN: 1
SPEECH CHANGE: 0
NAUSEA: 0
BLURRED VISION: 0
DOUBLE VISION: 0
COUGH: 0
PSYCHIATRIC NEGATIVE: 1
PHOTOPHOBIA: 0
SHORTNESS OF BREATH: 0
NERVOUS/ANXIOUS: 1
NERVOUS/ANXIOUS: 0
DIARRHEA: 0
ABDOMINAL PAIN: 0
NERVOUS/ANXIOUS: 0
BLOOD IN STOOL: 0
SINUS PAIN: 0
NERVOUS/ANXIOUS: 0
HEMOPTYSIS: 1
SHORTNESS OF BREATH: 0
SHORTNESS OF BREATH: 0
HEMOPTYSIS: 1
SPEECH CHANGE: 0
NAUSEA: 0
CHILLS: 0
PALPITATIONS: 0
COUGH: 1
SORE THROAT: 0
FLANK PAIN: 0
NERVOUS/ANXIOUS: 1
BACK PAIN: 1
PALPITATIONS: 0
SORE THROAT: 0
FOCAL WEAKNESS: 0
DIARRHEA: 0
SORE THROAT: 0
NAUSEA: 1
WEAKNESS: 0
SHORTNESS OF BREATH: 0
BLOOD IN STOOL: 0
VOMITING: 0
COUGH: 0
PALPITATIONS: 0
CARDIOVASCULAR NEGATIVE: 1
PALPITATIONS: 0
CHILLS: 0
VOMITING: 0
HALLUCINATIONS: 0
ABDOMINAL PAIN: 0
ABDOMINAL PAIN: 1
WEIGHT LOSS: 0
NAUSEA: 0
HEADACHES: 0
HEADACHES: 0
BACK PAIN: 1
HEMOPTYSIS: 1
CONSTITUTIONAL NEGATIVE: 1
CONSTITUTIONAL NEGATIVE: 1
PSYCHIATRIC NEGATIVE: 1
WEIGHT LOSS: 0
DIZZINESS: 0
ORTHOPNEA: 0
WEAKNESS: 1
FLANK PAIN: 0
ABDOMINAL PAIN: 0
DIARRHEA: 0
SHORTNESS OF BREATH: 1
CHILLS: 0
BACK PAIN: 1
ABDOMINAL PAIN: 0
SHORTNESS OF BREATH: 0
DIZZINESS: 0
FEVER: 0
SINUS PAIN: 0
NERVOUS/ANXIOUS: 0
MYALGIAS: 1
SHORTNESS OF BREATH: 1
HEARTBURN: 0
ABDOMINAL PAIN: 1
DIZZINESS: 0
MYALGIAS: 1
FLANK PAIN: 0
ABDOMINAL PAIN: 0
WEAKNESS: 1
FLANK PAIN: 0
SHORTNESS OF BREATH: 0
NAUSEA: 0
BACK PAIN: 0
NERVOUS/ANXIOUS: 0
EYE PAIN: 0
CHILLS: 0
SHORTNESS OF BREATH: 0
DIARRHEA: 0
SPEECH CHANGE: 0
NERVOUS/ANXIOUS: 1
NERVOUS/ANXIOUS: 1
MUSCULOSKELETAL NEGATIVE: 1
COUGH: 0
CONSTIPATION: 1
COUGH: 1
DIZZINESS: 0
CONSTIPATION: 0
EYES NEGATIVE: 1
MYALGIAS: 1
DIARRHEA: 0
TINGLING: 0
NERVOUS/ANXIOUS: 0
WEAKNESS: 1
TINGLING: 0
BLOOD IN STOOL: 0
HEMOPTYSIS: 1
NAUSEA: 0
ABDOMINAL PAIN: 0
CHILLS: 1
BLURRED VISION: 0
COUGH: 1
SINUS PAIN: 0
HEMOPTYSIS: 0
HEARTBURN: 0
BACK PAIN: 0
VOMITING: 0
CHILLS: 0
PALPITATIONS: 0
GASTROINTESTINAL NEGATIVE: 1
PALPITATIONS: 0
NAUSEA: 0
BACK PAIN: 1
CARDIOVASCULAR NEGATIVE: 1
COUGH: 1
BACK PAIN: 1
NERVOUS/ANXIOUS: 1
HEADACHES: 0
CHILLS: 0
EYE DISCHARGE: 0
BLOOD IN STOOL: 0
DOUBLE VISION: 0
SHORTNESS OF BREATH: 0
SHORTNESS OF BREATH: 0
FEVER: 0
PALPITATIONS: 0
SPUTUM PRODUCTION: 0
SHORTNESS OF BREATH: 0
CHILLS: 0
NERVOUS/ANXIOUS: 0
WEIGHT LOSS: 0
FLANK PAIN: 1
HEARTBURN: 0
WEAKNESS: 0
BACK PAIN: 0
HEADACHES: 0
PALPITATIONS: 0
BACK PAIN: 0
SHORTNESS OF BREATH: 0
SORE THROAT: 0
HEARTBURN: 0
DIARRHEA: 0
BACK PAIN: 1
FEVER: 0
HEADACHES: 0
CONSTIPATION: 0
FLANK PAIN: 0
CONSTIPATION: 0
NERVOUS/ANXIOUS: 1
COUGH: 0
EYES NEGATIVE: 1
FEVER: 0
CONSTIPATION: 1
ABDOMINAL PAIN: 0
COUGH: 0
COUGH: 0
EYES NEGATIVE: 1
DIARRHEA: 0
BACK PAIN: 0
DIARRHEA: 0
CHILLS: 0
PALPITATIONS: 0
VOMITING: 0
CONSTITUTIONAL NEGATIVE: 1
CONSTIPATION: 0
ABDOMINAL PAIN: 1
CHILLS: 0
SENSORY CHANGE: 0
DIZZINESS: 0
SHORTNESS OF BREATH: 0
ABDOMINAL PAIN: 0
CHILLS: 0
FEVER: 0
ABDOMINAL PAIN: 0
SPEECH CHANGE: 0
DIZZINESS: 0
NAUSEA: 0
DIARRHEA: 0
NERVOUS/ANXIOUS: 0
DIARRHEA: 0
DIZZINESS: 0
NAUSEA: 0
EYES NEGATIVE: 1
SORE THROAT: 0
CHILLS: 0
MUSCULOSKELETAL NEGATIVE: 1
SORE THROAT: 0
COUGH: 0
VOMITING: 0
COUGH: 0
VOMITING: 1
PSYCHIATRIC NEGATIVE: 1
DOUBLE VISION: 0
VOMITING: 0
PSYCHIATRIC NEGATIVE: 1
BACK PAIN: 1
BACK PAIN: 1
PALPITATIONS: 0
SORE THROAT: 0
SHORTNESS OF BREATH: 0
SHORTNESS OF BREATH: 0
NAUSEA: 0
PALPITATIONS: 0
NAUSEA: 0
NAUSEA: 0
PALPITATIONS: 0
SORE THROAT: 0

## 2024-01-01 ASSESSMENT — LIFESTYLE VARIABLES
DOES PATIENT WANT TO STOP DRINKING: NO
EVER FELT BAD OR GUILTY ABOUT YOUR DRINKING: NO
HOW MANY TIMES IN THE PAST YEAR HAVE YOU HAD 5 OR MORE DRINKS IN A DAY: 0
ON A TYPICAL DAY WHEN YOU DRINK ALCOHOL HOW MANY DRINKS DO YOU HAVE: 1
EVER HAD A DRINK FIRST THING IN THE MORNING TO STEADY YOUR NERVES TO GET RID OF A HANGOVER: NO
HAVE YOU EVER FELT YOU SHOULD CUT DOWN ON YOUR DRINKING: NO
TOTAL SCORE: 0
SUBSTANCE_ABUSE: 0
TOTAL SCORE: 0
HAVE PEOPLE ANNOYED YOU BY CRITICIZING YOUR DRINKING: NO
AVERAGE NUMBER OF DAYS PER WEEK YOU HAVE A DRINK CONTAINING ALCOHOL: 1
ALCOHOL_USE: YES
TOTAL SCORE: 0
CONSUMPTION TOTAL: NEGATIVE

## 2024-01-01 ASSESSMENT — COGNITIVE AND FUNCTIONAL STATUS - GENERAL
HELP NEEDED FOR BATHING: A LOT
SUGGESTED CMS G CODE MODIFIER MOBILITY: CM
TOILETING: A LOT
MOVING TO AND FROM BED TO CHAIR: TOTAL
WALKING IN HOSPITAL ROOM: A LOT
MOVING FROM LYING ON BACK TO SITTING ON SIDE OF FLAT BED: A LOT
WALKING IN HOSPITAL ROOM: A LITTLE
WALKING IN HOSPITAL ROOM: TOTAL
DRESSING REGULAR UPPER BODY CLOTHING: A LOT
MOVING TO AND FROM BED TO CHAIR: A LITTLE
WALKING IN HOSPITAL ROOM: A LITTLE
CLIMB 3 TO 5 STEPS WITH RAILING: A LOT
MOVING FROM LYING ON BACK TO SITTING ON SIDE OF FLAT BED: A LITTLE
TURNING FROM BACK TO SIDE WHILE IN FLAT BAD: A LOT
SUGGESTED CMS G CODE MODIFIER MOBILITY: CK
MOVING FROM LYING ON BACK TO SITTING ON SIDE OF FLAT BED: A LITTLE
DAILY ACTIVITIY SCORE: 23
SUGGESTED CMS G CODE MODIFIER MOBILITY: CK
MOVING FROM LYING ON BACK TO SITTING ON SIDE OF FLAT BED: A LOT
TURNING FROM BACK TO SIDE WHILE IN FLAT BAD: A LITTLE
CLIMB 3 TO 5 STEPS WITH RAILING: A LOT
DRESSING REGULAR LOWER BODY CLOTHING: A LITTLE
MOVING TO AND FROM BED TO CHAIR: A LITTLE
EATING MEALS: A LITTLE
TURNING FROM BACK TO SIDE WHILE IN FLAT BAD: A LITTLE
PERSONAL GROOMING: A LOT
DRESSING REGULAR LOWER BODY CLOTHING: A LOT
MOBILITY SCORE: 17
SUGGESTED CMS G CODE MODIFIER MOBILITY: CK
STANDING UP FROM CHAIR USING ARMS: A LITTLE
TOILETING: TOTAL
TURNING FROM BACK TO SIDE WHILE IN FLAT BAD: A LITTLE
SUGGESTED CMS G CODE MODIFIER DAILY ACTIVITY: CI
DRESSING REGULAR LOWER BODY CLOTHING: A LOT
STANDING UP FROM CHAIR USING ARMS: A LITTLE
MOVING TO AND FROM BED TO CHAIR: A LOT
MOBILITY SCORE: 18
CLIMB 3 TO 5 STEPS WITH RAILING: TOTAL
CLIMB 3 TO 5 STEPS WITH RAILING: A LOT
SUGGESTED CMS G CODE MODIFIER MOBILITY: CK
DAILY ACTIVITIY SCORE: 12
MOVING TO AND FROM BED TO CHAIR: A LITTLE
PERSONAL GROOMING: A LOT
MOBILITY SCORE: 8
HELP NEEDED FOR BATHING: A LOT
WALKING IN HOSPITAL ROOM: A LITTLE
SUGGESTED CMS G CODE MODIFIER DAILY ACTIVITY: CL
SUGGESTED CMS G CODE MODIFIER DAILY ACTIVITY: CK
TURNING FROM BACK TO SIDE WHILE IN FLAT BAD: A LITTLE
MOBILITY SCORE: 17
MOBILITY SCORE: 15
STANDING UP FROM CHAIR USING ARMS: TOTAL
STANDING UP FROM CHAIR USING ARMS: A LITTLE
DAILY ACTIVITIY SCORE: 14
CLIMB 3 TO 5 STEPS WITH RAILING: A LOT
DRESSING REGULAR UPPER BODY CLOTHING: A LOT

## 2024-01-01 ASSESSMENT — PAIN DESCRIPTION - PAIN TYPE
TYPE: SURGICAL PAIN
TYPE: SURGICAL PAIN
TYPE: ACUTE PAIN
TYPE: ACUTE PAIN;SURGICAL PAIN
TYPE: ACUTE PAIN
TYPE: ACUTE PAIN
TYPE: SURGICAL PAIN
TYPE: ACUTE PAIN;SURGICAL PAIN
TYPE: ACUTE PAIN
TYPE: ACUTE PAIN;CHRONIC PAIN;SURGICAL PAIN
TYPE: ACUTE PAIN
TYPE: ACUTE PAIN
TYPE: SURGICAL PAIN;ACUTE PAIN
TYPE: ACUTE PAIN
TYPE: ACUTE PAIN
TYPE: SURGICAL PAIN
TYPE: ACUTE PAIN
TYPE: SURGICAL PAIN
TYPE: ACUTE PAIN
TYPE: ACUTE PAIN;SURGICAL PAIN
TYPE: ACUTE PAIN
TYPE: ACUTE PAIN;SURGICAL PAIN
TYPE: ACUTE PAIN
TYPE: ACUTE PAIN;SURGICAL PAIN
TYPE: ACUTE PAIN
TYPE: SURGICAL PAIN
TYPE: ACUTE PAIN
TYPE: ACUTE PAIN;SURGICAL PAIN
TYPE: ACUTE PAIN
TYPE: SURGICAL PAIN
TYPE: ACUTE PAIN
TYPE: ACUTE PAIN;SURGICAL PAIN
TYPE: ACUTE PAIN
TYPE: ACUTE PAIN
TYPE: SURGICAL PAIN
TYPE: ACUTE PAIN
TYPE: SURGICAL PAIN
TYPE: ACUTE PAIN
TYPE: ACUTE PAIN;CHRONIC PAIN;SURGICAL PAIN
TYPE: ACUTE PAIN
TYPE: ACUTE PAIN

## 2024-01-01 ASSESSMENT — PAIN SCALES - WONG BAKER
WONGBAKER_NUMERICALRESPONSE: DOESN'T HURT AT ALL
WONGBAKER_NUMERICALRESPONSE: DOESN'T HURT AT ALL

## 2024-01-01 ASSESSMENT — PULMONARY FUNCTION TESTS: FVC: 1.3

## 2024-01-01 ASSESSMENT — FIBROSIS 4 INDEX
FIB4 SCORE: 0.86
FIB4 SCORE: 2.34
FIB4 SCORE: 5.87
FIB4 SCORE: 6.39
FIB4 SCORE: 4.5
FIB4 SCORE: 2.33
FIB4 SCORE: 1.12
FIB4 SCORE: 1.19
FIB4 SCORE: 1.29
FIB4 SCORE: 1.22
FIB4 SCORE: 0.69
FIB4 SCORE: 2.55
FIB4 SCORE: 0.96
FIB4 SCORE: 5.87
FIB4 SCORE: 5.42

## 2024-01-01 ASSESSMENT — ACTIVITIES OF DAILY LIVING (ADL): TOILETING: INDEPENDENT

## 2024-01-01 ASSESSMENT — GAIT ASSESSMENTS
GAIT LEVEL OF ASSIST: CONTACT GUARD ASSIST
DISTANCE (FEET): 5
DISTANCE (FEET): 5
GAIT LEVEL OF ASSIST: UNABLE TO PARTICIPATE
ASSISTIVE DEVICE: FRONT WHEEL WALKER
DEVIATION: BRADYKINETIC;DECREASED BASE OF SUPPORT;OTHER (COMMENT)
ASSISTIVE DEVICE: FRONT WHEEL WALKER
GAIT LEVEL OF ASSIST: MINIMAL ASSIST
DEVIATION: BRADYKINETIC;DECREASED BASE OF SUPPORT;OTHER (COMMENT)
GAIT LEVEL OF ASSIST: MINIMAL ASSIST
DEVIATION: BRADYKINETIC;DECREASED BASE OF SUPPORT;OTHER (COMMENT)
DISTANCE (FEET): 4
ASSISTIVE DEVICE: FRONT WHEEL WALKER;HAND HELD ASSIST

## 2024-01-01 ASSESSMENT — SOCIAL DETERMINANTS OF HEALTH (SDOH)
IN THE PAST 12 MONTHS, HAS THE ELECTRIC, GAS, OIL, OR WATER COMPANY THREATENED TO SHUT OFF SERVICE IN YOUR HOME?: NO
WITHIN THE LAST YEAR, HAVE YOU BEEN KICKED, HIT, SLAPPED, OR OTHERWISE PHYSICALLY HURT BY YOUR PARTNER OR EX-PARTNER?: NO
WITHIN THE LAST YEAR, HAVE TO BEEN RAPED OR FORCED TO HAVE ANY KIND OF SEXUAL ACTIVITY BY YOUR PARTNER OR EX-PARTNER?: NO
WITHIN THE LAST YEAR, HAVE YOU BEEN AFRAID OF YOUR PARTNER OR EX-PARTNER?: NO
WITHIN THE PAST 12 MONTHS, THE FOOD YOU BOUGHT JUST DIDN'T LAST AND YOU DIDN'T HAVE MONEY TO GET MORE: NEVER TRUE
WITHIN THE PAST 12 MONTHS, YOU WORRIED THAT YOUR FOOD WOULD RUN OUT BEFORE YOU GOT THE MONEY TO BUY MORE: NEVER TRUE
WITHIN THE LAST YEAR, HAVE YOU BEEN HUMILIATED OR EMOTIONALLY ABUSED IN OTHER WAYS BY YOUR PARTNER OR EX-PARTNER?: NO

## 2024-01-01 ASSESSMENT — PAIN SCALES - GENERAL
PAIN_LEVEL: 0
PAIN_LEVEL: 0

## 2024-01-01 ASSESSMENT — PATIENT HEALTH QUESTIONNAIRE - PHQ9
SUM OF ALL RESPONSES TO PHQ9 QUESTIONS 1 AND 2: 0
1. LITTLE INTEREST OR PLEASURE IN DOING THINGS: NOT AT ALL
2. FEELING DOWN, DEPRESSED, IRRITABLE, OR HOPELESS: NOT AT ALL
SUM OF ALL RESPONSES TO PHQ9 QUESTIONS 1 AND 2: 0
1. LITTLE INTEREST OR PLEASURE IN DOING THINGS: NOT AT ALL
2. FEELING DOWN, DEPRESSED, IRRITABLE, OR HOPELESS: NOT AT ALL

## 2024-01-04 ENCOUNTER — OFFICE VISIT (OUTPATIENT)
Dept: MEDICAL GROUP | Facility: PHYSICIAN GROUP | Age: 74
End: 2024-01-04
Payer: COMMERCIAL

## 2024-01-04 VITALS
HEIGHT: 69 IN | OXYGEN SATURATION: 94 % | DIASTOLIC BLOOD PRESSURE: 42 MMHG | WEIGHT: 181.8 LBS | HEART RATE: 59 BPM | BODY MASS INDEX: 26.93 KG/M2 | SYSTOLIC BLOOD PRESSURE: 138 MMHG | TEMPERATURE: 97.8 F

## 2024-01-04 DIAGNOSIS — I10 ESSENTIAL HYPERTENSION: ICD-10-CM

## 2024-01-04 DIAGNOSIS — N18.31 STAGE 3A CHRONIC KIDNEY DISEASE: ICD-10-CM

## 2024-01-04 DIAGNOSIS — F51.01 PRIMARY INSOMNIA: ICD-10-CM

## 2024-01-04 DIAGNOSIS — R74.8 ELEVATED ALKALINE PHOSPHATASE LEVEL: ICD-10-CM

## 2024-01-04 PROCEDURE — 3078F DIAST BP <80 MM HG: CPT | Performed by: STUDENT IN AN ORGANIZED HEALTH CARE EDUCATION/TRAINING PROGRAM

## 2024-01-04 PROCEDURE — 99214 OFFICE O/P EST MOD 30 MIN: CPT | Performed by: STUDENT IN AN ORGANIZED HEALTH CARE EDUCATION/TRAINING PROGRAM

## 2024-01-04 PROCEDURE — 3075F SYST BP GE 130 - 139MM HG: CPT | Performed by: STUDENT IN AN ORGANIZED HEALTH CARE EDUCATION/TRAINING PROGRAM

## 2024-01-04 RX ORDER — ZOLPIDEM TARTRATE 5 MG/1
5 TABLET ORAL
Qty: 30 TABLET | Refills: 2 | Status: SHIPPED | OUTPATIENT
Start: 2024-01-04 | End: 2024-02-03

## 2024-01-04 ASSESSMENT — FIBROSIS 4 INDEX: FIB4 SCORE: 0.97

## 2024-01-04 ASSESSMENT — PATIENT HEALTH QUESTIONNAIRE - PHQ9: CLINICAL INTERPRETATION OF PHQ2 SCORE: 0

## 2024-01-04 ASSESSMENT — ENCOUNTER SYMPTOMS
DIZZINESS: 0
FEVER: 0
HEADACHES: 0
SHORTNESS OF BREATH: 0
CHILLS: 0

## 2024-01-04 NOTE — PROGRESS NOTES
"Subjective:     CC:   Chief Complaint   Patient presents with    Follow-Up       HPI:   Olman presents today with    Problem   Stage 3a Chronic Kidney Disease (Hcc)    Chronic in nature.  Kidney function has declined from the 50s to the low to mid 40s over the past year.     Primary Insomnia    This is a chronic condition. Takes Ambien 5 mg nightly. Symptoms are well controlled with this.      Essential Hypertension    This is a chronic condition.  Current Meds:   - losartan 100 mg daily  - carvedilol 12.5 mg BID  - amlodipine 10 mg daily  Side effects: none  Home BP Log: Typically 120s/60s  Associated symptoms: Denies chest pain, shortness of breath, headaches, dizziness/lightheadedness               Past medical, family, and social history reviewed by me in Epic chart today.   Medications and allergies reviewed in Epic chart by me today.       Health Maintenance: Completed    ROS:  Review of Systems   Constitutional:  Negative for chills and fever.   Respiratory:  Negative for shortness of breath.    Cardiovascular:  Negative for chest pain.   Neurological:  Negative for dizziness and headaches.       Objective:     Exam:  /42 (BP Location: Left arm, Patient Position: Sitting, BP Cuff Size: Adult)   Pulse (!) 59   Temp 36.6 °C (97.8 °F) (Temporal)   Ht 1.753 m (5' 9\")   Wt 82.5 kg (181 lb 12.8 oz)   SpO2 94%   BMI 26.85 kg/m²  Body mass index is 26.85 kg/m².    Physical Exam  Constitutional:       General: He is not in acute distress.  HENT:      Mouth/Throat:      Mouth: Mucous membranes are moist.   Eyes:      Extraocular Movements: Extraocular movements intact.   Cardiovascular:      Rate and Rhythm: Normal rate and regular rhythm.      Heart sounds: No murmur heard.     No friction rub. No gallop.   Pulmonary:      Effort: Pulmonary effort is normal.      Breath sounds: No wheezing, rhonchi or rales.   Musculoskeletal:      Cervical back: Neck supple.   Skin:     General: Skin is warm and dry. "   Neurological:      Mental Status: He is alert.   Psychiatric:         Mood and Affect: Mood normal.       Assessment & Plan:     73 y.o. male with the following -     1. Primary insomnia  Chronic, stable.  Continue Ambien 5 mg nightly as needed.  Controlled substance agreement UTD. Last UDS within 1 year reviewed and appropriate.  PDMP reviewed by myself today.  Patient provided with additional refills to allow for 90 days.  Patient understands that he will need to follow-up in 3 months for additional refills.  - zolpidem (AMBIEN) 5 MG Tab; Take 1 Tablet by mouth at bedtime as needed for Sleep for up to 30 days.  Dispense: 30 Tablet; Refill: 2    2. Stage 3a chronic kidney disease (HCC)  Chronic, stable.  Will continue to monitor.  Labs ordered as below.  Continue good blood pressure control.  Continue adequate hydration and avoid NSAIDs.  - MICROALBUMIN CREAT RATIO URINE; Future  - Comp Metabolic Panel; Future    3. Elevated alkaline phosphatase level  - Comp Metabolic Panel; Future    4. Essential hypertension  Chronic, stable.  Continue current regimen.        Return in about 3 months (around 4/4/2024) for med refill.    Please note that this dictation was created using voice recognition software. I have made every reasonable attempt to correct obvious errors, but I expect that there are errors of grammar and possibly content that I did not discover before finalizing the note.

## 2024-02-06 ENCOUNTER — HOSPITAL ENCOUNTER (OUTPATIENT)
Dept: LAB | Facility: MEDICAL CENTER | Age: 74
End: 2024-02-06
Attending: STUDENT IN AN ORGANIZED HEALTH CARE EDUCATION/TRAINING PROGRAM
Payer: COMMERCIAL

## 2024-02-06 DIAGNOSIS — R74.8 ELEVATED ALKALINE PHOSPHATASE LEVEL: ICD-10-CM

## 2024-02-06 DIAGNOSIS — N18.31 STAGE 3A CHRONIC KIDNEY DISEASE: ICD-10-CM

## 2024-02-06 LAB
ALBUMIN SERPL BCP-MCNC: 4.3 G/DL (ref 3.2–4.9)
ALBUMIN/GLOB SERPL: 1.5 G/DL
ALP SERPL-CCNC: 125 U/L (ref 30–99)
ALT SERPL-CCNC: 21 U/L (ref 2–50)
ANION GAP SERPL CALC-SCNC: 10 MMOL/L (ref 7–16)
AST SERPL-CCNC: 14 U/L (ref 12–45)
BILIRUB SERPL-MCNC: 0.7 MG/DL (ref 0.1–1.5)
BUN SERPL-MCNC: 22 MG/DL (ref 8–22)
CALCIUM ALBUM COR SERPL-MCNC: 9 MG/DL (ref 8.5–10.5)
CALCIUM SERPL-MCNC: 9.2 MG/DL (ref 8.5–10.5)
CHLORIDE SERPL-SCNC: 109 MMOL/L (ref 96–112)
CO2 SERPL-SCNC: 26 MMOL/L (ref 20–33)
CREAT SERPL-MCNC: 2.1 MG/DL (ref 0.5–1.4)
CREAT UR-MCNC: 57.28 MG/DL
GFR SERPLBLD CREATININE-BSD FMLA CKD-EPI: 33 ML/MIN/1.73 M 2
GLOBULIN SER CALC-MCNC: 2.8 G/DL (ref 1.9–3.5)
GLUCOSE SERPL-MCNC: 120 MG/DL (ref 65–99)
MICROALBUMIN UR-MCNC: 7.7 MG/DL
MICROALBUMIN/CREAT UR: 134 MG/G (ref 0–30)
POTASSIUM SERPL-SCNC: 5 MMOL/L (ref 3.6–5.5)
PROT SERPL-MCNC: 7.1 G/DL (ref 6–8.2)
SODIUM SERPL-SCNC: 145 MMOL/L (ref 135–145)

## 2024-02-06 PROCEDURE — 82570 ASSAY OF URINE CREATININE: CPT

## 2024-02-06 PROCEDURE — 82043 UR ALBUMIN QUANTITATIVE: CPT

## 2024-02-06 PROCEDURE — 36415 COLL VENOUS BLD VENIPUNCTURE: CPT

## 2024-02-06 PROCEDURE — 80053 COMPREHEN METABOLIC PANEL: CPT

## 2024-03-04 ENCOUNTER — HOSPITAL ENCOUNTER (OUTPATIENT)
Dept: RADIOLOGY | Facility: MEDICAL CENTER | Age: 74
End: 2024-03-04
Attending: INTERNAL MEDICINE
Payer: COMMERCIAL

## 2024-03-04 DIAGNOSIS — R93.89 ABNORMAL CT OF THE CHEST: ICD-10-CM

## 2024-03-04 PROCEDURE — 71250 CT THORAX DX C-: CPT

## 2024-03-06 ENCOUNTER — PATIENT MESSAGE (OUTPATIENT)
Dept: MEDICAL GROUP | Facility: PHYSICIAN GROUP | Age: 74
End: 2024-03-06
Payer: COMMERCIAL

## 2024-03-06 DIAGNOSIS — I10 ESSENTIAL HYPERTENSION: ICD-10-CM

## 2024-03-06 DIAGNOSIS — E78.5 DYSLIPIDEMIA: ICD-10-CM

## 2024-03-06 DIAGNOSIS — N28.9 RENAL LESION: ICD-10-CM

## 2024-03-06 RX ORDER — ATORVASTATIN CALCIUM 40 MG/1
40 TABLET, FILM COATED ORAL EVERY EVENING
Qty: 90 TABLET | Refills: 3 | Status: SHIPPED | OUTPATIENT
Start: 2024-03-06

## 2024-03-06 RX ORDER — AMLODIPINE BESYLATE 10 MG/1
10 TABLET ORAL
Qty: 90 TABLET | Refills: 3 | Status: SHIPPED | OUTPATIENT
Start: 2024-03-06

## 2024-03-06 RX ORDER — CARVEDILOL 12.5 MG/1
TABLET ORAL
Qty: 180 TABLET | Refills: 3 | Status: SHIPPED | OUTPATIENT
Start: 2024-03-06

## 2024-03-22 ENCOUNTER — HOSPITAL ENCOUNTER (OUTPATIENT)
Dept: RADIOLOGY | Facility: MEDICAL CENTER | Age: 74
End: 2024-03-22
Attending: STUDENT IN AN ORGANIZED HEALTH CARE EDUCATION/TRAINING PROGRAM
Payer: COMMERCIAL

## 2024-03-22 DIAGNOSIS — N28.9 RENAL LESION: ICD-10-CM

## 2024-03-22 DIAGNOSIS — N28.89 LEFT RENAL MASS: ICD-10-CM

## 2024-03-22 PROCEDURE — 76775 US EXAM ABDO BACK WALL LIM: CPT

## 2024-04-01 ENCOUNTER — APPOINTMENT (OUTPATIENT)
Dept: RADIOLOGY | Facility: MEDICAL CENTER | Age: 74
End: 2024-04-01
Attending: STUDENT IN AN ORGANIZED HEALTH CARE EDUCATION/TRAINING PROGRAM
Payer: COMMERCIAL

## 2024-04-01 DIAGNOSIS — N28.89 LEFT RENAL MASS: ICD-10-CM

## 2024-04-03 ENCOUNTER — APPOINTMENT (OUTPATIENT)
Dept: SLEEP MEDICINE | Facility: MEDICAL CENTER | Age: 74
End: 2024-04-03
Attending: INTERNAL MEDICINE
Payer: COMMERCIAL

## 2024-04-03 VITALS
DIASTOLIC BLOOD PRESSURE: 74 MMHG | SYSTOLIC BLOOD PRESSURE: 126 MMHG | OXYGEN SATURATION: 94 % | RESPIRATION RATE: 19 BRPM | WEIGHT: 173 LBS | BODY MASS INDEX: 25.62 KG/M2 | HEART RATE: 74 BPM | HEIGHT: 69 IN

## 2024-04-03 DIAGNOSIS — J44.9 CHRONIC OBSTRUCTIVE PULMONARY DISEASE, UNSPECIFIED COPD TYPE (HCC): ICD-10-CM

## 2024-04-03 DIAGNOSIS — R93.89 ABNORMAL CT OF THE CHEST: ICD-10-CM

## 2024-04-03 DIAGNOSIS — Z87.891 FORMER SMOKER: ICD-10-CM

## 2024-04-03 DIAGNOSIS — J96.11 CHRONIC RESPIRATORY FAILURE WITH HYPOXIA (HCC): ICD-10-CM

## 2024-04-03 PROCEDURE — 3074F SYST BP LT 130 MM HG: CPT | Performed by: INTERNAL MEDICINE

## 2024-04-03 PROCEDURE — 3078F DIAST BP <80 MM HG: CPT | Performed by: INTERNAL MEDICINE

## 2024-04-03 PROCEDURE — 99214 OFFICE O/P EST MOD 30 MIN: CPT | Performed by: INTERNAL MEDICINE

## 2024-04-03 PROCEDURE — 99212 OFFICE O/P EST SF 10 MIN: CPT | Performed by: INTERNAL MEDICINE

## 2024-04-03 RX ORDER — ALBUTEROL SULFATE 90 UG/1
AEROSOL, METERED RESPIRATORY (INHALATION)
Qty: 3 EACH | Refills: 3 | Status: SHIPPED | OUTPATIENT
Start: 2024-04-03

## 2024-04-03 ASSESSMENT — ENCOUNTER SYMPTOMS
VOMITING: 0
NAUSEA: 0
SPEECH CHANGE: 0
WEIGHT LOSS: 0
ORTHOPNEA: 0
BACK PAIN: 0
NECK PAIN: 0
PHOTOPHOBIA: 0
SHORTNESS OF BREATH: 0
DOUBLE VISION: 0
ABDOMINAL PAIN: 0
WEAKNESS: 0
TREMORS: 0
STRIDOR: 0
FEVER: 0
SPUTUM PRODUCTION: 0
DIARRHEA: 0
COUGH: 0
HEMOPTYSIS: 0
DIAPHORESIS: 0
PND: 0
DEPRESSION: 0
PALPITATIONS: 0
BLURRED VISION: 0
HEARTBURN: 0
HEADACHES: 0
WHEEZING: 0
CLAUDICATION: 0
MYALGIAS: 0
SORE THROAT: 0
EYE PAIN: 0
DIZZINESS: 0
FALLS: 0
CHILLS: 0
FOCAL WEAKNESS: 0
EYE REDNESS: 0
CONSTIPATION: 0
SINUS PAIN: 0
EYE DISCHARGE: 0

## 2024-04-03 ASSESSMENT — FIBROSIS 4 INDEX: FIB4 SCORE: 0.91

## 2024-04-03 NOTE — PROGRESS NOTES
"Chief Complaint   Patient presents with    Follow-Up     LAST SEEN 11/15/23, CT CHEST 3/4/24         HPI: This patient is a 73 y.o. male whom is followed in our clinic for COPD c/b chronic hypoxic failure last seen by me on 11/15/23. PMHx is significant for HTN not well controlled, HLP and CAD. Pt also has a hx of AF. PFT last from 7/2022 showed   FEV1 of 0.84 L or 26% predicted with a ratio of 31. No bronchodilator response. He continues to have hyperinflation with total lung capacity 134% predicted and severe air trapping with residual volume of 286% predicted. DLCO is 55% predicted. He is currently on Trelegy 100 and albuterol as needed. He is a former smoker with only at most a 15 pk year history and quit in 2012 when he was dx with COPD.  Alpha-1 antitrypsin levels were normal in 2022.  He has been followed in our clinic for pulmonary nodules that have waxed and waned since 2022. CT chest from 11/13/2023 showed a new right upper lobe infiltrative mass of 4 cm in size but clearance of groundglass opacity in the medial portion of the right upper lobe seen in 11/2022.  This resolved on most recent CT from 3/4/24.  He had a 6 mm nodule that had also resolved.  No residual nodules.  We started him on airway clearance with nebulized bronchodilators last visit which she does not feel he is benefiting from.  He denies significant chest congestion.  Overall he is mMRC functional class II-3 this is stable to slightly improved since his last clinic visit with me.  He is not interested in pulmonary rehab and rides a stationary bike daily at home.  Oxygen needs are stable at 2 L/min continuous.  No acute complaints today.  Past Medical History:   Diagnosis Date    Adverse effect of anesthesia     \"stopped breathing/elevated bp\"2012    Anesthesia     \"stopped breathing/elevated bp\"2012    Atrial fibrillation (HCC)     Breath shortness     when he quit smoking/with exertion    CAD (coronary artery disease)     COPD     Dental " disorder     lower partial    Emphysema of lung (HCC)     Hyperlipidemia     Hypertension     Kidney stone     Oxygen dependent     q hs prn 2 ltr    Paroxysmal atrial fibrillation (HCC)     PVD (peripheral vascular disease) (HCC)     R iliac artery stent    Renal disorder     right kidney stone/stent in place       Social History     Socioeconomic History    Marital status: Single     Spouse name: Not on file    Number of children: Not on file    Years of education: Not on file    Highest education level: Some college, no degree   Occupational History    Not on file   Tobacco Use    Smoking status: Former     Current packs/day: 0.00     Average packs/day: 1 pack/day for 25.0 years (25.0 ttl pk-yrs)     Types: Cigarettes     Start date: 1987     Quit date: 2012     Years since quittin.1    Smokeless tobacco: Never   Vaping Use    Vaping Use: Never used   Substance and Sexual Activity    Alcohol use: Yes     Comment: occasional    Drug use: No    Sexual activity: Never   Other Topics Concern    Not on file   Social History Narrative    Not on file     Social Determinants of Health     Financial Resource Strain: Low Risk  (10/1/2023)    Overall Financial Resource Strain (CARDIA)     Difficulty of Paying Living Expenses: Not hard at all   Food Insecurity: No Food Insecurity (10/1/2023)    Hunger Vital Sign     Worried About Running Out of Food in the Last Year: Never true     Ran Out of Food in the Last Year: Never true   Transportation Needs: No Transportation Needs (10/1/2023)    PRAPARE - Transportation     Lack of Transportation (Medical): No     Lack of Transportation (Non-Medical): No   Physical Activity: Insufficiently Active (10/1/2023)    Exercise Vital Sign     Days of Exercise per Week: 7 days     Minutes of Exercise per Session: 20 min   Stress: No Stress Concern Present (10/1/2023)    [employer] [employer] - Occupational Stress Questionnaire     Feeling of Stress : Not at  all   Social Connections: Socially Isolated (10/1/2023)    Social Connection and Isolation Panel [NHANES]     Frequency of Communication with Friends and Family: More than three times a week     Frequency of Social Gatherings with Friends and Family: Once a week     Attends [employer] Services: Never     Active Member of Clubs or Organizations: No     Attends Club or Organization Meetings: Never     Marital Status:    Intimate Partner Violence: Not on file   Housing Stability: Low Risk  (10/1/2023)    Housing Stability Vital Sign     Unable to Pay for Housing in the Last Year: No     Number of Places Lived in the Last Year: 1     Unstable Housing in the Last Year: No       Family History   Problem Relation Age of Onset    Lung Disease Maternal Grandmother         empysema- smoker    Cancer Maternal Grandfather 65        colon       Current Outpatient Medications on File Prior to Visit   Medication Sig Dispense Refill    amLODIPine (NORVASC) 10 MG Tab Take 1 Tablet by mouth every morning on an empty stomach. 90 Tablet 3    atorvastatin (LIPITOR) 40 MG Tab Take 1 Tablet by mouth every evening. 90 Tablet 3    carvedilol (COREG) 12.5 MG Tab TAKE 1 TABLET BY MOUTH TWICE DAILY WITH MEALS 180 Tablet 3    losartan (COZAAR) 100 MG Tab Take 1 Tablet by mouth every evening. 90 Each 1    ipratropium-albuterol (DUONEB) 0.5-2.5 (3) MG/3ML nebulizer solution Take 3 mL by nebulization every four hours as needed for Shortness of Breath. 120 mL 11    fluticasone-umeclidinium-vilanterol (TRELEGY ELLIPTA) 100-62.5-25 mcg/act inhaler Inhale 1 Puff every day. 3 Each 3    aspirin (ASA) 81 MG Chew Tab chewable tablet Chew 1 Tablet every day. CHEW AND SWALLOW 90 Tablet 3     No current facility-administered medications on file prior to visit.       Patient has no known allergies.      ROS:   Review of Systems   Constitutional:  Negative for chills, diaphoresis, fever, malaise/fatigue and weight loss.   HENT:  Negative for congestion,  "ear discharge, ear pain, hearing loss, nosebleeds, sinus pain, sore throat and tinnitus.    Eyes:  Negative for blurred vision, double vision, photophobia, pain, discharge and redness.   Respiratory:  Negative for cough, hemoptysis, sputum production, shortness of breath, wheezing and stridor.    Cardiovascular:  Negative for chest pain, palpitations, orthopnea, claudication, leg swelling and PND.   Gastrointestinal:  Negative for abdominal pain, constipation, diarrhea, heartburn, nausea and vomiting.   Genitourinary:  Negative for dysuria and urgency.   Musculoskeletal:  Negative for back pain, falls, joint pain, myalgias and neck pain.   Skin:  Negative for itching and rash.   Neurological:  Negative for dizziness, tremors, speech change, focal weakness, weakness and headaches.   Endo/Heme/Allergies:  Negative for environmental allergies.   Psychiatric/Behavioral:  Negative for depression.        /74 (BP Location: Right arm, Patient Position: Sitting, BP Cuff Size: Adult)   Pulse 74   Resp 19   Ht 1.753 m (5' 9\")   Wt 78.5 kg (173 lb)   SpO2 94%   Physical Exam  Vitals reviewed.   Constitutional:       General: He is not in acute distress.     Appearance: Normal appearance.   HENT:      Head: Normocephalic and atraumatic.      Right Ear: External ear normal.      Left Ear: External ear normal.      Nose: Nose normal. No congestion.      Mouth/Throat:      Mouth: Mucous membranes are moist.      Pharynx: Oropharynx is clear. No oropharyngeal exudate.   Eyes:      General: No scleral icterus.     Extraocular Movements: Extraocular movements intact.      Conjunctiva/sclera: Conjunctivae normal.      Pupils: Pupils are equal, round, and reactive to light.   Cardiovascular:      Rate and Rhythm: Normal rate and regular rhythm.      Heart sounds: Normal heart sounds. No murmur heard.     No gallop.   Pulmonary:      Effort: Pulmonary effort is normal. No respiratory distress.      Breath sounds: No wheezing " or rales.      Comments: Decreased bs b/l upper lobes  Abdominal:      General: There is no distension.      Palpations: Abdomen is soft.   Musculoskeletal:         General: Normal range of motion.      Cervical back: Normal range of motion and neck supple.      Right lower leg: No edema.      Left lower leg: No edema.   Skin:     General: Skin is warm and dry.      Findings: No rash.   Neurological:      Mental Status: He is alert and oriented to person, place, and time.      Cranial Nerves: No cranial nerve deficit.   Psychiatric:         Mood and Affect: Mood normal.         Behavior: Behavior normal.       PFTs as reviewed by me personally: as per hPI    Imaging as reviewed by me personally:  as per HPI    Assessment:  1. Chronic obstructive pulmonary disease, unspecified COPD type (HCC)  VENTOLIN  (90 Base) MCG/ACT Aero Soln inhalation aerosol    PULMONARY FUNCTION TESTS -Test requested: Complete Pulmonary Function Test      2. Chronic respiratory failure with hypoxia (HCC)  PULMONARY FUNCTION TESTS -Test requested: Complete Pulmonary Function Test      3. Abnormal CT of the chest        4. Former smoker            Plan:  Chronic, severe but overall stable.  Continue Trelegy and supplemental oxygen.  Patient is tobacco free.  Normal alpha-1 antitrypsin enzyme levels.  Update pulmonary function testing at follow-up.  Can secondary to COPD.  Patient is compliant with and benefiting from supplemental oxygen at 2 L/min.  Abnormalities have resolved.  No indication for ongoing surveillance imaging.  Tobacco free.  Encouraged ongoing abstinence.  Return in about 6 months (around 10/3/2024) for pft at time of f/u.

## 2024-04-04 ENCOUNTER — OFFICE VISIT (OUTPATIENT)
Dept: MEDICAL GROUP | Facility: PHYSICIAN GROUP | Age: 74
End: 2024-04-04
Payer: COMMERCIAL

## 2024-04-04 VITALS
HEART RATE: 51 BPM | OXYGEN SATURATION: 92 % | TEMPERATURE: 98.2 F | HEIGHT: 69 IN | SYSTOLIC BLOOD PRESSURE: 130 MMHG | WEIGHT: 183 LBS | DIASTOLIC BLOOD PRESSURE: 52 MMHG | BODY MASS INDEX: 27.11 KG/M2

## 2024-04-04 DIAGNOSIS — N18.9 CHRONIC KIDNEY DISEASE, UNSPECIFIED CKD STAGE: ICD-10-CM

## 2024-04-04 DIAGNOSIS — E78.5 DYSLIPIDEMIA: ICD-10-CM

## 2024-04-04 DIAGNOSIS — N28.89 LEFT RENAL MASS: ICD-10-CM

## 2024-04-04 DIAGNOSIS — I10 ESSENTIAL HYPERTENSION: ICD-10-CM

## 2024-04-04 DIAGNOSIS — F51.01 PRIMARY INSOMNIA: ICD-10-CM

## 2024-04-04 DIAGNOSIS — R73.03 PREDIABETES: ICD-10-CM

## 2024-04-04 PROBLEM — F13.20 HYPNOTIC DEPENDENCE WITH CURRENT USE (HCC): Status: ACTIVE | Noted: 2024-04-04

## 2024-04-04 PROCEDURE — 3078F DIAST BP <80 MM HG: CPT | Performed by: STUDENT IN AN ORGANIZED HEALTH CARE EDUCATION/TRAINING PROGRAM

## 2024-04-04 PROCEDURE — 3075F SYST BP GE 130 - 139MM HG: CPT | Performed by: STUDENT IN AN ORGANIZED HEALTH CARE EDUCATION/TRAINING PROGRAM

## 2024-04-04 PROCEDURE — 99214 OFFICE O/P EST MOD 30 MIN: CPT | Performed by: STUDENT IN AN ORGANIZED HEALTH CARE EDUCATION/TRAINING PROGRAM

## 2024-04-04 RX ORDER — ZOLPIDEM TARTRATE 5 MG/1
5 TABLET ORAL
Qty: 30 TABLET | Refills: 2 | Status: SHIPPED | OUTPATIENT
Start: 2024-04-04 | End: 2024-07-03

## 2024-04-04 RX ORDER — ZOLPIDEM TARTRATE 5 MG/1
5 TABLET ORAL
COMMUNITY
Start: 2024-03-06 | End: 2024-04-04 | Stop reason: SDUPTHER

## 2024-04-04 ASSESSMENT — ENCOUNTER SYMPTOMS
SHORTNESS OF BREATH: 0
CHILLS: 0
FEVER: 0
DIZZINESS: 0
HEADACHES: 0

## 2024-04-04 ASSESSMENT — FIBROSIS 4 INDEX: FIB4 SCORE: 0.91

## 2024-04-04 NOTE — PROGRESS NOTES
"Subjective:     CC:   Chief Complaint   Patient presents with    Medication Refill     zolpidem (AMBIEN) 5 MG Tab    Follow-Up       HPI:   Olman presents today with    Problem   Left Renal Mass    Left renal mass noted incidentally on chest CT last month.  Follow-up renal ultrasound confirmed 4.9 x 4.5 x 4.8 cm LEFT renal mass.     Hypnotic Dependence With Current Use (Hcc)   Primary Insomnia    This is a chronic condition. Takes Ambien 5 mg nightly. Symptoms are well controlled with this. Denies any side effects.    Last UDS appropriate 10/4/23  CS agreement UTD 10/4/23       Essential Hypertension    This is a chronic condition.  Current Meds:   - losartan 100 mg daily  - carvedilol 12.5 mg BID  - amlodipine 10 mg daily  Side effects: none  Home BP Log: Typically 120s/60s  Associated symptoms: Denies chest pain, shortness of breath, headaches, dizziness/lightheadedness                Past medical, family, and social history reviewed by me in Epic chart today.   Medications and allergies reviewed in Epic chart by me today.       Health Maintenance: Completed    ROS:  Review of Systems   Constitutional:  Negative for chills and fever.   Respiratory:  Negative for shortness of breath.    Cardiovascular:  Negative for chest pain.   Neurological:  Negative for dizziness and headaches.       Objective:     Exam:  /52 (BP Location: Left arm, Patient Position: Sitting, BP Cuff Size: Adult long)   Pulse (!) 51   Temp 36.8 °C (98.2 °F) (Temporal)   Ht 1.753 m (5' 9\")   Wt 83 kg (183 lb)   SpO2 92%   BMI 27.02 kg/m²  Body mass index is 27.02 kg/m².    Physical Exam  Constitutional:       General: He is not in acute distress.  HENT:      Mouth/Throat:      Mouth: Mucous membranes are moist.   Eyes:      Extraocular Movements: Extraocular movements intact.   Cardiovascular:      Rate and Rhythm: Normal rate and regular rhythm.      Heart sounds: No murmur heard.     No friction rub. No gallop.   Pulmonary:      " Effort: Pulmonary effort is normal.      Breath sounds: No wheezing, rhonchi or rales.   Musculoskeletal:      Cervical back: Neck supple.   Skin:     General: Skin is warm and dry.   Neurological:      Mental Status: He is alert.   Psychiatric:         Mood and Affect: Mood normal.           Labs:     Results for orders placed or performed during the hospital encounter of 02/06/24   Comp Metabolic Panel   Result Value Ref Range    Sodium 145 135 - 145 mmol/L    Potassium 5.0 3.6 - 5.5 mmol/L    Chloride 109 96 - 112 mmol/L    Co2 26 20 - 33 mmol/L    Anion Gap 10.0 7.0 - 16.0    Glucose 120 (H) 65 - 99 mg/dL    Bun 22 8 - 22 mg/dL    Creatinine 2.10 (H) 0.50 - 1.40 mg/dL    Calcium 9.2 8.5 - 10.5 mg/dL    Correct Calcium 9.0 8.5 - 10.5 mg/dL    AST(SGOT) 14 12 - 45 U/L    ALT(SGPT) 21 2 - 50 U/L    Alkaline Phosphatase 125 (H) 30 - 99 U/L    Total Bilirubin 0.7 0.1 - 1.5 mg/dL    Albumin 4.3 3.2 - 4.9 g/dL    Total Protein 7.1 6.0 - 8.2 g/dL    Globulin 2.8 1.9 - 3.5 g/dL    A-G Ratio 1.5 g/dL   MICROALBUMIN CREAT RATIO URINE   Result Value Ref Range    Creatinine, Urine 57.28 mg/dL    Microalbumin, Urine Random 7.7 mg/dL    Micro Alb Creat Ratio 134 (H) 0 - 30 mg/g   ESTIMATED GFR   Result Value Ref Range    GFR (CKD-EPI) 33 (A) >60 mL/min/1.73 m 2         Assessment & Plan:     73 y.o. male with the following -     1. Primary insomnia  Chronic, stable. Continue current regimen. Ambien will be refilled today for 90 day supply. Patient understands this prescription is a controlled substance which is potentially habit-forming and its use is regulated by the [facility]. Controlled substance agreement is on file. Last UDS is up to date, results reviewed and are appropriate. I have reviewed the patient's prescription history as maintained by the [place] prescription monitoring program.  Patient understands that any refill requires an office visit.   - zolpidem (AMBIEN) 5 MG Tab; Take 1 Tablet by mouth at bedtime as needed for  Sleep for up to 90 days.  Dispense: 30 Tablet; Refill: 2    2. Left renal mass  Left renal mass was recently found on chest CT.  I have ordered a follow-up renal MRI.    3. Chronic kidney disease, unspecified CKD stage  Chronic, there has been slow decline in patient's kidney function.  I have advised him to stay hydrated.  I have advised him to avoid NSAIDs.  We will continue to closely monitor and consider referral to nephrology if this continues to progress.  - Renal Function Panel; Future  - MICROALBUMIN CREAT RATIO URINE; Future    4. Essential hypertension  Chronic, stable.  Continue current medication regimen.    5. Dyslipidemia  Chronic, on atorvastatin 40 mg daily.  Will be due for repeat cholesterol panel soon.  - Lipid Profile; Future    6. Prediabetes  - HEMOGLOBIN A1C; Future      Return in about 3 months (around 7/4/2024) for controlled substance refill.    Please note that this dictation was created using voice recognition software. I have made every reasonable attempt to correct obvious errors, but I expect that there are errors of grammar and possibly content that I did not discover before finalizing the note.

## 2024-04-08 DIAGNOSIS — N28.89 LEFT RENAL MASS: ICD-10-CM

## 2024-04-08 NOTE — PROGRESS NOTES
MRI abdomen needs to be reordered for RN oral sedation as the patient reports he does not require full sedation for the test.

## 2024-04-15 DIAGNOSIS — N28.89 LEFT RENAL MASS: ICD-10-CM

## 2024-04-16 ENCOUNTER — OFFICE VISIT (OUTPATIENT)
Dept: MEDICAL GROUP | Facility: PHYSICIAN GROUP | Age: 74
End: 2024-04-16
Payer: COMMERCIAL

## 2024-04-16 VITALS
SYSTOLIC BLOOD PRESSURE: 140 MMHG | HEART RATE: 63 BPM | WEIGHT: 184.4 LBS | TEMPERATURE: 98.8 F | DIASTOLIC BLOOD PRESSURE: 52 MMHG | BODY MASS INDEX: 27.31 KG/M2 | OXYGEN SATURATION: 90 % | HEIGHT: 69 IN

## 2024-04-16 DIAGNOSIS — F40.240 CLAUSTROPHOBIA: ICD-10-CM

## 2024-04-16 PROCEDURE — 3077F SYST BP >= 140 MM HG: CPT | Performed by: STUDENT IN AN ORGANIZED HEALTH CARE EDUCATION/TRAINING PROGRAM

## 2024-04-16 PROCEDURE — 99213 OFFICE O/P EST LOW 20 MIN: CPT | Performed by: STUDENT IN AN ORGANIZED HEALTH CARE EDUCATION/TRAINING PROGRAM

## 2024-04-16 PROCEDURE — 3078F DIAST BP <80 MM HG: CPT | Performed by: STUDENT IN AN ORGANIZED HEALTH CARE EDUCATION/TRAINING PROGRAM

## 2024-04-16 RX ORDER — ALPRAZOLAM 0.5 MG/1
TABLET ORAL
Qty: 1 TABLET | Refills: 0 | Status: SHIPPED | OUTPATIENT
Start: 2024-04-16 | End: 2024-04-17 | Stop reason: SDUPTHER

## 2024-04-16 ASSESSMENT — FIBROSIS 4 INDEX: FIB4 SCORE: 0.91

## 2024-04-16 ASSESSMENT — ENCOUNTER SYMPTOMS
FEVER: 0
CHILLS: 0
SHORTNESS OF BREATH: 0

## 2024-04-16 NOTE — PROGRESS NOTES
"Subjective:     CC:   Chief Complaint   Patient presents with    Other     Sedation for MRI        HPI:   Olman presents today to discuss sedation for an upcoming MRI.  Patient has MRI scheduled next month to follow-up a renal mass which was first noted on a chest CT and was further evaluated with ultrasound.  He was scheduled for MRI a few weeks ago but this visit was canceled because the patient experienced claustrophobia.  He prefers to try self sedation and reschedule the MRI.      Past medical, family, and social history reviewed by me in Epic chart today.   Medications and allergies reviewed in Epic chart by me today.       Health Maintenance: Completed    ROS:  Review of Systems   Constitutional:  Negative for chills and fever.   Respiratory:  Negative for shortness of breath.    Cardiovascular:  Negative for chest pain.       Objective:     Exam:  BP (!) 140/52 (BP Location: Left arm, Patient Position: Sitting, BP Cuff Size: Adult long)   Pulse 63   Temp 37.1 °C (98.8 °F) (Temporal)   Ht 1.753 m (5' 9\")   Wt 83.6 kg (184 lb 6.4 oz)   SpO2 90%   BMI 27.23 kg/m²  Body mass index is 27.23 kg/m².    Physical Exam  Constitutional:       General: He is not in acute distress.  HENT:      Mouth/Throat:      Mouth: Mucous membranes are moist.   Eyes:      Extraocular Movements: Extraocular movements intact.   Pulmonary:      Effort: Pulmonary effort is normal. No respiratory distress.   Musculoskeletal:      Cervical back: Neck supple.   Skin:     Comments: No visible rashes   Neurological:      Mental Status: He is alert.   Psychiatric:         Mood and Affect: Mood normal.           Assessment & Plan:     73 y.o. male with the following -     1. Claustrophobia  Patient requiring prescription for self-administered sedation prior to upcoming MRI.  I have prescribed 1 dose of Xanax, advised him to take this 30 to 60 minutes prior to his appointment.  We discussed potential side effects.  He was advised against " taking this with other controlled substances or with alcohol.  - ALPRAZolam (XANAX) 0.5 MG Tab; Take 1 tablet by mouth 30-60 minutes prior to your MRI study.  Dispense: 1 Tablet; Refill: 0            Return if symptoms worsen or fail to improve.    Please note that this dictation was created using voice recognition software. I have made every reasonable attempt to correct obvious errors, but I expect that there are errors of grammar and possibly content that I did not discover before finalizing the note.

## 2024-04-17 DIAGNOSIS — F40.240 CLAUSTROPHOBIA: ICD-10-CM

## 2024-04-17 RX ORDER — ALPRAZOLAM 0.5 MG/1
TABLET ORAL
Qty: 1 TABLET | Refills: 0 | Status: SHIPPED | OUTPATIENT
Start: 2024-04-17 | End: 2024-05-14

## 2024-04-22 ENCOUNTER — PATIENT MESSAGE (OUTPATIENT)
Dept: SLEEP MEDICINE | Facility: MEDICAL CENTER | Age: 74
End: 2024-04-22
Payer: COMMERCIAL

## 2024-04-22 DIAGNOSIS — J44.9 CHRONIC OBSTRUCTIVE PULMONARY DISEASE, UNSPECIFIED COPD TYPE (HCC): ICD-10-CM

## 2024-05-07 ENCOUNTER — HOSPITAL ENCOUNTER (OUTPATIENT)
Dept: LAB | Facility: MEDICAL CENTER | Age: 74
End: 2024-05-07
Attending: STUDENT IN AN ORGANIZED HEALTH CARE EDUCATION/TRAINING PROGRAM
Payer: COMMERCIAL

## 2024-05-07 DIAGNOSIS — E78.5 DYSLIPIDEMIA: ICD-10-CM

## 2024-05-07 DIAGNOSIS — R73.03 PREDIABETES: ICD-10-CM

## 2024-05-07 DIAGNOSIS — N18.9 CHRONIC KIDNEY DISEASE, UNSPECIFIED CKD STAGE: ICD-10-CM

## 2024-05-07 LAB
ALBUMIN SERPL BCP-MCNC: 4.1 G/DL (ref 3.2–4.9)
BUN SERPL-MCNC: 24 MG/DL (ref 8–22)
CALCIUM ALBUM COR SERPL-MCNC: 9.3 MG/DL (ref 8.5–10.5)
CALCIUM SERPL-MCNC: 9.4 MG/DL (ref 8.5–10.5)
CHLORIDE SERPL-SCNC: 109 MMOL/L (ref 96–112)
CHOLEST SERPL-MCNC: 111 MG/DL (ref 100–199)
CO2 SERPL-SCNC: 25 MMOL/L (ref 20–33)
CREAT SERPL-MCNC: 2.02 MG/DL (ref 0.5–1.4)
CREAT UR-MCNC: 109.19 MG/DL
EST. AVERAGE GLUCOSE BLD GHB EST-MCNC: 114 MG/DL
FASTING STATUS PATIENT QL REPORTED: NORMAL
GFR SERPLBLD CREATININE-BSD FMLA CKD-EPI: 34 ML/MIN/1.73 M 2
GLUCOSE SERPL-MCNC: 140 MG/DL (ref 65–99)
HBA1C MFR BLD: 5.6 % (ref 4–5.6)
HDLC SERPL-MCNC: 34 MG/DL
LDLC SERPL CALC-MCNC: 60 MG/DL
MICROALBUMIN UR-MCNC: 7.2 MG/DL
MICROALBUMIN/CREAT UR: 66 MG/G (ref 0–30)
PHOSPHATE SERPL-MCNC: 3 MG/DL (ref 2.5–4.5)
POTASSIUM SERPL-SCNC: 4.8 MMOL/L (ref 3.6–5.5)
SODIUM SERPL-SCNC: 145 MMOL/L (ref 135–145)
TRIGL SERPL-MCNC: 85 MG/DL (ref 0–149)

## 2024-05-08 DIAGNOSIS — N18.31 STAGE 3A CHRONIC KIDNEY DISEASE: ICD-10-CM

## 2024-05-13 ENCOUNTER — HOSPITAL ENCOUNTER (OUTPATIENT)
Dept: RADIOLOGY | Facility: MEDICAL CENTER | Age: 74
End: 2024-05-13
Attending: FAMILY MEDICINE
Payer: COMMERCIAL

## 2024-05-13 DIAGNOSIS — N28.89 LEFT RENAL MASS: ICD-10-CM

## 2024-05-13 RX ADMIN — GADOTERIDOL 15 ML: 279.3 INJECTION, SOLUTION INTRAVENOUS at 15:28

## 2024-05-14 DIAGNOSIS — N28.89 LEFT RENAL MASS: ICD-10-CM

## 2024-05-23 ENCOUNTER — TELEPHONE (OUTPATIENT)
Dept: UROLOGY | Facility: MEDICAL CENTER | Age: 74
End: 2024-05-23

## 2024-05-23 ENCOUNTER — OFFICE VISIT (OUTPATIENT)
Dept: UROLOGY | Facility: MEDICAL CENTER | Age: 74
End: 2024-05-23
Payer: COMMERCIAL

## 2024-05-23 VITALS
HEART RATE: 65 BPM | BODY MASS INDEX: 27.25 KG/M2 | SYSTOLIC BLOOD PRESSURE: 163 MMHG | WEIGHT: 184 LBS | OXYGEN SATURATION: 81 % | TEMPERATURE: 99.4 F | HEIGHT: 69 IN | DIASTOLIC BLOOD PRESSURE: 78 MMHG

## 2024-05-23 DIAGNOSIS — N28.89 LEFT RENAL MASS: ICD-10-CM

## 2024-05-23 PROCEDURE — 3077F SYST BP >= 140 MM HG: CPT | Performed by: STUDENT IN AN ORGANIZED HEALTH CARE EDUCATION/TRAINING PROGRAM

## 2024-05-23 PROCEDURE — 3078F DIAST BP <80 MM HG: CPT | Performed by: STUDENT IN AN ORGANIZED HEALTH CARE EDUCATION/TRAINING PROGRAM

## 2024-05-23 PROCEDURE — 99204 OFFICE O/P NEW MOD 45 MIN: CPT | Performed by: STUDENT IN AN ORGANIZED HEALTH CARE EDUCATION/TRAINING PROGRAM

## 2024-05-23 ASSESSMENT — FIBROSIS 4 INDEX: FIB4 SCORE: 0.91

## 2024-05-23 NOTE — PROGRESS NOTES
Subjective  Olman Sims is a 73 y.o. male who presents today for evaluation of a left renal mass.    He denies abdominal pain, flank pain, dysuria, hematuria, changes in energy/appetite/weight. He has emphysema and is on oxygen at home. He has had multiple stents placed for CAD and has Afib but says he is stable from a cardiac standpoint. His only surgical history is an inguinal hernia repair.    Family History   Problem Relation Age of Onset    Lung Disease Maternal Grandmother         empysema- smoker    Cancer Maternal Grandfather 65        colon       Social History     Socioeconomic History    Marital status: Single     Spouse name: Not on file    Number of children: Not on file    Years of education: Not on file    Highest education level: Some college, no degree   Occupational History    Not on file   Tobacco Use    Smoking status: Former     Current packs/day: 0.00     Average packs/day: 1 pack/day for 25.0 years (25.0 ttl pk-yrs)     Types: Cigarettes     Start date: 1987     Quit date: 2012     Years since quittin.2    Smokeless tobacco: Never   Vaping Use    Vaping status: Never Used   Substance and Sexual Activity    Alcohol use: Yes     Comment: occasional    Drug use: No    Sexual activity: Never   Other Topics Concern    Not on file   Social History Narrative    Not on file     Social Determinants of Health     Financial Resource Strain: Low Risk  (10/1/2023)    Overall Financial Resource Strain (CARDIA)     Difficulty of Paying Living Expenses: Not hard at all   Food Insecurity: No Food Insecurity (10/1/2023)    Hunger Vital Sign     Worried About Running Out of Food in the Last Year: Never true     Ran Out of Food in the Last Year: Never true   Transportation Needs: No Transportation Needs (10/1/2023)    PRAPARE - Transportation     Lack of Transportation (Medical): No     Lack of Transportation (Non-Medical): No   Physical Activity: Insufficiently Active (10/1/2023)     Exercise Vital Sign     Days of Exercise per Week: 7 days     Minutes of Exercise per Session: 20 min   Stress: No Stress Concern Present (10/1/2023)    [employer] - Occupational Stress Questionnaire     Feeling of Stress : Not at all   Social Connections: Socially Isolated (10/1/2023)    Social Connection and Isolation Panel [NHANES]     Frequency of Communication with Friends and Family: More than three times a week     Frequency of Social Gatherings with Friends and Family: Once a week     Attends Oriental orthodox Services: Never     Active Member of Clubs or Organizations: No     Attends Club or Organization Meetings: Never     Marital Status:    Intimate Partner Violence: Not on file   Housing Stability: Low Risk  (10/1/2023)    Housing Stability Vital Sign     Unable to Pay for Housing in the Last Year: No     Number of Places Lived in the Last Year: 1     Unstable Housing in the Last Year: No       Past Surgical History:   Procedure Laterality Date    INGUINAL HERNIA REPAIR ROBOTIC XI Bilateral 5/23/2023    Procedure: ROBOTIC BILATERAL INGUINAL HERNIA REPAIR;  Surgeon: Garland Mehta M.D.;  Location: SURGERY [facility];  Service: Gen Robotic    MD CYSTOSCOPY,INSERT URETERAL STENT Left 1/22/2020    Procedure: CYSTOSCOPY, WITH URETERAL STENT INSERTION;  Surgeon: Paul Quiles M.D.;  Location: SURGERY [facility];  Service: Urology    MD CYSTO/URETERO/PYELOSCOPY, DX Left 1/22/2020    Procedure: URETEROSCOPY;  Surgeon: Paul Quiles M.D.;  Location: SURGERY [facility];  Service: Urology    LASERTRIPSY Left 1/22/2020    Procedure: LITHOTRIPSY, USING LASER;  Surgeon: Paul Quiles M.D.;  Location: SURGERY [facility];  Service: Urology    SEPTOPLASTY N/A 5/4/2018    Procedure: SEPTOPLASTY;  Surgeon: Jesse Saavedra M.D.;  Location: SURGERY SAME DAY [facility];  Service: Ent    TURBINATE REDUCTION Bilateral 5/4/2018    Procedure: TURBINATE REDUCTION- RESECTION  "WITH SUBMUCOSAL APPROACH;  Surgeon: Jesse Saavedra M.D.;  Location: SURGERY SAME DAY [facility];  Service: Ent    RECOVERY  11/1/2012    Performed by [facility]Recovery Surgery at SURGERY SAME DAY [facility]    PERCUTANEOUS NEPHROSTOLITHOTOMY  5/2/2012    Performed by KAREN KOLB at SURGERY [facility]    PERCUTANEOUS NEPHROSTOLITHOTOMY  4/5/2012    Performed by KAREN KOLB at SURGERY [facility]    RECOVERY  4/4/2012    Performed by SURGERY, IR-RECOVERY at SURGERY SAME DAY [facility]    CYSTOSCOPY STENT PLACEMENT  2/24/2012    Performed by KAREN KOLB at SURGERY [facility]    APPENDECTOMY  1987    APPENDECTOMY      STENT PLACEMENT      heart    ZZZ CARDIAC CATH         Past Medical History:   Diagnosis Date    Adverse effect of anesthesia     \"stopped breathing/elevated bp\"2012    Anesthesia     \"stopped breathing/elevated bp\"2012    Atrial fibrillation (HCC)     Breath shortness     when he quit smoking/with exertion    CAD (coronary artery disease)     COPD     Dental disorder     lower partial    Emphysema of lung (HCC)     Hyperlipidemia     Hypertension     Kidney stone     Oxygen dependent     q hs prn 2 ltr    Paroxysmal atrial fibrillation (HCC)     PVD (peripheral vascular disease) ([facility])     R iliac artery stent    Renal disorder     right kidney stone/stent in place       Current Outpatient Medications   Medication Sig Dispense Refill    fluticasone-umeclidinium-vilanterol (TRELEGY ELLIPTA) 100-62.5-25 mcg/act inhaler Inhale 1 Puff every day. 3 Each 3    zolpidem (AMBIEN) 5 MG Tab Take 1 Tablet by mouth at bedtime as needed for Sleep for up to 90 days. 30 Tablet 2    VENTOLIN  (90 Base) MCG/ACT Aero Soln inhalation aerosol INHALE 2 PUFFS BY MOUTH EVERY 6 HOURS AS NEEDED 3 Each 3    amLODIPine (NORVASC) 10 MG Tab Take 1 Tablet by mouth every morning on an empty stomach. 90 Tablet 3    atorvastatin (LIPITOR) 40 MG Tab Take 1 Tablet by mouth every evening. 90 Tablet 3    " "carvedilol (COREG) 12.5 MG Tab TAKE 1 TABLET BY MOUTH TWICE DAILY WITH MEALS 180 Tablet 3    losartan (COZAAR) 100 MG Tab Take 1 Tablet by mouth every evening. 90 Each 1    ipratropium-albuterol (DUONEB) 0.5-2.5 (3) MG/3ML nebulizer solution Take 3 mL by nebulization every four hours as needed for Shortness of Breath. 120 mL 11    aspirin (ASA) 81 MG Chew Tab chewable tablet Chew 1 Tablet every day. CHEW AND SWALLOW 90 Tablet 3     No current facility-administered medications for this visit.       No Known Allergies    Objective  BP (!) 163/78 (BP Location: Right arm, Patient Position: Sitting, BP Cuff Size: Adult)   Pulse 65   Temp 37.4 °C (99.4 °F) (Temporal)   Ht 1.753 m (5' 9\")   Wt 83.5 kg (184 lb)   SpO2 (!) 81%   Physical Exam  Constitutional:       Appearance: Normal appearance.   HENT:      Head: Normocephalic and atraumatic.   Pulmonary:      Comments: On oxygen    Abdominal:      General: Abdomen is flat. There is no distension.      Palpations: Abdomen is soft.      Tenderness: There is no abdominal tenderness. There is no right CVA tenderness or left CVA tenderness.   Skin:     General: Skin is warm and dry.      Comments: Scar in right groin/lower quadrant   Neurological:      General: No focal deficit present.      Mental Status: He is alert.   Psychiatric:         Mood and Affect: Mood normal.         Behavior: Behavior normal.         Labs:   BMP   Lab Results   Component Value Date/Time    SODIUM 145 05/07/2024 0937    POTASSIUM 4.8 05/07/2024 0937    CHLORIDE 109 05/07/2024 0937    CO2 25 05/07/2024 0937    GLUCOSE 140 (H) 05/07/2024 0937    BUN 24 (H) 05/07/2024 0937    CREATININE 2.02 (H) 05/07/2024 0937    CALCIUM 9.4 05/07/2024 0937         Imaging:   MRI ABD PELV W & W/O   MR-ABDOMEN-WITH & W/O 05/13/2024    Narrative  5/13/2024 2:49 PM    HISTORY/REASON FOR EXAM: LEFT renal mass, follow-up    TECHNIQUE/EXAM DESCRIPTION: MRI of the kidneys with dynamic IV gadolinium enhancement.    MR " imaging of the kidneys was performed.  MR images of the kidneys were obtained with coronal single-shot fast spin-echo T2, axial fat-suppressed FRFSE T2, axial in-phase and out-of-phase T1 FSPGR, dynamic bolus intravenous gadolinium-enhanced  fat-suppressed precontrast, arterial dominant, and nephrographic phase axial T1 FSPGR, and delayed coronal and sagittal fat-suppressed T1 FSPGR sequences.    The study was performed on a Maciej 1.5 Ruth MRI scanner.    15 mL ProHance contrast was administered intravenously.    COMPARISON: Renal ultrasound 3/22/2024    FINDINGS:  The liver is unremarkable.  The spleen is unremarkable.  Adrenal glands are unremarkable.  The pancreas is unremarkable.  Gallbladder shows no signal voids consistent with stones.  Common bile duct measures 7 mm.  No signal void to indicate stone.  RIGHT kidney shows dilated collecting system and ureter.  Nonenhancing intermediate T2 signal material within the collecting system, likely debris.  Diffuse cortical thinning.  Small excrescence proteinaceous cyst.  LEFT kidney shows heterogeneous T2 hypointense enhancing lesion measuring 4.5 x 4.0 x 3.7 cm located at the upper pole.  No retroperitoneal adenopathy.  Atherosclerotic changes with distal stenosis of the abdominal aorta with probable RIGHT common iliac stent present.  Renal veins enhance normally.    Impression  1.  Heterogeneous enhancing mass at the mid to upper pole LEFT kidney measuring 4.5 cm, consistent with renal cell carcinoma.  2.  No evidence to suggest metastatic disease.  3.  Atrophic RIGHT kidney with hydroureteronephrosis, nonobstructing stones and probable debris within the intrarenal collecting system.  4.  Small excrescence proteinaceous cyst RIGHT mid kidney posteriorly, Bosniak 2.  5.  Cholelithiasis.      Assessment    Mr. Sims is a 73 year old gentleman with a 4.7 x 4.0 x 3.7 endophytic left renal mass at the upper to mid-pole on MRI.  The imaging characteristics were  suspicious for malignancy enhancement. The images were personally reviewed and discussed with the patient.       For masses greater than 3 cm we recommend surgical intervention with either partial or total nephrectomy depending on the size or location given their increased risk of developing into a malignancy.    We discussed nephrology referral is indicated for patients with a baseline GFR <45, confirmed proteinuria, diabetics with CKD, or when expected GFR after intervention is <30.  We discussed that he is at risk for needing dialysis after surgery although this is less likely with a partial nephrectomy. Due to his pulmonary status he is not a good candidate for a robotic partial nephrectomy.    Based off imaging I recommend nephrology consultation and open left partial nephrectomy possible left radical nephrectomy . The patient would like to proceed with nephrology consultation and open left partial nephrectomy .        Plan    Problem List Items Addressed This Visit       Left renal mass     Referral to nephrology  Schedule for left open partial nephrectomy possible radical nephrectomy  Patient will need cardiology and pulmonology clearance

## 2024-05-31 ENCOUNTER — TELEPHONE (OUTPATIENT)
Dept: UROLOGY | Facility: MEDICAL CENTER | Age: 74
End: 2024-05-31
Payer: COMMERCIAL

## 2024-05-31 NOTE — TELEPHONE ENCOUNTER
Spoke with pt to f/u in regards to scheduling procedure, reiterated to pt that I am waiting for July schedule for Marty and schedule as a whole for Dr. Siddiqi, pt vocalized concerns for cancer dx, stated I will reiterate concern to providers and leadership, verified pt has direct office number, verified pt's contact information is up to date and pt didn't have any additional questions at this time

## 2024-06-04 ENCOUNTER — PATIENT MESSAGE (OUTPATIENT)
Dept: MEDICAL GROUP | Facility: PHYSICIAN GROUP | Age: 74
End: 2024-06-04
Payer: COMMERCIAL

## 2024-06-04 RX ORDER — LOSARTAN POTASSIUM 100 MG/1
100 TABLET ORAL EVERY EVENING
Qty: 90 EACH | Refills: 3 | Status: SHIPPED | OUTPATIENT
Start: 2024-06-04

## 2024-06-04 NOTE — PATIENT COMMUNICATION
Received request via: Patient    Was the patient seen in the last year in this department? Yes    Does the patient have an active prescription (recently filled or refills available) for medication(s) requested? No    Pharmacy Name:     Does the patient have intermediate Plus and need 100 day supply (blood pressure, diabetes and cholesterol meds only)? Patient does not have SCP

## 2024-06-13 ENCOUNTER — APPOINTMENT (OUTPATIENT)
Dept: ADMISSIONS | Facility: MEDICAL CENTER | Age: 74
End: 2024-06-13
Attending: STUDENT IN AN ORGANIZED HEALTH CARE EDUCATION/TRAINING PROGRAM
Payer: COMMERCIAL

## 2024-06-18 ENCOUNTER — PRE-ADMISSION TESTING (OUTPATIENT)
Dept: ADMISSIONS | Facility: MEDICAL CENTER | Age: 74
End: 2024-06-18
Attending: STUDENT IN AN ORGANIZED HEALTH CARE EDUCATION/TRAINING PROGRAM
Payer: COMMERCIAL

## 2024-06-18 NOTE — OR NURSING
Pt states that he stopped breathing and had elevated BP with surgery in 2012.  Unsure if a code was called.

## 2024-06-20 ENCOUNTER — PRE-ADMISSION TESTING (OUTPATIENT)
Dept: ADMISSIONS | Facility: MEDICAL CENTER | Age: 74
End: 2024-06-20
Attending: STUDENT IN AN ORGANIZED HEALTH CARE EDUCATION/TRAINING PROGRAM
Payer: COMMERCIAL

## 2024-06-20 DIAGNOSIS — Z01.812 PRE-OPERATIVE LABORATORY EXAMINATION: ICD-10-CM

## 2024-06-20 DIAGNOSIS — Z01.810 PRE-OPERATIVE CARDIOVASCULAR EXAMINATION: ICD-10-CM

## 2024-06-20 LAB
ANION GAP SERPL CALC-SCNC: 8 MMOL/L (ref 7–16)
BASOPHILS # BLD AUTO: 0.3 % (ref 0–1.8)
BASOPHILS # BLD: 0.03 K/UL (ref 0–0.12)
BUN SERPL-MCNC: 23 MG/DL (ref 8–22)
CALCIUM SERPL-MCNC: 9.4 MG/DL (ref 8.5–10.5)
CHLORIDE SERPL-SCNC: 107 MMOL/L (ref 96–112)
CO2 SERPL-SCNC: 27 MMOL/L (ref 20–33)
CREAT SERPL-MCNC: 2.02 MG/DL (ref 0.5–1.4)
EKG IMPRESSION: NORMAL
EOSINOPHIL # BLD AUTO: 0.12 K/UL (ref 0–0.51)
EOSINOPHIL NFR BLD: 1.1 % (ref 0–6.9)
ERYTHROCYTE [DISTWIDTH] IN BLOOD BY AUTOMATED COUNT: 50.2 FL (ref 35.9–50)
GFR SERPLBLD CREATININE-BSD FMLA CKD-EPI: 34 ML/MIN/1.73 M 2
GLUCOSE SERPL-MCNC: 121 MG/DL (ref 65–99)
HCT VFR BLD AUTO: 44.8 % (ref 42–52)
HGB BLD-MCNC: 14.5 G/DL (ref 14–18)
IMM GRANULOCYTES # BLD AUTO: 0.04 K/UL (ref 0–0.11)
IMM GRANULOCYTES NFR BLD AUTO: 0.4 % (ref 0–0.9)
INR PPP: 1.09 (ref 0.87–1.13)
LYMPHOCYTES # BLD AUTO: 1.11 K/UL (ref 1–4.8)
LYMPHOCYTES NFR BLD: 9.9 % (ref 22–41)
MCH RBC QN AUTO: 31.2 PG (ref 27–33)
MCHC RBC AUTO-ENTMCNC: 32.4 G/DL (ref 32.3–36.5)
MCV RBC AUTO: 96.3 FL (ref 81.4–97.8)
MONOCYTES # BLD AUTO: 0.73 K/UL (ref 0–0.85)
MONOCYTES NFR BLD AUTO: 6.5 % (ref 0–13.4)
NEUTROPHILS # BLD AUTO: 9.18 K/UL (ref 1.82–7.42)
NEUTROPHILS NFR BLD: 81.8 % (ref 44–72)
NRBC # BLD AUTO: 0 K/UL
NRBC BLD-RTO: 0 /100 WBC (ref 0–0.2)
PLATELET # BLD AUTO: 197 K/UL (ref 164–446)
PMV BLD AUTO: 8.8 FL (ref 9–12.9)
POTASSIUM SERPL-SCNC: 5.1 MMOL/L (ref 3.6–5.5)
PROTHROMBIN TIME: 14.3 SEC (ref 12–14.6)
RBC # BLD AUTO: 4.65 M/UL (ref 4.7–6.1)
SODIUM SERPL-SCNC: 142 MMOL/L (ref 135–145)
WBC # BLD AUTO: 11.2 K/UL (ref 4.8–10.8)

## 2024-06-20 PROCEDURE — 36415 COLL VENOUS BLD VENIPUNCTURE: CPT

## 2024-06-20 PROCEDURE — 85610 PROTHROMBIN TIME: CPT

## 2024-06-20 PROCEDURE — 80048 BASIC METABOLIC PNL TOTAL CA: CPT

## 2024-06-20 PROCEDURE — 93010 ELECTROCARDIOGRAM REPORT: CPT | Performed by: INTERNAL MEDICINE

## 2024-06-20 PROCEDURE — 85025 COMPLETE CBC W/AUTO DIFF WBC: CPT

## 2024-06-20 PROCEDURE — 93005 ELECTROCARDIOGRAM TRACING: CPT

## 2024-06-26 ENCOUNTER — OFFICE VISIT (OUTPATIENT)
Dept: MEDICAL GROUP | Facility: PHYSICIAN GROUP | Age: 74
End: 2024-06-26
Payer: COMMERCIAL

## 2024-06-26 VITALS
SYSTOLIC BLOOD PRESSURE: 132 MMHG | DIASTOLIC BLOOD PRESSURE: 50 MMHG | HEIGHT: 69 IN | WEIGHT: 180 LBS | TEMPERATURE: 99.2 F | HEART RATE: 62 BPM | OXYGEN SATURATION: 90 % | BODY MASS INDEX: 26.66 KG/M2

## 2024-06-26 DIAGNOSIS — Z01.818 PREOPERATIVE EXAMINATION: ICD-10-CM

## 2024-06-26 DIAGNOSIS — F51.01 PRIMARY INSOMNIA: ICD-10-CM

## 2024-06-26 RX ORDER — ZOLPIDEM TARTRATE 5 MG/1
5 TABLET ORAL
Qty: 30 TABLET | Refills: 2 | Status: SHIPPED | OUTPATIENT
Start: 2024-07-05 | End: 2024-10-03

## 2024-06-26 ASSESSMENT — FIBROSIS 4 INDEX: FIB4 SCORE: 1.12

## 2024-06-26 NOTE — PROGRESS NOTES
REASON FOR VISIT: Pre-Op Consultation  Consultation Requested by: Urology  Procedure date and type: Partial nephrectomy 7/1/24  Inherent cardiac risk of procedure: Intermediate    History of condition for which surgery is planned: Left renal mass concerning for malignancy.      Current chronic conditions: COPD, CAD s/p cardiac stent in 2019, hypertension, hyperlipidemia, prediabetes, CKD.    Past medical history:  has a past medical history of Adverse effect of anesthesia, Anesthesia, Atrial fibrillation (HCC) (06/18/2024), Breath shortness (06/18/2024), CAD (coronary artery disease), COPD, Dental disorder (06/18/2024), Emphysema of lung (HCC) (06/18/2024), Hiatus hernia syndrome (06/18/2024), High cholesterol (06/18/2024), Hyperlipidemia, Hypertension (06/18/2024), Kidney stone, Oxygen dependent, Paroxysmal atrial fibrillation ([facility]), Pneumonia (06/18/2024), PVD (peripheral vascular disease) ([facility]), Renal disorder (06/18/2024), and Rheumatic fever (06/18/2024). Negative for: SBE, CVA, TIA, DVT, PE, bleeding requiring transfusion, intubation.    Surgical and anesthetic history:  has a past surgical history that includes cystoscopy stent placement (02/24/2012); recovery (04/04/2012); percutaneous nephrostolithotomy (04/05/2012); percutaneous nephrostolithotomy (05/02/2012); recovery (11/01/2012); appendectomy (1987); septoplasty (N/A, 05/04/2018); turbinate reduction (Bilateral, 05/04/2018); [facility] [place] cardiac cath; stent placement; pr cystoscopy,insert ureteral stent (Left, 01/22/2020); pr cysto/uretero/pyeloscopy, dx (Left, 01/22/2020); lasertripsy (Left, 01/22/2020); inguinal hernia repair robotic xi (Bilateral, 05/23/2023); and other abdominal surgery (06/18/2024).  The patient reports a history of complication after surgery causing respiratory arrest, however has had multiple surgeries under general anesthesia since that event without complication, bleeding, reaction to anesthetic, prolonged recovery.    Habits:   Social  History     Tobacco Use    Smoking status: Former     Current packs/day: 0.00     Average packs/day: 1 pack/day for 25.0 years (25.0 ttl pk-yrs)     Types: Cigarettes     Start date: 1987     Quit date: 2012     Years since quittin.3    Smokeless tobacco: Never    Tobacco comments:     2 ppd times last 6 months of smoking history   Vaping Use    Vaping status: Never Used   Substance Use Topics    Alcohol use: Yes     Comment: occasional on holidays    Drug use: No       No Known Allergies      Current medications:   Current Outpatient Medications   Medication Sig Dispense Refill    [START ON 2024] zolpidem (AMBIEN) 5 MG Tab Take 1 Tablet by mouth at bedtime as needed for Sleep for up to 90 days. 30 Tablet 2    losartan (COZAAR) 100 MG Tab Take 1 Tablet by mouth every evening. (Patient taking differently: Take 100 mg by mouth every evening.     CONTINUE TAKING MED PRIOR TO SURGERY, BUT HOLD MORNING OF PROCEDURE) 90 Each 3    fluticasone-umeclidinium-vilanterol (TRELEGY ELLIPTA) 100-62.5-25 mcg/act inhaler Inhale 1 Puff every day. (Patient taking differently: Inhale 1 Puff every day.     CONTINUE TAKING PRIOR TO SURGERY AND DAY OF SURGERY) 3 Each 3    VENTOLIN  (90 Base) MCG/ACT Aero Soln inhalation aerosol INHALE 2 PUFFS BY MOUTH EVERY 6 HOURS AS NEEDED (Patient taking differently: INHALE 2 PUFFS BY MOUTH EVERY 6 HOURS AS NEEDED    CONTINUE TAKING PRIOR TO SURGERY AND DAY OF SURGERY IR NEEDED) 3 Each 3    amLODIPine (NORVASC) 10 MG Tab Take 1 Tablet by mouth every morning on an empty stomach. (Patient taking differently: Take 10 mg by mouth every morning on an empty stomach.     CONTINUE TAKING PRIOR TO SURGERY AND DAY OF SURGERY) 90 Tablet 3    atorvastatin (LIPITOR) 40 MG Tab Take 1 Tablet by mouth every evening. 90 Tablet 3    carvedilol (COREG) 12.5 MG Tab TAKE 1 TABLET BY MOUTH TWICE DAILY WITH MEALS (Patient taking differently: TAKE 1 TABLET BY MOUTH TWICE DAILY WITH MEALS    CONTINUE  "TAKING PRIOR TO SURGERY AND DAY OF SURGERY) 180 Tablet 3    ipratropium-albuterol (DUONEB) 0.5-2.5 (3) MG/3ML nebulizer solution Take 3 mL by nebulization every four hours as needed for Shortness of Breath. (Patient taking differently: Take 3 mL by nebulization every four hours as needed for Shortness of Breath.     CONTINUE TAKING PRIOR TO SURGERY AND DAY OF SURGERY) 120 mL 11    aspirin (ASA) 81 MG Chew Tab chewable tablet Chew 1 Tablet every day. CHEW AND SWALLOW (Patient taking differently: Chew 81 mg every day. CHEW AND SWALLOW    CHECK WITH PRESCRIBING PHYSICIAN FOR INSTRUCTIONS) 90 Tablet 3     No current facility-administered medications for this visit.     Patient not taking any blood thinning medications or herbal supplements.              [facility] Activity Status Index: 24.95  METS: 5.81  ASA Class: 3    ROS: negative for: CP, SOB, HERMAN, Orthopnea, wheezing, leg edema, polydipsia, polyuria, fevers, chills, sweats, cough, cold, congestion, abd pain, reflux, black or bloody stools, weight loss/gain.    PHYSICAL EXAMINATION:  VITAL SIGNS: /50 (BP Location: Left arm, Patient Position: Sitting, BP Cuff Size: Adult)   Pulse 62   Temp 37.3 °C (99.2 °F) (Temporal)   Ht 1.753 m (5' 9\")   Wt 81.6 kg (180 lb)   SpO2 90%  Body mass index is 26.58 kg/m².  HEENT: EOMI, PERRL. Oropharynx pink, moist. Normal airway. Neck supple, no cervical lymphadenopathy.  LUNGS: CTAB good excursion.   HEART: RRR no murmur.  ABDOMEN: soft, nondistended, nontender normal BS. No HSM.  LOWER EXTREMITIES: warm and well perfused with no edema.    Labs: See attached/per surgeon        ASSESSMENT & PLAN:  1. Preoperative examination  Chronic conditions discussed.  Planned surgery: Left partial nephrectomy   High risk medical conditions: Positive CAD, COPD. Negative for Bleeding, Poor healing, Thrombosis, Debility  Chronic medical conditions: Stable and controlled. Has seen pulmonology in the past 3 months. Continue current medicines. " Okay to hold aspirin 7 days prior to surgery.  Discontinue all herbal supplements 2 weeks prior to surgery.  This patient is considered High risk for cardiopulmonary complications for this planned surgery by the Paco index    2. Primary insomnia  Chronic, stable condition. On Ambien 5 mg daily. He does not have any significant side effects with this medication. We will continue the current regimen.  Informed consent and controlled substance treatment agreement on file.  Urine drug screen is up-to-date and appropriate.  Risks versus benefits were reviewed with the patient. I have reviewed the patient's prescription history as maintained by the [place] prescription monitoring program today. Patient will be provided with refills for the next 3 months.   - zolpidem (AMBIEN) 5 MG Tab; Take 1 Tablet by mouth at bedtime as needed for Sleep for up to 90 days.  Dispense: 30 Tablet; Refill: 2        Return in about 3 months (around 9/26/2024) for follow up.

## 2024-07-01 NOTE — PROGRESS NOTES
Medication history reviewed with PT at bedside    Med rec is complete per PT reporting    Allergies reviewed.     Patient denies any outpatient antibiotics in the last 30 days.     Patient is not taking anticoagulants.    Preferred pharmacy for this visit - [facility] on [address] and [address] ([phone])

## 2024-07-02 PROBLEM — E87.5 HYPERKALEMIA: Status: ACTIVE | Noted: 2024-01-01

## 2024-07-02 PROBLEM — D72.823 LEUKEMOID REACTION: Status: ACTIVE | Noted: 2024-01-01

## 2024-07-02 PROBLEM — D62 ACUTE BLOOD LOSS AS CAUSE OF POSTOPERATIVE ANEMIA: Status: ACTIVE | Noted: 2024-01-01

## 2024-07-02 NOTE — PROGRESS NOTES
Pt's family updated on status and plan of care with all questions answered. Pt informed his family is already up in his room and verbalizes understanding.

## 2024-07-02 NOTE — OR NURSING
Pt is aaox4, resting comfortably in hospital bed on 5lpm oxymask. His respirations are equal and unlabored, he is in no acute distress. He does not complain of pain. CMS and neuro are intact, dr peck informed and Ok-ed to start the pt's epidural infusion. Pt tolerates well. Surgical site is clean, dry and intact with ice pack in place. Red tinged urine noted to coburn catheter and informed this is to be expected, no clots noted upon urine assessment. Will continue to monitor.

## 2024-07-02 NOTE — ASSESSMENT & PLAN NOTE
Continue with home dose Trelegy Ellipta   Duonebs PRN   Not signs for exacerbation  Follow-up with for allergies as outpatient  RT per protocol.  Wean steroids  Titrate oxygen as able to keep SPO2 >89%  3 to 4 L nasal cannula encourage deep inspiratory efforts keeping hemoglobin greater than 7   Group Topic: BH Symptom Management    Date: 7/24/2023  Start Time:  1:00 PM  End Time:  1:50 PM  Facilitators: Mami Antonio LPC    Focus: Interpersonal Relationships-Communication  Number in attendance: 6    Group finished discussing the communication styles.    Method: Group  Attendance: Present; Stepped out for 5 minutes  Patient Response: Appeared distracted and Interactive    Pt was contributed to group discussion at times. He was observed to pace the room and did step out for 5 minutes.    Mami Antonio LPC  7/24/23

## 2024-07-02 NOTE — ASSESSMENT & PLAN NOTE
He is not on anticoagulation  Per chart review has history atrial fibrillation 2019 due to COPD exacerbation one-time, and no indication for anticoagulation since then  7/4- HR increasing, pt did miss couple of dosage of carvedilol. Continue to monitor on telemetry. Resumed carvedilol. Continue to monitor closely.   7/8- continue tele. Resume blood thinners when ok per urology   7/11 on amiodarone drip and 7/12 restarted heparin drip and monitor hgb nd sign of further hematura  (On heparin drip for afib hx but also for aortic thrombus)  7/14 continuing IV amiodarone as patient may require to be n.p.o. for possible embolectomy.  Once able to safely take orals we will switch him over  7/15 was in atrial fibrillation overnight converted to sinus rhythm this morning.  Continue IV until able to take orals safely.  Holding off on anticoagulation currently due to ongoing bleeding  7/16, continued to have rate controlled A-fib  7/17 will transition to oral amiodarone  Maintaining rate control, monitor currently not yet a candidate for anticoagulation

## 2024-07-02 NOTE — PROGRESS NOTES
4 Eyes Skin Assessment Completed by ABRAHAM Elaine and Venkat VASQUEZ.    Head Scab  Ears WDL  Nose WDL  Mouth WDL  Neck Redness and Blanching  Breast/Chest Redness and Blanching  Shoulder Blades WDL  Spine Redness and Blanching  (R) Arm/Elbow/Hand Redness, Blanching, and Scab  (L) Arm/Elbow/Hand Redness, Blanching, and Scab  Abdomen Redness, Blanching, Scar, Scab, Abrasion, Bruising, and Incision  Groin Redness and Blanching  Scrotum/Coccyx/Buttocks Redness, Blanching, Excoriation, Moisture Fissure, Scar, and Scab  (R) Leg Redness and Blanching  (L) Leg Redness and Blanching  (R) Heel/Foot/Toe Redness and Blanching  (L) Heel/Foot/Toe Redness and Blanching          Devices In Places Tele Box, Blood Pressure Cuff, Pulse Ox, Lauren, and Nasal Cannula      Interventions In Place Gray Ear Foams, NC W/Ear Foams, Pillows, Barrier Cream, and Heels Loaded W/Pillows    Possible Skin Injury No    Pictures Uploaded Into Epic No, needs to be completed  Wound Consult Placed N/A  RN Wound Prevention Protocol Ordered No

## 2024-07-02 NOTE — CONSULTS
"[facility] -  CONSULTATION NOTE               Author: Delvis Merino M.D. Date & Time: 7/2/2024  8:51 AM       REASON FOR CONSULTATION:   Acute Renal Failure    CHIEF COMPLAINT:   Flank pain    HISTORY OF PRESENT ILLNESS:    74 y.o. male with CKD, left renal mass, COPD with chronic hypoxic respiratory failure, atrial fibrillation, hypertension, and coronary artery disease presented yesterday for open left partial nephrectomy nephrectomy. Continued to take losartan until day of surgery. Baseline cr prior to procedure appears ~2. Underwent procedure yesterday.  Op note without complications noted and only 150cc blood loss documented. Potassium elevated to 6.7 last night, temporizing measures given. Cr elevated to 3.2 this AM.  80cc UOP documented despite 3L IVF. Lauren draining with blood tinged urine, no clots. No chest pain. Shortness or breath stable.Previously followed by [facility] [place] [address] with CKD felt 2/2 HTN at that point in time. Past UAs with blood and protein.     REVIEW OF SYSTEMS:    10 point ROS performed, negative other than stated above    PAST MEDICAL HISTORY:   Past Medical History:   Diagnosis Date    Adverse effect of anesthesia     \"stopped breathing/elevated bp\"2012    Anesthesia     \"stopped breathing/elevated bp\"2012    Atrial fibrillation (HCC) 06/18/2024    history of    Breath shortness 06/18/2024    when he quit smoking/with exertion    CAD (coronary artery disease)     COPD     Dental disorder 06/18/2024    lower partial    Emphysema of lung (HCC) 06/18/2024    COPD, inhalers, and O2    Hiatus hernia syndrome 06/18/2024    repaired 2023    High cholesterol 06/18/2024    medicated    Hyperlipidemia     Hypertension 06/18/2024    medicated    Kidney stone     Oxygen dependent     q hs prn 2 ltr    Paroxysmal atrial fibrillation (HCC)     Pneumonia 06/18/2024    history of    PVD (peripheral vascular disease) ([facility])     R iliac artery stent    Renal disorder " 06/18/2024    right kidney stone/stent in place    Rheumatic fever 06/18/2024    history of       PAST SURGICAL HISTORY:      Past Surgical History:   Procedure Laterality Date    OTHER ABDOMINAL SURGERY  06/18/2024    hiatal hernia repair 2023    INGUINAL HERNIA REPAIR ROBOTIC XI Bilateral 05/23/2023    Procedure: ROBOTIC BILATERAL INGUINAL HERNIA REPAIR;  Surgeon: Garland Mehta M.D.;  Location: [facility];  Service: Gen Robotic    GA CYSTOSCOPY,INSERT URETERAL STENT Left 01/22/2020    Procedure: CYSTOSCOPY, WITH URETERAL STENT INSERTION;  Surgeon: Karen Quiles M.D.;  Location: [facility];  Service: Urology    GA CYSTO/URETERO/PYELOSCOPY, DX Left 01/22/2020    Procedure: URETEROSCOPY;  Surgeon: Karen Quiles M.D.;  Location: [facility];  Service: Urology    LASERTRIPSY Left 01/22/2020    Procedure: LITHOTRIPSY, USING LASER;  Surgeon: Karen Quiles M.D.;  Location: [facility];  Service: Urology    SEPTOPLASTY N/A 05/04/2018    Procedure: SEPTOPLASTY;  Surgeon: Jesse Saavedra M.D.;  Location: SURGERY SAME DAY [facility];  Service: Ent    TURBINATE REDUCTION Bilateral 05/04/2018    Procedure: TURBINATE REDUCTION- RESECTION WITH SUBMUCOSAL APPROACH;  Surgeon: Jesse Saavedra M.D.;  Location: SURGERY SAME DAY [facility];  Service: Ent    RECOVERY  11/01/2012    Performed by Ir-Recovery Surgery at SURGERY SAME DAY [facility]    PERCUTANEOUS NEPHROSTOLITHOTOMY  05/02/2012    Performed by KAREN QUILES at [facility]    PERCUTANEOUS NEPHROSTOLITHOTOMY  04/05/2012    Performed by KAREN QUILES at [facility]    RECOVERY  04/04/2012    Performed by SURGERY, IR-RECOVERY at SURGERY SAME DAY [facility]    CYSTOSCOPY STENT PLACEMENT  02/24/2012    Performed by KAREN QUILES at [facility]    APPENDECTOMY  1987    STENT PLACEMENT      heart    [facility] CATH         FAMILY HISTORY:   No family history of renal  "disease    SOCIAL HISTORY:   Past tobacco, +EtOH, No illicits    HOME MEDICATIONS:   No current facility-administered medications on file prior to encounter.     Current Outpatient Medications on File Prior to Encounter   Medication Sig Dispense Refill    tetrahydrozoline (VISINE) 0.05 % Solution Administer 1 Drop into both eyes 4 times a day as needed (red eyes).      fluticasone-umeclidinium-vilanterol (TRELEGY ELLIPTA) 100-62.5-25 mcg/act inhaler Inhale 1 Puff every day. 3 Each 3    amLODIPine (NORVASC) 10 MG Tab Take 1 Tablet by mouth every morning on an empty stomach. 90 Tablet 3    atorvastatin (LIPITOR) 40 MG Tab Take 1 Tablet by mouth every evening. 90 Tablet 3    carvedilol (COREG) 12.5 MG Tab TAKE 1 TABLET BY MOUTH TWICE DAILY WITH MEALS (Patient taking differently: Take 12.5 mg by mouth 2 times a day with meals.) 180 Tablet 3    VENTOLIN  (90 Base) MCG/ACT Aero Soln inhalation aerosol INHALE 2 PUFFS BY MOUTH EVERY 6 HOURS AS NEEDED (Patient taking differently: Inhale 1-2 Puffs every 6 hours as needed for Shortness of Breath.) 3 Each 3    ipratropium-albuterol (DUONEB) 0.5-2.5 (3) MG/3ML nebulizer solution Take 3 mL by nebulization every four hours as needed for Shortness of Breath. 120 mL 11    aspirin (ASA) 81 MG Chew Tab chewable tablet Chew 1 Tablet every day. CHEW AND SWALLOW 90 Tablet 3       ALLERGIES:  Patient has no known allergies.    PHYSICAL EXAM:  VS:  /59   Pulse 60   Temp 36.7 °C (98.1 °F) (Temporal)   Resp 18   Ht 1.753 m (5' 9\")   Wt 81.7 kg (180 lb 1.9 oz)   SpO2 96%   BMI 26.60 kg/m²   GENERAL: no acute distress  CV: RRR, No edema  RESP: Clear to ausculation bilaterally, No rhales  GI: Soft  MSK: No joint deformities   SKIN: No concerning rashes  NEURO: AOx3  PSYCH: Cooperative    LABS:  Recent Results (from the past 24 hour(s))   COD (Adult)    Collection Time: 07/01/24  1:00 PM   Result Value Ref Range    ABO Grouping Only AB     Rh Grouping Only POS     Antibody " Screen-Cod NEG    ABO Rh Confirm    Collection Time: 24  1:07 PM   Result Value Ref Range    ABO Rh Confirm AB POS    Histology Request    Collection Time: 24  4:08 PM   Result Value Ref Range    Pathology Request Sent to Histo    CBC without Differential    Collection Time: 24  8:19 PM   Result Value Ref Range    WBC 26.1 (H) 4.8 - 10.8 K/uL    RBC 3.80 (L) 4.70 - 6.10 M/uL    Hemoglobin 12.0 (L) 14.0 - 18.0 g/dL    Hematocrit 37.9 (L) 42.0 - 52.0 %    MCV 99.7 (H) 81.4 - 97.8 fL    MCH 31.6 27.0 - 33.0 pg    MCHC 31.7 (L) 32.3 - 36.5 g/dL    RDW 52.1 (H) 35.9 - 50.0 fL    Platelet Count 230 164 - 446 K/uL    MPV 9.0 9.0 - 12.9 fL   Basic Metabolic Panel    Collection Time: 24  8:19 PM   Result Value Ref Range    Sodium 134 (L) 135 - 145 mmol/L    Potassium 6.7 (HH) 3.6 - 5.5 mmol/L    Chloride 101 96 - 112 mmol/L    Co2 21 20 - 33 mmol/L    Glucose 150 (H) 65 - 99 mg/dL    Bun 31 (H) 8 - 22 mg/dL    Creatinine 2.80 (H) 0.50 - 1.40 mg/dL    Calcium 8.9 8.5 - 10.5 mg/dL    Anion Gap 12.0 7.0 - 16.0   ESTIMATED GFR    Collection Time: 24  8:19 PM   Result Value Ref Range    GFR (CKD-EPI) 23 (A) >60 mL/min/1.73 m 2   POTASSIUM SERUM (K)    Collection Time: 24 10:24 PM   Result Value Ref Range    Potassium 6.6 (HH) 3.6 - 5.5 mmol/L   EKG    Collection Time: 24 12:05 AM   Result Value Ref Range    Report       [facility]    Test Date:  2024  Pt Name:    KEIRA SOTOMAYOR             Department: 141  MRN:        [id number]                      Room:       T802  Gender:     Male                         Technician: ANANDA  :        1950                   Requested By:DAFNE J DE LA ROSA  Order #:    [id number]                    Reading MD: Alan Colon MD    Measurements  Intervals                                Axis  Rate:       67                           P:          77  LA:         197                          QRS:        -65  QRSD:       117                           T:          78  QT:         418  QTc:        442    Interpretive Statements  Sinus rhythm  Incomplete left bundle branch block  INFERIOR INFARCT, AGE INDETERMINATE  Compared to ECG 06/20/2024 10:09:22  Sinus bradycardia no longer present    Electronically Signed On 07- 03:47:42 PDT by Alan Colon MD     POCT glucose device results    Collection Time: 07/02/24  1:00 AM   Result Value Ref Range    POC Glucose, Blood 164 (H) 65 - 99 mg/dL   POCT glucose device results    Collection Time: 07/02/24  1:37 AM   Result Value Ref Range    POC Glucose, Blood 257 (H) 65 - 99 mg/dL   POCT glucose device results    Collection Time: 07/02/24  2:33 AM   Result Value Ref Range    POC Glucose, Blood 214 (H) 65 - 99 mg/dL   CBC without Differential    Collection Time: 07/02/24  3:07 AM   Result Value Ref Range    WBC 20.4 (H) 4.8 - 10.8 K/uL    RBC 3.49 (L) 4.70 - 6.10 M/uL    Hemoglobin 10.9 (L) 14.0 - 18.0 g/dL    Hematocrit 34.8 (L) 42.0 - 52.0 %    MCV 99.7 (H) 81.4 - 97.8 fL    MCH 31.2 27.0 - 33.0 pg    MCHC 31.3 (L) 32.3 - 36.5 g/dL    RDW 52.5 (H) 35.9 - 50.0 fL    Platelet Count 177 164 - 446 K/uL    MPV 9.2 9.0 - 12.9 fL   Basic Metabolic Panel    Collection Time: 07/02/24  3:07 AM   Result Value Ref Range    Sodium 131 (L) 135 - 145 mmol/L    Potassium 5.9 (H) 3.6 - 5.5 mmol/L    Chloride 99 96 - 112 mmol/L    Co2 20 20 - 33 mmol/L    Glucose 200 (H) 65 - 99 mg/dL    Bun 36 (H) 8 - 22 mg/dL    Creatinine 3.28 (H) 0.50 - 1.40 mg/dL    Calcium 9.1 8.5 - 10.5 mg/dL    Anion Gap 12.0 7.0 - 16.0   ESTIMATED GFR    Collection Time: 07/02/24  3:07 AM   Result Value Ref Range    GFR (CKD-EPI) 19 (A) >60 mL/min/1.73 m 2   POCT glucose device results    Collection Time: 07/02/24  5:12 AM   Result Value Ref Range    POC Glucose, Blood 162 (H) 65 - 99 mg/dL   POCT glucose device results    Collection Time: 07/02/24  6:04 AM   Result Value Ref Range    POC Glucose, Blood 227 (H) 65 - 99 mg/dL   POCT glucose device results     Collection Time: 07/02/24  6:40 AM   Result Value Ref Range    POC Glucose, Blood 198 (H) 65 - 99 mg/dL       (click the triangle to expand results)    IMAGING:  No orders to display       ASSESSMENT:  # Oliguric JAE: It setting of surgery/RAASI/decreased nephron mass  # CKD Stage 3B: Billed 2/2 HTN in the past. Urine with blood (renal mass) and protein  # Hyperkalemia  # Renal mass: s/p partial nephrectomy  # HTN  # COPD  # Anemia    PLAN:  -No role for RRT yet  -Lokelma TID  -BMP in PM  -Holding on further volume expansion unless develops hypotension   -Repeat urine studies  -Iron stores  -Renal diet  -Strict I/Os/continue coburn for now  -Dose all meds per eGFR <15     Thank you for this consult, we will continue to follow.    Delvis Merino MD

## 2024-07-02 NOTE — PROGRESS NOTES
NOC CROSSCOVER          Notified of critical K of 6.6 by bedside team. I ordered a stat EKG which revealed notable T wave changes. The hyperkalemia protocol was ordered with a repeat bmp ordered in 4 hours to continue to evaluate K levels. I also ordered the patient to be transferred to [facility] due to his significant electrolyte imbalance in addition to positive EKG changes.     Update: 0445, Notified of K of 5.9, I will order treatment for hyperkalemia and recheck a BMP later in the AM.       Mike PATEL

## 2024-07-02 NOTE — PROGRESS NOTES
Hospital Medicine Daily Progress Note    Date of Service  7/2/2024    Chief Complaint  Olman Sims is a 74 y.o. male admitted 7/1/2024 with elective partial nephrectomy    Hospital Course  80-year-old male past medical history of COPD on chronic oxygen 2-3 L at baseline, emphysema, hypertension, CAD, previous atrial fibrillation, PVD, previous rheumatic fever, previous nephrolithiasis status post nephrolithotomy was admitted for elective left partial nephrectomy also of JADIEL drain placement.  Postop then, they did with acute kidney injury, bleeding, hyperkalemia.  Nephrology consulted    Interval Problem Update  He is has no pain. He is anuric. I see blood on coburn and bag. I don't see clot. Also he is leaking from Prevena   Vitals are stable.   Hgb dropped to 10.9. he is hyperkalemic all day. Cr has worsen.   I did hold aspirin and heparin. I did discuss with nephrology. Started on Lokelma.   He is persistent hyperkalemic   CR continue to worsen  Monitor on telemetry   Renal US ordered urgently.   I did discuss with urology too.     I have discussed this patient's plan of care and discharge plan at IDT rounds today with Case Management, Nursing, Nursing leadership, and other members of the IDT team.    Consultants/Specialty  nephrology and urology    Code Status  Full Code    Disposition  The patient is not medically cleared for discharge to home or a post-acute facility.      I have placed the appropriate orders for post-discharge needs.    Review of Systems  Review of Systems   Constitutional:  Positive for malaise/fatigue. Negative for chills and fever.   HENT:  Negative for sore throat.    Respiratory:  Negative for cough and shortness of breath.    Cardiovascular:  Negative for chest pain, palpitations and leg swelling.   Gastrointestinal:  Negative for abdominal pain, blood in stool, constipation, diarrhea, heartburn, nausea and vomiting.   Genitourinary:  Positive for flank pain and hematuria.  Negative for dysuria and urgency.   Musculoskeletal:  Positive for back pain.   Neurological:  Negative for dizziness and headaches.   Psychiatric/Behavioral:  The patient is nervous/anxious.         Physical Exam  Temp:  [36.3 °C (97.3 °F)-36.9 °C (98.4 °F)] 36.9 °C (98.4 °F)  Pulse:  [60-82] 63  Resp:  [13-20] 17  BP: (116-150)/(53-66) 136/58  SpO2:  [93 %-100 %] 98 %    Physical Exam  Vitals and nursing note reviewed.   Constitutional:       General: He is not in acute distress.     Appearance: Normal appearance. He is ill-appearing.   HENT:      Head: Atraumatic.   Eyes:      General: No scleral icterus.  Cardiovascular:      Rate and Rhythm: Normal rate. Rhythm irregular.      Heart sounds: No murmur heard.     No gallop.   Pulmonary:      Effort: Pulmonary effort is normal. No respiratory distress.      Breath sounds: No wheezing.   Abdominal:      General: There is distension.      Palpations: Abdomen is soft.      Tenderness: There is no abdominal tenderness. There is right CVA tenderness and left CVA tenderness.      Comments: Leaking blood from the back, prevena on , external drain in place    Musculoskeletal:      Right lower leg: No edema.      Left lower leg: No edema.   Skin:     Coloration: Skin is not pale.   Neurological:      Mental Status: He is alert and oriented to person, place, and time.   Psychiatric:         Behavior: Behavior normal.         Thought Content: Thought content normal.         Judgment: Judgment normal.         Fluids    Intake/Output Summary (Last 24 hours) at 7/2/2024 1607  Last data filed at 7/2/2024 1430  Gross per 24 hour   Intake 4485 ml   Output 930 ml   Net 3555 ml       Laboratory  Recent Labs     07/01/24 2019 07/02/24  0307 07/02/24  1516   WBC 26.1* 20.4* 24.2*   RBC 3.80* 3.49* 3.09*   HEMOGLOBIN 12.0* 10.9* 9.9*   HEMATOCRIT 37.9* 34.8* 30.2*   MCV 99.7* 99.7* 97.7   MCH 31.6 31.2 32.0   MCHC 31.7* 31.3* 32.8   RDW 52.1* 52.5* 51.2*   PLATELETCT 230 177 180   MPV  9.0 9.2 9.2     Recent Labs     07/01/24 2019 07/01/24  2224 07/02/24  0307 07/02/24  1003   SODIUM 134*  --  131* 131*   POTASSIUM 6.7* 6.6* 5.9* 6.0*   CHLORIDE 101  --  99 97   CO2 21  --  20 21   GLUCOSE 150*  --  200* 130*   BUN 31*  --  36* 42*   CREATININE 2.80*  --  3.28* 3.88*   CALCIUM 8.9  --  9.1 8.6                   Imaging  US-RENAL    (Results Pending)        Assessment/Plan  * Left renal mass- (present on admission)  Assessment & Plan  Highly concern for malignancy   status post Open left partial nephrectomy nephrectomy on July 1, 2024  Managed by Urology   Follow-up pathology    Leukemoid reaction- (present on admission)  Assessment & Plan  Likely reactive.  Continue to monitor.  Consider antibiotic if any other signs of infection    Acute blood loss as cause of postoperative anemia- (present on admission)  Assessment & Plan  Hemoglobin is stable at this time  However continues to monitor closely since patient is bleeding, from coburn   Discussed with urology and waiting on further recs     Hyperkalemia- (present on admission)  Assessment & Plan  Due to JAE.  He did receive hyperkalemia protocol.    7/2/24-He remains persistent hyperkalemic. Nephrology consulted. Started on Lokelma   Continue to monitor BMP every 4 hours   Renal US to follow  I will start some IVF         Coronary artery disease involving native coronary artery of native heart without angina pectoris- (present on admission)  Assessment & Plan  NSTEMI 2019 s/p stent placement at [facility] at that time  hold ASA   Continue atorvastatin     History of atrial fibrillation- (present on admission)  Assessment & Plan  He is not on anticoagulation  Per chart review has history atrial fibrillation 2019 due to COPD exacerbation one-time, and no indication for anticoagulation since then    Essential hypertension- (present on admission)  Assessment & Plan  Holding Losartan in the setting of recent partial nephrectomy  Continue amlodipine        Peripheral vascular disease (HCC)- (present on admission)  Assessment & Plan  History of angioplasty stent placement by Dr. Vital 2012   hold aspirin  Follow-up as outpatient        COPD (chronic obstructive pulmonary disease) (HCC)- (present on admission)  Assessment & Plan  Continue with home dose Trelegy Ellipta   Duonebs PRN   Not signs for exacerbation  Follow-up with for allergies as outpatient      Acute kidney injury (JAE) with acute tubular necrosis (ATN) ([facility])- (present on admission)  Assessment & Plan  Cannot rule out obstruction versus renal.  No contrast exposure  Continue to monitor very closely  Continue IV fluid  Follow-up renal ultrasound  Discussed with nephrology and urology         VTE prophylaxis:   SCDs/TEDs      I have performed a physical exam and reviewed and updated ROS and Plan today (7/2/2024). In review of yesterday's note (7/1/2024), there are no changes except as documented above.    Patient is has a high medical complexity, complex decision making and is at high risk for complication, morbidity, and mortality.  My total time spent caring for the patient on the day of the encounter was 55 minutes.   This does not include time spent on separately billable procedures/tests.

## 2024-07-02 NOTE — HOSPITAL COURSE
80-year-old male past medical history of COPD on chronic oxygen 2-3 L at baseline, emphysema, hypertension, CAD, previous atrial fibrillation, PVD, previous rheumatic fever, previous nephrolithiasis status post nephrolithotomy was admitted for elective left partial nephrectomy also of JADIEL drain placement.  Postop then, they did with acute kidney injury, bleeding, hyperkalemia.  Nephrology consulted  Hematuria did resolve.  Patient was improving JADIEL drain removed 7/5.  He is still on Lauren.  Creatinine had improvement for couple of day and start to worsen again. Most likely ATN from partial nephrectomy  7/5- pt started to have complication of small bowel obstruction. NG tube placed 7/6 with low suction. He also started to noticed clot on urine. Started CBI. Felt slight better. 7/8- significantly worse. Requiring high flow oxygen, hypotensive. Cr worsen. Because of CBI not sure if pt is anuric. Rapid team and critical care doctor called and pt transfer to ICU for pressors and close monitoring. Stat CT scan ordered

## 2024-07-02 NOTE — PROGRESS NOTES
Bedside report received from PACU RN, assumed care upon arrival.  Assessment complete.  A&O x 4. Patient calls appropriately.  Patient ambulates with standby assist with FWW.   Patient has 6/10 pain. Patient medicated per epidural.  Denies N&V. Tolerating clear liquid diet.  Prevena [place] to surgical site. LLQ JADIEL, dressing leaking, dressing changed.  Lauren in place with bloody/ red tinged output.   Patient denies SOB. On 3L nasal cannula  SCD's on.  Patient calm, pleasant, and cooperative.  Review plan with of care with patient. Call light and personal belongings within reach. Hourly rounding in place. All needs met at this time.

## 2024-07-02 NOTE — ASSESSMENT & PLAN NOTE
History of angioplasty stent placement by Dr. Vital 2012   hold aspirin  Atorvastatin.  Control BP and lipids

## 2024-07-02 NOTE — PROGRESS NOTES
Surgical Progress Note    Author: GIOVANNI Vann Date & Time created: 2024   1:42 PM     Interval Events:    Pain adequately managed at this time. Patient reporting Pain 4-5/10 when he moves. No pain noted when he does not move.    K 5.9/6.0 today/ BUN 26 creat 3.28, nephrology consulted. H and H 10.9/34.8 stable    Lauren remains in place. Decreased urine output, urine is bloody which is to be expected.     Patient currently on 2L NC NAD  Abdomen is soft no n/v, not passing gas yet. Decreased appetite on CLD   ROS  Hemodynamics:  Temp (24hrs), Av.6 °C (97.8 °F), Min:36.3 °C (97.3 °F), Max:36.9 °C (98.4 °F)  Temperature: 36.9 °C (98.4 °F)  Pulse  Av.5  Min: 57  Max: 82   Blood Pressure : 136/58, Arterial BP: 109/100     Respiratory:    Respiration: 17, Pulse Oximetry: 98 %           Neuro:  GCS       Fluids:    Intake/Output Summary (Last 24 hours) at 2024 1342  Last data filed at 2024 0600  Gross per 24 hour   Intake 4210 ml   Output 870 ml   Net 3340 ml     Weight: 81.7 kg (180 lb 1.9 oz)  Current Diet Order   Procedures    Diet Order Diet: Clear Liquid     Physical Exam  Constitutional:       Appearance: Normal appearance.   HENT:      Head: Normocephalic and atraumatic.   Eyes:      Extraocular Movements: Extraocular movements intact.   Pulmonary:      Comments: 2LNC  Abdominal:      Palpations: Abdomen is soft.   Genitourinary:     Comments: Lauren in place draining bloody urine, no clots  Neurological:      Mental Status: He is alert.   Psychiatric:         Mood and Affect: Mood normal.       Labs:  Recent Results (from the past 24 hour(s))   Histology Request    Collection Time: 24  4:08 PM   Result Value Ref Range    Pathology Request Sent to Histo    CBC without Differential    Collection Time: 24  8:19 PM   Result Value Ref Range    WBC 26.1 (H) 4.8 - 10.8 K/uL    RBC 3.80 (L) 4.70 - 6.10 M/uL    Hemoglobin 12.0 (L) 14.0 - 18.0 g/dL    Hematocrit 37.9 (L) 42.0 -  52.0 %    MCV 99.7 (H) 81.4 - 97.8 fL    MCH 31.6 27.0 - 33.0 pg    MCHC 31.7 (L) 32.3 - 36.5 g/dL    RDW 52.1 (H) 35.9 - 50.0 fL    Platelet Count 230 164 - 446 K/uL    MPV 9.0 9.0 - 12.9 fL   Basic Metabolic Panel    Collection Time: 24  8:19 PM   Result Value Ref Range    Sodium 134 (L) 135 - 145 mmol/L    Potassium 6.7 (HH) 3.6 - 5.5 mmol/L    Chloride 101 96 - 112 mmol/L    Co2 21 20 - 33 mmol/L    Glucose 150 (H) 65 - 99 mg/dL    Bun 31 (H) 8 - 22 mg/dL    Creatinine 2.80 (H) 0.50 - 1.40 mg/dL    Calcium 8.9 8.5 - 10.5 mg/dL    Anion Gap 12.0 7.0 - 16.0   ESTIMATED GFR    Collection Time: 24  8:19 PM   Result Value Ref Range    GFR (CKD-EPI) 23 (A) >60 mL/min/1.73 m 2   POTASSIUM SERUM (K)    Collection Time: 24 10:24 PM   Result Value Ref Range    Potassium 6.6 (HH) 3.6 - 5.5 mmol/L   EKG    Collection Time: 24 12:05 AM   Result Value Ref Range    Report       [facility]    Test Date:  2024  Pt Name:    KEIRA SOTOMAYOR             Department: [facility]  MRN:        [id number]                      Room:       02  Gender:     Male                         Technician: ANANDA  :        1950                   Requested By:DAFNE DE LA ROSA  Order #:    [id number]                    Reading MD: Alan Colon MD    Measurements  Intervals                                Axis  Rate:       67                           P:          77  OH:         197                          QRS:        -65  QRSD:       117                          T:          78  QT:         418  QTc:        442    Interpretive Statements  Sinus rhythm  Incomplete left bundle branch block  INFERIOR INFARCT, AGE INDETERMINATE  Compared to ECG 2024 10:09:22  Sinus bradycardia no longer present    Electronically Signed On 2024 03:47:42 PDT by Alan Colon MD     POCT glucose device results    Collection Time: 24  1:00 AM   Result Value Ref Range    POC Glucose, Blood 164 (H) 65 - 99 mg/dL   POCT glucose  device results    Collection Time: 07/02/24  1:37 AM   Result Value Ref Range    POC Glucose, Blood 257 (H) 65 - 99 mg/dL   POCT glucose device results    Collection Time: 07/02/24  2:33 AM   Result Value Ref Range    POC Glucose, Blood 214 (H) 65 - 99 mg/dL   CBC without Differential    Collection Time: 07/02/24  3:07 AM   Result Value Ref Range    WBC 20.4 (H) 4.8 - 10.8 K/uL    RBC 3.49 (L) 4.70 - 6.10 M/uL    Hemoglobin 10.9 (L) 14.0 - 18.0 g/dL    Hematocrit 34.8 (L) 42.0 - 52.0 %    MCV 99.7 (H) 81.4 - 97.8 fL    MCH 31.2 27.0 - 33.0 pg    MCHC 31.3 (L) 32.3 - 36.5 g/dL    RDW 52.5 (H) 35.9 - 50.0 fL    Platelet Count 177 164 - 446 K/uL    MPV 9.2 9.0 - 12.9 fL   Basic Metabolic Panel    Collection Time: 07/02/24  3:07 AM   Result Value Ref Range    Sodium 131 (L) 135 - 145 mmol/L    Potassium 5.9 (H) 3.6 - 5.5 mmol/L    Chloride 99 96 - 112 mmol/L    Co2 20 20 - 33 mmol/L    Glucose 200 (H) 65 - 99 mg/dL    Bun 36 (H) 8 - 22 mg/dL    Creatinine 3.28 (H) 0.50 - 1.40 mg/dL    Calcium 9.1 8.5 - 10.5 mg/dL    Anion Gap 12.0 7.0 - 16.0   ESTIMATED GFR    Collection Time: 07/02/24  3:07 AM   Result Value Ref Range    GFR (CKD-EPI) 19 (A) >60 mL/min/1.73 m 2   POCT glucose device results    Collection Time: 07/02/24  5:12 AM   Result Value Ref Range    POC Glucose, Blood 162 (H) 65 - 99 mg/dL   POCT glucose device results    Collection Time: 07/02/24  6:04 AM   Result Value Ref Range    POC Glucose, Blood 227 (H) 65 - 99 mg/dL   POCT glucose device results    Collection Time: 07/02/24  6:40 AM   Result Value Ref Range    POC Glucose, Blood 198 (H) 65 - 99 mg/dL   POCT glucose device results    Collection Time: 07/02/24  8:15 AM   Result Value Ref Range    POC Glucose, Blood 160 (H) 65 - 99 mg/dL   Basic Metabolic Panel    Collection Time: 07/02/24 10:03 AM   Result Value Ref Range    Sodium 131 (L) 135 - 145 mmol/L    Potassium 6.0 (H) 3.6 - 5.5 mmol/L    Chloride 97 96 - 112 mmol/L    Co2 21 20 - 33 mmol/L     Glucose 130 (H) 65 - 99 mg/dL    Bun 42 (H) 8 - 22 mg/dL    Creatinine 3.88 (H) 0.50 - 1.40 mg/dL    Calcium 8.6 8.5 - 10.5 mg/dL    Anion Gap 13.0 7.0 - 16.0   ESTIMATED GFR    Collection Time: 07/02/24 10:03 AM   Result Value Ref Range    GFR (CKD-EPI) 16 (A) >60 mL/min/1.73 m 2     Medical Decision Making, by Problem:  Active Hospital Problems    Diagnosis     Left renal mass [N28.89]      Left renal mass noted incidentally on chest CT last month.  Follow-up renal ultrasound confirmed 4.9 x 4.5 x 4.8 cm LEFT renal mass.      Coronary artery disease involving native coronary artery of native heart without angina pectoris [I25.10]     Essential hypertension [I10]      This is a chronic condition.  Current Meds:   - losartan 100 mg daily  - carvedilol 12.5 mg BID  - amlodipine 10 mg daily  Side effects: none  Home BP Log: Typically 120s/60s  Associated symptoms: Denies chest pain, shortness of breath, headaches, dizziness/lightheadedness           COPD (chronic obstructive pulmonary disease) ([facility]) [J44.9]      This is a chronic condition.  Managed by pulmonology.  Patient is on Trelegy inhaler daily and albuterol as needed.  He is on supplemental oxygen at night and as needed throughout the day.   Symptoms currently controlled.        Plan:    Decreased appetite, continue clears until patient is increasing intake.    Nephrology consulted by hospitalist    Continue to monitor labs.    Up as tolerated and can advance diet as patient able to tolerate clears.    Keep coburn catheter for accurate I and O    Wound vac minimal output, no leak, continue to monitor    If you have any questions, please reach out to me on voalte  Lisa SHORT  [facility]    Quality Measures:  Quality-Core Measures    Discussed patient condition with Patient andpepe ROSARIO

## 2024-07-02 NOTE — PROGRESS NOTES
Wendy from Lab called with critical result of potassium at 6.7. Critical lab result read back to Wendy.   Dr. Ferguson notified of critical lab result at 2205.  Critical lab result read back by Dr. Ferguson, redraw ordered.

## 2024-07-02 NOTE — PROGRESS NOTES
Bedside report received from off going RN/tech: Chele RN, assumed care of patient.     Fall Risk Score: LOW RISK  Fall risk interventions in place: Place yellow fall risk ID band on patient, Provide patient/family education based on risk assessment, Educate patient/family to call staff for assistance when getting out of bed, Place patient in room close to nursing station, Utilize bed/chair fall alarm, Notify charge of high risk for huddle, and Bed alarm connected correctly  Bed type: Regular (Dieudonne Score less than 17 interventions in place)  Patient on cardiac monitor: Yes  IVF/IV medications: Infusion per MAR (List Med(s)) LR 70  Oxygen: How many liters 2L  Bedside sitter: Not Applicable   Isolation: Not applicable    Received report from night shift and pt was not placed on PCA. Per night shift, he spoke with provider and ok to continue oral medications.  It was mentioned no urin output was produced since arrival to floor and unsure of urine production prior to transfer. Pt was bladder scanned which showed 0mL, and irrigated with no blood clots. MD notified Delvis Smith.

## 2024-07-02 NOTE — PROGRESS NOTES
2308  Lazara from lab called for critical potassium at 6.6. Hospitalist notified.     2315  CNA called RN to bedside as CNA noticed JADIEL dressing saturated and leaking. Dressing changed with petroleum gauze, dry gauze, and hypafix. Patient stood up while linen was getting changed and RN noticed epidural site was leaking more prominent compared to beginning of shift. No neuro deficits at this time. Patient complaining of 6-7/10 pain but states he keeps forgetting to use the epidural button. Charge RN at bedside to assess epidural site.     Anesthesiologist notified and at bedside. Dr. St stated epidural not in place, to be taken out. Epidural removed by RN with Charge RN at bedside.     0010  Report given to T8 RN, ACT will transfer patient. Patient's belongings in bed. Patient on 2L O2 for transfer. Updated Amberle about current status and transfer.

## 2024-07-02 NOTE — ASSESSMENT & PLAN NOTE
7/19, so far holding  7/18 additional transfusion, no overt blood loss noted, close monitoring  7/17 continue to monitor closely since patient has ongoing hematuria and on CBI  7/16 stabilizing, monitoring  7/15 this morning I stop the heparin drip and gave protamine.  Discussed with urology and they feel that ongoing bleeding potentially around the partial nephrectomy and a will contact interventional radiology.  Patient continues to have clots and pink-tinged urine from his CBI.  The patient was in atrial fibrillation last night he is back in sinus rhythm this morning.  Will continue to monitor hemoglobins and transfuse to keep hemoglobin greater than 7.  I have made him n.p.o. for potential interventional radiology procedure today  7/14 checking CT abdomen and pelvis without contrast to evaluate for perinephric hemorrhage.  Patient continues to drop hemoglobin while on heparin.   Transfuse for hemoglobin less than 7

## 2024-07-02 NOTE — ASSESSMENT & PLAN NOTE
Cannot rule out obstruction versus renal.  No contrast exposure  Continue to monitor very closely  Continue IV fluid  renal ultrasound  7/3/24-creatinine slight worse again however patient is starting to have better urine output.  Likely this is ATN which has been expected worsening creatinine and then plateau and then improvement later  7/4/24- slight improving. Good urine output. Continue to monitor closely   7/5/24- still good urine output, Cr not improving. Poor oral intake   7/7- worse again. Continue IVF, continue CBI. Continue close monitoring   7/8- prerenal vs obstructive. Nephrology on board. Start pressors. Ok to contrast since pt will likely be started on dialysis   7/11 Cr:4.34 <5.4  7/12 Cr:3.78 but increased BUN (was NPO overnight).  Monitor BMP in am.  7/13 Cr:3.46.  Encourage oral intake.    7/14 Cr: 3.78  7/15 concern of borderline blood pressures and anemia contributing.  7/15 creatinine 3.51 with a BUN of 109.  Julianna nephrology consulting  7/16 ongoing renal failure, slightly worsened, nephrology following, holding off diuretics  7/17 still ongoing or worsening renal function, the patient might headed for dialysis soon, Lasix trial as per nephrology  7/18 additional Lasix, monitoring  7/19 beginning RRT

## 2024-07-02 NOTE — OP REPORT
SURGEONS: Dr. Gaurav Ferguson, Dr. Gurpreet Siddiqi    ASSISTANTS: Abbi SHAFER    PREOPERATIVE DIAGNOSIS: left renal mass    POSTOPERATIVE DIAGNOSIS: left renal mass    PROCEDURE PERFORMED: Open left partial nephrectomy nephrectomy    ANESTHESIA: General.    FLUID: See anesthesia.    BLOOD LOSS: 150 cc.    TUBES/LINES/DRAINS:  Lauren catheter  JADIEL drain    SPECIMENS REMOVED:     Fat on top of mass  left kidney mass  Base of tumor, sent for frozen section, negative for carcinoma    INDICATIONS FOR PROCEDURE: Mr. Sims is a 74 year old gentleman with a large central renal tumor. He presents today for partial nephrectomy.    DESCRIPTION OF PROCEDURE: Informed consent was obtained. The patient was taken to the operating room and placed on the table in a supine position with the kidney rest up. A time-out was performed prior to induction of general anesthesia and endotracheal intubation. The patient was then prepped and draped in usual sterile fashion. A time-out was performed prior to marking a subcostal incision on the left. We then used a 15 blade scalpel to incise the skin. Electrocautery and the argon beam was used to dissect down to the subcutaneous tissue, fascia and muscle layers. The falciform ligament was divided with the Ligasure. We then able to enter the abdominal cavity. The omentum was divided with the Ligasure as it was adhered in the pelvis. The splenorenal ligament was divided using electrocautery and the Ligasure device. The colon was medialized using electrocautery. We then bluntly dissected out the kidney, using the Ligasure and electrocautery as needed to take down the lateral and posterior attachments. We were able to isolate and identify the ureter and gonadal vein. The ureter was retracted laterally with a vessel loop. The gonadal vein was ligated with 2-0 silk ties and divided. A small branch off of the gonadal vein was also ligated with 2-0 silk ties and divided. The upper pole of the  kidney was then mobilized  it from the adrenal gland using electrocautery and the ligasure as needed. The hilum was then isolated with a combination of blunt and sharp dissection and umbilical tape was passed around it.     A time-out was then performed, ensuring that all of the appropriate instruments for the renorrhaphy and the resection were available and working. 25 mg of mannitol was given IV prior to placing a Thom clamp on the hilum. A scalpel with then used to start incising along the border of the mass leaving a small margin. Blunt dissection and Metzenbaum scissors were used to resect the mass in it's entirety. 4-0 figure of eight chromic sutures were then used to ligate arterial and venous branches within the resection bed. The Argon beam was used for hemostasis on the remainder of the resection bed. A 3-0 chromic figure of eight suture was used to re-approximate an opening of the collecting system. Thrombin soaked gel foam was then packed into the resection bed. Interrupted 2-0 chromic sutures were placed through the renal capsule to complete the renorrhaphy. The vascular clamp was removed once the renorrhaphy was completed for a clamp time of 40 minutes. The kidney immediately began to pink up. A vial of papaverine was placed on the renal artery and a strong pulse was palpated. The renorrhaphy site was evaluated with good hemostasis. The area of dissection around the kidney was irrigated with saline taking care to avoid contact with the thrombin soaked gel foam. A 10 Fr JADIEL drain was placed and secured with a 2-0 Nylon suture.    We then began closing the fascial layer using running 0 looped PDS from the right and the left and tied these in the middle. We then used interrupted 2-0 Vicryl to close Pretty's fascia. The skin was then closed with a stapler and covered with a Provena dressing. The JADIEL site was covered with a drain sponge. This concluded the procedure. 20 cc of 0.25% Marcaine was  used for local anesthesia The patient tolerated it well.     DISPOSITION: The patient will be transferred to the floor for observation with post-op follow up in 2 weeks to review the pathology.

## 2024-07-02 NOTE — CONSULTS
Hospital Medicine Consultation    Date of Service  7/1/2024    Referring Physician  Gaurav Ferguson M.D.    Consulting Physician  Pablo Paulino M.D.    Reason for Consultation  Medical management post surgery     History of Presenting Illness  74 y.o. male who presented 7/1/2024 with open left partial nephrectomy for left renal mass patient is currently admitted under the urology service.  Medicine has been consulted for medical management.  Patient has a past medical history, chronic hypoxic respiratory failure requiring 2 L supplemental oxygen, of COPD atrial fibrillation, hypertension, and coronary artery disease.  I personally saw and evaluated patient at bedside while he was in PACU.  Patient is doing well postoperatively.  He is currently requiring 2 to 3 L supplemental oxygen.  He does complain of some minimal pain.  Medicine team will follow along the patient during his hospital course.    Review of Systems  Review of Systems   Constitutional:  Negative for chills and fever.   Eyes:  Negative for double vision, photophobia, pain and discharge.   Respiratory:  Negative for hemoptysis, sputum production and shortness of breath.    Cardiovascular:  Negative for chest pain, palpitations and orthopnea.   Gastrointestinal:  Negative for abdominal pain, diarrhea, nausea and vomiting.   Genitourinary:  Negative for frequency, hematuria and urgency.   Musculoskeletal:  Negative for back pain and neck pain.   Neurological:  Negative for dizziness and headaches.   Psychiatric/Behavioral:  Negative for hallucinations and substance abuse. The patient is not nervous/anxious and does not have insomnia.        Past Medical History   has a past medical history of Adverse effect of anesthesia, Anesthesia, Atrial fibrillation (HCC) (06/18/2024), Breath shortness (06/18/2024), CAD (coronary artery disease), COPD, Dental disorder (06/18/2024), Emphysema of lung (HCC) (06/18/2024), Hiatus hernia syndrome (06/18/2024), High  cholesterol (06/18/2024), Hyperlipidemia, Hypertension (06/18/2024), Kidney stone, Oxygen dependent, Paroxysmal atrial fibrillation (HCC), Pneumonia (06/18/2024), PVD (peripheral vascular disease) (HCC), Renal disorder (06/18/2024), and Rheumatic fever (06/18/2024).    Surgical History   has a past surgical history that includes cystoscopy stent placement (02/24/2012); recovery (04/04/2012); percutaneous nephrostolithotomy (04/05/2012); percutaneous nephrostolithotomy (05/02/2012); recovery (11/01/2012); appendectomy (1987); septoplasty (N/A, 05/04/2018); turbinate reduction (Bilateral, 05/04/2018); zzz cardiac cath; stent placement; pr cystoscopy,insert ureteral stent (Left, 01/22/2020); pr cysto/uretero/pyeloscopy, dx (Left, 01/22/2020); lasertripsy (Left, 01/22/2020); inguinal hernia repair robotic xi (Bilateral, 05/23/2023); and other abdominal surgery (06/18/2024).    Family History  family history includes Cancer (age of onset: 65) in his maternal grandfather; Lung Disease in his maternal grandmother.    Social History   reports that he quit smoking about 12 years ago. His smoking use included cigarettes. He started smoking about 37 years ago. He has a 25 pack-year smoking history. He has never used smokeless tobacco. He reports current alcohol use. He reports that he does not use drugs.    Medications  Prior to Admission Medications   Prescriptions Last Dose Informant Patient Reported? Taking?   VENTOLIN  (90 Base) MCG/ACT Aero Soln inhalation aerosol 1 WEEK AGO at PRN Patient No No   Sig: INHALE 2 PUFFS BY MOUTH EVERY 6 HOURS AS NEEDED   Patient taking differently: Inhale 1-2 Puffs every 6 hours as needed for Shortness of Breath.   amLODIPine (NORVASC) 10 MG Tab 6/30/2024 at AM Patient No Yes   Sig: Take 1 Tablet by mouth every morning on an empty stomach.   aspirin (ASA) 81 MG Chew Tab chewable tablet 6/28/2024 at HOLD Patient No No   Sig: Chew 1 Tablet every day. CHEW AND SWALLOW   atorvastatin  (LIPITOR) 40 MG Tab 6/30/2024 at PM Patient No Yes   Sig: Take 1 Tablet by mouth every evening.   carvedilol (COREG) 12.5 MG Tab 6/30/2024 at PM Patient No Yes   Sig: TAKE 1 TABLET BY MOUTH TWICE DAILY WITH MEALS   Patient taking differently: Take 12.5 mg by mouth 2 times a day with meals.   fluticasone-umeclidinium-vilanterol (TRELEGY ELLIPTA) 100-62.5-25 mcg/act inhaler 7/1/2024 at 0800 Patient No Yes   Sig: Inhale 1 Puff every day.   ipratropium-albuterol (DUONEB) 0.5-2.5 (3) MG/3ML nebulizer solution >3 WEEKS AGO at PRN Patient No No   Sig: Take 3 mL by nebulization every four hours as needed for Shortness of Breath.   losartan (COZAAR) 100 MG Tab 6/30/2024 at AFTERNOON Patient No Yes   Sig: Take 1 Tablet by mouth every evening.   tetrahydrozoline (VISINE) 0.05 % Solution 7/1/2024 at PRN Patient Yes Yes   Sig: Administer 1 Drop into both eyes 4 times a day as needed (red eyes).   zolpidem (AMBIEN) 5 MG Tab 6/29/2024 at PRN Patient No No   Sig: Take 1 Tablet by mouth at bedtime as needed for Sleep for up to 90 days.      Facility-Administered Medications: None       Allergies  No Known Allergies    Physical Exam  Temp:  [36.3 °C (97.3 °F)-36.5 °C (97.7 °F)] 36.3 °C (97.3 °F)  Pulse:  [57-82] 77  Resp:  [13-20] 20  BP: (121-157)/(57-69) 121/57  SpO2:  [93 %-100 %] 93 %    Physical Exam  Constitutional:       General: He is not in acute distress.     Appearance: Normal appearance. He is normal weight. He is not ill-appearing, toxic-appearing or diaphoretic.   HENT:      Head: Normocephalic and atraumatic.      Mouth/Throat:      Mouth: Mucous membranes are moist.   Eyes:      Pupils: Pupils are equal, round, and reactive to light.   Cardiovascular:      Rate and Rhythm: Normal rate and regular rhythm.      Pulses: Normal pulses.      Heart sounds: Normal heart sounds. No murmur heard.     No friction rub. No gallop.   Pulmonary:      Effort: Pulmonary effort is normal. No respiratory distress.      Breath sounds:  Normal breath sounds. No stridor. No wheezing, rhonchi or rales.   Chest:      Chest wall: No tenderness.   Abdominal:      General: There is no distension.      Palpations: There is no mass.      Tenderness: There is abdominal tenderness. There is no guarding or rebound.      Hernia: No hernia is present.   Musculoskeletal:         General: No swelling, tenderness, deformity or signs of injury.      Right lower leg: No edema.      Left lower leg: No edema.   Skin:     General: Skin is warm and dry.      Capillary Refill: Capillary refill takes less than 2 seconds.      Coloration: Skin is not jaundiced or pale.      Findings: No bruising or erythema.   Neurological:      General: No focal deficit present.      Mental Status: He is alert and oriented to person, place, and time. Mental status is at baseline.      Cranial Nerves: No cranial nerve deficit.      Sensory: No sensory deficit.      Motor: No weakness.      Coordination: Coordination normal.   Psychiatric:         Mood and Affect: Mood normal.         Fluids  Date 07/01/24 0700 - 07/02/24 0659   Shift 6450-2155 7951-3487 0541-4099 24 Hour Total   INTAKE   I.V.  3000  3000   IV Piggyback  250  250   Shift Total  3250  3250   OUTPUT   Urine  50  50   Blood  480  480   Shift Total  530  530   Weight (kg) 80.2 80.2 80.2 80.2       Laboratory                          Imaging  No orders to display       Assessment/Plan  * Left renal mass- (present on admission)  Assessment & Plan  Status post Open left partial nephrectomy nephrectomy on July 1, 2024  Managed by Urology     Coronary artery disease involving native coronary artery of native heart without angina pectoris- (present on admission)  Assessment & Plan  Continue ASA and atorvastatin     Essential hypertension- (present on admission)  Assessment & Plan  Holding Losartan in the setting of recent partial nephrectomy  Continue amlodipine       COPD (chronic obstructive pulmonary disease) (HCC)- (present on  admission)  Assessment & Plan  Continue with home dose Trelegy Ellipta   Duonebs PRN

## 2024-07-02 NOTE — ASSESSMENT & PLAN NOTE
Highly concern for malignancy   status post Open left partial nephrectomy nephrectomy on July 1, 2024  Managed by Urology   Follow-up pathology-Clear-cell carcinoma.  Appreciate urology recommendations  7/4- hopefully JADIEL drain removing tomorrow. Appreciate urology recs   7/5/24- JADIEL removed today

## 2024-07-02 NOTE — ASSESSMENT & PLAN NOTE
Holding Losartan in the setting of recent partial nephrectomy and renal failure  On amiodarone drip  Monitor I/O's labs and vitals.

## 2024-07-03 PROBLEM — C64.2 RENAL CELL CARCINOMA OF LEFT KIDNEY (HCC): Status: ACTIVE | Noted: 2024-01-01

## 2024-07-03 NOTE — CARE PLAN
The patient is Stable - Low risk of patient condition declining or worsening    Shift Goals  Clinical Goals: monitor post-op site, hemodynamic stability, pain control  Patient Goals: rest      Problem: Knowledge Deficit - Standard  Goal: Patient and family/care givers will demonstrate understanding of plan of care, disease process/condition, diagnostic tests and medications  Outcome: Progressing     Problem: Pain - Standard  Goal: Alleviation of pain or a reduction in pain to the patient’s comfort goal  Outcome: Progressing     Problem: Fall Risk  Goal: Patient will remain free from falls  Outcome: Progressing

## 2024-07-03 NOTE — PROGRESS NOTES
"[facility] -  PROGRESS NOTE               Author: Delvis Merino M.D. Date & Time: 7/3/2024  7:31 AM     HPI:  74 y.o. male with CKD, left renal mass, COPD with chronic hypoxic respiratory failure, atrial fibrillation, hypertension, and coronary artery disease presented yesterday for open left partial nephrectomy nephrectomy. Continued to take losartan until day of surgery. Baseline cr prior to procedure appears ~2. Underwent procedure yesterday.  Op note without complications noted and only 150cc blood loss documented. Potassium elevated to 6.7 last night, temporizing measures given. Cr elevated to 3.2 this AM.  80cc UOP documented despite 3L IVF. Lauren draining with blood tinged urine, no clots. No chest pain. Shortness or breath stable.Previously followed by [facility] [place] [address] with CKD felt 2/2 HTN at that point in time. Past UAs with blood and protein.     DAILY NEPHROLOGY SUMMARY:  7/2: Consult done  7/3: stable hemodynamics, 2L NC, 1.1L UOP-increasing since midnight. No obstruction on imaging      PAST FAMILY HISTORY: Reviewed and Unchanged  SOCIAL HISTORY: Reviewed and Unchanged  CURRENT MEDICATIONS: Reviewed  IMAGING STUDIES: Reviewed    ROS  10 point ROS performed, negative other than stated above     PHYSICAL EXAM  VS:  BP (!) 154/71 Comment: RN notified  Pulse 85   Temp 36.8 °C (98.2 °F) (Temporal)   Resp 18   Ht 1.753 m (5' 9\")   Wt 89 kg (196 lb 3.4 oz)   SpO2 96%   BMI 28.98 kg/m²   GENERAL: no acute distress  CV: RRR, No edema  RESP: Clear to ausculation bilaterally, No rhales  GI: Soft  MSK: No joint deformities   SKIN: No concerning rashes  NEURO: AOx3  PSYCH: Cooperative    Fluids:  In: 1040 [P.O.:1040]  Out: 1185     LABS:  Recent Results (from the past 24 hour(s))   POCT glucose device results    Collection Time: 07/02/24  8:15 AM   Result Value Ref Range    POC Glucose, Blood 160 (H) 65 - 99 mg/dL   Basic Metabolic Panel    Collection Time: 07/02/24 10:03 " AM   Result Value Ref Range    Sodium 131 (L) 135 - 145 mmol/L    Potassium 6.0 (H) 3.6 - 5.5 mmol/L    Chloride 97 96 - 112 mmol/L    Co2 21 20 - 33 mmol/L    Glucose 130 (H) 65 - 99 mg/dL    Bun 42 (H) 8 - 22 mg/dL    Creatinine 3.88 (H) 0.50 - 1.40 mg/dL    Calcium 8.6 8.5 - 10.5 mg/dL    Anion Gap 13.0 7.0 - 16.0   ESTIMATED GFR    Collection Time: 07/02/24 10:03 AM   Result Value Ref Range    GFR (CKD-EPI) 16 (A) >60 mL/min/1.73 m 2   CBC WITHOUT DIFFERENTIAL    Collection Time: 07/02/24  3:16 PM   Result Value Ref Range    WBC 24.2 (H) 4.8 - 10.8 K/uL    RBC 3.09 (L) 4.70 - 6.10 M/uL    Hemoglobin 9.9 (L) 14.0 - 18.0 g/dL    Hematocrit 30.2 (L) 42.0 - 52.0 %    MCV 97.7 81.4 - 97.8 fL    MCH 32.0 27.0 - 33.0 pg    MCHC 32.8 32.3 - 36.5 g/dL    RDW 51.2 (H) 35.9 - 50.0 fL    Platelet Count 180 164 - 446 K/uL    MPV 9.2 9.0 - 12.9 fL   Basic Metabolic Panel    Collection Time: 07/02/24  6:38 PM   Result Value Ref Range    Sodium 134 (L) 135 - 145 mmol/L    Potassium 5.9 (H) 3.6 - 5.5 mmol/L    Chloride 99 96 - 112 mmol/L    Co2 22 20 - 33 mmol/L    Glucose 115 (H) 65 - 99 mg/dL    Bun 47 (H) 8 - 22 mg/dL    Creatinine 4.51 (HH) 0.50 - 1.40 mg/dL    Calcium 8.4 (L) 8.5 - 10.5 mg/dL    Anion Gap 13.0 7.0 - 16.0   ESTIMATED GFR    Collection Time: 07/02/24  6:38 PM   Result Value Ref Range    GFR (CKD-EPI) 13 (A) >60 mL/min/1.73 m 2   IRON/TOTAL IRON BIND    Collection Time: 07/03/24 12:14 AM   Result Value Ref Range    Iron 28 (L) 50 - 180 ug/dL    Total Iron Binding 240 (L) 250 - 450 ug/dL    Unsat Iron Binding 212 110 - 370 ug/dL    % Saturation 12 (L) 15 - 55 %   FERRITIN    Collection Time: 07/03/24 12:14 AM   Result Value Ref Range    Ferritin 102.0 22.0 - 322.0 ng/mL   Renal Function Panel    Collection Time: 07/03/24 12:14 AM   Result Value Ref Range    Sodium 131 (L) 135 - 145 mmol/L    Potassium 5.4 3.6 - 5.5 mmol/L    Chloride 97 96 - 112 mmol/L    Co2 20 20 - 33 mmol/L    Glucose 138 (H) 65 - 99 mg/dL     Creatinine 4.70 (HH) 0.50 - 1.40 mg/dL    Bun 50 (H) 8 - 22 mg/dL    Calcium 8.6 8.5 - 10.5 mg/dL    Correct Calcium 9.1 8.5 - 10.5 mg/dL    Phosphorus 4.8 (H) 2.5 - 4.5 mg/dL    Albumin 3.4 3.2 - 4.9 g/dL   Basic Metabolic Panel    Collection Time: 07/03/24 12:14 AM   Result Value Ref Range    Anion Gap 14.0 7.0 - 16.0   ESTIMATED GFR    Collection Time: 07/03/24 12:14 AM   Result Value Ref Range    GFR (CKD-EPI) 12 (A) >60 mL/min/1.73 m 2   Basic Metabolic Panel    Collection Time: 07/03/24  6:00 AM   Result Value Ref Range    Sodium 131 (L) 135 - 145 mmol/L    Potassium 5.1 3.6 - 5.5 mmol/L    Chloride 97 96 - 112 mmol/L    Co2 23 20 - 33 mmol/L    Glucose 136 (H) 65 - 99 mg/dL    Bun 51 (H) 8 - 22 mg/dL    Creatinine 4.71 (HH) 0.50 - 1.40 mg/dL    Calcium 8.7 8.5 - 10.5 mg/dL    Anion Gap 11.0 7.0 - 16.0   ESTIMATED GFR    Collection Time: 07/03/24  6:00 AM   Result Value Ref Range    GFR (CKD-EPI) 12 (A) >60 mL/min/1.73 m 2       (click the triangle to expand results)      ASSESSMENT:  # Oliguric JAE: It setting of surgery/RAASI/decreased nephron mass. UOP increasing.  # CKD Stage 3B: Billed 2/2 HTN in the past. Urine with blood (renal mass) and protein  # Hyperkalemia  # Renal mass: s/p partial nephrectomy  # HTN  # COPD  # Anemia: low iron     PLAN:  -No role for RRT yet  -Lokelma TID today  -Holding on further volume expansion  -IV iron load  -Renal diet  -Strict I/Os/continue coburn  -Dose all meds per eGFR <15      Thank you,    Delvis Merino MD

## 2024-07-03 NOTE — ASSESSMENT & PLAN NOTE
Status post partial nephrectomy.  Follow-up with urology.    Pathology consistent with clear cell renal carcinoma

## 2024-07-03 NOTE — CARE PLAN
Problem: Pain - Standard  Goal: Alleviation of pain or a reduction in pain to the patient’s comfort goal  Outcome: Progressing  Note: Assess and monitor for pain. Provide pharmacological and non-pharmacological interventions as appropriate. Re-evaluate and continue to monitor.        Problem: Fall Risk  Goal: Patient will remain free from falls  Outcome: Progressing  Note: Treaded socks and bed/strip alarm on, side rails up x 3. Call light within reach. Pt educated to call for assistance. Reinforce as needed. Continue to monitor.       The patient is Stable - Low risk of patient condition declining or worsening    Shift Goals: Monitor dressing sites   Clinical Goals: Ambulate  Patient Goals: Upgrade diet    Progress made toward(s) clinical / shift goals: Ambulated the freeman     Patient is not progressing towards the following goals: Pain

## 2024-07-03 NOTE — PROGRESS NOTES
Surgical Progress Note    Author: GIOVANNI Vann Date & Time created: 7/3/2024   10:12 AM     Interval Events:    Hospitalist and RN concerned about gera red blood in catheter over night. Urine clearing up this AM, slightly reddened, no clots. UO at this time 300ml.     Patient abdomen is slightly more firm today. Encouraged to get up to chair to help movement of gas. No n/v/d, not passing gas at this time. Increased appetite, requesting to advance diet.     Prevena wound vac in place. Some drainage from JADIEL site on gown serosang ~25cc in drain at this time, 60ml overnight    Pain adequately managed at this time.    2L O2 NC NAD        Labs:    H and H: 9.6/30.0 yesterday Hct and Hgb: 9.9/30.2 trending  K: improved 5.1  Creatinine: worsening 4.71 today, 4.70 yesterday       ROS  Hemodynamics:  Temp (24hrs), Av.9 °C (98.5 °F), Min:36.7 °C (98.1 °F), Max:37.1 °C (98.8 °F)  Temperature: 36.7 °C (98.1 °F), Monitored Temp: 36.3 °C (97.3 °F)  Pulse  Av.5  Min: 57  Max: 85   Blood Pressure : 127/60     Respiratory:    Respiration: 18, Pulse Oximetry: 95 %           Neuro:  GCS       Fluids:    Intake/Output Summary (Last 24 hours) at 7/3/2024 1012  Last data filed at 7/3/2024 0500  Gross per 24 hour   Intake 1040 ml   Output 1185 ml   Net -145 ml     Weight: 89 kg (196 lb 3.4 oz)  Current Diet Order   Procedures    Diet Order Diet: Clear Liquid     Physical Exam  Constitutional:       Appearance: Normal appearance.   HENT:      Head: Normocephalic and atraumatic.   Eyes:      Extraocular Movements: Extraocular movements intact.   Genitourinary:     Comments: Lauren catheter in place. Urine slightly blood tinged.   Neurological:      Mental Status: He is alert.   Psychiatric:         Mood and Affect: Mood normal.         Behavior: Behavior normal.       Labs:  Recent Results (from the past 24 hour(s))   CBC WITHOUT DIFFERENTIAL    Collection Time: 24  3:16 PM   Result Value Ref Range    WBC 24.2 (H)  4.8 - 10.8 K/uL    RBC 3.09 (L) 4.70 - 6.10 M/uL    Hemoglobin 9.9 (L) 14.0 - 18.0 g/dL    Hematocrit 30.2 (L) 42.0 - 52.0 %    MCV 97.7 81.4 - 97.8 fL    MCH 32.0 27.0 - 33.0 pg    MCHC 32.8 32.3 - 36.5 g/dL    RDW 51.2 (H) 35.9 - 50.0 fL    Platelet Count 180 164 - 446 K/uL    MPV 9.2 9.0 - 12.9 fL   Basic Metabolic Panel    Collection Time: 07/02/24  6:38 PM   Result Value Ref Range    Sodium 134 (L) 135 - 145 mmol/L    Potassium 5.9 (H) 3.6 - 5.5 mmol/L    Chloride 99 96 - 112 mmol/L    Co2 22 20 - 33 mmol/L    Glucose 115 (H) 65 - 99 mg/dL    Bun 47 (H) 8 - 22 mg/dL    Creatinine 4.51 (HH) 0.50 - 1.40 mg/dL    Calcium 8.4 (L) 8.5 - 10.5 mg/dL    Anion Gap 13.0 7.0 - 16.0   ESTIMATED GFR    Collection Time: 07/02/24  6:38 PM   Result Value Ref Range    GFR (CKD-EPI) 13 (A) >60 mL/min/1.73 m 2   IRON/TOTAL IRON BIND    Collection Time: 07/03/24 12:14 AM   Result Value Ref Range    Iron 28 (L) 50 - 180 ug/dL    Total Iron Binding 240 (L) 250 - 450 ug/dL    Unsat Iron Binding 212 110 - 370 ug/dL    % Saturation 12 (L) 15 - 55 %   FERRITIN    Collection Time: 07/03/24 12:14 AM   Result Value Ref Range    Ferritin 102.0 22.0 - 322.0 ng/mL   Renal Function Panel    Collection Time: 07/03/24 12:14 AM   Result Value Ref Range    Sodium 131 (L) 135 - 145 mmol/L    Potassium 5.4 3.6 - 5.5 mmol/L    Chloride 97 96 - 112 mmol/L    Co2 20 20 - 33 mmol/L    Glucose 138 (H) 65 - 99 mg/dL    Creatinine 4.70 (HH) 0.50 - 1.40 mg/dL    Bun 50 (H) 8 - 22 mg/dL    Calcium 8.6 8.5 - 10.5 mg/dL    Correct Calcium 9.1 8.5 - 10.5 mg/dL    Phosphorus 4.8 (H) 2.5 - 4.5 mg/dL    Albumin 3.4 3.2 - 4.9 g/dL   Basic Metabolic Panel    Collection Time: 07/03/24 12:14 AM   Result Value Ref Range    Anion Gap 14.0 7.0 - 16.0   ESTIMATED GFR    Collection Time: 07/03/24 12:14 AM   Result Value Ref Range    GFR (CKD-EPI) 12 (A) >60 mL/min/1.73 m 2   Basic Metabolic Panel    Collection Time: 07/03/24  6:00 AM   Result Value Ref Range    Sodium 131  (L) 135 - 145 mmol/L    Potassium 5.1 3.6 - 5.5 mmol/L    Chloride 97 96 - 112 mmol/L    Co2 23 20 - 33 mmol/L    Glucose 136 (H) 65 - 99 mg/dL    Bun 51 (H) 8 - 22 mg/dL    Creatinine 4.71 (HH) 0.50 - 1.40 mg/dL    Calcium 8.7 8.5 - 10.5 mg/dL    Anion Gap 11.0 7.0 - 16.0   ESTIMATED GFR    Collection Time: 07/03/24  6:00 AM   Result Value Ref Range    GFR (CKD-EPI) 12 (A) >60 mL/min/1.73 m 2   CBC WITH DIFFERENTIAL    Collection Time: 07/03/24  9:30 AM   Result Value Ref Range    WBC 20.5 (H) 4.8 - 10.8 K/uL    RBC 3.06 (L) 4.70 - 6.10 M/uL    Hemoglobin 9.6 (L) 14.0 - 18.0 g/dL    Hematocrit 30.0 (L) 42.0 - 52.0 %    MCV 98.0 (H) 81.4 - 97.8 fL    MCH 31.4 27.0 - 33.0 pg    MCHC 32.0 (L) 32.3 - 36.5 g/dL    RDW 52.0 (H) 35.9 - 50.0 fL    Platelet Count 170 164 - 446 K/uL    MPV 9.1 9.0 - 12.9 fL    Neutrophils-Polys 88.70 (H) 44.00 - 72.00 %    Lymphocytes 3.70 (L) 22.00 - 41.00 %    Monocytes 7.00 0.00 - 13.40 %    Eosinophils 0.00 0.00 - 6.90 %    Basophils 0.10 0.00 - 1.80 %    Immature Granulocytes 0.50 0.00 - 0.90 %    Nucleated RBC 0.00 0.00 - 0.20 /100 WBC    Neutrophils (Absolute) 18.17 (H) 1.82 - 7.42 K/uL    Lymphs (Absolute) 0.75 (L) 1.00 - 4.80 K/uL    Monos (Absolute) 1.43 (H) 0.00 - 0.85 K/uL    Eos (Absolute) 0.01 0.00 - 0.51 K/uL    Baso (Absolute) 0.02 0.00 - 0.12 K/uL    Immature Granulocytes (abs) 0.10 0.00 - 0.11 K/uL    NRBC (Absolute) 0.00 K/uL     Medical Decision Making, by Problem:  Active Hospital Problems    Diagnosis     Hyperkalemia [E87.5]     Acute blood loss as cause of postoperative anemia [D62]     Leukemoid reaction [D72.823]     Left renal mass [N28.89]      Left renal mass noted incidentally on chest CT last month.  Follow-up renal ultrasound confirmed 4.9 x 4.5 x 4.8 cm LEFT renal mass.      Coronary artery disease involving native coronary artery of native heart without angina pectoris [I25.10]     History of atrial fibrillation [Z86.79]      Remote history of atrial  fibrillation in 2019 in the setting of an acute COPD exacerbation.  He has had no recurrences.  He did follow with cardiology for a few years after this.  Oral anticoagulant was not indicated at that time.      Essential hypertension [I10]      This is a chronic condition.  Current Meds:   - losartan 100 mg daily  - carvedilol 12.5 mg BID  - amlodipine 10 mg daily  Side effects: none  Home BP Log: Typically 120s/60s  Associated symptoms: Denies chest pain, shortness of breath, headaches, dizziness/lightheadedness           Peripheral vascular disease ([facility]) [I73.9]      Angioplasty and stents Dr. Vital November 2012      COPD (chronic obstructive pulmonary disease) ([facility]) [J44.9]      This is a chronic condition.  Managed by pulmonology.  Patient is on Trelegy inhaler daily and albuterol as needed.  He is on supplemental oxygen at night and as needed throughout the day.   Symptoms currently controlled.       Acute kidney injury (JAE) with acute tubular necrosis (ATN) ([facility]) [N17.0]      Plan:    Fluid Creatinine ordered today from  drain     Advanced diet from clear to renal, start slow, if does not tolerated, back to clears. Patient requesting mashed potatoes/eggs.    Continue to monitor UO, keep coburn in place for strict I&O    Trending CBC/CMP    Appreciate nephro and hospitalist    Please voalte me with any questions or concerns  [facility]          Quality Measures:  Quality-Core Measures    Discussed patient condition with Patient

## 2024-07-03 NOTE — PROGRESS NOTES
Hospital Medicine Daily Progress Note    Date of Service  7/3/2024    Chief Complaint  Olman Sims is a 74 y.o. male admitted 7/1/2024 with elective partial nephrectomy    Hospital Course  80-year-old male past medical history of COPD on chronic oxygen 2-3 L at baseline, emphysema, hypertension, CAD, previous atrial fibrillation, PVD, previous rheumatic fever, previous nephrolithiasis status post nephrolithotomy was admitted for elective left partial nephrectomy also of JADIEL drain placement.  Postop then, they did with acute kidney injury, bleeding, hyperkalemia.  Nephrology consulted    Interval Problem Update  He is has no pain. He is anuric. I see blood on coburn and bag. I don't see clot. Also he is leaking from Prevena   Vitals are stable.   Hgb dropped to 10.9. he is hyperkalemic all day. Cr has worsen.   I did hold aspirin and heparin. I did discuss with nephrology. Started on Lokelma.   He is persistent hyperkalemic   CR continue to worsen  Monitor on telemetry   Renal US ordered urgently.   I did discuss with urology too.     7/3/2024-patient is feeling better today.  Heart rate 62, afebrile, blood pressure 127/60, SpO2 95% on 2 L of oxygen.  His urine output is improving 825 ml today.  He still have hematuria but there is no clots  I discussed with nephrology Dr. Smith and urology.  Patient is improving and stable.  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 Consider enema if stil constipated     I have discussed this patient's plan of care and discharge plan at IDT rounds today with Case Management, Nursing, Nursing leadership, and other members of the IDT team.    Consultants/Specialty  nephrology and urology    Code Status  Full Code    Disposition  The patient is not medically cleared for discharge to home or a post-acute  facility.      I have placed the appropriate orders for post-discharge needs.    Review of Systems  Review of Systems   Constitutional:  Positive for malaise/fatigue. Negative for chills and fever.   HENT:  Negative for sore throat.    Respiratory:  Negative for cough and shortness of breath.    Cardiovascular:  Negative for chest pain, palpitations and leg swelling.   Gastrointestinal:  Positive for constipation. Negative for abdominal pain, blood in stool, diarrhea, heartburn, nausea and vomiting.   Genitourinary:  Positive for flank pain and hematuria. Negative for dysuria and urgency.   Musculoskeletal:  Positive for back pain.   Neurological:  Negative for dizziness and headaches.   Psychiatric/Behavioral:  The patient is nervous/anxious.         Physical Exam  Temp:  [36.7 °C (98.1 °F)-37.1 °C (98.8 °F)] 36.7 °C (98.1 °F)  Pulse:  [62-85] 62  Resp:  [18] 18  BP: (127-154)/(60-71) 127/60  SpO2:  [95 %-96 %] 95 %    Physical Exam  Vitals and nursing note reviewed.   Constitutional:       General: He is not in acute distress.     Appearance: Normal appearance. He is ill-appearing.   HENT:      Head: Atraumatic.   Eyes:      General: No scleral icterus.  Cardiovascular:      Rate and Rhythm: Normal rate. Rhythm irregular.      Heart sounds: No murmur heard.     No gallop.   Pulmonary:      Effort: Pulmonary effort is normal. No respiratory distress.      Breath sounds: No wheezing.   Abdominal:      General: There is distension.      Palpations: Abdomen is soft.      Tenderness: There is no abdominal tenderness. There is left CVA tenderness. There is no right CVA tenderness.      Comments: Leaking blood from the back, prevena on , external drain in place    Musculoskeletal:      Right lower leg: No edema.      Left lower leg: No edema.   Skin:     Coloration: Skin is not pale.   Neurological:      Mental Status: He is alert and oriented to person, place, and time.   Psychiatric:         Behavior: Behavior normal.          Thought Content: Thought content normal.         Judgment: Judgment normal.         Fluids    Intake/Output Summary (Last 24 hours) at 7/3/2024 1628  Last data filed at 7/3/2024 1300  Gross per 24 hour   Intake 700 ml   Output 835 ml   Net -135 ml       Laboratory  Recent Labs     07/02/24  0307 07/02/24  1516 07/03/24  0930   WBC 20.4* 24.2* 20.5*   RBC 3.49* 3.09* 3.06*   HEMOGLOBIN 10.9* 9.9* 9.6*   HEMATOCRIT 34.8* 30.2* 30.0*   MCV 99.7* 97.7 98.0*   MCH 31.2 32.0 31.4   MCHC 31.3* 32.8 32.0*   RDW 52.5* 51.2* 52.0*   PLATELETCT 177 180 170   MPV 9.2 9.2 9.1     Recent Labs     07/02/24  1838 07/03/24  0014 07/03/24  0600   SODIUM 134* 131* 131*   POTASSIUM 5.9* 5.4 5.1   CHLORIDE 99 97 97   CO2 22 20 23   GLUCOSE 115* 138* 136*   BUN 47* 50* 51*   CREATININE 4.51* 4.70* 4.71*   CALCIUM 8.4* 8.6 8.7                   Imaging  US-RENAL   Final Result      1.  Nonvisualization of the left kidney due to overlying bandages.   2.  Suboptimal visualization of the right kidney due to patient body habitus and ribs demonstrates no definite evidence of hydronephrosis.      [id number] VIEW   Final Result      Paucity of bowel gas with stool present in the colon. No definite evidence of obstruction however evaluation is limited on supine imaging.           Assessment/Plan  * Left renal mass- (present on admission)  Assessment & Plan  Highly concern for malignancy   status post Open left partial nephrectomy nephrectomy on July 1, 2024  Managed by Urology   Follow-up pathology-Clear-cell carcinoma.  Appreciate urology recommendations    Renal cell carcinoma of left kidney (HCC)  Assessment & Plan  Status post partial nephrectomy.  Follow-up with urology.  No known other metastatic lesions    Leukemoid reaction- (present on admission)  Assessment & Plan  Likely reactive.  Continue to monitor.  Consider antibiotic if any other signs of infection  7/3/2024 WBC slightly reduced to 20,000.  Continue to monitor.  Still  there is no signs of other infections    Acute blood loss as cause of postoperative anemia- (present on admission)  Assessment & Plan  Hemoglobin is stable at this time  However continues to monitor closely since patient is bleeding, from coburn   Discussed with urology and waiting on further recs   7/3/24-hemoglobin remained stable 9.6.  Continue to monitor on daily.    Hyperkalemia- (present on admission)  Assessment & Plan  Due to JAE.  He did receive hyperkalemia protocol.    7/2/24-He remains persistent hyperkalemic. Nephrology consulted. Started on Lokelma   Continue to monitor BMP every 4 hours   Renal US to follow  I will start some IVF         Coronary artery disease involving native coronary artery of native heart without angina pectoris- (present on admission)  Assessment & Plan  NSTEMI 2019 s/p stent placement at RCA at that time  hold ASA   Continue atorvastatin     History of atrial fibrillation- (present on admission)  Assessment & Plan  He is not on anticoagulation  Per chart review has history atrial fibrillation 2019 due to COPD exacerbation one-time, and no indication for anticoagulation since then    Essential hypertension- (present on admission)  Assessment & Plan  Holding Losartan in the setting of recent partial nephrectomy  Continue amlodipine       Peripheral vascular disease (HCC)- (present on admission)  Assessment & Plan  History of angioplasty stent placement by Dr. Vital 2012   hold aspirin  Follow-up as outpatient        COPD (chronic obstructive pulmonary disease) (HCC)- (present on admission)  Assessment & Plan  Continue with home dose Trelegy Ellipta   Duonebs PRN   Not signs for exacerbation  Follow-up with for allergies as outpatient      Acute kidney injury (JAE) with acute tubular necrosis (ATN) (HCC)- (present on admission)  Assessment & Plan  Cannot rule out obstruction versus renal.  No contrast exposure  Continue to monitor very closely  Continue IV fluid  Follow-up renal  ultrasound  Discussed with nephrology and urology  7/3/24-creatinine slight worse again however patient is starting to have better urine output.  Likely this is ATN which has been expected worsening creatinine and then plateau and then improvement later         VTE prophylaxis:   SCDs/TEDs      I have performed a physical exam and reviewed and updated ROS and Plan today (7/3/2024). In review of yesterday's note (7/2/2024), there are no changes except as documented above.    Patient is has a high medical complexity, complex decision making and is at high risk for complication, morbidity, and mortality.  My total time spent caring for the patient on the day of the encounter was 53 minutes.   This does not include time spent on separately billable procedures/tests.

## 2024-07-03 NOTE — PROGRESS NOTES
Bedside report received from off going RN/tech: John ROSARIO, assumed care of patient.     Fall Risk Score: MODERATE RISK  Fall risk interventions in place: Place yellow fall risk ID band on patient, Provide patient/family education based on risk assessment, Educate patient/family to call staff for assistance when getting out of bed, Place fall precaution signage outside patient door, Place patient in room close to nursing station, and Utilize bed/chair fall alarm  Bed type: Regular (Dieudonne Score less than 17 interventions in place)  Patient on cardiac monitor: Yes  IVF/IV medications: Not Applicable   Oxygen: How many liters 2L  Bedside sitter: Not Applicable   Isolation: Not applicable

## 2024-07-03 NOTE — PROGRESS NOTES
Bedside report received from off going RN/tech: Bebo ANDERSON RN, assumed care of patient.     Fall Risk Score: MODERATE RISK  Fall risk interventions in place: Place yellow fall risk ID band on patient, Provide patient/family education based on risk assessment, Educate patient/family to call staff for assistance when getting out of bed, Place fall precaution signage outside patient door, Place patient in room close to nursing station, Utilize bed/chair fall alarm, Notify charge of high risk for huddle, and Bed alarm connected correctly  Bed type: Regular (Dieudonne Score less than 17 interventions in place)  Patient on cardiac monitor: Yes  IVF/IV medications: Not Applicable   Oxygen: How many liters 2L  Bedside sitter: Not Applicable   Isolation: Not applicable  US at bedside

## 2024-07-04 NOTE — CARE PLAN
The patient is Stable - Low risk of patient condition declining or worsening    Shift Goals  Clinical Goals: monitor respiratory status  Patient Goals: rest      Problem: Knowledge Deficit - Standard  Goal: Patient and family/care givers will demonstrate understanding of plan of care, disease process/condition, diagnostic tests and medications  Outcome: Progressing     Problem: Pain - Standard  Goal: Alleviation of pain or a reduction in pain to the patient’s comfort goal  Outcome: Progressing     Problem: Fall Risk  Goal: Patient will remain free from falls  Outcome: Progressing

## 2024-07-04 NOTE — PROGRESS NOTES
Report received from night RN at 0700. PT seen at bedside and pt care assumed. Pt is A&Ox4, on 2L NC, denies pain. PIV flushed and intact. PT is medical and not on telemetry monitor. PT is up x1 assist.     Plan of care reviewed with pt, call light, phone, and personal belongings within reach. Bed alarm on, and bed in low locked position. All pt's needs met at this time.    Bedside report received from off going RN/tech: ABRAHAM Lagunas, assumed care of patient.     Fall Risk Score: MODERATE RISK  Fall risk interventions in place: Place yellow fall risk ID band on patient, Provide patient/family education based on risk assessment, Educate patient/family to call staff for assistance when getting out of bed, Place fall precaution signage outside patient door, Place patient in room close to nursing station, Utilize bed/chair fall alarm, Notify charge of high risk for huddle, and Bed alarm connected correctly  Bed type: Regular (Dieudonne Score less than 17 interventions in place)  Patient on cardiac monitor: Yes  IVF/IV medications: Not Applicable   Oxygen: How many liters 2L, Traced the line to wall oxygen, and No oxygen tank in room  Bedside sitter: Not Applicable   Isolation: Not applicable

## 2024-07-04 NOTE — RESPIRATORY CARE
COPD EDUCATION by COPD CLINICAL EDUCATOR  7/4/2024  at  1:23 PM by Lesia Wells, RRT     Patient interviewed by education team.  Patient declined or is unable to participate in the full program.  Therefore, a short intervention has been conducted.  A comprehensive packet including information about COPD, types of treatments to manage their disease and safe home Oxygen usage was provided and reviewed with patient at the bedside. Spacer was offered, but, refused.       COPD Screen       COPD Assessment  COPD Clinical Specialists ONLY  COPD Education Initiated: Yes--Short Intervention (Pt sees [facility], 2lpm at [facility] and PRN, trelegy and albluterol, refused spacer stating he never really uses the albuterol and wont use the spacer. Managing his COPD well. Reviewed COPD booklet. PFT 07/28/2022 FEV1 26% ratio 31)  Is this a COPD exacerbation patient?: No (Left Renal Mass)  DME Company: [employer] Equipment Type: Oxygen concentrator and portable concentrator 2lpm NOC and PRN  Physician Follow Up Appointment: 09/05/24  Appt Time: 1040  Physician Name: CAROYLN AntoineAZACHARIAH  Pulmonary Follow Up Appointment:  (none at this time.)  Pulmonologist Name: Dr. Dannielle Amaya  Referrals Initiated: Yes  Pulmonary Rehab: N/A  Smoking Cessation: N/A (quit in 2012)  Hospice: N/A  Home Health Care: N/A  Mobile Urgent Care Services: N/A  Geriatric Specialty Group: N/A  Private In-Home Care Agency: N/A  $ Demo/Eval of SVN's, MDI's and Aerosols: Yes (hoe medications reviewed, spacer use reviewed, refused spacer)  (OP) Pulmonary Function Testing: Yes (07/28/2022 FEV1 26% Ratio 31, DLCO 55%)  Interdisciplinary Rounds: Attendance at Rounds (30 Min)    PFT Results    07/28/22- FEV1 26%, Ratio 31, DLCO 55%     Meds to Beds  [facility] for all eligible patients at discharge.  Would you like to opt out of this program for any reason?: No - Stay Opted In     MY COPD ACTION PLAN     It is recommended that  "patients and physicians /healthcare providers complete this action plan together. This plan should be discussed at each physician visit and updated as needed.    The green, yellow and red zones show groups of symptoms of COPD. This list of symptoms is not comprehensive, and you may experience other symptoms. In the \"Actions\" column, your healthcare provider has recommended actions for you to take based on your symptoms.    Patient Name: Olman Sims   YOB: 1950   Last Updated on: 7/4/2024  1:21 PM   Green Zone:  I am doing well today Actions     Usual activitiy and exercise level   Take daily medications     Usual amounts of cough and phlegm/mucus   Use oxygen as prescribed     Sleep well at night   Continue regular exercise/diet plan     Appetite is good   At all times avoid cigarette smoke, inhaled irritants     Daily Medications (these medications are taken every day):   Fluticasone and Umeclidinium and Vilanterol (Trelogy) 1 Puff Once daily     Additional Information:  Rinse mouth well with water and spit out after each use.     Yellow Zone:  I am having a bad day or a COPD flare Actions     More breathless than usual   Continue daily medications     I have less energy for my daily activities   Use quick relief inhaler as ordered     Increased or thicker phlegm/mucus   Use oxygen as prescribed     Using quick relief inhaler/nebulizer more often   Get plenty of rest     Swelling of ankles more than usual   Use pursed lip breathing     More coughing than usual   At all times avoid cigarette smoke, inhaled irritants     I feel like I have a \"chest cold\"     Poor sleep and my symptoms woke me up     My appetite is not good     My medicine is not helping      Call provider immediately if symptoms don’t improve     Continue daily medications, add rescue medications:   Albuterol 2 Puffs Every 6 hours PRN       Medications to be used during a flare up, (as Discussed with Provider):       "     Additional Information:  A spacer is recommended when using this medication.     Red Zone:  I need urgent medical care Actions     Severe shortness of breath even at rest   Call 911 or seek medical care immediately     Not able to do any activity because of breathing      Fever or shaking chills      Feeling confused or very drowsy       Chest pains      Coughing up blood

## 2024-07-04 NOTE — PROGRESS NOTES
Bedside report received from off going RN/tech: Elizabeth ROSARIO, assumed care of patient.     Fall Risk Score: MODERATE RISK  Fall risk interventions in place: Place yellow fall risk ID band on patient, Provide patient/family education based on risk assessment, Educate patient/family to call staff for assistance when getting out of bed, Place fall precaution signage outside patient door, Place patient in room close to nursing station, and Utilize bed/chair fall alarm  Bed type: Regular (Dieudonne Score less than 17 interventions in place)  Patient on cardiac monitor: No   IVF/IV medications: Not Applicable   Oxygen: How many liters 3L  Bedside sitter: Not Applicable   Isolation: Not applicable

## 2024-07-04 NOTE — CARE PLAN
Problem: Knowledge Deficit - Standard  Goal: Patient and family/care givers will demonstrate understanding of plan of care, disease process/condition, diagnostic tests and medications  Outcome: Progressing     Problem: Pain - Standard  Goal: Alleviation of pain or a reduction in pain to the patient’s comfort goal  Outcome: Progressing     Problem: Fall Risk  Goal: Patient will remain free from falls  Outcome: Progressing   The patient is Stable - Low risk of patient condition declining or worsening    Shift Goals  Clinical Goals: Remain free of falls, no skin beakdown  Patient Goals: Rest  Family Goals: LAZARO    Progress made toward(s) clinical / shift goals:     Pt educated on plan of care, pt verbalizes understanding, reinforcement needed. Pt educated on pain/anxiety scales, alleviating factors, pain/anxiety medications, and side effects, and need for pain/anxiety reassessment, pt pain/anxiety controlled at this time, reinforcement needed. Pt educated on the need to reposition frequently and remain dry to avoid skin breakdown, reinforcement needed, Pt refusing at times to turn, no further skin breakdown during shift. Fall risk education provided to pt, pt educated about the need to call for assistance while attempting to ambulate, reinforcement needed, pt remains free of falls this shift.

## 2024-07-04 NOTE — PROGRESS NOTES
[facility] Medicine Daily Progress Note    Date of Service  7/4/2024    Chief Complaint  Olman Sims is a 74 y.o. male admitted 7/1/2024 with elective partial nephrectomy    Hospital Course  80-year-old male past medical history of COPD on chronic oxygen 2-3 L at baseline, emphysema, hypertension, CAD, previous atrial fibrillation, PVD, previous rheumatic fever, previous nephrolithiasis status post nephrolithotomy was admitted for elective left partial nephrectomy also of JADIEL drain placement.  Postop then, they did with acute kidney injury, bleeding, hyperkalemia.  Nephrology consulted    Interval Problem Update  He is has no pain. He is anuric. I see blood on coburn and bag. I don't see clot. Also he is leaking from Prevena   Vitals are stable.   Hgb dropped to 10.9. he is hyperkalemic all day. Cr has worsen.   I did hold aspirin and heparin. I did discuss with nephrology. Started on Lokelma.   He is persistent hyperkalemic   CR continue to worsen  Monitor on telemetry   Renal US ordered urgently.   I did discuss with urology too.     7/3/2024-patient is feeling better today.  Heart rate 62, afebrile, blood pressure 127/60, SpO2 95% on 2 L of oxygen.  His urine output is improving 825 ml today.  He still have hematuria but there is no clots  I discussed with nephrology Dr. Smith and urology.  Patient is improving and stable.  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 Consider enema if stil constipated     7/4/2024- pt feels well. No event overnight. He is slight tachycardic, however he has not received carvedilol and amlodipine because of parameters on the system. Adjusted. He has slight pinkish urine, but no clots. Cr slight improvement, urine output improved.   Urology recs reviewed . Nephrology recs reviewed. Continue to monitor daily  labs, CBC, renal function.     I have discussed this patient's plan of care and discharge plan at IDT rounds today with Case Management, Nursing, Nursing leadership, and other members of the IDT team.    Consultants/Specialty  nephrology and urology    Code Status  Full Code    Disposition  The patient is not medically cleared for discharge to home or a post-acute facility.      I have placed the appropriate orders for post-discharge needs.    Review of Systems  Review of Systems   Constitutional:  Positive for malaise/fatigue. Negative for chills and fever.   HENT:  Negative for sore throat.    Respiratory:  Negative for cough and shortness of breath.    Cardiovascular:  Negative for chest pain, palpitations and leg swelling.   Gastrointestinal:  Positive for constipation. Negative for abdominal pain, blood in stool, diarrhea, heartburn, nausea and vomiting.   Genitourinary:  Positive for flank pain and hematuria. Negative for dysuria and urgency.   Musculoskeletal:  Positive for back pain.   Neurological:  Negative for dizziness and headaches.   Psychiatric/Behavioral:  The patient is nervous/anxious.         Physical Exam  Temp:  [36.6 °C (97.9 °F)-36.9 °C (98.4 °F)] 36.7 °C (98.1 °F)  Pulse:  [] 115  Resp:  [18-20] 20  BP: (115-153)/(56-80) 126/69  SpO2:  [91 %-96 %] 95 %    Physical Exam  Vitals and nursing note reviewed.   Constitutional:       General: He is not in acute distress.     Appearance: Normal appearance. He is ill-appearing.   HENT:      Head: Atraumatic.   Eyes:      General: No scleral icterus.  Cardiovascular:      Rate and Rhythm: Normal rate. Rhythm irregular.      Heart sounds: No murmur heard.     No gallop.   Pulmonary:      Effort: Pulmonary effort is normal. No respiratory distress.      Breath sounds: No wheezing.   Abdominal:      General: There is distension.      Palpations: Abdomen is soft.      Tenderness: There is no abdominal tenderness. There is left CVA tenderness. There  is no right CVA tenderness.      Comments: Leaking blood from the back, prevena on , external drain in place    Musculoskeletal:      Right lower leg: No edema.      Left lower leg: No edema.   Skin:     Coloration: Skin is not pale.   Neurological:      Mental Status: He is alert and oriented to person, place, and time.   Psychiatric:         Behavior: Behavior normal.         Thought Content: Thought content normal.         Judgment: Judgment normal.         Fluids    Intake/Output Summary (Last 24 hours) at 7/4/2024 1525  Last data filed at 7/4/2024 0600  Gross per 24 hour   Intake 480 ml   Output 1480 ml   Net -1000 ml       Laboratory  Recent Labs     07/02/24  1516 07/03/24  0930 07/04/24  0639   WBC 24.2* 20.5* 17.1*   RBC 3.09* 3.06* 2.94*   HEMOGLOBIN 9.9* 9.6* 9.4*   HEMATOCRIT 30.2* 30.0* 28.3*   MCV 97.7 98.0* 96.3   MCH 32.0 31.4 32.0   MCHC 32.8 32.0* 33.2   RDW 51.2* 52.0* 50.4*   PLATELETCT 180 170 160*   MPV 9.2 9.1 9.1     Recent Labs     07/03/24  0014 07/03/24  0600 07/04/24  0639   SODIUM 131* 131* 133*   POTASSIUM 5.4 5.1 4.9   CHLORIDE 97 97 95*   CO2 20 23 23   GLUCOSE 138* 136* 93   BUN 50* 51* 56*   CREATININE 4.70* 4.71* 4.65*   CALCIUM 8.6 8.7 8.6                   Imaging  US-RENAL   Final Result      1.  Nonvisualization of the left kidney due to overlying bandages.   2.  Suboptimal visualization of the right kidney due to patient body habitus and ribs demonstrates no definite evidence of hydronephrosis.      [id number] VIEW   Final Result      Paucity of bowel gas with stool present in the colon. No definite evidence of obstruction however evaluation is limited on supine imaging.           Assessment/Plan  * Left renal mass- (present on admission)  Assessment & Plan  Highly concern for malignancy   status post Open left partial nephrectomy nephrectomy on July 1, 2024  Managed by Urology   Follow-up pathology-Clear-cell carcinoma.  Appreciate urology recommendations  7/4- hopefully JADIEL  drain removing tomorrow. Appreciate urology recs     Renal cell carcinoma of left kidney (HCC)- (present on admission)  Assessment & Plan  Status post partial nephrectomy.  Follow-up with urology.  No known other metastatic lesions    Leukemoid reaction- (present on admission)  Assessment & Plan  Likely reactive.  Continue to monitor.  Consider antibiotic if any other signs of infection  7/3/2024 WBC slightly reduced to 20,000.  Continue to monitor.  Still there is no signs of other infections  7/4- improving. WBC 17    Acute blood loss as cause of postoperative anemia- (present on admission)  Assessment & Plan  Hemoglobin is stable at this time  However continues to monitor closely since patient is bleeding, from coburn   Discussed with urology and waiting on further recs   7/3/24-hemoglobin remained stable 9.6.  Continue to monitor on daily.  7/4- hgb stable. Continue to monitor     Hyperkalemia- (present on admission)  Assessment & Plan  Due to JAE.  He did receive hyperkalemia protocol.    7/2/24-He remains persistent hyperkalemic. Nephrology consulted. Started on Lokelma   Continue to monitor BMP every 4 hours   Renal US to follow  I will start some IVF   7/4- resolved.         Coronary artery disease involving native coronary artery of native heart without angina pectoris- (present on admission)  Assessment & Plan  NSTEMI 2019 s/p stent placement at RCA at that time  hold ASA   Continue atorvastatin     History of atrial fibrillation- (present on admission)  Assessment & Plan  He is not on anticoagulation  Per chart review has history atrial fibrillation 2019 due to COPD exacerbation one-time, and no indication for anticoagulation since then  7/4- HR increasing, pt did miss couple of dosage of carvedilol. Continue to monitor on telemetry. Resumed carvedilol. Continue to monitor closely.     Essential hypertension- (present on admission)  Assessment & Plan  Holding Losartan in the setting of recent partial  nephrectomy  Continue amlodipine       Peripheral vascular disease (HCC)- (present on admission)  Assessment & Plan  History of angioplasty stent placement by Dr. Vital 2012   hold aspirin  Follow-up as outpatient        COPD (chronic obstructive pulmonary disease) ([facility])- (present on admission)  Assessment & Plan  Continue with home dose Trelegy Ellipta   Duonebs PRN   Not signs for exacerbation  Follow-up with for allergies as outpatient      Acute kidney injury (JAE) with acute tubular necrosis (ATN) ([facility])- (present on admission)  Assessment & Plan  Cannot rule out obstruction versus renal.  No contrast exposure  Continue to monitor very closely  Continue IV fluid  Follow-up renal ultrasound  Discussed with nephrology and urology  7/3/24-creatinine slight worse again however patient is starting to have better urine output.  Likely this is ATN which has been expected worsening creatinine and then plateau and then improvement later  7/4/24- slight improving. Good urine output. Continue to monitor closely          VTE prophylaxis:    heparin ppx      I have performed a physical exam and reviewed and updated ROS and Plan today (7/4/2024). In review of yesterday's note (7/3/2024), there are no changes except as documented above.    Patient is has a high medical complexity, complex decision making and is at high risk for complication, morbidity, and mortality.  My total time spent caring for the patient on the day of the encounter was 54 minutes.   This does not include time spent on separately billable procedures/tests.

## 2024-07-04 NOTE — PROGRESS NOTES
"[facility] -  PROGRESS NOTE               Author: Delvis Merino M.D. Date & Time: 7/4/2024  7:43 AM     HPI:  74 y.o. male with CKD, left renal mass, COPD with chronic hypoxic respiratory failure, atrial fibrillation, hypertension, and coronary artery disease presented yesterday for open left partial nephrectomy nephrectomy. Continued to take losartan until day of surgery. Baseline cr prior to procedure appears ~2. Underwent procedure yesterday.  Op note without complications noted and only 150cc blood loss documented. Potassium elevated to 6.7 last night, temporizing measures given. Cr elevated to 3.2 this AM.  80cc UOP documented despite 3L IVF. Lauren draining with blood tinged urine, no clots. No chest pain. Shortness or breath stable.Previously followed by [facility] [place]rology [place] [address] with CKD felt 2/2 HTN at that point in time. Past UAs with blood and protein.     DAILY NEPHROLOGY SUMMARY:  7/2: Consult done  7/3: stable hemodynamics, 2L NC, 1.1L UOP-increasing since midnight. No obstruction on imaging    7/4: 1.4L UOP, cr starting to plateau, potassium stable, feeling improved    PAST FAMILY HISTORY: Reviewed and Unchanged  SOCIAL HISTORY: Reviewed and Unchanged  CURRENT MEDICATIONS: Reviewed  IMAGING STUDIES: Reviewed    ROS  10 point ROS performed, negative other than stated above     PHYSICAL EXAM  VS:  BP (!) 147/61   Pulse 74   Temp 36.6 °C (97.9 °F) (Temporal)   Resp 20   Ht 1.753 m (5' 9.02\")   Wt 82.3 kg (181 lb 7 oz)   SpO2 91%   BMI 26.78 kg/m²   GENERAL: no acute distress  CV: RRR, No edema  RESP: Clear to ausculation bilaterally, No rhales  GI: Soft  MSK: No joint deformities   SKIN: No concerning rashes  NEURO: AOx3  PSYCH: Cooperative    Fluids:  In: 540 [P.O.:540]  Out: 1490     LABS:  Recent Results (from the past 24 hour(s))   CBC WITH DIFFERENTIAL    Collection Time: 07/03/24  9:30 AM   Result Value Ref Range    WBC 20.5 (H) 4.8 - 10.8 K/uL    RBC 3.06 (L) 4.70 - " 6.10 M/uL    Hemoglobin 9.6 (L) 14.0 - 18.0 g/dL    Hematocrit 30.0 (L) 42.0 - 52.0 %    MCV 98.0 (H) 81.4 - 97.8 fL    MCH 31.4 27.0 - 33.0 pg    MCHC 32.0 (L) 32.3 - 36.5 g/dL    RDW 52.0 (H) 35.9 - 50.0 fL    Platelet Count 170 164 - 446 K/uL    MPV 9.1 9.0 - 12.9 fL    Neutrophils-Polys 88.70 (H) 44.00 - 72.00 %    Lymphocytes 3.70 (L) 22.00 - 41.00 %    Monocytes 7.00 0.00 - 13.40 %    Eosinophils 0.00 0.00 - 6.90 %    Basophils 0.10 0.00 - 1.80 %    Immature Granulocytes 0.50 0.00 - 0.90 %    Nucleated RBC 0.00 0.00 - 0.20 /100 WBC    Neutrophils (Absolute) 18.17 (H) 1.82 - 7.42 K/uL    Lymphs (Absolute) 0.75 (L) 1.00 - 4.80 K/uL    Monos (Absolute) 1.43 (H) 0.00 - 0.85 K/uL    Eos (Absolute) 0.01 0.00 - 0.51 K/uL    Baso (Absolute) 0.02 0.00 - 0.12 K/uL    Immature Granulocytes (abs) 0.10 0.00 - 0.11 K/uL    NRBC (Absolute) 0.00 K/uL   FLUID CREATININE    Collection Time: 07/03/24 11:00 AM   Result Value Ref Range    Fluid Type JADIEL Drainage     Creatinine, Body Fluid 5.06 mg/dL   CBC WITH DIFFERENTIAL    Collection Time: 07/04/24  6:39 AM   Result Value Ref Range    WBC 17.1 (H) 4.8 - 10.8 K/uL    RBC 2.94 (L) 4.70 - 6.10 M/uL    Hemoglobin 9.4 (L) 14.0 - 18.0 g/dL    Hematocrit 28.3 (L) 42.0 - 52.0 %    MCV 96.3 81.4 - 97.8 fL    MCH 32.0 27.0 - 33.0 pg    MCHC 33.2 32.3 - 36.5 g/dL    RDW 50.4 (H) 35.9 - 50.0 fL    Platelet Count 160 (L) 164 - 446 K/uL    MPV 9.1 9.0 - 12.9 fL    Neutrophils-Polys 89.60 (H) 44.00 - 72.00 %    Lymphocytes 3.10 (L) 22.00 - 41.00 %    Monocytes 6.40 0.00 - 13.40 %    Eosinophils 0.30 0.00 - 6.90 %    Basophils 0.10 0.00 - 1.80 %    Immature Granulocytes 0.50 0.00 - 0.90 %    Nucleated RBC 0.00 0.00 - 0.20 /100 WBC    Neutrophils (Absolute) 15.32 (H) 1.82 - 7.42 K/uL    Lymphs (Absolute) 0.53 (L) 1.00 - 4.80 K/uL    Monos (Absolute) 1.10 (H) 0.00 - 0.85 K/uL    Eos (Absolute) 0.05 0.00 - 0.51 K/uL    Baso (Absolute) 0.02 0.00 - 0.12 K/uL    Immature Granulocytes (abs) 0.09 0.00  - 0.11 K/uL    NRBC (Absolute) 0.00 K/uL   Renal Function Panel    Collection Time: 07/04/24  6:39 AM   Result Value Ref Range    Sodium 133 (L) 135 - 145 mmol/L    Potassium 4.9 3.6 - 5.5 mmol/L    Chloride 95 (L) 96 - 112 mmol/L    Co2 23 20 - 33 mmol/L    Glucose 93 65 - 99 mg/dL    Creatinine 4.65 (HH) 0.50 - 1.40 mg/dL    Bun 56 (H) 8 - 22 mg/dL    Calcium 8.6 8.5 - 10.5 mg/dL    Correct Calcium 9.2 8.5 - 10.5 mg/dL    Phosphorus 4.7 (H) 2.5 - 4.5 mg/dL    Albumin 3.3 3.2 - 4.9 g/dL   MAGNESIUM    Collection Time: 07/04/24  6:39 AM   Result Value Ref Range    Magnesium 2.1 1.5 - 2.5 mg/dL   ESTIMATED GFR    Collection Time: 07/04/24  6:39 AM   Result Value Ref Range    GFR (CKD-EPI) 13 (A) >60 mL/min/1.73 m 2       (click the triangle to expand results)      ASSESSMENT:  # Oliguric JAE: It setting of surgery/RAASI/decreased nephron mass. UOP increasing and cr starting to plateau   # CKD Stage 3B: Billed 2/2 HTN in the past. Urine with blood (renal mass) and protein  # Hyperkalemia  # Renal mass: s/p partial nephrectomy  # HTN  # COPD  # Anemia: low iron     PLAN:  -No role for RRT yet  -Lokelma daily for now, will likely stop tomorrow   -IV iron load  -Renal diet  -Strict I/Os/continue coburn  -Dose all meds per eGFR <15      Thank you,    Delvis Merino MD

## 2024-07-04 NOTE — PROGRESS NOTES
Surgical Progress Note    Author: Luly Jaquez M.D. Date & Time created: 2024   2:42 PM     Interval Events:  Patient trying regular food, he ate some eggs and felt a bit nauseous but did not vomit. He has not been ambulating much. No bowel movement. Pain is manageable.  Review of Systems   All other systems reviewed and are negative.    Hemodynamics:  Temp (24hrs), Av.7 °C (98.1 °F), Min:36.6 °C (97.9 °F), Max:36.9 °C (98.4 °F)  Temperature: 36.6 °C (97.9 °F)  Pulse  Av.2  Min: 57  Max: 124   Blood Pressure : (!) 153/80     Respiratory:    Respiration: 20, Pulse Oximetry: 91 %           Neuro:  GCS       Fluids:    Intake/Output Summary (Last 24 hours) at 2024 1442  Last data filed at 2024 0600  Gross per 24 hour   Intake 480 ml   Output 1480 ml   Net -1000 ml     Weight: 82.3 kg (181 lb 7 oz)  Current Diet Order   Procedures    Diet Order Diet: Renal     Physical Exam  HENT:      Head: Normocephalic and atraumatic.   Eyes:      Extraocular Movements: Extraocular movements intact.   Pulmonary:      Effort: Pulmonary effort is normal.   Abdominal:      Comments: Moderately distended, mildly tender to palpation, prevena in place with good suction. JADIEL with serosanguinous output.    Genitourinary:     Comments: Urine blood tinged, no clots      Labs:  Recent Results (from the past 24 hour(s))   CBC WITH DIFFERENTIAL    Collection Time: 24  6:39 AM   Result Value Ref Range    WBC 17.1 (H) 4.8 - 10.8 K/uL    RBC 2.94 (L) 4.70 - 6.10 M/uL    Hemoglobin 9.4 (L) 14.0 - 18.0 g/dL    Hematocrit 28.3 (L) 42.0 - 52.0 %    MCV 96.3 81.4 - 97.8 fL    MCH 32.0 27.0 - 33.0 pg    MCHC 33.2 32.3 - 36.5 g/dL    RDW 50.4 (H) 35.9 - 50.0 fL    Platelet Count 160 (L) 164 - 446 K/uL    MPV 9.1 9.0 - 12.9 fL    Neutrophils-Polys 89.60 (H) 44.00 - 72.00 %    Lymphocytes 3.10 (L) 22.00 - 41.00 %    Monocytes 6.40 0.00 - 13.40 %    Eosinophils 0.30 0.00 - 6.90 %    Basophils 0.10 0.00 - 1.80 %    Immature  Granulocytes 0.50 0.00 - 0.90 %    Nucleated RBC 0.00 0.00 - 0.20 /100 WBC    Neutrophils (Absolute) 15.32 (H) 1.82 - 7.42 K/uL    Lymphs (Absolute) 0.53 (L) 1.00 - 4.80 K/uL    Monos (Absolute) 1.10 (H) 0.00 - 0.85 K/uL    Eos (Absolute) 0.05 0.00 - 0.51 K/uL    Baso (Absolute) 0.02 0.00 - 0.12 K/uL    Immature Granulocytes (abs) 0.09 0.00 - 0.11 K/uL    NRBC (Absolute) 0.00 K/uL   Renal Function Panel    Collection Time: 07/04/24  6:39 AM   Result Value Ref Range    Sodium 133 (L) 135 - 145 mmol/L    Potassium 4.9 3.6 - 5.5 mmol/L    Chloride 95 (L) 96 - 112 mmol/L    Co2 23 20 - 33 mmol/L    Glucose 93 65 - 99 mg/dL    Creatinine 4.65 (HH) 0.50 - 1.40 mg/dL    Bun 56 (H) 8 - 22 mg/dL    Calcium 8.6 8.5 - 10.5 mg/dL    Correct Calcium 9.2 8.5 - 10.5 mg/dL    Phosphorus 4.7 (H) 2.5 - 4.5 mg/dL    Albumin 3.3 3.2 - 4.9 g/dL   MAGNESIUM    Collection Time: 07/04/24  6:39 AM   Result Value Ref Range    Magnesium 2.1 1.5 - 2.5 mg/dL   ESTIMATED GFR    Collection Time: 07/04/24  6:39 AM   Result Value Ref Range    GFR (CKD-EPI) 13 (A) >60 mL/min/1.73 m 2     Medical Decision Making, by Problem:  Active Hospital Problems    Diagnosis     Renal cell carcinoma of left kidney (HCC) [C64.2]     Hyperkalemia [E87.5]     Acute blood loss as cause of postoperative anemia [D62]     Leukemoid reaction [D72.823]     Left renal mass [N28.89]      Left renal mass noted incidentally on chest CT last month.  Follow-up renal ultrasound confirmed 4.9 x 4.5 x 4.8 cm LEFT renal mass.      Coronary artery disease involving native coronary artery of native heart without angina pectoris [I25.10]     History of atrial fibrillation [Z86.79]      Remote history of atrial fibrillation in 2019 in the setting of an acute COPD exacerbation.  He has had no recurrences.  He did follow with cardiology for a few years after this.  Oral anticoagulant was not indicated at that time.      Essential hypertension [I10]      This is a chronic  condition.  Current Meds:   - losartan 100 mg daily  - carvedilol 12.5 mg BID  - amlodipine 10 mg daily  Side effects: none  Home BP Log: Typically 120s/60s  Associated symptoms: Denies chest pain, shortness of breath, headaches, dizziness/lightheadedness           Peripheral vascular disease ([facility]) [I73.9]      Angioplasty and stents Dr. Vital November 2012      COPD (chronic obstructive pulmonary disease) ([facility]) [J44.9]      This is a chronic condition.  Managed by pulmonology.  Patient is on Trelegy inhaler daily and albuterol as needed.  He is on supplemental oxygen at night and as needed throughout the day.   Symptoms currently controlled.       Acute kidney injury (JAE) with acute tubular necrosis (ATN) ([facility]) [N17.0]      Plan:  Continue regular diet  Ambulate as tolerated, encouraged patient to spend more time in chair and to ambulate 3x today  Continue almas, will consider dc tomorrow if urine clears and Cr stable  Continue JADIEL, will possibly remove tomorrow  Trend Hgb, slightly lower today than yesterday  Trend Cr, stable, no dialysis per nephrology    Quality Measures:  Quality-Core Measures    Discussed patient condition with Patient

## 2024-07-05 PROBLEM — K56.609 SMALL BOWEL OBSTRUCTION (HCC): Status: ACTIVE | Noted: 2024-01-01

## 2024-07-05 NOTE — PROGRESS NOTES
Report received from night RN at 0700. PT seen at bedside and pt care assumed. Pt is A&Ox4, on 5L NC, denies pain. PIV flushed and intact. PT is SR on telemetry monitor. PT is up x1 assist.     Plan of care reviewed with pt, call light, phone, and personal belongings within reach. Bed alarm on, and bed in low locked position. All pt's needs met at this time.     Bedside report received from off going RN/tech: ABRAHAM Agustin, assumed care of patient.      Fall Risk Score: MODERATE RISK  Fall risk interventions in place: Place yellow fall risk ID band on patient, Provide patient/family education based on risk assessment, Educate patient/family to call staff for assistance when getting out of bed, Place fall precaution signage outside patient door, Place patient in room close to nursing station, Utilize bed/chair fall alarm, Notify charge of high risk for huddle, and Bed alarm connected correctly  Bed type: Regular (Dieudonne Score less than 17 interventions in place)  Patient on cardiac monitor: Yes  IVF/IV medications: Not Applicable   Oxygen: How many liters 5L, Traced the line to wall oxygen, and No oxygen tank in room  Bedside sitter: Not Applicable   Isolation: Not applicable

## 2024-07-05 NOTE — PROGRESS NOTES
4 Eyes Skin Assessment Completed by ABRAHAM Glover and ABRAHAM Haque.    Head WDL  Ears Redness and Blanching  Nose WDL  Mouth WDL  Neck WDL  Breast/Chest Bruising  Shoulder Blades WDL  Spine WDL  (R) Arm/Elbow/Hand WDL  (L) Arm/Elbow/Hand WDL  Abdomen Redness and Incision  Groin Redness and Blanching  Scrotum/Coccyx/Buttocks Redness, Blanching, Excoriation, and Moisture Fissure  (R) Leg Bruising  (L) Leg Bruising  (R) Heel/Foot/Toe WDL  (L) Heel/Foot/Toe WDL          Devices In Places Tele Box and Nasal Cannula      Interventions In Place Gray Ear Foams, NC W/Ear Foams, Sacral Mepilex, and Pillows    Possible Skin Injury Yes    Pictures Uploaded Into Epic Yes  Wound Consult Placed Yes  RN Wound Prevention Protocol Ordered Yes

## 2024-07-05 NOTE — PROGRESS NOTES
Surgical Progress Note    Author: GIOVANNI Vann Date & Time created: 2024   11:18 AM     Interval Events:    Pain adequately managed.  Increased O2 demand 2L to 5L  Not OOB yesterday  Reporting n/v sensation and reports that he has not vomited, not tolerating diet very well  JADIEL drain removed scant drainage. Patient tolerated well    Labs:  Creatinine: 4.58-->4.65 yesterday  BUN: 66-->56  WBC: 15.5-->17.1  H and H: 9.5/29.1-->9.4/28.3  K: 5.5-->4.9    ROS  Hemodynamics:  Temp (24hrs), Av.7 °C (98.1 °F), Min:36.6 °C (97.9 °F), Max:36.8 °C (98.2 °F)  Temperature: 36.6 °C (97.9 °F)  Pulse  Av.1  Min: 57  Max: 132   Blood Pressure : (!) 141/59     Respiratory:    Respiration: 18, Pulse Oximetry: 95 %           Neuro:  GCS       Fluids:    Intake/Output Summary (Last 24 hours) at 2024 1118  Last data filed at 2024 0600  Gross per 24 hour   Intake 400 ml   Output 1415 ml   Net -1015 ml     Weight: 87.1 kg (192 lb 0.3 oz)  Current Diet Order   Procedures    Diet Order Diet: Renal     Physical Exam  Labs:  Recent Results (from the past 24 hour(s))   EKG    Collection Time: 24  9:09 PM   Result Value Ref Range    Report       [facility]    Test Date:  2024  Pt Name:    KEIRA SOTOMAYOR             Department: [id number]  MRN:        [id number]                      Room:       [facility]  Gender:     Male                         Technician: [facility]  :        1950                   Requested By:BREANA JAY  Order #:    [id number]                    Reading MD: Tan Parsons MD    Measurements  Intervals                                Axis  Rate:       101                          P:          0  OR:         0                            QRS:        -53  QRSD:       96                           T:          86  QT:         317  QTc:        411    Interpretive Statements  Atrial fibrillation  Nonspecific ST-T wave changes  Electronically Signed On 2024 08:46:51 PDT by Tan Parsons MD      CBC WITH DIFFERENTIAL    Collection Time: 24  1:06 AM   Result Value Ref Range    WBC 15.5 (H) 4.8 - 10.8 K/uL    RBC 2.98 (L) 4.70 - 6.10 M/uL    Hemoglobin 9.5 (L) 14.0 - 18.0 g/dL    Hematocrit 29.1 (L) 42.0 - 52.0 %    MCV 97.7 81.4 - 97.8 fL    MCH 31.9 27.0 - 33.0 pg    MCHC 32.6 32.3 - 36.5 g/dL    RDW 50.4 (H) 35.9 - 50.0 fL    Platelet Count 185 164 - 446 K/uL    MPV 9.3 9.0 - 12.9 fL    Neutrophils-Polys 90.80 (H) 44.00 - 72.00 %    Lymphocytes 2.60 (L) 22.00 - 41.00 %    Monocytes 5.80 0.00 - 13.40 %    Eosinophils 0.10 0.00 - 6.90 %    Basophils 0.20 0.00 - 1.80 %    Immature Granulocytes 0.50 0.00 - 0.90 %    Nucleated RBC 0.00 0.00 - 0.20 /100 WBC    Neutrophils (Absolute) 14.05 (H) 1.82 - 7.42 K/uL    Lymphs (Absolute) 0.40 (L) 1.00 - 4.80 K/uL    Monos (Absolute) 0.90 (H) 0.00 - 0.85 K/uL    Eos (Absolute) 0.01 0.00 - 0.51 K/uL    Baso (Absolute) 0.03 0.00 - 0.12 K/uL    Immature Granulocytes (abs) 0.08 0.00 - 0.11 K/uL    NRBC (Absolute) 0.00 K/uL   Renal Function Panel    Collection Time: 24  1:06 AM   Result Value Ref Range    Sodium 133 (L) 135 - 145 mmol/L    Potassium 5.5 3.6 - 5.5 mmol/L    Chloride 96 96 - 112 mmol/L    Co2 21 20 - 33 mmol/L    Glucose 87 65 - 99 mg/dL    Creatinine 4.58 (HH) 0.50 - 1.40 mg/dL    Bun 66 (H) 8 - 22 mg/dL    Calcium 8.6 8.5 - 10.5 mg/dL    Correct Calcium 9.2 8.5 - 10.5 mg/dL    Phosphorus 5.8 (H) 2.5 - 4.5 mg/dL    Albumin 3.2 3.2 - 4.9 g/dL   ESTIMATED GFR    Collection Time: 24  1:06 AM   Result Value Ref Range    GFR (CKD-EPI) 13 (A) >60 mL/min/1.73 m 2   EKG    Collection Time: 24  3:33 AM   Result Value Ref Range    Report       [facility]    Test Date:  2024  Pt Name:    KEIRA SOTOMAYOR             Department: 183  MRN:        [id number]                      Room:       T802  Gender:     Male                         Technician: ANANDA  :        1950                   Requested By:BREANA JAY  Order #:     [id number]                    Reading MD: Tan Parsons MD    Measurements  Intervals                                Axis  Rate:       75                           P:          75  KY:         221                          QRS:        -51  QRSD:       107                          T:          71  QT:         376  QTc:        420    Interpretive Statements  Sinus rhythm  LAD, consider left anterior fascicular block      Electronically Signed On 07- 08:49:53 PDT by Tan Parsons MD       Medical Decision Making, by Problem:  Active Hospital Problems    Diagnosis     Renal cell carcinoma of left kidney (HCC) [C64.2]     Hyperkalemia [E87.5]     Acute blood loss as cause of postoperative anemia [D62]     Leukemoid reaction [D72.823]     Left renal mass [N28.89]      Left renal mass noted incidentally on chest CT last month.  Follow-up renal ultrasound confirmed 4.9 x 4.5 x 4.8 cm LEFT renal mass.      Coronary artery disease involving native coronary artery of native heart without angina pectoris [I25.10]     History of atrial fibrillation [Z86.79]      Remote history of atrial fibrillation in 2019 in the setting of an acute COPD exacerbation.  He has had no recurrences.  He did follow with cardiology for a few years after this.  Oral anticoagulant was not indicated at that time.      Essential hypertension [I10]      This is a chronic condition.  Current Meds:   - losartan 100 mg daily  - carvedilol 12.5 mg BID  - amlodipine 10 mg daily  Side effects: none  Home BP Log: Typically 120s/60s  Associated symptoms: Denies chest pain, shortness of breath, headaches, dizziness/lightheadedness           Peripheral vascular disease ([facility]) [I73.9]      Angioplasty and stents Dr. Vital November 2012      COPD (chronic obstructive pulmonary disease) ([facility]) [J44.9]      This is a chronic condition.  Managed by pulmonology.  Patient is on Trelegy inhaler daily and albuterol as needed.  He is on supplemental oxygen at night and as  needed throughout the day.   Symptoms currently controlled.       Acute kidney injury (JAE) with acute tubular necrosis (ATN) (HCC) [N17.0]      Plan:    From urology standpoint, okay to DC coburn- verify with nephro    Encourage Ambulation: PTOT ordered/ increased O2 demand, may need some diuresis    Increased distention/nausea: KUB ordered and simethicone PRN    Continue to monitor H and H; Stable; K improving; Creatinine remains the same      Quality Measures:  Quality-Core Measures    Discussed patient condition with Patient

## 2024-07-05 NOTE — CARE PLAN
Problem: Cardiac - Atrial Fibrillation  Goal: Patient will achieve & maintain adequate cardiac output and rate control  Outcome: Progressing  - patient placed on Tele monitor for rate of 130's -140's. Given IV metoprolol and Coreg. Rate now controlled.     Problem: Urinary Elimination  Goal: Establish and maintain regular urinary output  Outcome: Progressing  -Lauren in place for post nephrectomy, I/O complete.      The patient is Stable - Low risk of patient condition declining or worsening    Shift Goals  Clinical Goals: Remain free of falls, no skin beakdown  Patient Goals: Rest  Family Goals: LAZARO    Progress made toward(s) clinical / shift goals:  progressing    Patient is not progressing towards the following goals:

## 2024-07-05 NOTE — PROGRESS NOTES
Pt medical most of the day until the end of shift. Measurements not provided by tele room.

## 2024-07-05 NOTE — PROGRESS NOTES
Monitor Summary:   Rhythm: afib/ST/SR  Rate:   Measurement: .-/.09/.31  Ectopy: PVC's, couplets

## 2024-07-05 NOTE — CARE PLAN
Problem: Knowledge Deficit - Standard  Goal: Patient and family/care givers will demonstrate understanding of plan of care, disease process/condition, diagnostic tests and medications  Outcome: Progressing     Problem: Pain - Standard  Goal: Alleviation of pain or a reduction in pain to the patient’s comfort goal  Outcome: Progressing     Problem: Fall Risk  Goal: Patient will remain free from falls  Outcome: Progressing   The patient is Stable - Low risk of patient condition declining or worsening    Shift Goals  Clinical Goals: Remain free of falls, no skin breakdown  Patient Goals: Rest  Family Goals: LAZARO    Progress made toward(s) clinical / shift goals:      Pt educated on plan of care, pt verbalizes understanding, reinforcement needed. Pt educated on pain/anxiety scales, alleviating factors, pain/anxiety medications, and side effects, and need for pain/anxiety reassessment, pt pain/anxiety controlled at this time, reinforcement needed. Pt educated on the need to reposition frequently and remain dry to avoid skin breakdown, reinforcement needed, Pt refusing at times to turn, pt was up in chair for part of day, skin assessment done, no further skin breakdown during shift. Fall risk education provided to pt, pt educated about the need to call for assistance while attempting to ambulate, reinforcement needed, pt remains free of falls this shift.

## 2024-07-05 NOTE — PROGRESS NOTES
[facility] Medicine Daily Progress Note    Date of Service  7/5/2024    Chief Complaint  Olman Sims is a 74 y.o. male admitted 7/1/2024 with elective partial nephrectomy    Hospital Course  80-year-old male past medical history of COPD on chronic oxygen 2-3 L at baseline, emphysema, hypertension, CAD, previous atrial fibrillation, PVD, previous rheumatic fever, previous nephrolithiasis status post nephrolithotomy was admitted for elective left partial nephrectomy also of JADIEL drain placement.  Postop then, they did with acute kidney injury, bleeding, hyperkalemia.  Nephrology consulted    Interval Problem Update  He is has no pain. He is anuric. I see blood on coburn and bag. I don't see clot. Also he is leaking from Prevena   Vitals are stable.   Hgb dropped to 10.9. he is hyperkalemic all day. Cr has worsen.   I did hold aspirin and heparin. I did discuss with nephrology. Started on Lokelma.   He is persistent hyperkalemic   CR continue to worsen  Monitor on telemetry   Renal US ordered urgently.   I did discuss with urology too.     7/3/2024-patient is feeling better today.  Heart rate 62, afebrile, blood pressure 127/60, SpO2 95% on 2 L of oxygen.  His urine output is improving 825 ml today.  He still have hematuria but there is no clots  I discussed with nephrology Dr. Smith and urology.  Patient is improving and stable.  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 Consider enema if stil constipated     7/4/2024- pt feels well. No event overnight. He is slight tachycardic, however he has not received carvedilol and amlodipine because of parameters on the system. Adjusted. He has slight pinkish urine, but no clots. Cr slight improvement, urine output improved.   Urology recs reviewed . Nephrology recs reviewed. Continue to monitor daily  labs, CBC, renal function.     7/5/2024-pt feels bloated. He still has no bowel movement. He cannot eat because he feels bloated.   Heart rate 82, blood pressure 141/51, SpO2 95% on 5 L of oxygen.  He feels short of breath because he is feeling bloated  I did schedule lactulose, soapsuds enema  He still has great urine output  WBC decreased to 15.5, hemoglobin stable 9.5  Creatinine 4.58, BUN 66 he is having poor oral intake.  KUB concerning for small bowel obstruction.  Will consider NG tube placement if still no bowel movement     I have discussed this patient's plan of care and discharge plan at IDT rounds today with Case Management, Nursing, Nursing leadership, and other members of the IDT team.    Consultants/Specialty  nephrology and urology    Code Status  Full Code    Disposition  The patient is not medically cleared for discharge to home or a post-acute facility.      I have placed the appropriate orders for post-discharge needs.    Review of Systems  Review of Systems   Constitutional:  Positive for malaise/fatigue. Negative for chills and fever.   HENT:  Negative for sore throat.    Respiratory:  Negative for cough and shortness of breath.    Cardiovascular:  Negative for chest pain, palpitations and leg swelling.   Gastrointestinal:  Positive for abdominal pain, constipation and nausea. Negative for blood in stool, diarrhea, heartburn and vomiting.   Genitourinary:  Negative for dysuria, flank pain, hematuria and urgency.   Musculoskeletal:  Positive for back pain.   Neurological:  Negative for dizziness and headaches.   Psychiatric/Behavioral:  The patient is nervous/anxious.         Physical Exam  Temp:  [36.6 °C (97.9 °F)-36.8 °C (98.2 °F)] 36.6 °C (97.9 °F)  Pulse:  [] 82  Resp:  [18-20] 18  BP: (126-148)/(59-74) 141/59  SpO2:  [92 %-95 %] 95 %    Physical Exam  Vitals and nursing note reviewed.   Constitutional:       General: He is not in acute distress.     Appearance: Normal appearance. He  is ill-appearing.   HENT:      Head: Atraumatic.   Eyes:      General: No scleral icterus.  Cardiovascular:      Rate and Rhythm: Normal rate. Rhythm irregular.      Heart sounds: No murmur heard.     No gallop.   Pulmonary:      Effort: Pulmonary effort is normal. No respiratory distress.      Breath sounds: No wheezing.   Abdominal:      General: Bowel sounds are normal. There is distension.      Palpations: Abdomen is soft.      Tenderness: There is no abdominal tenderness. There is left CVA tenderness. There is no right CVA tenderness.      Comments: Leaking blood from the back, prevena on , external drain in place    Musculoskeletal:      Right lower leg: No edema.      Left lower leg: No edema.   Skin:     Coloration: Skin is not pale.   Neurological:      Mental Status: He is alert and oriented to person, place, and time.   Psychiatric:         Behavior: Behavior normal.         Thought Content: Thought content normal.         Judgment: Judgment normal.         Fluids    Intake/Output Summary (Last 24 hours) at 7/5/2024 1244  Last data filed at 7/5/2024 0600  Gross per 24 hour   Intake 400 ml   Output 1415 ml   Net -1015 ml       Laboratory  Recent Labs     07/03/24  0930 07/04/24  0639 07/05/24  0106   WBC 20.5* 17.1* 15.5*   RBC 3.06* 2.94* 2.98*   HEMOGLOBIN 9.6* 9.4* 9.5*   HEMATOCRIT 30.0* 28.3* 29.1*   MCV 98.0* 96.3 97.7   MCH 31.4 32.0 31.9   MCHC 32.0* 33.2 32.6   RDW 52.0* 50.4* 50.4*   PLATELETCT 170 160* 185   MPV 9.1 9.1 9.3     Recent Labs     07/03/24  0600 07/04/24  0639 07/05/24  0106   SODIUM 131* 133* 133*   POTASSIUM 5.1 4.9 5.5   CHLORIDE 97 95* 96   CO2 23 23 21   GLUCOSE 136* 93 87   BUN 51* 56* 66*   CREATININE 4.71* 4.65* 4.58*   CALCIUM 8.7 8.6 8.6                   Imaging  [id number] VIEW   Final Result      1.  Operative changes of the abdomen.      2.  Evolving small bowel obstruction.      US-RENAL   Final Result      1.  Nonvisualization of the left kidney due to overlying  bandages.   2.  Suboptimal visualization of the right kidney due to patient body habitus and ribs demonstrates no definite evidence of hydronephrosis.      [id number] VIEW   Final Result      Paucity of bowel gas with stool present in the colon. No definite evidence of obstruction however evaluation is limited on supine imaging.           Assessment/Plan  * Left renal mass- (present on admission)  Assessment & Plan  Highly concern for malignancy   status post Open left partial nephrectomy nephrectomy on July 1, 2024  Managed by Urology   Follow-up pathology-Clear-cell carcinoma.  Appreciate urology recommendations  7/4- hopefully JADIEL drain removing tomorrow. Appreciate urology recs   7/5/24- JADIEL removed today     Small bowel obstruction (HCC)  Assessment & Plan  Pt developing ileus and small bowel obstruction   Enema, NPO, bowel rest   IVF  If nauseas and vomiting he need NG tube. He has partial evolving small bowel , not true complete obstruction     Renal cell carcinoma of left kidney (HCC)- (present on admission)  Assessment & Plan  Status post partial nephrectomy.  Follow-up with urology.  No known other metastatic lesions    Leukemoid reaction- (present on admission)  Assessment & Plan  Likely reactive.  Continue to monitor.  Consider antibiotic if any other signs of infection  7/3/2024 WBC slightly reduced to 20,000.  Continue to monitor.  Still there is no signs of other infections  7/4- improving. WBC 17  7/5- wbc continues to decrease to 15.5    Acute blood loss as cause of postoperative anemia- (present on admission)  Assessment & Plan  Hemoglobin is stable at this time  However continues to monitor closely since patient is bleeding, from coburn   Discussed with urology and waiting on further recs   7/3/24-hemoglobin remained stable 9.6.  Continue to monitor on daily.  7/4- hgb stable. Continue to monitor   7/5- hgb stable. Hematuria almost resolved. Continue to monitor     Hyperkalemia- (present on  admission)  Assessment & Plan  Due to JAE.  He did receive hyperkalemia protocol.    7/2/24-He remains persistent hyperkalemic. Nephrology consulted. Started on Lokelma   Continue to monitor BMP every 4 hours   Renal US to follow  I will start some IVF   7/4- resolved.         Coronary artery disease involving native coronary artery of native heart without angina pectoris- (present on admission)  Assessment & Plan  NSTEMI 2019 s/p stent placement at [facility] at that time  hold ASA   Continue atorvastatin     History of atrial fibrillation- (present on admission)  Assessment & Plan  He is not on anticoagulation  Per chart review has history atrial fibrillation 2019 due to COPD exacerbation one-time, and no indication for anticoagulation since then  7/4- HR increasing, pt did miss couple of dosage of carvedilol. Continue to monitor on telemetry. Resumed carvedilol. Continue to monitor closely.     Essential hypertension- (present on admission)  Assessment & Plan  Holding Losartan in the setting of recent partial nephrectomy  Continue amlodipine       Peripheral vascular disease (HCC)- (present on admission)  Assessment & Plan  History of angioplasty stent placement by Dr. Vital 2012   hold aspirin  Follow-up as outpatient        COPD (chronic obstructive pulmonary disease) (HCC)- (present on admission)  Assessment & Plan  Continue with home dose Trelegy Ellipta   Duonebs PRN   Not signs for exacerbation  Follow-up with for allergies as outpatient      Acute kidney injury (JAE) with acute tubular necrosis (ATN) ([facility])- (present on admission)  Assessment & Plan  Cannot rule out obstruction versus renal.  No contrast exposure  Continue to monitor very closely  Continue IV fluid  Follow-up renal ultrasound  Discussed with nephrology and urology  7/3/24-creatinine slight worse again however patient is starting to have better urine output.  Likely this is ATN which has been expected worsening creatinine and then plateau and then  improvement later  7/4/24- slight improving. Good urine output. Continue to monitor closely   7/5/24- still good urine output, Cr not improving. Poor oral intake          VTE prophylaxis:    heparin ppx      I have performed a physical exam and reviewed and updated ROS and Plan today (7/5/2024). In review of yesterday's note (7/4/2024), there are no changes except as documented above.    Patient is has a high medical complexity, complex decision making and is at high risk for complication, morbidity, and mortality.  My total time spent caring for the patient on the day of the encounter was 52 minutes.   This does not include time spent on separately billable procedures/tests.

## 2024-07-05 NOTE — PROGRESS NOTES
"[facility] -  PROGRESS NOTE               Author: Delvis Merino M.D. Date & Time: 7/5/2024  8:15 AM     HPI:  74 y.o. male with CKD, left renal mass, COPD with chronic hypoxic respiratory failure, atrial fibrillation, hypertension, and coronary artery disease presented yesterday for open left partial nephrectomy nephrectomy. Continued to take losartan until day of surgery. Baseline cr prior to procedure appears ~2. Underwent procedure yesterday.  Op note without complications noted and only 150cc blood loss documented. Potassium elevated to 6.7 last night, temporizing measures given. Cr elevated to 3.2 this AM.  80cc UOP documented despite 3L IVF. Lauren draining with blood tinged urine, no clots. No chest pain. Shortness or breath stable.Previously followed by [facility] nephrology [place] [id number] with CKD felt 2/2 HTN at that point in time. Past UAs with blood and protein.     DAILY NEPHROLOGY SUMMARY:  7/2: Consult done  7/3: stable hemodynamics, 2L NC, 1.1L UOP-increasing since midnight. No obstruction on imaging    7/4: 1.4L UOP, cr starting to plateau, potassium stable, feeling improved  7/5: stable hemodynamics, 1.4L UOP, constipation main complaint, minimal PO intake    PAST FAMILY HISTORY: Reviewed and Unchanged  SOCIAL HISTORY: Reviewed and Unchanged  CURRENT MEDICATIONS: Reviewed  IMAGING STUDIES: Reviewed    ROS  10 point ROS performed, negative other than stated above     PHYSICAL EXAM  VS:  BP (!) 141/59   Pulse 73   Temp 36.6 °C (97.9 °F) (Temporal)   Resp 18   Ht 1.753 m (5' 9.02\")   Wt 87.1 kg (192 lb 0.3 oz)   SpO2 95%   BMI 28.34 kg/m²   GENERAL: no acute distress  CV: RRR, No edema  RESP: Clear to ausculation bilaterally, No rhales  GI: Soft  MSK: No joint deformities   SKIN: No concerning rashes  NEURO: AOx3  PSYCH: Cooperative    Fluids:  In: 400 [P.O.:400]  Out: 1415     LABS:  Recent Results (from the past 24 hour(s))   EKG    Collection Time: 07/04/24  9:09 PM   Result " Value Ref Range    Report       [facility]    Test Date:  2024  Pt Name:    KEIRA SOTOMAYOR             Department: [id number]  MRN:        [id number]                      Room:       T802  Gender:     Male                         Technician: ANANDA  :        1950                   Requested By:BREANA JAY  Order #:    [id number]                    Reading MD:    Measurements  Intervals                                Axis  Rate:       101                          P:          0  AL:         0                            QRS:        -53  QRSD:       96                           T:          86  QT:         317  QTc:        411    Interpretive Statements  Atrial fibrillation  Paired ventricular premature complexes  Inferior infarct, old  Artifact in lead(s) I,II,III,aVR,aVL,aVF,V1,V2,V3,V4,V5,V6  Compared to ECG 2024 00:05:42  Ventricular premature complex(es) now present  Sinus rhythm no longer present  Left bundle-branch block no longer present  Myocardial infarct finding still present     CBC WITH DIFFERENTIAL    Collection Time: 24  1:06 AM   Result Value Ref Range    WBC 15.5 (H) 4.8 - 10.8 K/uL    RBC 2.98 (L) 4.70 - 6.10 M/uL    Hemoglobin 9.5 (L) 14.0 - 18.0 g/dL    Hematocrit 29.1 (L) 42.0 - 52.0 %    MCV 97.7 81.4 - 97.8 fL    MCH 31.9 27.0 - 33.0 pg    MCHC 32.6 32.3 - 36.5 g/dL    RDW 50.4 (H) 35.9 - 50.0 fL    Platelet Count 185 164 - 446 K/uL    MPV 9.3 9.0 - 12.9 fL    Neutrophils-Polys 90.80 (H) 44.00 - 72.00 %    Lymphocytes 2.60 (L) 22.00 - 41.00 %    Monocytes 5.80 0.00 - 13.40 %    Eosinophils 0.10 0.00 - 6.90 %    Basophils 0.20 0.00 - 1.80 %    Immature Granulocytes 0.50 0.00 - 0.90 %    Nucleated RBC 0.00 0.00 - 0.20 /100 WBC    Neutrophils (Absolute) 14.05 (H) 1.82 - 7.42 K/uL    Lymphs (Absolute) 0.40 (L) 1.00 - 4.80 K/uL    Monos (Absolute) 0.90 (H) 0.00 - 0.85 K/uL    Eos (Absolute) 0.01 0.00 - 0.51 K/uL    Baso (Absolute) 0.03 0.00 - 0.12 K/uL    Immature Granulocytes  (abs) 0.08 0.00 - 0.11 K/uL    NRBC (Absolute) 0.00 K/uL   EKG    Collection Time: 24  3:33 AM   Result Value Ref Range    Report       [facility]    Test Date:  2024  Pt Name:    KEIRA SOTOMAYOR             Department: 183  MRN:        [id number]                      Room:       [facility]  Gender:     Male                         Technician: ANANDA  :        1950                   Requested By:BREANA JAY  Order #:    [id number]                    Reading MD:    Measurements  Intervals                                Axis  Rate:       75                           P:          75  W[place]:         221                          QRS:        -51  QRSD:       107                          T:          71  QT:         376  QTc:        420    Interpretive Statements  Sinus rhythm  LAD, consider left anterior fascicular block  Consider anterior infarct  ST elevation, consider inferior injury  Artifact in lead(s) I,II,III,aVR,aVL,aVF,V1,V2,V3,V4,V5,V6  Compared to ECG 2024 21:09:30  ST (T wave) deviation now present  Atrial fibrillation no longer present  Ventricular premature complex(es) no longer present  Myocardial i nfarct finding still present         (click the triangle to expand results)      ASSESSMENT:  # Oliguric JAE: It setting of surgery/RAASI/decreased nephron mass. UOP increasing and cr starting to plateau   # CKD Stage 3B: Billed 2/2 HTN in the past. Urine with blood (renal mass) and protein  # Hyperkalemia  # Renal mass: s/p partial nephrectomy  # HTN  # COPD  # Anemia: low iron     PLAN:  -No role for RRT  -continue Lokelma daily for now  -IV iron load  -Renal diet  -Strict I/Os/continue coburn  -Dose all meds per eGFR <15      Thank you,    Delvis Merino MD

## 2024-07-06 NOTE — PROGRESS NOTES
Hospital Medicine Daily Progress Note    Date of Service  7/6/2024    Chief Complaint  Olman Sims is a 74 y.o. male admitted 7/1/2024 with elective partial nephrectomy    Hospital Course  80-year-old male past medical history of COPD on chronic oxygen 2-3 L at baseline, emphysema, hypertension, CAD, previous atrial fibrillation, PVD, previous rheumatic fever, previous nephrolithiasis status post nephrolithotomy was admitted for elective left partial nephrectomy also of JADIEL drain placement.  Postop then, they did with acute kidney injury, bleeding, hyperkalemia.  Nephrology consulted  Hematuria did resolve.  Patient was improving JADIEL drain removed 7/5.  He is still on Coburn.  Creatinine had improvement for couple of day and start to worsen again. Most likely ATN from partial nephrectomy  7/5- pt started to have complication of small bowel obstruction. NG tube placed 7/6 with low suction     Interval Problem Update  He is has no pain. He is anuric. I see blood on coburn and bag. I don't see clot. Also he is leaking from Prevena   Vitals are stable.   Hgb dropped to 10.9. he is hyperkalemic all day. Cr has worsen.   I did hold aspirin and heparin. I did discuss with nephrology. Started on Lokelma.   He is persistent hyperkalemic   CR continue to worsen  Monitor on telemetry   Renal US ordered urgently.   I did discuss with urology too.     7/3/2024-patient is feeling better today.  Heart rate 62, afebrile, blood pressure 127/60, SpO2 95% on 2 L of oxygen.  His urine output is improving 825 ml today.  He still have hematuria but there is no clots  I discussed with nephrology Dr. Smith and urology.  Patient is improving and stable.  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 Consider enema if stil constipated     7/4/2024- pt feels well. No  event overnight. He is slight tachycardic, however he has not received carvedilol and amlodipine because of parameters on the system. Adjusted. He has slight pinkish urine, but no clots. Cr slight improvement, urine output improved.   Urology recs reviewed . Nephrology recs reviewed. Continue to monitor daily labs, CBC, renal function.     7/5/2024-pt feels bloated. He still has no bowel movement. He cannot eat because he feels bloated.   Heart rate 82, blood pressure 141/51, SpO2 95% on 5 L of oxygen.  He feels short of breath because he is feeling bloated  I did schedule lactulose, soapsuds enema  He still has great urine output  WBC decreased to 15.5, hemoglobin stable 9.5  Creatinine 4.58, BUN 66 he is having poor oral intake.  KUB concerning for small bowel obstruction.  Will consider NG tube placement if still no bowel movement     7/6/2024- pt did have 2 large bowel movement overnight.  X-ray was done and showed stable air-filled bowel loops.  Later on he started vomiting.  Today in the morning he also was vomiting.  He has no signs of acute peritonitis, or abdominal pain .   he is now requiring up to 7 L of oxygen, blood pressure 147/68, heart rate 80 he is afebrile.  Sodium 137, potassium 4.6, creatinine more send 5.48, BUN 88  WBC improving to 14.5, hemoglobin 10.5.  Hematuria resolved  Patient placed on strict n.p.o. and IV fluid with low suctioning NG  I started also on low-dose of Lasix IV 40 mg with mild IV fluid hydration, to prevent fluid overload.     I have discussed this patient's plan of care and discharge plan at IDT rounds today with Case Management, Nursing, Nursing leadership, and other members of the IDT team.    Consultants/Specialty  nephrology and urology    Code Status  Full Code    Disposition  The patient is not medically cleared for discharge to home or a post-acute facility.      I have placed the appropriate orders for post-discharge needs.    Review of Systems  Review of Systems    Constitutional:  Positive for malaise/fatigue. Negative for chills and fever.   HENT:  Negative for sore throat.    Respiratory:  Negative for cough and shortness of breath.    Cardiovascular:  Negative for chest pain, palpitations and leg swelling.   Gastrointestinal:  Positive for abdominal pain, nausea and vomiting. Negative for blood in stool, constipation, diarrhea and heartburn.   Genitourinary:  Negative for dysuria, flank pain, hematuria and urgency.   Musculoskeletal:  Positive for back pain.   Neurological:  Negative for dizziness and headaches.   Psychiatric/Behavioral:  The patient is nervous/anxious.         Physical Exam  Temp:  [36.4 °C (97.5 °F)-36.7 °C (98.1 °F)] 36.7 °C (98.1 °F)  Pulse:  [75-87] 75  Resp:  [18] 18  BP: (133-149)/(61-71) 137/62  SpO2:  [94 %-99 %] 94 %    Physical Exam  Vitals and nursing note reviewed.   Constitutional:       General: He is not in acute distress.     Appearance: Normal appearance. He is ill-appearing.   HENT:      Head: Atraumatic.   Eyes:      General: No scleral icterus.  Cardiovascular:      Rate and Rhythm: Normal rate. Rhythm irregular.      Heart sounds: No murmur heard.     No gallop.   Pulmonary:      Effort: Pulmonary effort is normal. No respiratory distress.      Breath sounds: No wheezing.   Abdominal:      General: Bowel sounds are normal. There is distension.      Palpations: Abdomen is soft.      Tenderness: There is no abdominal tenderness. There is left CVA tenderness. There is no right CVA tenderness.      Comments: Leaking blood from the back, prevena on , external drain in place    Musculoskeletal:      Right lower leg: No edema.      Left lower leg: No edema.   Skin:     Coloration: Skin is not pale.   Neurological:      Mental Status: He is alert and oriented to person, place, and time.   Psychiatric:         Behavior: Behavior normal.         Thought Content: Thought content normal.         Judgment: Judgment normal.          Fluids    Intake/Output Summary (Last 24 hours) at 7/6/2024 1155  Last data filed at 7/5/2024 1340  Gross per 24 hour   Intake --   Output 350 ml   Net -350 ml       Laboratory  Recent Labs     07/04/24 0639 07/05/24 0106 07/06/24  0049   WBC 17.1* 15.5* 14.5*   RBC 2.94* 2.98* 3.30*   HEMOGLOBIN 9.4* 9.5* 10.5*   HEMATOCRIT 28.3* 29.1* 31.0*   MCV 96.3 97.7 93.9   MCH 32.0 31.9 31.8   MCHC 33.2 32.6 33.9   RDW 50.4* 50.4* 49.3   PLATELETCT 160* 185 255   MPV 9.1 9.3 9.6     Recent Labs     07/04/24 0639 07/05/24 0106 07/06/24  0049   SODIUM 133* 133* 137   POTASSIUM 4.9 5.5 4.6   CHLORIDE 95* 96 92*   CO2 23 21 26   GLUCOSE 93 87 161*   BUN 56* 66* 88*   CREATININE 4.65* 4.58* 5.48*   CALCIUM 8.6 8.6 8.8                   Imaging  DX-ABDOMEN FOR TUBE PLACEMENT   Final Result      Nasogastric tube tip projects over the proximal stomach.      [id number] VIEW   Final Result      1.  Stable mild to moderate dilatation of small bowel loops with air seen in the distal rectum.      [id number] VIEW   Final Result      1.  Operative changes of the abdomen.      2.  Evolving small bowel obstruction.      US-RENAL   Final Result      1.  Nonvisualization of the left kidney due to overlying bandages.   2.  Suboptimal visualization of the right kidney due to patient body habitus and ribs demonstrates no definite evidence of hydronephrosis.      [id number] VIEW   Final Result      Paucity of bowel gas with stool present in the colon. No definite evidence of obstruction however evaluation is limited on supine imaging.           Assessment/Plan  * Left renal mass- (present on admission)  Assessment & Plan  Highly concern for malignancy   status post Open left partial nephrectomy nephrectomy on July 1, 2024  Managed by Urology   Follow-up pathology-Clear-cell carcinoma.  Appreciate urology recommendations  7/4- hopefully JADIEL drain removing tomorrow. Appreciate urology recs   7/5/24- JADIEL removed today     Small bowel  obstruction (HCC)  Assessment & Plan  Pt developing ileus and small bowel obstruction   Enema, NPO, bowel rest   IVF  If nauseas and vomiting he need NG tube. He has partial evolving small bowel , not true complete obstruction   7/5/24-Place NG tube.  Consider serial bowels for tomorrow.  Low suction    Renal cell carcinoma of left kidney (HCC)- (present on admission)  Assessment & Plan  Status post partial nephrectomy.  Follow-up with urology.  No known other metastatic lesions    Leukemoid reaction- (present on admission)  Assessment & Plan  Likely reactive.  Continue to monitor.  Consider antibiotic if any other signs of infection  7/3/2024 WBC slightly reduced to 20,000.  Continue to monitor.  Still there is no signs of other infections  7/4- improving. WBC 17  7/5- wbc continues to decrease to 15.5  7/7-WBC continues to decrease to 14.5.  No signs of active infection    Acute blood loss as cause of postoperative anemia- (present on admission)  Assessment & Plan  Hemoglobin is stable at this time  However continues to monitor closely since patient is bleeding, from coburn   Discussed with urology and waiting on further recs   7/3/24-hemoglobin remained stable 9.6.  Continue to monitor on daily.  7/4- hgb stable. Continue to monitor   7/5- hgb stable. Hematuria almost resolved. Continue to monitor   7/6-hemoglobin stable.  Patient able to tolerate DVT prophylaxis and aspirin    Hyperkalemia- (present on admission)  Assessment & Plan  Due to JAE.  He did receive hyperkalemia protocol.    7/2/24-He remains persistent hyperkalemic. Nephrology consulted. Started on Lokelma   Continue to monitor BMP every 4 hours   Renal US to follow  I will start some IVF   7/4- resolved.         Coronary artery disease involving native coronary artery of native heart without angina pectoris- (present on admission)  Assessment & Plan  NSTEMI 2019 s/p stent placement at RCA at that time  hold ASA   Continue atorvastatin     History of  atrial fibrillation- (present on admission)  Assessment & Plan  He is not on anticoagulation  Per chart review has history atrial fibrillation 2019 due to COPD exacerbation one-time, and no indication for anticoagulation since then  7/4- HR increasing, pt did miss couple of dosage of carvedilol. Continue to monitor on telemetry. Resumed carvedilol. Continue to monitor closely.     Essential hypertension- (present on admission)  Assessment & Plan  Holding Losartan in the setting of recent partial nephrectomy  Continue amlodipine       Peripheral vascular disease (HCC)- (present on admission)  Assessment & Plan  History of angioplasty stent placement by Dr. Vital 2012   hold aspirin  Follow-up as outpatient  7/5- resumed asa         COPD (chronic obstructive pulmonary disease) ([facility])- (present on admission)  Assessment & Plan  Continue with home dose Trelegy Ellipta   Duonebs PRN   Not signs for exacerbation  Follow-up with for allergies as outpatient      Acute kidney injury (JAE) with acute tubular necrosis (ATN) ([facility])- (present on admission)  Assessment & Plan  Cannot rule out obstruction versus renal.  No contrast exposure  Continue to monitor very closely  Continue IV fluid  Follow-up renal ultrasound  Discussed with nephrology and urology  7/3/24-creatinine slight worse again however patient is starting to have better urine output.  Likely this is ATN which has been expected worsening creatinine and then plateau and then improvement later  7/4/24- slight improving. Good urine output. Continue to monitor closely   7/5/24- still good urine output, Cr not improving. Poor oral intake          VTE prophylaxis:    heparin ppx      I have performed a physical exam and reviewed and updated ROS and Plan today (7/6/2024). In review of yesterday's note (7/5/2024), there are no changes except as documented above.    Patient is has a high medical complexity, complex decision making and is at high risk for complication,  morbidity, and mortality.  My total time spent caring for the patient on the day of the encounter was 53 minutes.   This does not include time spent on separately billable procedures/tests.

## 2024-07-06 NOTE — PROGRESS NOTES
NOC HOSPITALIST CROSS COVER    Notified by RN regarding patient having nausea and vomiting. He has a suspected SBO. A repeat abdominal xray was obtained which showed stable distended air filled bowel loops. NG tube orders were placed, but immediately prior to NG placement, the patient's nausea improved and he had a large bowel movement. Will hold off on NG placement at this time.       Vitals:    07/06/24 0538   BP: (!) 145/71   Pulse: 80   Resp: 18   Temp: 36.6 °C (97.9 °F)   SpO2: 95%        -----------------------------------------------------------------------------------------------------------    Electronically signed by:  Ady Woodall, JADA, APRN, MPP-BC  Hospitalist Services

## 2024-07-06 NOTE — PROGRESS NOTES
Bedside report received from off going RN/tech: Vamsi, assumed care of patient.   Overnight Events: overnight pt continued to have N/V with multiple episodes of vomiting. Pt does report 3 large Bowel movements, however is still extremely nauseous with two more episodes of vomiting this AM. Pt is currently NPO and morning med's held at this time. Lauren in place with roughly 400 cc's   A&O x 4  Oxygen: How many liters 6L  2-3 L is baseline, will wean down as tolerated.   SBP Ranged: 130's-140's  Patient on cardiac monitor: Yes SR  70's-80's  IVF/IV medications: Not Applicable   Fall Risk Score: moderate fall risk   Fall risk interventions in place: Place yellow fall risk ID band on patient, Provide patient/family education based on risk assessment, Educate patient/family to call staff for assistance when getting out of bed, Place fall precaution signage outside patient door, Place patient in room close to nursing station, Utilize bed/chair fall alarm, Notify charge of high risk for huddle, and Bed alarm connected correctly  Bed type: Regular (Dieudonne Score less than 17 interventions in place)  Bedside sitter: Not Applicable   Isolation: Not applicable

## 2024-07-06 NOTE — PROGRESS NOTES
"[facility] -  PROGRESS NOTE               Author: Delvis Merino M.D. Date & Time: 7/6/2024  11:32 AM     HPI:  74 y.o. male with CKD, left renal mass, COPD with chronic hypoxic respiratory failure, atrial fibrillation, hypertension, and coronary artery disease presented yesterday for open left partial nephrectomy nephrectomy. Continued to take losartan until day of surgery. Baseline cr prior to procedure appears ~2. Underwent procedure yesterday.  Op note without complications noted and only 150cc blood loss documented. Potassium elevated to 6.7 last night, temporizing measures given. Cr elevated to 3.2 this AM.  80cc UOP documented despite 3L IVF. Lauren draining with blood tinged urine, no clots. No chest pain. Shortness or breath stable.Previously followed by [facility] nephrology [place] [id number] with CKD felt 2/2 HTN at that point in time. Past UAs with blood and protein.     DAILY NEPHROLOGY SUMMARY:  7/2: Consult done  7/3: stable hemodynamics, 2L NC, 1.1L UOP-increasing since midnight. No obstruction on imaging    7/4: 1.4L UOP, cr starting to plateau, potassium stable, feeling improved  7/5: stable hemodynamics, 1.4L UOP, constipation main complaint, minimal PO intake  7/6: Worsening nasuea and emesis, Concern for SBO and then had 2 large Bms, NG tube to suction with significant output, only 350cc UOP documented, cr climbing again, started on IV fluids, feeling slightly improved this AM    PAST FAMILY HISTORY: Reviewed and Unchanged  SOCIAL HISTORY: Reviewed and Unchanged  CURRENT MEDICATIONS: Reviewed  IMAGING STUDIES: Reviewed    ROS  10 point ROS performed, negative other than stated above     PHYSICAL EXAM  VS:  BP (!) 147/68   Pulse 80   Temp 36.4 °C (97.5 °F) (Temporal)   Resp 18   Ht 1.753 m (5' 9.02\")   Wt 79.1 kg (174 lb 6.1 oz)   SpO2 97%   BMI 25.74 kg/m²   GENERAL: no acute distress  CV: RRR, No edema  RESP: Clear to ausculation bilaterally, No rhales  GI: Soft  MSK: No joint " deformities   SKIN: No concerning rashes  NEURO: AOx3  PSYCH: Cooperative    Fluids:  In: -   Out: 350     LABS:  Recent Results (from the past 24 hour(s))   CBC WITHOUT DIFFERENTIAL    Collection Time: 07/06/24 12:49 AM   Result Value Ref Range    WBC 14.5 (H) 4.8 - 10.8 K/uL    RBC 3.30 (L) 4.70 - 6.10 M/uL    Hemoglobin 10.5 (L) 14.0 - 18.0 g/dL    Hematocrit 31.0 (L) 42.0 - 52.0 %    MCV 93.9 81.4 - 97.8 fL    MCH 31.8 27.0 - 33.0 pg    MCHC 33.9 32.3 - 36.5 g/dL    RDW 49.3 35.9 - 50.0 fL    Platelet Count 255 164 - 446 K/uL    MPV 9.6 9.0 - 12.9 fL   Renal Function Panel    Collection Time: 07/06/24 12:49 AM   Result Value Ref Range    Sodium 137 135 - 145 mmol/L    Potassium 4.6 3.6 - 5.5 mmol/L    Chloride 92 (L) 96 - 112 mmol/L    Co2 26 20 - 33 mmol/L    Glucose 161 (H) 65 - 99 mg/dL    Creatinine 5.48 (HH) 0.50 - 1.40 mg/dL    Bun 88 (H) 8 - 22 mg/dL    Calcium 8.8 8.5 - 10.5 mg/dL    Correct Calcium 9.4 8.5 - 10.5 mg/dL    Phosphorus 7.4 (H) 2.5 - 4.5 mg/dL    Albumin 3.3 3.2 - 4.9 g/dL   ESTIMATED GFR    Collection Time: 07/06/24 12:49 AM   Result Value Ref Range    GFR (CKD-EPI) 10 (A) >60 mL/min/1.73 m 2       (click the triangle to expand results)      ASSESSMENT:  # Oliguric SCOTT: It setting of surgery/RAASI/decreased nephron mass. Was starting to recover then 2nd Scott with bowel obstruction.  # CKD Stage 3B: Billed 2/2 HTN in the past. Urine with blood (renal mass) and protein  # Hyperkalemia  # Renal mass: s/p partial nephrectomy  # HTN  # COPD  # Anemia: low iron  # SBO vs. Ileus: improving with NG     PLAN:  -No role for RRT  -Ok to hold lokelma while NPO  -Agree with IV fluids  -No diuretics  -IV iron load  -Renal diet  -Strict I/Os/continue coburn  -Dose all meds per eGFR <15      Thank you,    Delivs Merino MD

## 2024-07-06 NOTE — CARE PLAN
Problem: Cardiac - Atrial Fibrillation  Goal: Patient will achieve & maintain adequate cardiac output and rate control  Outcome: Progressing  - patient rate controlled with coreg     Problem: Bowel Elimination  Goal: Establish and maintain regular bowel function  Outcome: Progressing  - patient given full bowel regimen, had bowel movement      The patient is Stable - Low risk of patient condition declining or worsening    Shift Goals  Clinical Goals: improve bowel function  Patient Goals: Rest  Family Goals: LAZARO    Progress made toward(s) clinical / shift goals:  progressing    Patient is not progressing towards the following goals:

## 2024-07-06 NOTE — PROGRESS NOTES
Surgical Progress Note    Author: Gaurav Ferguson M.D. Date & Time created: 2024   3:45 PM     Interval Events:  He had nausea and vomiting this morning followed by two large bowel movements. He felt better temporarily but started having nausea and vomiting again. NGT was placed with 1400 cc of brown/bilious output. His nausea and vomiting has resolved at this time.    Review of Systems   Constitutional:  Negative for chills and fever.   Gastrointestinal:  Positive for abdominal pain, nausea and vomiting.   Genitourinary:  Negative for flank pain and hematuria.     Hemodynamics:  Temp (24hrs), Av.6 °C (97.9 °F), Min:36.4 °C (97.5 °F), Max:36.7 °C (98.1 °F)  Temperature: 36.6 °C (97.9 °F)  Pulse  Av.3  Min: 57  Max: 132   Blood Pressure : 120/69     Respiratory:    Respiration: 18, Pulse Oximetry: 99 %           Neuro:  GCS       Fluids:    Intake/Output Summary (Last 24 hours) at 2024 1545  Last data filed at 2024 1400  Gross per 24 hour   Intake --   Output 2250 ml   Net -2250 ml     Weight: 79.1 kg (174 lb 6.1 oz)  Current Diet Order   Procedures    Diet NPO Restrict to: Strict     Physical Exam  Constitutional:       Appearance: Normal appearance.   HENT:      Head: Normocephalic and atraumatic.   Pulmonary:      Effort: Pulmonary effort is normal.      Comments: On oxygen  Abdominal:      General: There is distension.      Tenderness: There is abdominal tenderness.      Comments: Less distended than yesterday, NGT in place with bilious output   Skin:     General: Skin is warm and dry.   Neurological:      General: No focal deficit present.      Mental Status: He is alert.   Psychiatric:         Mood and Affect: Mood normal.         Behavior: Behavior normal.       Labs:  Recent Results (from the past 24 hour(s))   CBC WITHOUT DIFFERENTIAL    Collection Time: 24 12:49 AM   Result Value Ref Range    WBC 14.5 (H) 4.8 - 10.8 K/uL    RBC 3.30 (L) 4.70 - 6.10 M/uL    Hemoglobin 10.5 (L) 14.0 -  18.0 g/dL    Hematocrit 31.0 (L) 42.0 - 52.0 %    MCV 93.9 81.4 - 97.8 fL    MCH 31.8 27.0 - 33.0 pg    MCHC 33.9 32.3 - 36.5 g/dL    RDW 49.3 35.9 - 50.0 fL    Platelet Count 255 164 - 446 K/uL    MPV 9.6 9.0 - 12.9 fL   Renal Function Panel    Collection Time: 07/06/24 12:49 AM   Result Value Ref Range    Sodium 137 135 - 145 mmol/L    Potassium 4.6 3.6 - 5.5 mmol/L    Chloride 92 (L) 96 - 112 mmol/L    Co2 26 20 - 33 mmol/L    Glucose 161 (H) 65 - 99 mg/dL    Creatinine 5.48 (HH) 0.50 - 1.40 mg/dL    Bun 88 (H) 8 - 22 mg/dL    Calcium 8.8 8.5 - 10.5 mg/dL    Correct Calcium 9.4 8.5 - 10.5 mg/dL    Phosphorus 7.4 (H) 2.5 - 4.5 mg/dL    Albumin 3.3 3.2 - 4.9 g/dL   ESTIMATED GFR    Collection Time: 07/06/24 12:49 AM   Result Value Ref Range    GFR (CKD-EPI) 10 (A) >60 mL/min/1.73 m 2     Medical Decision Making, by Problem:  Active Hospital Problems    Diagnosis     Small bowel obstruction (HCC) [K56.609]     Renal cell carcinoma of left kidney (HCC) [C64.2]     Hyperkalemia [E87.5]     Acute blood loss as cause of postoperative anemia [D62]     Leukemoid reaction [D72.823]     Left renal mass [N28.89]      Left renal mass noted incidentally on chest CT last month.  Follow-up renal ultrasound confirmed 4.9 x 4.5 x 4.8 cm LEFT renal mass.      Coronary artery disease involving native coronary artery of native heart without angina pectoris [I25.10]     History of atrial fibrillation [Z86.79]      Remote history of atrial fibrillation in 2019 in the setting of an acute COPD exacerbation.  He has had no recurrences.  He did follow with cardiology for a few years after this.  Oral anticoagulant was not indicated at that time.      Essential hypertension [I10]      This is a chronic condition.  Current Meds:   - losartan 100 mg daily  - carvedilol 12.5 mg BID  - amlodipine 10 mg daily  Side effects: none  Home BP Log: Typically 120s/60s  Associated symptoms: Denies chest pain, shortness of breath, headaches,  dizziness/lightheadedness           Peripheral vascular disease ([facility]) [I73.9]      Angioplasty and stents Dr. Vital November 2012      COPD (chronic obstructive pulmonary disease) ([facility]) [J44.9]      This is a chronic condition.  Managed by pulmonology.  Patient is on Trelegy inhaler daily and albuterol as needed.  He is on supplemental oxygen at night and as needed throughout the day.   Symptoms currently controlled.       Acute kidney injury (JAE) with acute tubular necrosis (ATN) ([facility]) [N17.0]      Plan:  Mr. Sims is a 74 year old gentleman s/p open left partial nephrectomy on 7/2/2024. His Cr was stable but increased from yesterday and is currently 5.48, electrolytes are stable at this time. His nausea and vomiting have improved since placement of NGT which had bilious output of 1400 cc upon placement. We discussed that this will remain in place until the volume and character changes. He has started to have ROBF which is encouraging. Hgb is stable. Pain is well controlled. He is working on ambulation with PT. He was encouraged to get up to the chair as much as possible.    Quality Measures:  Quality-Core Measures    Discussed patient condition with Patient and nurse

## 2024-07-06 NOTE — PROGRESS NOTES
Bedside report received from day RN, pt care assumed, assessment completed. Pt is A&O4, pain 0/10, SR on the monitor. Updated on POC, questions answered. Bed in lowest, locked position, treaded socks on, call light and belongings within reach. Fall precautions in place.      Fall Risk Score: MODERATE RISK  Fall risk interventions in place: Provide patient/family education based on risk assessment, Educate patient/family to call staff for assistance when getting out of bed, Place fall precaution signage outside patient door, Utilize bed/chair fall alarm, and Bed alarm connected correctly  Bed type: Regular (Dieudonne Score less than 17 interventions in place)  Patient on cardiac monitor: Yes  IVF/IV medications: Not Applicable   Oxygen: How many liters 6L, Traced the line to wall oxygen, and No oxygen tank in room  Bedside sitter: Not Applicable   Isolation: Not applicable

## 2024-07-07 NOTE — PROGRESS NOTES
NOC HOSPITALIST CROSS COVER    Notified by RN regarding patient having continuous bloody oozing from around his coburn catheter and has significant hematuria. Coburn catheter was exchanged but continued to leak around the sides with hematuria. Catheter exchanged to a continuous bladder irrigation due to ongoing hematuria and concern for clots leading to catheter leakage. His hemoglobin remains stable this morning.       Vitals:    07/07/24 0423   BP: 139/73   Pulse: 94   Resp: 18   Temp: 36.6 °C (97.9 °F)   SpO2: 96%     -----------------------------------------------------------------------------------------------------------    Electronically signed by:  Ady Woodall, JADA, APRN, DORA-BC  Hospitalist Services

## 2024-07-07 NOTE — PROGRESS NOTES
Surgical Progress Note    Author: Gaurav Ferguson M.D. Date & Time created: 2024   1:57 PM     Interval Events:  He has had his NGT clamped for the past three hours with no nausea or vomiting. He has had several bowel movements and passed flatus, although none today. He is feeling hungry. No pain today. No fever or chills.    Review of Systems   Constitutional:  Negative for chills and fever.   Gastrointestinal:  Negative for abdominal pain, nausea and vomiting.   Genitourinary:  Positive for hematuria. Negative for flank pain.     Hemodynamics:  Temp (24hrs), Av.6 °C (97.9 °F), Min:36.6 °C (97.9 °F), Max:36.7 °C (98.1 °F)  Temperature: 36.6 °C (97.9 °F)  Pulse  Av.1  Min: 57  Max: 132   Blood Pressure : 120/59     Respiratory:    Respiration: 18, Pulse Oximetry: 90 %        RUL Breath Sounds: Clear, RML Breath Sounds: Diminished, RLL Breath Sounds: Diminished, ALEN Breath Sounds: Clear, LLL Breath Sounds: Diminished  Neuro:  GCS       Fluids:    Intake/Output Summary (Last 24 hours) at 2024 1357  Last data filed at 2024 1000  Gross per 24 hour   Intake 672.83 ml   Output 6715 ml   Net -6042.17 ml     Weight: 74.9 kg (165 lb 2 oz)  Current Diet Order   Procedures    Diet NPO Restrict to: Strict     Physical Exam  Constitutional:       Appearance: Normal appearance.   HENT:      Head: Normocephalic and atraumatic.   Pulmonary:      Effort: Pulmonary effort is normal.   Abdominal:      General: There is distension.      Tenderness: There is no abdominal tenderness. There is no guarding.      Comments: Less distended than yesterday, abdomen more compressible.  NGT in place with more dwyer output noted in tubing, currently clamped.   Genitourinary:     Comments: Lauren catheter with clear to light pink tinged UOP on minimal CBI rate  18 Fr 3-way  Skin:     General: Skin is warm and dry.   Neurological:      General: No focal deficit present.      Mental Status: He is alert.   Psychiatric:         Mood  and Affect: Mood normal.         Behavior: Behavior normal.       Labs:  Recent Results (from the past 24 hour(s))   PLATELET MAPPING WITH BASIC TEG    Collection Time: 07/07/24  4:00 AM   Result Value Ref Range    Reaction Time Initial-R 5.2 4.6 - 9.1 min    React Time Initial Hep 4.9 4.3 - 8.3 min    Clot Kinetics-K 0.8 0.8 - 2.1 min    Clot Angle-Angle 79.7 (H) 63.0 - 78.0 degrees    Maximum Clot Strength-MA 68.5 52.0 - 69.0 mm    TEG Functional Fibrinogen(MA) 49.5 (H) 15.0 - 32.0 mm    Lysis 30 minutes-LY30 0.1 0.0 - 2.6 %    % Inhibition ADP See Comment 0.0 - 17.0 %    % Inhibition AA See Comment 0.0 - 11.0 %    TEG Algorithm Link Algorithm    Prothrombin Time    Collection Time: 07/07/24  4:00 AM   Result Value Ref Range    PT 14.8 (H) 12.0 - 14.6 sec    INR 1.14 (H) 0.87 - 1.13   CBC WITHOUT DIFFERENTIAL    Collection Time: 07/07/24  4:00 AM   Result Value Ref Range    WBC 13.8 (H) 4.8 - 10.8 K/uL    RBC 3.56 (L) 4.70 - 6.10 M/uL    Hemoglobin 11.5 (L) 14.0 - 18.0 g/dL    Hematocrit 33.6 (L) 42.0 - 52.0 %    MCV 94.4 81.4 - 97.8 fL    MCH 32.3 27.0 - 33.0 pg    MCHC 34.2 32.3 - 36.5 g/dL    RDW 49.0 35.9 - 50.0 fL    Platelet Count 321 164 - 446 K/uL    MPV 9.2 9.0 - 12.9 fL   Comp Metabolic Panel    Collection Time: 07/07/24  4:00 AM   Result Value Ref Range    Sodium 141 135 - 145 mmol/L    Potassium 4.3 3.6 - 5.5 mmol/L    Chloride 85 (L) 96 - 112 mmol/L    Co2 34 (H) 20 - 33 mmol/L    Anion Gap 22.0 (H) 7.0 - 16.0    Glucose 149 (H) 65 - 99 mg/dL    Bun 107 (H) 8 - 22 mg/dL    Creatinine 6.87 (HH) 0.50 - 1.40 mg/dL    Calcium 8.8 8.5 - 10.5 mg/dL    Correct Calcium 9.2 8.5 - 10.5 mg/dL    AST(SGOT) 49 (H) 12 - 45 U/L    ALT(SGPT) 11 2 - 50 U/L    Alkaline Phosphatase 104 (H) 30 - 99 U/L    Total Bilirubin 0.5 0.1 - 1.5 mg/dL    Albumin 3.5 3.2 - 4.9 g/dL    Total Protein 6.6 6.0 - 8.2 g/dL    Globulin 3.1 1.9 - 3.5 g/dL    A-G Ratio 1.1 g/dL   COD - Adult (Type and Screen)    Collection Time: 07/07/24   4:00 AM   Result Value Ref Range    ABO Grouping Only AB     Rh Grouping Only POS     Antibody Screen-Cod NEG    ESTIMATED GFR    Collection Time: 07/07/24  4:00 AM   Result Value Ref Range    GFR (CKD-EPI) 8 (A) >60 mL/min/1.73 m 2     Medical Decision Making, by Problem:  Active Hospital Problems    Diagnosis     Small bowel obstruction (HCC) [K56.609]     Renal cell carcinoma of left kidney (HCC) [C64.2]     Hyperkalemia [E87.5]     Acute blood loss as cause of postoperative anemia [D62]     Leukemoid reaction [D72.823]     Left renal mass [N28.89]      Left renal mass noted incidentally on chest CT last month.  Follow-up renal ultrasound confirmed 4.9 x 4.5 x 4.8 cm LEFT renal mass.      Coronary artery disease involving native coronary artery of native heart without angina pectoris [I25.10]     History of atrial fibrillation [Z86.79]      Remote history of atrial fibrillation in 2019 in the setting of an acute COPD exacerbation.  He has had no recurrences.  He did follow with cardiology for a few years after this.  Oral anticoagulant was not indicated at that time.      Essential hypertension [I10]      This is a chronic condition.  Current Meds:   - losartan 100 mg daily  - carvedilol 12.5 mg BID  - amlodipine 10 mg daily  Side effects: none  Home BP Log: Typically 120s/60s  Associated symptoms: Denies chest pain, shortness of breath, headaches, dizziness/lightheadedness           Peripheral vascular disease ([facility]) [I73.9]      Angioplasty and stents Dr. Vital November 2012      COPD (chronic obstructive pulmonary disease) ([facility]) [J44.9]      This is a chronic condition.  Managed by pulmonology.  Patient is on Trelegy inhaler daily and albuterol as needed.  He is on supplemental oxygen at night and as needed throughout the day.   Symptoms currently controlled.       Acute kidney injury (JAE) with acute tubular necrosis (ATN) ([facility]) [N17.0]      Plan:  Mr. Sims is a 74 year old gentleman s/p open left partial  nephrectomy on 7/2/2024. His Cr was stable but increased from yesterday and is currently 6.87, electrolytes are stable at this time.     His nausea and vomiting have improved since placement of NGT which had bilious output of 1400 cc upon placement. Output decreased this AM and he is undergoing clamp trial. The KUB from this morning appears relatively unchanged from prior. If the residual is <200 cc after return to suction it would be reasonable to try and remove the NGT as he has started to have ROBF. If residuals remain elevated or nausea/vomiting recurs I recommend a general surgery consult for further evaluation.     Pain is well controlled. He is working on ambulation with PT. He was encouraged to get up to the chair as much as possible.      Hgb is stable.The hematuria is most likely related to mobilization of blood clot from the entry into the collecting system during surgery. The leakage around the catheter is consistent with bladder spasms, I recommend oxybutynin 5 mg TID prn. Wean CBI today as able, can exchange for a 2-way catheter. There have been minimal clots irrigated from the catheter.     -Clamp trial today with NGT, if patient fails clamp trial would recommend general surgery consult  -Ok from urology standpoint to remove NGT if residual is <200 cc after 4 hour clamp trial  -Wean CBI and exchange for 18 or 20 Fr 2-way catheter as able today  -Star Oxybutynin 5 mg TID prn for bladder spasms and leakage from around the catheter  -ok to remove provena today    Quality Measures:  Quality-Core Measures    Discussed patient condition with RN and Patient, and hospitalist

## 2024-07-07 NOTE — WOUND TEAM
[facility]  Inpatient Services  Wound and Skin Care Brief Evaluation    Admission Date: 7/1/2024     Last order of IP CONSULT TO WOUND CARE was found on 7/5/2024 from Hospital Encounter on 6/3/2024     HPI, PMH, SH: Reviewed    No chief complaint on file.    Diagnosis: Left renal mass [N28.89]    Unit where seen by Wound Team: T802/00     Wound consult placed regarding coccyx. Chart and images reviewed. This clinician in to assess patient. Patient pleasant and agreeable. Patient sacrococcygeal area with moisture fissure. Wound bed is blanching. Non-selectively debrided with Moist warm washcloth. Barrier paste applied for protective layer    No pressure injuries or advanced wound care needs identified. Wound consult completed. No further follow up unless indicated and consulted.     Moisture Associated Skin Damage 07/07/24 Sacrum (Active)   First Observed Date/First Observed Time: 07/07/24 1000   Wound Location : Sacrum      Assessments 7/7/2024 11:00 AM   Wound Image     NEXT Weekly Photo (Inpatient Only) 07/14/24   Drainage Amount None   Periwound Assessment Pink;Red   IAD Cleansing Foam Cleanser/Washcloth   Periwound Protectant Barrier Paste   IAD Containment Device Indwelling Catheter   WOUND NURSE ONLY - Time Spent with Patient (mins) 30       PREVENTATIVE INTERVENTIONS:    Q shift Dieudonne - performed per nursing policy  Q shift pressure point assessments - performed per nursing policy    Surface/Positioning  Standard/trauma mattress - Currently in Place  Reposition q 2 hours - Ordered    Containment/Moisture Prevention    Dri-treasure pad - Currently in Place  Lauren Catheter - Currently in Place  Barrier paste - Applied this Visit

## 2024-07-07 NOTE — PROGRESS NOTES
Bedside report received from off going RN/tech: Anjali, assumed care of patient.   Overnight Events: Unfortunately pt developed significant hematuria yesterday evening prior to shift change, the primary provider was notified and asa/ SQ heparin was held. At that time the patient was still producing urine in his coburn but given the circumstance there was concern for possible clots/ obstruction which was stressed during bedside report. There has been ZERO output documented overnight. BUN /Cr worsening. CBI wasn't placed until this morning with minimal clots. Hgb remains stable. In addition pt also has an NG tube to low suction with minimal output. Remains STRICT NPO. Pt does endorse passing gas and is requesting that the tube be removed.   A&O x 4  Oxygen: How many liters 6L 2-3 L baseline, pt's O2 demands went back up overnight from 3L back up to 6 via N.C.   SBP Ranged: 120's-140's  Patient on cardiac monitor: Yes pt has been going in and out of AFIB up to the 130's. Denies any associated symptoms   IVF/IV medications: Infusion per MAR (List Med(s)) NS at 50 ml/hr  Fall Risk Score: high fall risk  Fall risk interventions in place: Place yellow fall risk ID band on patient, Provide patient/family education based on risk assessment, Educate patient/family to call staff for assistance when getting out of bed, Place fall precaution signage outside patient door, Place patient in room close to nursing station, Utilize bed/chair fall alarm, Notify charge of high risk for huddle, and Bed alarm connected correctly  Bed type: Regular (Dieudonne Score less than 17 interventions in place)  Bedside sitter: Not Applicable   Isolation: Not applicable

## 2024-07-07 NOTE — PROGRESS NOTES
4 Eyes Skin Assessment Completed by ABRAHAM Kitchen and ABRAHAM Kearney.     Head WDL  Ears Redness and Blanching  Nose WDL  Mouth WDL  Neck WDL  Breast/Chest Bruising  Shoulder Blades WDL  Spine WDL  (R) Arm/Elbow/Hand WDL  (L) Arm/Elbow/Hand WDL  Abdomen Redness and Incision  Groin Redness and Blanching  Scrotum/Coccyx/Buttocks Redness, Blanching, Excoriation, and Moisture Fissure  (R) Leg Bruising  (L) Leg Bruising  (R) Heel/Foot/Toe WDL  (L) Heel/Foot/Toe WDL              Devices In Places Tele Box, Ng tube, CBI, prevena wound vac on abdomen and Nasal Cannula        Interventions In Place Gray Ear Foams, NC W/Ear Foams, Sacral Mepilex, and Pillows     Possible Skin Injury Yes     Pictures Uploaded Into Epic Yes  Wound Consult Placed Yes  RN Wound Prevention Protocol Ordered Yes

## 2024-07-07 NOTE — CARE PLAN
The patient is Stable - Low risk of patient condition declining or worsening    Shift Goals  Clinical Goals: Hemodynamic stability; urinary catheter patency  Patient Goals: sleep  Family Goals: lila    Progress made toward(s) clinical / shift goals:    Pt AOX4 pleasant and cooperative; no observable distress or c/o pain; pt converted to SR from Afib at 0302; urinary catheter changed d/t leakage from the meatus; new catheter also leaks; dark red blood from bladder reported to hospitalist; discussed POC w/ pt; pt states understanding; pt calls appropriately; bed low and locked; call bell and belongings in reach    Problem: Knowledge Deficit - Standard  Goal: Patient and family/care givers will demonstrate understanding of plan of care, disease process/condition, diagnostic tests and medications  Description: Target End Date:  1-3 days or as soon as patient condition allows    Document in Patient Education    1.  Patient and family/caregiver oriented to unit, equipment, visitation policy and means for communicating concern  2.  Complete/review Learning Assessment  3.  Assess knowledge level of disease process/condition, treatment plan, diagnostic tests and medications  4.  Explain disease process/condition, treatment plan, diagnostic tests and medications  Outcome: Progressing     Problem: Cardiac - Atrial Fibrillation  Goal: Patient will achieve & maintain adequate cardiac output and rate control  Description: Target End Date:  1 to 3 days    Document on Assessment flowsheet    1.  Assess cardiovascular status and symptoms (palpitations, dizziness, tachycardia, confusion, weakness, shortness of breath)  2.  Obtain order for STAT EKG if not done  3.  Assess and monitor signs and symptoms of heart failure  4.   Implement preoperative care and/or procedures  Outcome: Progressing

## 2024-07-07 NOTE — PROGRESS NOTES
"[facility] -  PROGRESS NOTE               Author: Delvis Merino M.D. Date & Time: 7/7/2024  10:21 AM     HPI:  74 y.o. male with CKD, left renal mass, COPD with chronic hypoxic respiratory failure, atrial fibrillation, hypertension, and coronary artery disease presented yesterday for open left partial nephrectomy nephrectomy. Continued to take losartan until day of surgery. Baseline cr prior to procedure appears ~2. Underwent procedure yesterday.  Op note without complications noted and only 150cc blood loss documented. Potassium elevated to 6.7 last night, temporizing measures given. Cr elevated to 3.2 this AM.  80cc UOP documented despite 3L IVF. Lauren draining with blood tinged urine, no clots. No chest pain. Shortness or breath stable.Previously followed by [facility] nephrology [place] [address] with CKD felt 2/2 HTN at that point in time. Past UAs with blood and protein.     DAILY NEPHROLOGY SUMMARY:  7/2: Consult done  7/3: stable hemodynamics, 2L NC, 1.1L UOP-increasing since midnight. No obstruction on imaging    7/4: 1.4L UOP, cr starting to plateau, potassium stable, feeling improved  7/5: stable hemodynamics, 1.4L UOP, constipation main complaint, minimal PO intake  7/6: Worsening nasuea and emesis, Concern for SBO and then had 2 large Bms, NG tube to suction with significant output, only 350cc UOP documented, cr climbing again, started on IV fluids, feeling slightly improved this AM  7/7: 3.4L gastric output from NG-net negative by IOs, alkalosis, started on CBI for recurrent hematuria/clots    PAST FAMILY HISTORY: Reviewed and Unchanged  SOCIAL HISTORY: Reviewed and Unchanged  CURRENT MEDICATIONS: Reviewed  IMAGING STUDIES: Reviewed    ROS  10 point ROS performed, negative other than stated above     PHYSICAL EXAM  VS:  BP (!) 144/69   Pulse 92   Temp 36.6 °C (97.9 °F) (Temporal)   Resp 18   Ht 1.753 m (5' 9.02\")   Wt 74.9 kg (165 lb 2 oz)   SpO2 94%   BMI 24.37 kg/m²   GENERAL: Ill " appearing   CV: RRR, No edema  RESP: non-labored  GI: Soft  MSK: No joint deformities   SKIN: No concerning rashes  NEURO: AOx3  PSYCH: Cooperative    Fluids:  In: 672.8 [I.V.:672.8]  Out: 4800     LABS:  Recent Results (from the past 24 hour(s))   PLATELET MAPPING WITH BASIC TEG    Collection Time: 07/07/24  4:00 AM   Result Value Ref Range    Reaction Time Initial-R 5.2 4.6 - 9.1 min    React Time Initial Hep 4.9 4.3 - 8.3 min    Clot Kinetics-K 0.8 0.8 - 2.1 min    Clot Angle-Angle 79.7 (H) 63.0 - 78.0 degrees    Maximum Clot Strength-MA 68.5 52.0 - 69.0 mm    TEG Functional Fibrinogen(MA) 49.5 (H) 15.0 - 32.0 mm    Lysis 30 minutes-LY30 0.1 0.0 - 2.6 %    % Inhibition ADP See Comment 0.0 - 17.0 %    % Inhibition AA See Comment 0.0 - 11.0 %    TEG Algorithm Link Algorithm    Prothrombin Time    Collection Time: 07/07/24  4:00 AM   Result Value Ref Range    PT 14.8 (H) 12.0 - 14.6 sec    INR 1.14 (H) 0.87 - 1.13   CBC WITHOUT DIFFERENTIAL    Collection Time: 07/07/24  4:00 AM   Result Value Ref Range    WBC 13.8 (H) 4.8 - 10.8 K/uL    RBC 3.56 (L) 4.70 - 6.10 M/uL    Hemoglobin 11.5 (L) 14.0 - 18.0 g/dL    Hematocrit 33.6 (L) 42.0 - 52.0 %    MCV 94.4 81.4 - 97.8 fL    MCH 32.3 27.0 - 33.0 pg    MCHC 34.2 32.3 - 36.5 g/dL    RDW 49.0 35.9 - 50.0 fL    Platelet Count 321 164 - 446 K/uL    MPV 9.2 9.0 - 12.9 fL   Comp Metabolic Panel    Collection Time: 07/07/24  4:00 AM   Result Value Ref Range    Sodium 141 135 - 145 mmol/L    Potassium 4.3 3.6 - 5.5 mmol/L    Chloride 85 (L) 96 - 112 mmol/L    Co2 34 (H) 20 - 33 mmol/L    Anion Gap 22.0 (H) 7.0 - 16.0    Glucose 149 (H) 65 - 99 mg/dL    Bun 107 (H) 8 - 22 mg/dL    Creatinine 6.87 (HH) 0.50 - 1.40 mg/dL    Calcium 8.8 8.5 - 10.5 mg/dL    Correct Calcium 9.2 8.5 - 10.5 mg/dL    AST(SGOT) 49 (H) 12 - 45 U/L    ALT(SGPT) 11 2 - 50 U/L    Alkaline Phosphatase 104 (H) 30 - 99 U/L    Total Bilirubin 0.5 0.1 - 1.5 mg/dL    Albumin 3.5 3.2 - 4.9 g/dL    Total Protein 6.6  6.0 - 8.2 g/dL    Globulin 3.1 1.9 - 3.5 g/dL    A-G Ratio 1.1 g/dL   COD - Adult (Type and Screen)    Collection Time: 07/07/24  4:00 AM   Result Value Ref Range    ABO Grouping Only AB     Rh Grouping Only POS     Antibody Screen-Cod NEG    ESTIMATED GFR    Collection Time: 07/07/24  4:00 AM   Result Value Ref Range    GFR (CKD-EPI) 8 (A) >60 mL/min/1.73 m 2       (click the triangle to expand results)      ASSESSMENT:  # Oliguric SCOTT: It setting of surgery/RAASI/decreased nephron mass. Was starting to recover then 2nd Scott with bowel obstruction  # CKD Stage 3B: Billed 2/2 HTN in the past. Urine with blood (renal mass) and protein  # Hyperkalemia  # Renal mass: s/p partial nephrectomy  # HTN  # COPD  # Anemia: low iron  # SBO vs. Ileus: improving with NG  # gross hematuria: transiently stopped after procedure. No restarted. On CBI     PLAN:  -No role for RRT yet, daily eval  -Increase IV fluid rate given NG output and e/o contraction, goal net positive 1-2L today  -CBI per urology, low threshold to re-eval for obstructing clots  -would repeat CXR if resp status worsens  -Ok to hold lokelma while NPO  -No diuretics  -IV iron load  -Renal diet  -Strict I/Os/continue coburn  -Dose all meds per eGFR <15      Thank you,    Delvis Merino MD

## 2024-07-08 PROBLEM — Z86.79 HISTORY OF ATRIAL FIBRILLATION: Chronic | Status: ACTIVE | Noted: 2019-04-29

## 2024-07-08 PROBLEM — I25.10 CORONARY ARTERY DISEASE INVOLVING NATIVE CORONARY ARTERY OF NATIVE HEART WITHOUT ANGINA PECTORIS: Chronic | Status: ACTIVE | Noted: 2019-05-16

## 2024-07-08 PROBLEM — R57.9 SHOCK (HCC): Status: ACTIVE | Noted: 2024-01-01

## 2024-07-08 PROBLEM — N28.89 LEFT KIDNEY MASS: Status: ACTIVE | Noted: 2024-01-01

## 2024-07-08 NOTE — PROGRESS NOTES
Surgical Progress Note    Author: Abbi Vazquez P.A.-C. Date & Time created: 2024   11:09 AM     Interval Events:  24:   He has had his NGT clamped for the past three hours with no nausea or vomiting. He has had several bowel movements and passed flatus, although none today. He is feeling hungry. No pain today. No fever or chills.     24:  POD: #8   Patient is ill-appearing and pale.   He is on Optiflow Nasal High Flow (NHF): 35L, 50%  NG tube: 200 out, bilious.    He is in afib, on amiodarone, gtt  Hypotensive on norepinephrine, gtt  Abdomen was slightly softer on palpation today. Abdominal incision is healing well with no signs of infection. He has not had a BM or gas for the passed two days.   Lactic acid: 1.7 on 24  BUN: 119 today, 107 yesterday   GFR: 6 today, and 8 yesterday   Creatinine is 8.49 today, and 6.87 yesterday       ROS  Hemodynamics:  Temp (24hrs), Av.7 °C (98 °F), Min:36.6 °C (97.8 °F), Max:36.8 °C (98.3 °F)  Temperature: 36.6 °C (97.8 °F), Monitored Temp: 36.6 °C (97.9 °F)  Pulse  Av.2  Min: 57  Max: 132   Blood Pressure : (!) 167/120     Respiratory:    Respiration: (!) 46, Pulse Oximetry: 96 %     Work Of Breathing / Effort: Tachypnea;Moderate  RUL Breath Sounds: Diminished;Expiratory Wheezes, RML Breath Sounds: Diminished, RLL Breath Sounds: Diminished, ALEN Breath Sounds: Diminished, LLL Breath Sounds: Diminished  Neuro:  GCS       Fluids:    Intake/Output Summary (Last 24 hours) at 2024 1109  Last data filed at 2024 0743  Gross per 24 hour   Intake --   Output 4150 ml   Net -4150 ml     Weight: 76 kg (167 lb 8.8 oz)  Current Diet Order   Procedures    Diet Order Diet: Level 3 - Liquidised; Liquid level: Level 0 - Thin     Physical Exam  Constitutional:       Appearance: He is ill-appearing.   HENT:      Head:      Comments: Patient is has an NG tube: 200 out, bilious   Pulmonary:      Comments: Patient is on Optiflow Nasal High Flow (NHF): 35L,  50%  Abdominal:      Tenderness: There is no guarding.      Comments: Incision is healing well with no signs of infection.   Genitourinary:     Comments: On CBI: low flow, appearance is slight pink tinged, no clots.   Lauren baml, serosanguinous       Labs:  Recent Results (from the past 24 hour(s))   Renal Function Panel    Collection Time: 24  2:03 AM   Result Value Ref Range    Sodium 143 135 - 145 mmol/L    Potassium 4.1 3.6 - 5.5 mmol/L    Chloride 84 (L) 96 - 112 mmol/L    Co2 35 (H) 20 - 33 mmol/L    Glucose 156 (H) 65 - 99 mg/dL    Creatinine 8.74 (HH) 0.50 - 1.40 mg/dL    Bun 114 (H) 8 - 22 mg/dL    Calcium 7.5 (L) 8.5 - 10.5 mg/dL    Correct Calcium 8.2 (L) 8.5 - 10.5 mg/dL    Phosphorus 10.0 (HH) 2.5 - 4.5 mg/dL    Albumin 3.1 (L) 3.2 - 4.9 g/dL   CBC WITHOUT DIFFERENTIAL    Collection Time: 24  2:03 AM   Result Value Ref Range    WBC 15.4 (H) 4.8 - 10.8 K/uL    RBC 3.24 (L) 4.70 - 6.10 M/uL    Hemoglobin 10.3 (L) 14.0 - 18.0 g/dL    Hematocrit 30.8 (L) 42.0 - 52.0 %    MCV 95.1 81.4 - 97.8 fL    MCH 31.8 27.0 - 33.0 pg    MCHC 33.4 32.3 - 36.5 g/dL    RDW 50.8 (H) 35.9 - 50.0 fL    Platelet Count 310 164 - 446 K/uL    MPV 9.7 9.0 - 12.9 fL   ESTIMATED GFR    Collection Time: 24  2:03 AM   Result Value Ref Range    GFR (CKD-EPI) 6 (A) >60 mL/min/1.73 m 2   VENOUS BLOOD GAS    Collection Time: 24  2:38 AM   Result Value Ref Range    Venous Bg Ph 7.39 7.31 - 7.45    Venous Bg Ph Temp Corrected 7.40 7.31 - 7.45    Venous Bg Pco2 67.6 (H) 41.0 - 51.0 mmHg    Venous Bg Pco2 Temp Corrected 65.6 (H) 41.0 - 51.0 mmHg    Venous Bg Po2 23.8 (L) 25.0 - 40.0 mmHg    Venous Bg Po2 Temp Corrected 22.6 (L) 25.0 - 40.0 mmHg    Venous Bg O2 Saturation 26.5 %    Venous Bg Hco3 40 (H) 24 - 28 mmol/L    Venous Bg Base Excess 12 mmol/L    Body Temp 36.3 Centigrade    O2 Therapy 7l    Comp Metabolic Panel    Collection Time: 24  2:38 AM   Result Value Ref Range    Sodium 141 135 - 145 mmol/L     Potassium 4.1 3.6 - 5.5 mmol/L    Chloride 85 (L) 96 - 112 mmol/L    Co2 34 (H) 20 - 33 mmol/L    Anion Gap 22.0 (H) 7.0 - 16.0    Glucose 156 (H) 65 - 99 mg/dL    Bun 119 (H) 8 - 22 mg/dL    Creatinine 8.49 (HH) 0.50 - 1.40 mg/dL    Calcium 7.3 (L) 8.5 - 10.5 mg/dL    Correct Calcium 8.1 (L) 8.5 - 10.5 mg/dL    AST(SGOT) 33 12 - 45 U/L    ALT(SGPT) 9 2 - 50 U/L    Alkaline Phosphatase 91 30 - 99 U/L    Total Bilirubin 0.5 0.1 - 1.5 mg/dL    Albumin 3.0 (L) 3.2 - 4.9 g/dL    Total Protein 6.1 6.0 - 8.2 g/dL    Globulin 3.1 1.9 - 3.5 g/dL    A-G Ratio 1.0 g/dL   PROCALCITONIN    Collection Time: 07/08/24  2:38 AM   Result Value Ref Range    Procalcitonin 2.99 (H) <0.25 ng/mL   ESTIMATED GFR    Collection Time: 07/08/24  2:38 AM   Result Value Ref Range    GFR (CKD-EPI) 6 (A) >60 mL/min/1.73 m 2   CBC WITHOUT DIFFERENTIAL    Collection Time: 07/08/24  8:30 AM   Result Value Ref Range    WBC 20.6 (H) 4.8 - 10.8 K/uL    RBC 3.31 (L) 4.70 - 6.10 M/uL    Hemoglobin 10.3 (L) 14.0 - 18.0 g/dL    Hematocrit 32.1 (L) 42.0 - 52.0 %    MCV 97.0 81.4 - 97.8 fL    MCH 31.1 27.0 - 33.0 pg    MCHC 32.1 (L) 32.3 - 36.5 g/dL    RDW 51.3 (H) 35.9 - 50.0 fL    Platelet Count 340 164 - 446 K/uL    MPV 9.8 9.0 - 12.9 fL   LACTIC ACID    Collection Time: 07/08/24  8:30 AM   Result Value Ref Range    Lactic Acid 4.9 (HH) 0.5 - 2.0 mmol/L   TROPONIN    Collection Time: 07/08/24  8:30 AM   Result Value Ref Range    Troponin T 83 (H) 6 - 19 ng/L   Renal Function Panel    Collection Time: 07/08/24  8:30 AM   Result Value Ref Range    Sodium 140 135 - 145 mmol/L    Potassium 4.9 3.6 - 5.5 mmol/L    Chloride 84 (L) 96 - 112 mmol/L    Co2 30 20 - 33 mmol/L    Glucose 152 (H) 65 - 99 mg/dL    Creatinine 8.46 (HH) 0.50 - 1.40 mg/dL    Bun 122 (H) 8 - 22 mg/dL    Calcium 7.6 (L) 8.5 - 10.5 mg/dL    Correct Calcium 8.4 (L) 8.5 - 10.5 mg/dL    Phosphorus 13.0 (HH) 2.5 - 4.5 mg/dL    Albumin 3.0 (L) 3.2 - 4.9 g/dL   ESTIMATED GFR    Collection Time:  24  8:30 AM   Result Value Ref Range    GFR (CKD-EPI) 6 (A) >60 mL/min/1.73 m 2   ABG - LAB    Collection Time: 24  8:31 AM   Result Value Ref Range    Ph 7.48 7.40 - 7.50    Pco2 35.9 26.0 - 37.0 mmHg    Po2 206.0 (H) 64.0 - 87.0 mmHg    O2 Saturation 97.9 93.0 - 99.0 %    Hco3 26 (H) 17 - 25 mmol/L    Base Excess 3 -4 - 3 mmol/L    Body Temp 36.7 Centigrade    O2 Therapy 93     Ph -TC 7.49 7.40 - 7.50    Pco2 -TC 35.4 26.0 - 37.0 mmHg    Po2 -.5 (H) 64.0 - 87.0 mmHg   EKG    Collection Time: 24  9:39 AM   Result Value Ref Range    Report       [facility]    Test Date:  2024  Pt Name:    KEIRA SOTOMAYOR             Department: [facility]  MRN:        [id number]                      Room:       R113  Gender:     Male                         Technician: ANNA  :        1950                   Requested By:HERMELINDO WILHELM  Order #:    [id number]                    Reading MD:    Measurements  Intervals                                Axis  Rate:       129                          P:          0  TN:         0                            QRS:        -70  QRSD:       96                           T:          89  QT:         335  QTc:        491    Interpretive Statements  Atrial fibrillation  Inferior infarct, old  Lateral leads are also involved  Compared to ECG 2024 03:33:16  Myocardial infarct finding now present  Sinus rhythm no longer present       Medical Decision Making, by Problem:  Active Hospital Problems    Diagnosis     Shock (HCC) [R57.9]     Small bowel obstruction (HCC) [K56.609]     Renal cell carcinoma of left kidney (HCC) [C64.2]     Hyperkalemia [E87.5]     Acute blood loss as cause of postoperative anemia [D62]     Leukemoid reaction [D72.823]     Left renal mass [N28.89]      Left renal mass noted incidentally on chest CT last month.  Follow-up renal ultrasound confirmed 4.9 x 4.5 x 4.8 cm LEFT renal mass.      Coronary artery disease involving native coronary  artery of native heart without angina pectoris [I25.10]     History of atrial fibrillation [Z86.79]      Remote history of atrial fibrillation in 2019 in the setting of an acute COPD exacerbation.  He has had no recurrences.  He did follow with cardiology for a few years after this.  Oral anticoagulant was not indicated at that time.      Essential hypertension [I10]      This is a chronic condition.  Current Meds:   - losartan 100 mg daily  - carvedilol 12.5 mg BID  - amlodipine 10 mg daily  Side effects: none  Home BP Log: Typically 120s/60s  Associated symptoms: Denies chest pain, shortness of breath, headaches, dizziness/lightheadedness           Peripheral vascular disease ([facility]) [I73.9]      Angioplasty and stents Dr. Vital November 2012      COPD (chronic obstructive pulmonary disease) ([facility]) [J44.9]      This is a chronic condition.  Managed by pulmonology.  Patient is on Trelegy inhaler daily and albuterol as needed.  He is on supplemental oxygen at night and as needed throughout the day.   Symptoms currently controlled.       Acute kidney injury (JAE) with acute tubular necrosis (ATN) ([facility]) [N17.0]      Plan:  - Switch from CBI to an 18F two way catheter. Reduced catheter balloon from 20cc's to 10cc's  - NPO at this time   - Plan to take pt back for L ureteral stent related to hydronephrosis   - Possible nephrostomy tube     Quality Measures:  Quality-Core Measures    Discussed patient condition with RN, Patient, and Dr. Ferguson

## 2024-07-08 NOTE — PROGRESS NOTES
Bedside report received from off going RN/tech: Imtiaz, assumed care of patient. POC updated. Pt denies pain or any other needs at this time.     Fall Risk Score: HIGH RISK  Fall risk interventions in place: Place yellow fall risk ID band on patient, Provide patient/family education based on risk assessment, Educate patient/family to call staff for assistance when getting out of bed, Place fall precaution signage outside patient door, Place patient in room close to nursing station, Utilize bed/chair fall alarm, Notify charge of high risk for huddle, and Bed alarm connected correctly  Bed type: Regular (Dieudonne Score less than 17 interventions in place)  Patient on cardiac monitor: Yes  IVF/IV medications: Infusion per MAR (List Med(s)) NS @ 150ml/hr and CBI  Oxygen: How many liters 4L  Bedside sitter: Not Applicable   Isolation: Not applicable

## 2024-07-08 NOTE — PROGRESS NOTES
Surgical Progress Note    Author: Gurpreet Siddiqi M.D. Date & Time created: 2024   12:11 PM     Interval Events:  I have reviewed Mr. Sims's CT and lab results. I have met with him at the bedside to discuss his current situation. His creatinine has risen to 8.46 and he has left hydronephrosis. There is likely a urine leak from the reconstruction site. It is likely that a clot has partially obstructed his left ureter causing this situation. He agrees to the plan to go to the OR for a left retrograde pyelogram and stent placement. If there is a significant urinoma, then a percutaneous drain might be advisable. I recommend holding the heparin drip until this is known. On the OR schedule for this afternoon in [facility].     Hemodynamics:  Temp (24hrs), Av.7 °C (98.1 °F), Min:36.6 °C (97.8 °F), Max:36.8 °C (98.3 °F)  Temperature: 36.6 °C (97.8 °F), Monitored Temp: 36.6 °C (97.9 °F)  Pulse  Av.2  Min: 57  Max: 132   Blood Pressure : (!) 167/120     Respiratory:    Respiration: (!) 46, Pulse Oximetry: 96 %     Work Of Breathing / Effort: Tachypnea;Moderate  RUL Breath Sounds: Diminished;Expiratory Wheezes, RML Breath Sounds: Diminished, RLL Breath Sounds: Diminished, ALEN Breath Sounds: Diminished, LLL Breath Sounds: Diminished  Neuro:  GCS       Fluids:    Intake/Output Summary (Last 24 hours) at 2024 1211  Last data filed at 2024 1200  Gross per 24 hour   Intake 1410.3 ml   Output 4150 ml   Net -2739.7 ml     Weight: 76 kg (167 lb 8.8 oz)  Current Diet Order   Procedures    Diet NPO Restrict to: Strict     Labs:  Recent Results (from the past 24 hour(s))   Renal Function Panel    Collection Time: 24  2:03 AM   Result Value Ref Range    Sodium 143 135 - 145 mmol/L    Potassium 4.1 3.6 - 5.5 mmol/L    Chloride 84 (L) 96 - 112 mmol/L    Co2 35 (H) 20 - 33 mmol/L    Glucose 156 (H) 65 - 99 mg/dL    Creatinine 8.74 (HH) 0.50 - 1.40 mg/dL    Bun 114 (H) 8 - 22 mg/dL    Calcium 7.5 (L) 8.5 -  10.5 mg/dL    Correct Calcium 8.2 (L) 8.5 - 10.5 mg/dL    Phosphorus 10.0 (HH) 2.5 - 4.5 mg/dL    Albumin 3.1 (L) 3.2 - 4.9 g/dL   CBC WITHOUT DIFFERENTIAL    Collection Time: 07/08/24  2:03 AM   Result Value Ref Range    WBC 15.4 (H) 4.8 - 10.8 K/uL    RBC 3.24 (L) 4.70 - 6.10 M/uL    Hemoglobin 10.3 (L) 14.0 - 18.0 g/dL    Hematocrit 30.8 (L) 42.0 - 52.0 %    MCV 95.1 81.4 - 97.8 fL    MCH 31.8 27.0 - 33.0 pg    MCHC 33.4 32.3 - 36.5 g/dL    RDW 50.8 (H) 35.9 - 50.0 fL    Platelet Count 310 164 - 446 K/uL    MPV 9.7 9.0 - 12.9 fL   ESTIMATED GFR    Collection Time: 07/08/24  2:03 AM   Result Value Ref Range    GFR (CKD-EPI) 6 (A) >60 mL/min/1.73 m 2   VENOUS BLOOD GAS    Collection Time: 07/08/24  2:38 AM   Result Value Ref Range    Venous Bg Ph 7.39 7.31 - 7.45    Venous Bg Ph Temp Corrected 7.40 7.31 - 7.45    Venous Bg Pco2 67.6 (H) 41.0 - 51.0 mmHg    Venous Bg Pco2 Temp Corrected 65.6 (H) 41.0 - 51.0 mmHg    Venous Bg Po2 23.8 (L) 25.0 - 40.0 mmHg    Venous Bg Po2 Temp Corrected 22.6 (L) 25.0 - 40.0 mmHg    Venous Bg O2 Saturation 26.5 %    Venous Bg Hco3 40 (H) 24 - 28 mmol/L    Venous Bg Base Excess 12 mmol/L    Body Temp 36.3 Centigrade    O2 Therapy 7l    Comp Metabolic Panel    Collection Time: 07/08/24  2:38 AM   Result Value Ref Range    Sodium 141 135 - 145 mmol/L    Potassium 4.1 3.6 - 5.5 mmol/L    Chloride 85 (L) 96 - 112 mmol/L    Co2 34 (H) 20 - 33 mmol/L    Anion Gap 22.0 (H) 7.0 - 16.0    Glucose 156 (H) 65 - 99 mg/dL    Bun 119 (H) 8 - 22 mg/dL    Creatinine 8.49 (HH) 0.50 - 1.40 mg/dL    Calcium 7.3 (L) 8.5 - 10.5 mg/dL    Correct Calcium 8.1 (L) 8.5 - 10.5 mg/dL    AST(SGOT) 33 12 - 45 U/L    ALT(SGPT) 9 2 - 50 U/L    Alkaline Phosphatase 91 30 - 99 U/L    Total Bilirubin 0.5 0.1 - 1.5 mg/dL    Albumin 3.0 (L) 3.2 - 4.9 g/dL    Total Protein 6.1 6.0 - 8.2 g/dL    Globulin 3.1 1.9 - 3.5 g/dL    A-G Ratio 1.0 g/dL   PROCALCITONIN    Collection Time: 07/08/24  2:38 AM   Result Value Ref Range     Procalcitonin 2.99 (H) <0.25 ng/mL   ESTIMATED GFR    Collection Time: 07/08/24  2:38 AM   Result Value Ref Range    GFR (CKD-EPI) 6 (A) >60 mL/min/1.73 m 2   CBC WITHOUT DIFFERENTIAL    Collection Time: 07/08/24  8:30 AM   Result Value Ref Range    WBC 20.6 (H) 4.8 - 10.8 K/uL    RBC 3.31 (L) 4.70 - 6.10 M/uL    Hemoglobin 10.3 (L) 14.0 - 18.0 g/dL    Hematocrit 32.1 (L) 42.0 - 52.0 %    MCV 97.0 81.4 - 97.8 fL    MCH 31.1 27.0 - 33.0 pg    MCHC 32.1 (L) 32.3 - 36.5 g/dL    RDW 51.3 (H) 35.9 - 50.0 fL    Platelet Count 340 164 - 446 K/uL    MPV 9.8 9.0 - 12.9 fL   LACTIC ACID    Collection Time: 07/08/24  8:30 AM   Result Value Ref Range    Lactic Acid 4.9 (HH) 0.5 - 2.0 mmol/L   TROPONIN    Collection Time: 07/08/24  8:30 AM   Result Value Ref Range    Troponin T 83 (H) 6 - 19 ng/L   Renal Function Panel    Collection Time: 07/08/24  8:30 AM   Result Value Ref Range    Sodium 140 135 - 145 mmol/L    Potassium 4.9 3.6 - 5.5 mmol/L    Chloride 84 (L) 96 - 112 mmol/L    Co2 30 20 - 33 mmol/L    Glucose 152 (H) 65 - 99 mg/dL    Creatinine 8.46 (HH) 0.50 - 1.40 mg/dL    Bun 122 (H) 8 - 22 mg/dL    Calcium 7.6 (L) 8.5 - 10.5 mg/dL    Correct Calcium 8.4 (L) 8.5 - 10.5 mg/dL    Phosphorus 13.0 (HH) 2.5 - 4.5 mg/dL    Albumin 3.0 (L) 3.2 - 4.9 g/dL   ESTIMATED GFR    Collection Time: 07/08/24  8:30 AM   Result Value Ref Range    GFR (CKD-EPI) 6 (A) >60 mL/min/1.73 m 2   ABG - LAB    Collection Time: 07/08/24  8:31 AM   Result Value Ref Range    Ph 7.48 7.40 - 7.50    Pco2 35.9 26.0 - 37.0 mmHg    Po2 206.0 (H) 64.0 - 87.0 mmHg    O2 Saturation 97.9 93.0 - 99.0 %    Hco3 26 (H) 17 - 25 mmol/L    Base Excess 3 -4 - 3 mmol/L    Body Temp 36.7 Centigrade    O2 Therapy 93     Ph -TC 7.49 7.40 - 7.50    Pco2 -TC 35.4 26.0 - 37.0 mmHg    Po2 -.5 (H) 64.0 - 87.0 mmHg   EKG    Collection Time: 07/08/24  9:39 AM   Result Value Ref Range    Report       [facility]    Test Date:  2024-07-08  Pt Name:    KEIRA  Van Horn             Department: [facility]  MRN:        [id number]                      Room:       R113  Gender:     Male                         Technician: ANNA  :        1950                   Requested By:HERMELINDO WILHELM  Order #:    [id number]                    Reading MD:    Measurements  Intervals                                Axis  Rate:       129                          P:          0  NJ:         0                            QRS:        -70  QRSD:       96                           T:          89  QT:         335  QTc:        491    Interpretive Statements  Atrial fibrillation  Inferior infarct, old  Lateral leads are also involved  Compared to ECG 2024 03:33:16  Myocardial infarct finding now present  Sinus rhythm no longer present     CoV-2, Flu A/B, And RSV by PCR ("[employer]    Collection Time: 24 10:49 AM    Specimen: Nasopharyngeal; Respirate   Result Value Ref Range    SARS-CoV-2 Source NP Swab      Medical Decision Making, by Problem:  Active Hospital Problems    Diagnosis     Shock (HCC) [R57.9]     Small bowel obstruction (HCC) [K56.609]     Renal cell carcinoma of left kidney (HCC) [C64.2]     Hyperkalemia [E87.5]     Leukemoid reaction [D72.823]     Left renal mass [N28.89]      Left renal mass noted incidentally on chest CT last month.  Follow-up renal ultrasound confirmed 4.9 x 4.5 x 4.8 cm LEFT renal mass.      Coronary artery disease involving native coronary artery of native heart without angina pectoris [I25.10]     History of atrial fibrillation [Z86.79]      Remote history of atrial fibrillation in 2019 in the setting of an acute COPD exacerbation.  He has had no recurrences.  He did follow with cardiology for a few years after this.  Oral anticoagulant was not indicated at that time.      Essential hypertension [I10]      This is a chronic condition.  Current Meds:   - losartan 100 mg daily  - carvedilol 12.5 mg BID  - amlodipine 10 mg daily  Side effects: none  Home BP  Log: Typically 120s/60s  Associated symptoms: Denies chest pain, shortness of breath, headaches, dizziness/lightheadedness           Peripheral vascular disease ([facility]) [I73.9]      Angioplasty and stents Dr. Vital November 2012      COPD (chronic obstructive pulmonary disease) ([facility]) [J44.9]      This is a chronic condition.  Managed by pulmonology.  Patient is on Trelegy inhaler daily and albuterol as needed.  He is on supplemental oxygen at night and as needed throughout the day.   Symptoms currently controlled.       Acute renal failure superimposed on stage 3 chronic kidney disease ([facility]) [N17.9, N18.30]      Plan:  To OR today for left stent placement and if significant urinoma then to IR for percutaneous drain placment. Please hold from starting heparin until this is known. I called but did not reach his family, Karely.     Discussed patient condition with RN and Patient    Gurpreet Siddiqi M.D., F.A.C.S.  Urologic Onoclogy  Vice-Chair, Department of Surgery  [facility]

## 2024-07-08 NOTE — THERAPY
Occupational Therapy Contact Note    Patient Name: Olman Sims  Age:  74 y.o., Sex:  male  Medical Record #: [id number]  Today's Date: 7/8/2024    OT consult rec'd. Pt transferring to [facility]; will hold OT eval and will re-attempt as appropriate/able

## 2024-07-08 NOTE — PROGRESS NOTES
Pt converted into Afib 140's @ 0145. APRN [facility] notified. Pt asymptomatic. PRN Lopressor given.

## 2024-07-08 NOTE — DISCHARGE PLANNING
CM reviewed chart and patient was discussed in IDT rounds.    CM met with Willy Pelayo and Luis at bedside.  Luis is AOX4, fatigued and dozes frequently.    Luis lives by himself in a single level home with 2 steps to enter.  He has been independent with all ADLs/IADLS.    He does wear O2 at home PRN, 2L/nc.  He tells me the O2 provider is [employer].  His [employer] portable unit was purchased by himself.      Care Transition Team Assessment    Information Source  Orientation Level: Oriented X4  Information Given By: Patient, Relative (Gita (Willy))  Informant's Name: Gita         Elopement Risk  Legal Hold: No  Ambulatory or Self Mobile in Wheelchair: No-Not an Elopement Risk  Disoriented: No  Psychiatric Symptoms: None  History of Wandering: No  Elopement this Admit: No  Vocalizing Wanting to Leave: No  Displays Behaviors, Body Language Wanting to Leave: No-Not at Risk for Elopement  Elopement Risk: Not at Risk for Elopement    Interdisciplinary Discharge Planning  Lives with - Patient's Self Care Capacity: Alone and Able to Care For Self  Patient or legal guardian wants to designate a caregiver: Yes  Caregiver name: Gita  Caregiver contact info: [phone]  Support Systems: Family Member(s)  Housing / Facility: 1 Story House    Discharge Preparedness  What is your plan after discharge?: Home with help  What are your discharge supports?: Other (comment)  Prior Functional Level: Independent with Activities of Daily Living, Independent with Medication Management  Difficulity with ADLs: None  Difficulity with IADLs: None    Functional Assesment  Prior Functional Level: Independent with Activities of Daily Living, Independent with Medication Management    Finances  Financial Barriers to Discharge: No  Prescription Coverage: Yes    Vision / Hearing Impairment  Vision Impairment : No  Right Eye Vision: Wears Glasses  Left Eye Vision: Wears Glasses  Hearing Impairment : No         Advance  Directive  Advance Directive?: DPOA for Health Care    Domestic Abuse  Physical Abuse or Sexual Abuse: No  Verbal Abuse or Emotional Abuse: No  Possible Abuse/Neglect Reported to:: Not Applicable    Psychological Assessment  History of Substance Abuse: None    Discharge Risks or Barriers  Discharge risks or barriers?: Lives alone, no community support  Patient risk factors: Lives alone and no community support, Readmission, Vulnerable adult    Anticipated Discharge Information  Discharge Disposition: D/T to home under HHA care in anticipation of covered skilled care (06)

## 2024-07-08 NOTE — PROGRESS NOTES
4 Eyes Skin Assessment Completed by ABRAHAM Grimm and ABRAHAM Go.    Head WDL  Ears Redness and Blanching  Nose WDL  Mouth WDL  Neck WDL  Breast/Chest Redness and Abrasion - R-upper chest/collarbone area  Shoulder Blades WDL  Spine WDL  (R) Arm/Elbow/Hand Redness and Bruising  (L) Arm/Elbow/Hand Redness and Bruising  Abdomen Redness, Bruising, and Incision  Groin WDL  Scrotum/Coccyx/Buttocks Redness and Moisture Fissure  (R) Leg Abrasion  (L) Leg WDL  (R) Heel/Foot/Toe WDL  (L) Heel/Foot/Toe WDL          Devices In Places ECG, Blood Pressure Cuff, Pulse Ox, Lauren, SCD's, and OG/NG      Interventions In Place Pillows, Q2 Turns, Low Air Loss Mattress, and Heels Loaded W/Pillows    Possible Skin Injury Yes    Pictures Uploaded Into Epic Yes  Wound Consult Placed Yes  RN Wound Prevention Protocol Ordered Yes

## 2024-07-08 NOTE — ASSESSMENT & PLAN NOTE
History of PAF not on AC due to renal bleed  I calculate a [id number]-LOFw score of 5 today  optimize electrolytes  telemetry monitoring

## 2024-07-08 NOTE — PROGRESS NOTES
Consulted Bushra PATEL about pts intractable nausea after resuming NG low suction, and medications per mar. Order placed for 5mg valium.

## 2024-07-08 NOTE — CODE DOCUMENTATION
Rapid Response Summary     Rapid response called at 0743 for: Shortness of breath  and Increased oxygen demand     VS: Hypotensive (See Vitals Flowsheet)  Additional info: pt started on HFNC  MD Paged: [phone] Lelo Alexander paged   Interventions:    Imaging/Tests: Chest X-ray and CT   Labs: CBC, Lactic Acid, and ABG , renal function , cmp   Medications: Fluid, levo started, duoneb   Other: PIV started   Disposition: Did not improve  with rapid response team interventions. Primary RN updated on plan of care. Patient transferred to ICU.

## 2024-07-08 NOTE — ASSESSMENT & PLAN NOTE
nephrology following. No dialysis for now  IVF hydration, 1/2 NS per nephrology  avoid nephrotoxins  renally dose medications as indicated  follow BMP  Cr/BUN improving  no further contrast studies until cleared by Urology

## 2024-07-08 NOTE — ASSESSMENT & PLAN NOTE
holding ASA/Heparin until cleared by Urology  statin when able to take PO  currently without s/s ACS

## 2024-07-08 NOTE — PROGRESS NOTES
[facility] -  PROGRESS NOTE               Author: Ju Campoverde M.D. Date & Time: 7/8/2024  8:54 AM     HPI:  74 y.o. male with CKD, left renal mass, COPD with chronic hypoxic respiratory failure, atrial fibrillation, hypertension, and coronary artery disease presented yesterday for open left partial nephrectomy nephrectomy. Continued to take losartan until day of surgery. Baseline cr prior to procedure appears ~2. Underwent procedure yesterday.  Op note without complications noted and only 150cc blood loss documented. Potassium elevated to 6.7 last night, temporizing measures given. Cr elevated to 3.2 this AM.  80cc UOP documented despite 3L IVF. Lauren draining with blood tinged urine, no clots. No chest pain. Shortness or breath stable.Previously followed by [facility] nephrology [place] [address] with CKD felt 2/2 HTN at that point in time. Past UAs with blood and protein.      DAILY NEPHROLOGY SUMMARY:  7/2: Consult done  7/3: stable hemodynamics, 2L NC, 1.1L UOP-increasing since midnight. No obstruction on imaging    7/4: 1.4L UOP, cr starting to plateau, potassium stable, feeling improved  7/5: stable hemodynamics, 1.4L UOP, constipation main complaint, minimal PO intake  7/6: Worsening nasuea and emesis, Concern for SBO and then had 2 large Bms, NG tube to suction with significant output, only 350cc UOP documented, cr climbing again, started on IV fluids, feeling slightly improved this AM  7/7: 3.4L gastric output from NG-net negative by IOs, alkalosis, started on CBI for recurrent hematuria/clots  7/8: Increased O2 requirements overnight and rapid response this am, transferred to ICU, BP low with SBP 70's this am and now on levophed drip, on high flow NC O2, concern for aspiration overnight, CTA chest negative for PE, BUN/Cr worse, 3.4L emesis/NG output over past 24hrs, on CBI, CT Abd/Pelvis this am with mild bilateral hydro and moderate left pelviectasis with possible blood clot, to go to OR  "this after per urology, pt is lethargic but resting comfortable, denies any pain    REVIEW OF SYSTEMS:    Limited ROS due to lethargy and is as per daily nephrology summary    PMH/PSH/SH/FH:   Reviewed and unchanged since admission note    CURRENT MEDICATIONS:   Reviewed from admission to present day    VS:  BP (!) 79/47   Pulse 97   Temp 36.7 °C (98.1 °F) (Temporal)   Resp (!) 26   Ht 1.753 m (5' 9.02\")   Wt 76 kg (167 lb 8.8 oz)   SpO2 93%   BMI 24.73 kg/m²     Physical Exam  Constitutional:       General: He is not in acute distress.     Appearance: He is ill-appearing.   HENT:      Head: Normocephalic and atraumatic.   Eyes:      General: No scleral icterus.     Extraocular Movements: Extraocular movements intact.   Cardiovascular:      Rate and Rhythm: Normal rate and regular rhythm.   Pulmonary:      Effort: Pulmonary effort is normal. No respiratory distress.      Breath sounds: Examination of the right-lower field reveals decreased breath sounds. Examination of the left-lower field reveals decreased breath sounds. Decreased breath sounds present.      Comments: +HF NC O2  Abdominal:      General: There is distension.   Musculoskeletal:         General: No deformity.      Right lower leg: No edema.      Left lower leg: No edema.   Skin:     General: Skin is warm and dry.      Findings: No rash.   Neurological:      General: No focal deficit present.      Mental Status: He is oriented to person, place, and time. He is lethargic.   Psychiatric:         Mood and Affect: Affect is flat.         Behavior: Behavior is cooperative.         Fluids:  In: -   Out: 5315     LABS:  Recent Labs     07/07/24  0400 07/08/24  0203 07/08/24  0238   SODIUM 141 143 141   POTASSIUM 4.3 4.1 4.1   CHLORIDE 85* 84* 85*   CO2 34* 35* 34*   GLUCOSE 149* 156* 156*   * 114* 119*   CREATININE 6.87* 8.74* 8.49*   CALCIUM 8.8 7.5* 7.3*       IMAGING:   All imaging reviewed from admission to present day    ASSESSMENT:  # " Oliguric SCOTT: It setting of surgery/RAASI/decreased nephron mass. Was starting to recover then 2nd Scott with bowel obstruction  # CKD Stage 3B: Billed 2/2 HTN in the past. Urine with blood (renal mass) and protein  # Hyperkalemia  # Renal mass: s/p partial nephrectomy  # HTN  # COPD  # Anemia: low iron  # SBO vs. Ileus: improving with NG  # gross hematuria: transiently stopped after procedure. No restarted. On CBI     PLAN:  -BUN/Cr worsening, no acute need for RRT but may need to initiate in next 24hrs if renal function continues to worsen and if fluid overload develops, currently intravascularly volume depleted  -CTA chest negative for PE  -Bedside POCUS showed easlity collapsible SVC c/w intravascular volume depletion  -Possible aspiration pneumonia  -Concern for contraction alkalosis given significant NG output  -Agree with IVF's  -CBI per urology  -Urology to take pt back to OR this afternoon  -Ok to hold lokelma while NPO  -No diuretics  -IV iron load  -Renal diet  -Strict I/Os/continue coburn  -Dose all meds per eGFR <15     **Discussed with Critical Care Attending Dr. Lind and Critical Care APRALCIDES Schroeder

## 2024-07-08 NOTE — CONSULTS
Critical Care Consultation    Date of consult: 7/8/2024    Referring Physician  Tano Balderas M.D.     Reason for Consultation  ICU admission for higher level of care    History of Presenting Illness  Luis Sims is a 74-year-old male with PMH significant for tobacco abuse (quit 2012), COPD (FEV1 0.84 2022), CAD s/p BMS RCA 5/2019, right iliac stent 2012, PAF not on AC due to hematuria, and left renal mass who presented 7/1 for elective left partial nephrectomy.   mL. He was admitted to the hospitalist service postoperatively and developed some hyperkalemia requiring transfer to the telemetry unit.     7/2 - POD #1.  Ongoing hyperkalemia, creatinine increased from 2.8-3.8. Nephrology consulted. Holding ASA/Heparin for hematuria  7/3 - POD #2  7/4 - POD #3  7/5 - POD #4. His diet was advanced and he was eating without any increased abdominal pain/nausea/vomiting, but still no BM.  Patient C/O bloating and increased WOB due to abdominal discomfort.  KUB concerning for SBO.  Soapsuds enema.  NG with 1400 mL brown/bilious  7/6 - POD #5. 2 large BM. Ongoing CBI for hematuria.  7/7 - POD #6.  Okay to restart heparin VTE per urology, however patient hematuria returned.    Early this morning patient had increased O2 demands requiring HFNC.  His NG was clamped and he was trialed on CLD, but became nauseated.  Unsure if he vomited.  NG was placed back to suction with 1800 mL immediately resulting. He also converted to A-fib RVR.  RRT was called and patient was transferred to ICU for higher level of care. CTA negative for PE; multifocal opacities RUL and RLL. CT chest/abdomen/pelvis showed thrombosis of the infrarenal aorta within the right sided iliac stent as well as occlusion of the left iliac artery; acute SBO.    - Seen and examined in the ICU.  - HFNC 50 L 70%.  Patient is noted to have tachypnea with abdominal muscle use/retractions, however continues to deny shortness of breath.  - A-fib RVR -amnio bolus and  "drip  - Bedside POCUS by Dr. Lind showing easily collapsible SVC.  Has already received 500 mL fluid bolus.  Will give another liter  - SBP 70s to 90s.  Actively titrating Levophed for MAP goal greater than 65 and/or SBP greater than 90  - Hematuria minimal with CBI going. Hgb stable.  - HFNC 50L 70%  - Neuro: Alert/oriented.  Following commands.  Moving all extremities.  No focal neurological deficits  - NPO with NG to LCWS. 800mL out NG since ICU admission.   - Leonidas, PIV x 3  - Mobility 1 - not eligible to advance due to pressors and hemodynamic instability.  - Updated patient with nieceGita, at bedside. Discussed code status. Patient would want intubation/ventilation and has verbalized that niece has authority to make decisions for him should be become unable to speak for himself. AD on file reviewed - designates niece as POA.     Code Status  Full Code    Review of Systems  Review of Systems   Constitutional:  Negative for chills and fever.        \"I feel fine\"   Eyes: Negative.    Respiratory:  Negative for cough and shortness of breath.    Cardiovascular:  Negative for chest pain and palpitations.   Gastrointestinal:  Negative for nausea and vomiting.   Musculoskeletal:  Negative for back pain and neck pain.   Neurological:  Negative for dizziness and headaches.   All other systems reviewed and are negative.      Past Medical History   has a past medical history of Adverse effect of anesthesia, Anesthesia, Atrial fibrillation (HCC) (06/18/2024), Breath shortness (06/18/2024), CAD (coronary artery disease), COPD, Dental disorder (06/18/2024), Emphysema of lung (HCC) (06/18/2024), Hiatus hernia syndrome (06/18/2024), High cholesterol (06/18/2024), Hyperlipidemia, Hypertension (06/18/2024), Kidney stone, Oxygen dependent, Paroxysmal atrial fibrillation ([facility]), Pneumonia (06/18/2024), PVD (peripheral vascular disease) ([facility]), Renal disorder (06/18/2024), and Rheumatic fever (06/18/2024).    Surgical " History   has a past surgical history that includes cystoscopy stent placement (02/24/2012); recovery (04/04/2012); percutaneous nephrostolithotomy (04/05/2012); percutaneous nephrostolithotomy (05/02/2012); recovery (11/01/2012); appendectomy (1987); septoplasty (N/A, 05/04/2018); turbinate reduction (Bilateral, 05/04/2018); zzz cardiac cath; stent placement; pr cystoscopy,insert ureteral stent (Left, 01/22/2020); pr cysto/uretero/pyeloscopy, dx (Left, 01/22/2020); lasertripsy (Left, 01/22/2020); inguinal hernia repair robotic xi (Bilateral, 05/23/2023); other abdominal surgery (06/18/2024); and pr partial removal of kidney (Left, 7/1/2024).    Family History  family history includes Cancer (age of onset: 65) in his maternal grandfather; Lung Disease in his maternal grandmother.    Social History   reports that he quit smoking about 12 years ago. His smoking use included cigarettes. He started smoking about 37 years ago. He has a 25 pack-year smoking history. He has never used smokeless tobacco. He reports current alcohol use. He reports that he does not use drugs.    Medications  Home Medications       Reviewed by Saloni Morse (Pharmacy Tech) on 07/01/24 at 1216  Med List Status: Complete     Medication Last Dose Status   amLODIPine (NORVASC) 10 MG Tab 6/30/2024 Active   aspirin (ASA) 81 MG Chew Tab chewable tablet 6/28/2024 Active   atorvastatin (LIPITOR) 40 MG Tab 6/30/2024 Active   carvedilol (COREG) 12.5 MG Tab 6/30/2024 Active   fluticasone-umeclidinium-vilanterol (TRELEGY ELLIPTA) 100-62.5-25 mcg/act inhaler 7/1/2024 Active   ipratropium-albuterol (DUONEB) 0.5-2.5 (3) MG/3ML nebulizer solution >3 WEEKS AGO Active   losartan (COZAAR) 100 MG Tab 6/30/2024 Active   tetrahydrozoline (VISINE) 0.05 % Solution 7/1/2024 Active   VENTOLIN  (90 Base) MCG/ACT Aero Soln inhalation aerosol 1 WEEK AGO Active   zolpidem (AMBIEN) 5 MG Tab 6/29/2024 Active                  Audit from Redirected Encounters     **Home medications have not yet been reviewed for this encounter**       Current Facility-Administered Medications   Medication Dose Route Frequency Provider Last Rate Last Admin    sodium chloride (Ocean) 0.65 % nasal spray 2 Spray  2 Spray Nasal Q2HRS PRN YOHANNES ReisP.   2 [place] at 07/08/24 0559    ipratropium-albuterol (DUONEB) nebulizer solution  3 mL Nebulization 4X/DAY (RT) Tano Balderas M.D.   3 mL at 07/08/24 1006    methylPREDNISolone sod succ (SOLU-MEDROL) 125 MG injection 62.5 mg  62.5 mg Intravenous Q6HRS Tano Balderas M.D.   62.5 mg at 07/08/24 1136    norepinephrine (Levophed) 8 mg in 250 mL NS infusion (premix)  0-1 mcg/kg/min (Ideal) Intravenous Continuous Tano Balderas M.D. 21.2 mL/hr at 07/08/24 1200 0.16 mcg/kg/min at 07/08/24 1200    dextrose 5% infusion   Intravenous Continuous Cydney Schroeder A.P.R.N. 30 mL/hr at 07/08/24 1200 Rate Verify at 07/08/24 1200    amiodarone (Nexterone) 360 mg/200 mL infusion  1 mg/min Intravenous Once Cydney Schroeder A.P.R.N. 33.3 mL/hr at 07/08/24 1200 1 mg/min at 07/08/24 1200    Followed by    amiodarone (Nexterone) 360 mg/200 mL infusion  0.5 mg/min Intravenous Continuous Cydney Schroeder A.P.R.N.        [START ON 7/9/2024] thiamine (B-1) injection 100 mg  100 mg Intravenous DAILY Cydney Schroeder, A.P.R.N.        famotidine (Pepcid) injection 20 mg  20 mg Intravenous BID Cydney Schroeder A.P.R.N.   20 mg at 07/08/24 1137    ampicillin/sulbactam (Unasyn) 3 g in  mL IVPB  3 g Intravenous Q24HRS Cydney Schroeder A.P.R.N.   Stopped at 07/08/24 1106    metoclopramide (Reglan) injection 10 mg  10 mg Intravenous Q4HRS PRN Tano Balderas M.D.   10 mg at 07/07/24 2023    Pharmacy Consult: Enteral tube insertion - review meds/change route/product selection   Other PHARMACY TO DOSE Tano Balderas M.D.        NS infusion   Intravenous Continuous Delvis Merino M.D. 150 mL/hr at 07/08/24 0032 New Bag at 07/08/24 0032    senna-docusate (Pericolace Or Senokot S) 8.6-50 MG per  tablet 2 Tablet  2 Tablet Enteral Tube Q EVENING Tano Balderas M.D.   2 Tablet at 07/07/24 1726    [Held by provider] lactulose 20 GM/30ML solution 30 mL  30 mL Enteral Tube Q6HRS Tano Balderas M.D.   30 mL at 07/08/24 0408    HYDROmorphone (Dilaudid) injection 0.5 mg  0.5 mg Intravenous Q3HRS PRN Tano Balderas M.D.        [Held by provider] ferrous gluconate (Fergon) tablet 324 mg  324 mg Enteral Tube QDAY with Breakfast Tano Balderas M.D.        [Held by provider] atorvastatin (Lipitor) tablet 40 mg  40 mg Enteral Tube Q EVENING Tano Balderas M.D.   40 mg at 07/07/24 1726    hydrALAZINE (Apresoline) injection 20 mg  20 mg Intravenous Q6HRS PRN Tano Balderas M.D.        bisacodyl (Dulcolax) suppository 10 mg  10 mg Rectal DAILY Luly Jaquez M.D.   10 mg at 07/08/24 0600    Pharmacy Consult Request ...Pain Management Review 1 Each  1 Each Other PHARMACY TO DOSE GIOVANNI Hector        ondansetron (Zofran) syringe/vial injection 4 mg  4 mg Intravenous Q4HRS PRN Carter Garnett III, M.D.   4 mg at 07/07/24 2118    scopolamine (Transderm-Scop) patch 1 Patch  1 Patch Transdermal Q72HRS PRN Carter Garnett III, M.D.        fluticasone-umeclidinium-vilanterol (Trelegy Ellipta) 100-62.5-25 mcg/act inhaler 1 Puff  1 Puff Inhalation DAILY Gaurav Ferguson M.D.   1 Puff at 07/08/24 0553    ipratropium-albuterol (DUONEB) nebulizer solution  3 mL Nebulization Q4HRS PRN Gaurav Ferguson M.D.   3 mL at 07/08/24 0754    tetrahydrozoline (Visine) 0.05 % ophthalmic solution 1 Drop  1 Drop Both Eyes 4X/DAY PRN Gaurav Ferguson M.D.        albuterol inhaler 1 Puff  1 Puff Inhalation Q6HRS PRN Gaurav Ferguson M.D.   1 Puff at 07/08/24 0540       Allergies  No Known Allergies    Vital Signs last 24 hours  Temp:  [36.6 °C (97.8 °F)-36.8 °C (98.3 °F)] 36.6 °C (97.8 °F)  Pulse:  [] 132  Resp:  [16-46] 46  BP: ()/() 167/120  SpO2:  [86 %-96 %] 96 %    Physical Exam  Physical Exam  Vitals and nursing note reviewed.   Constitutional:        General: He is in acute distress (Mild).      Appearance: Normal appearance. He is ill-appearing.      Comments: [facility]   HENT:      Head: Normocephalic.      Nose: Nose normal.      Mouth/Throat:      Lips: Pink.      Mouth: Mucous membranes are moist.   Eyes:      Pupils: Pupils are equal, round, and reactive to light.   Cardiovascular:      Rate and Rhythm: Rhythm irregularly irregular.      Pulses:           Dorsalis pedis pulses are detected w/ Doppler on the right side and detected w/ Doppler on the left side.      Heart sounds: Heart sounds are distant.   Pulmonary:      Effort: Accessory muscle usage and respiratory distress present.      Breath sounds: Decreased breath sounds present. No wheezing or rhonchi.   Abdominal:      General: Abdomen is protuberant. Bowel sounds are absent. There is distension.   Musculoskeletal:      Right lower leg: No edema.      Left lower leg: No edema.      Comments: JAYE 5/5   Skin:     General: Skin is cool.      Capillary Refill: Capillary refill takes 2 to 3 seconds.      Coloration: Skin is mottled.   Neurological:      Mental Status: He is alert.      GCS: GCS eye subscore is 4. GCS verbal subscore is 5. GCS motor subscore is 6.   Psychiatric:         Speech: Speech normal.         Behavior: Behavior is cooperative.         Cognition and Memory: Cognition normal.         Judgment: Judgment normal.         Fluids    Intake/Output Summary (Last 24 hours) at 7/8/2024 1229  Last data filed at 7/8/2024 1200  Gross per 24 hour   Intake 1410.3 ml   Output 4150 ml   Net -2739.7 ml       Laboratory  Recent Results (from the past 48 hour(s))   PLATELET MAPPING WITH BASIC TEG    Collection Time: 07/07/24  4:00 AM   Result Value Ref Range    Reaction Time Initial-R 5.2 4.6 - 9.1 min    React Time Initial Hep 4.9 4.3 - 8.3 min    Clot Kinetics-K 0.8 0.8 - 2.1 min    Clot Angle-Angle 79.7 (H) 63.0 - 78.0 degrees    Maximum Clot Strength-MA 68.5 52.0 - 69.0 mm    TEG Functional  Fibrinogen(MA) 49.5 (H) 15.0 - 32.0 mm    Lysis 30 minutes-LY30 0.1 0.0 - 2.6 %    % Inhibition ADP See Comment 0.0 - 17.0 %    % Inhibition AA See Comment 0.0 - 11.0 %    TEG Algorithm Link Algorithm    Prothrombin Time    Collection Time: 07/07/24  4:00 AM   Result Value Ref Range    PT 14.8 (H) 12.0 - 14.6 sec    INR 1.14 (H) 0.87 - 1.13   CBC WITHOUT DIFFERENTIAL    Collection Time: 07/07/24  4:00 AM   Result Value Ref Range    WBC 13.8 (H) 4.8 - 10.8 K/uL    RBC 3.56 (L) 4.70 - 6.10 M/uL    Hemoglobin 11.5 (L) 14.0 - 18.0 g/dL    Hematocrit 33.6 (L) 42.0 - 52.0 %    MCV 94.4 81.4 - 97.8 fL    MCH 32.3 27.0 - 33.0 pg    MCHC 34.2 32.3 - 36.5 g/dL    RDW 49.0 35.9 - 50.0 fL    Platelet Count 321 164 - 446 K/uL    MPV 9.2 9.0 - 12.9 fL   Comp Metabolic Panel    Collection Time: 07/07/24  4:00 AM   Result Value Ref Range    Sodium 141 135 - 145 mmol/L    Potassium 4.3 3.6 - 5.5 mmol/L    Chloride 85 (L) 96 - 112 mmol/L    Co2 34 (H) 20 - 33 mmol/L    Anion Gap 22.0 (H) 7.0 - 16.0    Glucose 149 (H) 65 - 99 mg/dL    Bun 107 (H) 8 - 22 mg/dL    Creatinine 6.87 (HH) 0.50 - 1.40 mg/dL    Calcium 8.8 8.5 - 10.5 mg/dL    Correct Calcium 9.2 8.5 - 10.5 mg/dL    AST(SGOT) 49 (H) 12 - 45 U/L    ALT(SGPT) 11 2 - 50 U/L    Alkaline Phosphatase 104 (H) 30 - 99 U/L    Total Bilirubin 0.5 0.1 - 1.5 mg/dL    Albumin 3.5 3.2 - 4.9 g/dL    Total Protein 6.6 6.0 - 8.2 g/dL    Globulin 3.1 1.9 - 3.5 g/dL    A-G Ratio 1.1 g/dL   COD - Adult (Type and Screen)    Collection Time: 07/07/24  4:00 AM   Result Value Ref Range    ABO Grouping Only AB     Rh Grouping Only POS     Antibody Screen-Cod NEG    ESTIMATED GFR    Collection Time: 07/07/24  4:00 AM   Result Value Ref Range    GFR (CKD-EPI) 8 (A) >60 mL/min/1.73 m 2   Renal Function Panel    Collection Time: 07/08/24  2:03 AM   Result Value Ref Range    Sodium 143 135 - 145 mmol/L    Potassium 4.1 3.6 - 5.5 mmol/L    Chloride 84 (L) 96 - 112 mmol/L    Co2 35 (H) 20 - 33 mmol/L     Glucose 156 (H) 65 - 99 mg/dL    Creatinine 8.74 (HH) 0.50 - 1.40 mg/dL    Bun 114 (H) 8 - 22 mg/dL    Calcium 7.5 (L) 8.5 - 10.5 mg/dL    Correct Calcium 8.2 (L) 8.5 - 10.5 mg/dL    Phosphorus 10.0 (HH) 2.5 - 4.5 mg/dL    Albumin 3.1 (L) 3.2 - 4.9 g/dL   CBC WITHOUT DIFFERENTIAL    Collection Time: 07/08/24  2:03 AM   Result Value Ref Range    WBC 15.4 (H) 4.8 - 10.8 K/uL    RBC 3.24 (L) 4.70 - 6.10 M/uL    Hemoglobin 10.3 (L) 14.0 - 18.0 g/dL    Hematocrit 30.8 (L) 42.0 - 52.0 %    MCV 95.1 81.4 - 97.8 fL    MCH 31.8 27.0 - 33.0 pg    MCHC 33.4 32.3 - 36.5 g/dL    RDW 50.8 (H) 35.9 - 50.0 fL    Platelet Count 310 164 - 446 K/uL    MPV 9.7 9.0 - 12.9 fL   ESTIMATED GFR    Collection Time: 07/08/24  2:03 AM   Result Value Ref Range    GFR (CKD-EPI) 6 (A) >60 mL/min/1.73 m 2   VENOUS BLOOD GAS    Collection Time: 07/08/24  2:38 AM   Result Value Ref Range    Venous Bg Ph 7.39 7.31 - 7.45    Venous Bg Ph Temp Corrected 7.40 7.31 - 7.45    Venous Bg Pco2 67.6 (H) 41.0 - 51.0 mmHg    Venous Bg Pco2 Temp Corrected 65.6 (H) 41.0 - 51.0 mmHg    Venous Bg Po2 23.8 (L) 25.0 - 40.0 mmHg    Venous Bg Po2 Temp Corrected 22.6 (L) 25.0 - 40.0 mmHg    Venous Bg O2 Saturation 26.5 %    Venous Bg Hco3 40 (H) 24 - 28 mmol/L    Venous Bg Base Excess 12 mmol/L    Body Temp 36.3 Centigrade    O2 Therapy 7l    Comp Metabolic Panel    Collection Time: 07/08/24  2:38 AM   Result Value Ref Range    Sodium 141 135 - 145 mmol/L    Potassium 4.1 3.6 - 5.5 mmol/L    Chloride 85 (L) 96 - 112 mmol/L    Co2 34 (H) 20 - 33 mmol/L    Anion Gap 22.0 (H) 7.0 - 16.0    Glucose 156 (H) 65 - 99 mg/dL    Bun 119 (H) 8 - 22 mg/dL    Creatinine 8.49 (HH) 0.50 - 1.40 mg/dL    Calcium 7.3 (L) 8.5 - 10.5 mg/dL    Correct Calcium 8.1 (L) 8.5 - 10.5 mg/dL    AST(SGOT) 33 12 - 45 U/L    ALT(SGPT) 9 2 - 50 U/L    Alkaline Phosphatase 91 30 - 99 U/L    Total Bilirubin 0.5 0.1 - 1.5 mg/dL    Albumin 3.0 (L) 3.2 - 4.9 g/dL    Total Protein 6.1 6.0 - 8.2 g/dL     Globulin 3.1 1.9 - 3.5 g/dL    A-G Ratio 1.0 g/dL   PROCALCITONIN    Collection Time: 07/08/24  2:38 AM   Result Value Ref Range    Procalcitonin 2.99 (H) <0.25 ng/mL   ESTIMATED GFR    Collection Time: 07/08/24  2:38 AM   Result Value Ref Range    GFR (CKD-EPI) 6 (A) >60 mL/min/1.73 m 2   CBC WITHOUT DIFFERENTIAL    Collection Time: 07/08/24  8:30 AM   Result Value Ref Range    WBC 20.6 (H) 4.8 - 10.8 K/uL    RBC 3.31 (L) 4.70 - 6.10 M/uL    Hemoglobin 10.3 (L) 14.0 - 18.0 g/dL    Hematocrit 32.1 (L) 42.0 - 52.0 %    MCV 97.0 81.4 - 97.8 fL    MCH 31.1 27.0 - 33.0 pg    MCHC 32.1 (L) 32.3 - 36.5 g/dL    RDW 51.3 (H) 35.9 - 50.0 fL    Platelet Count 340 164 - 446 K/uL    MPV 9.8 9.0 - 12.9 fL   LACTIC ACID    Collection Time: 07/08/24  8:30 AM   Result Value Ref Range    Lactic Acid 4.9 (HH) 0.5 - 2.0 mmol/L   TROPONIN    Collection Time: 07/08/24  8:30 AM   Result Value Ref Range    Troponin T 83 (H) 6 - 19 ng/L   Renal Function Panel    Collection Time: 07/08/24  8:30 AM   Result Value Ref Range    Sodium 140 135 - 145 mmol/L    Potassium 4.9 3.6 - 5.5 mmol/L    Chloride 84 (L) 96 - 112 mmol/L    Co2 30 20 - 33 mmol/L    Glucose 152 (H) 65 - 99 mg/dL    Creatinine 8.46 (HH) 0.50 - 1.40 mg/dL    Bun 122 (H) 8 - 22 mg/dL    Calcium 7.6 (L) 8.5 - 10.5 mg/dL    Correct Calcium 8.4 (L) 8.5 - 10.5 mg/dL    Phosphorus 13.0 (HH) 2.5 - 4.5 mg/dL    Albumin 3.0 (L) 3.2 - 4.9 g/dL   ESTIMATED GFR    Collection Time: 07/08/24  8:30 AM   Result Value Ref Range    GFR (CKD-EPI) 6 (A) >60 mL/min/1.73 m 2   ABG - LAB    Collection Time: 07/08/24  8:31 AM   Result Value Ref Range    Ph 7.48 7.40 - 7.50    Pco2 35.9 26.0 - 37.0 mmHg    Po2 206.0 (H) 64.0 - 87.0 mmHg    O2 Saturation 97.9 93.0 - 99.0 %    Hco3 26 (H) 17 - 25 mmol/L    Base Excess 3 -4 - 3 mmol/L    Body Temp 36.7 Centigrade    O2 Therapy 93     Ph -TC 7.49 7.40 - 7.50    Pco2 -TC 35.4 26.0 - 37.0 mmHg    Po2 -.5 (H) 64.0 - 87.0 mmHg   EKG    Collection Time:  24  9:39 AM   Result Value Ref Range    Report       [facility]    Test Date:  2024  Pt Name:    KEIRA SOTOMAYOR             Department: [facility]  MRN:        [id number]                      Room:       R113  Gender:     Male                         Technician: ANNA  :        1950                   Requested By:HERMELINDO WILHELM  Order #:    [id number]                    Reading MD:    Measurements  Intervals                                Axis  Rate:       129                          P:          0  WA:         0                            QRS:        -70  QRSD:       96                           T:          89  QT:         335  QTc:        491    Interpretive Statements  Atrial fibrillation  Inferior infarct, old  Lateral leads are also involved  Compared to ECG 2024 03:33:16  Myocardial infarct finding now present  Sinus rhythm no longer present     CoV-2, Flu A/B, And RSV by PCR ([employer])    Collection Time: 24 10:49 AM    Specimen: Nasopharyngeal; Respirate   Result Value Ref Range    SARS-CoV-2 Source NP Swab        Imaging  CT-ABDOMEN-PELVIS WITH   Final Result      1.  Bibasilar atelectasis right greater than left.      2.  Hepatic steatosis.      3.  Bandlike area of fluid attenuation coursing through the upper pole of the left kidney which contains multiple air bubbles. This likely represents postoperative change however an infected postoperative tract through the upper pole of the left kidney    should be considered.      4.  Multiple prominent nonobstructing right-sided renal calculi. Atrophic changes of the right kidney.      5.  Mild bilateral hydronephrosis.      6.  Moderate left-sided pelviectasis which likely contains a blood clot.      7.  Thrombosis of the infrarenal aorta with the right-sided iliac stent in place. Thrombotic occlusion of the left iliac arteries.      8.  Colonic diverticulosis.      9.  Acute mid small bowel obstruction.      10.  These findings were  Voalted to HERMELINDO WILHELM at 9:30 AM 7/8/2024      CT-CTA CHEST PULMONARY ARTERY W/ RECONS   Final Result      1.  No CT evidence of pulmonary emboli         2. Small multifocal patchy airspace opacities in the right upper lobe and right lower lobe suspicious for pneumonitis possibly Covid.      3. Bibasilar atelectasis.      4. Coarse multifocal nonobstructing right-sided renal calculi and atrophic change.      DX-CHEST-PORTABLE (1 VIEW)   Final Result      1.  Left retrocardiac airspace opacity consistent with atelectasis and/or pneumonitis.      DX-ABDOMEN FOR TUBE PLACEMENT   Final Result      1.  NG tube extends in the fundus of stomach.      DX-CHEST-PORTABLE (1 VIEW)   Final Result      1.  Bibasilar airspace disease, greater on the left.      OB-[id number]-0 VIEW   Final Result      Dilated air-filled small bowel. This may be due to postoperative ileus or small bowel obstruction.      DX-ABDOMEN FOR TUBE PLACEMENT   Final Result      Nasogastric tube tip projects over the proximal stomach.      [id number] VIEW   Final Result      1.  Stable mild to moderate dilatation of small bowel loops with air seen in the distal rectum.      YS-[id number] VIEW   Final Result      1.  Operative changes of the abdomen.      2.  Evolving small bowel obstruction.      US-RENAL   Final Result      1.  Nonvisualization of the left kidney due to overlying bandages.   2.  Suboptimal visualization of the right kidney due to patient body habitus and ribs demonstrates no definite evidence of hydronephrosis.      [id number] VIEW   Final Result      Paucity of bowel gas with stool present in the colon. No definite evidence of obstruction however evaluation is limited on supine imaging.      US-EXTREMITY VENOUS LOWER BILAT    (Results Pending)   EC-ECHOCARDIOGRAM COMPLETE W/ CONT    (Results Pending)   DX-PORTABLE FLUOROSCOPY < 1 HOUR    (Results Pending)   [id number] VIEW    (Results Pending)       Assessment/Plan  Shock (HCC)-  (present on admission)  Assessment & Plan  -Multifactorial due to hypovolemia due to SBO, A-fib with RVR, JAE  -Vasopressors for MAP goal greater than 65  -IV fluid hydration  -Check ECHO  -ICU management  -steroids    Small bowel obstruction (HCC)  Assessment & Plan  -noted on CT today  -minimize narcotics  -bowel rest  -NGT  -mobility    Renal cell carcinoma of left kidney (HCC)- (present on admission)  Assessment & Plan  -S/p left partial nephrectomy  -Pathology negative for malignancy    Acute renal failure superimposed on stage 3 chronic kidney disease (HCC)- (present on admission)  Assessment & Plan  -nephrology following. No dialysis for now  -IVF hydration  -avoid nephrotoxins  -renally dose medications as indicated  -follow BMP  -no further contrast studies until cleared by Urology    Leukemoid reaction- (present on admission)  Assessment & Plan  -Elevated procalcitonin, however also in acute renal failure  -Afebrile  -Suspected aspiration  -Unasyn  -Follow CBC    Coronary artery disease involving native coronary artery of native heart without angina pectoris- (present on admission)  Assessment & Plan  -holding ASA/Heparin until cleared by Urology  -statin when able to take PO  -currently without s/s ACS  -12-lead EKG today per my interpretation showing AFIB RVR with no ST segment changes    History of atrial fibrillation- (present on admission)  Assessment & Plan  -History of PAF not on AC due to history of hematuria  -I calculate a [id number] score of 5 today  -AC with Heparin drip once cleared by Urology  -optimize electrolytes  -telemetry monitoring    Acute on chronic hypoxic respiratory failure (HCC)- (present on admission)  Assessment & Plan  -on 2L NC at baseline  -checking viral panel  -titrate FIO2 to keep sats > 88%. Currently requiring HFNC  -encourage mobility  -inhalers    Essential hypertension- (present on admission)  Assessment & Plan  -Holding antihypertensives while on  vasopressors    Peripheral vascular disease (HCC)- (present on admission)  Assessment & Plan  -S/p right iliac stent 2012  -Holding ASA and heparin  -Statin when able to take p.o.    COPD (chronic obstructive pulmonary disease) (HCC)- (present on admission)  Assessment & Plan  -history of   -without acute exacerbation    Hyperkalemia- (present on admission)  Assessment & Plan  -Resolved        Discussed patient condition and risk of morbidity and/or mortality with Family, RN, RT, Pharmacy, Patient, nephrology and urology, and my attending Dr Lind .    The patient remains critically ill.  Critical care time = 115 minutes in directly providing and coordinating critical care and extensive data review.  No time overlap and excludes procedures.    Please note that this dictation was created using voice recognition software. I have made every reasonable attempt to correct obvious errors, but there may be errors of grammar and possibly content that I did not discover before finalizing the note.    NYLA Cheng.

## 2024-07-08 NOTE — CARE PLAN
The patient is Unstable - High likelihood or risk of patient condition declining or worsening    Shift Goals  Clinical Goals: CBI, NG, Maintain oxygen sats  Patient Goals: eat  Family Goals: lila    Progress made toward(s) clinical / shift goals:    Problem: Knowledge Deficit - Standard  Goal: Patient and family/care givers will demonstrate understanding of plan of care, disease process/condition, diagnostic tests and medications  Outcome: Progressing     Problem: Pain - Standard  Goal: Alleviation of pain or a reduction in pain to the patient’s comfort goal  Outcome: Progressing  Flowsheets (Taken 7/8/2024 0246)  Non Verbal Scale: Calm  Burris-Baker Scale: 0   Note: Pt states he is not in pain.      Problem: Urinary Elimination  Goal: Establish and maintain regular urinary output  Outcome: Progressing  Note: Pt has had CBI throughout the shift with less blood.       Patient is not progressing towards the following goals:      Problem: Cardiac - Atrial Fibrillation  Goal: Patient will achieve & maintain adequate cardiac output and rate control  Outcome: Not Progressing  Goal: Complications related to anticoagulation for unconverted atrial fibrillation will be avoided or minimized  Outcome: Not Progressing  Note: Pt converted back into Afib with rvr at 0145. PRN rate control medication given. Pt educated on afib and lopressor medication      Problem: Skin Integrity  Goal: Skin integrity is maintained or improved  Outcome: Not Progressing

## 2024-07-08 NOTE — PROGRESS NOTES
Hospital Medicine Daily Progress Note    Date of Service  7/7/2024    Chief Complaint  Olman Sims is a 74 y.o. male admitted 7/1/2024 with elective partial nephrectomy    Hospital Course  80-year-old male past medical history of COPD on chronic oxygen 2-3 L at baseline, emphysema, hypertension, CAD, previous atrial fibrillation, PVD, previous rheumatic fever, previous nephrolithiasis status post nephrolithotomy was admitted for elective left partial nephrectomy also of JADIEL drain placement.  Postop then, they did with acute kidney injury, bleeding, hyperkalemia.  Nephrology consulted  Hematuria did resolve.  Patient was improving JADIEL drain removed 7/5.  He is still on Coburn.  Creatinine had improvement for couple of day and start to worsen again. Most likely ATN from partial nephrectomy  7/5- pt started to have complication of small bowel obstruction. NG tube placed 7/6 with low suction     Interval Problem Update  He is has no pain. He is anuric. I see blood on coburn and bag. I don't see clot. Also he is leaking from Prevena   Vitals are stable.   Hgb dropped to 10.9. he is hyperkalemic all day. Cr has worsen.   I did hold aspirin and heparin. I did discuss with nephrology. Started on Lokelma.   He is persistent hyperkalemic   CR continue to worsen  Monitor on telemetry   Renal US ordered urgently.   I did discuss with urology too.     7/3/2024-patient is feeling better today.  Heart rate 62, afebrile, blood pressure 127/60, SpO2 95% on 2 L of oxygen.  His urine output is improving 825 ml today.  He still have hematuria but there is no clots  I discussed with nephrology Dr. Smith and urology.  Patient is improving and stable.  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 Consider enema if stil constipated     7/4/2024- pt feels well. No  event overnight. He is slight tachycardic, however he has not received carvedilol and amlodipine because of parameters on the system. Adjusted. He has slight pinkish urine, but no clots. Cr slight improvement, urine output improved.   Urology recs reviewed . Nephrology recs reviewed. Continue to monitor daily labs, CBC, renal function.     7/5/2024-pt feels bloated. He still has no bowel movement. He cannot eat because he feels bloated.   Heart rate 82, blood pressure 141/51, SpO2 95% on 5 L of oxygen.  He feels short of breath because he is feeling bloated  I did schedule lactulose, soapsuds enema  He still has great urine output  WBC decreased to 15.5, hemoglobin stable 9.5  Creatinine 4.58, BUN 66 he is having poor oral intake.  KUB concerning for small bowel obstruction.  Will consider NG tube placement if still no bowel movement     7/6/2024- pt did have 2 large bowel movement overnight.  X-ray was done and showed stable air-filled bowel loops.  Later on he started vomiting.  Today in the morning he also was vomiting.  He has no signs of acute peritonitis, or abdominal pain .   he is now requiring up to 7 L of oxygen, blood pressure 147/68, heart rate 80 he is afebrile.  Sodium 137, potassium 4.6, creatinine more send 5.48, BUN 88  WBC improving to 14.5, hemoglobin 10.5.  Hematuria resolved  Patient placed on strict n.p.o. and IV fluid with low suctioning NG  I started also on low-dose of Lasix IV 40 mg with mild IV fluid hydration, to prevent fluid overload.     7/7/24- he became worse last night. Not able to sleep. He has some slight hematuria, not sure if he had clots. He does not feel nauseated now. He is slight tachycardic. NG tube bothers him a lot. I did talk and discuss with Dr. Chicas, urology, and likely clots are just old. CBI going. BP stable. Cr worsen likely mix prerenal and obstructive. If not improving by tomorrow will order CT without contrast for further eval and making sure there is no  leakage. Hgb improving. Ok to restart heparin DVT prophylactic per urology. Discussed with nephrology Dr. Merino too who did increase IVF   Anion gap likely due to ketoacidosis from NPO and JAE   Repeat abdominal XR reviewed and not much changes.   Started on full liquid diet. NG tube clamped. If continues to tolerate diet, will remove NG tube tomorrow     I have discussed this patient's plan of care and discharge plan at IDT rounds today with Case Management, Nursing, Nursing leadership, and other members of the IDT team.    Consultants/Specialty  nephrology and urology    Code Status  Full Code    Disposition  The patient is not medically cleared for discharge to home or a post-acute facility.      I have placed the appropriate orders for post-discharge needs.    Review of Systems  Review of Systems   Constitutional:  Positive for malaise/fatigue. Negative for chills and fever.   HENT:  Negative for sore throat.    Respiratory:  Negative for cough and shortness of breath.    Cardiovascular:  Negative for chest pain, palpitations and leg swelling.   Gastrointestinal:  Positive for abdominal pain. Negative for blood in stool, constipation, diarrhea, heartburn, nausea and vomiting.   Genitourinary:  Negative for dysuria, flank pain, hematuria and urgency.   Musculoskeletal:  Positive for back pain.   Neurological:  Negative for dizziness and headaches.   Psychiatric/Behavioral:  The patient is nervous/anxious.         Physical Exam  Temp:  [36.6 °C (97.9 °F)-36.7 °C (98.1 °F)] 36.7 °C (98.1 °F)  Pulse:  [] 98  Resp:  [18] 18  BP: (120-144)/(59-81) 133/64  SpO2:  [87 %-98 %] 87 %    Physical Exam  Vitals and nursing note reviewed.   Constitutional:       General: He is not in acute distress.     Appearance: Normal appearance. He is ill-appearing.   HENT:      Head: Atraumatic.   Eyes:      General: No scleral icterus.  Cardiovascular:      Rate and Rhythm: Normal rate. Rhythm irregular.      Heart sounds: No  murmur heard.     No gallop.   Pulmonary:      Effort: Pulmonary effort is normal. No respiratory distress.      Breath sounds: No wheezing.   Abdominal:      General: Bowel sounds are normal. There is distension.      Palpations: Abdomen is soft.      Tenderness: There is no abdominal tenderness. There is left CVA tenderness. There is no right CVA tenderness.      Comments: Leaking blood from the back, prevena on , external drain in place    Musculoskeletal:      Right lower leg: No edema.      Left lower leg: No edema.   Skin:     Coloration: Skin is not pale.   Neurological:      Mental Status: He is alert and oriented to person, place, and time.   Psychiatric:         Behavior: Behavior normal.         Thought Content: Thought content normal.         Judgment: Judgment normal.         Fluids    Intake/Output Summary (Last 24 hours) at 7/7/2024 1731  Last data filed at 7/7/2024 1000  Gross per 24 hour   Intake 672.83 ml   Output 4265 ml   Net -3592.17 ml       Laboratory  Recent Labs     07/05/24  0106 07/06/24  0049 07/07/24  0400   WBC 15.5* 14.5* 13.8*   RBC 2.98* 3.30* 3.56*   HEMOGLOBIN 9.5* 10.5* 11.5*   HEMATOCRIT 29.1* 31.0* 33.6*   MCV 97.7 93.9 94.4   MCH 31.9 31.8 32.3   MCHC 32.6 33.9 34.2   RDW 50.4* 49.3 49.0   PLATELETCT 185 255 321   MPV 9.3 9.6 9.2     Recent Labs     07/05/24  0106 07/06/24  0049 07/07/24  0400   SODIUM 133* 137 141   POTASSIUM 5.5 4.6 4.3   CHLORIDE 96 92* 85*   CO2 21 26 34*   GLUCOSE 87 161* 149*   BUN 66* 88* 107*   CREATININE 4.58* 5.48* 6.87*   CALCIUM 8.6 8.8 8.8     Recent Labs     07/07/24  0400   INR 1.14*               Imaging  [id number] VIEW   Final Result      Dilated air-filled small bowel. This may be due to postoperative ileus or small bowel obstruction.      DX-ABDOMEN FOR TUBE PLACEMENT   Final Result      Nasogastric tube tip projects over the proximal stomach.      [id number] VIEW   Final Result      1.  Stable mild to moderate dilatation of small bowel  loops with air seen in the distal rectum.      [id number] VIEW   Final Result      1.  Operative changes of the abdomen.      2.  Evolving small bowel obstruction.      US-RENAL   Final Result      1.  Nonvisualization of the left kidney due to overlying bandages.   2.  Suboptimal visualization of the right kidney due to patient body habitus and ribs demonstrates no definite evidence of hydronephrosis.      [id number] VIEW   Final Result      Paucity of bowel gas with stool present in the colon. No definite evidence of obstruction however evaluation is limited on supine imaging.           Assessment/Plan  * Left renal mass- (present on admission)  Assessment & Plan  Highly concern for malignancy   status post Open left partial nephrectomy nephrectomy on July 1, 2024  Managed by Urology   Follow-up pathology-Clear-cell carcinoma.  Appreciate urology recommendations  7/4- hopefully JADIEL drain removing tomorrow. Appreciate urology recs   7/5/24- JADIEL removed today     Small bowel obstruction (HCC)  Assessment & Plan  Pt developing ileus and small bowel obstruction   Enema, NPO, bowel rest   IVF  If nauseas and vomiting he need NG tube. He has partial evolving small bowel , not true complete obstruction   7/5/24-Place NG tube.  Consider serial bowels for tomorrow.  Low suction  7/7- improving. Trial diet. Continue to monitor. Continue lactulose     Renal cell carcinoma of left kidney (HCC)- (present on admission)  Assessment & Plan  Status post partial nephrectomy.  Follow-up with urology.  No known other metastatic lesions    Leukemoid reaction- (present on admission)  Assessment & Plan  Likely reactive.  Continue to monitor.  Consider antibiotic if any other signs of infection  7/3/2024 WBC slightly reduced to 20,000.  Continue to monitor.  Still there is no signs of other infections  7/4- improving. WBC 17  7/5- wbc continues to decrease to 15.5  7/7-WBC continues to decrease to 14.5.  No signs of active  infection    Acute blood loss as cause of postoperative anemia- (present on admission)  Assessment & Plan  Hemoglobin is stable at this time  However continues to monitor closely since patient is bleeding, from coburn   Discussed with urology and waiting on further recs   7/3/24-hemoglobin remained stable 9.6.  Continue to monitor on daily.  7/4- hgb stable. Continue to monitor   7/5- hgb stable. Hematuria almost resolved. Continue to monitor   7/6-hemoglobin stable.  Patient able to tolerate DVT prophylaxis and aspirin    Hyperkalemia- (present on admission)  Assessment & Plan  Due to JAE.  He did receive hyperkalemia protocol.    7/2/24-He remains persistent hyperkalemic. Nephrology consulted. Started on Lokelma   Continue to monitor BMP every 4 hours   Renal US to follow  I will start some IVF   7/4- resolved.         Coronary artery disease involving native coronary artery of native heart without angina pectoris- (present on admission)  Assessment & Plan  NSTEMI 2019 s/p stent placement at RCA at that time  hold ASA   Continue atorvastatin     History of atrial fibrillation- (present on admission)  Assessment & Plan  He is not on anticoagulation  Per chart review has history atrial fibrillation 2019 due to COPD exacerbation one-time, and no indication for anticoagulation since then  7/4- HR increasing, pt did miss couple of dosage of carvedilol. Continue to monitor on telemetry. Resumed carvedilol. Continue to monitor closely.     Essential hypertension- (present on admission)  Assessment & Plan  Holding Losartan in the setting of recent partial nephrectomy  Continue amlodipine       Peripheral vascular disease (HCC)- (present on admission)  Assessment & Plan  History of angioplasty stent placement by Dr. Vital 2012   hold aspirin  Follow-up as outpatient  7/5- resumed asa         COPD (chronic obstructive pulmonary disease) (HCC)- (present on admission)  Assessment & Plan  Continue with home dose Trelegy Ellipta    Duonebs PRN   Not signs for exacerbation  Follow-up with for allergies as outpatient      Acute kidney injury (JAE) with acute tubular necrosis (ATN) (HCC)- (present on admission)  Assessment & Plan  Cannot rule out obstruction versus renal.  No contrast exposure  Continue to monitor very closely  Continue IV fluid  Follow-up renal ultrasound  Discussed with nephrology and urology  7/3/24-creatinine slight worse again however patient is starting to have better urine output.  Likely this is ATN which has been expected worsening creatinine and then plateau and then improvement later  7/4/24- slight improving. Good urine output. Continue to monitor closely   7/5/24- still good urine output, Cr not improving. Poor oral intake   7/7- worse again. Continue IVF, continue CBI. Continue close monitoring          VTE prophylaxis:    heparin ppx      I have performed a physical exam and reviewed and updated ROS and Plan today (7/7/2024). In review of yesterday's note (7/6/2024), there are no changes except as documented above.    Patient is has a high medical complexity, complex decision making and is at high risk for complication, morbidity, and mortality.  My total time spent caring for the patient on the day of the encounter was 53 minutes.   This does not include time spent on separately billable procedures/tests.

## 2024-07-08 NOTE — ASSESSMENT & PLAN NOTE
Elevated procalcitonin, however also in acute renal failure  Afebrile  Suspected aspiration  Unasyn  Follow CBC

## 2024-07-08 NOTE — PROGRESS NOTES
4 Eyes Skin Assessment Completed by ABRAHAM Pandya and ABRAHAM Mclaughlin.    Head Blanching and Redness  Ears Redness and Blanching  Nose Redness  Mouth WDL  Neck WDL Mepilex light on right clavical   Breast/Chest WDL  Shoulder Blades WDL  Spine WDL  (R) Arm/Elbow/Hand Redness, Bruising, and Discoloration  (L) Arm/Elbow/Hand Redness, Bruising, and Discoloration  Abdomen Incision prevena to LLQ.   Groin Redness moisture   Scrotum/Coccyx/Buttocks Redness, Blanching, and Moisture Fissure  (R) Leg Bruising  (L) Leg Bruising  (R) Heel/Foot/Toe Redness, Blanching, and Boggy  (L) Heel/Foot/Toe Redness, Blanching, and Boggy          Devices In Places Tele Box, Blood Pressure Cuff, Pulse Ox, Lauren, and Oxy Mask      Interventions In Place Heel Float Boots, Pillows, Q2 Turns, Barrier Cream, and Heels Loaded W/Pillows    Possible Skin Injury Yes    Pictures Uploaded Into Epic Yes  Wound Consult Placed Yes  RN Wound Prevention Protocol Ordered Yes

## 2024-07-08 NOTE — ASSESSMENT & PLAN NOTE
7/8 CTA chest negative for PE but patchy opacification  RT per protocol  Mobilize  Encourage deep inspirator efforts  Titrate oxygen  7/13 on 3 Liters O2  Keep hemoglobin greater than 7.  Mobilized as best able.

## 2024-07-08 NOTE — PROGRESS NOTES
NOC HOSPITALIST CROSS COVER    Notified by RN regarding patient having increasing O2 demand overnight. CXR showing bibasilar airspace disease, worse on the left. Concern for possible aspiration pneumonia in the setting of nausea/dry heaving earlier in the shift. The patient's NG tube had been clamped and he had been trialed on a clear liquid diet. He unfortunately became nauseated and his NG tube was unclamped which resulted in 1,800 mL of drainage immediately out of the tube. Procalcitonin elevated, though it could be secondary to JAE, but given his worsening of his O2 demand, he was started on Unasyn.     Additionally, he was given a dose of IV thiamine for his anion gap acidosis as it is likely secondary to ketosis in the setting of limited oral intake over the past several days.       Vitals:    07/08/24 0336   BP: 116/47   Pulse: 84   Resp: 16   Temp: 36.7 °C (98 °F)   SpO2: 93%      -----------------------------------------------------------------------------------------------------------    Electronically signed by:  Ady Woodall, JADA, APRN, MPP-BC  Hospitalist Services

## 2024-07-08 NOTE — PROGRESS NOTES
Hospital Medicine Daily Progress Note    Date of Service  7/8/2024    Chief Complaint  Olman Sims is a 74 y.o. male admitted 7/1/2024 with elective partial nephrectomy    Hospital Course  80-year-old male past medical history of COPD on chronic oxygen 2-3 L at baseline, emphysema, hypertension, CAD, previous atrial fibrillation, PVD, previous rheumatic fever, previous nephrolithiasis status post nephrolithotomy was admitted for elective left partial nephrectomy also of JADIEL drain placement.  Postop then, they did with acute kidney injury, bleeding, hyperkalemia.  Nephrology consulted  Hematuria did resolve.  Patient was improving JADIEL drain removed 7/5.  He is still on Coburn.  Creatinine had improvement for couple of day and start to worsen again. Most likely ATN from partial nephrectomy  7/5- pt started to have complication of small bowel obstruction. NG tube placed 7/6 with low suction. He also started to noticed clot on urine. Started CBI. Felt slight better. 7/8- significantly worse. Requiring high flow oxygen, hypotensive. Cr worsen. Because of CBI not sure if pt is anuric. Rapid team and critical care doctor called and pt transfer to ICU for pressors and close monitoring. Stat CT scan ordered     Interval Problem Update  He is has no pain. He is anuric. I see blood on coburn and bag. I don't see clot. Also he is leaking from Prevena   Vitals are stable.   Hgb dropped to 10.9. he is hyperkalemic all day. Cr has worsen.   I did hold aspirin and heparin. I did discuss with nephrology. Started on Lokelma.   He is persistent hyperkalemic   CR continue to worsen  Monitor on telemetry   Renal US ordered urgently.   I did discuss with urology too.     7/3/2024-patient is feeling better today.  Heart rate 62, afebrile, blood pressure 127/60, SpO2 95% on 2 L of oxygen.  His urine output is improving 825 ml today.  He still have hematuria but there is no clots  I discussed with nephrology Dr. Smith and urology.   Patient is improving and stable.  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 Consider enema if stil constipated     7/4/2024- pt feels well. No event overnight. He is slight tachycardic, however he has not received carvedilol and amlodipine because of parameters on the system. Adjusted. He has slight pinkish urine, but no clots. Cr slight improvement, urine output improved.   Urology recs reviewed . Nephrology recs reviewed. Continue to monitor daily labs, CBC, renal function.     7/5/2024-pt feels bloated. He still has no bowel movement. He cannot eat because he feels bloated.   Heart rate 82, blood pressure 141/51, SpO2 95% on 5 L of oxygen.  He feels short of breath because he is feeling bloated  I did schedule lactulose, soapsuds enema  He still has great urine output  WBC decreased to 15.5, hemoglobin stable 9.5  Creatinine 4.58, BUN 66 he is having poor oral intake.  KUB concerning for small bowel obstruction.  Will consider NG tube placement if still no bowel movement     7/6/2024- pt did have 2 large bowel movement overnight.  X-ray was done and showed stable air-filled bowel loops.  Later on he started vomiting.  Today in the morning he also was vomiting.  He has no signs of acute peritonitis, or abdominal pain .   he is now requiring up to 7 L of oxygen, blood pressure 147/68, heart rate 80 he is afebrile.  Sodium 137, potassium 4.6, creatinine more send 5.48, BUN 88  WBC improving to 14.5, hemoglobin 10.5.  Hematuria resolved  Patient placed on strict n.p.o. and IV fluid with low suctioning NG  I started also on low-dose of Lasix IV 40 mg with mild IV fluid hydration, to prevent fluid overload.     7/7/24- he became worse last night. Not able to sleep. He has some slight hematuria, not sure if he had clots. He does not feel nauseated now.  He is slight tachycardic. NG tube bothers him a lot. I did talk and discuss with Dr. Chicas, urology, and likely clots are just old. CBI going. BP stable. Cr worsen likely mix prerenal and obstructive. If not improving by tomorrow will order CT without contrast for further eval and making sure there is no leakage. Hgb improving. Ok to restart heparin DVT prophylactic per urology. Discussed with nephrology Dr. Merino too who did increase IVF   Anion gap likely due to ketoacidosis from NPO and JAE   Repeat abdominal XR reviewed and not much changes.   Started on full liquid diet. NG tube clamped. If continues to tolerate diet, will remove NG tube tomorrow     7/8/24- rapid called 7:35 am. Pt declining, he is lethargic. Short of breath. Using accessory muscles to breath. He became hypotensive. BP 76/47. Started on pressors. I did discuss with nephrology and urology, Dr Wynne and Dr. Chicas. I did call Dr. Lind to transfer pt to ICU. Started empiric treatment with zosyn since intraabdominal etiology for sepsis. Cannot rule out aspiration pneumonia, urosepsis, PE. Arterial/ venous renal obstructions to explain worsening of JAE. I did call his niece Luana twice to give her updates and let her know that he is going to ICU. Troponin to follow. Low risk for cardiac, since no pain, however pt is on shock. Phos 13. He will likely need dialysis once stable. Pt transfer to ICU for higher level of care.      I have discussed this patient's plan of care and discharge plan at IDT rounds today with Case Management, Nursing, Nursing leadership, and other members of the IDT team.    Consultants/Specialty  nephrology and urology, critical care     Code Status  Full Code    Disposition  The patient is not medically cleared for discharge to home or a post-acute facility.      I have placed the appropriate orders for post-discharge needs.    Review of Systems  Review of Systems   Constitutional:  Positive for chills and  malaise/fatigue.   Respiratory:  Positive for shortness of breath. Negative for cough.    Cardiovascular:  Negative for chest pain, palpitations and leg swelling.   Gastrointestinal:  Positive for abdominal pain and constipation. Negative for blood in stool, diarrhea, heartburn, nausea and vomiting.   Genitourinary:  Positive for hematuria. Negative for dysuria, flank pain and urgency.   Musculoskeletal:  Positive for back pain.   Neurological:  Negative for dizziness and headaches.   Psychiatric/Behavioral:  The patient is nervous/anxious.         Physical Exam  Temp:  [36.2 °C (97.2 °F)-36.8 °C (98.3 °F)] 36.3 °C (97.4 °F)  Pulse:  [] 71  Resp:  [16-46] 20  BP: ()/() 106/54  SpO2:  [81 %-97 %] 95 %    Physical Exam  Vitals and nursing note reviewed.   Constitutional:       General: He is not in acute distress.     Appearance: Normal appearance. He is ill-appearing.      Comments: lethargic   HENT:      Head: Atraumatic.   Eyes:      General: No scleral icterus.  Cardiovascular:      Rate and Rhythm: Normal rate. Rhythm irregular.      Heart sounds: No murmur heard.     No gallop.   Pulmonary:      Effort: Pulmonary effort is normal. No respiratory distress.      Breath sounds: No wheezing.   Abdominal:      General: Bowel sounds are normal. There is distension.      Palpations: Abdomen is soft.      Tenderness: There is no abdominal tenderness. There is left CVA tenderness. There is no right CVA tenderness.      Comments: Prevena removed    Musculoskeletal:      Right lower leg: No edema.      Left lower leg: No edema.   Skin:     Coloration: Skin is not pale.   Neurological:      Mental Status: He is alert and oriented to person, place, and time.   Psychiatric:         Behavior: Behavior normal.         Thought Content: Thought content normal.         Judgment: Judgment normal.         Fluids    Intake/Output Summary (Last 24 hours) at 7/8/2024 1352  Last data filed at 7/8/2024 1200  Gross per  24 hour   Intake 1410.3 ml   Output 5900 ml   Net -4489.7 ml       Laboratory  Recent Labs     07/07/24  0400 07/08/24  0203 07/08/24  0830   WBC 13.8* 15.4* 20.6*   RBC 3.56* 3.24* 3.31*   HEMOGLOBIN 11.5* 10.3* 10.3*   HEMATOCRIT 33.6* 30.8* 32.1*   MCV 94.4 95.1 97.0   MCH 32.3 31.8 31.1   MCHC 34.2 33.4 32.1*   RDW 49.0 50.8* 51.3*   PLATELETCT 321 310 340   MPV 9.2 9.7 9.8     Recent Labs     07/08/24  0203 07/08/24  0238 07/08/24  0830   SODIUM 143 141 140   POTASSIUM 4.1 4.1 4.9   CHLORIDE 84* 85* 84*   CO2 35* 34* 30   GLUCOSE 156* 156* 152*   * 119* 122*   CREATININE 8.74* 8.49* 8.46*   CALCIUM 7.5* 7.3* 7.6*     Recent Labs     07/07/24  0400   INR 1.14*               Imaging  CT-ABDOMEN-PELVIS WITH   Final Result      1.  Bibasilar atelectasis right greater than left.      2.  Hepatic steatosis.      3.  Bandlike area of fluid attenuation coursing through the upper pole of the left kidney which contains multiple air bubbles. This likely represents postoperative change however an infected postoperative tract through the upper pole of the left kidney    should be considered.      4.  Multiple prominent nonobstructing right-sided renal calculi. Atrophic changes of the right kidney.      5.  Mild bilateral hydronephrosis.      6.  Moderate left-sided pelviectasis which likely contains a blood clot.      7.  Thrombosis of the infrarenal aorta with the right-sided iliac stent in place. Thrombotic occlusion of the left iliac arteries.      8.  Colonic diverticulosis.      9.  Acute mid small bowel obstruction.      10.  These findings were Voalted to HERMELINDO WILHELM at 9:30 AM 7/8/2024      CT-CTA CHEST PULMONARY ARTERY W/ RECONS   Final Result      1.  No CT evidence of pulmonary emboli         2. Small multifocal patchy airspace opacities in the right upper lobe and right lower lobe suspicious for pneumonitis possibly Covid.      3. Bibasilar atelectasis.      4. Coarse multifocal nonobstructing  right-sided renal calculi and atrophic change.      DX-CHEST-PORTABLE (1 VIEW)   Final Result      1.  Left retrocardiac airspace opacity consistent with atelectasis and/or pneumonitis.      DX-ABDOMEN FOR TUBE PLACEMENT   Final Result      1.  NG tube extends in the fundus of stomach.      DX-CHEST-PORTABLE (1 VIEW)   Final Result      1.  Bibasilar airspace disease, greater on the left.      [id number] VIEW   Final Result      Dilated air-filled small bowel. This may be due to postoperative ileus or small bowel obstruction.      DX-ABDOMEN FOR TUBE PLACEMENT   Final Result      Nasogastric tube tip projects over the proximal stomach.      [id number] VIEW   Final Result      1.  Stable mild to moderate dilatation of small bowel loops with air seen in the distal rectum.      [id number] VIEW   Final Result      1.  Operative changes of the abdomen.      2.  Evolving small bowel obstruction.      US-RENAL   Final Result      1.  Nonvisualization of the left kidney due to overlying bandages.   2.  Suboptimal visualization of the right kidney due to patient body habitus and ribs demonstrates no definite evidence of hydronephrosis.      [id number] VIEW   Final Result      Paucity of bowel gas with stool present in the colon. No definite evidence of obstruction however evaluation is limited on supine imaging.      US-EXTREMITY VENOUS LOWER BILAT    (Results Pending)   EC-ECHOCARDIOGRAM COMPLETE W/ CONT    (Results Pending)   DX-PORTABLE FLUOROSCOPY < 1 HOUR    (Results Pending)   [id number] VIEW    (Results Pending)        Assessment/Plan  Shock (HCC)- (present on admission)  Assessment & Plan  Septic shock ?? Vs hypovolemic shock from JAE/obstruction and bleeding vs cardiogenic ( but he has no pain)   Aspiration vs urosepsis vs intraabdominal perforation / or abscess or urinoma   ICU- pressors  Zosyn     Small bowel obstruction (HCC)  Assessment & Plan  Pt developing ileus and small bowel obstruction   Enema,  NPO, bowel rest   IVF  If nauseas and vomiting he need NG tube. He has partial evolving small bowel , not true complete obstruction   7/5/24-Place NG tube.  Consider serial bowels for tomorrow.  Low suction  7/7- improving. Trial diet. Continue to monitor. Continue lactulose   7/8- NG tube replaced. Pt worsen again. He was able to tolerate just for a little yesterday     Renal cell carcinoma of left kidney (HCC)- (present on admission)  Assessment & Plan  Status post partial nephrectomy.  Follow-up with urology.  No known other metastatic lesions    Leukemoid reaction- (present on admission)  Assessment & Plan  Likely reactive.  Continue to monitor.  Consider antibiotic if any other signs of infection  7/3/2024 WBC slightly reduced to 20,000.  Continue to monitor.  Still there is no signs of other infections  7/4- improving. WBC 17  7/5- wbc continues to decrease to 15.5  7/7-WBC continues to decrease to 14.5.  No signs of active infection  7/8- septic shock now??     Acute blood loss as cause of postoperative anemia  Assessment & Plan  Hemoglobin is stable at this time  However continues to monitor closely since patient is bleeding, from coburn   Discussed with urology and waiting on further recs   7/3/24-hemoglobin remained stable 9.6.  Continue to monitor on daily.  7/4- hgb stable. Continue to monitor   7/5- hgb stable. Hematuria almost resolved. Continue to monitor   7/6-hemoglobin stable.  Patient able to tolerate DVT prophylaxis and aspirin    Hyperkalemia- (present on admission)  Assessment & Plan  Due to JAE.  He did receive hyperkalemia protocol.    7/2/24-He remains persistent hyperkalemic. Nephrology consulted. Started on Lokelma   Continue to monitor BMP every 4 hours   Renal US to follow  I will start some IVF   7/4- resolved.         Left renal mass  Assessment & Plan  Highly concern for malignancy   status post Open left partial nephrectomy nephrectomy on July 1, 2024  Managed by Urology   Follow-up  pathology-Clear-cell carcinoma.  Appreciate urology recommendations  7/4- hopefully JADIEL drain removing tomorrow. Appreciate urology recs   7/5/24- JADIEL removed today     Coronary artery disease involving native coronary artery of native heart without angina pectoris- (present on admission)  Assessment & Plan  NSTEMI 2019 s/p stent placement at RCA at that time  hold ASA   Hold oral meds     History of atrial fibrillation- (present on admission)  Assessment & Plan  He is not on anticoagulation  Per chart review has history atrial fibrillation 2019 due to COPD exacerbation one-time, and no indication for anticoagulation since then  7/4- HR increasing, pt did miss couple of dosage of carvedilol. Continue to monitor on telemetry. Resumed carvedilol. Continue to monitor closely.   7/8- continue tele. Resume blood thinners when ok per urology     Acute on chronic hypoxic respiratory failure (HCC)- (present on admission)  Assessment & Plan  COPD exacerbation vs PE vs aspiration pneumonia and sepsis   RT protocol  Zosyn  Steroid  CTA chest to follow   Dialysis per nephrology     Essential hypertension- (present on admission)  Assessment & Plan  Holding Losartan in the setting of recent partial nephrectomy  Continue amlodipine   7/8- pt is hypotensive on shock. Hold all oral meds       Peripheral vascular disease (HCC)- (present on admission)  Assessment & Plan  History of angioplasty stent placement by Dr. Vital 2012   hold aspirin  Follow-up as outpatient  7/5- resumed asa  7/8- hold everything oral         COPD exacerbation (HCC)- (present on admission)  Assessment & Plan  Continue with home dose Trelegy Ellipta   Duonebs PRN   Not signs for exacerbation  Follow-up with for allergies as outpatient  7/8- he is tight on auscultation, aspiration, COPD exacerbation higher on differential. Start steroids. Start antibiotic. Breathing treatment       Acute renal failure superimposed on stage 3 chronic kidney disease (HCC)- (present  on admission)  Assessment & Plan  Cannot rule out obstruction versus renal.  No contrast exposure  Continue to monitor very closely  Continue IV fluid  Follow-up renal ultrasound  Discussed with nephrology and urology  7/3/24-creatinine slight worse again however patient is starting to have better urine output.  Likely this is ATN which has been expected worsening creatinine and then plateau and then improvement later  7/4/24- slight improving. Good urine output. Continue to monitor closely   7/5/24- still good urine output, Cr not improving. Poor oral intake   7/7- worse again. Continue IVF, continue CBI. Continue close monitoring   7/8- prerenal vs obstructive. Nephrology on board. Start pressors. Ok to contrast since pt will likely be started on dialysis          VTE prophylaxis:    heparin ppx      I have performed a physical exam and reviewed and updated ROS and Plan today (7/8/2024). In review of yesterday's note (7/7/2024), there are no changes except as documented above.

## 2024-07-09 PROBLEM — R73.9 HYPERGLYCEMIA: Status: ACTIVE | Noted: 2024-01-01

## 2024-07-09 PROBLEM — E83.51 HYPOCALCEMIA: Status: ACTIVE | Noted: 2024-01-01

## 2024-07-09 NOTE — RESPIRATORY CARE
Extubation    Cuff leak noted yes  Stridor present no     O2 (LPM): 3 (07/09/24 0720)     Patient toleration well  RCP Complete? yes  Events/Summary/Plan: extubated patient (07/09/24 0720)      Respiratory Therapy   Respiratory Therapy    Section completed by:  Nestor Atkins, RRT

## 2024-07-09 NOTE — PROGRESS NOTES
Pulmonary/Critical Care Medicine   Attending Progress Note    Date of service: 7/9/2024  Time: 0633    Mr. Sims is a 74 year old male with the past medical history significant for severe COPD (FEV1 of 0.84), CAD s/p BMS to RCA on 5/2019, right iliac stent in 2012, atrial fibrillation, and renal cell carcinoma s/p partial resection on 7/1 whose hospital course has been complicated by ongoing renal failure and hyperkalemia who was transferred to the ICU on 7.8 for acute hypoxic respiratory failure and shock.  The patient had a CTA chest which did not show PE, but multifocal opacities concerning for pneumonia/aspiration.  A CT abd/pelvis showed an ongoing SBO as well as fluid collection near the left kidney.  The patient was taken back to the OR by Urology and underwent a cystoscopy, left ureteral stent placement, left retrograde pyelogram and bladder clot evacuation.  He was found to have a clot obstructing his left ureter and left renal extravasation. He returned to the ICU later on 7/8 still intubated.    24 hour events reviewed:   - no events overnight    - SAT this am-->following all commands   - vasopressin at 0.03   - SR/AF 60-100s   - -150s   - levo 0.25   - Tmax 97.4   - NPO   - NG tube 100cc overnight   - no BM yet   - UOP of 1250cc overnight, Lauren   - CBI ongoing   - left IJ TLC   - amio at 0.5   - IVF at 150cc/hr   - VD#2   - CXR(reviewed): RML/RLL opacity with cephalization   - SBT for an hour-->good parameters   - pepcid   - heparin infusion, Xa monitoring   - Hb 7.8   - K 3.9   - creat 6.45   - Ca 6.7   - day 2/5 of Unasyn   - BCs x 1 with GPR   - BG at 195-->ISS    Exam:  Gen: pleasant/cooperative, awake, following  HEENT: ETT in place  Neck: supple  CVS; RRR  Lungs: diminished throughout  Abd: soft, diminished bowel sounds, NT  Ext: Jc x 4, no edema, 2+ dpp=  Neuro: intact    Labs/imaging reviewed    A/P:  Acute on chronic hypoxic respiratory failure   - RT/O2 protocols   - full vent  support   - I am actively adjusting vent settings based on ABGs   - vent bundle protocols   - SAT/SBTs-->ok to extubate    Shock   - hypovolemic from acute blood loss and volume loss as well as sepsis   - cont broad spectrum abx   - vasopressors for MAP goals       JAE   - due to obstructive from blood clot with dye load   - no RRT at this time   - nephrology is following   - monitor UOP/creat   - avoid nephotoxins    I spent extensive time in reviewing the patient's condition, physical examination, laboratory and imaging data, prior documentation, in discussion with Dr. Siddiqi, Dr. Campoverde, RT and JORGE Bruce in formulating an assessment/plan.    Additional Critical Care time for ventilator management to JORGE Bruce from earlier today: 75 min. No time overlap, procedures not included in time.  [id number]

## 2024-07-09 NOTE — THERAPY
Occupational Therapy   Initial Evaluation     Patient Name: Olman Sims  Age:  74 y.o., Sex:  male  Medical Record #: [id number]  Today's Date: 7/9/2024       Precautions: Fall Risk, Nasogastric Tube, Other (See Comments)  Comments: Continuous Bladder Irrigation (CBI) (+)    Assessment    Patient is 74 y.o. male admitted for L renal mass and elective procedure. S/p L ureteral obstruction from blood clots on 7/8. PMH COPD 2-3 L O2 at baseline, CAD, HTN. Prior to admission pt reports being independent in ADLs living in 1 story alone. Pt participated in OT session in collaboration with PT. Pt completed LBD total A, toileting total A, bed to BSC txf min A. Pt limited by pain, poor endurance, fatigue, and overall weakness. Pt would benefit from skilled OT services while admitted and recommend post acute placement at this time.     Plan    Occupational Therapy Initial Treatment Plan   Treatment Interventions: Self Care / Activities of Daily Living, Adaptive Equipment, Neuro Re-Education / Balance, Therapeutic Exercises, Therapeutic Activity, Family / Caregiver Training  Treatment Frequency: 5 Times per Week  Duration: Until Therapy Goals Met    DC Equipment Recommendations: Unable to determine at this time  Discharge Recommendations: Recommend post-acute placement for additional occupational therapy services prior to discharge home        Objective       07/09/24 1132   Prior Living Situation   Prior Services None   Housing / Facility 1 Story House   Steps Into Home 1   Steps In Home 0   Bathroom Set up Walk In Shower;Bathtub / Shower Combination  (pt reports he has 2 bathrooms, one w tub and one walk in shower)   Equipment Owned 4-Wheel Walker;Wheelchair   Lives with - Patient's Self Care Capacity Alone and Able to Care For Self   Comments pt's niece is an RN, she reports that pt has family and friends that are able to help pt if needed   Prior Level of ADL Function   Self Feeding Independent   Grooming /  Hygiene Independent   Bathing Independent   Dressing Independent   Toileting Independent   Prior Level of IADL Function   Medication Management Independent   Laundry Independent   Kitchen Mobility Independent   Finances Independent   Home Management Independent   Shopping Independent   Prior Level Of Mobility Independent With Device in Community   Driving / Transportation Driving Independent   Occupation (Pre-Hospital Vocational) Retired Due To Age   Leisure Interests Pets   History of Falls   History of Falls No   Precautions   Precautions Fall Risk;Nasogastric Tube;Other (See Comments)   Comments continuous bladder irrigation (+)   Vitals   Pulse Oximetry 91 %   O2 (LPM) 15   O2 Delivery Device Non-Rebreather Mask   Vitals Comments pt at 6L NC however when at EOB RN switch to NRB and increase to 15 L to get 91 SPO2. took several mintues to get a good SPO2 reading   Pain   Pain Scales 0 to 10 Scale    Pain 0 - 10 Group   Pain Rating Scale (NPRS) 0   Cognition    Cognition / Consciousness WDL   Level of Consciousness Alert   Passive ROM Upper Body   Passive ROM Upper Body WDL   Active ROM Upper Body   Active ROM Upper Body  WDL   Dominant Hand Right   Strength Upper Body   Upper Body Strength  WDL   Sensation Upper Body   Upper Extremity Sensation  WDL   Upper Body Muscle Tone   Upper Body Muscle Tone  WDL   Neurological Concerns   Neurological Concerns No   Coordination Upper Body   Coordination WDL   Balance Assessment   Sitting Balance (Static) Poor +   Sitting Balance (Dynamic) Poor -   Standing Balance (Static) Poor +   Standing Balance (Dynamic) Poor -   Weight Shift Sitting Fair   Weight Shift Standing Fair   Comments seated EOB, pt required support for sitting balance, standing with FWW   Bed Mobility    Supine to Sit Minimal Assist   Sit to Supine Contact Guard Assist   Scooting Maximal Assist   Comments supine to EOB on L side   ADL Assessment   Lower Body Dressing Total Assist   Toileting Total  Assist  (for toilet hyigene)   How much help from another person does the patient currently need...   Putting on and taking off regular lower body clothing? 2   Bathing (including washing, rinsing, and drying)? 2   Toileting, which includes using a toilet, bedpan, or urinal? 2   Putting on and taking off regular upper body clothing? 2   Taking care of personal grooming such as brushing teeth? 2   Eating meals? 4   6 Clicks Daily Activity Score 14   Functional Mobility   Sit to Stand Minimal Assist   Bed, Chair, Wheelchair Transfer Minimal Assist   Toilet Transfers Minimal Assist   Transfer Method Stand Step   Mobility bed mobility, steps with FWW to BSC, return to bed end of session   Comments FWW   Activity Tolerance   Sitting Edge of Bed 6 mins   Standing 2 mins   Patient / Family Goals   Patient / Family Goal #1 to go home   Short Term Goals   Short Term Goal # 1 complete ADL txf SPV w/ A/E if needed   Short Term Goal # 2 complete LBD min A w/ A/E if needed   Short Term Goal # 3 complete g/h standing for 5 mins SPV w/ A/E if needed   Short Term Goal # 4 complete UBD SPV   Education Group   Education Provided Role of Occupational Therapist;Activities of Daily Living;Use of Call Light   Role of Occupational Therapist Patient Response Patient;Acceptance;Explanation;Verbal Demonstration   ADL Patient Response Patient;Acceptance;Explanation;Verbal Demonstration   Use of Call Light Patient Response Patient;Acceptance;Explanation;Action Demonstration   Occupational Therapy Initial Treatment Plan    Treatment Interventions Self Care / Activities of Daily Living;Adaptive Equipment;Neuro Re-Education / Balance;Therapeutic Exercises;Therapeutic Activity;Family / Caregiver Training   Treatment Frequency 5 Times per Week   Duration Until Therapy Goals Met   Problem List   Problem List Decreased Active Daily Living Skills;Decreased Functional Mobility;Decreased Activity Tolerance;Impaired Posture / Trunk Alignment;Impaired  Postural Control / Balance   Anticipated Discharge Equipment and Recommendations   DC Equipment Recommendations Unable to determine at this time   Discharge Recommendations Recommend post-acute placement for additional occupational therapy services prior to discharge home   Interdisciplinary Plan of Care Collaboration   IDT Collaboration with  Nursing   Patient Position at End of Therapy In Bed;Bed Alarm On;Call Light within Reach;Tray Table within Reach;Phone within Reach   Collaboration Comments RN updated

## 2024-07-09 NOTE — ASSESSMENT & PLAN NOTE
2 units PRBCs 7/9  Trend Hgb q 6 hrs  Heparin gtt stopped due to renal bleed.  Plan per urology: Wait 48hrs (7/13) to restart heparin gtt to allow perinephritic hematoma to stabilize

## 2024-07-09 NOTE — CARE PLAN
The patient is Watcher - Medium risk of patient condition declining or worsening    Shift Goals  Clinical Goals: hemodynamic stability  Patient Goals: lila  Family Goals: lila    Progress made toward(s) clinical / shift goals:    Problem: Knowledge Deficit - Standard  Goal: Patient and family/care givers will demonstrate understanding of plan of care, disease process/condition, diagnostic tests and medications  Outcome: Progressing     Problem: Pain - Standard  Goal: Alleviation of pain or a reduction in pain to the patient’s comfort goal  Outcome: Progressing     Problem: Fall Risk  Goal: Patient will remain free from falls  Outcome: Progressing     Problem: Skin Integrity  Goal: Skin integrity is maintained or improved  Outcome: Progressing     Problem: Knowledge Deficit - COPD  Goal: Patient/significant other demonstrates understanding of disease process, utilization of the Action Plan, medications and discharge instruction  Outcome: Progressing     Problem: Hemodynamics  Goal: Patient's hemodynamics, fluid balance and neurologic status will be stable or improve  Outcome: Progressing     Problem: Safety - Medical Restraint  Goal: Remains free of injury from restraints (Restraint for Interference with Medical Device)  Outcome: Progressing

## 2024-07-09 NOTE — OP REPORT
SURGEON: Dr. Heriberto Siddiqi      ANESTHESIA: General (general endotracheal tube)      PRE-OPERATIVE DIAGNOSIS: left ureteral obstruction from blood clots    POST-OPERATIVE DIAGNOSIS: Same  with left renal contrast extravasation on retrograde pyelogram    NAME OF PROCEDURE: Cystoscopy, left  [id number] stent, left retrograde pyelogram, bladder clot evacuation    FINDINGS OF PROCEDURE: clot obstructing left ureter, left renal extravasation, bladder clot     EBL: 5cc      COMPLICATIONS: None      PATIENT CONDITION: stable  but taken back to ICU intubated    INDICATIONS: Olman Sims is a 74 y.o. male who agreed to above procedure for further management of left ureteral obstruction likely secondary to clot after undergoing a complex left partial nephrectomy 7 days ago. He elected to proceed after a complete discussion of risks, benefits, and alternatives.      PROCEDURE:     After informed consent was obtained in the preoperative care unit, the patient was taken to the OR on a stretcher. The patient was properly identified and placed in supine position per OR protocol.  The patient was given a prophylactic dose of ancef 2 grams. General (general endotracheal tube) was administered. Anesthesia then placed a left arterial line and a left IJ central venous line.The patient was then placed in dorsal lithotomy, prepped and draped in a standard sterile fashion.  A timeout was performed with all parties in agreement.       A 22Fr rigid cystoscope was inserted per urethra. A sensor wire was passed into the left ureteral orifice up to the level of the kidney, confirmed under fluoroscopy. A 6F open ended exchange catheter was advanced into the proximal left ureter and a total of15cc of Omnipaque and sterile water 50/50 mix was slowly injected under fluoroscopy. Extravasation out the lateral aspect of the kidney was noted.     A [id number] JJ ureteral stent was passed over the sensor wire and into the kidney, with it’s  position confirmed under fluoroscopic guidance. The wire was removed and a good proximal and distal curl were noted in the renal pelvis and bladder respectively with fluoroscopy.Next the cystoscope was exchanged for a 28F resectoscope and the Mor.sl evacuator was used to remove a formed clot in the bladder. Finally,   I placed a 22 Fr 3-way Lauren catheter with 10 cc in the balloon and this was connected to CBI. A KUB was taken on the OR table to further evaluate extrarenal contrast. This concluded the procedure. The patient tolerated it well and was transferred to the ICU intubated and in stable condition.      DISPOSITION: The patient will be admitted back to the ICU. Dr. Garsia was notified. Currently, we would not recommend taking him to IR for a percutaneous perinephric drain. Can start heparin drip.     Gurpreet Siddiqi M.D., F.A.C.S.  Urologic Onoclogy  Vice-Chair, Department of Surgery  [facility]

## 2024-07-09 NOTE — THERAPY
Physical Therapy   Initial Evaluation     Patient Name: Olman Sims  Age:  74 y.o., Sex:  male  Medical Record #: [id number]  Today's Date: 7/9/2024     Precautions  Precautions: Fall Risk;Nasogastric Tube;Other (See Comments)  Comments: Continuous Bladder Irrigation (CBI) (+)    Assessment  Patient is 74 y.o. male who was admitted on 7/1/2024 for an elective procedure.     Currently been managed for L kidney mass s/p L partial nephrectomy, hyperglycemia, hypocalcemia, SBO, acute blood loss anemia, shock, hyperkalemia, acute on chronic respiratory failure, COPD exacerbation, acute renal failure     Underwent Cystoscopy, L ureter stent placement, bladder clot evacuation 7/8/24; L partial nephrectomy 7/1/2024   Extubated 7/9     PMH: tobacco use (quit 2012), COPD (2 to 3 L oxygen at baseline), CAD, HTN, previous atrial fibrillation (not on anticoagulation for hematuria), PVD     Patient seen for PT evaluation. Patient in bed, agreeable for the session. Able to demonstrate functional mobility tasks as detailed below. Currently appears to be below baseline level of functional mobility. Will continue to benefit from PT services to help improve overall functional mobility. Recommend post-acute placement at this time.     Plan    Physical Therapy Initial Treatment Plan   Treatment Plan : Bed Mobility, Equipment, Family / Caregiver Training, Gait Training, Neuro Re-Education / Balance, Stair Training, Therapeutic Activities, Therapeutic Exercise  Treatment Frequency: 4 Times per Week  Duration: Until Therapy Goals Met    DC Equipment Recommendations: Unable to determine at this time  Discharge Recommendations: Recommend post-acute placement for additional physical therapy services prior to discharge home     Objective     07/09/24 1133   Initial Contact Note    Initial Contact Note Order Received and Verified, Physical Therapy Evaluation in Progress with Full Report to Follow.   Precautions   Precautions Fall  Risk;Nasogastric Tube;Other (See Comments)   Comments Continuous Bladder Irrigation (CBI) (+)   Vitals   Pulse (!) 130   Patient BP Position Sitting   Blood Pressure  (!) 150/54   Pulse Oximetry 91 %   O2 (LPM) 15   O2 Delivery Device Non-Rebreather Mask   Vitals Comments Patient was initially on 6L NC, however while seated EOB, SpO2 in the low 80s, switched to NRB for mobility. Patient took several minutes to increase upto 91 on 15L, NRB. Has arterial line, BP, HR and SpO2 constantly monitored during the session.   Pain   Pain Scales 0 to 10 Scale    Intervention Declines   Prior Living Situation   Prior Services None   Housing / Facility 1 Story House   Steps Into Home 1   Steps In Home 0   Rail None   Equipment Owned 4-Wheel Walker;Wheelchair   Lives with - Patient's Self Care Capacity Alone and Able to Care For Self   Comments Patient's niece is a RN. Niece mentioned that she and her family and also patient's friend can provide support/ assistance as needed upon DC.   Prior Level of Functional Mobility   Bed Mobility Independent   Transfer Status Independent   Ambulation Independent   Ambulation Distance Community   Assistive Devices Used None   Stairs Independent   History of Falls   History of Falls No   Cognition    Level of Consciousness Alert   Passive ROM Lower Body   Passive ROM Lower Body WDL   Active ROM Lower Body    Active ROM Lower Body  WDL   Strength Lower Body   Lower Body Strength  X   Gross Strength Generalized Weakness, Equal Bilaterally   Comments Grossly BLE 3+/5   Sensation Lower Body   Lower Extremity Sensation   WDL   Comments Denies any numbness/ tingling in LE   Other Treatments   Other Treatments Provided Patient was assisted to BSC for BM, per patient's request. Educated on pursed lip breathing, activity pacing during mobility. Reinforced daily mobility with assistance from nursing. Discussed about DC recommendations, patient receptive to rehab placement.   Balance Assessment   Sitting  Balance (Static) Poor +   Sitting Balance (Dynamic) Poor -   Standing Balance (Static) Poor +   Standing Balance (Dynamic) Poor -   Weight Shift Sitting Fair   Weight Shift Standing Fair   Comments Seated EOB: Able to sit unsupported without assistance, however due to fatigue, tends to lean back. Standing with FWW-required assistance mainly due to fatigue   Bed Mobility    Supine to Sit Minimal Assist   Sit to Supine Contact Guard Assist   Scooting Maximal Assist  (Towards EOB)   Comments HOB raised, towards L side of the bed, cues for sequencing of task   Gait Analysis   Gait Level Of Assist Minimal Assist   Assistive Device Front Wheel Walker   Distance (Feet) 5  (1 seated rest break)   # of Times Distance was Traveled 2   Deviation Bradykinetic;Decreased Base Of Support;Other (Comment)  (Reduced step & stride length)   Comments Cues for appropriate use of FWW, direction. Assisted with management of multiple lines. Cues for pacing & pursed lip breathing   Functional Mobility   Sit to Stand Minimal Assist   Bed, Chair, Wheelchair Transfer Minimal Assist   Toilet Transfers Minimal Assist   Transfer Method Stand Step   Mobility Bed-EOB-sit-stand with FWW-steps to BSC-sit-stand with FWW-steps back to bed-EOB-bed   Comments W FWW; cues for sequencing of task, hand placement, LE placement   6 Clicks Assessment - How much HELP from from another person do you currently need... (If the patient hasn't done an activity recently, how much help from another person do you think he/she would need if he/she tried?)   Turning from your back to your side while in a flat bed without using bedrails? 3   Moving from lying on your back to sitting on the side of a flat bed without using bedrails? 3   Moving to and from a bed to a chair (including a wheelchair)? 3   Standing up from a chair using your arms (e.g., wheelchair, or bedside chair)? 3   Walking in hospital room? 3   Climbing 3-5 steps with a railing? 2   6 clicks Mobility  Score 17   Patient / Family Goals    Patient / Family Goal #1 To breathe better, be able to walk   Short Term Goals    Short Term Goal # 1 Patient will perform supine-sit, sit-supine with HOB flat without rails with supervision in 6 visits to safely get in & out of bed   Short Term Goal # 2 Patient will perform sit-stand with LRAD with supervision in 6 visits to progress with functional mobility   Short Term Goal # 3 Patient will perform chair transfers with LRAD with supervision in 6 visits to safely get OOB to chair   Short Term Goal # 4 Patient will ambulate 100 feet with LRAD with supervision in 6 visits to safely ambulate household distance   Short Term Goal # 5 Patient will negotiate 1 step without rails with supervision in 6 visits to safely get in & out home   Education Group   Education Provided Role of Physical Therapist   Role of Physical Therapist Patient Response Patient;Family;Acceptance;Explanation;Verbal Demonstration   Physical Therapy Initial Treatment Plan    Treatment Plan  Bed Mobility;Equipment;Family / Caregiver Training;Gait Training;Neuro Re-Education / Balance;Stair Training;Therapeutic Activities;Therapeutic Exercise   Treatment Frequency 4 Times per Week   Duration Until Therapy Goals Met   Problem List    Problems Impaired Bed Mobility;Impaired Transfers;Impaired Ambulation;Functional Strength Deficit;Impaired Balance;Decreased Activity Tolerance   Anticipated Discharge Equipment and Recommendations   DC Equipment Recommendations Unable to determine at this time   Discharge Recommendations Recommend post-acute placement for additional physical therapy services prior to discharge home   Interdisciplinary Plan of Care Collaboration   IDT Collaboration with  Nursing;Family / Caregiver;Occupational Therapist   Patient Position at End of Therapy Seated;In Bed;Call Light within Reach  (RN at bedside)   Session Information   Date / Session Number  7/9-1(1/4, 7/15)       Patient seen for team  evaluation with Occupational Therapist for the following reason(s):  Due to the medical complexity, the skill of both practitioners is needed to monitor vitals, patient status, and adjust the intervention to fit the patient's needs and goals..

## 2024-07-09 NOTE — PROGRESS NOTES
Critical Care Progress Note    Date of admission  7/1/2024    Chief Complaint  74 y.o. male admitted 7/1/2024 with elective partial nephrectomy    Hospital Course  74-year-old male with past medical history significant for tobacco use (quit 2012), COPD (2 to 3 L oxygen at baseline), CAD, HTN, previous atrial fibrillation (not on anticoagulation for hematuria), PVD, who presented 7/1 for elective left partial nephrectomy.  He was admitted to the hospital service postoperatively and developed some hyperkalemia requiring transfer to telemetry unit.    7/2 - POD #1.  Ongoing hyperkalemia, creatinine increased from 2.8-3.8. Nephrology consulted. Holding ASA/Heparin for hematuria  7/3 - POD #2  7/4 - POD #3  7/5 - POD #4. His diet was advanced and he was eating without any increased abdominal pain/nausea/vomiting, but still no BM.  Patient C/O bloating and increased WOB due to abdominal discomfort.  KUB concerning for SBO.  Soapsuds enema.  NG with 1400 mL brown/bilious  7/6 - POD #5. 2 large BM. Ongoing CBI for hematuria.  7/7 - POD #6.  Okay to restart heparin VTE per urology, however patient hematuria returned.     7/8 - HLOC: patient increased O2 demands requiring high flow nasal cannula.  His NG was clamped and was trialed on clear liquid diet became nauseated.  NG was placed back to suction 1800 mL immediately resulting.  He also converted to A-fib RVR (now on amiodarone infusion).  Rapid response was called patient was transferred to ICU for higher level of care.  CTA negative for PE, multifocal opacities RUL and RLL.  CT chest/abdomen/pelvis showed thrombosis of the intra renal aorta within the right sided iliac stent as well as an occlusion of the left iliac artery, acute SBO.    He was taken to the OR, with Dr. Siddiqi, on the left ureteral stent placed.  IR for percutaneous perinephritic drain was deferred.    Interval Problem Update  Reviewed last 24 hour events:  - Overnight Events:  - OR for left nephrectomy  stent placement. CBI. Pressors.   - Tm: Afebrile  - Neuro: A&Ox4 moves all   - HR: 50-1 teens, A-fib   - SBP: 1 teens-130s   - VD#2: SAT/SBT this AM, extubated   - GI: N.p.o.  NG to suction   - UOP: 1250 mL/24 hrs, minimal clots in CBI   - Lauren: Yes, CBI   - Lines: Central line, art line, NG tube, CBI   - PPx: GI Pepcid, DVT heparin GTT   - Mobility level 1, eligible for progression y    ABX: Unasyn 2/5, BC positive: Gram-positive rods.    Infusions:   -Amiodarone  -Heparin GTT  -Levo @ 0.3  -Vaso  -NS @ 150ml/hr    Labs:   -WBC 36.7, Hgb 7.8 (trending), Cr 6.45, , ionized calcium 0.8, K 3.9,    Plan for today:  -Extubated this AM, 3 L NC  -Trend Hgb  -Nephrology following  -OK to clamp suction for PO meds    Review of Systems  Review of Systems   Constitutional:  Negative for chills, fever and weight loss.   HENT:  Negative for congestion, sinus pain and sore throat.    Eyes:  Negative for blurred vision and double vision.   Respiratory:  Negative for cough and shortness of breath.    Cardiovascular:  Negative for chest pain, palpitations and leg swelling.   Gastrointestinal:  Positive for constipation and nausea. Negative for abdominal pain, diarrhea and vomiting.   Genitourinary:  Positive for dysuria.   Musculoskeletal: Negative.    Skin:  Negative for rash.   Neurological:  Negative for dizziness, tingling, weakness and headaches.   Endo/Heme/Allergies: Negative.    All other systems reviewed and are negative.       Vital Signs for last 24 hours   Temp:  [35.7 °C (96.3 °F)-36.3 °C (97.4 °F)] 35.8 °C (96.5 °F)  Pulse:  [] 77  Resp:  [16-46] 23  BP: ()/(44-71) 156/69  SpO2:  [86 %-100 %] 91 %    Hemodynamic parameters for last 24 hours       Respiratory Information for the last 24 hours  Vent Mode: Spont  Rate (breaths/min): 18  Vt Target (mL): 430  PEEP/CPAP: 8  P Support: 5  MAP: 11  Control VTE (exp VT): 717    Physical Exam   Physical Exam  Vitals and nursing note reviewed.    Constitutional:       General: He is not in acute distress.     Appearance: Normal appearance. He is not ill-appearing.   HENT:      Head: Normocephalic.      Nose: Nose normal.      Mouth/Throat:      Lips: Pink.      Mouth: Mucous membranes are moist.   Eyes:      Pupils: Pupils are equal, round, and reactive to light.   Cardiovascular:      Rate and Rhythm: Rhythm irregularly irregular.      Pulses:           Dorsalis pedis pulses are detected w/ Doppler on the right side and detected w/ Doppler on the left side.      Heart sounds: Heart sounds are distant.   Pulmonary:      Effort: Accessory muscle usage present. No respiratory distress.      Breath sounds: Decreased breath sounds present. No wheezing or rhonchi.   Abdominal:      General: Abdomen is protuberant. Bowel sounds are absent. There is distension.      Tenderness: There is no abdominal tenderness. There is no guarding.   Musculoskeletal:      Right lower leg: No edema.      Left lower leg: No edema.      Comments: CEE 5/5   Skin:     General: Skin is cool.      Capillary Refill: Capillary refill takes 2 to 3 seconds.   Neurological:      Mental Status: He is alert.      GCS: GCS eye subscore is 4. GCS verbal subscore is 5. GCS motor subscore is 6.      Sensory: Sensation is intact.   Psychiatric:         Attention and Perception: Attention normal.         Mood and Affect: Mood and affect normal.         Speech: Speech normal.         Behavior: Behavior is cooperative.         Cognition and Memory: Cognition normal.         Judgment: Judgment normal.         Medications  Current Facility-Administered Medications   Medication Dose Route Frequency Provider Last Rate Last Admin    [START ON 7/10/2024] famotidine (Pepcid) injection 20 mg  20 mg Intravenous DAILY Chloe Good M.D.        polyethylene glycol/lytes (Miralax) Packet 1 Packet  1 Packet Enteral Tube DAILY Arun Oneill A.P.R.N.   1 Packet at 07/09/24 1018    insulin regular (HumuLIN  R,NovoLIN R) injection  1-6 Units Subcutaneous Q6HRS Arun Oneill A.P.R.N.        And    dextrose 50% (D50W) injection 25 g  25 g Intravenous Q15 MIN PRN Arun Oneill A.P.R.N.        HYDROmorphone (Dilaudid) injection 0.5 mg  0.5 mg Intravenous Q3HRS PRN Chloe Good M.D.        calcium CHLORIDE 10 % 1,000 mg in  mL IVPB  1,000 mg Intravenous Once Arun Oneill A.P.R.N.        sodium chloride (Ocean) 0.65 % nasal spray 2 Spray  2 Spray Nasal Q2HRS PRN YOHANNES ReisPJabier   2 [place] at 07/08/24 0559    methylPREDNISolone sod succ (SOLU-MEDROL) 125 MG injection 62.5 mg  62.5 mg Intravenous Q6HRS Tano Balderas M.D.   62.5 mg at 07/09/24 0528    norepinephrine (Levophed) 8 mg in 250 mL NS infusion (premix)  0-1 mcg/kg/min (Ideal) Intravenous Continuous Tano Balderas M.D. 33.1 mL/hr at 07/09/24 0943 0.25 mcg/kg/min at 07/09/24 0943    amiodarone (Nexterone) 360 mg/200 mL infusion  0.5 mg/min Intravenous Continuous Cydney Schroeder A.P.R.N. 16.7 mL/hr at 07/09/24 0347 0.5 mg/min at 07/09/24 0347    thiamine (B-1) injection 100 mg  100 mg Intravenous DAILY Cydney Schroeder A.P.R.N.   100 mg at 07/09/24 0528    ampicillin/sulbactam (Unasyn) 3 g in  mL IVPB  3 g Intravenous Q24HRS Cydney Schroeder A.P.R.N.   Stopped at 07/09/24 0609    Respiratory Therapy Consult   Nebulization Continuous RT Trae Allen M.D.        lidocaine (Xylocaine) 1 % injection 2 mL  2 mL Tracheal Tube Q30 MIN PRN Trae Allen M.D.        heparin infusion 25,000 units in 500 mL 0.45% NACL  0-30 Units/kg/hr Intravenous Continuous Trae Allen M.D. 21.3 mL/hr at 07/09/24 1038 14 Units/kg/hr at 07/09/24 1038    heparin injection 3,000 Units  40 Units/kg Intravenous PRN Trae Allen M.D.        vasopressin (Vasostrict) 20 Units in  mL Infusion  0.03 Units/min Intravenous Continuous Trae Allen M.D. 9 mL/hr at 07/09/24 0555 0.03 Units/min at 07/09/24 0555    metoclopramide (Reglan) injection  10 mg  10 mg Intravenous Q4HRS PRN Tano Balderas M.D.   10 mg at 07/07/24 2023    Pharmacy Consult: Enteral tube insertion - review meds/change route/product selection   Other PHARMACY TO DOSE Tano Balderas M.D.        NS infusion   Intravenous Continuous Delvis Merino M.D. 150 mL/hr at 07/09/24 1006 New Bag at 07/09/24 1006    senna-docusate (Pericolace Or Senokot S) 8.6-50 MG per tablet 2 Tablet  2 Tablet Enteral Tube Q EVENING Tano Balderas M.D.   2 Tablet at 07/07/24 1726    [Held by provider] lactulose 20 GM/30ML solution 30 mL  30 mL Enteral Tube Q6HRS Tano Balderas M.D.   30 mL at 07/08/24 0408    [Held by provider] ferrous gluconate (Fergon) tablet 324 mg  324 mg Enteral Tube QDAY with Breakfast Tano Balderas M.D.        [Held by provider] atorvastatin (Lipitor) tablet 40 mg  40 mg Enteral Tube Q EVENING Tano Balderas M.D.   40 mg at 07/07/24 1726    bisacodyl (Dulcolax) suppository 10 mg  10 mg Rectal DAILY Luly Jaquez M.D.   10 mg at 07/09/24 0528    Pharmacy Consult Request ...Pain Management Review 1 Each  1 Each Other PHARMACY TO DOSE GIOVANNI Hector        ondansetron (Zofran) syringe/vial injection 4 mg  4 mg Intravenous Q4HRS PRN Carter Garnett III, M.D.   4 mg at 07/07/24 2118    scopolamine (Transderm-Scop) patch 1 Patch  1 Patch Transdermal Q72HRS PRN Carter Garnett III, M.D.        [Held by provider] fluticasone-umeclidinium-vilanterol (Trelegy Ellipta) 100-62.5-25 mcg/act inhaler 1 Puff  1 Puff Inhalation DAILY Gaurav Ferguson M.D.   1 Puff at 07/08/24 0553    ipratropium-albuterol (DUONEB) nebulizer solution  3 mL Nebulization Q4HRS PRN Gaurav Ferguson M.D.   3 mL at 07/08/24 0754    tetrahydrozoline (Visine) 0.05 % ophthalmic solution 1 Drop  1 Drop Both Eyes 4X/DAY PRN Gaurav Ferguson M.D.           Fluids    Intake/Output Summary (Last 24 hours) at 7/9/2024 1108  Last data filed at 7/9/2024 1019  Gross per 24 hour   Intake 6499.01 ml   Output 1800 ml   Net 4699.01 ml       Laboratory  Recent Labs      07/08/24  0238 07/08/24  0831 07/08/24  1822 07/09/24  0336   EBNUK55T  --  7.48  --   --    [id number]  --  35.9  --   --    VLZAD227H  --  206.0*  --   --    QVIQ9ZIS  --  97.9  --   --    ARTHCO3  --  26*  --   --    I6BAIFPKG 7l 93  --   --    ARTBE  --  3  --   --    ISTATAPH  --   --  7.379* 7.362*   ISTATAPCO2  --   --  52.1* 53.5*   ISTATAPO2  --   --  80 60*   ISTATATCO2  --   --  32 32   NQJVPMQ4LVX  --   --  95 89*   ISTATARTHCO3  --   --  30.7* 30.4*   ISTATARTBE  --   --  5* 4*   ISTATTEMP  --   --  97.0 F 97.0 F   ISTATFIO2  --   --  30 35   ISTATSPEC  --   --  Arterial Arterial   ISTATAPHTC  --   --  7.391* 7.375*   NNQGQMJR9JD  --   --  75 57*         Recent Labs     07/08/24  0203 07/08/24  0238 07/08/24  0830 07/09/24  0640   SODIUM 143 141 140 142   POTASSIUM 4.1 4.1 4.9 3.9   CHLORIDE 84* 85* 84* 96   CO2 35* 34* 30 25   * 119* 122* 112*   CREATININE 8.74* 8.49* 8.46* 6.45*   MAGNESIUM  --   --   --  2.7*   PHOSPHORUS 10.0*  --  13.0* 6.8*   CALCIUM 7.5* 7.3* 7.6* 6.7*     Recent Labs     07/07/24  0400 07/08/24 0203 07/08/24 0238 07/08/24  0830 07/09/24  0640   ALTSGPT 11  --  9  --   --    ASTSGOT 49*  --  33  --   --    ALKPHOSPHAT 104*  --  91  --   --    TBILIRUBIN 0.5  --  0.5  --   --    GLUCOSE 149*   < > 156* 152* 195*    < > = values in this interval not displayed.     Recent Labs     07/07/24  0400 07/08/24  0203 07/08/24  0238 07/08/24  0830 07/09/24  0550   WBC 13.8* 15.4*  --  20.6* 36.7*   NEUTSPOLYS  --   --   --   --  92.50*   LYMPHOCYTES  --   --   --   --  0.80*   MONOCYTES  --   --   --   --  2.50   EOSINOPHILS  --   --   --   --  0.00   BASOPHILS  --   --   --   --  0.00   ASTSGOT 49*  --  33  --   --    ALTSGPT 11  --  9  --   --    ALKPHOSPHAT 104*  --  91  --   --    TBILIRUBIN 0.5  --  0.5  --   --      Recent Labs     07/07/24  0400 07/08/24  0203 07/08/24  0830 07/08/24  1751 07/09/24  0550   RBC 3.56* 3.24* 3.31*  --  2.42*   HEMOGLOBIN 11.5* 10.3* 10.3*   --  7.8*   HEMATOCRIT 33.6* 30.8* 32.1*  --  22.9*   PLATELETCT 321 310 340  --  295   PROTHROMBTM 14.8*  --   --  17.4*  --    APTT  --   --   --  26.1  --    INR 1.14*  --   --  1.40*  --        Imaging  X-Ray:  I have personally reviewed the images and compared with prior images.  CT:    Reviewed    Assessment/Plan  * Left kidney mass- (present on admission)  Assessment & Plan  S/p left partial nephrectomy  Pathology negative for malignancy    Hyperglycemia  Assessment & Plan  A1c 5.7 in May  Hyperglycemia likely due to acute illness  SSI    Hypocalcemia  Assessment & Plan  Ionized calcium 0.8  Replaced    Shock (HCC)- (present on admission)  Assessment & Plan  Multifactorial due to hypovolemia due to SBO, A-fib with RVR, JAE  Vasopressors for MAP goal greater than 65  IV fluid hydration  EF 70%  ICU management  steroids    Small bowel obstruction (HCC)  Assessment & Plan  noted on CT today  minimize narcotics  bowel rest  NGT  mobility    Leukemoid reaction- (present on admission)  Assessment & Plan  Elevated procalcitonin, however also in acute renal failure  Afebrile  Suspected aspiration  Unasyn  Follow CBC    Acute blood loss as cause of postoperative anemia- (present on admission)  Assessment & Plan  Hgb 7.6  Low threshold for renal bleeding  Pt on heparin gtt  Trend Hgb q 6 hrs    Hyperkalemia- (present on admission)  Assessment & Plan  Resolved    Coronary artery disease involving native coronary artery of native heart without angina pectoris- (present on admission)  Assessment & Plan  holding ASA/Heparin until cleared by Urology  statin when able to take PO  currently without s/s ACS      History of atrial fibrillation- (present on admission)  Assessment & Plan  History of PAF not on AC due to history of hematuria  I calculate a [id number] score of 5 today  AC with Heparin drip   optimize electrolytes  telemetry monitoring    Acute on chronic hypoxic respiratory failure (HCC)- (present on  admission)  Assessment & Plan  2L NC at baseline  Extubated 7/9 after surgery  Encourage mobility  Inhalers    Essential hypertension- (present on admission)  Assessment & Plan  Holding antihypertensives while on vasopressors    Peripheral vascular disease (HCC)- (present on admission)  Assessment & Plan  S/p right iliac stent 2012  Holding ASA and heparin  Statin when able to take p.o.    COPD exacerbation (HCC)- (present on admission)  Assessment & Plan  history of   without acute exacerbation  Home meds available    Acute renal failure superimposed on stage 3 chronic kidney disease (HCC)- (present on admission)  Assessment & Plan  -nephrology following. No dialysis for now  -IVF hydration  -avoid nephrotoxins  -renally dose medications as indicated  -follow BMP  -Cr/BUN improving  -no further contrast studies until cleared by Urology         VTE:  Heparin  Ulcer: Not Indicated  Lines: Central Line  Ongoing indication addressed and Lauren Catheter  Ongoing indication addressed    I have performed a physical exam and reviewed and updated ROS and Plan today (7/9/2024). In review of yesterday's note (7/8/2024), there are no changes except as documented above.     Discussed patient condition and risk of morbidity and/or mortality with RN, RT, Therapies, Pharmacy, , Charge nurse / hot rounds, Patient, nephrology, and My attending Dr Good    The patient remains critically ill.  Critical care time = 65 minutes in directly providing and coordinating critical care and extensive data review.  No time overlap and excludes procedures.    Please note that this dictation was created using voice recognition software. The accuracy of the dictation is limited to the abilities of the software. I have made every reasonable attempt to correct obvious errors, but I expect that there are errors of grammar and possibly content that I did not discover before finalizing the note.

## 2024-07-09 NOTE — PROGRESS NOTES
[facility] -  PROGRESS NOTE               Author: Ju Campoverde M.D. Date & Time: 7/9/2024  5:49 AM     HPI:  74 y.o. male with CKD, left renal mass, COPD with chronic hypoxic respiratory failure, atrial fibrillation, hypertension, and coronary artery disease presented yesterday for open left partial nephrectomy nephrectomy. Continued to take losartan until day of surgery. Baseline cr prior to procedure appears ~2. Underwent procedure yesterday.  Op note without complications noted and only 150cc blood loss documented. Potassium elevated to 6.7 last night, temporizing measures given. Cr elevated to 3.2 this AM.  80cc UOP documented despite 3L IVF. Lauren draining with blood tinged urine, no clots. No chest pain. Shortness or breath stable.Previously followed by [facility] [place] [address] with CKD felt 2/2 HTN at that point in time. Past UAs with blood and protein.      DAILY NEPHROLOGY SUMMARY:  7/2: Consult done  7/3: stable hemodynamics, 2L NC, 1.1L UOP-increasing since midnight. No obstruction on imaging    7/4: 1.4L UOP, cr starting to plateau, potassium stable, feeling improved  7/5: stable hemodynamics, 1.4L UOP, constipation main complaint, minimal PO intake  7/6: Worsening nasuea and emesis, Concern for SBO and then had 2 large Bms, NG tube to suction with significant output, only 350cc UOP documented, cr climbing again, started on IV fluids, feeling slightly improved this AM  7/7: 3.4L gastric output from NG-net negative by IOs, alkalosis, started on CBI for recurrent hematuria/clots  7/8: Increased O2 requirements overnight and rapid response this am, transferred to ICU, BP low with SBP 70's this am and now on levophed drip, on high flow NC O2, concern for aspiration overnight, CTA chest negative for PE, BUN/Cr worse, 3.4L emesis/NG output over past 24hrs, on CBI, CT Abd/Pelvis this am with mild bilateral hydro and moderate left pelviectasis with possible blood clot, to go to OR  "this after per urology, pt is lethargic but resting comfortable, denies any pain  7/9: No events, went to OR yest for cystoscopy and left ureteral and bladder clot evacuation, remains intubated since surgery, on pressor support with levophed and vasopressin, stable on vent with 40% FiO2, CBI running, 1.1L NG output, BUN/Cr slightly improved 112/6.45 (was 122/8.46 yest),     REVIEW OF SYSTEMS:    Unable to obtain, pt intubated    PMH/PSH/SH/FH:   Reviewed and unchanged since admission note    CURRENT MEDICATIONS:   Reviewed from admission to present day    VS:  BP (!) 157/69   Pulse (!) 51   Temp (!) 35.8 °C (96.4 °F) (Temporal)   Resp (!) 23   Ht 1.753 m (5' 9.02\")   Wt 76 kg (167 lb 8.8 oz)   SpO2 95%   BMI 24.73 kg/m²     Physical Exam  Constitutional:       General: He is not in acute distress.     Appearance: He is ill-appearing.      Interventions: He is sedated and intubated.   HENT:      Head: Normocephalic and atraumatic.   Eyes:      General: No scleral icterus.     Extraocular Movements: Extraocular movements intact.   Cardiovascular:      Rate and Rhythm: Normal rate and regular rhythm.   Pulmonary:      Effort: Pulmonary effort is normal. No respiratory distress. He is intubated.      Breath sounds: Examination of the right-lower field reveals decreased breath sounds. Examination of the left-lower field reveals decreased breath sounds. Decreased breath sounds present.      Comments: +Vent  Abdominal:      General: There is distension.   Musculoskeletal:         General: No deformity.      Right lower leg: No edema.      Left lower leg: No edema.   Skin:     General: Skin is warm and dry.      Findings: No rash.   Neurological:      Mental Status: He is lethargic.      Comments: Sedated   Psychiatric:         Mood and Affect: Affect is flat.         Behavior: Behavior is cooperative.      Comments: Unable to assess, pt sedated and intubated         Fluids:  In: 2358.2 [I.V.:2228.7]  Out: 4600 "     LABS:  Recent Labs     07/08/24  0203 07/08/24  0238 07/08/24  0830   SODIUM 143 141 140   POTASSIUM 4.1 4.1 4.9   CHLORIDE 84* 85* 84*   CO2 35* 34* 30   GLUCOSE 156* 156* 152*   * 119* 122*   CREATININE 8.74* 8.49* 8.46*   CALCIUM 7.5* 7.3* 7.6*       IMAGING:   All imaging reviewed from admission to present day    ASSESSMENT:  # Oliguric SCOTT: In setting of surgery/RAASI/decreased nephron mass. Was starting to recover then 2nd Scott with bowel obstruction  - Now with SCOTT again due to obstruction from left ureteral clot and left renal extravasation, underwent cysto and clot evacuation 7/8 by Urology  -Unable to assess oliguria status due to CBI  # CKD Stage 3B: Billed 2/2 HTN in the past. Urine with blood (renal mass) and protein  # Hyperkalemia--resolved  # Renal mass: s/p partial Left nephrectomy  -left ureteral clot with obstruction and extravasation of left kidney on imaging 7/8  -s/o Cystoscopy and left ureteral clot and bladder clot evacuation 7/8  # HTN--currently with multifactorial shock in part due to volume depletion   -Requiring pressor support with 2 pressors  #Acute Hypoxic Respiratory Failure--required high flow NC O2 7/8 and now intubated  -Suspicion for aspiration pneumonia  -CTA Chest negative for PE 7/8  # COPD--currently on vent  # Anemia: low iron  # SBO vs. Ileus: improving with NG  # gross hematuria: transiently stopped after procedure. Now restarted.  -On CBI, Urology following     PLAN:  -BUN/Cr slightly improved  -No acute need for RRT, but will eval daily as pt has had contrast exposure 7/8  -Continue IVF's as pt has been intravascularly volume depleted given large NG output  -CTA chest negative for PE  -Bedside POCUS 7/8 showed easlity collapsible SVC c/w intravascular volume depletion  -Possible aspiration pneumonia  -CBI per urology  -No further lokelma at this time  -No diuretics  -IV iron load  -Renal diet  -Strict I/Os/continue coburn  -Dose all meds per eGFR <15      **Discussed with ICU RN and Critical Care Attending

## 2024-07-09 NOTE — HOSPITAL COURSE
74-year-old male with past medical history significant for tobacco use (quit 2012), COPD (2 to 3 L oxygen at baseline), CAD, HTN, previous atrial fibrillation (not on anticoagulation for hematuria), PVD, who presented 7/1 for elective left partial nephrectomy.  He was admitted to the hospital service postoperatively and developed some hyperkalemia requiring transfer to telemetry unit.    7/2 - POD #1.  Ongoing hyperkalemia, creatinine increased from 2.8-3.8. Nephrology consulted. Holding ASA/Heparin for hematuria  7/3 - POD #2  7/4 - POD #3  7/5 - POD #4. His diet was advanced and he was eating without any increased abdominal pain/nausea/vomiting, but still no BM.  Patient C/O bloating and increased WOB due to abdominal discomfort.  KUB concerning for SBO.  Soapsuds enema.  NG with 1400 mL brown/bilious  7/6 - POD #5. 2 large BM. Ongoing CBI for hematuria.  7/7 - POD #6.  Okay to restart heparin VTE per urology, however patient hematuria returned.     7/8 - HLOC: patient increased O2 demands requiring high flow nasal cannula.  His NG was clamped and was trialed on clear liquid diet became nauseated.  NG was placed back to suction 1800 mL immediately resulting.  He also converted to A-fib RVR (now on amiodarone infusion).  Rapid response was called patient was transferred to ICU for higher level of care.  CTA negative for PE, multifocal opacities RUL and RLL.  CT chest/abdomen/pelvis showed thrombosis of the intra renal aorta within the right sided iliac stent as well as an occlusion of the left iliac artery, acute SBO.    He was taken to the OR, with Dr. Siddiqi, on the left ureteral stent placed.  IR for percutaneous perinephritic drain was deferred.    7/9 - Extubated, 3L NC.  Hgb 7.8, 7.7, 7.1, 6.5.  Transfused 2 units of PRBCs.  Stopped heparin drip.    7/10 - CT-AP showed 52mm x 32mm perinephric hematoma.

## 2024-07-09 NOTE — FLOWSHEET NOTE
07/09/24 0625   Weaning Parameters   RR (bpm) 14   $ FVC / Vital Capacity (liters)  1.3   NIF (cm H2O)  -30   Rapid Shallow Breathing Index (RR/VT) 20   Spontaneous VE 9.2   Spontaneous

## 2024-07-09 NOTE — CARE PLAN
Problem: Ventilation  Goal: Ability to achieve and maintain unassisted ventilation or tolerate decreased levels of ventilator support  Description: Target End Date:  4 days     Document on Vent flowsheet    1.  Support and monitor invasive and noninvasive mechanical ventilation  2.  Monitor ventilator weaning response  3.  Perform ventilator associated pneumonia prevention interventions  4.  Manage ventilation therapy by monitoring diagnostic test results  Outcome: Progressing                                                    Ventilator Daily Summary    Vent Day #2    Airway: 8.0 ETT at 23cm at teeth    Ventilator settings: APV-CMV RR: 18 Vt: 430 PEEP: 8 Fio2: 40%    Duoneb QID     Weaning trials: none this shift    Respiratory Procedures: none this shift    Plan: Continue current ventilator settings and wean mechanical ventilation as tolerated per physician orders.

## 2024-07-09 NOTE — PROGRESS NOTES
Pulmonary and Critical Care Medicine Progress Note    I had the pleasure of receiving this gentleman into the ICU following surgery.  He underwent cystoscopy, left ureteral stent placement, left retrograde pyelogram and bladder clot evacuation.  He was found to have a clot obstructing his left ureter and left renal extravasation.    35.8, 126/60, atrial fibrillation with a ventricular rate of 83, respiratory rate 16  He is sedated on full mechanical ventilatory support    Diagnoses:    Shock - multifactorial: hypovolemic, possible septic shock, distributive shock - IV fluid resuscitation, I am titrating norepinephrine and vasopressin  Acute hypoxemic respiratory failure - intubated 7/8 for surgery, ABCDEF bundle, SAT/SBT as appropriate, mobility level 1 for now  Acute on chronic kidney injury - S/P left ureteral stent placement for left ureteral obstruction due to blood clot  Atrial fibrillation with controlled ventricular response - continue amiodarone infusion - OK to begin anticoagulation with heparin per urology  S/P partial left nephrectomy for clear-cell renal cell carcinoma  COPD - no acute exacerbation  CAD  PAD    I have assessed and reassessed his respiratory status with ventilator adjustments, airway mechanics, ventilator waveforms, blood pressure, hemodynamics, cardiovascular status with titration of norepinephrine and vasopressin, serial determinations of his anticoagulation status with titration of a heparin infusion, urine output and his neurologic status.  He is at increased risk for worsening respiratory, cardiovascular and renal system dysfunction.    The patient remains critically ill.  Critical care time = 100 minutes in directly providing and coordinating critical care and extensive data review.  No time overlap and excludes procedures.    The time spent is in addition to the 115 minutes spent by JORGE Galdamez and 40 minutes spent by Dr. Lind earlier today.      Trae Allen,  MD  Pulmonary and Critical Care Medicine

## 2024-07-09 NOTE — PROGRESS NOTES
Notified Arun OSEI of hematuria requiring increased CBI rate, increased net urine output. RN to check HGB now and recheck again at 1800. Continue Heparin gtt at this time.

## 2024-07-09 NOTE — CARE PLAN
New vent  Problem: Ventilation  Goal: Ability to achieve and maintain unassisted ventilation or tolerate decreased levels of ventilator support  Description: Target End Date:  4 days     Document on Vent flowsheet    1.  Support and monitor invasive and noninvasive mechanical ventilation  2.  Monitor ventilator weaning response  3.  Perform ventilator associated pneumonia prevention interventions  4.  Manage ventilation therapy by monitoring diagnostic test results  Outcome: Progressing

## 2024-07-09 NOTE — PROGRESS NOTES
Surgical Progress Note    Author: Gurpreet Siddiqi M.D. Date & Time created: 2024   8:44 AM     Interval Events:  Patient was extubated at 07:05. Breathing well and in no distress. Mentating well, had good conversation. Creatinine is improved. WBC up to 36, no fever. On heparin drip, urine is light pink with CBI essentially off.     Hemodynamics:  Temp (24hrs), Av °C (96.8 °F), Min:35.7 °C (96.3 °F), Max:36.6 °C (97.8 °F)  Temperature: 35.8 °C (96.5 °F)  Pulse  Av.1  Min: 47  Max: 136   Blood Pressure : (!) 156/69, Arterial BP: (!) 159/32     Respiratory:  Vent Mode: Spont, Rate (breaths/min): 18, PEEP/CPAP: 8, PIP: 15, MAP: 11 Respiration: (!) 25, Pulse Oximetry: 90 %     Work Of Breathing / Effort: Vented  RUL Breath Sounds: Diminished, RML Breath Sounds: Diminished, RLL Breath Sounds: Diminished, ALEN Breath Sounds: Diminished, LLL Breath Sounds: Diminished  Neuro:  GCS       Fluids:    Intake/Output Summary (Last 24 hours) at 2024 0844  Last data filed at 2024 0800  Gross per 24 hour   Intake 6428.43 ml   Output 1650 ml   Net 4778.43 ml     Weight: 81.9 kg (180 lb 8.9 oz)  Current Diet Order   Procedures    Diet NPO Restrict to: Strict (okay to receive meds through the NG/OG tube)     Physical Exam  Constitutional:       Appearance: Normal appearance.   Cardiovascular:      Rate and Rhythm: Normal rate and regular rhythm.      Heart sounds: Normal heart sounds.   Pulmonary:      Effort: Pulmonary effort is normal.      Breath sounds: Normal breath sounds.   Abdominal:      General: There is distension.      Palpations: Abdomen is soft.      Tenderness: There is no abdominal tenderness. There is no right CVA tenderness, left CVA tenderness, guarding or rebound.   Genitourinary:     Penis: Normal.    Neurological:      Mental Status: He is alert.       Labs:  Recent Results (from the past 24 hour(s))   EKG    Collection Time: 24  9:39 AM   Result Value Ref Range    Report       [facility]    Test Date:  2024  Pt Name:    KEIRA SOTOMAYOR             [facility]  MRN:        [id number]                      Room:       R113  Gender:     Male                         Technician: [facility]  :        1950                   Requested By:HERMELINDO WILHELM  Order #:    [id number]                    Reading MD: Negrito Baumann MD    Measurements  Intervals                                Axis  Rate:       129                          P:          0  HI:         0                            QRS:        -70  QRSD:       96                           T:          89  QT:         335  QTc:        491    Interpretive Statements  Atrial fibrillation  Left anterior fascicular block  Delayed precordial R wave progression  Compared to ECG 2024 03:33:16  Atrial fibrillation is now present  Electronically Signed On 2024 22:43:07 PDT by Negrito Baumann MD     MRSA By PCR (Amp)    Collection Time: 24 10:40 AM    Specimen: Nares; Respirate   Result Value Ref Range    MRSA by PCR Negative Negative   CoV-2, Flu A/B, And RSV by PCR (Cepheid)    Collection Time: 24 10:49 AM    Specimen: Nasopharyngeal; Respirate   Result Value Ref Range    Influenza virus A RNA Negative Negative    Influenza virus B, PCR Negative Negative    RSV, PCR Negative Negative    SARS-CoV-2 by PCR NotDetected     SARS-CoV-2 Source NP Swab    LACTIC ACID    Collection Time: 24 12:30 PM   Result Value Ref Range    Lactic Acid 1.7 0.5 - 2.0 mmol/L   POCT glucose device results    Collection Time: 24 12:32 PM   Result Value Ref Range    POC Glucose, Blood 176 (H) 65 - 99 mg/dL   EC-ECHOCARDIOGRAM COMPLETE W/ CONT    Collection Time: 24  2:12 PM   Result Value Ref Range    Eject.Frac. MOD 4C 46.91     Left Ventrical Ejection Fraction 70    LACTIC ACID    Collection Time: 24  5:51 PM   Result Value Ref Range    Lactic Acid 0.9 0.5 - 2.0 mmol/L   aPTT    Collection Time: 24  5:51 PM    Result Value Ref Range    APTT 26.1 24.7 - 36.0 sec   Prothrombin Time    Collection Time: 07/08/24  5:51 PM   Result Value Ref Range    PT 17.4 (H) 12.0 - 14.6 sec    INR 1.40 (H) 0.87 - 1.13   Heparin Xa (Unfractionated)    Collection Time: 07/08/24  5:51 PM   Result Value Ref Range    Heparin Xa (UFH) <0.10 IU/mL   Triglyceride    Collection Time: 07/08/24  5:51 PM   Result Value Ref Range    Triglycerides 186 (H) 0 - 149 mg/dL   POCT arterial blood gas device results    Collection Time: 07/08/24  6:22 PM   Result Value Ref Range    Ph 7.379 (L) 7.400 - 7.500    Pco2 52.1 (HH) 26.0 - 37.0 mmHg    Po2 80 64 - 87 mmHg    Tco2 32 20 - 33 mmol/L    S02 95 93 - 99 %    Hco3 30.7 (H) 17.0 - 25.0 mmol/L    BE 5 (H) -4 - 3 mmol/L    Body Temp 97.0 F degrees    O2 Therapy 30 %    iPF Ratio 267     Ph Temp Cherie 7.391 (L) 7.400 - 7.500    Pco2 Temp Co 50.1 (H) 26.0 - 37.0 mmHg    Po2 Temp Cor 75 64 - 87 mmHg    Specimen Arterial     Shaheed Test N/A     DelSys Vent     End Tidal Carbon Dioxide 32 mmhg    Tidal Volume 430 mL    Peep End Expiratory Pressure 8 cmh20    Set Rate 16     Mode APV-CMV    POCT lactate device results    Collection Time: 07/08/24  6:22 PM   Result Value Ref Range    iStat Lactate 0.7 0.5 - 2.0 mmol/L   LACTIC ACID    Collection Time: 07/08/24  9:15 PM   Result Value Ref Range    Lactic Acid 1.0 0.5 - 2.0 mmol/L   HEP B SURFACE AB    Collection Time: 07/08/24  9:15 PM   Result Value Ref Range    Hep B Surface Antibody Quant <3.50 0.00 - 10.00 mIU/mL   HEPATITIS PANEL ACUTE(4 COMPONENTS)    Collection Time: 07/08/24  9:15 PM   Result Value Ref Range    Hepatitis B Surface Antigen Non-Reactive Non-Reactive    Hepatitis B Cors Ab,IgM Non-Reactive Non-Reactive    Hepatitis A Virus Ab, IgM Non-Reactive Non-Reactive    Hepatitis C Antibody Non-Reactive Non-Reactive   Heparin Xa (Unfractionated)    Collection Time: 07/09/24 12:15 AM   Result Value Ref Range    Heparin Xa (UFH) 0.71 IU/mL   POCT glucose  device results    Collection Time: 07/09/24 12:16 AM   Result Value Ref Range    POC Glucose, Blood 195 (H) 65 - 99 mg/dL   POCT arterial blood gas device results    Collection Time: 07/09/24  3:36 AM   Result Value Ref Range    Ph 7.362 (L) 7.400 - 7.500    Pco2 53.5 (HH) 26.0 - 37.0 mmHg    Po2 60 (L) 64 - 87 mmHg    Tco2 32 20 - 33 mmol/L    S02 89 (L) 93 - 99 %    Hco3 30.4 (H) 17.0 - 25.0 mmol/L    BE 4 (H) -4 - 3 mmol/L    Body Temp 97.0 F degrees    O2 Therapy 35 %    iPF Ratio 171     Ph Temp Cherie 7.375 (L) 7.400 - 7.500    Pco2 Temp Co 51.5 (HH) 26.0 - 37.0 mmHg    Po2 Temp Cor 57 (L) 64 - 87 mmHg    Specimen Arterial     Shaheed Test N/A     DelSys Vent     End Tidal Carbon Dioxide 35 mmhg    Tidal Volume 430 mL    Peep End Expiratory Pressure 8 cmh20    Set Rate 18     Mode APV-CMV    POCT lactate device results    Collection Time: 07/09/24  3:36 AM   Result Value Ref Range    iStat Lactate 0.8 0.5 - 2.0 mmol/L   CBC with Differential    Collection Time: 07/09/24  5:50 AM   Result Value Ref Range    WBC 36.7 (H) 4.8 - 10.8 K/uL    RBC 2.42 (L) 4.70 - 6.10 M/uL    Hemoglobin 7.8 (L) 14.0 - 18.0 g/dL    Hematocrit 22.9 (L) 42.0 - 52.0 %    MCV 94.6 81.4 - 97.8 fL    MCH 32.2 27.0 - 33.0 pg    MCHC 34.1 32.3 - 36.5 g/dL    RDW 49.1 35.9 - 50.0 fL    Platelet Count 295 164 - 446 K/uL    MPV 10.0 9.0 - 12.9 fL    Neutrophils-Polys 92.50 (H) 44.00 - 72.00 %    Lymphocytes 0.80 (L) 22.00 - 41.00 %    Monocytes 2.50 0.00 - 13.40 %    Eosinophils 0.00 0.00 - 6.90 %    Basophils 0.00 0.00 - 1.80 %    Nucleated RBC 0.30 (H) 0.00 - 0.20 /100 WBC    Neutrophils (Absolute) 35.20 (H) 1.82 - 7.42 K/uL    Lymphs (Absolute) 0.29 (L) 1.00 - 4.80 K/uL    Monos (Absolute) 0.92 (H) 0.00 - 0.85 K/uL    Eos (Absolute) 0.00 0.00 - 0.51 K/uL    Baso (Absolute) 0.00 0.00 - 0.12 K/uL    NRBC (Absolute) 0.10 K/uL   DIFFERENTIAL MANUAL    Collection Time: 07/09/24  5:50 AM   Result Value Ref Range    Bands-Stabs 3.40 0.00 - 10.00 %     Metamyelocytes 0.80 %    Manual Diff Status PERFORMED    PERIPHERAL SMEAR REVIEW    Collection Time: 07/09/24  5:50 AM   Result Value Ref Range    Peripheral Smear Review see below    PLATELET ESTIMATE    Collection Time: 07/09/24  5:50 AM   Result Value Ref Range    Plt Estimation Normal    MORPHOLOGY    Collection Time: 07/09/24  5:50 AM   Result Value Ref Range    RBC Morphology Present     Poikilocytosis 1+     Echinocytes 2+     Toxic Gran Few    Basic Metabolic Panel    Collection Time: 07/09/24  6:40 AM   Result Value Ref Range    Sodium 142 135 - 145 mmol/L    Potassium 3.9 3.6 - 5.5 mmol/L    Chloride 96 96 - 112 mmol/L    Co2 25 20 - 33 mmol/L    Glucose 195 (H) 65 - 99 mg/dL    Bun 112 (H) 8 - 22 mg/dL    Creatinine 6.45 (HH) 0.50 - 1.40 mg/dL    Calcium 6.7 (LL) 8.5 - 10.5 mg/dL    Anion Gap 21.0 (H) 7.0 - 16.0   MAGNESIUM    Collection Time: 07/09/24  6:40 AM   Result Value Ref Range    Magnesium 2.7 (H) 1.5 - 2.5 mg/dL   PHOSPHORUS    Collection Time: 07/09/24  6:40 AM   Result Value Ref Range    Phosphorus 6.8 (H) 2.5 - 4.5 mg/dL   ESTIMATED GFR    Collection Time: 07/09/24  6:40 AM   Result Value Ref Range    GFR (CKD-EPI) 9 (A) >60 mL/min/1.73 m 2     Medical Decision Making, by Problem:  Active Hospital Problems    Diagnosis     Shock (HCC) [R57.9]     Small bowel obstruction (HCC) [K56.609]     Left kidney mass [N28.89]     Hyperkalemia [E87.5]     Leukemoid reaction [D72.823]     Coronary artery disease involving native coronary artery of native heart without angina pectoris [I25.10]     History of atrial fibrillation [Z86.79]      Remote history of atrial fibrillation in 2019 in the setting of an acute COPD exacerbation.  He has had no recurrences.  He did follow with cardiology for a few years after this.  Oral anticoagulant was not indicated at that time.      Acute on chronic hypoxic respiratory failure (HCC) [J96.21]     Essential hypertension [I10]      This is a chronic condition.  Current  Meds:   - losartan 100 mg daily  - carvedilol 12.5 mg BID  - amlodipine 10 mg daily  Side effects: none  Home BP Log: Typically 120s/60s  Associated symptoms: Denies chest pain, shortness of breath, headaches, dizziness/lightheadedness           Peripheral vascular disease ([facility]) [I73.9]      Angioplasty and stents Dr. Vital November 2012      COPD exacerbation ([facility]) [J44.1]      This is a chronic condition.  Managed by pulmonology.  Patient is on Trelegy inhaler daily and albuterol as needed.  He is on supplemental oxygen at night and as needed throughout the day.   Symptoms currently controlled.       Acute renal failure superimposed on stage 3 chronic kidney disease ([facility]) [N17.9, N18.30]      Plan:  Patient is stable with improved creatinine after left stent placement. I suggest seeing what WBC is tomorrow and consider renal ultrasound to evaluate for perinephric urinoma that might benefit from a percutaneous drain. Avoid further IV contrast. Agree with nephrology that dialysis not indicated today. Heparin may cause renal bleed, suggest running it at the lower threshold.   Discussed case with pato at bedside and Dr. Good, Pulmonary CC.       Discussed patient condition with Hospitalist, Family, RN, and Patient.    Gurpreet Siddiqi M.D., F.A.C.S.  Urologic Onoclogy  Vice-Chair, Department of Surgery  [facility]

## 2024-07-09 NOTE — PROGRESS NOTES
Heparin Xa specimen sent at 0015. This RN called lab to ensure specimen was received at 0233. This RN called a second time for result at 0312. Lab released result at this time, heparin gtt titrated based on result.

## 2024-07-10 PROBLEM — I74.10 AORTIC THROMBUS (HCC): Status: ACTIVE | Noted: 2024-01-01

## 2024-07-10 PROBLEM — G47.00 INSOMNIA: Status: ACTIVE | Noted: 2018-11-16

## 2024-07-10 NOTE — WOUND TEAM
[facility]  Inpatient Services  Wound and Skin Care Brief Evaluation    Admission Date: 7/1/2024     Last order of IP CONSULT TO WOUND CARE was found on 7/8/2024 from Hospital Encounter on 6/3/2024     HPI, PMH, SH: Reviewed    No chief complaint on file.    Diagnosis: Left renal mass [N28.89]  Shock (HCC) [R57.9]    Unit where seen by Wound Team: R113/00     Wound consult placed regarding sacrum and bilateral ears. Chart and images reviewed. This discussed with bedside RN Sirisha to get pt foam protectors for elastic straps of Oxymask. . This clinician in to assess patient. Patient pleasant and agreeable. Assessed ears that are red and blanching. He turned himself to L side for sacrum to be assessed. Cleaned off barrier paste and pt has a little more peeled skin from sacrococcygeal area revealing pink partial thickness wound. Non-selectively debrided with No rinse foam soap, Perineal Wipes (Barrier wipes), and Moist warm washcloth.     No pressure injuries or advanced wound care needs identified. Wound consult completed. No further follow up unless indicated and consulted.     Wound 07/01/24 Coccyx Inner (Active)   Date First Assessed/Time First Assessed: 07/01/24 2105   Location: Coccyx  Wound Orientation: Inner      Assessments 7/9/2024  1:40 PM   Wound Image      Site Assessment Pink;Red;Fragile   Periwound Assessment Pink   Margins Defined edges   Closure Secondary intention   Drainage Amount None   Treatments Cleansed;Nonselective debridement   Wound Cleansing Foam Cleanser/Washcloth   Periwound Protectant Barrier Paste   Dressing Status Open to Air   Dressing Cleansing/Solutions Not Applicable   Dressing Options Open to Air   Dressing Change/Treatment Frequency Every Shift, and As Needed   NEXT Dressing Change/Treatment Date 07/09/24   Wound Team Following Not following   Wound Length (cm) 7 cm   Wound Width (cm) 2.5 cm   Wound Depth (cm) 0.2 cm   Wound Surface Area (cm^2) 17.5 cm^2   Wound  Volume (cm^3) 3.5 cm^3   Shape irregular       PREVENTATIVE INTERVENTIONS:    Q shift Dieudonne - performed per nursing policy  Q shift pressure point assessments - performed per nursing policy      Surface/Positioning  ICU Low Airloss - Currently in Place  Reposition q 2 hours - Currently in Place  TAPs Turning system - Currently in Place    Offloading/Redistribution  Heel offloading dressing (Silicone dressing) - Currently in Place      Respiratory  Gray Foam Ear protectors - Ordered  Oxymask - Currently in Place    Containment/Moisture Prevention    Dri-treasure pad - Currently in Place  Lauren Catheter - Currently in Place  Barrier wipes - Currently in Place  Barrier paste - Currently in Place

## 2024-07-10 NOTE — PROGRESS NOTES
Surgical Progress Note    Author: Abbi Vazquez P.A.-C. Date & Time created: 7/10/2024   10:33 AM     Interval Events:  Patient was in good spirits at the time of rounds this morning, and was able to respond to our questions appropriately.   Creatinine improved to 5.4 from 6.45 yesterday  WBC is down to 23.5 from 36.7 yesterday, no fever.   Will discontinue heparin drip due to bleed   Decreased to low flow CBI  - output is slightly blood-tinged     ROS  Hemodynamics:  Temp (24hrs), Av.3 °C (97.3 °F), Min:35.8 °C (96.5 °F), Max:36.6 °C (97.8 °F)  Temperature: 36.2 °C (97.1 °F)  Pulse  Av.9  Min: 47  Max: 136   Blood Pressure : (!) 148/65, Arterial BP: 143/39     Respiratory:    Respiration: (!) 25, Pulse Oximetry: 92 %     Work Of Breathing / Effort: Moderate;Tachypnea;Increased Work of Breathing  RUL Breath Sounds: Diminished, RML Breath Sounds: Diminished, RLL Breath Sounds: Diminished, ALEN Breath Sounds: Diminished, LLL Breath Sounds: Diminished  Neuro:  GCS       Fluids:    Intake/Output Summary (Last 24 hours) at 7/10/2024 1033  Last data filed at 7/10/2024 0600  Gross per 24 hour   Intake 4262.35 ml   Output 3575 ml   Net 687.35 ml        Current Diet Order   Procedures    Diet Order Diet: Clear Liquid     Physical Exam  Pulmonary:      Effort: Pulmonary effort is normal.   Abdominal:      General: Abdomen is flat.   Neurological:      Mental Status: He is alert.   Psychiatric:         Mood and Affect: Mood normal.       Labs:  Recent Results (from the past 24 hour(s))   POCT glucose device results    Collection Time: 24 12:06 PM   Result Value Ref Range    POC Glucose, Blood 168 (H) 65 - 99 mg/dL   HGB    Collection Time: 24 12:10 PM   Result Value Ref Range    Hemoglobin 7.7 (L) 14.0 - 18.0 g/dL   HGB    Collection Time: 24  3:26 PM   Result Value Ref Range    Hemoglobin 7.1 (L) 14.0 - 18.0 g/dL   Heparin Xa (Unfractionated)    Collection Time: 24  4:35 PM   Result Value Ref  Range    Heparin Xa (UFH) 0.76 IU/mL   POCT glucose device results    Collection Time: 07/09/24  5:44 PM   Result Value Ref Range    POC Glucose, Blood 155 (H) 65 - 99 mg/dL   HGB    Collection Time: 07/09/24  6:24 PM   Result Value Ref Range    Hemoglobin 6.5 (L) 14.0 - 18.0 g/dL   HGB    Collection Time: 07/10/24 12:00 AM   Result Value Ref Range    Hemoglobin 8.3 (L) 14.0 - 18.0 g/dL   POCT glucose device results    Collection Time: 07/10/24 12:05 AM   Result Value Ref Range    POC Glucose, Blood 151 (H) 65 - 99 mg/dL   CBC with Differential    Collection Time: 07/10/24  5:50 AM   Result Value Ref Range    WBC 23.5 (H) 4.8 - 10.8 K/uL    RBC 2.48 (L) 4.70 - 6.10 M/uL    Hemoglobin 7.8 (L) 14.0 - 18.0 g/dL    Hematocrit 22.9 (L) 42.0 - 52.0 %    MCV 92.3 81.4 - 97.8 fL    MCH 31.5 27.0 - 33.0 pg    MCHC 34.1 32.3 - 36.5 g/dL    RDW 51.7 (H) 35.9 - 50.0 fL    Platelet Count 160 (L) 164 - 446 K/uL    MPV 10.0 9.0 - 12.9 fL    Neutrophils-Polys 97.40 (H) 44.00 - 72.00 %    Lymphocytes 0.00 (L) 22.00 - 41.00 %    Monocytes 2.60 0.00 - 13.40 %    Eosinophils 0.00 0.00 - 6.90 %    Basophils 0.00 0.00 - 1.80 %    Nucleated RBC 0.10 0.00 - 0.20 /100 WBC    Neutrophils (Absolute) 22.89 (H) 1.82 - 7.42 K/uL    Lymphs (Absolute) 0.00 (L) 1.00 - 4.80 K/uL    Monos (Absolute) 0.61 0.00 - 0.85 K/uL    Eos (Absolute) 0.00 0.00 - 0.51 K/uL    Baso (Absolute) 0.00 0.00 - 0.12 K/uL    NRBC (Absolute) 0.02 K/uL    Anisocytosis 1+    Basic Metabolic Panel (BMP)    Collection Time: 07/10/24  5:50 AM   Result Value Ref Range    Sodium 148 (H) 135 - 145 mmol/L    Potassium 3.8 3.6 - 5.5 mmol/L    Chloride 108 96 - 112 mmol/L    Co2 23 20 - 33 mmol/L    Glucose 138 (H) 65 - 99 mg/dL    Bun 106 (H) 8 - 22 mg/dL    Creatinine 5.40 (HH) 0.50 - 1.40 mg/dL    Calcium 6.4 (LL) 8.5 - 10.5 mg/dL    Anion Gap 17.0 (H) 7.0 - 16.0   Magnesium    Collection Time: 07/10/24  5:50 AM   Result Value Ref Range    Magnesium 2.5 1.5 - 2.5 mg/dL    Phosphorus    Collection Time: 07/10/24  5:50 AM   Result Value Ref Range    Phosphorus 6.5 (H) 2.5 - 4.5 mg/dL   ESTIMATED GFR    Collection Time: 07/10/24  5:50 AM   Result Value Ref Range    GFR (CKD-EPI) 11 (A) >60 mL/min/1.73 m 2   DIFFERENTIAL MANUAL    Collection Time: 07/10/24  5:50 AM   Result Value Ref Range    Manual Diff Status PERFORMED    PERIPHERAL SMEAR REVIEW    Collection Time: 07/10/24  5:50 AM   Result Value Ref Range    Peripheral Smear Review see below    PLATELET ESTIMATE    Collection Time: 07/10/24  5:50 AM   Result Value Ref Range    Plt Estimation Decreased    MORPHOLOGY    Collection Time: 07/10/24  5:50 AM   Result Value Ref Range    RBC Morphology Present     Poikilocytosis 2+     Ovalocytes 1+     Echinocytes 2+    POCT glucose device results    Collection Time: 07/10/24  5:54 AM   Result Value Ref Range    POC Glucose, Blood 133 (H) 65 - 99 mg/dL     Medical Decision Making, by Problem:  Active Hospital Problems    Diagnosis     Aortic thrombus (HCC) [I74.10]     Hypocalcemia [E83.51]     Hyperglycemia [R73.9]     Shock (HCC) [R57.9]     Small bowel obstruction (HCC) [K56.609]     Left kidney mass [N28.89]     Hyperkalemia [E87.5]     Leukemoid reaction [D72.823]     Acute blood loss as cause of postoperative anemia [D62]     Coronary artery disease involving native coronary artery of native heart without angina pectoris [I25.10]     History of atrial fibrillation [Z86.79]      Remote history of atrial fibrillation in 2019 in the setting of an acute COPD exacerbation.  He has had no recurrences.  He did follow with cardiology for a few years after this.  Oral anticoagulant was not indicated at that time.      Acute on chronic hypoxic respiratory failure (HCC) [J96.21]     Essential hypertension [I10]      This is a chronic condition.  Current Meds:   - losartan 100 mg daily  - carvedilol 12.5 mg BID  - amlodipine 10 mg daily  Side effects: none  Home BP Log: Typically  120s/60s  Associated symptoms: Denies chest pain, shortness of breath, headaches, dizziness/lightheadedness           Peripheral vascular disease ([facility]) [I73.9]      Angioplasty and stents Dr. Vital November 2012      COPD exacerbation ([facility]) [J44.1]      This is a chronic condition.  Managed by pulmonology.  Patient is on Trelegy inhaler daily and albuterol as needed.  He is on supplemental oxygen at night and as needed throughout the day.   Symptoms currently controlled.       Acute renal failure superimposed on stage 3 chronic kidney disease (HCC) [N17.9, N18.30]      Plan:  -Patient is stable with continued improvements in creatinine after left stent placement.   -Pending renal ultrasound to evaluate for possible renal bleed and perinephric urinoma that might benefit from a percutaneous drain.   -Plan for selective embolization with IR -pending renal US  -D/C Heparin- due to bleed  -CBI reduced to low flow      Quality Measures:  Quality-Core Measures    Discussed patient condition with Patient, JORGE, Dr. Good, Dr. Jaquez, and Dr. Siddiqi

## 2024-07-10 NOTE — RESPIRATORY CARE
COPD EDUCATION by COPD CLINICAL EDUCATOR  7/10/2024 at 3:02 PM by Adelia Ashley, RRT     Patient seen again at request of his care team. We reviewed his current Action Plan and restarted his home therapy-Trelegy. He has a rescue inhale and nebulizer available while here as he continues to improve. His Action Plan was updated in the EMR to reflect current Respiratory Medication use.                   COPD Screen  COPD Risk Screening  Do you have a history of COPD?: Yes  Do you have a Pulmonologist?: Yes    COPD Assessment  COPD Clinical Specialists ONLY  COPD Education Initiated: Yes--Short Intervention  Is this a COPD exacerbation patient?: No (confirmed with Dr Duron not exacerbation Vent for Aspiration event; SLP to see met with pt review meds delivery and appts PFT noted below)  DME Company: [employer])  DME Equipment Type: 2 lpm day 4 lpm at night  Physician Follow Up Appointment: 09/05/24  Appt Time: 1040  Physician Name: SONI Ramirez  Pulmonary Follow Up Appointment:  (none at this time.)  Pulmonologist Name: Arleen - Dr Amaya yearly last seen 4/3/2024  Referrals Initiated: Yes  Pulmonary Rehab: N/A  Smoking Cessation: No (quit 2012)  Hospice: N/A  Home Health Care:  (Home Health vs SNF choice in progress and 6 clicks)  Mobile Urgent Care Services: N/A  Geriatric Specialty Group: N/A  Private In-Home Care Agency: N/A  $ Demo/Eval of SVN's, MDI's and Aerosols: Yes (review of delivery and devices care and cleaning)    PFT Results  (OP) Pulmonary Function Testing: Yes (last PFT7/2022 FEV1-16 FEV1/FVC Ratio-29 DLCO markly decreased)  Interdisciplinary Rounds: Attendance at Rounds (30 Min)    Meds to Beds  [facility] provides bedside medication delivery for all eligible patients at discharge.  Would you like to opt out of this program for any reason?: No - Stay Opted In     MY COPD ACTION PLAN     It is recommended that patients and physicians /healthcare providers complete this action plan together.  "This plan should be discussed at each physician visit and updated as needed.    The green, yellow and red zones show groups of symptoms of COPD. This list of symptoms is not comprehensive, and you may experience other symptoms. In the \"Actions\" column, your healthcare provider has recommended actions for you to take based on your symptoms.    Patient Name: Olman Sims   YOB: 1950   Last Updated on: 7/10/2024  3:02 PM   Green Zone:  I am doing well today Actions     Usual activitiy and exercise level   Take daily medications     Usual amounts of cough and phlegm/mucus   Use oxygen as prescribed     Sleep well at night   Continue regular exercise/diet plan     Appetite is good   At all times avoid cigarette smoke, inhaled irritants     Daily Medications (these medications are taken every day):   Fluticasone and Umeclidinium and Vilanterol (Trelogy) 1 Puff Once daily     Additional Information:  Rinse mouth well with water and spit out after each use.     Yellow Zone:  I am having a bad day or a COPD flare Actions     More breathless than usual   Continue daily medications     I have less energy for my daily activities   Use quick relief inhaler as ordered     Increased or thicker phlegm/mucus   Use oxygen as prescribed     Using quick relief inhaler/nebulizer more often   Get plenty of rest     Swelling of ankles more than usual   Use pursed lip breathing     More coughing than usual   At all times avoid cigarette smoke, inhaled irritants     I feel like I have a \"chest cold\"     Poor sleep and my symptoms woke me up     My appetite is not good     My medicine is not helping      Call provider immediately if symptoms don’t improve     Continue daily medications, add rescue medications:   Albuterol 2 Puffs Every 6 hours PRN       Medications to be used during a flare up, (as Discussed with Provider):           Additional Information:  A spacer is recommended when using this medication. "     Red Zone:  I need urgent medical care Actions     Severe shortness of breath even at rest   Call 911 or seek medical care immediately     Not able to do any activity because of breathing      Fever or shaking chills      Feeling confused or very drowsy       Chest pains      Coughing up blood

## 2024-07-10 NOTE — PROGRESS NOTES
Pulmonary and Critical Care Medicine Progress Note    Hgb has been consistently trending down: 7.8 -> 7.7 -> 7.1 -> 6.5.  I will transfuse 2 units of PRBCs.  I am going to stop his heparin infusion due to the need for transfusion.  At this point in time, the benefits of anticoagulation for his AF is outweighed by the risks of bleeding requiring transfusion.    Trae Allen MD  Pulmonary and Critical Care Medicine

## 2024-07-10 NOTE — CARE PLAN
The patient is Watcher - Medium risk of patient condition declining or worsening    Shift Goals  Clinical Goals: hemodynamic stability, decreased hematuria, stable Hgb, CBI  Patient Goals: Rest  Family Goals: updates    Progress made toward(s) clinical / shift goals:    Problem: Knowledge Deficit - Standard  Goal: Patient and family/care givers will demonstrate understanding of plan of care, disease process/condition, diagnostic tests and medications  Outcome: Progressing     Problem: Pain - Standard  Goal: Alleviation of pain or a reduction in pain to the patient’s comfort goal  Outcome: Progressing     Problem: Fall Risk  Goal: Patient will remain free from falls  Outcome: Progressing     Problem: Cardiac - Atrial Fibrillation  Goal: Patient will achieve & maintain adequate cardiac output and rate control  Outcome: Progressing     Problem: Urinary Elimination  Goal: Establish and maintain regular urinary output  Outcome: Progressing     Problem: Skin Integrity  Goal: Skin integrity is maintained or improved  Outcome: Progressing

## 2024-07-10 NOTE — ASSESSMENT & PLAN NOTE
CT on 7/8: Thrombosis in the infrarenal aorta   Holding Heparin gtt in presence of renal bleed  Plan per urology: Wait 48hrs (7/13) to restart heparin gtt to allow perinephritic hematoma to stabilize. At that time AC vs IR embo.

## 2024-07-10 NOTE — PROGRESS NOTES
Pulmonary/Critical Care Medicine   Attending Progress Note    Date of service: 7/10/2024  Time: 0718    Mr. Sims is a 74 year old male with the past medical history significant for severe COPD (FEV1 of 0.84), CAD s/p BMS to RCA on 5/2019, right iliac stent in 2012, atrial fibrillation, and renal cell carcinoma s/p partial resection on 7/1 whose hospital course has been complicated by ongoing renal failure and hyperkalemia who was transferred to the ICU on 7.8 for acute hypoxic respiratory failure and shock.  The patient had a CTA chest which did not show PE, but multifocal opacities concerning for pneumonia/aspiration.  A CT abd/pelvis showed an ongoing SBO as well as fluid collection near the left kidney.  The patient was taken back to the OR by Urology and underwent a cystoscopy, left ureteral stent placement, left retrograde pyelogram and bladder clot evacuation.  He was found to have a clot obstructing his left ureter and left renal extravasation. He returned to the ICU later on 7/8 still intubated.  7/9 - VD#2, SAT/SBT-->extubated, remains on levophed/vasopressin, Hb trended down to 6.5 and received 2 unit of pRBCs, heparin was stopped    24 hour events reviewed:   - required 2 units of pRBCs   - Lauren with bloody urine   - heparin stopped   - a/ox4   - SR/AF 70-100s   - levo is off   - SBP 90-190s   - clear liquid diet   - NGT clamped   - BM x 4 yesterday   - UOP of 1.6 liters   - NS at 150cc/hr   - amio 0.5   - no RT issues   - heparin off   - pepcid   - day 3/5 of unasyn   - BC with   - Na 142 to 148   - Ca 6.4-->repleted with 2g CaCl IV   - day 2 of steroids   - creat 5.40   - Hb now at 7.8 from post transfusion Hb of 8.3    Yesterday's Events:   - no events overnight    - SAT this am-->following all commands   - vasopressin at 0.03   - SR/AF 60-100s   - -150s   - levo 0.25   - Tmax 97.4   - NPO   - NG tube 100cc overnight   - no BM yet   - UOP of 1250cc overnight, Laurne   - CBI ongoing   - left  IJ TLC   - amio at 0.5   - IVF at 150cc/hr   - VD#2   - CXR(reviewed): RML/RLL opacity with cephalization   - SBT for an hour-->good parameters   - pepcid   - heparin infusion, Xa monitoring   - Hb 7.8   - K 3.9   - creat 6.45   - Ca 6.7   - day 2/5 of Unasyn   - BCs x 1 with GPR   - BG at 195-->ISS    Exam:  Gen: pleasant/cooperative, awake, following  HEENT: clear, MMM  Neck: supple  CVS; RRR  Lungs: diminished throughout  Abd: soft, diminished bowel sounds, NT  Ext: Jc x 4, no edema, 2+ dpp=  Neuro: intact    Labs/imaging reviewed    A/P:  Acute on chronic hypoxic respiratory failure   - suspect due to aspiration event   - RT/O2 protocols   - extubated on 7/9    Shock   - hypovolemic from acute blood loss and volume loss as well as sepsis   - cont broad spectrum abx   - off vasopressors after getting pRBCs   - resolving       JAE   - due to obstructive from blood clot with dye load   - no RRT at this time   - nephrology is following   - monitor UOP/creat   - avoid nephrotoxins   - slowly improving    SBO   - having BMs   - NG clamped   - trial clears before removing    Acute Blood Loss Anemia   - ?postoperative   - s/p 2 units pRBCs   - holding heparin infusion   - cont to trend Hb, transfuse for Hb<7 or hemodynamic stability   - CBI in place    COPD exacerbation   - resolved   - cont Duonebs   - home Trelegy inhaler   - consider steroid taper tomorrow    I spent extensive time in reviewing the patient's condition, physical examination, laboratory and imaging data, prior documentation, in discussion with Dr. Siddiqi, Dr. Campoverde, RT and Arun Oneill, APRN in formulating an assessment/plan.    Critical Care time = 25 min. No time overlap, procedures not included in time.    Chloe Good MD  Pulmonary and Critical Care Medicine

## 2024-07-10 NOTE — PROGRESS NOTES
Critical Care Progress Note    Date of admission  7/1/2024    Chief Complaint  74 y.o. male admitted 7/1/2024 with elective partial nephrectomy .    Hospital Course  74-year-old male with past medical history significant for tobacco use (quit 2012), COPD (2 to 3 L oxygen at baseline), CAD, HTN, previous atrial fibrillation (not on anticoagulation for hematuria), PVD, who presented 7/1 for elective left partial nephrectomy.  He was admitted to the hospital service postoperatively and developed some hyperkalemia requiring transfer to telemetry unit.    7/2 - POD #1.  Ongoing hyperkalemia, creatinine increased from 2.8-3.8. Nephrology consulted. Holding ASA/Heparin for hematuria  7/3 - POD #2  7/4 - POD #3  7/5 - POD #4. His diet was advanced and he was eating without any increased abdominal pain/nausea/vomiting, but still no BM.  Patient C/O bloating and increased WOB due to abdominal discomfort.  KUB concerning for SBO.  Soapsuds enema.  NG with 1400 mL brown/bilious  7/6 - POD #5. 2 large BM. Ongoing CBI for hematuria.  7/7 - POD #6.  Okay to restart heparin VTE per urology, however patient hematuria returned.     7/8 - HLOC: patient increased O2 demands requiring high flow nasal cannula.  His NG was clamped and was trialed on clear liquid diet became nauseated.  NG was placed back to suction 1800 mL immediately resulting.  He also converted to A-fib RVR (now on amiodarone infusion).  Rapid response was called patient was transferred to ICU for higher level of care.  CTA negative for PE, multifocal opacities RUL and RLL.  CT chest/abdomen/pelvis showed thrombosis of the intra renal aorta within the right sided iliac stent as well as an occlusion of the left iliac artery, acute SBO.    He was taken to the OR, with Dr. Siddiqi, on the left ureteral stent placed.  IR for percutaneous perinephritic drain was deferred.    7/9 - Extubated, 3L NC.  Hgb 7.8, 7.7, 7.1, 6.5.  Transfused 2 units of PRBCs.  Stopped heparin  drip.    Interval Problem Update  Reviewed last 24 hour events:  - Overnight Events:  -2 units PRBCs.  -Stopped heparin drip.  -BM last night   - Tm: Afebrile  - Neuro: ANO x 4, following commands   - HR: 60-90 SR/A-fib   - SBP: 120-150   - GI: N.p.o. for bowel rest for SBO.  BMs last night, will trial clear liquids.   - UOP: 3775 mL/24 hrs   - Lauren: Yes, CBI   - Lines: Triple-lumen, art line,   - PPx: GI Pepcid, DVT none   - Mobility level 1, eligible for progression yes    ABX: Unasyn 3/5. BCs x 1 positive for GPR    Infusions:   -Amiodarone 0.5  -Levo at 0.05  -NS at 150 mL/h  -Vaso off    Labs:   -WBCs, 23.5, Hgb 7.8 (2 units PRBCs), , , BUN/creatinine improving, calcium 6.4 (replaced)    Plan for today:  -Trial clear liquid diet  -Discuss AC with urology in the presence of renal bleed and aortic thrombus  -Will D/C NGT, and maintenance fluids if diet is tolerated  -Added Ambien, per patient request, this is a chronic medication and patient denies any negative side effects    Review of Systems  Review of Systems   Constitutional:  Negative for chills, fever and weight loss.        I feel much better   HENT:  Negative for congestion, sinus pain and sore throat.    Eyes:  Negative for blurred vision and double vision.   Respiratory:  Negative for cough and shortness of breath.    Cardiovascular:  Negative for chest pain, palpitations and leg swelling.   Gastrointestinal:  Negative for abdominal pain, constipation, diarrhea, nausea and vomiting.   Genitourinary:  Negative for dysuria.   Musculoskeletal: Negative.    Skin:  Negative for rash.   Neurological:  Negative for dizziness, tingling, weakness and headaches.   Endo/Heme/Allergies: Negative.    All other systems reviewed and are negative.       Vital Signs for last 24 hours   Temp:  [35.8 °C (96.5 °F)-36.6 °C (97.8 °F)] 36.2 °C (97.1 °F)  Pulse:  [] 83  Resp:  [15-64] 25  BP: (137-169)/(31-65) 148/65  SpO2:  [88 %-98 %] 92 %    Hemodynamic  parameters for last 24 hours       Respiratory Information for the last 24 hours       Physical Exam   Physical Exam  Vitals and nursing note reviewed.   Constitutional:       General: He is not in acute distress.     Appearance: Normal appearance. He is not ill-appearing.   HENT:      Head: Normocephalic.      Nose: Nose normal.      Mouth/Throat:      Lips: Pink.      Mouth: Mucous membranes are moist.   Eyes:      Pupils: Pupils are equal, round, and reactive to light.   Cardiovascular:      Rate and Rhythm: Rhythm irregularly irregular.      Pulses:           Dorsalis pedis pulses are detected w/ Doppler on the right side and detected w/ Doppler on the left side.      Heart sounds: Heart sounds are distant.   Pulmonary:      Effort: Accessory muscle usage present. No respiratory distress.      Breath sounds: Decreased breath sounds present. No wheezing or rhonchi.   Abdominal:      General: Abdomen is protuberant. Bowel sounds are absent. There is no distension.      Palpations: Abdomen is soft.      Tenderness: There is no abdominal tenderness. There is no guarding.   Musculoskeletal:      Right lower leg: No edema.      Left lower leg: No edema.      Comments: CEE 5/5   Skin:     General: Skin is cool.      Capillary Refill: Capillary refill takes 2 to 3 seconds.   Neurological:      Mental Status: He is alert.      GCS: GCS eye subscore is 4. GCS verbal subscore is 5. GCS motor subscore is 6.      Sensory: Sensation is intact.   Psychiatric:         Attention and Perception: Attention normal.         Mood and Affect: Mood and affect normal.         Speech: Speech normal.         Behavior: Behavior is cooperative.         Cognition and Memory: Cognition normal.         Judgment: Judgment normal.         Medications  Current Facility-Administered Medications   Medication Dose Route Frequency Provider Last Rate Last Admin    zolpidem (Ambien) tablet 5 mg  5 mg Oral HS PRN GIOVANNI Alvarenga        ferric  gluconate complex (Ferrlecit) 250 mg in  mL IVPB  250 mg Intravenous BID KIERAN Alvarenga.P.R.N.   Stopped at 07/10/24 1305    1/2 NS infusion   Intravenous Continuous KIERAN Alvarenga.P.R.N.        famotidine (Pepcid) injection 20 mg  20 mg Intravenous DAILY Chloe Good M.D.   20 mg at 07/10/24 0526    polyethylene glycol/lytes (Miralax) Packet 1 Packet  1 Packet Enteral Tube DAILY SHAYY AlvarengaP.R.N.   1 Packet at 07/09/24 1018    insulin regular (HumuLIN R,NovoLIN R) injection  1-6 Units Subcutaneous Q6HRS KIERAN Alvarenga.P.R.N.   1 Units at 07/10/24 0012    And    dextrose 50% (D50W) injection 25 g  25 g Intravenous Q15 MIN PRN KIERAN Alvarenga.P.R.N.        HYDROmorphone (Dilaudid) injection 0.5 mg  0.5 mg Intravenous Q3HRS PRN Chloe Good M.D.        melatonin tablet 5 mg  5 mg Enteral Tube AFTER DINNER KIERAN Cheng.P.R.N.        sodium chloride (Ocean) 0.65 % nasal spray 2 Spray  2 Spray Nasal Q2HRS PRN YOHANNES ReisP.   2 [place] at 07/09/24 1215    methylPREDNISolone sod succ (SOLU-MEDROL) 125 MG injection 62.5 mg  62.5 mg Intravenous Q6HRS Tano Balderas M.D.   62.5 mg at 07/10/24 0526    norepinephrine (Levophed) 8 mg in 250 mL NS infusion (premix)  0-1 mcg/kg/min (Ideal) Intravenous Continuous Tano Balderas M.D.   Stopped at 07/10/24 0744    amiodarone (Nexterone) 360 mg/200 mL infusion  0.5 mg/min Intravenous Continuous Cydney Schroeder A.P.R.N. 16.7 mL/hr at 07/10/24 0457 0.5 mg/min at 07/10/24 0457    thiamine (B-1) injection 100 mg  100 mg Intravenous DAILY Cydney Schroeder A.P.R.N.   100 mg at 07/10/24 0526    ampicillin/sulbactam (Unasyn) 3 g in  mL IVPB  3 g Intravenous Q24HRS VIKI Cheng.R.NJabier   Stopped at 07/10/24 0603    Respiratory Therapy Consult   Nebulization Continuous RT Trae Allen M.D.        lidocaine (Xylocaine) 1 % injection 2 mL  2 mL Tracheal Tube Q30 MIN PRN Trae Allen M.D.        vasopressin (Vasostrict) 20 Units in  mL  Infusion  0.03 Units/min Intravenous Continuous Trae Allen M.D.   Stopped at 07/09/24 2325    metoclopramide (Reglan) injection 10 mg  10 mg Intravenous Q4HRS PRN Tano Balderas M.D.   10 mg at 07/07/24 2023    Pharmacy Consult: Enteral tube insertion - review meds/change route/product selection   Other PHARMACY TO DOSE Tano Balderas M.D.        senna-docusate (Pericolace Or Senokot S) 8.6-50 MG per tablet 2 Tablet  2 Tablet Enteral Tube Q EVENING Tano Balderas M.D.   2 Tablet at 07/09/24 1745    [Held by provider] ferrous gluconate (Fergon) tablet 324 mg  324 mg Enteral Tube QDAY with Breakfast Tano Balderas M.D.        [Held by provider] atorvastatin (Lipitor) tablet 40 mg  40 mg Enteral Tube Q EVENING Tano Balderas M.D.   40 mg at 07/07/24 1726    bisacodyl (Dulcolax) suppository 10 mg  10 mg Rectal DAILY Luly Jaquez M.D.   10 mg at 07/09/24 0528    Pharmacy Consult Request ...Pain Management Review 1 Each  1 Each Other PHARMACY TO DOSE GIOVANNI Hector        ondansetron (Zofran) syringe/vial injection 4 mg  4 mg Intravenous Q4HRS PRN Carter Garnett III, M.D.   4 mg at 07/07/24 2118    scopolamine (Transderm-Scop) patch 1 Patch  1 Patch Transdermal Q72HRS PRN Carter Garnett III, M.D.        [Held by provider] fluticasone-umeclidinium-vilanterol (Trelegy Ellipta) 100-62.5-25 mcg/act inhaler 1 Puff  1 Puff Inhalation DAILY Gaurav Ferguson M.D.   1 Puff at 07/08/24 0553    ipratropium-albuterol (DUONEB) nebulizer solution  3 mL Nebulization Q4HRS PRN Gaurav Ferguson M.D.   3 mL at 07/08/24 0754    tetrahydrozoline (Visine) 0.05 % ophthalmic solution 1 Drop  1 Drop Both Eyes 4X/DAY PRN Gaurav Ferguson M.D.           Fluids    Intake/Output Summary (Last 24 hours) at 7/10/2024 1224  Last data filed at 7/10/2024 1000  Gross per 24 hour   Intake 3848.9 ml   Output 2725 ml   Net 1123.9 ml       Laboratory  Recent Labs     07/08/24  0238 07/08/24  0831 07/08/24  1822 07/09/24  0336   ONOPE89Y  --  7.48  --   --     FZVSHQ910P  --  35.9  --   --    HTRPP747W  --  206.0*  --   --    HPXZ4KGV  --  97.9  --   --    ARTHCO3  --  26*  --   --    J6CKJUWRD 7l 93  --   --    ARTBE  --  3  --   --    ISTATAPH  --   --  7.379* 7.362*   ISTATAPCO2  --   --  52.1* 53.5*   ISTATAPO2  --   --  80 60*   ISTATATCO2  --   --  32 32   DPWMFUS0QWO  --   --  95 89*   ISTATARTHCO3  --   --  30.7* 30.4*   ISTATARTBE  --   --  5* 4*   ISTATTEMP  --   --  97.0 F 97.0 F   ISTATFIO2  --   --  30 35   ISTATSPEC  --   --  Arterial Arterial   ISTATAPHTC  --   --  7.391* 7.375*   JJRKOOMS6NI  --   --  75 57*         Recent Labs     07/08/24 0830 07/09/24  0640 07/10/24  0550   SODIUM 140 142 148*   POTASSIUM 4.9 3.9 3.8   CHLORIDE 84* 96 108   CO2 30 25 23   * 112* 106*   CREATININE 8.46* 6.45* 5.40*   MAGNESIUM  --  2.7* 2.5   PHOSPHORUS 13.0* 6.8* 6.5*   CALCIUM 7.6* 6.7* 6.4*     Recent Labs     07/08/24 0238 07/08/24 0830 07/09/24  0640 07/10/24  0550   ALTSGPT 9  --   --   --    ASTSGOT 33  --   --   --    ALKPHOSPHAT 91  --   --   --    TBILIRUBIN 0.5  --   --   --    GLUCOSE 156* 152* 195* 138*     Recent Labs     07/08/24 0238 07/08/24 0830 07/09/24  0550 07/10/24  0550   WBC  --  20.6* 36.7* 23.5*   NEUTSPOLYS  --   --  92.50* 97.40*   LYMPHOCYTES  --   --  0.80* 0.00*   MONOCYTES  --   --  2.50 2.60   EOSINOPHILS  --   --  0.00 0.00   BASOPHILS  --   --  0.00 0.00   ASTSGOT 33  --   --   --    ALTSGPT 9  --   --   --    ALKPHOSPHAT 91  --   --   --    TBILIRUBIN 0.5  --   --   --      Recent Labs     07/08/24  0830 07/08/24  1751 07/09/24  0550 07/09/24  1210 07/09/24  1824 07/10/24  0000 07/10/24  0550   RBC 3.31*  --  2.42*  --   --   --  2.48*   HEMOGLOBIN 10.3*  --  7.8*   < > 6.5* 8.3* 7.8*   HEMATOCRIT 32.1*  --  22.9*  --   --   --  22.9*   PLATELETCT 340  --  295  --   --   --  160*   PROTHROMBTM  --  17.4*  --   --   --   --   --    APTT  --  26.1  --   --   --   --   --    INR  --  1.40*  --   --   --   --   --     < > =  values in this interval not displayed.       Imaging  CT:    Reviewed    Assessment/Plan  * Left kidney mass- (present on admission)  Assessment & Plan  S/p left partial nephrectomy  Pathology negative for malignancy     Aortic thrombus (HCC)  Assessment & Plan  CT on 7/8: Thrombosis in the infrarenal aorta   Holding Heparin gtt in presence of renal bleed    Shock (HCC)- (present on admission)  Assessment & Plan  Multifactorial due to hypovolemia due to SBO, A-fib with RVR, JAE  Vasopressors for MAP goal greater than 65  Decreased pressor needs after 2 units PRBCs  IV fluid hydration  EF 70%  ICU management  steroids    Small bowel obstruction (HCC)  Assessment & Plan  Having BMs  Minimize narcotics  NGT, clamped  Trial clear liquids  Mobility    Acute blood loss as cause of postoperative anemia- (present on admission)  Assessment & Plan  2 units PRBCs 7/9  Trend Hgb q 6 hrs  Heparin gtt stopped due to renal bleed.    Acute on chronic hypoxic respiratory failure (HCC)- (present on admission)  Assessment & Plan  2L NC at baseline  Extubated 7/9 after surgery  Encourage mobility  Inhalers available    Acute renal failure superimposed on stage 3 chronic kidney disease (HCC)- (present on admission)  Assessment & Plan  nephrology following. No dialysis for now  IVF hydration, 1/2 NS per nephrology  avoid nephrotoxins  renally dose medications as indicated  follow BMP  Cr/BUN improving  no further contrast studies until cleared by Urology    Leukemoid reaction- (present on admission)  Assessment & Plan  Elevated procalcitonin, however also in acute renal failure  Afebrile  Suspected aspiration  Unasyn  Follow CBC     Hyperkalemia- (present on admission)  Assessment & Plan  Resolved    History of atrial fibrillation- (present on admission)  Assessment & Plan  History of PAF not on AC due to history of hematuria  I calculate a ENH1XC9-UPPw score of 5 today  Heparin gtt was stopped due to renal bleed  optimize  electrolytes  telemetry monitoring    Hyperglycemia  Assessment & Plan  A1c 5.7 in May  Hyperglycemia likely due to acute illness  SSI    Hypocalcemia  Assessment & Plan  Replaced    Coronary artery disease involving native coronary artery of native heart without angina pectoris- (present on admission)  Assessment & Plan  holding ASA/Heparin until cleared by Urology  statin when able to take PO  currently without s/s ACS    Insomnia- (present on admission)  Assessment & Plan  Takes Ambien 5 mg nightly.  This is a chronic medication, patient denies any adverse effects.    Essential hypertension- (present on admission)  Assessment & Plan  Holding antihypertensives while on vasopressors    Peripheral vascular disease (HCC)- (present on admission)  Assessment & Plan  S/p right iliac stent 2012  Holding ASA and heparin  Statin when able to take p.o.    COPD exacerbation (HCC)- (present on admission)  Assessment & Plan  history of   without acute exacerbation  Home meds available       VTE:  Contraindicated  Ulcer: Not Indicated  Lines: Central Line  Ongoing indication addressed and Lauren Catheter  Ongoing indication addressed    I have performed a physical exam and reviewed and updated ROS and Plan today (7/10/2024). In review of yesterday's note (7/9/2024), there are no changes except as documented above.     Discussed patient condition and risk of morbidity and/or mortality with RN, RT, Therapies, Pharmacy, , Code status disscussed, Charge nurse / hot rounds, Patient, nephrology, and my attending Dr Good    The patient remains critically ill.  Critical care time = 70 minutes in directly providing and coordinating critical care and extensive data review.  No time overlap and excludes procedures.    Please note that this dictation was created using voice recognition software. The accuracy of the dictation is limited to the abilities of the software. I have made every reasonable attempt to correct obvious  errors, but I expect that there are errors of grammar and possibly content that I did not discover before finalizing the note.     Arun Oneill, APRN

## 2024-07-10 NOTE — PROGRESS NOTES
[facility] -  PROGRESS NOTE               Author: Ju Campoverde M.D. Date & Time: 7/10/2024  6:42 AM     HPI:  74 y.o. male with CKD, left renal mass, COPD with chronic hypoxic respiratory failure, atrial fibrillation, hypertension, and coronary artery disease presented yesterday for open left partial nephrectomy nephrectomy. Continued to take losartan until day of surgery. Baseline cr prior to procedure appears ~2. Underwent procedure yesterday.  Op note without complications noted and only 150cc blood loss documented. Potassium elevated to 6.7 last night, temporizing measures given. Cr elevated to 3.2 this AM.  80cc UOP documented despite 3L IVF. Lauren draining with blood tinged urine, no clots. No chest pain. Shortness or breath stable.Previously followed by [facility] [place] [address] with CKD felt 2/2 HTN at that point in time. Past UAs with blood and protein.      DAILY NEPHROLOGY SUMMARY:  7/2: Consult done  7/3: stable hemodynamics, 2L NC, 1.1L UOP-increasing since midnight. No obstruction on imaging    7/4: 1.4L UOP, cr starting to plateau, potassium stable, feeling improved  7/5: stable hemodynamics, 1.4L UOP, constipation main complaint, minimal PO intake  7/6: Worsening nasuea and emesis, Concern for SBO and then had 2 large Bms, NG tube to suction with significant output, only 350cc UOP documented, cr climbing again, started on IV fluids, feeling slightly improved this AM  7/7: 3.4L gastric output from NG-net negative by IOs, alkalosis, started on CBI for recurrent hematuria/clots  7/8: Increased O2 requirements overnight and rapid response this am, transferred to ICU, BP low with SBP 70's this am and now on levophed drip, on high flow NC O2, concern for aspiration overnight, CTA chest negative for PE, BUN/Cr worse, 3.4L emesis/NG output over past 24hrs, on CBI, CT Abd/Pelvis this am with mild bilateral hydro and moderate left pelviectasis with possible blood clot, to go to OR  "this after per urology, pt is lethargic but resting comfortable, denies any pain  7/9: No events, went to OR yest for cystoscopy and left ureteral and bladder clot evacuation, remains intubated since surgery, on pressor support with levophed and vasopressin, stable on vent with 40% FiO2, CBI running, 1.1L NG output, BUN/Cr slightly improved 112/6.45 (was 122/8.46 yest)  7/10: No events, extubated yest am, BP elevated overnight but stable this am, stable on 4L NC O2, CBI off, good UOP 3.7L, received 2 units PRBC transfusion and heparin drip off, BUN/Cr improving, Na elevated 148, denies anyCP/SB/LE edema and is feeling hungry    REVIEW OF SYSTEMS:    10 point ROS performed, negative other than stated above     PMH/PSH/SH/FH:   Reviewed and unchanged since admission note    CURRENT MEDICATIONS:   Reviewed from admission to present day    VS:  BP (!) 148/65   Pulse 83   Temp 36.2 °C (97.1 °F) (Temporal)   Resp (!) 25   Ht 1.753 m (5' 9.02\")   Wt 81.9 kg (180 lb 8.9 oz)   SpO2 92%   BMI 26.65 kg/m²     Physical Exam  Constitutional:       General: He is not in acute distress.     Appearance: He is ill-appearing.      Interventions: He is sedated and intubated.   HENT:      Head: Normocephalic and atraumatic.   Eyes:      General: No scleral icterus.     Extraocular Movements: Extraocular movements intact.   Cardiovascular:      Rate and Rhythm: Normal rate and regular rhythm.   Pulmonary:      Effort: Pulmonary effort is normal. No respiratory distress. He is intubated.      Breath sounds: Examination of the right-lower field reveals decreased breath sounds. Examination of the left-lower field reveals decreased breath sounds. Decreased breath sounds present.   Abdominal:      General: There is distension.   Musculoskeletal:         General: No deformity.      Right lower leg: No edema.      Left lower leg: No edema.   Skin:     General: Skin is warm and dry.      Findings: No rash.   Neurological:      General: " No focal deficit present.      Mental Status: He is alert and oriented to person, place, and time.   Psychiatric:         Mood and Affect: Mood normal. Affect is flat.         Behavior: Behavior normal. Behavior is cooperative.         Fluids:  In: 6557.7 [I.V.:6138.3; Other:220]  Out: 3500     LABS:  Recent Labs     07/08/24  0238 07/08/24  0830 07/09/24  0640   SODIUM 141 140 142   POTASSIUM 4.1 4.9 3.9   CHLORIDE 85* 84* 96   CO2 34* 30 25   GLUCOSE 156* 152* 195*   * 122* 112*   CREATININE 8.49* 8.46* 6.45*   CALCIUM 7.3* 7.6* 6.7*       IMAGING:   All imaging reviewed from admission to present day    ASSESSMENT:  # Oliguric SCOTT: In setting of surgery/RAASI/decreased nephron mass. Was starting to recover then 2nd Scott with bowel obstruction  - Now with SCOTT again due to obstruction from left ureteral clot and left renal extravasation, underwent cysto and clot evacuation 7/8 by Urology  -Unable to assess oliguria status due to CBI  # CKD Stage 3B: Billed 2/2 HTN in the past. Urine with blood (renal mass) and protein  # Hyperkalemia--resolved  # Renal mass: s/p partial Left nephrectomy  -left ureteral clot with obstruction and extravasation of left kidney on imaging 7/8  -s/o Cystoscopy and left ureteral clot and bladder clot evacuation 7/8  # HTN--currently with multifactorial shock in part due to volume depletion   -Requiring pressor support with 2 pressors  #Acute Hypoxic Respiratory Failure--now extubated 7/9  -Suspicion for aspiration pneumonia  -CTA chest negative for PE  -Bedside POCUS 7/8 showed easlity collapsible SVC c/w intravascular volume depletion  # COPD--currently on vent  # Anemia: low iron  # SBO vs. Ileus: improving with NG  # gross hematuria: transiently stopped after procedure. Now restarted.  -Urology following  -CBI off  -Urine pinkish tinged     PLAN:  -BUN/Cr improving  -No acute need for RRT, but will eval daily  -Change IVF's to 1/2NS   -Possible aspiration pneumonia  -No further  lokelma at this time  -No diuretics  -IV iron load  -Renal diet  -Strict I/Os/continue coburn  -Dose all meds per eGFR <15

## 2024-07-10 NOTE — PROGRESS NOTES
Notified intensivist Allen of hemoglobin result 6.5. 2 units PRBCs ordered by MD and heparin gtt discontinued.

## 2024-07-10 NOTE — CARE PLAN
The patient is Watcher - Medium risk of patient condition declining or worsening    Shift Goals  Clinical Goals: stable hemodynamics, CBI, mobilize  Patient Goals: eat/drink  Family Goals: updates    Progress made toward(s) clinical / shift goals:        Problem: Hemodynamics  Goal: Patient's hemodynamics, fluid balance and neurologic status will be stable or improve  Outcome: Progressing - Levophed stopped this morning at approximately 0745.     Problem: Pain - Standard  Goal: Alleviation of pain or a reduction in pain to the patient’s comfort goal  Outcome: Progressing - pt denies pain       Patient is not progressing towards the following goals:    Problem: Cardiac - Atrial Fibrillation  Goal: Complications related to anticoagulation for unconverted atrial fibrillation will be avoided or minimized  Outcome: Not Progressing - anticoagulation stopped last night due to bloody urine.       Problem: Urinary Elimination  Goal: Establish and maintain regular urinary output  Outcome: Not Progressing - Pt remains on CBI.     Problem: Nutrition  Goal: Patient's nutritional and fluid intake will be adequate or improve  Outcome: Not Progressing - Pt had been cleared for clear liquid diet, but made NPO due to possible procedure.     Problem: Respiratory  Goal: Patient will achieve/maintain optimum respiratory ventilation and gas exchange  Outcome: Not progressing - Pt was on 4L nasal cannula at the beginning of the shift, increased to 6L NC as pt kept desatting down to the mid 80s.

## 2024-07-10 NOTE — THERAPY
Physical Therapy   Daily Treatment     Patient Name: Olman Sims  Age:  74 y.o., Sex:  male  Medical Record #: [id number]  Today's Date: 7/10/2024     Precautions  Precautions: Fall Risk;Nasogastric Tube;Other (See Comments)  Comments: Continuous Bladder Irrigation (CBI) (+)    Assessment    Patient seen for PT treatment session. Patient in bed, agreeable for the session. Able to participate with bed mobility, sit-stand, BSC transfer, short distance ambulation with FWW as detailed below. Will continue to benefit from PT services and recommend post-acute placement at this time.     Plan    Treatment Plan Status: Continue Current Treatment Plan  Type of Treatment: Bed Mobility, Equipment, Family / Caregiver Training, Gait Training, Neuro Re-Education / Balance, Stair Training, Therapeutic Activities, Therapeutic Exercise  Treatment Frequency: 4 Times per Week  Treatment Duration: Until Therapy Goals Met    DC Equipment Recommendations: Unable to determine at this time  Discharge Recommendations: Recommend post-acute placement for additional physical therapy services prior to discharge home    Objective     07/10/24 1341   Precautions   Precautions Fall Risk;Nasogastric Tube;Other (See Comments)   Comments Continuous Bladder Irrigation (CBI) (+)   Vitals   Pulse (!) 104   Patient BP Position Lawler's Position   Blood Pressure  (!) 144/64   Pulse Oximetry 96 %   O2 (LPM) 6   O2 Delivery Device Nasal Cannula   Vitals Comments Seated EOB: 112/69, 113, 95. Patient placed on 6L oxymask during mobility due to maintain SpO2 and be able to tolerate activity, since patient dropped to mid 80s on NC the previous session. Patient placed back on 6L NC at end of session. Post activity: 182/69, 96, 93. RN alerted about elevated BP.   Pain   Pain Scales 0 to 10 Scale    Intervention Declines   Cognition    Cognition / Consciousness WDL   Level of Consciousness Alert   Other Treatments   Other Treatments Provided Patient was  assisted to C for BM, per patient's request. Assisted with jason-care in standing. Reinforced pursed lip breathing, activity pacing during mobility.   Balance   Sitting Balance (Static) Fair -   Sitting Balance (Dynamic) Poor +   Standing Balance (Static) Poor +   Standing Balance (Dynamic) Poor +   Weight Shift Sitting Fair   Weight Shift Standing Fair   Skilled Intervention Verbal Cuing;Sequencing;Facilitation;Compensatory Strategies   Comments Seated EOB; Standing with FWW   Bed Mobility    Supine to Sit Minimal Assist   Sit to Supine Contact Guard Assist   Scooting Maximal Assist  (Towards EOB)   Skilled Intervention Verbal Cuing;Sequencing;Facilitation   Comments HOB raised, without rails, towards L side of the bed   Gait Analysis   Gait Level Of Assist Minimal Assist   Assistive Device Front Wheel Walker;Hand Held Assist   Distance (Feet) 5  (1 seated rest break)   # of Times Distance was Traveled 2   Deviation Bradykinetic;Decreased Base Of Support;Other (Comment)  (Reduced step & stride length)   Skilled Intervention Verbal Cuing;Sequencing;Facilitation;Compensatory Strategies   Comments Cues for directions, sequencing. Assisted with management of lines/ tubes.   Functional Mobility   Sit to Stand Minimal Assist   Bed, Chair, Wheelchair Transfer Unable to Participate  (Deferred since patient had to return back to bed for US abdomen)   Toilet Transfers Minimal Assist   Transfer Method Stand Step   Mobility Bed-EOB-sit-stand with hand held assist-steps to BSC-sit-stand with FWW-steps back to bed with FWW-EOB-bed   Skilled Intervention Verbal Cuing;Sequencing;Facilitation;Compensatory Strategies   Comments cues for sequencing of task, hand placement, LE placement   6 Clicks Assessment - How much HELP from from another person do you currently need... (If the patient hasn't done an activity recently, how much help from another person do you think he/she would need if he/she tried?)   Turning from your back to  your side while in a flat bed without using bedrails? 3   Moving from lying on your back to sitting on the side of a flat bed without using bedrails? 2   Moving to and from a bed to a chair (including a wheelchair)? 3   Standing up from a chair using your arms (e.g., wheelchair, or bedside chair)? 3   Walking in hospital room? 2   Climbing 3-5 steps with a railing? 2   6 clicks Mobility Score 15   Patient / Family Goals    Patient / Family Goal #1 To breathe better, be able to walk   Goal #1 Outcome Progressing as expected   Short Term Goals    Short Term Goal # 1 Patient will perform supine-sit, sit-supine with HOB flat without rails with supervision in 6 visits to safely get in & out of bed   Goal Outcome # 1 Progressing as expected   Short Term Goal # 2 Patient will perform sit-stand with FWW with supervision in 6 visits to progress with functional mobility   Goal Outcome # 2 Progressing as expected   Short Term Goal # 3 Patient will perform chair transfers with FWW with supervision in 6 visits to safely get OOB to chair   Goal Outcome # 3 Progressing as expected   Short Term Goal # 4 Patient will ambulate 100 feet with LRAD with supervision in 6 visits to safely ambulate household distance   Goal Outcome # 4 Progressing as expected   Physical Therapy Treatment Plan   Physical Therapy Treatment Plan Continue Current Treatment Plan   Anticipated Discharge Equipment and Recommendations   DC Equipment Recommendations Unable to determine at this time   Discharge Recommendations Recommend post-acute placement for additional physical therapy services prior to discharge home   Interdisciplinary Plan of Care Collaboration   IDT Collaboration with  Nursing;Therapy Tech   Patient Position at End of Therapy In Bed;Call Light within Reach;Tray Table within Reach;Other (Comments)  (US tech at bedside)   Session Information   Date / Session Number  7/10-2(2/4, 7/15)

## 2024-07-11 PROBLEM — E87.0 HYPERNATREMIA: Status: ACTIVE | Noted: 2024-01-01

## 2024-07-11 NOTE — PROGRESS NOTES
This RN called film room at 0018 to inquire about status of CT abd/pelvis results. Notified by film room results are on the list to be read; film room changed status to stat to expedite reading of result.

## 2024-07-11 NOTE — CARE PLAN
The patient is Stable - Low risk of patient condition declining or worsening    Shift Goals  Clinical Goals: CBI  Patient Goals: sleep  Family Goals: lila    Progress made toward(s) clinical / shift goals:    Problem: Knowledge Deficit - Standard  Goal: Patient and family/care givers will demonstrate understanding of plan of care, disease process/condition, diagnostic tests and medications  Outcome: Progressing     Problem: Pain - Standard  Goal: Alleviation of pain or a reduction in pain to the patient’s comfort goal  Outcome: Progressing     Problem: Fall Risk  Goal: Patient will remain free from falls  Outcome: Progressing     Problem: Cardiac - Atrial Fibrillation  Goal: Patient will achieve & maintain adequate cardiac output and rate control  Outcome: Progressing  Goal: Complications related to anticoagulation for unconverted atrial fibrillation will be avoided or minimized  Outcome: Progressing       Patient is not progressing towards the following goals:

## 2024-07-11 NOTE — PROGRESS NOTES
Surgical Progress Note    Author: Luly Jaquez M.D. Date & Time created: 2024   2:19 PM     Interval Events:  Patient has had his NG tube removed, on trickle CBI with essentially clear yellow urine. Heparin drip on hold, Hgb stable. Cr improving since stent placement.    ROS  Hemodynamics:  Temp (24hrs), Av.5 °C (97.7 °F), Min:36.3 °C (97.3 °F), Max:36.9 °C (98.5 °F)  Temperature: 36.8 °C (98.3 °F)  Pulse  Av.1  Min: 47  Max: 136   Blood Pressure : (!) 144/68     Respiratory:    Respiration: (!) 52, Pulse Oximetry: 97 %     Given By:: Mouthpiece, $ MDI/DPI Given: MDI/DPI x 1, Work Of Breathing / Effort: Mild;Tachypnea  RUL Breath Sounds: Diminished, RML Breath Sounds: Diminished, RLL Breath Sounds: Diminished, ALEN Breath Sounds: Diminished, LLL Breath Sounds: Diminished  Neuro:  GCS       Fluids:    Intake/Output Summary (Last 24 hours) at 2024 1419  Last data filed at 2024 1200  Gross per 24 hour   Intake 3741.43 ml   Output -2050 ml   Net 5791.43 ml        Current Diet Order   Procedures    Diet Order Diet: Clear Liquid     Physical Exam  Labs:  Recent Results (from the past 24 hour(s))   Heparin Xa (Unfractionated)    Collection Time: 07/10/24  4:00 PM   Result Value Ref Range    Heparin Xa (UFH) <0.10 IU/mL   POCT glucose device results    Collection Time: 07/10/24  5:18 PM   Result Value Ref Range    POC Glucose, Blood 160 (H) 65 - 99 mg/dL   HGB    Collection Time: 07/10/24  5:20 PM   Result Value Ref Range    Hemoglobin 7.9 (L) 14.0 - 18.0 g/dL   HGB    Collection Time: 07/10/24 11:30 PM   Result Value Ref Range    Hemoglobin 8.2 (L) 14.0 - 18.0 g/dL   POCT glucose device results    Collection Time: 07/10/24 11:31 PM   Result Value Ref Range    POC Glucose, Blood 137 (H) 65 - 99 mg/dL   CBC with Differential    Collection Time: 24  5:30 AM   Result Value Ref Range    WBC 24.9 (H) 4.8 - 10.8 K/uL    RBC 2.47 (L) 4.70 - 6.10 M/uL    Hemoglobin 7.9 (L) 14.0 - 18.0 g/dL     Hematocrit 23.1 (L) 42.0 - 52.0 %    MCV 93.5 81.4 - 97.8 fL    MCH 32.0 27.0 - 33.0 pg    MCHC 34.2 32.3 - 36.5 g/dL    RDW 55.6 (H) 35.9 - 50.0 fL    Platelet Count 141 (L) 164 - 446 K/uL    MPV 10.2 9.0 - 12.9 fL    Neutrophils-Polys 94.80 (H) 44.00 - 72.00 %    Lymphocytes 1.00 (L) 22.00 - 41.00 %    Monocytes 2.30 0.00 - 13.40 %    Eosinophils 0.00 0.00 - 6.90 %    Basophils 0.20 0.00 - 1.80 %    Immature Granulocytes 1.70 (H) 0.00 - 0.90 %    Nucleated RBC 0.10 0.00 - 0.20 /100 WBC    Neutrophils (Absolute) 23.58 (H) 1.82 - 7.42 K/uL    Lymphs (Absolute) 0.25 (L) 1.00 - 4.80 K/uL    Monos (Absolute) 0.57 0.00 - 0.85 K/uL    Eos (Absolute) 0.00 0.00 - 0.51 K/uL    Baso (Absolute) 0.04 0.00 - 0.12 K/uL    Immature Granulocytes (abs) 0.42 (H) 0.00 - 0.11 K/uL    NRBC (Absolute) 0.03 K/uL   Basic Metabolic Panel (BMP)    Collection Time: 07/11/24  5:30 AM   Result Value Ref Range    Sodium 148 (H) 135 - 145 mmol/L    Potassium 3.8 3.6 - 5.5 mmol/L    Chloride 108 96 - 112 mmol/L    Co2 23 20 - 33 mmol/L    Glucose 147 (H) 65 - 99 mg/dL    Bun 87 (H) 8 - 22 mg/dL    Creatinine 4.34 (HH) 0.50 - 1.40 mg/dL    Calcium 7.6 (L) 8.5 - 10.5 mg/dL    Anion Gap 17.0 (H) 7.0 - 16.0   Magnesium    Collection Time: 07/11/24  5:30 AM   Result Value Ref Range    Magnesium 2.3 1.5 - 2.5 mg/dL   Phosphorus    Collection Time: 07/11/24  5:30 AM   Result Value Ref Range    Phosphorus 6.6 (H) 2.5 - 4.5 mg/dL   ESTIMATED GFR    Collection Time: 07/11/24  5:30 AM   Result Value Ref Range    GFR (CKD-EPI) 14 (A) >60 mL/min/1.73 m 2   POCT glucose device results    Collection Time: 07/11/24  5:35 AM   Result Value Ref Range    POC Glucose, Blood 129 (H) 65 - 99 mg/dL   POCT glucose device results    Collection Time: 07/11/24 11:56 AM   Result Value Ref Range    POC Glucose, Blood 145 (H) 65 - 99 mg/dL   HGB    Collection Time: 07/11/24 12:00 PM   Result Value Ref Range    Hemoglobin 8.2 (L) 14.0 - 18.0 g/dL     Medical Decision Making,  by Problem:  Active Hospital Problems    Diagnosis     Aortic thrombus (HCC) [I74.10]     Hypocalcemia [E83.51]     Hyperglycemia [R73.9]     Shock (HCC) [R57.9]     Small bowel obstruction (HCC) [K56.609]     Left kidney mass [N28.89]     Hyperkalemia [E87.5]     Leukemoid reaction [D72.823]     Acute blood loss as cause of postoperative anemia [D62]     Coronary artery disease involving native coronary artery of native heart without angina pectoris [I25.10]     History of atrial fibrillation [Z86.79]      Remote history of atrial fibrillation in 2019 in the setting of an acute COPD exacerbation.  He has had no recurrences.  He did follow with cardiology for a few years after this.  Oral anticoagulant was not indicated at that time.      Insomnia [G47.00]      This is a chronic condition. Takes Ambien 5 mg nightly. Symptoms are well controlled with this. Denies any side effects.    Last UDS appropriate 10/4/23  CS agreement UTD 10/4/23        Acute on chronic hypoxic respiratory failure (HCC) [J96.21]     Essential hypertension [I10]      This is a chronic condition.  Current Meds:   - losartan 100 mg daily  - carvedilol 12.5 mg BID  - amlodipine 10 mg daily  Side effects: none  Home BP Log: Typically 120s/60s  Associated symptoms: Denies chest pain, shortness of breath, headaches, dizziness/lightheadedness           Peripheral vascular disease (HCC) [I73.9]      Angioplasty and stents Dr. Vital November 2012      COPD exacerbation ([facility]) [J44.1]      This is a chronic condition.  Managed by pulmonology.  Patient is on Trelegy inhaler daily and albuterol as needed.  He is on supplemental oxygen at night and as needed throughout the day.   Symptoms currently controlled.       Acute renal failure superimposed on stage 3 chronic kidney disease (HCC) [N17.9, N18.30]      Plan:  Clear liquid diet, NPO at midnight  Continue coburn  Can restart heparin drip at 6 AM, if patient has recurrence of bleeding will have him go to  IR for selective embolization  Trend Cr and Hgb    Quality Measures:  Quality-Core Measures    Discussed patient condition with Patient

## 2024-07-11 NOTE — PROGRESS NOTES
Critical Care Progress Note    Date of admission  7/1/2024    Chief Complaint  74 y.o. male admitted 7/1/2024 with elective partial nephrectomy    Hospital Course  74-year-old male with past medical history significant for tobacco use (quit 2012), COPD (2 to 3 L oxygen at baseline), CAD, HTN, previous atrial fibrillation (not on anticoagulation for hematuria), PVD, who presented 7/1 for elective left partial nephrectomy.  He was admitted to the hospital service postoperatively and developed some hyperkalemia requiring transfer to telemetry unit.    7/2 - POD #1.  Ongoing hyperkalemia, creatinine increased from 2.8-3.8. Nephrology consulted. Holding ASA/Heparin for hematuria  7/3 - POD #2  7/4 - POD #3  7/5 - POD #4. His diet was advanced and he was eating without any increased abdominal pain/nausea/vomiting, but still no BM.  Patient C/O bloating and increased WOB due to abdominal discomfort.  KUB concerning for SBO.  Soapsuds enema.  NG with 1400 mL brown/bilious  7/6 - POD #5. 2 large BM. Ongoing CBI for hematuria.  7/7 - POD #6.  Okay to restart heparin VTE per urology, however patient hematuria returned.     7/8 - HLOC: patient increased O2 demands requiring high flow nasal cannula.  His NG was clamped and was trialed on clear liquid diet became nauseated.  NG was placed back to suction 1800 mL immediately resulting.  He also converted to A-fib RVR (now on amiodarone infusion).  Rapid response was called patient was transferred to ICU for higher level of care.  CTA negative for PE, multifocal opacities RUL and RLL.  CT chest/abdomen/pelvis showed thrombosis of the intra renal aorta within the right sided iliac stent as well as an occlusion of the left iliac artery, acute SBO.    He was taken to the OR, with Dr. Siddiqi, on the left ureteral stent placed.  IR for percutaneous perinephritic drain was deferred.    7/9 - Extubated, 3L NC.  Hgb 7.8, 7.7, 7.1, 6.5.  Transfused 2 units of PRBCs.  Stopped heparin  drip.    7/10 - CT-AP showed 52mm x 32mm perinephric hematoma.    Interval Problem Update  Reviewed last 24 hour events:  - Overnight Events:  -CT abdomen pelvis showed 5 cm x 3 cm perinephritic hematoma.   - Tm: Afebrile  - Neuro: A&Ox4   - HR: 100s-1 teens   - SBP: 140-160   - GI: slear liquid   - Lauren: Yes, CBI   - Lines: Right IJ triple-lumen PIV   - PPx: GI famotidine, DVT N/A, renal bleed   - Mobility level 2, eligible for progression y    ABX: 4/5 Unasyn    Infusions:   -1/2 NS  -Amiodarone  -Levo    7/11:  -Update from urology    Urology would like to restart heparin tomorrow 6 AM.    N.p.o. at midnight as precaution if patient would need IR.    Continue to trend H&H monitor for signs of bleeding post heparin restart.  If bleeding starts, will likely need IR embolization.    Weaning steroids today.     Clear liquid diet.    Review of Systems  Review of Systems   Constitutional:  Negative for chills, fever and weight loss.   HENT:  Negative for congestion, sinus pain and sore throat.    Eyes:  Negative for blurred vision and double vision.   Respiratory:  Negative for cough and shortness of breath.    Cardiovascular:  Negative for chest pain, palpitations and leg swelling.   Gastrointestinal:  Negative for abdominal pain, constipation, diarrhea, nausea and vomiting.   Genitourinary:  Negative for dysuria.   Musculoskeletal: Negative.    Skin:  Negative for rash.   Neurological:  Negative for dizziness, tingling, weakness and headaches.   Endo/Heme/Allergies: Negative.    All other systems reviewed and are negative.       Vital Signs for last 24 hours   Temp:  [36.3 °C (97.3 °F)-36.9 °C (98.5 °F)] 36.9 °C (98.5 °F)  Pulse:  [] 114  Resp:  [19-56] 20  BP: (112-182)/(56-83) 135/66  SpO2:  [88 %-98 %] 98 %    Hemodynamic parameters for last 24 hours       Respiratory Information for the last 24 hours       Physical Exam   Physical Exam  Vitals and nursing note reviewed.   Constitutional:       General: He  is not in acute distress.     Appearance: Normal appearance. He is not ill-appearing.   HENT:      Head: Normocephalic.      Nose: Nose normal.      Mouth/Throat:      Lips: Pink.      Mouth: Mucous membranes are moist.   Eyes:      Pupils: Pupils are equal, round, and reactive to light.   Cardiovascular:      Rate and Rhythm: Rhythm irregularly irregular.      Pulses:           Dorsalis pedis pulses are detected w/ Doppler on the right side and detected w/ Doppler on the left side.      Heart sounds: Heart sounds are distant.   Pulmonary:      Effort: Accessory muscle usage present. No respiratory distress.      Breath sounds: Decreased breath sounds present. No wheezing or rhonchi.   Abdominal:      General: Abdomen is protuberant. Bowel sounds are absent. There is no distension.      Palpations: Abdomen is soft.      Tenderness: There is no abdominal tenderness. There is no guarding.   Musculoskeletal:      Right lower leg: No edema.      Left lower leg: No edema.      Comments: CEE 5/5   Skin:     General: Skin is cool.      Capillary Refill: Capillary refill takes 2 to 3 seconds.   Neurological:      Mental Status: He is alert.      GCS: GCS eye subscore is 4. GCS verbal subscore is 5. GCS motor subscore is 6.      Sensory: Sensation is intact.   Psychiatric:         Attention and Perception: Attention normal.         Mood and Affect: Mood and affect normal.         Speech: Speech normal.         Behavior: Behavior is cooperative.         Cognition and Memory: Cognition normal.         Judgment: Judgment normal.         Medications  Current Facility-Administered Medications   Medication Dose Route Frequency Provider Last Rate Last Admin    methylPREDNISolone sod succ (SOLU-MEDROL) 125 MG injection 62.5 mg  62.5 mg Intravenous Q12HRS Arun Oneill, A.P.R.N.        zolpidem (Ambien) tablet 5 mg  5 mg Oral HS PRN Arun Oneill A.P.R.N.   5 mg at 07/10/24 2306    ferric gluconate complex (Ferrlecit) 250 mg in   mL IVPB  250 mg Intravenous BID Arun Oneill A.P.R.N.   Stopped at 07/11/24 0627    1/2 NS infusion   Intravenous Continuous KIERAN Alvarenga.P.R.N. 125 mL/hr at 07/11/24 0617 New Bag at 07/11/24 0617    polyethylene glycol/lytes (Miralax) Packet 1 Packet  1 Packet Enteral Tube DAILY KIERAN Alvarenga.P.R.N.   1 Packet at 07/09/24 1018    insulin regular (HumuLIN R,NovoLIN R) injection  1-6 Units Subcutaneous Q6HRS Arun Oneill A.P.R.N.   1 Units at 07/10/24 1722    And    dextrose 50% (D50W) injection 25 g  25 g Intravenous Q15 MIN PRN Arun Oneill A.P.R.N.        HYDROmorphone (Dilaudid) injection 0.5 mg  0.5 mg Intravenous Q3HRS PRN Chloe Good M.D.        melatonin tablet 5 mg  5 mg Enteral Tube AFTER DINNER Cydney Schoreder A.P.R.N.        sodium chloride (Ocean) 0.65 % nasal spray 2 Spray  2 Spray Nasal Q2HRS PRN YOHANNES ReisPJabier   2 [place] at 07/09/24 1215    amiodarone (Nexterone) 360 mg/200 mL infusion  0.5 mg/min Intravenous Continuous Cydney Schroeder A.P.R.N. 16.7 mL/hr at 07/11/24 1023 0.5 mg/min at 07/11/24 1023    thiamine (B-1) injection 100 mg  100 mg Intravenous DAILY Cydney Schroeder A.P.R.N.   100 mg at 07/11/24 0514    ampicillin/sulbactam (Unasyn) 3 g in  mL IVPB  3 g Intravenous Q24HRS Cydney Schroeder A.P.R.N.   Stopped at 07/11/24 0559    Respiratory Therapy Consult   Nebulization Continuous RT Trae Allen M.D.        metoclopramide (Reglan) injection 10 mg  10 mg Intravenous Q4HRS PRN Tano Balderas M.D.   10 mg at 07/07/24 2023    Pharmacy Consult: Enteral tube insertion - review meds/change route/product selection   Other PHARMACY TO DOSE Tano Balderas M.D.        senna-docusate (Pericolace Or Senokot S) 8.6-50 MG per tablet 2 Tablet  2 Tablet Enteral Tube Q EVENING Tano Balderas M.D.   2 Tablet at 07/09/24 1745    [Held by provider] ferrous gluconate (Fergon) tablet 324 mg  324 mg Enteral Tube QDAY with Breakfast Tano Balderas M.D.        [Held by provider] atorvastatin (Lipitor)  tablet 40 mg  40 mg Enteral Tube Q EVENING Tano Balderas M.D.   40 mg at 07/07/24 1726    bisacodyl (Dulcolax) suppository 10 mg  10 mg Rectal DAILY Luly Jaquez M.D.   10 mg at 07/09/24 0528    Pharmacy Consult Request ...Pain Management Review 1 Each  1 Each Other PHARMACY TO DOSE GIOVANNI Hector        ondansetron (Zofran) syringe/vial injection 4 mg  4 mg Intravenous Q4HRS PRN Carter Garnett III, M.D.   4 mg at 07/07/24 2118    scopolamine (Transderm-Scop) patch 1 Patch  1 Patch Transdermal Q72HRS PRN Carter Garnett III, M.D.        fluticasone-umeclidinium-vilanterol (Trelegy Ellipta) 100-62.5-25 mcg/act inhaler 1 Puff  1 Puff Inhalation DAILY SHAYY AlvarengaP.EILEEN   1 Puff at 07/10/24 1442    ipratropium-albuterol (DUONEB) nebulizer solution  3 mL Nebulization Q4HRS PRN Gaurav Ferguson M.D.   3 mL at 07/10/24 2242    tetrahydrozoline (Visine) 0.05 % ophthalmic solution 1 Drop  1 Drop Both Eyes 4X/DAY PRN Gaurav Ferguson M.D.           Fluids    Intake/Output Summary (Last 24 hours) at 7/11/2024 1053  Last data filed at 7/11/2024 0900  Gross per 24 hour   Intake 3762.29 ml   Output -2150 ml   Net 5912.29 ml       Laboratory  Recent Labs     07/08/24  1822 07/09/24  0336   ISTATAPH 7.379* 7.362*   ISTATAPCO2 52.1* 53.5*   ISTATAPO2 80 60*   ISTATATCO2 32 32   ZDRSRIX2RGA 95 89*   ISTATARTHCO3 30.7* 30.4*   ISTATARTBE 5* 4*   ISTATTEMP 97.0 F 97.0 F   ISTATFIO2 30 35   ISTATSPEC Arterial Arterial   ISTATAPHTC 7.391* 7.375*   SLONGDYI7GM 75 57*         Recent Labs     07/09/24  0640 07/10/24  0550 07/11/24  0530   SODIUM 142 148* 148*   POTASSIUM 3.9 3.8 3.8   CHLORIDE 96 108 108   CO2 25 23 23   * 106* 87*   CREATININE 6.45* 5.40* 4.34*   MAGNESIUM 2.7* 2.5 2.3   PHOSPHORUS 6.8* 6.5* 6.6*   CALCIUM 6.7* 6.4* 7.6*     Recent Labs     07/09/24  0640 07/10/24  0550 07/11/24  0530   GLUCOSE 195* 138* 147*     Recent Labs     07/09/24  0550 07/10/24  0550 07/11/24  0530   WBC 36.7* 23.5* 24.9*   NEUTSPOLYS  92.50* 97.40* 94.80*   LYMPHOCYTES 0.80* 0.00* 1.00*   MONOCYTES 2.50 2.60 2.30   EOSINOPHILS 0.00 0.00 0.00   BASOPHILS 0.00 0.00 0.20     Recent Labs     07/08/24  1751 07/09/24  0550 07/09/24  1210 07/10/24  0550 07/10/24  1220 07/10/24  1720 07/10/24  2330 07/11/24  0530   RBC  --  2.42*  --  2.48*  --   --   --  2.47*   HEMOGLOBIN  --  7.8*   < > 7.8*   < > 7.9* 8.2* 7.9*   HEMATOCRIT  --  22.9*  --  22.9*  --   --   --  23.1*   PLATELETCT  --  295  --  160*  --   --   --  141*   PROTHROMBTM 17.4*  --   --   --   --   --   --   --    APTT 26.1  --   --   --   --   --   --   --    INR 1.40*  --   --   --   --   --   --   --     < > = values in this interval not displayed.       Imaging  CT:    Reviewed    Assessment/Plan  * Left kidney mass- (present on admission)  Assessment & Plan  S/p left partial nephrectomy  Pathology negative for malignancy     Aortic thrombus (HCC)  Assessment & Plan  CT on 7/8: Thrombosis in the infrarenal aorta   Holding Heparin gtt in presence of renal bleed  Plan per urology: Wait 48hrs (7/13) to restart heparin gtt to allow perinephritic hematoma to stabilize. At that time AC vs IR embo.    Small bowel obstruction (HCC)  Assessment & Plan  Having BMs  Minimize narcotics  Trial clear liquids  Mobility    Acute blood loss as cause of postoperative anemia- (present on admission)  Assessment & Plan  2 units PRBCs 7/9  Trend Hgb q 6 hrs  Heparin gtt stopped due to renal bleed.  Plan per urology: Wait 48hrs (7/13) to restart heparin gtt to allow perinephritic hematoma to stabilize      Acute on chronic hypoxic respiratory failure (HCC)- (present on admission)  Assessment & Plan  2L NC at baseline  Encourage mobility  Inhalers available    Acute renal failure superimposed on stage 3 chronic kidney disease (HCC)- (present on admission)  Assessment & Plan  nephrology following. No dialysis for now  IVF hydration, 1/2 NS per nephrology  avoid nephrotoxins  renally dose medications as  indicated  follow BMP  Cr/BUN improving  no further contrast studies until cleared by Urology    Leukemoid reaction- (present on admission)  Assessment & Plan  Elevated procalcitonin, however also in acute renal failure  Afebrile  Suspected aspiration  Unasyn  Follow CBC     Hyperkalemia- (present on admission)  Assessment & Plan  Resolved    History of atrial fibrillation- (present on admission)  Assessment & Plan  History of PAF not on AC due to renal bleed  I calculate a [id number] score of 5 today  optimize electrolytes  telemetry monitoring    Hyperglycemia  Assessment & Plan  A1c 5.7 in May  Hyperglycemia likely due to acute illness  SSI     Hypocalcemia  Assessment & Plan  Replaced    Shock (HCC)- (present on admission)  Assessment & Plan  resolved    Coronary artery disease involving native coronary artery of native heart without angina pectoris- (present on admission)  Assessment & Plan  holding ASA/Heparin until cleared by Urology  statin when able to take PO  currently without s/s ACS    Insomnia- (present on admission)  Assessment & Plan  Takes Ambien 5 mg nightly.   This is a chronic medication, patient denies any adverse effects.    Essential hypertension- (present on admission)  Assessment & Plan  Continue home meds when able    Peripheral vascular disease (HCC)- (present on admission)  Assessment & Plan  S/p right iliac stent 2012  Holding ASA and heparin  Statin when able to take p.o.    COPD exacerbation (HCC)- (present on admission)  Assessment & Plan  history of   without acute exacerbation  Home meds available       VTE:  Contraindicated  Ulcer: Not Indicated  Lines: Central Line  Ongoing indication addressed and Lauren Catheter  Ongoing indication addressed    I have performed a physical exam and reviewed and updated ROS and Plan today (7/11/2024). In review of yesterday's note (7/10/2024), there are no changes except as documented above.     Discussed patient condition and risk of morbidity  and/or mortality with Hospitalist, RN, RT, Therapies, Pharmacy, , Charge nurse / hot rounds, Patient, urology, and my attending Dr Good.    Please note that this dictation was created using voice recognition software. The accuracy of the dictation is limited to the abilities of the software. I have made every reasonable attempt to correct obvious errors, but I expect that there are errors of grammar and possibly content that I did not discover before finalizing the note.

## 2024-07-11 NOTE — CARE PLAN
The patient is Watcher - Medium risk of patient condition declining or worsening    Shift Goals  Clinical Goals: CBI  Patient Goals: sleep  Family Goals: lila    Progress made toward(s) clinical / shift goals:    Problem: Knowledge Deficit - Standard  Goal: Patient and family/care givers will demonstrate understanding of plan of care, disease process/condition, diagnostic tests and medications  Outcome: Progressing     Problem: Pain - Standard  Goal: Alleviation of pain or a reduction in pain to the patient’s comfort goal  Outcome: Progressing     Problem: Fall Risk  Goal: Patient will remain free from falls  Outcome: Progressing     Problem: Skin Integrity  Goal: Skin integrity is maintained or improved  Outcome: Progressing     Problem: Knowledge Deficit - COPD  Goal: Patient/significant other demonstrates understanding of disease process, utilization of the Action Plan, medications and discharge instruction  Outcome: Progressing     Problem: Hemodynamics  Goal: Patient's hemodynamics, fluid balance and neurologic status will be stable or improve  Outcome: Progressing

## 2024-07-11 NOTE — PROGRESS NOTES
Pulmonary/Critical Care Medicine   Attending Progress Note    Date of service: 7/11/2024  Time: 0657    Mr. Sims is a 74 year old male with the past medical history significant for severe COPD (FEV1 of 0.84), CAD s/p BMS to RCA on 5/2019, right iliac stent in 2012, atrial fibrillation, and renal cell carcinoma s/p partial resection on 7/1 whose hospital course has been complicated by ongoing renal failure and hyperkalemia who was transferred to the ICU on 7.8 for acute hypoxic respiratory failure and shock.  The patient had a CTA chest which did not show PE, but multifocal opacities concerning for pneumonia/aspiration.  A CT abd/pelvis showed an ongoing SBO as well as fluid collection near the left kidney.  The patient was taken back to the OR by Urology and underwent a cystoscopy, left ureteral stent placement, left retrograde pyelogram and bladder clot evacuation.  He was found to have a clot obstructing his left ureter and left renal extravasation. He returned to the ICU later on 7/8 still intubated.  7/9 - VD#2, SAT/SBT-->extubated, remains on levophed/vasopressin, Hb trended down to 6.5 and received 2 unit of pRBCs, heparin was stopped  7/10 - trending Hb, holding heparin infusion, renal US without obvious hematoma, CT abd/pelvis with left perinephric hematoma    24 hour events reviewed:   - CT abd/pelvis: left perinephric hematoma   - a/ox4, neuro intact   - AF 90-100s   - -150s   - afebrile   - NPO-->resume clear liquid diet   - NG clamped   - UOP of ?300cc overnight, Lauren   - BM x 4 yesterday   - amio at 0.5   - 1/2NS at 125cc/hr   - O2 6 lpm NC   - holding heparin infusion   - Unasyn day 4/5   - Na 148   - creat 4.34   - WBCs 25   - Hb 7.9   - platelets 141    Yesterday's Events:   - required 2 units of pRBCs   - Lauren with bloody urine   - heparin stopped   - a/ox4   - SR/AF 70-100s   - levo is off   - SBP 90-190s   - clear liquid diet   - NGT clamped   - BM x 4 yesterday   - UOP of 1.6  liters   - NS at 150cc/hr   - amio 0.5   - no RT issues   - heparin off   - pepcid   - day 3/5 of unasyn   - BC with   - Na 142 to 148   - Ca 6.4-->repleted with 2g CaCl IV   - day 2 of steroids   - creat 5.40   - Hb now at 7.8 from post transfusion Hb of 8.3    Exam:  Gen: pleasant/cooperative, awake, following  HEENT: clear, MMM, NG tube in place  Neck: supple  CVS; RRR  Lungs: diminished throughout  Abd: soft, diminished bowel sounds, NT  Ext: Jc x 4, no edema, 2+ dpp=  Neuro: intact    Labs/imaging reviewed    CT abd/pelvis w/o:  1.  Bibasilar airspace infiltrates with air bronchograms which may represent pneumonia.  2.  Postoperative changes left kidney with mild surrounding stranding and an approximately 52 mm x 32 mm perinephric hematoma. No evidence of urinoma. Left ureteral stent in satisfactory position.  3.  Ileus.  4.  Sigmoid diverticulosis.    A/P:  Acute on chronic hypoxic respiratory failure   - suspect due to aspiration event   - RT/O2 protocols   - extubated on 7/9   - Duonebs as needed    Shock   - resolved and off vasopressors       JAE   - due to obstructive from blood clot with dye load   - no RRT at this time   - nephrology is following   - monitor UOP/creat   - avoid nephrotoxins   - continues to improve    SBO   - having BMs   - NG clamped   - continue clear liquid diet prior to removing NG    Acute Blood Loss Anemia   - CT with perinephric hematoma   - s/p 2 units pRBCs on 7/9   - holding heparin infusion for next 48 hours   - cont to trend Hb, transfuse for Hb<7 or hemodynamic stability   - CBI in place    COPD exacerbation   - resolved   - cont Duonebs   - home Trelegy inhaler   - start steroid taper    I spent extensive time in reviewing the patient's condition, physical examination, laboratory and imaging data, prior documentation, in discussion with Urology team, Dr. Campoverde, RT and Arun Oneill, APRN in formulating an assessment/plan.    Due to patient's complicated hospital course  and concern for bleeding, will transfer patient to the IMCU under the care of the hospitalist team.  The critical care team will sign off at this time.    Chloe Good MD  Pulmonary and Critical Care Medicine

## 2024-07-11 NOTE — PROGRESS NOTES
Hospital Medicine Daily Progress Note    Date of Service  7/11/2024    Chief Complaint  Here for elective partial nephrectomy    Hospital Course  Luis Sims is a 80-year-old male past medical history of COPD on chronic oxygen 2-3 L at baseline, emphysema, hypertension, CAD, previous atrial fibrillation, PVD, previous rheumatic fever, previous nephrolithiasis status post nephrolithotomy was admitted for elective left partial nephrectomy also of JADIEL drain placement.  Postop then, they did with acute kidney injury, bleeding, hyperkalemia.  Nephrology consulted  Hematuria did resolve.  Patient was improving JADIEL drain removed 7/5.  He is still on Lauren.  Creatinine had improvement for couple of day and start to worsen again. Most likely ATN from partial nephrectomy  7/5- pt started to have complication of small bowel obstruction. NG tube placed 7/6 with low suction. He also started to noticed clot on urine. Started CBI. Felt slight better. 7/8- significantly worse. Requiring high flow oxygen, hypotensive. Cr worsen. Because of CBI not sure if pt is anuric. Rapid team and critical care doctor called and pt transfer to ICU for pressors and close monitoring. Stat CT scan ordered   Required intubation but extubated 7/9 but was on pressor support. On 7/10 a CT abd/pelvis showed a left perinephric hematoma.  7/1 pathology showing clear cell renal carcinoma    Interval Problem Update  7/11 Alert and oriented.  Speech clear.  No acute distress. Plan per urology is to restart heparin drip at 7/12 0600am and if recurrence of bleeding the he will go to IR for selective embolization.  NPO at midnight.    I have discussed this patient's plan of care and discharge plan at IDT rounds today with Case Management, Nursing, Nursing leadership, and other members of the IDT team.    Consultants/Specialty  nephrology and urology    Code Status  Full Code    Disposition  The patient is not medically cleared for discharge to home or a  post-acute facility.      I have placed the appropriate orders for post-discharge needs.    Review of Systems  Review of Systems   Constitutional:  Negative for malaise/fatigue.   Respiratory:  Negative for shortness of breath.    Cardiovascular:  Negative for chest pain and leg swelling.   Gastrointestinal:  Negative for abdominal pain and nausea.   Genitourinary:  Positive for hematuria.   Musculoskeletal:  Negative for back pain.   Neurological:  Negative for dizziness.   Psychiatric/Behavioral:  The patient is not nervous/anxious.         Physical Exam  Temp:  [36.3 °C (97.4 °F)-36.9 °C (98.5 °F)] 36.9 °C (98.5 °F)  Pulse:  [] 86  Resp:  [20-56] 43  BP: (116-174)/(56-81) 159/76  SpO2:  [92 %-99 %] 97 %    Physical Exam  Vitals reviewed.   Constitutional:       Appearance: Normal appearance.   HENT:      Head: Normocephalic.      Nose: Nose normal.      Mouth/Throat:      Mouth: Mucous membranes are dry.      Pharynx: Oropharynx is clear.   Eyes:      General:         Right eye: No discharge.         Left eye: No discharge.   Cardiovascular:      Rate and Rhythm: Normal rate.      Heart sounds: No murmur heard.  Pulmonary:      Effort: Pulmonary effort is normal. No respiratory distress.      Breath sounds: Normal breath sounds.   Abdominal:      General: There is no distension.      Palpations: Abdomen is soft.      Comments: Staples intact and good approximation of surgical edges   Musculoskeletal:      Cervical back: Neck supple.      Right lower leg: No edema.      Left lower leg: No edema.   Lymphadenopathy:      Cervical: No cervical adenopathy.   Skin:     Coloration: Skin is not jaundiced.   Neurological:      General: No focal deficit present.      Mental Status: He is alert and oriented to person, place, and time.      Motor: Weakness present.   Psychiatric:         Mood and Affect: Mood normal.         Fluids    Intake/Output Summary (Last 24 hours) at 7/11/2024 2022  Last data filed at 7/11/2024  1820  Gross per 24 hour   Intake 2663.09 ml   Output 2500 ml   Net 163.09 ml       Laboratory  Recent Labs     07/09/24  0550 07/09/24  1210 07/10/24  0550 07/10/24  1220 07/11/24  0530 07/11/24  1200 07/11/24  1730   WBC 36.7*  --  23.5*  --  24.9*  --   --    RBC 2.42*  --  2.48*  --  2.47*  --   --    HEMOGLOBIN 7.8*   < > 7.8*   < > 7.9* 8.2* 8.3*   HEMATOCRIT 22.9*  --  22.9*  --  23.1*  --   --    MCV 94.6  --  92.3  --  93.5  --   --    MCH 32.2  --  31.5  --  32.0  --   --    MCHC 34.1  --  34.1  --  34.2  --   --    RDW 49.1  --  51.7*  --  55.6*  --   --    PLATELETCT 295  --  160*  --  141*  --   --    MPV 10.0  --  10.0  --  10.2  --   --     < > = values in this interval not displayed.     Recent Labs     07/09/24  0640 07/10/24  0550 07/11/24  0530   SODIUM 142 148* 148*   POTASSIUM 3.9 3.8 3.8   CHLORIDE 96 108 108   CO2 25 23 23   GLUCOSE 195* 138* 147*   * 106* 87*   CREATININE 6.45* 5.40* 4.34*   CALCIUM 6.7* 6.4* 7.6*                       Imaging  DX-CHEST-PORTABLE (1 VIEW)   Final Result      Stable chest.      CT-ABDOMEN-PELVIS W/O   Final Result      1.  Bibasilar airspace infiltrates with air bronchograms which may represent pneumonia.   2.  Postoperative changes left kidney with mild surrounding stranding and an approximately 52 mm x 32 mm perinephric hematoma. No evidence of urinoma. Left ureteral stent in satisfactory position.   3.  Ileus.   4.  Sigmoid diverticulosis.      US-RENAL   Final Result      1.  No evidence of obvious perinephric hematoma, status post left partial nephrectomy.      DX-CHEST-PORTABLE (1 VIEW)   Final Result      Persistent bibasilar infiltrate with removal of the endotracheal tube.      US-EXTREMITY VENOUS LOWER BILAT   Final Result      DX-CHEST-PORTABLE (1 VIEW)   Final Result      1.  No significant change.      DX-CHEST-PORTABLE (1 VIEW)   Final Result      1.  Interval placement of an endotracheal tube which terminates in satisfactory position at the  level of the aortic arch.   2.  Interval insertion of a central venous catheter which terminates with the tip projecting over the expected region of the distal brachiocephalic vein or proximal superior vena cava.   3.  Patchy right-sided pulmonary opacities and bilateral basilar atelectasis.      DX-PORTABLE FLUORO > 1 HOUR   Final Result      Portable fluoroscopy utilized for 28 seconds.         INTERPRETING LOCATION: [address], [place], [address]      [id number] VIEW   Final Result      Digitized intraoperative radiograph is submitted for review. This examination is not for diagnostic purpose but for guidance during a surgical procedure. Please see the patient's chart for full procedural details.         INTERPRETING LOCATION: [address], [place], [address]      [id number] VIEW   Final Result      1.  A left-sided double-J ureteral stent is in place. Due to overlying structures and adjacent contrast opacified bowel loops, extravasation from the renal collecting system cannot be excluded.   2.  Multiple mild to moderately dilated gas-filled loops of small bowel with enteric contrast present. Findings could be seen in the setting of partial small bowel obstruction or ileus.      EC-ECHOCARDIOGRAM COMPLETE W/ CONT   Final Result      CT-ABDOMEN-PELVIS WITH   Final Result      1.  Bibasilar atelectasis right greater than left.      2.  Hepatic steatosis.      3.  Bandlike area of fluid attenuation coursing through the upper pole of the left kidney which contains multiple air bubbles. This likely represents postoperative change however an infected postoperative tract through the upper pole of the left kidney    should be considered.      4.  Multiple prominent nonobstructing right-sided renal calculi. Atrophic changes of the right kidney.      5.  Mild bilateral hydronephrosis.      6.  Moderate left-sided pelviectasis which likely contains a blood clot.      7.  Thrombosis of the infrarenal aorta with the right-sided  iliac stent in place. Thrombotic occlusion of the left iliac arteries.      8.  Colonic diverticulosis.      9.  Acute mid small bowel obstruction.      10.  These findings were Voalted to HERMELINDO WILHELM at 9:30 AM 7/8/2024      CT-CTA CHEST PULMONARY ARTERY W/ RECONS   Final Result      1.  No CT evidence of pulmonary emboli         2. Small multifocal patchy airspace opacities in the right upper lobe and right lower lobe suspicious for pneumonitis possibly Covid.      3. Bibasilar atelectasis.      4. Coarse multifocal nonobstructing right-sided renal calculi and atrophic change.      DX-CHEST-PORTABLE (1 VIEW)   Final Result      1.  Left retrocardiac airspace opacity consistent with atelectasis and/or pneumonitis.      DX-ABDOMEN FOR TUBE PLACEMENT   Final Result      1.  NG tube extends in the fundus of stomach.      DX-CHEST-PORTABLE (1 VIEW)   Final Result      1.  Bibasilar airspace disease, greater on the left.      [id number] VIEW   Final Result      Dilated air-filled small bowel. This may be due to postoperative ileus or small bowel obstruction.      DX-ABDOMEN FOR TUBE PLACEMENT   Final Result      Nasogastric tube tip projects over the proximal stomach.      [id number] VIEW   Final Result      1.  Stable mild to moderate dilatation of small bowel loops with air seen in the distal rectum.      [id number] VIEW   Final Result      1.  Operative changes of the abdomen.      2.  Evolving small bowel obstruction.      US-RENAL   Final Result      1.  Nonvisualization of the left kidney due to overlying bandages.   2.  Suboptimal visualization of the right kidney due to patient body habitus and ribs demonstrates no definite evidence of hydronephrosis.      [id number] VIEW   Final Result      Paucity of bowel gas with stool present in the colon. No definite evidence of obstruction however evaluation is limited on supine imaging.      DX-CHEST-PORTABLE (1 VIEW)    (Results Pending)         Assessment/Plan  * Left kidney mass- (present on admission)  Assessment & Plan  Status post partial nephrectomy.  Follow-up with urology.    7/1 pathology: Clear cell renal carcinoma    Hypernatremia  Assessment & Plan  7/11 Na:148  Monitor labs  Advance diet as tolerates    Aortic thrombus (HCC)  Assessment & Plan  Restart heparin if hematuria resolving.    Hypocalcemia  Assessment & Plan  7/11 Ca:7.6    Shock (HCC)- (present on admission)  Assessment & Plan  S/p pressor support  Monitor vitals., I/O's  Care with pain meds    Small bowel obstruction (HCC)  Assessment & Plan  7/11 active bowel sounds. Advance diet with close monitor.  Antiemetics  mobilize    Leukemoid reaction- (present on admission)  Assessment & Plan  7/11 wbc:24.9 whileon steroids. On antibiotics    Acute blood loss as cause of postoperative anemia- (present on admission)  Assessment & Plan  Continue to monitor closely since patient has ongoing hematuria and on CBI  7/11 Hgb:8.2    Hyperkalemia  Assessment & Plan          Left renal mass  Assessment & Plan  Highly concern for malignancy   status post Open left partial nephrectomy nephrectomy on July 1, 2024  Managed by Urology   Follow-up pathology-Clear-cell carcinoma.  Appreciate urology recommendations  7/4- hopefully JADIEL drain removing tomorrow. Appreciate urology recs   7/5/24- JADIEL removed today     Coronary artery disease involving native coronary artery of native heart without angina pectoris- (present on admission)  Assessment & Plan  NSTEMI 2019 s/p stent placement at RCA at that time  hold ASA due to hematura  Hold oral meds     History of atrial fibrillation- (present on admission)  Assessment & Plan  He is not on anticoagulation  Per chart review has history atrial fibrillation 2019 due to COPD exacerbation one-time, and no indication for anticoagulation since then  7/4- HR increasing, pt did miss couple of dosage of carvedilol. Continue to monitor on telemetry. Resumed carvedilol.  Continue to monitor closely.   7/8- continue tele. Resume blood thinners when ok per urology   7/11 on amiodarone drip and restart heparin drip and monitor hgb nd sign of further hematura    Acute on chronic hypoxic respiratory failure (HCC)- (present on admission)  Assessment & Plan  7/8 CTA chest negative for PE but patchy opacification  RT per protocol  Mobilize  Encourage deep inspirator efforts  Titrate oxygen  7/11 on 6 Liters O2    Essential hypertension- (present on admission)  Assessment & Plan  Holding Losartan in the setting of recent partial nephrectomy and renal failure  On amiodarone drip  7/8- pt is hypotensive on shock. Held all oral meds   Monitor I/O's labs and vitals.      Peripheral vascular disease (HCC)- (present on admission)  Assessment & Plan  History of angioplasty stent placement by Dr. Vital 2012   hold aspirin  Atorvastatin.  Control BP and lipids    COPD exacerbation (HCC)- (present on admission)  Assessment & Plan  Continue with home dose Trelegy Ellipta   Duonebs PRN   Not signs for exacerbation  Follow-up with for allergies as outpatient  7/11: RT per protocol.  Wean steroids      Acute renal failure superimposed on stage 3 chronic kidney disease (HCC)- (present on admission)  Assessment & Plan  Cannot rule out obstruction versus renal.  No contrast exposure  Continue to monitor very closely  Continue IV fluid  renal ultrasound  7/3/24-creatinine slight worse again however patient is starting to have better urine output.  Likely this is ATN which has been expected worsening creatinine and then plateau and then improvement later  7/4/24- slight improving. Good urine output. Continue to monitor closely   7/5/24- still good urine output, Cr not improving. Poor oral intake   7/7- worse again. Continue IVF, continue CBI. Continue close monitoring   7/8- prerenal vs obstructive. Nephrology on board. Start pressors. Ok to contrast since pt will likely be started on dialysis   7/11 Cr:4.34  <5.4         VTE prophylaxis:   SCDs/TEDs      I have performed a physical exam and reviewed and updated ROS and Plan today (7/11/2024). In review of yesterday's note (7/10/2024), there are no changes except as documented above.

## 2024-07-11 NOTE — PROGRESS NOTES
[facility] -  PROGRESS NOTE               Author: Ju Campoverde M.D. Date & Time: 7/11/2024  7:01 AM     HPI:  74 y.o. male with CKD, left renal mass, COPD with chronic hypoxic respiratory failure, atrial fibrillation, hypertension, and coronary artery disease presented yesterday for open left partial nephrectomy nephrectomy. Continued to take losartan until day of surgery. Baseline cr prior to procedure appears ~2. Underwent procedure yesterday.  Op note without complications noted and only 150cc blood loss documented. Potassium elevated to 6.7 last night, temporizing measures given. Cr elevated to 3.2 this AM.  80cc UOP documented despite 3L IVF. Lauren draining with blood tinged urine, no clots. No chest pain. Shortness or breath stable.Previously followed by [facility] [place]rology [place] [address] with CKD felt 2/2 HTN at that point in time. Past UAs with blood and protein.      DAILY NEPHROLOGY SUMMARY:  7/2: Consult done  7/3: stable hemodynamics, 2L NC, 1.1L UOP-increasing since midnight. No obstruction on imaging    7/4: 1.4L UOP, cr starting to plateau, potassium stable, feeling improved  7/5: stable hemodynamics, 1.4L UOP, constipation main complaint, minimal PO intake  7/6: Worsening nasuea and emesis, Concern for SBO and then had 2 large Bms, NG tube to suction with significant output, only 350cc UOP documented, cr climbing again, started on IV fluids, feeling slightly improved this AM  7/7: 3.4L gastric output from NG-net negative by IOs, alkalosis, started on CBI for recurrent hematuria/clots  7/8: Increased O2 requirements overnight and rapid response this am, transferred to ICU, BP low with SBP 70's this am and now on levophed drip, on high flow NC O2, concern for aspiration overnight, CTA chest negative for PE, BUN/Cr worse, 3.4L emesis/NG output over past 24hrs, on CBI, CT Abd/Pelvis this am with mild bilateral hydro and moderate left pelviectasis with possible blood clot, to go to OR  "this after per urology, pt is lethargic but resting comfortable, denies any pain  7/9: No events, went to OR yest for cystoscopy and left ureteral and bladder clot evacuation, remains intubated since surgery, on pressor support with levophed and vasopressin, stable on vent with 40% FiO2, CBI running, 1.1L NG output, BUN/Cr slightly improved 112/6.45 (was 122/8.46 yest)  7/10: No events, extubated yest am, BP elevated overnight but stable this am, stable on 4L NC O2, CBI off, good UOP 3.7L, received 2 units PRBC transfusion and heparin drip off, BUN/Cr improving, Na elevated 148, denies anyCP/SB/LE edema and is feeling hungry  7/11: No events, BP mildly elevated, currently on 6L NC O2, good UOP, BUN/Cr improving, tolerating PO, Na still elevated at 148, CT Abd/Pelvis shows left perinephric hematoma, tolerating p.o. liquids, denies any CP/SOB/LE edema    REVIEW OF SYSTEMS:    10 point ROS performed, negative other than stated above     PMH/PSH/SH/FH:   Reviewed and unchanged since admission note    CURRENT MEDICATIONS:   Reviewed from admission to present day    VS:  BP (!) 147/64   Pulse (!) 105   Temp 36.7 °C (98.1 °F) (Temporal)   Resp (!) 27   Ht 1.753 m (5' 9.02\")   Wt 81.9 kg (180 lb 8.9 oz)   SpO2 98%   BMI 26.65 kg/m²     Physical Exam  Constitutional:       General: He is not in acute distress.     Appearance: He is ill-appearing.      Interventions: He is sedated and intubated.   HENT:      Head: Normocephalic and atraumatic.   Eyes:      General: No scleral icterus.     Extraocular Movements: Extraocular movements intact.   Cardiovascular:      Rate and Rhythm: Normal rate and regular rhythm.   Pulmonary:      Effort: Pulmonary effort is normal. No respiratory distress. He is intubated.      Breath sounds: Examination of the right-lower field reveals decreased breath sounds. Examination of the left-lower field reveals decreased breath sounds. Decreased breath sounds present.   Abdominal:      " General: There is distension.   Musculoskeletal:         General: No deformity.      Right lower leg: No edema.      Left lower leg: No edema.   Skin:     General: Skin is warm and dry.      Findings: No rash.   Neurological:      General: No focal deficit present.      Mental Status: He is alert and oriented to person, place, and time.   Psychiatric:         Mood and Affect: Mood normal. Affect is flat.         Behavior: Behavior normal. Behavior is cooperative.         Fluids:  In: 4813.1 [P.O.:1180; I.V.:3132.5]  Out: -2950     LABS:  Recent Labs     07/09/24  0640 07/10/24  0550 07/11/24  0530   SODIUM 142 148* 148*   POTASSIUM 3.9 3.8 3.8   CHLORIDE 96 108 108   CO2 25 23 23   GLUCOSE 195* 138* 147*   * 106* 87*   CREATININE 6.45* 5.40* 4.34*   CALCIUM 6.7* 6.4* 7.6*       IMAGING:   All imaging reviewed from admission to present day    ASSESSMENT:  # Oliguric SCOTT: In setting of surgery/RAASI/decreased nephron mass. Was starting to recover then 2nd Scott with bowel obstruction  - Now with SCOTT again due to obstruction from left ureteral clot and left renal extravasation, underwent cysto and clot evacuation 7/8 by Urology  -Unable to assess oliguria status due to CBI  -BUN/Cr improving  # CKD Stage 3B: Billed 2/2 HTN in the past. Urine with blood (renal mass) and protein  # Hyperkalemia--resolved  # Renal mass: s/p partial Left nephrectomy  -left ureteral clot with obstruction and extravasation of left kidney on imaging 7/8  -s/p Cystoscopy and left ureteral clot and bladder clot evacuation 7/8  -CT Abd/Pelvis 7/10 shows left perinephric hematoma  # HTN--shock now resolved, off all pressors  -BP mildly elevated  #Acute Hypoxic Respiratory Failure--now extubated 7/9  -Suspicion for aspiration pneumonia  -CTA chest negative for PE  -Bedside POCUS 7/8 showed easlity collapsible SVC c/w intravascular volume depletion  # COPD  # Anemia: low iron  -Acute blood loss anemia  # SBO vs. Ileus: improving, now on clear  liquid diet  # gross hematuria: transiently stopped after procedure. Now restarted.  -Urology following  -CBI per urology     PLAN:  -BUN/Cr improving  -No acute need for RRT, but will eval daily  -DC IV fluids  -Monitor for renal recovery  -Encourage PO fluids  -Possible aspiration pneumonia  -No diuretics at this time  -Urology following  -Anticoagulation held due to perinephric hematoma  -Renal diet  -Strict I/Os/continue coburn  -Dose all meds per eGFR <15

## 2024-07-11 NOTE — PROGRESS NOTES
Dr. Good notified that urology ordered a CT abdomen/pelvis to rule out retroperitoneal hematoma. MD ordered to stop the heparin gtt.

## 2024-07-11 NOTE — DIETARY
Nutrition Services: Brief Update    Pt is on NPO/clear liquids x 3 days today.    Pt is s/p cystoscopy with L stent placement and bladder clot evacuation on 7/8. Per critical care APRN plan to resume clear liquid today, RN had mentioned pt was NPO at midnight for possible procedure. Noted hold AC for 2 days and then Urology reassess for possible IR embolization. PO intake noted while on clear liquids is <25% of 1 meal, 25-50% of 1 snack, and 50-75% of one meal. Wound team notes 7/9 no pressure injuries or advanced wound care needs. Last BM: 7/10.     Advance diet past clear liquids when medically feasible, will order Ensure clear with meals to provide additional protein.     RD following.

## 2024-07-12 NOTE — ASSESSMENT & PLAN NOTE
Initially on heparin and then had bleeding episode  heparin drip stopped and reversed protamine given.  Concern of hemoptysis, ongoing hematuria and dropping hemoglobin.  hold anticoagulation

## 2024-07-12 NOTE — PROGRESS NOTES
[facility] -  PROGRESS NOTE               Author: Ju Campoverde M.D. Date & Time: 7/12/2024  6:42 AM     HPI:  74 y.o. male with CKD, left renal mass, COPD with chronic hypoxic respiratory failure, atrial fibrillation, hypertension, and coronary artery disease presented yesterday for open left partial nephrectomy nephrectomy. Continued to take losartan until day of surgery. Baseline cr prior to procedure appears ~2. Underwent procedure yesterday.  Op note without complications noted and only 150cc blood loss documented. Potassium elevated to 6.7 last night, temporizing measures given. Cr elevated to 3.2 this AM.  80cc UOP documented despite 3L IVF. Lauren draining with blood tinged urine, no clots. No chest pain. Shortness or breath stable.Previously followed by [facility] nephrology [place] [address] with CKD felt 2/2 HTN at that point in time. Past UAs with blood and protein.      DAILY NEPHROLOGY SUMMARY:  7/2: Consult done  7/3: stable hemodynamics, 2L NC, 1.1L UOP-increasing since midnight. No obstruction on imaging    7/4: 1.4L UOP, cr starting to plateau, potassium stable, feeling improved  7/5: stable hemodynamics, 1.4L UOP, constipation main complaint, minimal PO intake  7/6: Worsening nasuea and emesis, Concern for SBO and then had 2 large Bms, NG tube to suction with significant output, only 350cc UOP documented, cr climbing again, started on IV fluids, feeling slightly improved this AM  7/7: 3.4L gastric output from NG-net negative by IOs, alkalosis, started on CBI for recurrent hematuria/clots  7/8: Increased O2 requirements overnight and rapid response this am, transferred to ICU, BP low with SBP 70's this am and now on levophed drip, on high flow NC O2, concern for aspiration overnight, CTA chest negative for PE, BUN/Cr worse, 3.4L emesis/NG output over past 24hrs, on CBI, CT Abd/Pelvis this am with mild bilateral hydro and moderate left pelviectasis with possible blood clot, to go to OR  "this after per urology, pt is lethargic but resting comfortable, denies any pain  7/9: No events, went to OR yest for cystoscopy and left ureteral and bladder clot evacuation, remains intubated since surgery, on pressor support with levophed and vasopressin, stable on vent with 40% FiO2, CBI running, 1.1L NG output, BUN/Cr slightly improved 112/6.45 (was 122/8.46 yest)  7/10: No events, extubated yest am, BP elevated overnight but stable this am, stable on 4L NC O2, CBI off, good UOP 3.7L, received 2 units PRBC transfusion and heparin drip off, BUN/Cr improving, Na elevated 148, denies anyCP/SB/LE edema and is feeling hungry  7/11: No events, BP mildly elevated, currently on 6L NC O2, good UOP, BUN/Cr improving, tolerating PO, Na still elevated at 148, CT Abd/Pelvis shows left perinephric hematoma, tolerating p.o. liquids, denies any CP/SOB/LE edema  7/12: No events, BP elevated at times but stable this am, stable on 5L NC O2, 2.4L UOP, Na still 148, serum chloride and BUN slightly higher today, Cr improved at 3.78, currently n.p.o. for possible procedure but otherwise is tolerating p.o.    REVIEW OF SYSTEMS:    10 point ROS performed, negative other than stated above     PMH/PSH/SH/FH:   Reviewed and unchanged since admission note    CURRENT MEDICATIONS:   Reviewed from admission to present day    VS:  BP (!) 140/64   Pulse 81   Temp 36.9 °C (98.5 °F) (Temporal)   Resp (!) 28   Ht 1.753 m (5' 9.02\")   Wt 81.9 kg (180 lb 8.9 oz)   SpO2 96%   BMI 26.65 kg/m²     Physical Exam  Constitutional:       General: He is not in acute distress.     Appearance: He is ill-appearing.      Interventions: He is sedated and intubated.   HENT:      Head: Normocephalic and atraumatic.   Eyes:      General: No scleral icterus.     Extraocular Movements: Extraocular movements intact.   Cardiovascular:      Rate and Rhythm: Normal rate and regular rhythm.   Pulmonary:      Effort: Pulmonary effort is normal. No respiratory " distress. He is intubated.      Breath sounds: Examination of the right-lower field reveals decreased breath sounds. Examination of the left-lower field reveals decreased breath sounds. Decreased breath sounds present.   Abdominal:      General: There is distension.   Musculoskeletal:         General: No deformity.      Right lower leg: No edema.      Left lower leg: No edema.   Skin:     General: Skin is warm and dry.      Findings: No rash.   Neurological:      General: No focal deficit present.      Mental Status: He is alert and oriented to person, place, and time.   Psychiatric:         Mood and Affect: Mood normal. Affect is flat.         Behavior: Behavior normal. Behavior is cooperative.         Fluids:  In: 3046.4 [P.O.:100; I.V.:2663.8]  Out: 2000     LABS:  Recent Labs     07/10/24  0550 07/11/24  0530 07/12/24  0505   SODIUM 148* 148* 148*   POTASSIUM 3.8 3.8 3.7   CHLORIDE 108 108 110   CO2 23 23 24   GLUCOSE 138* 147* 164*   * 87* 93*   CREATININE 5.40* 4.34* 3.78*   CALCIUM 6.4* 7.6* 7.4*       IMAGING:   All imaging reviewed from admission to present day    ASSESSMENT:  # Oliguric SCOTT: In setting of surgery/RAASI/decreased nephron mass. Was starting to recover then 2nd Scott with bowel obstruction  - Now with SCOTT again due to obstruction from left ureteral clot and left renal extravasation, underwent cysto and clot evacuation 7/8 by Urology  -Unable to assess oliguria status due to CBI  -BUN/Cr improving  # CKD Stage 3B: Billed 2/2 HTN in the past. Urine with blood (renal mass) and protein  # Hyperkalemia--resolved  # Renal mass: s/p partial Left nephrectomy  -left ureteral clot with obstruction and extravasation of left kidney on imaging 7/8  -s/p Cystoscopy and left ureteral clot and bladder clot evacuation 7/8  -CT Abd/Pelvis 7/10 shows left perinephric hematoma  # HTN--shock now resolved, off all pressors  -BP mildly elevated  #Acute Hypoxic Respiratory Failure--now extubated 7/9  -Suspicion  for aspiration pneumonia  -CTA chest negative for PE  -Bedside POCUS 7/8 showed easlity collapsible SVC c/w intravascular volume depletion  # COPD  # Anemia: low iron  -Acute blood loss anemia  # SBO vs. Ileus: improving, now on clear liquid diet  # gross hematuria: transiently stopped after procedure. Now restarted.  -Urology following  -CBI per urology     PLAN:  -Cr improving and non-oliguric, but BUN higher and Na and chloride elevated  -No acute need for RRT, but will eval daily  -Encourage PO fluids and if BUN/Na remain elevated then restart D5W  -Monitor for renal recovery  -Possible aspiration pneumonia  -No diuretics at this time  -Wean O2 as tolerated  -Urology following  -Anticoagulation held due to perinephric hematoma, to be restarted today  -Renal diet  -Strict I/Os/continue coburn  -Dose all meds per eGFR <15     **Discussed above with Hospitalist

## 2024-07-12 NOTE — PROGRESS NOTES
Hospital Medicine Daily Progress Note    Date of Service  7/12/2024    Chief Complaint  Here for elective partial nephrectomy    Hospital Course  Luis Sims is a 80-year-old male past medical history of COPD on chronic oxygen 2-3 L at baseline, emphysema, hypertension, CAD, previous atrial fibrillation, PVD, previous rheumatic fever, previous nephrolithiasis status post nephrolithotomy was admitted for elective left partial nephrectomy also of JADIEL drain placement.  Postop then, they did with acute kidney injury, bleeding, hyperkalemia.  Nephrology consulted  Hematuria did resolve.  Patient was improving JADIEL drain removed 7/5.  He is still on Luaren.  Creatinine had improvement for couple of day and start to worsen again. Most likely ATN from partial nephrectomy  7/5- pt started to have complication of small bowel obstruction. NG tube placed 7/6 with low suction. He also started to noticed clot on urine. Started CBI. Felt slight better. 7/8- significantly worse. Requiring high flow oxygen, hypotensive. Cr worsen. Because of CBI not sure if pt is anuric. Rapid team and critical care doctor called and pt transfer to ICU for pressors and close monitoring. Stat CT scan ordered   Required intubation but extubated 7/9 but was on pressor support. On 7/10 a CT abd/pelvis showed a left perinephric hematoma.  7/1 pathology showing clear cell renal carcinoma    Interval Problem Update  7/12: Started on heparin this am without further hematuria and remained hemodynamically stable.  Follow up Hgb unchanged at 12:30 and okay to start diet. Patient alert, oriented and clear speech.    7/11 Alert and oriented.  Speech clear.  No acute distress. Plan per urology is to restart heparin drip at 7/12 0600am and if recurrence of bleeding the he will go to IR for selective embolization.  NPO at midnight.    I have discussed this patient's plan of care and discharge plan at IDT rounds today with Case Management, Nursing, Nursing  leadership, and other members of the IDT team.    Consultants/Specialty  nephrology and urology    Code Status  Full Code    Disposition  The patient is not medically cleared for discharge to home or a post-acute facility.      I have placed the appropriate orders for post-discharge needs.    Review of Systems  Review of Systems   Constitutional:  Negative for malaise/fatigue.   Respiratory:  Negative for shortness of breath.    Cardiovascular:  Negative for chest pain and leg swelling.   Gastrointestinal:  Negative for abdominal pain and nausea.   Genitourinary:  Positive for hematuria.   Musculoskeletal:  Negative for back pain.   Neurological:  Negative for dizziness and speech change.   Psychiatric/Behavioral:  The patient is not nervous/anxious.         Physical Exam  Temp:  [36.1 °C (97 °F)-37 °C (98.6 °F)] 36.7 °C (98 °F)  Pulse:  [] 79  Resp:  [22-46] 31  BP: ()/(56-73) 159/73  SpO2:  [90 %-100 %] 95 %    Physical Exam  Vitals reviewed.   Constitutional:       Appearance: Normal appearance.   HENT:      Head: Normocephalic.      Nose: Nose normal.      Mouth/Throat:      Mouth: Mucous membranes are dry.      Pharynx: Oropharynx is clear.   Eyes:      General:         Right eye: No discharge.         Left eye: No discharge.   Cardiovascular:      Rate and Rhythm: Normal rate.   Pulmonary:      Effort: Pulmonary effort is normal. No respiratory distress.      Breath sounds: Normal breath sounds.   Abdominal:      General: There is no distension.      Palpations: Abdomen is soft.      Comments: Staples intact and good approximation of surgical edges   Musculoskeletal:      Cervical back: Normal range of motion and neck supple.      Right lower leg: No edema.      Left lower leg: No edema.   Skin:     Coloration: Skin is not jaundiced.   Neurological:      General: No focal deficit present.      Mental Status: He is alert and oriented to person, place, and time.      Motor: Weakness present.    Psychiatric:         Mood and Affect: Mood normal.         Fluids    Intake/Output Summary (Last 24 hours) at 7/12/2024 1916  Last data filed at 7/12/2024 1800  Gross per 24 hour   Intake 1318.38 ml   Output -700 ml   Net 2018.38 ml       Laboratory  Recent Labs     07/11/24  0530 07/11/24  1200 07/12/24  0505 07/12/24  1230 07/12/24  1820   WBC 24.9*  --  30.4* 29.5*  --    RBC 2.47*  --  2.38* 2.39*  --    HEMOGLOBIN 7.9*   < > 7.5* 7.5* 7.3*   HEMATOCRIT 23.1*  --  22.6* 22.6*  --    MCV 93.5  --  95.0 94.6  --    MCH 32.0  --  31.5 31.4  --    MCHC 34.2  --  33.2 33.2  --    RDW 55.6*  --  58.0* 57.1*  --    PLATELETCT 141*  --  140* 146*  --    MPV 10.2  --  10.7 10.7  --     < > = values in this interval not displayed.     Recent Labs     07/10/24  0550 07/11/24  0530 07/12/24  0505   SODIUM 148* 148* 148*   POTASSIUM 3.8 3.8 3.7   CHLORIDE 108 108 110   CO2 23 23 24   GLUCOSE 138* 147* 164*   * 87* 93*   CREATININE 5.40* 4.34* 3.78*   CALCIUM 6.4* 7.6* 7.4*     Recent Labs     07/12/24  0505   APTT 29.6   INR 1.29*                   Imaging  DX-CHEST-PORTABLE (1 VIEW)   Final Result      1.  Ongoing bibasilar interstitial opacities.   2.  Resolving subsegmental atelectatic change left lung base.   3.  No new abnormality.      DX-CHEST-PORTABLE (1 VIEW)   Final Result      Stable chest.      CT-ABDOMEN-PELVIS W/O   Final Result      1.  Bibasilar airspace infiltrates with air bronchograms which may represent pneumonia.   2.  Postoperative changes left kidney with mild surrounding stranding and an approximately 52 mm x 32 mm perinephric hematoma. No evidence of urinoma. Left ureteral stent in satisfactory position.   3.  Ileus.   4.  Sigmoid diverticulosis.      US-RENAL   Final Result      1.  No evidence of obvious perinephric hematoma, status post left partial nephrectomy.      DX-CHEST-PORTABLE (1 VIEW)   Final Result      Persistent bibasilar infiltrate with removal of the endotracheal tube.       US-EXTREMITY VENOUS LOWER BILAT   Final Result      DX-CHEST-PORTABLE (1 VIEW)   Final Result      1.  No significant change.      DX-CHEST-PORTABLE (1 VIEW)   Final Result      1.  Interval placement of an endotracheal tube which terminates in satisfactory position at the level of the aortic arch.   2.  Interval insertion of a central venous catheter which terminates with the tip projecting over the expected region of the distal brachiocephalic vein or proximal superior vena cava.   3.  Patchy right-sided pulmonary opacities and bilateral basilar atelectasis.      DX-PORTABLE FLUORO > 1 HOUR   Final Result      Portable fluoroscopy utilized for 28 seconds.         INTERPRETING LOCATION: [address] , [place], [address]      [id number] VIEW   Final Result      Digitized intraoperative radiograph is submitted for review. This examination is not for diagnostic purpose but for guidance during a surgical procedure. Please see the patient's chart for full procedural details.         INTERPRETING LOCATION: [address] , [place], [address]      [id number] VIEW   Final Result      1.  A left-sided double-J ureteral stent is in place. Due to overlying structures and adjacent contrast opacified bowel loops, extravasation from the renal collecting system cannot be excluded.   2.  Multiple mild to moderately dilated gas-filled loops of small bowel with enteric contrast present. Findings could be seen in the setting of partial small bowel obstruction or ileus.      EC-ECHOCARDIOGRAM COMPLETE W/ CONT   Final Result      CT-ABDOMEN-PELVIS WITH   Final Result      1.  Bibasilar atelectasis right greater than left.      2.  Hepatic steatosis.      3.  Bandlike area of fluid attenuation coursing through the upper pole of the left kidney which contains multiple air bubbles. This likely represents postoperative change however an infected postoperative tract through the upper pole of the left kidney    should be considered.      4.   Multiple prominent nonobstructing right-sided renal calculi. Atrophic changes of the right kidney.      5.  Mild bilateral hydronephrosis.      6.  Moderate left-sided pelviectasis which likely contains a blood clot.      7.  Thrombosis of the infrarenal aorta with the right-sided iliac stent in place. Thrombotic occlusion of the left iliac arteries.      8.  Colonic diverticulosis.      9.  Acute mid small bowel obstruction.      10.  These findings were Voalted to HERMELINDO WILHELM at 9:30 AM 7/8/2024      CT-CTA CHEST PULMONARY ARTERY W/ RECONS   Final Result      1.  No CT evidence of pulmonary emboli         2. Small multifocal patchy airspace opacities in the right upper lobe and right lower lobe suspicious for pneumonitis possibly Covid.      3. Bibasilar atelectasis.      4. Coarse multifocal nonobstructing right-sided renal calculi and atrophic change.      DX-CHEST-PORTABLE (1 VIEW)   Final Result      1.  Left retrocardiac airspace opacity consistent with atelectasis and/or pneumonitis.      DX-ABDOMEN FOR TUBE PLACEMENT   Final Result      1.  NG tube extends in the fundus of stomach.      DX-CHEST-PORTABLE (1 VIEW)   Final Result      1.  Bibasilar airspace disease, greater on the left.      [id number] VIEW   Final Result      Dilated air-filled small bowel. This may be due to postoperative ileus or small bowel obstruction.      DX-ABDOMEN FOR TUBE PLACEMENT   Final Result      Nasogastric tube tip projects over the proximal stomach.      [id number] VIEW   Final Result      1.  Stable mild to moderate dilatation of small bowel loops with air seen in the distal rectum.      [id number] VIEW   Final Result      1.  Operative changes of the abdomen.      2.  Evolving small bowel obstruction.      US-RENAL   Final Result      1.  Nonvisualization of the left kidney due to overlying bandages.   2.  Suboptimal visualization of the right kidney due to patient body habitus and ribs demonstrates no definite  evidence of hydronephrosis.      [id number] VIEW   Final Result      Paucity of bowel gas with stool present in the colon. No definite evidence of obstruction however evaluation is limited on supine imaging.           Assessment/Plan  * Left kidney mass- (present on admission)  Assessment & Plan  Status post partial nephrectomy.  Follow-up with urology.    7/1 pathology: Clear cell renal carcinoma    Hypernatremia  Assessment & Plan  7/12 Na:148  Monitor labs  Advance diet as tolerates    Aortic thrombus (HCC)  Assessment & Plan  7/12 Restarted heparin and monitoring Hgb and CBI    Hypocalcemia  Assessment & Plan  7/12 Ca:7.4    Shock (HCC)- (present on admission)  Assessment & Plan  S/p pressor support  Monitor vitals., I/O's  Care with pain meds    Small bowel obstruction (HCC)  Assessment & Plan  7/12 active bowel sounds. Advance diet with close monitor.  Antiemetics  mobilize    Leukemoid reaction- (present on admission)  Assessment & Plan  7/12 wbc:30.4 <24.9 whileon steroids. On antibiotics    Acute blood loss as cause of postoperative anemia- (present on admission)  Assessment & Plan  Continue to monitor closely since patient has ongoing hematuria and on CBI  7/12 Hgb:7.5 while on heparin drip    Hyperkalemia  Assessment & Plan          Left renal mass  Assessment & Plan  Highly concern for malignancy   status post Open left partial nephrectomy nephrectomy on July 1, 2024  Managed by Urology   Follow-up pathology-Clear-cell carcinoma.  Appreciate urology recommendations  7/4- hopefully JADIEL drain removing tomorrow. Appreciate urology recs   7/5/24- JADIEL removed today     Coronary artery disease involving native coronary artery of native heart without angina pectoris- (present on admission)  Assessment & Plan  NSTEMI 2019 s/p stent placement at RCA at that time  hold ASA due to hematura  Hold oral meds     History of atrial fibrillation- (present on admission)  Assessment & Plan  He is not on  anticoagulation  Per chart review has history atrial fibrillation 2019 due to COPD exacerbation one-time, and no indication for anticoagulation since then  7/4- HR increasing, pt did miss couple of dosage of carvedilol. Continue to monitor on telemetry. Resumed carvedilol. Continue to monitor closely.   7/8- continue tele. Resume blood thinners when ok per urology   7/11 on amiodarone drip and restart heparin drip and monitor hgb nd sign of further hematura    Acute on chronic hypoxic respiratory failure (HCC)- (present on admission)  Assessment & Plan  7/8 CTA chest negative for PE but patchy opacification  RT per protocol  Mobilize  Encourage deep inspirator efforts  Titrate oxygen  7/12 on 5 Liters O2    Essential hypertension- (present on admission)  Assessment & Plan  Holding Losartan in the setting of recent partial nephrectomy and renal failure  On amiodarone drip  7/8- pt is hypotensive on shock. Held all oral meds   Monitor I/O's labs and vitals.      Peripheral vascular disease (HCC)- (present on admission)  Assessment & Plan  History of angioplasty stent placement by Dr. Vital 2012   hold aspirin  Atorvastatin.  Control BP and lipids    COPD exacerbation (HCC)- (present on admission)  Assessment & Plan  Continue with home dose Trelegy Ellipta   Duonebs PRN   Not signs for exacerbation  Follow-up with for allergies as outpatient  RT per protocol.  Wean steroids  Titrate oxygen as able to keep SPO2 >89%      Acute renal failure superimposed on stage 3 chronic kidney disease (HCC)- (present on admission)  Assessment & Plan  Cannot rule out obstruction versus renal.  No contrast exposure  Continue to monitor very closely  Continue IV fluid  renal ultrasound  7/3/24-creatinine slight worse again however patient is starting to have better urine output.  Likely this is ATN which has been expected worsening creatinine and then plateau and then improvement later  7/4/24- slight improving. Good urine output.  Continue to monitor closely   7/5/24- still good urine output, Cr not improving. Poor oral intake   7/7- worse again. Continue IVF, continue CBI. Continue close monitoring   7/8- prerenal vs obstructive. Nephrology on board. Start pressors. Ok to contrast since pt will likely be started on dialysis   7/11 Cr:4.34 <5.4  7/12 Cr:3.78 but increased BUN (was NPO overnight).  Monitor BMP in am.         VTE prophylaxis:    therapeutic anticoagulation with weight-based heparin gtt, pharmacy to adjust PRN      I have performed a physical exam and reviewed and updated ROS and Plan today (7/12/2024). In review of yesterday's note (7/11/2024), there are no changes except as documented above.

## 2024-07-12 NOTE — THERAPY
Physical Therapy   Daily Treatment     Patient Name: Olman Sims  Age:  74 y.o., Sex:  male  Medical Record #: [id number]  Today's Date: 7/12/2024     Precautions  Precautions: Fall Risk;Other (See Comments)  Comments: Continuous Bladder Irrigation (CBI) (+)    Assessment    Patient seen for PT treatment session. Patient in bed, agreeable for the session. Able to participate with bed mobility, chair transfers, short distance ambulation with FWW as detailed below. Will continue to benefit from PT services and recommend post-acute placement at this time.     Plan    Treatment Plan Status: Continue Current Treatment Plan  Type of Treatment: Bed Mobility, Equipment, Family / Caregiver Training, Gait Training, Neuro Re-Education / Balance, Stair Training, Therapeutic Activities, Therapeutic Exercise  Treatment Frequency: 4 Times per Week  Treatment Duration: Until Therapy Goals Met    DC Equipment Recommendations: Unable to determine at this time  Discharge Recommendations: Recommend post-acute placement for additional physical therapy services prior to discharge home    Objective     07/12/24 1035   Precautions   Precautions Fall Risk;Other (See Comments)   Comments Continuous Bladder Irrigation (CBI) (+)   Vitals   Pulse 94   Patient BP Position Lawler's Position   Blood Pressure  (!) 145/67   Pulse Oximetry 97 %   O2 (LPM) 5   O2 Delivery Device Nasal Cannula   Vitals Comments Seated EOB: 150/70, 92, 92. Post activity, seated in chair: 128/59, 124, 93. Patient's HR increases upto 130s during mobility.   Pain   Pain Scales 0 to 10 Scale    Intervention Declines   Cognition    Level of Consciousness Alert   Sitting Lower Body Exercises   Sitting Lower Body Exercises Yes   Ankle Pumps 2 sets of 10;Bilateral   Hip Flexion 1 set of 10;Bilateral   Long Arc Quad 1 set of 10;Bilateral   Comments Seated in the chair, AROM   Other Treatments   Other Treatments Provided Reinforced pursed lip breathing, activity pacing  during mobility. Reinforced importance of OOB to chair, daily mobility as able with assistance from nursing staff.   Balance   Sitting Balance (Static) Fair -   Sitting Balance (Dynamic) Fair -   Standing Balance (Static) Fair -   Standing Balance (Dynamic) Fair -   Weight Shift Sitting Good   Weight Shift Standing Good   Skilled Intervention Verbal Cuing   Comments Seated EOB; Standing with FWW   Bed Mobility    Supine to Sit Contact Guard Assist   Scooting Contact Guard Assist   Rolling Contact Guard Assist  (Roll to L)   Skilled Intervention Verbal Cuing;Sequencing;Facilitation;Compensatory Strategies   Comments HOB raised, without use of rails, towards L side of the bed   Gait Analysis   Gait Level Of Assist Contact Guard Assist   Assistive Device Front Wheel Walker   Distance (Feet) 4   # of Times Distance was Traveled 3   Deviation Bradykinetic;Decreased Base Of Support;Other (Comment)  (Reduced step & stride length)   Skilled Intervention Verbal Cuing;Sequencing;Facilitation   Comments Cues for directions, sequencing. Assisted with management of lines/ tubes. Distance limited due to multiple lines.   Functional Mobility   Sit to Stand Contact Guard Assist   Bed, Chair, Wheelchair Transfer Contact Guard Assist   Transfer Method Stand Step   Mobility Bed-EOB-sit-stand-steps to chair-LE ex's-sit-stand-forward and backwards steps from chair   Skilled Intervention Verbal Cuing;Sequencing;Facilitation   Comments W FWW; cues for sequencing of task, hand placement, LE placement   6 Clicks Assessment - How much HELP from from another person do you currently need... (If the patient hasn't done an activity recently, how much help from another person do you think he/she would need if he/she tried?)   Turning from your back to your side while in a flat bed without using bedrails? 3   Moving from lying on your back to sitting on the side of a flat bed without using bedrails? 3   Moving to and from a bed to a chair  (including a wheelchair)? 3   Standing up from a chair using your arms (e.g., wheelchair, or bedside chair)? 3   Walking in hospital room? 3   Climbing 3-5 steps with a railing? 2   6 clicks Mobility Score 17   Activity Tolerance   Sitting in Chair Post session   Patient / Family Goals    Patient / Family Goal #1 To breathe better, be able to walk   Goal #1 Outcome Progressing slower than expected   Short Term Goals    Short Term Goal # 1 Patient will perform supine-sit, sit-supine with HOB flat without rails with supervision in 6 visits to safely get in & out of bed   Goal Outcome # 1 Progressing slower than expected   Short Term Goal # 2 Patient will perform sit-stand with FWW with supervision in 6 visits to progress with functional mobility   Goal Outcome # 2 Progressing slower than expected   Short Term Goal # 3 Patient will perform chair transfers with FWW with supervision in 6 visits to safely get OOB to chair   Goal Outcome # 3 Progressing slower than expected   Short Term Goal # 4 Patient will ambulate 100 feet with LRAD with supervision in 6 visits to safely ambulate household distance   Goal Outcome # 4 Progressing slower than expected   Short Term Goal # 5 Patient will negotiate 1 step without rails with supervision in 6 visits to safely get in & out home   Goal Outcome # 5 Goal not met   Physical Therapy Treatment Plan   Physical Therapy Treatment Plan Continue Current Treatment Plan   Anticipated Discharge Equipment and Recommendations   DC Equipment Recommendations Unable to determine at this time   Discharge Recommendations Recommend post-acute placement for additional physical therapy services prior to discharge home   Interdisciplinary Plan of Care Collaboration   IDT Collaboration with  Nursing   Patient Position at End of Therapy Seated;Chair Alarm On;Call Light within Reach;Tray Table within Reach   Session Information   Date / Session Number  7/12-3(3/4, 7/15)

## 2024-07-12 NOTE — DISCHARGE PLANNING
Pt discussed in IDT rounds.  The heparin gtt will be reinitiated and if he rebleeds he will go to IR for selective embolization.

## 2024-07-12 NOTE — PROGRESS NOTES
Surgical Progress Note    Author: Abbi Vazquez P.A.-C. Date & Time created: 2024   11:20 AM     Interval Events:  Patient is sitting in his chair at time of rounds.   Reports he's feeling better and misses his dog.   He's tolerating ice chips well.   CBI is off   Patient is on a Heparin drip   Urine in coburn bag is serosanguinous, mostly clear in coburn tube.   Hgb 7.5 today, was 8.1 yesterday   Cr continues to improve, 3.78 today, was 4.34 yesterday  since stent placement.       Review of Systems   Constitutional:  Negative for chills and fever.     Hemodynamics:  Temp (24hrs), Av.9 °C (98.5 °F), Min:36.8 °C (98.3 °F), Max:37 °C (98.6 °F)  Temperature: 37 °C (98.6 °F)  Pulse  Av.7  Min: 47  Max: 136   Blood Pressure : (!) 145/67     Respiratory:    Respiration: (!) 29, Pulse Oximetry: 98 %     Work Of Breathing / Effort: Mild;Tachypnea  RUL Breath Sounds: Diminished, RML Breath Sounds: Diminished, RLL Breath Sounds: Diminished, ALEN Breath Sounds: Diminished, LLL Breath Sounds: Diminished  Neuro:  GCS       Fluids:    Intake/Output Summary (Last 24 hours) at 2024 1120  Last data filed at 2024 1000  Gross per 24 hour   Intake 933.4 ml   Output 250 ml   Net 683.4 ml        Current Diet Order   Procedures    Diet Order Diet: Full Liquid     Physical Exam  Pulmonary:      Effort: Pulmonary effort is normal.   Skin:     Coloration: Skin is pale.   Neurological:      Mental Status: He is alert.   Psychiatric:         Mood and Affect: Mood normal.       Labs:  Recent Results (from the past 24 hour(s))   POCT glucose device results    Collection Time: 24 11:56 AM   Result Value Ref Range    POC Glucose, Blood 145 (H) 65 - 99 mg/dL   HGB    Collection Time: 24 12:00 PM   Result Value Ref Range    Hemoglobin 8.2 (L) 14.0 - 18.0 g/dL   POCT glucose device results    Collection Time: 24  4:53 PM   Result Value Ref Range    POC Glucose, Blood 129 (H) 65 - 99 mg/dL   HGB    Collection  Time: 07/11/24  5:30 PM   Result Value Ref Range    Hemoglobin 8.3 (L) 14.0 - 18.0 g/dL   POCT glucose device results    Collection Time: 07/11/24 11:21 PM   Result Value Ref Range    POC Glucose, Blood 149 (H) 65 - 99 mg/dL   HGB    Collection Time: 07/11/24 11:29 PM   Result Value Ref Range    Hemoglobin 8.1 (L) 14.0 - 18.0 g/dL   Basic Metabolic Panel (BMP)    Collection Time: 07/12/24  5:05 AM   Result Value Ref Range    Sodium 148 (H) 135 - 145 mmol/L    Potassium 3.7 3.6 - 5.5 mmol/L    Chloride 110 96 - 112 mmol/L    Co2 24 20 - 33 mmol/L    Glucose 164 (H) 65 - 99 mg/dL    Bun 93 (H) 8 - 22 mg/dL    Creatinine 3.78 (H) 0.50 - 1.40 mg/dL    Calcium 7.4 (L) 8.5 - 10.5 mg/dL    Anion Gap 14.0 7.0 - 16.0   CBC with Differential    Collection Time: 07/12/24  5:05 AM   Result Value Ref Range    WBC 30.4 (H) 4.8 - 10.8 K/uL    RBC 2.38 (L) 4.70 - 6.10 M/uL    Hemoglobin 7.5 (L) 14.0 - 18.0 g/dL    Hematocrit 22.6 (L) 42.0 - 52.0 %    MCV 95.0 81.4 - 97.8 fL    MCH 31.5 27.0 - 33.0 pg    MCHC 33.2 32.3 - 36.5 g/dL    RDW 58.0 (H) 35.9 - 50.0 fL    Platelet Count 140 (L) 164 - 446 K/uL    MPV 10.7 9.0 - 12.9 fL    Neutrophils-Polys 93.20 (H) 44.00 - 72.00 %    Lymphocytes 0.70 (L) 22.00 - 41.00 %    Monocytes 3.70 0.00 - 13.40 %    Eosinophils 0.00 0.00 - 6.90 %    Basophils 0.20 0.00 - 1.80 %    Immature Granulocytes 2.20 (H) 0.00 - 0.90 %    Nucleated RBC 0.10 0.00 - 0.20 /100 WBC    Neutrophils (Absolute) 28.34 (H) 1.82 - 7.42 K/uL    Lymphs (Absolute) 0.20 (L) 1.00 - 4.80 K/uL    Monos (Absolute) 1.13 (H) 0.00 - 0.85 K/uL    Eos (Absolute) 0.00 0.00 - 0.51 K/uL    Baso (Absolute) 0.06 0.00 - 0.12 K/uL    Immature Granulocytes (abs) 0.66 (H) 0.00 - 0.11 K/uL    NRBC (Absolute) 0.04 K/uL   Magnesium    Collection Time: 07/12/24  5:05 AM   Result Value Ref Range    Magnesium 2.2 1.5 - 2.5 mg/dL   Phosphorus    Collection Time: 07/12/24  5:05 AM   Result Value Ref Range    Phosphorus 5.6 (H) 2.5 - 4.5 mg/dL   aPTT     Collection Time: 07/12/24  5:05 AM   Result Value Ref Range    APTT 29.6 24.7 - 36.0 sec   Prothrombin Time    Collection Time: 07/12/24  5:05 AM   Result Value Ref Range    PT 16.3 (H) 12.0 - 14.6 sec    INR 1.29 (H) 0.87 - 1.13   Heparin Xa (Unfractionated)    Collection Time: 07/12/24  5:05 AM   Result Value Ref Range    Heparin Xa (UFH) <0.10 IU/mL   ESTIMATED GFR    Collection Time: 07/12/24  5:05 AM   Result Value Ref Range    GFR (CKD-EPI) 16 (A) >60 mL/min/1.73 m 2   POCT glucose device results    Collection Time: 07/12/24  6:14 AM   Result Value Ref Range    POC Glucose, Blood 164 (H) 65 - 99 mg/dL     Medical Decision Making, by Problem:  Active Hospital Problems    Diagnosis     Hypernatremia [E87.0]     Aortic thrombus (HCC) [I74.10]     Hypocalcemia [E83.51]     Hyperglycemia [R73.9]     Shock (HCC) [R57.9]     Small bowel obstruction (HCC) [K56.609]     Left kidney mass [N28.89]     Leukemoid reaction [D72.823]     Acute blood loss as cause of postoperative anemia [D62]     Coronary artery disease involving native coronary artery of native heart without angina pectoris [I25.10]     History of atrial fibrillation [Z86.79]      Remote history of atrial fibrillation in 2019 in the setting of an acute COPD exacerbation.  He has had no recurrences.  He did follow with cardiology for a few years after this.  Oral anticoagulant was not indicated at that time.      Insomnia [G47.00]      This is a chronic condition. Takes Ambien 5 mg nightly. Symptoms are well controlled with this. Denies any side effects.    Last UDS appropriate 10/4/23  CS agreement UTD 10/4/23        Acute on chronic hypoxic respiratory failure (HCC) [J96.21]     Essential hypertension [I10]      This is a chronic condition.  Current Meds:   - losartan 100 mg daily  - carvedilol 12.5 mg BID  - amlodipine 10 mg daily  Side effects: none  Home BP Log: Typically 120s/60s  Associated symptoms: Denies chest pain, shortness of breath, headaches,  dizziness/lightheadedness           Peripheral vascular disease ([facility]) [I73.9]      Angioplasty and stents Dr. Vital November 2012      COPD exacerbation ([facility]) [J44.1]      This is a chronic condition.  Managed by pulmonology.  Patient is on Trelegy inhaler daily and albuterol as needed.  He is on supplemental oxygen at night and as needed throughout the day.   Symptoms currently controlled.       Acute renal failure superimposed on stage 3 chronic kidney disease ([facility]) [N17.9, N18.30]      Plan:  - Repeat CBC at 1300   - Keep NPO - pending results from repeat CBC   - Consider advancing to clear liquids - pending CBC results   - Continue coburn   - If patient has recurrence of bleeding will have him go to IR for selective embolization  - Trending Cr and Hgb    Quality Measures:  Quality-Core Measures    Discussed patient condition with RN, Patient, and Dr. Jaquez

## 2024-07-12 NOTE — PROGRESS NOTES
Urology surgery notified of stable CBC. Diet ordered by Dr. Riley. Patient updated and provided with food. Gita notified by phone.

## 2024-07-13 NOTE — PROGRESS NOTES
Patient to transfer to [facility]. Message left for Gita whyte. Report called to Nathalie ROSARIO.

## 2024-07-13 NOTE — PROGRESS NOTES
[facility] -  PROGRESS NOTE               Author: Ju Campoverde M.D. Date & Time: 7/13/2024  6:56 AM     HPI:  74 y.o. male with CKD, left renal mass, COPD with chronic hypoxic respiratory failure, atrial fibrillation, hypertension, and coronary artery disease presented yesterday for open left partial nephrectomy nephrectomy. Continued to take losartan until day of surgery. Baseline cr prior to procedure appears ~2. Underwent procedure yesterday.  Op note without complications noted and only 150cc blood loss documented. Potassium elevated to 6.7 last night, temporizing measures given. Cr elevated to 3.2 this AM.  80cc UOP documented despite 3L IVF. Lauren draining with blood tinged urine, no clots. No chest pain. Shortness or breath stable.Previously followed by [facility] [place] [address] with CKD felt 2/2 HTN at that point in time. Past UAs with blood and protein.      DAILY NEPHROLOGY SUMMARY:  7/2: Consult done  7/3: stable hemodynamics, 2L NC, 1.1L UOP-increasing since midnight. No obstruction on imaging    7/4: 1.4L UOP, cr starting to plateau, potassium stable, feeling improved  7/5: stable hemodynamics, 1.4L UOP, constipation main complaint, minimal PO intake  7/6: Worsening nasuea and emesis, Concern for SBO and then had 2 large Bms, NG tube to suction with significant output, only 350cc UOP documented, cr climbing again, started on IV fluids, feeling slightly improved this AM  7/7: 3.4L gastric output from NG-net negative by IOs, alkalosis, started on CBI for recurrent hematuria/clots  7/8: Increased O2 requirements overnight and rapid response this am, transferred to ICU, BP low with SBP 70's this am and now on levophed drip, on high flow NC O2, concern for aspiration overnight, CTA chest negative for PE, BUN/Cr worse, 3.4L emesis/NG output over past 24hrs, on CBI, CT Abd/Pelvis this am with mild bilateral hydro and moderate left pelviectasis with possible blood clot, to go to OR  "this after per urology, pt is lethargic but resting comfortable, denies any pain  7/9: No events, went to OR yest for cystoscopy and left ureteral and bladder clot evacuation, remains intubated since surgery, on pressor support with levophed and vasopressin, stable on vent with 40% FiO2, CBI running, 1.1L NG output, BUN/Cr slightly improved 112/6.45 (was 122/8.46 yest)  7/10: No events, extubated yest am, BP elevated overnight but stable this am, stable on 4L NC O2, CBI off, good UOP 3.7L, received 2 units PRBC transfusion and heparin drip off, BUN/Cr improving, Na elevated 148, denies anyCP/SB/LE edema and is feeling hungry  7/11: No events, BP mildly elevated, currently on 6L NC O2, good UOP, BUN/Cr improving, tolerating PO, Na still elevated at 148, CT Abd/Pelvis shows left perinephric hematoma, tolerating p.o. liquids, denies any CP/SOB/LE edema  7/12: No events, BP elevated at times but stable this am, stable on 5L NC O2, 2.4L UOP, Na still 148, serum chloride and BUN slightly higher today, Cr improved at 3.78, currently n.p.o. for possible procedure but otherwise is tolerating p.o.  7/13: Transferred out of ICU and to IMCU, BP mildly elevated, stable on 3L NC O2, creatinine continues to improve, BUN remains at 95 and NA remains elevated at 148, did not require urologic intervention yesterday, hemoglobin dropped this morning and patient receiving 1 unit PRBC transfusion,    REVIEW OF SYSTEMS:    10 point ROS performed, negative other than stated above     PMH/PSH/SH/FH:   Reviewed and unchanged since admission note    CURRENT MEDICATIONS:   Reviewed from admission to present day    VS:  BP (!) 169/72   Pulse 77   Temp 36.9 °C (98.4 °F) (Temporal)   Resp (!) 24   Ht 1.753 m (5' 9.02\")   Wt 85.1 kg (187 lb 9.8 oz)   SpO2 98%   BMI 27.69 kg/m²     Physical Exam  Vitals and nursing note reviewed.   Constitutional:       General: He is not in acute distress.     Appearance: He is ill-appearing.      " Interventions: He is sedated and intubated.   HENT:      Head: Normocephalic and atraumatic.   Eyes:      General: No scleral icterus.     Extraocular Movements: Extraocular movements intact.   Cardiovascular:      Rate and Rhythm: Normal rate and regular rhythm.   Pulmonary:      Effort: Pulmonary effort is normal. No respiratory distress. He is intubated.      Breath sounds: Examination of the right-lower field reveals decreased breath sounds. Examination of the left-lower field reveals decreased breath sounds. Decreased breath sounds present.   Abdominal:      General: There is distension.   Musculoskeletal:         General: No deformity.      Cervical back: Normal range of motion and neck supple.      Right lower leg: No edema.      Left lower leg: No edema.   Skin:     General: Skin is warm and dry.      Findings: No rash.   Neurological:      General: No focal deficit present.      Mental Status: He is alert and oriented to person, place, and time.   Psychiatric:         Mood and Affect: Mood normal. Affect is flat.         Behavior: Behavior normal. Behavior is cooperative.         Fluids:  In: 1318.4 [P.O.:600; I.V.:622.1]  Out: -700     LABS:  Recent Labs     07/11/24  0530 07/12/24  0505 07/13/24  0224   SODIUM 148* 148* 148*   POTASSIUM 3.8 3.7 3.5*   CHLORIDE 108 110 110   CO2 23 24 24   GLUCOSE 147* 164* 160*   BUN 87* 93* 95*   CREATININE 4.34* 3.78* 3.46*   CALCIUM 7.6* 7.4* 7.1*       IMAGING:   All imaging reviewed from admission to present day    ASSESSMENT:  # Oliguric SCOTT: In setting of surgery/RAASI/decreased nephron mass. Was starting to recover then 2nd Scott with bowel obstruction  - Now with SCOTT again due to obstruction from left ureteral clot and left renal extravasation, underwent cysto and clot evacuation 7/8 by Urology  -Unable to assess oliguria status due to CBI  -BUN/Cr improved  # CKD Stage 3B: Billed 2/2 HTN in the past. Urine with blood (renal mass) and protein  #  Hyperkalemia--resolved  # Renal mass: s/p partial Left nephrectomy  -left ureteral clot with obstruction and extravasation of left kidney on imaging 7/8  -s/p Cystoscopy and left ureteral clot and bladder clot evacuation 7/8  -CT Abd/Pelvis 7/10 shows left perinephric hematoma  # HTN--shock now resolved, off all pressors  -BP mildly elevated  #Acute Hypoxic Respiratory Failure--now extubated 7/9  -Suspicion for aspiration pneumonia  -CTA chest negative for PE  -Bedside POCUS 7/8 showed easlity collapsible SVC c/w intravascular volume depletion  # COPD  # Anemia: low iron  -Acute blood loss anemia as well secondary to perinephric hematoma  # SBO vs. Ileus: improving, now on clear liquid diet  # gross hematuria: transiently stopped after procedure. Now restarted.  -Urology following  -CBI per urology     PLAN:  -Cr improving and non-oliguric, but BUN remains elevated and Na and chloride elevated  -No acute need for RRT, but will eval daily  -Encourage PO fluids  -Restart D5W   -Monitor for renal recovery  -Possible aspiration pneumonia  -No diuretics at this time  -Wean O2 as tolerated, O2 requirements improving  -Urology following  -Serial CBC's and transfuse PRN for Hgb less than 7  -Renal diet  -Strict I/Os/continue coburn  -Dose all meds per eGFR <15     **Discussed above with Hospitalist

## 2024-07-13 NOTE — PROGRESS NOTES
Hospital Medicine Daily Progress Note    Date of Service  7/13/2024    Chief Complaint  Here for elective partial nephrectomy    Hospital Course  Luis Sims is a 80-year-old male past medical history of COPD on chronic oxygen 2-3 L at baseline, emphysema, hypertension, CAD, previous atrial fibrillation, PVD, previous rheumatic fever, previous nephrolithiasis status post nephrolithotomy was admitted for elective left partial nephrectomy also of JADIEL drain placement.  Postop then, they did with acute kidney injury, bleeding, hyperkalemia.  Nephrology consulted  Hematuria did resolve.  Patient was improving JADIEL drain removed 7/5.  He is still on Lauren.  Creatinine had improvement for couple of day and start to worsen again. Most likely ATN from partial nephrectomy  7/5- pt started to have complication of small bowel obstruction. NG tube placed 7/6 with low suction. He also started to noticed clot on urine. Started CBI. Felt slight better. 7/8- significantly worse. Requiring high flow oxygen, hypotensive. Cr worsen. Because of CBI not sure if pt is anuric. Rapid team and critical care doctor called and pt transfer to ICU for pressors and close monitoring. Stat CT scan ordered   Required intubation but extubated 7/9 but was on pressor support. On 7/10 a CT abd/pelvis showed a left perinephric hematoma.  7/1 pathology showing clear cell renal carcinoma    Interval Problem Update  7/13: Hgb:6.7 <7.3. Cr:3.46, Na:148. Recevied transfusion of pRBCs this am. Alert and oriented.  CBI with lighter pink urine than yesterday. No abdominal pain.      7/12: Started on heparin this am without further hematuria and remained hemodynamically stable.  Follow up Hgb unchanged at 12:30 and okay to start diet. Patient alert, oriented and clear speech.    7/11 Alert and oriented.  Speech clear.  No acute distress. Plan per urology is to restart heparin drip at 7/12 0600am and if recurrence of bleeding the he will go to IR for selective  embolization.  NPO at midnight.    I have discussed this patient's plan of care and discharge plan at IDT rounds today with Case Management, Nursing, Nursing leadership, and other members of the IDT team.    Consultants/Specialty  nephrology and urology    Code Status  Full Code    Disposition  The patient is not medically cleared for discharge to home or a post-acute facility.  Anticipate discharge to: skilled nursing facility    I have placed the appropriate orders for post-discharge needs.    Review of Systems  Review of Systems   Constitutional:  Negative for malaise/fatigue.   Respiratory:  Negative for shortness of breath.    Cardiovascular:  Negative for palpitations and leg swelling.   Gastrointestinal:  Negative for abdominal pain and nausea.   Genitourinary:  Positive for hematuria.   Musculoskeletal:  Negative for back pain.   Neurological:  Negative for speech change.   Psychiatric/Behavioral:  The patient is not nervous/anxious.         Physical Exam  Temp:  [36.1 °C (97 °F)-37.2 °C (99 °F)] 36.9 °C (98.4 °F)  Pulse:  [] 77  Resp:  [22-66] 24  BP: ()/(56-85) 169/72  SpO2:  [90 %-100 %] 98 %    Physical Exam  Vitals reviewed.   Constitutional:       Appearance: Normal appearance.   HENT:      Head: Normocephalic.      Nose: Nose normal.      Mouth/Throat:      Mouth: Mucous membranes are dry.      Pharynx: Oropharynx is clear.   Eyes:      General:         Right eye: No discharge.         Left eye: No discharge.   Cardiovascular:      Rate and Rhythm: Normal rate.   Pulmonary:      Effort: Pulmonary effort is normal. No respiratory distress.      Breath sounds: Normal breath sounds.   Abdominal:      General: There is no distension.      Palpations: Abdomen is soft.      Comments: Staples intact and good approximation of surgical edges   Musculoskeletal:      Cervical back: Normal range of motion and neck supple.      Right lower leg: No edema.      Left lower leg: No edema.   Skin:      Coloration: Skin is not jaundiced.   Neurological:      General: No focal deficit present.      Mental Status: He is alert and oriented to person, place, and time.      Motor: Weakness present.   Psychiatric:         Mood and Affect: Mood normal.         Fluids    Intake/Output Summary (Last 24 hours) at 7/13/2024 0824  Last data filed at 7/13/2024 0625  Gross per 24 hour   Intake 2221.75 ml   Output 700 ml   Net 1521.75 ml       Laboratory  Recent Labs     07/12/24  0505 07/12/24  1230 07/12/24  1820 07/13/24  0224   WBC 30.4* 29.5*  --  22.9*   RBC 2.38* 2.39*  --  2.13*   HEMOGLOBIN 7.5* 7.5* 7.3* 6.7*   HEMATOCRIT 22.6* 22.6*  --  20.2*   MCV 95.0 94.6  --  94.8   MCH 31.5 31.4  --  31.5   MCHC 33.2 33.2  --  33.2   RDW 58.0* 57.1*  --  57.1*   PLATELETCT 140* 146*  --  135*   MPV 10.7 10.7  --  10.8     Recent Labs     07/11/24  0530 07/12/24  0505 07/13/24  0224   SODIUM 148* 148* 148*   POTASSIUM 3.8 3.7 3.5*   CHLORIDE 108 110 110   CO2 23 24 24   GLUCOSE 147* 164* 160*   BUN 87* 93* 95*   CREATININE 4.34* 3.78* 3.46*   CALCIUM 7.6* 7.4* 7.1*     Recent Labs     07/12/24  0505   APTT 29.6   INR 1.29*                   Imaging  DX-CHEST-PORTABLE (1 VIEW)   Final Result      Increased patchy right greater than left basilar opacities suspicious for pneumonitis.      DX-CHEST-PORTABLE (1 VIEW)   Final Result      1.  Ongoing bibasilar interstitial opacities.   2.  Resolving subsegmental atelectatic change left lung base.   3.  No new abnormality.      DX-CHEST-PORTABLE (1 VIEW)   Final Result      Stable chest.      CT-ABDOMEN-PELVIS W/O   Final Result      1.  Bibasilar airspace infiltrates with air bronchograms which may represent pneumonia.   2.  Postoperative changes left kidney with mild surrounding stranding and an approximately 52 mm x 32 mm perinephric hematoma. No evidence of urinoma. Left ureteral stent in satisfactory position.   3.  Ileus.   4.  Sigmoid diverticulosis.      US-RENAL   Final Result       1.  No evidence of obvious perinephric hematoma, status post left partial nephrectomy.      DX-CHEST-PORTABLE (1 VIEW)   Final Result      Persistent bibasilar infiltrate with removal of the endotracheal tube.      US-EXTREMITY VENOUS LOWER BILAT   Final Result      DX-CHEST-PORTABLE (1 VIEW)   Final Result      1.  No significant change.      DX-CHEST-PORTABLE (1 VIEW)   Final Result      1.  Interval placement of an endotracheal tube which terminates in satisfactory position at the level of the aortic arch.   2.  Interval insertion of a central venous catheter which terminates with the tip projecting over the expected region of the distal brachiocephalic vein or proximal superior vena cava.   3.  Patchy right-sided pulmonary opacities and bilateral basilar atelectasis.      DX-PORTABLE FLUORO > 1 HOUR   Final Result      Portable fluoroscopy utilized for 28 seconds.         INTERPRETING LOCATION: [address], [place], [address]      [id number]-4 VIEW   Final Result      Digitized intraoperative radiograph is submitted for review. This examination is not for diagnostic purpose but for guidance during a surgical procedure. Please see the patient's chart for full procedural details.         INTERPRETING LOCATION: [address], [place], [address]      [id number]-4 VIEW   Final Result      1.  A left-sided double-J ureteral stent is in place. Due to overlying structures and adjacent contrast opacified bowel loops, extravasation from the renal collecting system cannot be excluded.   2.  Multiple mild to moderately dilated gas-filled loops of small bowel with enteric contrast present. Findings could be seen in the setting of partial small bowel obstruction or ileus.      EC-ECHOCARDIOGRAM COMPLETE W/ CONT   Final Result      CT-ABDOMEN-PELVIS WITH   Final Result      1.  Bibasilar atelectasis right greater than left.      2.  Hepatic steatosis.      3.  Bandlike area of fluid attenuation coursing through the upper pole of the  left kidney which contains multiple air bubbles. This likely represents postoperative change however an infected postoperative tract through the upper pole of the left kidney    should be considered.      4.  Multiple prominent nonobstructing right-sided renal calculi. Atrophic changes of the right kidney.      5.  Mild bilateral hydronephrosis.      6.  Moderate left-sided pelviectasis which likely contains a blood clot.      7.  Thrombosis of the infrarenal aorta with the right-sided iliac stent in place. Thrombotic occlusion of the left iliac arteries.      8.  Colonic diverticulosis.      9.  Acute mid small bowel obstruction.      10.  These findings were Voalted to HERMELINDO WILHELM at 9:30 AM 7/8/2024      CT-CTA CHEST PULMONARY ARTERY W/ RECONS   Final Result      1.  No CT evidence of pulmonary emboli         2. Small multifocal patchy airspace opacities in the right upper lobe and right lower lobe suspicious for pneumonitis possibly Covid.      3. Bibasilar atelectasis.      4. Coarse multifocal nonobstructing right-sided renal calculi and atrophic change.      DX-CHEST-PORTABLE (1 VIEW)   Final Result      1.  Left retrocardiac airspace opacity consistent with atelectasis and/or pneumonitis.      DX-ABDOMEN FOR TUBE PLACEMENT   Final Result      1.  NG tube extends in the fundus of stomach.      DX-CHEST-PORTABLE (1 VIEW)   Final Result      1.  Bibasilar airspace disease, greater on the left.      [id number]-9 VIEW   Final Result      Dilated air-filled small bowel. This may be due to postoperative ileus or small bowel obstruction.      DX-ABDOMEN FOR TUBE PLACEMENT   Final Result      Nasogastric tube tip projects over the proximal stomach.      KY-[id number]-4 VIEW   Final Result      1.  Stable mild to moderate dilatation of small bowel loops with air seen in the distal rectum.      [id number]-3 VIEW   Final Result      1.  Operative changes of the abdomen.      2.  Evolving small bowel obstruction.       US-RENAL   Final Result      1.  Nonvisualization of the left kidney due to overlying bandages.   2.  Suboptimal visualization of the right kidney due to patient body habitus and ribs demonstrates no definite evidence of hydronephrosis.      [id number]-4 VIEW   Final Result      Paucity of bowel gas with stool present in the colon. No definite evidence of obstruction however evaluation is limited on supine imaging.           Assessment/Plan  * Left kidney mass- (present on admission)  Assessment & Plan  Status post partial nephrectomy.  Follow-up with urology.    7/1 pathology: Clear cell renal carcinoma    Hypernatremia  Assessment & Plan  7/12 Na:148  Monitor labs  Advance diet as tolerates    Aortic thrombus (HCC)  Assessment & Plan  7/12 Restarted heparin and monitoring Hgb and CBI    Hypocalcemia  Assessment & Plan  7/13 Ca:7.1 <7.4    Shock (HCC)- (present on admission)  Assessment & Plan  S/p pressor support  Monitor vitals., I/O's  Care with pain meds    Small bowel obstruction (HCC)  Assessment & Plan  7/13 active bowel sounds. Advance diet with close monitor.  Antiemetics  mobilize    Leukemoid reaction- (present on admission)  Assessment & Plan  7/13 wbc:22.9 <29.5<30.4 <24.9 while on steroids. On antibiotics    Acute blood loss as cause of postoperative anemia- (present on admission)  Assessment & Plan  Continue to monitor closely since patient has ongoing hematuria and on CBI  7/13 Hgb:6.7 <7.5 while on heparin drip and transfused 1 unit pRBC. Heparin drip remains  May need abdominal imaging if Hgb continues to drop.  Await further urology input.    Hyperkalemia  Assessment & Plan          Left renal mass  Assessment & Plan  Highly concern for malignancy   status post Open left partial nephrectomy nephrectomy on July 1, 2024  Managed by Urology   Follow-up pathology-Clear-cell carcinoma.  Appreciate urology recommendations  7/4- hopefully JADIEL drain removing tomorrow. Appreciate urology recs   7/5/24-  JADIEL removed today     Coronary artery disease involving native coronary artery of native heart without angina pectoris- (present on admission)  Assessment & Plan  NSTEMI 2019 s/p stent placement at R[facility] at that time  hold ASA due to hematura  Hold oral meds     History of atrial fibrillation- (present on admission)  Assessment & Plan  He is not on anticoagulation  Per chart review has history atrial fibrillation 2019 due to COPD exacerbation one-time, and no indication for anticoagulation since then  7/4- HR increasing, pt did miss couple of dosage of carvedilol. Continue to monitor on telemetry. Resumed carvedilol. Continue to monitor closely.   7/8- continue tele. Resume blood thinners when ok per urology   7/11 on amiodarone drip and 7/12 restarted heparin drip and monitor hgb nd sign of further hematura  (On heparin drip for afib hx but also for aortic thrombus)    Acute on chronic hypoxic respiratory failure (HCC)- (present on admission)  Assessment & Plan  7/8 CTA chest negative for PE but patchy opacification  RT per protocol  Mobilize  Encourage deep inspirator efforts  Titrate oxygen  7/13 on 3 Liters O2    Essential hypertension- (present on admission)  Assessment & Plan  Holding Losartan in the setting of recent partial nephrectomy and renal failure  On amiodarone drip  7/8- pt is hypotensive on shock. Held all oral meds   Monitor I/O's labs and vitals.    Peripheral vascular disease (HCC)- (present on admission)  Assessment & Plan  History of angioplasty stent placement by Dr. Vital 2012   hold aspirin  Atorvastatin.  Control BP and lipids    COPD exacerbation (HCC)- (present on admission)  Assessment & Plan  Continue with home dose Trelegy Ellipta   Duonebs PRN   Not signs for exacerbation  Follow-up with for allergies as outpatient  RT per protocol.  Wean steroids  Titrate oxygen as able to keep SPO2 >89%      Acute renal failure superimposed on stage 3 chronic kidney disease (HCC)- (present on  admission)  Assessment & Plan  Cannot rule out obstruction versus renal.  No contrast exposure  Continue to monitor very closely  Continue IV fluid  renal ultrasound  7/3/24-creatinine slight worse again however patient is starting to have better urine output.  Likely this is ATN which has been expected worsening creatinine and then plateau and then improvement later  7/4/24- slight improving. Good urine output. Continue to monitor closely   7/5/24- still good urine output, Cr not improving. Poor oral intake   7/7- worse again. Continue IVF, continue CBI. Continue close monitoring   7/8- prerenal vs obstructive. Nephrology on board. Start pressors. Ok to contrast since pt will likely be started on dialysis   7/11 Cr:4.34 <5.4  7/12 Cr:3.78 but increased BUN (was NPO overnight).  Monitor BMP in am.  7/13 Cr:3.46.  Encourage oral intake.           VTE prophylaxis: VTE Selection    I have performed a physical exam and reviewed and updated ROS and Plan today (7/13/2024). In review of yesterday's note (7/12/2024), there are no changes except as documented above.

## 2024-07-13 NOTE — PROGRESS NOTES
Pt hemoglobin came back at 6.7, pt on heparin gtt with CBI having red flecks in urine with pink tinge, pt denies lightheadedness, BP stable, MD Paulino notified, per MD order 1 unit, MD at bedside per MD okay to continue heparin gtt, COD older than 72 hours new COD sent to blood bank, awaiting COD to transfuse

## 2024-07-13 NOTE — PROGRESS NOTES
4 Eyes Skin Assessment Completed by ABRAHAM Zelaya and ABRAHAM Winkler.    Head WDL  Ears Redness and Blanching  Nose WDL  Mouth WDL  Neck WDL (top of right shoulder) skin tag/old incision     Breast/Chest WDL  Shoulder Blades WDL  Spine WDL  (R) Arm/Elbow/Hand Redness and Blanching    (L) Arm/Elbow/Hand Redness and Blanching    Abdomen incision midline around to left side with staples, small opening on left side of abdomen weeping (dressing changed)          Groin WDL  Scrotum/Coccyx/Buttocks Redness, Blanching, Excoriation, and Moisture Fissure    (R) Leg WDL  (L) Leg WDL  (R) Heel/Foot/Toe WDL  (L) Heel/Foot/Toe WDL          Devices In Places ECG, Tele Box, Blood Pressure Cuff, Pulse Ox, Lauren, SCD's, and Nasal Cannula      Interventions In Place NC W/Ear Foams, Heel Mepilex, Sacral Mepilex, TAP System, Pillows, Q2 Turns, Low Air Loss Mattress, Barrier Cream, and Heels Loaded W/Pillows    Possible Skin Injury Yes    Pictures Uploaded Into Epic Yes  Wound Consult Placed Yes  RN Wound Prevention Protocol Ordered Yes

## 2024-07-13 NOTE — CARE PLAN
The patient is Watcher - Medium risk of patient condition declining or worsening    Shift Goals  Clinical Goals: q6 HGB monitoring  Patient Goals: rest  Family Goals: LAZARO      Problem: Knowledge Deficit - Standard  Goal: Patient and family/care givers will demonstrate understanding of plan of care, disease process/condition, diagnostic tests and medications  Outcome: Progressing     Problem: Pain - Standard  Goal: Alleviation of pain or a reduction in pain to the patient’s comfort goal  Outcome: Progressing     Problem: Fall Risk  Goal: Patient will remain free from falls  Outcome: Progressing

## 2024-07-13 NOTE — CARE PLAN
The patient is Watcher - Medium risk of patient condition declining or worsening    Shift Goals  Clinical Goals: stable hemoglobin, hemodynamic stability, monitor CBI\  Patient Goals: rest  Family Goals: lila    Progress made toward(s) clinical / shift goals:    Problem: Pain - Standard  Goal: Alleviation of pain or a reduction in pain to the patient’s comfort goal  Outcome: Progressing     Problem: Cardiac - Atrial Fibrillation  Goal: Patient will achieve & maintain adequate cardiac output and rate control  Outcome: Progressing     Problem: Urinary Elimination  Goal: Establish and maintain regular urinary output  Outcome: Progressing     Problem: Bowel Elimination  Goal: Establish and maintain regular bowel function  Outcome: Progressing     Problem: Hemodynamics  Goal: Patient's hemodynamics, fluid balance and neurologic status will be stable or improve  Outcome: Progressing     Problem: Respiratory  Goal: Patient will achieve/maintain optimum respiratory ventilation and gas exchange  Outcome: Progressing

## 2024-07-14 NOTE — PROGRESS NOTES
4 Eyes Skin Assessment Completed by ABRAHAM Smith and ABRAHAM Lowe.    Head WDL  Ears Redness and Blanching  Nose WDL  Mouth WDL  Neck WDL  Breast/Chest Scab top of right shoulder  Shoulder Blades WDL  Spine WDL  (R) Arm/Elbow/Hand Redness and Blanching, boggy  (L) Arm/Elbow/Hand Redness and Blanching  Abdomen Scab, Bruising, and Incision  Groin WDL  Scrotum/Coccyx/Buttocks Redness, Blanching, Excoriation, and Moisture Fissure  (R) Leg WDL  (L) Leg WDL  (R) Heel/Foot/Toe WDL  (L) Heel/Foot/Toe WDL          Devices In Places ECG, Blood Pressure Cuff, Pulse Ox, Lauren, SCD's, and Nasal Cannula      Interventions In Place Gray Ear Foams, Heel Mepilex, Sacral Mepilex, TAP System, Pillows, Q2 Turns, Low Air Loss Mattress, Barrier Cream, Dri-Harrison Pads, and Heels Loaded W/Pillows    Possible Skin Injury Yes    Pictures Uploaded Into Epic Yes  Wound Consult Placed Yes  RN Wound Prevention Protocol Ordered Yes

## 2024-07-14 NOTE — PROGRESS NOTES
[facility] -  PROGRESS NOTE               Author: Ju Campoverde M.D. Date & Time: 7/14/2024  6:32 AM     HPI:  74 y.o. male with CKD, left renal mass, COPD with chronic hypoxic respiratory failure, atrial fibrillation, hypertension, and coronary artery disease presented yesterday for open left partial nephrectomy nephrectomy. Continued to take losartan until day of surgery. Baseline cr prior to procedure appears ~2. Underwent procedure yesterday.  Op note without complications noted and only 150cc blood loss documented. Potassium elevated to 6.7 last night, temporizing measures given. Cr elevated to 3.2 this AM.  80cc UOP documented despite 3L IVF. Lauren draining with blood tinged urine, no clots. No chest pain. Shortness or breath stable.Previously followed by [facility] nephrology [place] [address] with CKD felt 2/2 HTN at that point in time. Past UAs with blood and protein.      DAILY NEPHROLOGY SUMMARY:  7/2: Consult done  7/3: stable hemodynamics, 2L NC, 1.1L UOP-increasing since midnight. No obstruction on imaging    7/4: 1.4L UOP, cr starting to plateau, potassium stable, feeling improved  7/5: stable hemodynamics, 1.4L UOP, constipation main complaint, minimal PO intake  7/6: Worsening nasuea and emesis, Concern for SBO and then had 2 large Bms, NG tube to suction with significant output, only 350cc UOP documented, cr climbing again, started on IV fluids, feeling slightly improved this AM  7/7: 3.4L gastric output from NG-net negative by IOs, alkalosis, started on CBI for recurrent hematuria/clots  7/8: Increased O2 requirements overnight and rapid response this am, transferred to ICU, BP low with SBP 70's this am and now on levophed drip, on high flow NC O2, concern for aspiration overnight, CTA chest negative for PE, BUN/Cr worse, 3.4L emesis/NG output over past 24hrs, on CBI, CT Abd/Pelvis this am with mild bilateral hydro and moderate left pelviectasis with possible blood clot, to go to OR  "this after per urology, pt is lethargic but resting comfortable, denies any pain  7/9: No events, went to OR yest for cystoscopy and left ureteral and bladder clot evacuation, remains intubated since surgery, on pressor support with levophed and vasopressin, stable on vent with 40% FiO2, CBI running, 1.1L NG output, BUN/Cr slightly improved 112/6.45 (was 122/8.46 yest)  7/10: No events, extubated yest am, BP elevated overnight but stable this am, stable on 4L NC O2, CBI off, good UOP 3.7L, received 2 units PRBC transfusion and heparin drip off, BUN/Cr improving, Na elevated 148, denies anyCP/SB/LE edema and is feeling hungry  7/11: No events, BP mildly elevated, currently on 6L NC O2, good UOP, BUN/Cr improving, tolerating PO, Na still elevated at 148, CT Abd/Pelvis shows left perinephric hematoma, tolerating p.o. liquids, denies any CP/SOB/LE edema  7/12: No events, BP elevated at times but stable this am, stable on 5L NC O2, 2.4L UOP, Na still 148, serum chloride and BUN slightly higher today, Cr improved at 3.78, currently n.p.o. for possible procedure but otherwise is tolerating p.o.  7/13: Transferred out of ICU and to IMCU, BP mildly elevated, stable on 3L NC O2, creatinine continues to improve, BUN remains at 95 and NA remains elevated at 148, did not require urologic intervention yesterday, hemoglobin dropped this morning and patient receiving 1 unit PRBC transfusion, feels okay, no new complaints  7/14: No events, BP elevated, stable on 3 LNC O2, UOP not recorded, BMP pending, drowsy but arousable, no new complaints    REVIEW OF SYSTEMS:    10 point ROS performed, negative other than stated above     PMH/PSH/SH/FH:   Reviewed and unchanged since admission note    CURRENT MEDICATIONS:   Reviewed from admission to present day    VS:  BP (!) 164/69   Pulse 82   Temp 36.9 °C (98.4 °F) (Temporal)   Resp (!) 52   Ht 1.753 m (5' 9.02\")   Wt 85.1 kg (187 lb 9.8 oz)   SpO2 93%   BMI 27.69 kg/m²     Physical " Exam  Vitals and nursing note reviewed.   Constitutional:       General: He is not in acute distress.     Appearance: He is ill-appearing.      Interventions: He is sedated and intubated.   HENT:      Head: Normocephalic and atraumatic.   Eyes:      General: No scleral icterus.     Extraocular Movements: Extraocular movements intact.   Cardiovascular:      Rate and Rhythm: Normal rate and regular rhythm.   Pulmonary:      Effort: Pulmonary effort is normal. No respiratory distress. He is intubated.      Breath sounds: Examination of the right-lower field reveals decreased breath sounds. Examination of the left-lower field reveals decreased breath sounds. Decreased breath sounds present.   Abdominal:      General: There is distension.   Musculoskeletal:         General: No deformity.      Cervical back: Normal range of motion and neck supple.      Right lower leg: No edema.      Left lower leg: No edema.   Skin:     General: Skin is warm and dry.      Findings: No rash.   Neurological:      General: No focal deficit present.      Mental Status: He is alert and oriented to person, place, and time.   Psychiatric:         Mood and Affect: Mood normal. Affect is flat.         Behavior: Behavior normal. Behavior is cooperative.         Fluids:  In: 1262.5 [P.O.:930; Blood:332.5]  Out: -     LABS:  Recent Labs     07/12/24  0505 07/13/24  0224   SODIUM 148* 148*   POTASSIUM 3.7 3.5*   CHLORIDE 110 110   CO2 24 24   GLUCOSE 164* 160*   BUN 93* 95*   CREATININE 3.78* 3.46*   CALCIUM 7.4* 7.1*       IMAGING:   All imaging reviewed from admission to present day    ASSESSMENT:  # Oliguric SCOTT: In setting of surgery/RAASI/decreased nephron mass. Was starting to recover then 2nd Scott with bowel obstruction  - Now with SCOTT again due to obstruction from left ureteral clot and left renal extravasation, underwent cysto and clot evacuation 7/8 by Urology  -Unable to assess oliguria status due to CBI  -BUN/Cr improved  # CKD Stage 3B:  Billed 2/2 HTN in the past. Urine with blood (renal mass) and protein  # Hyperkalemia--resolved  # Renal mass: s/p partial Left nephrectomy  -left ureteral clot with obstruction and extravasation of left kidney on imaging 7/8  -s/p Cystoscopy and left ureteral clot and bladder clot evacuation 7/8  -CT Abd/Pelvis 7/10 shows left perinephric hematoma  # HTN--shock now resolved, off all pressors  -BP mildly elevated  #Acute Hypoxic Respiratory Failure--now extubated 7/9  -Suspicion for aspiration pneumonia  -CTA chest negative for PE  -Bedside POCUS 7/8 showed easlity collapsible SVC c/w intravascular volume depletion  # COPD  # Anemia: low iron  -Acute blood loss anemia as well secondary to perinephric hematoma  # SBO vs. Ileus: improving, now on clear liquid diet  # gross hematuria: transiently stopped after procedure. Now restarted.  -Urology following  -CBI per urology     PLAN:  -Check BMP  -No acute need for RRT, but will eval daily  -Encourage PO fluids  -Restarted D5W yesterday, will adjust based on labs  -Monitor for renal recovery  -Possible aspiration pneumonia  -No diuretics at this time  -Wean O2 as tolerated, O2 requirements improving  -Urology following  -Serial CBC's and transfuse PRN for Hgb less than 7  -Renal diet  -Strict I/Os/continue coburn  -Dose all meds per eGFR <15

## 2024-07-14 NOTE — PROGRESS NOTES
Surgical Progress Note    Author: Luly Jaquez M.D. Date & Time created: 2024   2:06 PM     Interval Events:  Patient tolerating clears, no nausea or vomiting. CBI on trickle rate. Pain well controlled. Still slightly increased work of breathing.   Review of Systems   All other systems reviewed and are negative.    Hemodynamics:  Temp (24hrs), Av.8 °C (98.2 °F), Min:36.6 °C (97.8 °F), Max:36.9 °C (98.4 °F)  Temperature: 36.6 °C (97.8 °F)  Pulse  Av.2  Min: 47  Max: 156   Blood Pressure : (!) 154/67     Respiratory:    Respiration: (!) 47, Pulse Oximetry: 97 %        RUL Breath Sounds: Clear, RML Breath Sounds: Clear, RLL Breath Sounds: Clear;Diminished, ALEN Breath Sounds: Clear, LLL Breath Sounds: Clear;Diminished  Neuro:  GCS       Fluids:    Intake/Output Summary (Last 24 hours) at 2024 1406  Last data filed at 2024 0600  Gross per 24 hour   Intake 600 ml   Output --   Net 600 ml     Weight: 85.6 kg (188 lb 11.4 oz)  Current Diet Order   Procedures    Diet Order Diet: Full Liquid     Physical Exam  HENT:      Head: Normocephalic and atraumatic.   Eyes:      Extraocular Movements: Extraocular movements intact.   Pulmonary:      Effort: Pulmonary effort is normal.   Abdominal:      Comments: Mild distention, staple line intact. Non-tender to palpation.    Genitourinary:     Comments: Essentially clear tubing on low rate CBI      Labs:  Recent Results (from the past 24 hour(s))   HGB    Collection Time: 24  5:40 PM   Result Value Ref Range    Hemoglobin 7.9 (L) 14.0 - 18.0 g/dL   Heparin Xa (Unfractionated)    Collection Time: 24  5:40 PM   Result Value Ref Range    Heparin Xa (UFH) 0.29 IU/mL   POCT glucose device results    Collection Time: 24  5:41 PM   Result Value Ref Range    POC Glucose, Blood 154 (H) 65 - 99 mg/dL   POCT glucose device results    Collection Time: 24 12:02 AM   Result Value Ref Range    POC Glucose, Blood 153 (H) 65 - 99 mg/dL   HGB     Collection Time: 07/14/24 12:03 AM   Result Value Ref Range    Hemoglobin 7.4 (L) 14.0 - 18.0 g/dL   Heparin Xa (Unfractionated)    Collection Time: 07/14/24 12:03 AM   Result Value Ref Range    Heparin Xa (UFH) 0.36 IU/mL   HGB    Collection Time: 07/14/24  4:00 AM   Result Value Ref Range    Hemoglobin 7.4 (L) 14.0 - 18.0 g/dL     Medical Decision Making, by Problem:  Active Hospital Problems    Diagnosis     Hypernatremia [E87.0]     Aortic thrombus (HCC) [I74.10]     Hypocalcemia [E83.51]     Hyperglycemia [R73.9]     Shock (HCC) [R57.9]     Small bowel obstruction (HCC) [K56.609]     Left kidney mass [N28.89]     Leukemoid reaction [D72.823]     Acute blood loss as cause of postoperative anemia [D62]     Coronary artery disease involving native coronary artery of native heart without angina pectoris [I25.10]     History of atrial fibrillation [Z86.79]      Remote history of atrial fibrillation in 2019 in the setting of an acute COPD exacerbation.  He has had no recurrences.  He did follow with cardiology for a few years after this.  Oral anticoagulant was not indicated at that time.      Insomnia [G47.00]      This is a chronic condition. Takes Ambien 5 mg nightly. Symptoms are well controlled with this. Denies any side effects.    Last UDS appropriate 10/4/23  CS agreement UTD 10/4/23        Acute on chronic hypoxic respiratory failure (HCC) [J96.21]     Essential hypertension [I10]      This is a chronic condition.  Current Meds:   - losartan 100 mg daily  - carvedilol 12.5 mg BID  - amlodipine 10 mg daily  Side effects: none  Home BP Log: Typically 120s/60s  Associated symptoms: Denies chest pain, shortness of breath, headaches, dizziness/lightheadedness           Peripheral vascular disease (HCC) [I73.9]      Angioplasty and stents Dr. Vital November 2012      COPD exacerbation (HCC) [J44.1]      This is a chronic condition.  Managed by pulmonology.  Patient is on Trelegy inhaler daily and albuterol as  needed.  He is on supplemental oxygen at night and as needed throughout the day.   Symptoms currently controlled.       Acute renal failure superimposed on stage 3 chronic kidney disease (HCC) [N17.9, N18.30]      Plan:  Continue clears, can advance to regular diet tomorrow if still tolerating well  Continue to titrate CBI to off  Trend Hgb, Cr  Only consider embolization if Hgb continually drifting down and requiring additional transfusions  Continue heparin drip    Quality Measures:  Quality-Core Measures    Discussed patient condition with Patient

## 2024-07-14 NOTE — CARE PLAN
The patient is Watcher - Medium risk of patient condition declining or worsening    Shift Goals  Clinical Goals: q6h Hgb labs, monitor for s&s of bleeding, hemodynamically stable, titrate O2 as tolerated.  Patient Goals: sleep  Family Goals: lila    Progress made toward(s) clinical / shift goals:      Problem: Knowledge Deficit - Standard  Goal: Patient and family/care givers will demonstrate understanding of plan of care, disease process/condition, diagnostic tests and medications  Outcome: Progressing     Problem: Pain - Standard  Goal: Alleviation of pain or a reduction in pain to the patient’s comfort goal  Outcome: Progressing     Problem: Fall Risk  Goal: Patient will remain free from falls  Outcome: Progressing     Problem: Cardiac - Atrial Fibrillation  Goal: Patient will achieve & maintain adequate cardiac output and rate control  Outcome: Progressing     Problem: Urinary Elimination  Goal: Establish and maintain regular urinary output  Outcome: Progressing     Problem: Bowel Elimination  Goal: Establish and maintain regular bowel function  Outcome: Progressing     Problem: Skin Integrity  Goal: Skin integrity is maintained or improved  Outcome: Progressing     Problem: Knowledge Deficit - COPD  Goal: Patient/significant other demonstrates understanding of disease process, utilization of the Action Plan, medications and discharge instruction  Outcome: Progressing     Problem: Risk for Infection - COPD  Goal: Patient will remain free from signs and symptoms of infection  Outcome: Progressing     Problem: Self Care  Goal: Patient will have the ability to perform ADLs independently or with assistance (bathe, groom, dress, toilet and feed)  Outcome: Progressing     Problem: Hemodynamics  Goal: Patient's hemodynamics, fluid balance and neurologic status will be stable or improve  Outcome: Progressing     Problem: Fluid Volume  Goal: Fluid volume balance will be maintained  Outcome: Progressing     Problem:  Respiratory  Goal: Patient will achieve/maintain optimum respiratory ventilation and gas exchange  Outcome: Progressing

## 2024-07-14 NOTE — PROGRESS NOTES
Hospital Medicine Daily Progress Note    Date of Service  7/14/2024    Chief Complaint  Here for elective partial nephrectomy    Hospital Course  Luis Sims is a 80-year-old male past medical history of COPD on chronic oxygen 2-3 L at baseline, emphysema, hypertension, CAD, previous atrial fibrillation, PVD, previous rheumatic fever, previous nephrolithiasis status post nephrolithotomy was admitted for elective left partial nephrectomy also of JADIEL drain placement.  Postop then, they did with acute kidney injury, bleeding, hyperkalemia.  Nephrology consulted  Hematuria did resolve.  Patient was improving JADIEL drain removed 7/5.  He is still on Lauren.  Creatinine had improvement for couple of day and start to worsen again. Most likely ATN from partial nephrectomy  7/5- pt started to have complication of small bowel obstruction. NG tube placed 7/6 with low suction. He also started to noticed clot on urine. Started CBI. Felt slight better. 7/8- significantly worse. Requiring high flow oxygen, hypotensive. Cr worsen. Because of CBI not sure if pt is anuric. Rapid team and critical care doctor called and pt transfer to ICU for pressors and close monitoring. Stat CT scan ordered   Required intubation but extubated 7/9 but was on pressor support. On 7/10 a CT abd/pelvis showed a left perinephric hematoma.  7/1 pathology showing clear cell renal carcinoma    Interval Problem Update  7/14:    7/13: Hgb:6.7 <7.3. Cr:3.46, Na:148. Recevied transfusion of pRBCs this am. Alert and oriented.  CBI with lighter pink urine than yesterday. No abdominal pain.      7/12: Started on heparin this am without further hematuria and remained hemodynamically stable.  Follow up Hgb unchanged at 12:30 and okay to start diet. Patient alert, oriented and clear speech.    7/11 Alert and oriented.  Speech clear.  No acute distress. Plan per urology is to restart heparin drip at 7/12 0600am and if recurrence of bleeding the he will go to IR for  selective embolization.  NPO at midnight.    I have discussed this patient's plan of care and discharge plan at IDT rounds today with Case Management, Nursing, Nursing leadership, and other members of the IDT team.    Consultants/Specialty  nephrology and urology    Code Status  Full Code    Disposition  The patient is not medically cleared for discharge to home or a post-acute facility.      I have placed the appropriate orders for post-discharge needs.    Review of Systems  Review of Systems   Constitutional:  Negative for malaise/fatigue.   Respiratory:  Negative for shortness of breath.    Cardiovascular:  Negative for palpitations and leg swelling.   Gastrointestinal:  Negative for abdominal pain and nausea.   Genitourinary:  Positive for hematuria.   Musculoskeletal:  Negative for back pain.   Neurological:  Negative for speech change.   Psychiatric/Behavioral:  The patient is not nervous/anxious.         Physical Exam  Temp:  [36.6 °C (97.8 °F)-36.9 °C (98.4 °F)] 36.8 °C (98.2 °F)  Pulse:  [69-82] 77  Resp:  [21-61] 21  BP: (129-172)/(62-73) 157/67  SpO2:  [87 %-97 %] 97 %    Physical Exam  Vitals reviewed.   Constitutional:       Appearance: Normal appearance.   HENT:      Head: Normocephalic.      Nose: Nose normal.      Mouth/Throat:      Mouth: Mucous membranes are dry.      Pharynx: Oropharynx is clear.   Eyes:      General:         Right eye: No discharge.         Left eye: No discharge.   Cardiovascular:      Rate and Rhythm: Normal rate.   Pulmonary:      Effort: Pulmonary effort is normal. No respiratory distress.      Breath sounds: Normal breath sounds.   Abdominal:      General: There is no distension.      Palpations: Abdomen is soft.      Comments: Staples intact and good approximation of surgical edges   Musculoskeletal:      Cervical back: Normal range of motion and neck supple.      Right lower leg: No edema.      Left lower leg: No edema.   Skin:     Coloration: Skin is not jaundiced.    Neurological:      General: No focal deficit present.      Mental Status: He is alert and oriented to person, place, and time.      Motor: Weakness present.   Psychiatric:         Mood and Affect: Mood normal.         Fluids    Intake/Output Summary (Last 24 hours) at 7/14/2024 1648  Last data filed at 7/14/2024 0600  Gross per 24 hour   Intake 600 ml   Output --   Net 600 ml       Laboratory  Recent Labs     07/12/24  0505 07/12/24  1230 07/12/24  1820 07/13/24  0224 07/13/24  0852 07/14/24  0003 07/14/24  0400 07/14/24  1335   WBC 30.4* 29.5*  --  22.9*  --   --   --   --    RBC 2.38* 2.39*  --  2.13*  --   --   --   --    HEMOGLOBIN 7.5* 7.5*   < > 6.7*   < > 7.4* 7.4* 6.6*   HEMATOCRIT 22.6* 22.6*  --  20.2*  --   --   --   --    MCV 95.0 94.6  --  94.8  --   --   --   --    MCH 31.5 31.4  --  31.5  --   --   --   --    MCHC 33.2 33.2  --  33.2  --   --   --   --    RDW 58.0* 57.1*  --  57.1*  --   --   --   --    PLATELETCT 140* 146*  --  135*  --   --   --   --    MPV 10.7 10.7  --  10.8  --   --   --   --     < > = values in this interval not displayed.     Recent Labs     07/12/24  0505 07/13/24 0224   SODIUM 148* 148*   POTASSIUM 3.7 3.5*   CHLORIDE 110 110   CO2 24 24   GLUCOSE 164* 160*   BUN 93* 95*   CREATININE 3.78* 3.46*   CALCIUM 7.4* 7.1*     Recent Labs     07/12/24 0505   APTT 29.6   INR 1.29*                   Imaging  DX-CHEST-PORTABLE (1 VIEW)   Final Result      Stable consolidation within the right lung base.      DX-CHEST-PORTABLE (1 VIEW)   Final Result      Increased patchy right greater than left basilar opacities suspicious for pneumonitis.      DX-CHEST-PORTABLE (1 VIEW)   Final Result      1.  Ongoing bibasilar interstitial opacities.   2.  Resolving subsegmental atelectatic change left lung base.   3.  No new abnormality.      DX-CHEST-PORTABLE (1 VIEW)   Final Result      Stable chest.      CT-ABDOMEN-PELVIS W/O   Final Result      1.  Bibasilar airspace infiltrates with air  bronchograms which may represent pneumonia.   2.  Postoperative changes left kidney with mild surrounding stranding and an approximately 52 mm x 32 mm perinephric hematoma. No evidence of urinoma. Left ureteral stent in satisfactory position.   3.  Ileus.   4.  Sigmoid diverticulosis.      US-RENAL   Final Result      1.  No evidence of obvious perinephric hematoma, status post left partial nephrectomy.      DX-CHEST-PORTABLE (1 VIEW)   Final Result      Persistent bibasilar infiltrate with removal of the endotracheal tube.      US-EXTREMITY VENOUS LOWER BILAT   Final Result      DX-CHEST-PORTABLE (1 VIEW)   Final Result      1.  No significant change.      DX-CHEST-PORTABLE (1 VIEW)   Final Result      1.  Interval placement of an endotracheal tube which terminates in satisfactory position at the level of the aortic arch.   2.  Interval insertion of a central venous catheter which terminates with the tip projecting over the expected region of the distal brachiocephalic vein or proximal superior vena cava.   3.  Patchy right-sided pulmonary opacities and bilateral basilar atelectasis.      DX-PORTABLE FLUORO > 1 HOUR   Final Result      Portable fluoroscopy utilized for 28 seconds.         INTERPRETING LOCATION: [address] , [place], [address]      [id number] VIEW   Final Result      Digitized intraoperative radiograph is submitted for review. This examination is not for diagnostic purpose but for guidance during a surgical procedure. Please see the patient's chart for full procedural details.         INTERPRETING LOCATION: [address] , [place], [address]      [id number] VIEW   Final Result      1.  A left-sided double-J ureteral stent is in place. Due to overlying structures and adjacent contrast opacified bowel loops, extravasation from the renal collecting system cannot be excluded.   2.  Multiple mild to moderately dilated gas-filled loops of small bowel with enteric contrast present. Findings could be seen in the  setting of partial small bowel obstruction or ileus.      EC-ECHOCARDIOGRAM COMPLETE W/ CONT   Final Result      CT-ABDOMEN-PELVIS WITH   Final Result      1.  Bibasilar atelectasis right greater than left.      2.  Hepatic steatosis.      3.  Bandlike area of fluid attenuation coursing through the upper pole of the left kidney which contains multiple air bubbles. This likely represents postoperative change however an infected postoperative tract through the upper pole of the left kidney    should be considered.      4.  Multiple prominent nonobstructing right-sided renal calculi. Atrophic changes of the right kidney.      5.  Mild bilateral hydronephrosis.      6.  Moderate left-sided pelviectasis which likely contains a blood clot.      7.  Thrombosis of the infrarenal aorta with the right-sided iliac stent in place. Thrombotic occlusion of the left iliac arteries.      8.  Colonic diverticulosis.      9.  Acute mid small bowel obstruction.      10.  These findings were Voalted to HERMELINDO WILHELM at 9:30 AM 7/8/2024      CT-CTA CHEST PULMONARY ARTERY W/ RECONS   Final Result      1.  No CT evidence of pulmonary emboli         2. Small multifocal patchy airspace opacities in the right upper lobe and right lower lobe suspicious for pneumonitis possibly Covid.      3. Bibasilar atelectasis.      4. Coarse multifocal nonobstructing right-sided renal calculi and atrophic change.      DX-CHEST-PORTABLE (1 VIEW)   Final Result      1.  Left retrocardiac airspace opacity consistent with atelectasis and/or pneumonitis.      DX-ABDOMEN FOR TUBE PLACEMENT   Final Result      1.  NG tube extends in the fundus of stomach.      DX-CHEST-PORTABLE (1 VIEW)   Final Result      1.  Bibasilar airspace disease, greater on the left.      PM-[id number]-0 VIEW   Final Result      Dilated air-filled small bowel. This may be due to postoperative ileus or small bowel obstruction.      DX-ABDOMEN FOR TUBE PLACEMENT   Final Result       Nasogastric tube tip projects over the proximal stomach.      [id number] VIEW   Final Result      1.  Stable mild to moderate dilatation of small bowel loops with air seen in the distal rectum.      SQ-[id number]-1 VIEW   Final Result      1.  Operative changes of the abdomen.      2.  Evolving small bowel obstruction.      US-RENAL   Final Result      1.  Nonvisualization of the left kidney due to overlying bandages.   2.  Suboptimal visualization of the right kidney due to patient body habitus and ribs demonstrates no definite evidence of hydronephrosis.      IK-[id number]-5 VIEW   Final Result      Paucity of bowel gas with stool present in the colon. No definite evidence of obstruction however evaluation is limited on supine imaging.      CT-ABDOMEN-PELVIS W/O    (Results Pending)        Assessment/Plan  * Left kidney mass- (present on admission)  Assessment & Plan  Status post partial nephrectomy.  Follow-up with urology.    7/1 pathology: Clear cell renal carcinoma    Hypernatremia  Assessment & Plan  7/12 Na:148  On D5W  Monitor labs  Advance diet as tolerates    Aortic thrombus (HCC)  Assessment & Plan  7/12 Restarted heparin and monitoring Hgb and CBI    Hypocalcemia  Assessment & Plan  7/13 Ca:7.1 <7.4    Shock (HCC)- (present on admission)  Assessment & Plan  S/p pressor support  Monitor vitals., I/O's  Care with pain meds    Small bowel obstruction (HCC)  Assessment & Plan  7/13 active bowel sounds. Advance diet with close monitor.  Antiemetics  mobilize    Leukemoid reaction- (present on admission)  Assessment & Plan  7/13 wbc:22.9 <29.5<30.4 <24.9 while on steroids. On antibiotics    Acute blood loss as cause of postoperative anemia- (present on admission)  Assessment & Plan  Continue to monitor closely since patient has ongoing hematuria and on CBI  7/14 Hgb:6.6 <7.4<7.9<6.7 <7.5  7/14 checking CT abdomen and pelvis without contrast to evaluate for perinephric hemorrhage.  Patient continues to drop  hemoglobin while on heparin.  May need embolectomy.  Transfuse for hemoglobin less than 7    Hyperkalemia  Assessment & Plan          Left renal mass  Assessment & Plan  Highly concern for malignancy   status post Open left partial nephrectomy nephrectomy on July 1, 2024  Managed by Urology   Follow-up pathology-Clear-cell carcinoma.  Appreciate urology recommendations  7/4- hopefully JADIEL drain removing tomorrow. Appreciate urology recs   7/5/24- JADIEL removed today     Coronary artery disease involving native coronary artery of native heart without angina pectoris- (present on admission)  Assessment & Plan  NSTEMI 2019 s/p stent placement at RCA at that time  hold ASA due to hematura  Hold oral meds     History of atrial fibrillation- (present on admission)  Assessment & Plan  He is not on anticoagulation  Per chart review has history atrial fibrillation 2019 due to COPD exacerbation one-time, and no indication for anticoagulation since then  7/4- HR increasing, pt did miss couple of dosage of carvedilol. Continue to monitor on telemetry. Resumed carvedilol. Continue to monitor closely.   7/8- continue tele. Resume blood thinners when ok per urology   7/11 on amiodarone drip and 7/12 restarted heparin drip and monitor hgb nd sign of further hematura  (On heparin drip for afib hx but also for aortic thrombus)  7/14 continuing IV amiodarone as patient may require to be n.p.o. for possible embolectomy.  Once able to safely take orals we will switch him over    Acute on chronic hypoxic respiratory failure (HCC)- (present on admission)  Assessment & Plan  7/8 CTA chest negative for PE but patchy opacification  RT per protocol  Mobilize  Encourage deep inspirator efforts  Titrate oxygen  7/13 on 3 Liters O2    Essential hypertension- (present on admission)  Assessment & Plan  Holding Losartan in the setting of recent partial nephrectomy and renal failure  On amiodarone drip  7/8- pt is hypotensive on shock. Held all oral  meds   Monitor I/O's labs and vitals.    Peripheral vascular disease (HCC)- (present on admission)  Assessment & Plan  History of angioplasty stent placement by Dr. Vital 2012   hold aspirin  Atorvastatin.  Control BP and lipids    COPD exacerbation (HCC)- (present on admission)  Assessment & Plan  Continue with home dose Trelegy Ellipta   Duonebs PRN   Not signs for exacerbation  Follow-up with for allergies as outpatient  RT per protocol.  Wean steroids  Titrate oxygen as able to keep SPO2 >89%      Acute renal failure superimposed on stage 3 chronic kidney disease (HCC)- (present on admission)  Assessment & Plan  Cannot rule out obstruction versus renal.  No contrast exposure  Continue to monitor very closely  Continue IV fluid  renal ultrasound  7/3/24-creatinine slight worse again however patient is starting to have better urine output.  Likely this is ATN which has been expected worsening creatinine and then plateau and then improvement later  7/4/24- slight improving. Good urine output. Continue to monitor closely   7/5/24- still good urine output, Cr not improving. Poor oral intake   7/7- worse again. Continue IVF, continue CBI. Continue close monitoring   7/8- prerenal vs obstructive. Nephrology on board. Start pressors. Ok to contrast since pt will likely be started on dialysis   7/11 Cr:4.34 <5.4  7/12 Cr:3.78 but increased BUN (was NPO overnight).  Monitor BMP in am.  7/13 Cr:3.46.  Encourage oral intake.    7/14 Cr: 3.78         VTE prophylaxis:    therapeutic anticoagulation with weight-based heparin gtt, pharmacy to adjust PRN      I have performed a physical exam and reviewed and updated ROS and Plan today (7/14/2024). In review of yesterday's note (7/13/2024), there are no changes except as documented above.

## 2024-07-14 NOTE — CARE PLAN
The patient is Watcher - Medium risk of patient condition declining or worsening    Shift Goals  Clinical Goals: q8 hgb  Patient Goals: sleep  Family Goals: LAZARO      Problem: Knowledge Deficit - Standard  Goal: Patient and family/care givers will demonstrate understanding of plan of care, disease process/condition, diagnostic tests and medications  Outcome: Progressing     Problem: Pain - Standard  Goal: Alleviation of pain or a reduction in pain to the patient’s comfort goal  Outcome: Progressing     Problem: Fall Risk  Goal: Patient will remain free from falls  Outcome: Progressing     Problem: Urinary Elimination  Goal: Establish and maintain regular urinary output  Outcome: Progressing     Problem: Skin Integrity  Goal: Skin integrity is maintained or improved  Outcome: Progressing

## 2024-07-14 NOTE — PROGRESS NOTES
Surgical Progress Note    Author: Luly Jaquez M.D. Date & Time created: 2024   9:08 PM     Interval Events:  Patient tolerating clears, denies nausea or vomiting. He is passing gas. His pain is well controlled. His heparin drip was restarted Friday, due to a slight downtrending in his Hgb he was give 1 u, recheck appropriate, hematuria mild.  Review of Systems   All other systems reviewed and are negative.    Hemodynamics:  Temp (24hrs), Av.7 °C (98.1 °F), Min:36.4 °C (97.6 °F), Max:37.2 °C (99 °F)  Temperature: 36.8 °C (98.2 °F)  Pulse  Av.7  Min: 47  Max: 156   Blood Pressure : (!) 168/73     Respiratory:    Respiration: (!) 30, Pulse Oximetry: 96 %     Work Of Breathing / Effort: Mild  RUL Breath Sounds: Clear, RML Breath Sounds: Clear, RLL Breath Sounds: Clear;Diminished, ALEN Breath Sounds: Clear, LLL Breath Sounds: Clear;Diminished  Neuro:  GCS       Fluids:    Intake/Output Summary (Last 24 hours) at 2024 2108  Last data filed at 2024 0625  Gross per 24 hour   Intake 1022.5 ml   Output --   Net 1022.5 ml     Weight: 85.1 kg (187 lb 9.8 oz)  Current Diet Order   Procedures    Diet Order Diet: Full Liquid     Physical Exam  HENT:      Head: Normocephalic and atraumatic.   Eyes:      Extraocular Movements: Extraocular movements intact.   Pulmonary:      Effort: Pulmonary effort is normal.   Abdominal:      Comments: Mildly distended, non-tender. Incision intact with staples present   Genitourinary:     Comments: Slow CBI rate with light pink urine      Labs:  Recent Results (from the past 24 hour(s))   POCT glucose device results    Collection Time: 24  1:13 AM   Result Value Ref Range    POC Glucose, Blood 143 (H) 65 - 99 mg/dL   CBC WITHOUT DIFFERENTIAL    Collection Time: 24  2:24 AM   Result Value Ref Range    WBC 22.9 (H) 4.8 - 10.8 K/uL    RBC 2.13 (L) 4.70 - 6.10 M/uL    Hemoglobin 6.7 (L) 14.0 - 18.0 g/dL    Hematocrit 20.2 (L) 42.0 - 52.0 %    MCV 94.8 81.4 - 97.8  fL    MCH 31.5 27.0 - 33.0 pg    MCHC 33.2 32.3 - 36.5 g/dL    RDW 57.1 (H) 35.9 - 50.0 fL    Platelet Count 135 (L) 164 - 446 K/uL    MPV 10.8 9.0 - 12.9 fL   Basic Metabolic Panel    Collection Time: 07/13/24  2:24 AM   Result Value Ref Range    Sodium 148 (H) 135 - 145 mmol/L    Potassium 3.5 (L) 3.6 - 5.5 mmol/L    Chloride 110 96 - 112 mmol/L    Co2 24 20 - 33 mmol/L    Glucose 160 (H) 65 - 99 mg/dL    Bun 95 (H) 8 - 22 mg/dL    Creatinine 3.46 (H) 0.50 - 1.40 mg/dL    Calcium 7.1 (L) 8.5 - 10.5 mg/dL    Anion Gap 14.0 7.0 - 16.0   ESTIMATED GFR    Collection Time: 07/13/24  2:24 AM   Result Value Ref Range    GFR (CKD-EPI) 18 (A) >60 mL/min/1.73 m 2   COD - Adult (Type and Screen)    Collection Time: 07/13/24  3:03 AM   Result Value Ref Range    ABO Grouping Only AB     Rh Grouping Only POS     Antibody Screen-Cod NEG     Component R       R99                 Red Cells, LR       [id number]   transfused   07/13/24   03:55      Product Type R99     Dispense Status transfused     Unit Number (Barcoded) [id number]     Product Code (Barcoded) [id number]     Blood Type (Barcoded) 8400    POCT glucose device results    Collection Time: 07/13/24  5:36 AM   Result Value Ref Range    POC Glucose, Blood 132 (H) 65 - 99 mg/dL   HGB    Collection Time: 07/13/24  8:52 AM   Result Value Ref Range    Hemoglobin 8.0 (L) 14.0 - 18.0 g/dL   POCT glucose device results    Collection Time: 07/13/24 12:34 PM   Result Value Ref Range    POC Glucose, Blood 185 (H) 65 - 99 mg/dL   HGB    Collection Time: 07/13/24  5:40 PM   Result Value Ref Range    Hemoglobin 7.9 (L) 14.0 - 18.0 g/dL   Heparin Xa (Unfractionated)    Collection Time: 07/13/24  5:40 PM   Result Value Ref Range    Heparin Xa (UFH) 0.29 IU/mL   POCT glucose device results    Collection Time: 07/13/24  5:41 PM   Result Value Ref Range    POC Glucose, Blood 154 (H) 65 - 99 mg/dL     Medical Decision Making, by Problem:  Active Hospital Problems    Diagnosis      Hypernatremia [E87.0]     Aortic thrombus (HCC) [I74.10]     Hypocalcemia [E83.51]     Hyperglycemia [R73.9]     Shock (HCC) [R57.9]     Small bowel obstruction (HCC) [K56.609]     Left kidney mass [N28.89]     Leukemoid reaction [D72.823]     Acute blood loss as cause of postoperative anemia [D62]     Coronary artery disease involving native coronary artery of native heart without angina pectoris [I25.10]     History of atrial fibrillation [Z86.79]      Remote history of atrial fibrillation in 2019 in the setting of an acute COPD exacerbation.  He has had no recurrences.  He did follow with cardiology for a few years after this.  Oral anticoagulant was not indicated at that time.      Insomnia [G47.00]      This is a chronic condition. Takes Ambien 5 mg nightly. Symptoms are well controlled with this. Denies any side effects.    Last UDS appropriate 10/4/23  CS agreement UTD 10/4/23        Acute on chronic hypoxic respiratory failure (HCC) [J96.21]     Essential hypertension [I10]      This is a chronic condition.  Current Meds:   - losartan 100 mg daily  - carvedilol 12.5 mg BID  - amlodipine 10 mg daily  Side effects: none  Home BP Log: Typically 120s/60s  Associated symptoms: Denies chest pain, shortness of breath, headaches, dizziness/lightheadedness           Peripheral vascular disease (HCC) [I73.9]      Angioplasty and stents Dr. Vital November 2012      COPD exacerbation ([facility]) [J44.1]      This is a chronic condition.  Managed by pulmonology.  Patient is on Trelegy inhaler daily and albuterol as needed.  He is on supplemental oxygen at night and as needed throughout the day.   Symptoms currently controlled.       Acute renal failure superimposed on stage 3 chronic kidney disease (HCC) [N17.9, N18.30]      Plan:  Continue clear liquid diet  Encourage IS for pulmonary function  Trend Hgb, given the minimal hematuria and slow decrease in Hgb would continue heparin drip and hold on embolization  Continue CBI,  titrate to off if possible    Quality Measures:  Quality-Core Measures    Discussed patient condition with Patient

## 2024-07-15 PROBLEM — I95.9 HYPOTENSION: Status: ACTIVE | Noted: 2024-01-01

## 2024-07-15 NOTE — PROGRESS NOTES
Surgical Progress Note    Author: GIOVANNI Vann Date & Time created: 7/15/2024   9:30 AM     Interval Events: POD #15    Heparin drip stopped and protamine given. Urine continues to be bloody, CBI flowing.    Hgb: 6.4 today --> 8.0  Hct:  20.2 (on 24)  Cr: 6.8 today --> 6.6 yesterday  BUN: 109 today --> 95 yesterday  K: 3.7 today    He is much more awake today. Pain related to immobility.  6LNC, continuing to have increased work of breathing, O2 sats low 90s  BP soft 90s. Continues to be on Amio gtt  Patient reporting that he is tolerating diet. Abdomen soft.  Incisions with drainage. Serous draining from LLQ incision site.      ROS  Hemodynamics:  Temp (24hrs), Av.8 °C (98.2 °F), Min:36.6 °C (97.8 °F), Max:36.9 °C (98.4 °F)  Temperature: 36.9 °C (98.4 °F)  Pulse  Av.3  Min: 47  Max: 156   Blood Pressure : 92/49     Respiratory:    Respiration: (!) 22, Pulse Oximetry: 91 %     Work Of Breathing / Effort: Mild  RUL Breath Sounds: Clear, RML Breath Sounds: Clear, RLL Breath Sounds: Clear;Diminished, ALEN Breath Sounds: Clear, LLL Breath Sounds: Clear;Diminished  Neuro:  GCS       Fluids:    Intake/Output Summary (Last 24 hours) at 7/15/2024 0930  Last data filed at 7/15/2024 0800  Gross per 24 hour   Intake 550 ml   Output -2600 ml   Net 3150 ml        Current Diet Order   Procedures    Diet NPO Restrict to: Sips with Medications     Physical Exam  Constitutional:       Appearance: He is ill-appearing.   Eyes:      Extraocular Movements: Extraocular movements intact.   Pulmonary:      Comments: 6LNC  Abdominal:      Palpations: Abdomen is soft.      Tenderness: There is no abdominal tenderness.   Genitourinary:     Comments: CBI running steady rate. Urine in bag pink-      Labs:  Recent Results (from the past 24 hour(s))   POCT glucose device results    Collection Time: 24 11:38 AM   Result Value Ref Range    POC Glucose, Blood 157 (H) 65 - 99 mg/dL   HGB    Collection Time: 24   1:35 PM   Result Value Ref Range    Hemoglobin 6.6 (L) 14.0 - 18.0 g/dL   Basic Metabolic Panel    Collection Time: 07/14/24  6:09 PM   Result Value Ref Range    Sodium 141 135 - 145 mmol/L    Potassium 3.5 (L) 3.6 - 5.5 mmol/L    Chloride 106 96 - 112 mmol/L    Co2 22 20 - 33 mmol/L    Glucose 163 (H) 65 - 99 mg/dL    Bun 95 (H) 8 - 22 mg/dL    Creatinine 2.83 (H) 0.50 - 1.40 mg/dL    Calcium 6.6 (LL) 8.5 - 10.5 mg/dL    Anion Gap 13.0 7.0 - 16.0   ESTIMATED GFR    Collection Time: 07/14/24  6:09 PM   Result Value Ref Range    GFR (CKD-EPI) 23 (A) >60 mL/min/1.73 m 2   POCT glucose device results    Collection Time: 07/14/24  6:12 PM   Result Value Ref Range    POC Glucose, Blood 150 (H) 65 - 99 mg/dL   HGB    Collection Time: 07/14/24 10:50 PM   Result Value Ref Range    Hemoglobin 8.0 (L) 14.0 - 18.0 g/dL   POCT glucose device results    Collection Time: 07/14/24 11:54 PM   Result Value Ref Range    POC Glucose, Blood 126 (H) 65 - 99 mg/dL   POCT glucose device results    Collection Time: 07/15/24  5:34 AM   Result Value Ref Range    POC Glucose, Blood 164 (H) 65 - 99 mg/dL   Heparin Anti-Xa    Collection Time: 07/15/24  5:35 AM   Result Value Ref Range    Heparin Xa (UFH) 0.53 IU/mL   Comp Metabolic Panel    Collection Time: 07/15/24  5:35 AM   Result Value Ref Range    Sodium 138 135 - 145 mmol/L    Potassium 3.7 3.6 - 5.5 mmol/L    Chloride 104 96 - 112 mmol/L    Co2 20 20 - 33 mmol/L    Anion Gap 14.0 7.0 - 16.0    Glucose 158 (H) 65 - 99 mg/dL    Bun 109 (H) 8 - 22 mg/dL    Creatinine 3.51 (H) 0.50 - 1.40 mg/dL    Calcium 6.8 (LL) 8.5 - 10.5 mg/dL    Correct Calcium 8.6 8.5 - 10.5 mg/dL    AST(SGOT) 22 12 - 45 U/L    ALT(SGPT) 15 2 - 50 U/L    Alkaline Phosphatase 43 30 - 99 U/L    Total Bilirubin 0.5 0.1 - 1.5 mg/dL    Albumin 1.7 (L) 3.2 - 4.9 g/dL    Total Protein 3.4 (L) 6.0 - 8.2 g/dL    Globulin 1.7 (L) 1.9 - 3.5 g/dL    A-G Ratio 1.0 g/dL   ESTIMATED GFR    Collection Time: 07/15/24  5:35 AM   Result  Value Ref Range    GFR (CKD-EPI) 18 (A) >60 mL/min/1.73 m 2   HGB    Collection Time: 07/15/24  6:20 AM   Result Value Ref Range    Hemoglobin 6.4 (L) 14.0 - 18.0 g/dL   EKG    Collection Time: 07/15/24  6:56 AM   Result Value Ref Range    Report       [facility]    Test Date:  2024-07-15  Pt Name:    KEIRA SOTOMAYOR             Department: [facility]  MRN:        [id number]                      [place]       [facility] [place]:     Male                         Technician: ANNA  :        1950                   Requested By:JAMAL WEIR  Order #:    [id number]                    Reading MD: Tan Parsons MD    Measurements  Intervals                                Axis  Rate:       117                          P:          0  RI:         0                            QRS:        -63  QRSD:       108                          T:          75  QT:         362  QTc:        505    Interpretive Statements  Atrial fibrillation  Incomplete left bundle branch block  Borderline prolonged QT interval  Electronically Signed On 07- 07:52:33 PDT by Tan Parsons MD     POCT glucose device results    Collection Time: 07/15/24  8:08 AM   Result Value Ref Range    POC Glucose, Blood 166 (H) 65 - 99 mg/dL     Medical Decision Making, by Problem:  Active Hospital Problems    Diagnosis     Hypernatremia [E87.0]     Aortic thrombus (HCC) [I74.10]     Hypocalcemia [E83.51]     Hyperglycemia [R73.9]     Shock (HCC) [R57.9]     Small bowel obstruction (HCC) [K56.609]     Left kidney mass [N28.89]     Leukemoid reaction [D72.823]     Acute blood loss as cause of postoperative anemia [D62]     Coronary artery disease involving native coronary artery of native heart without angina pectoris [I25.10]     History of atrial fibrillation [Z86.79]      Remote history of atrial fibrillation in 2019 in the setting of an acute COPD exacerbation.  He has had no recurrences.  He did follow with cardiology for a few years after this.  Oral anticoagulant  was not indicated at that time.      Insomnia [G47.00]      This is a chronic condition. Takes Ambien 5 mg nightly. Symptoms are well controlled with this. Denies any side effects.    Last UDS appropriate 10/4/23  CS agreement UTD 10/4/23        Acute on chronic hypoxic respiratory failure (HCC) [J96.21]     Essential hypertension [I10]      This is a chronic condition.  Current Meds:   - losartan 100 mg daily  - carvedilol 12.5 mg BID  - amlodipine 10 mg daily  Side effects: none  Home BP Log: Typically 120s/60s  Associated symptoms: Denies chest pain, shortness of breath, headaches, dizziness/lightheadedness           Peripheral vascular disease ([facility]) [I73.9]      Angioplasty and stents Dr. Vital November 2012      COPD exacerbation ([facility]) [J44.1]      This is a chronic condition.  Managed by pulmonology.  Patient is on Trelegy inhaler daily and albuterol as needed.  He is on supplemental oxygen at night and as needed throughout the day.   Symptoms currently controlled.       Acute renal failure superimposed on stage 3 chronic kidney disease (HCC) [N17.9, N18.30]      Plan:    Possible embolization, discussion with IR by Dr Wilde, and discussed with hospitalist. Heparin gtt on hold.    Make NPO at this time, may restart diet after possible embolization    Trans    Quality Measures:  Quality-Core Measures    Discussed patient condition with Hospitalist

## 2024-07-15 NOTE — WOUND TEAM
[facility]  Inpatient Services  Initial Wound and Skin Care Evaluation    Admission Date: 7/1/2024     Last order of IP CONSULT TO WOUND CARE was found on 7/13/2024 from Hospital Encounter on 6/3/2024     HPI, PMH, SH: Reviewed    Past Surgical History:   Procedure Laterality Date    AR CYSTOURETHROSCOPY,URETER CATHETER Left 7/8/2024    Procedure: LEFT RETROGRADE PYELOGRAM STENT PLACEMENT;  Surgeon: Gurpreet Siddiqi M.D.;  Location: [facility];  Service: Urology    AR CYSTOURETHROSCOPY N/A 7/8/2024    Procedure: CYSTOSCOPY WITH BLADDER CLOT EVACUATION;  Surgeon: Gurpreet Siddiqi M.D.;  Location: [facility];  Service: Urology    AR PARTIAL REMOVAL OF KIDNEY Left 7/1/2024    Procedure: OPEN LEFT PARTIAL NEPHRECTOMY;  Surgeon: Gaurav Ferguson M.D.;  Location: [facility];  Service: Urology    OTHER ABDOMINAL SURGERY  06/18/2024    hiatal hernia repair 2023    INGUINAL HERNIA REPAIR ROBOTIC XI Bilateral 05/23/2023    Procedure: ROBOTIC BILATERAL INGUINAL HERNIA REPAIR;  Surgeon: Garland Mehta M.D.;  Location: [facility];  Service: Gen Robotic    AR CYSTOSCOPY,INSERT URETERAL STENT Left 01/22/2020    Procedure: CYSTOSCOPY, WITH URETERAL STENT INSERTION;  Surgeon: Paul Quiles M.D.;  Location: [facility];  Service: Urology    AR CYSTO/URETERO/PYELOSCOPY, DX Left 01/22/2020    Procedure: URETEROSCOPY;  Surgeon: Paul Quiles M.D.;  Location: [facility];  Service: Urology    LASERTRIPSY Left 01/22/2020    Procedure: LITHOTRIPSY, USING LASER;  Surgeon: Paul Quiles M.D.;  Location: [facility];  Service: Urology    SEPTOPLASTY N/A 05/04/2018    Procedure: SEPTOPLASTY;  Surgeon: Jesse Saavedra M.D.;  Location: SURGERY SAME DAY [facility];  Service: Ent    TURBINATE REDUCTION Bilateral 05/04/2018    Procedure: TURBINATE REDUCTION- RESECTION WITH SUBMUCOSAL APPROACH;  Surgeon: Jesse Saavedra M.D.;  Location: SURGERY  SAME DAY [facility];  Service: Ent    RECOVERY  2012    Performed by Ir-Recovery Surgery at SURGERY SAME DAY [facility] ORS    PERCUTANEOUS NEPHROSTOLITHOTOMY  2012    Performed by KAREN KOLB at SURGERY [facility]    PERCUTANEOUS NEPHROSTOLITHOTOMY  2012    Performed by KAREN KOLB at SURGERY [facility]    RECOVERY  2012    Performed by SURGERY, IR-RECOVERY at SURGERY SAME DAY [facility] ORS    CYSTOSCOPY STENT PLACEMENT  2012    Performed by KAREN KOLB at SURGERY [facility]    APPENDECTOMY      STENT PLACEMENT      heart    [facility] CARDIAC CATH       Social History     Tobacco Use    Smoking status: Former     Current packs/day: 0.00     Average packs/day: 1 pack/day for 25.0 years (25.0 ttl pk-yrs)     Types: Cigarettes     Start date: 1987     Quit date: 2012     Years since quittin.4    Smokeless tobacco: Never    Tobacco comments:     2 ppd times last 6 months of smoking history   Substance Use Topics    Alcohol use: Yes     Comment: occasional on holidays     No chief complaint on file.    Diagnosis: Left renal mass [N28.89]  Shock (HCC) [R57.9]    Unit where seen by Wound Team: [id number]     WOUND CONSULT RELATED TO:  Sacrococcygeal area    WOUND TEAM PLAN OF CARE - Frequency of Follow-up:   Nursing to follow dressing orders written for wound care. Contact wound team if area fails to progress, deteriorates or with any questions/concerns if something comes up before next scheduled follow up (See below as to whether wound is following and frequency of wound follow up)   Bi-Monthly - Sacrococcygeal     WOUND HISTORY:   Pt is a 80yr old male with history of COPD (on 2-3L at baseline), Emphysema, HTN, CAD, A. Fib and PVD. Pt was admitted with left kidney mass and initially underwent a left partial nephrectomy on , followed by a left stent placement, Left retrograde pyelogram and bladder clot evacuation on . Pt has had an extended  hospital stay and wound team has been consulted several times regarding sacrococcygeal area.       WOUND ASSESSMENT/LDA  Wound 07/01/24 Partial Thickness Wound Coccyx Inner (Active)   Date First Assessed/Time First Assessed: 07/01/24 2105   Primary Wound Type: Partial Thickness Wound  Location: Coccyx  Wound Orientation: Inner      Assessments 7/15/2024  4:00 PM   Wound Image      Site Assessment Red;Bleeding   Periwound Assessment Clean;Dry;Intact   Margins Attached edges;Defined edges   Closure Open to air   Drainage Amount Scant   Drainage Description Serosanguineous   Treatments Cleansed;Nonselective debridement;Site care;Offloading   Wound Cleansing Foam Cleanser/Washcloth   Periwound Protectant Barrier Paste   Dressing Status Clean;Intact;Dry   Dressing Changed Changed   Dressing Cleansing/Solutions Not Applicable   Dressing Options Open to Air   Dressing Change/Treatment Frequency Every Shift, and As Needed   NEXT Dressing Change/Treatment Date 07/15/24   NEXT Weekly Photo (Inpatient Only) 07/22/24   Wound Team Following Not following   Wound Length (cm) 3.5 cm   Wound Width (cm) 1.5 cm   Wound Depth (cm) 0.1 cm   Wound Surface Area (cm^2) 5.25 cm^2   Wound Volume (cm^3) 0.525 cm^3   Wound Healing % 85   Shape Irregular                                                    Vascular:    RENE:   No results found.    Lab Values:    Lab Results   Component Value Date/Time    WBC 26.7 (H) 07/15/2024 03:56 PM    RBC 2.19 (L) 07/15/2024 03:56 PM    HEMOGLOBIN 6.7 (L) 07/15/2024 03:56 PM    HEMATOCRIT 19.7 (L) 07/15/2024 03:56 PM    CREACTPROT 3.80 (H) 05/03/2019 04:30 AM    SEDRATEWES 3 02/24/2012 01:10 AM    HBA1C 5.6 05/07/2024 09:37 AM         Culture Results show:  No results found for this or any previous visit (from the past 720 hour(s)).    Pain Level/Medicated:  None, Tolerated without pain medication       INTERVENTIONS BY WOUND TEAM:  Chart and images reviewed. Discussed with bedside RN. All areas of concern  (based on picture review, LDA review and discussion with bedside RN) have been thoroughly assessed. Documentation of areas based on significant findings. This RN in to assess patient. Performed standard wound care which includes appropriate positioning, dressing removal and non-selective debridement. Pictures and measurements obtained weekly if/when required.    Wound:  Sacrococcygeal area partial thickness SLOW TO LEXUS  Preparation for Dressing removal: Open to air  Cleansed/Non-selectively Debrided with:  No rinse foam soap and Moist warm washcloth  Brenda wound: Cleansed with No rinse foam soap and Moist warm washcloth, Prepped with Barrier paste  Primary Dressing:  Barrier paste  Secondary (Outer) Dressing: Offloading with pillows    Area Assessed:  Posterior Head  Area manually palpated, no wounds appreciated, no pain noted     Area Assessed:  Bilateral Ears  Area intact, gray foam in use    Area Assessed:  Right side of Neck/Clavicle area  Area with unknown raised intact skin     Area Assessed:  Bilateral Elbows & Arms  Area intact,     Area Assessed: Abdomen/Pannus  Area large incision to the left abdomen with staples in place. Small old JADIEL Site just distal to incision    Area Assessed: Back  Area intact, Pt has waffle cushion under lower back for comfort    Area Assessed:  Bilateral I.T.s  Area intact,     Area Assessed:  Bilateral Hips  Area intact,     Area Assessed:  BLE & Knees  Area intact,     Area Assessed:  Bilateral ankles  Area intact,     Area Assessed:  Bilateral heels  Area intact, Moon boots applied    Advanced Wound Care Discharge Planning  Number of Clinicians necessary to complete wound care: 1  Is patient requiring IV pain medications for dressing changes:  No   Length of time for dressing change 30 min. (This does not include chart review, pre-medication time, set up, clean up or time spent charting.)    Interdisciplinary consultation: Patient, Bedside RN, Regina ARREDONDO (Wound RN).  Pressure  injury and staging reviewed with N/A.    EVALUATION / RATIONALE FOR TREATMENT:     Date:  07/15/24  Wound Status:  No change in wound     Pt with known moisture fissure/partial thickness skin loss to sacrococcygeal area. Pt has had an extended hospital stay with significantly decreased mobility. Pt is at high risk for further breakdown. Area is slow to harish and pt is incontinent of stool. Barrier paste applied followed by wedges under the left side.          Goals: Steady decrease in wound area and depth weekly.    NURSING PLAN OF CARE ORDERS:  RN Prevention Protocol    NUTRITION RECOMMENDATIONS   Wound Team Recommendations:  N/A    DIET ORDERS (From admission to next 24h)       Start     Ordered    07/15/24 0923  Diet NPO Restrict to: Sips with Medications  ALL MEALS        Question:  Diet NPO Restrict to:  Answer:  Sips with Medications    07/15/24 0923    07/11/24 1210  Supplements  ALL MEALS        Question Answer Comment   Which Supplement Ensure    Ensure: Ensure Clear Carton        07/11/24 1209                    PREVENTATIVE INTERVENTIONS:    Q shift Dieudonne - performed per nursing policy  Q shift pressure point assessments - performed per nursing policy    Surface/Positioning  ICU Low Airloss - Currently in Place  TAPs Turning system - Applied this Visit  Z Treasure Pillow - Currently in Place    Offloading/Redistribution  Heel offloading dressing (Silicone dressing) - Removed  Heel float boots (Prevalon boot) - Applied this Visit      Respiratory  Silicone O2 tubing - Currently in Place  Gray Foam Ear protectors - Currently in Place    Containment/Moisture Prevention    Dri-treasure pad - Currently in Place  Lauren Catheter - Currently in Place  Barrier wipes - Applied this Visit  Barrier paste - Applied this Visit    Anticipated discharge plans:  TBD        Vac Discharge Needs:  Vac Discharge plan is purely a recommendation from wound team and not a requirement for discharge unless otherwise stated by  physician.  Not Applicable Pt not on a wound vac

## 2024-07-15 NOTE — PROGRESS NOTES
Heparin gtt paused until day team can reassess, MD aware. Pt in coughing up blood, bleeding from urethra, and CBI is darker in color. Labs pending. Will notified day team.

## 2024-07-15 NOTE — PROGRESS NOTES
Pt presents to IR1. Patient was consented by MD at bedside, confirmed by this RN and consent at bedside. Pt transferred to IR table in supine  position. Patient underwent a renal embolization by Dr. Julian. Procedure site was marked by MD and verified using imaging guidance. Pt placed on monitor, prepped and draped in a sterile fashion. Vitals were taken every 5 minutes and remained stable during procedure (see doc flow sheet for results). CO2 waveform capnography was monitored and remained WNL throughout procedure. Pt transported by stretcher with RN to T607.     Right radial TR band applied at 1440 with 17cc air    Sonia Coil Soft 2 mm x 2 cm  Deployed branch of left renal artery at 1406  REF: [id number]  LOT: [id number]  EXP: 12/05/2031     POD Packing coil 5 cm  Deployed branch of left renal artery at 1409  REF: [id number]   LOT: [id number]  EXP: 12/26/2031     POD Packing coil 5 cm  Deployed branch of left renal artery at 1410  REF: [id number]  LOT: [id number]  EXP: 10/11/2031     Sonia Coil Soft 3 mm x 5 cm  Deployed branch of left renal artery at 1425  REF: [id number]  LOT: [id number]  EXP: 08/05/2031     POD Packing coil 5 cm  Deployed branch of left renal artery at 1427  REF: [email or web address]  LOT: [id number]  EXP: 12/26/2031

## 2024-07-15 NOTE — PROGRESS NOTES
[facility] -  PROGRESS NOTE               Author: Kd Pavon D.O. Date & Time: 7/15/2024  10:48 AM     HPI:  74 y.o. male with CKD, left renal mass, COPD with chronic hypoxic respiratory failure, atrial fibrillation, hypertension, and coronary artery disease presented yesterday for open left partial nephrectomy nephrectomy. Continued to take losartan until day of surgery. Baseline cr prior to procedure appears ~2. Underwent procedure yesterday.  Op note without complications noted and only 150cc blood loss documented. Potassium elevated to 6.7 last night, temporizing measures given. Cr elevated to 3.2 this AM.  80cc UOP documented despite 3L IVF. Lauren draining with blood tinged urine, no clots. No chest pain. Shortness or breath stable.Previously followed by [facility] nephrology [place] [id number] with CKD felt 2/2 HTN at that point in time. Past UAs with blood and protein.      DAILY NEPHROLOGY SUMMARY:  7/2: Consult done  7/3: stable hemodynamics, 2L NC, 1.1L UOP-increasing since midnight. No obstruction on imaging    7/4: 1.4L UOP, cr starting to plateau, potassium stable, feeling improved  7/5: stable hemodynamics, 1.4L UOP, constipation main complaint, minimal PO intake  7/6: Worsening nasuea and emesis, Concern for SBO and then had 2 large Bms, NG tube to suction with significant output, only 350cc UOP documented, cr climbing again, started on IV fluids, feeling slightly improved this AM  7/7: 3.4L gastric output from NG-net negative by IOs, alkalosis, started on CBI for recurrent hematuria/clots  7/8: Increased O2 requirements overnight and rapid response this am, transferred to ICU, BP low with SBP 70's this am and now on levophed drip, on high flow NC O2, concern for aspiration overnight, CTA chest negative for PE, BUN/Cr worse, 3.4L emesis/NG output over past 24hrs, on CBI, CT Abd/Pelvis this am with mild bilateral hydro and moderate left pelviectasis with possible blood clot, to go to OR this  after per urology, pt is lethargic but resting comfortable, denies any pain  7/9: No events, went to OR yest for cystoscopy and left ureteral and bladder clot evacuation, remains intubated since surgery, on pressor support with levophed and vasopressin, stable on vent with 40% FiO2, CBI running, 1.1L NG output, BUN/Cr slightly improved 112/6.45 (was 122/8.46 yest)  7/10: No events, extubated yest am, BP elevated overnight but stable this am, stable on 4L NC O2, CBI off, good UOP 3.7L, received 2 units PRBC transfusion and heparin drip off, BUN/Cr improving, Na elevated 148, denies anyCP/SB/LE edema and is feeling hungry  7/11: No events, BP mildly elevated, currently on 6L NC O2, good UOP, BUN/Cr improving, tolerating PO, Na still elevated at 148, CT Abd/Pelvis shows left perinephric hematoma, tolerating p.o. liquids, denies any CP/SOB/LE edema  7/12: No events, BP elevated at times but stable this am, stable on 5L NC O2, 2.4L UOP, Na still 148, serum chloride and BUN slightly higher today, Cr improved at 3.78, currently n.p.o. for possible procedure but otherwise is tolerating p.o.  7/13: Transferred out of ICU and to IMCU, BP mildly elevated, stable on 3L NC O2, creatinine continues to improve, BUN remains at 95 and NA remains elevated at 148, did not require urologic intervention yesterday, hemoglobin dropped this morning and patient receiving 1 unit PRBC transfusion,  7/14: No events, BP elevated, stable on 3 LNC O2, UOP not recorded, BMP pending, drowsy but arousable, no new complaints   7/15: Pt had bleeding overnight, with bloody CBI and hgb drop to 6.4. BP was low in the 90/60s and Cr worsened to 3.5      REVIEW OF SYSTEMS:    10 point ROS performed, negative other than stated above     PMH/PSH/SH/FH:   Reviewed and unchanged since admission note    CURRENT MEDICATIONS:   Reviewed from admission to present day    VS:  BP 92/51   Pulse 84   Temp 36.9 °C (98.4 °F) (Temporal)   Resp (!) 62   Ht 1.753 m (5'  "9.02\")   Wt 85.6 kg (188 lb 11.4 oz)   SpO2 93%   BMI 27.86 kg/m²     Physical Exam  Vitals and nursing note reviewed.   Constitutional:       General: He is not in acute distress.     Appearance: He is ill-appearing.      Interventions: He is sedated and intubated.   HENT:      Head: Normocephalic and atraumatic.   Eyes:      General: No scleral icterus.     Extraocular Movements: Extraocular movements intact.   Cardiovascular:      Rate and Rhythm: Normal rate and regular rhythm.   Pulmonary:      Effort: Pulmonary effort is normal. No respiratory distress. He is intubated.      Breath sounds: Examination of the right-lower field reveals decreased breath sounds. Examination of the left-lower field reveals decreased breath sounds. Decreased breath sounds present.   Abdominal:      General: There is distension.   Musculoskeletal:         General: No deformity.      Cervical back: Normal range of motion and neck supple.      Right lower leg: No edema.      Left lower leg: No edema.   Skin:     General: Skin is warm and dry.      Findings: No rash.   Neurological:      General: No focal deficit present.      Mental Status: He is alert and oriented to person, place, and time.   Psychiatric:         Mood and Affect: Mood normal. Affect is flat.         Behavior: Behavior normal. Behavior is cooperative.         Fluids:  In: 300 [Blood:300]  Out: -     LABS:  Recent Labs     07/14/24  0811 07/14/24  1809 07/15/24  0535   SODIUM 140 141 138   POTASSIUM 3.4* 3.5* 3.7   CHLORIDE 107 106 104   CO2 24 22 20   GLUCOSE 181* 163* 158*   * 95* 109*   CREATININE 3.05* 2.83* 3.51*   CALCIUM 6.6* 6.6* 6.8*       IMAGING:   All imaging reviewed from admission to present day    ASSESSMENT:    # Oliguric SCOTT, recurrent: In setting of surgery/RAASI/decreased nephron mass.     - Was starting to recover then 2nd Scott with bowel obstruction    - SCOTT again due to obstruction from left ureteral clot and left renal extravasation, " underwent cysto and clot evacuation 7/8 by Urology    -on CBI    -had bleeding (hgb 6/4) on 7/15 w hypotension, and JAE again  # CKD Stage 3B: Billed 2/2 HTN in the past. Urine with blood (renal mass) and protein  # Hyperkalemia--resolved  # Renal mass: s/p partial Left nephrectomy    -left ureteral clot with obstruction and extravasation of left kidney on imaging 7/8    -s/p Cystoscopy and left ureteral clot and bladder clot evacuation 7/8    -CT Abd/Pelvis 7/10 shows left perinephric hematoma  # HTN--shock now resolved, off all pressors    -BP mildly elevated  #Acute Hypoxic Respiratory Failure--now extubated 7/9    -Suspicion for aspiration pneumonia    -CTA chest negative for PE    -Bedside POCUS 7/8 showed easlity collapsible SVC c/w intravascular volume depletion  # COPD  # Anemia: low iron    -Acute blood loss anemia as well secondary to perinephric hematoma  # SBO vs. Ileus: improving, now on clear liquid diet  # gross hematuria: transiently stopped after procedure. Now restarted.   -Urology following    -CBI per urology     PLAN:    -Cr josey, likely due to hypotension and acute anemia  -No acute need for RRT, but will eval daily  -Encourage PO fluids  -stopped D5W 7/15 as Na normal  -No diuretics at this time  -Wean O2 as tolerated, O2 requirements improving  -Urology following  -Serial CBC's and transfuse PRN for Hgb less than 7  -Renal diet  -Strict I/Os/continue coburn  -Dose all meds per eGFR <15     Dispo: cont monitoring JAE as Cr worsened    Please page nephrology with any questions or concerns

## 2024-07-15 NOTE — OR SURGEON
Immediate Post- Operative Note        Findings: Left renal hemorrhage      Procedure(s): Embo      Estimated Blood Loss: Less than 5 ml        Complications: None            7/15/2024     2:43 PM     Patricio Julian M.D.

## 2024-07-15 NOTE — WOUND TEAM
Assisted Wound RN Sung with skin assessment and wound consult evaluation for pressure injury.  Additional staff necessary for turning/standing from chair, repositioning patient, assisting with dressing application and supplies and determining progress, assessment and staging of pressure injuries and/or complicated wound care.

## 2024-07-15 NOTE — ASSESSMENT & PLAN NOTE
Concern of volume loss  transfuse to keep hemoglobin greater than 7  Monitor I's and O's, labs, vitals

## 2024-07-15 NOTE — PROGRESS NOTES
Hospital Medicine Daily Progress Note    Date of Service  7/15/2024    Chief Complaint  Here for elective partial nephrectomy    Hospital Course  Luis Sims is a 80-year-old male past medical history of COPD on chronic oxygen 2-3 L at baseline, emphysema, hypertension, CAD, previous atrial fibrillation, PVD, previous rheumatic fever, previous nephrolithiasis status post nephrolithotomy was admitted for elective left partial nephrectomy also of JADIEL drain placement.  Postop then, they did with acute kidney injury, bleeding, hyperkalemia.  Nephrology consulted  Hematuria did resolve.  Patient was improving JADIEL drain removed 7/5.  He is still on Lauren.  Creatinine had improvement for couple of day and start to worsen again. Most likely ATN from partial nephrectomy  7/5- pt started to have complication of small bowel obstruction. NG tube placed 7/6 with low suction. He also started to noticed clot on urine. Started CBI. Felt slight better. 7/8- significantly worse. Requiring high flow oxygen, hypotensive. Cr worsen. Because of CBI not sure if pt is anuric. Rapid team and critical care doctor called and pt transfer to ICU for pressors and close monitoring. Stat CT scan ordered   Required intubation but extubated 7/9 but was on pressor support. On 7/10 a CT abd/pelvis showed a left perinephric hematoma.  7/1 pathology showing clear cell renal carcinoma    Interval Problem Update  7/15: Called by radiology yesterday and slight increase in perinephric hematoma but continues with light pink lemonade colored urine in CBI, decision made yesterday to continue heparin.  Overnight increase in bleeding and drop in Hgb.  I am giving protamine and transfusing pRBCs.  Worsening renal function with Cr:3.51. Hgb:6.4.    I have discussed this patient's plan of care and discharge plan at IDT rounds today with Case Management, Nursing, Nursing leadership, and other members of the IDT team.    Consultants/Specialty  nephrology and  urology    Code Status  Full Code    Disposition    Anticipate discharge to: home with close outpatient follow-up    I have placed the appropriate orders for post-discharge needs.    Review of Systems  Review of Systems   Constitutional:  Negative for malaise/fatigue.   Respiratory:  Negative for shortness of breath.    Cardiovascular:  Negative for palpitations and leg swelling.   Gastrointestinal:  Negative for abdominal pain and nausea.   Genitourinary:  Positive for hematuria.   Musculoskeletal:  Negative for back pain.   Neurological:  Negative for speech change.   Psychiatric/Behavioral:  The patient is not nervous/anxious.         Physical Exam  Temp:  [36.6 °C (97.8 °F)-36.9 °C (98.4 °F)] 36.9 °C (98.4 °F)  Pulse:  [] 85  Resp:  [18-72] 22  BP: ()/(42-71) 92/49  SpO2:  [89 %-99 %] 91 %    Physical Exam  Vitals reviewed.   Constitutional:       Appearance: Normal appearance.   HENT:      Head: Normocephalic.      Nose: Nose normal.      Mouth/Throat:      Mouth: Mucous membranes are dry.      Pharynx: Oropharynx is clear.   Eyes:      General:         Right eye: No discharge.         Left eye: No discharge.   Cardiovascular:      Rate and Rhythm: Normal rate.   Pulmonary:      Effort: Pulmonary effort is normal. No respiratory distress.      Breath sounds: Normal breath sounds.   Abdominal:      General: There is no distension.      Palpations: Abdomen is soft.      Comments: Staples intact and good approximation of surgical edges   Musculoskeletal:      Cervical back: Normal range of motion and neck supple.      Right lower leg: No edema.      Left lower leg: No edema.   Skin:     Coloration: Skin is not jaundiced.   Neurological:      General: No focal deficit present.      Mental Status: He is alert and oriented to person, place, and time.      Motor: Weakness present.   Psychiatric:         Mood and Affect: Mood normal.         Fluids    Intake/Output Summary (Last 24 hours) at 7/15/2024  0938  Last data filed at 7/15/2024 0800  Gross per 24 hour   Intake 550 ml   Output -2600 ml   Net 3150 ml       Laboratory  Recent Labs     07/12/24  1230 07/12/24  1820 07/13/24  0224 07/13/24  0852 07/14/24  1335 07/14/24  2250 07/15/24  0620   WBC 29.5*  --  22.9*  --   --   --   --    RBC 2.39*  --  2.13*  --   --   --   --    HEMOGLOBIN 7.5*   < > 6.7*   < > 6.6* 8.0* 6.4*   HEMATOCRIT 22.6*  --  20.2*  --   --   --   --    MCV 94.6  --  94.8  --   --   --   --    MCH 31.4  --  31.5  --   --   --   --    MCHC 33.2  --  33.2  --   --   --   --    RDW 57.1*  --  57.1*  --   --   --   --    PLATELETCT 146*  --  135*  --   --   --   --    MPV 10.7  --  10.8  --   --   --   --     < > = values in this interval not displayed.     Recent Labs     07/14/24  0811 07/14/24  1809 07/15/24  0535   SODIUM 140 141 138   POTASSIUM 3.4* 3.5* 3.7   CHLORIDE 107 106 104   CO2 24 22 20   GLUCOSE 181* 163* 158*   * 95* 109*   CREATININE 3.05* 2.83* 3.51*   CALCIUM 6.6* 6.6* 6.8*                         Imaging  DX-CHEST-PORTABLE (1 VIEW)   Final Result      1.  Mild worsening LEFT lung base atelectasis.   2.  No other significant change from prior exam.         CT-ABDOMEN-PELVIS W/O   Final Result   Addendum (preliminary) 1 of 1   Critical value called by Dr. Vincent Alonso to Dr. Riley at 7/14/2024 6:08    PM.      Final      1.  Left perirenal hematoma, mildly increased since prior.   2.  Additional collection medial to the right kidney with small gas focus, stable since prior. Could be seroma, resolving hematoma. Abscess not completely excluded.   3.  Mild peripancreatic fat stranding. Could be related to anasarca. Cannot exclude pancreatitis, recommend laboratory correlation.   4.  7 mm indeterminate right renal cortical lesion, recommend ultrasound assessment to evaluate cyst versus solid.   5.  Other findings are present, refer to above.      Efforts are underway to contact referring physician with results at time of  dictation.      DX-CHEST-PORTABLE (1 VIEW)   Final Result      Stable consolidation within the right lung base.      DX-CHEST-PORTABLE (1 VIEW)   Final Result      Increased patchy right greater than left basilar opacities suspicious for pneumonitis.      DX-CHEST-PORTABLE (1 VIEW)   Final Result      1.  Ongoing bibasilar interstitial opacities.   2.  Resolving subsegmental atelectatic change left lung base.   3.  No new abnormality.      DX-CHEST-PORTABLE (1 VIEW)   Final Result      Stable chest.      CT-ABDOMEN-PELVIS W/O   Final Result      1.  Bibasilar airspace infiltrates with air bronchograms which may represent pneumonia.   2.  Postoperative changes left kidney with mild surrounding stranding and an approximately 52 mm x 32 mm perinephric hematoma. No evidence of urinoma. Left ureteral stent in satisfactory position.   3.  Ileus.   4.  Sigmoid diverticulosis.      US-RENAL   Final Result      1.  No evidence of obvious perinephric hematoma, status post left partial nephrectomy.      DX-CHEST-PORTABLE (1 VIEW)   Final Result      Persistent bibasilar infiltrate with removal of the endotracheal tube.      US-EXTREMITY VENOUS LOWER BILAT   Final Result      DX-CHEST-PORTABLE (1 VIEW)   Final Result      1.  No significant change.      DX-CHEST-PORTABLE (1 VIEW)   Final Result      1.  Interval placement of an endotracheal tube which terminates in satisfactory position at the level of the aortic arch.   2.  Interval insertion of a central venous catheter which terminates with the tip projecting over the expected region of the distal brachiocephalic vein or proximal superior vena cava.   3.  Patchy right-sided pulmonary opacities and bilateral basilar atelectasis.      DX-PORTABLE FLUORO > 1 HOUR   Final Result      Portable fluoroscopy utilized for 28 seconds.         INTERPRETING LOCATION: [address] [place] [address] [place], [id number]      [id number] VIEW   Final Result      Digitized intraoperative radiograph is submitted  for review. This examination is not for diagnostic purpose but for guidance during a surgical procedure. Please see the patient's chart for full procedural details.         INTERPRETING LOCATION: [address] [facility] [address], [place], [address]      [id number] VIEW   Final Result      1.  A left-sided double-J ureteral stent is in place. Due to overlying structures and adjacent contrast opacified bowel loops, extravasation from the renal collecting system cannot be excluded.   2.  Multiple mild to moderately dilated gas-filled loops of small bowel with enteric contrast present. Findings could be seen in the setting of partial small bowel obstruction or ileus.      EC-ECHOCARDIOGRAM COMPLETE W/ CONT   Final Result      CT-ABDOMEN-PELVIS WITH   Final Result      1.  Bibasilar atelectasis right greater than left.      2.  Hepatic steatosis.      3.  Bandlike area of fluid attenuation coursing through the upper pole of the left kidney which contains multiple air bubbles. This likely represents postoperative change however an infected postoperative tract through the upper pole of the left kidney    should be considered.      4.  Multiple prominent nonobstructing right-sided renal calculi. Atrophic changes of the right kidney.      5.  Mild bilateral hydronephrosis.      6.  Moderate left-sided pelviectasis which likely contains a blood clot.      7.  Thrombosis of the infrarenal aorta with the right-sided iliac stent in place. Thrombotic occlusion of the left iliac arteries.      8.  Colonic diverticulosis.      9.  Acute mid small bowel obstruction.      10.  These findings were Voalted to HERMELINDO WILHELM at 9:30 AM 7/8/2024      CT-CTA CHEST PULMONARY ARTERY W/ RECONS   Final Result      1.  No CT evidence of pulmonary emboli         2. Small multifocal patchy airspace opacities in the right upper lobe and right lower lobe suspicious for pneumonitis possibly Covid.      3. Bibasilar atelectasis.      4. Coarse multifocal nonobstructing  right-sided renal calculi and atrophic change.      DX-CHEST-PORTABLE (1 VIEW)   Final Result      1.  Left retrocardiac airspace opacity consistent with atelectasis and/or pneumonitis.      DX-ABDOMEN FOR TUBE PLACEMENT   Final Result      1.  NG tube extends in the fundus of stomach.      DX-CHEST-PORTABLE (1 VIEW)   Final Result      1.  Bibasilar airspace disease, greater on the left.      [id number]-5 VIEW   Final Result      Dilated air-filled small bowel. This may be due to postoperative ileus or small bowel obstruction.      DX-ABDOMEN FOR TUBE PLACEMENT   Final Result      Nasogastric tube tip projects over the proximal stomach.      BD-[id number]-7 VIEW   Final Result      1.  Stable mild to moderate dilatation of small bowel loops with air seen in the distal rectum.      [id number] VIEW   Final Result      1.  Operative changes of the abdomen.      2.  Evolving small bowel obstruction.      US-RENAL   Final Result      1.  Nonvisualization of the left kidney due to overlying bandages.   2.  Suboptimal visualization of the right kidney due to patient body habitus and ribs demonstrates no definite evidence of hydronephrosis.      JN-[id number]-7 VIEW   Final Result      Paucity of bowel gas with stool present in the colon. No definite evidence of obstruction however evaluation is limited on supine imaging.      IR-CONSULT AND TREAT    (Results Pending)        Assessment/Plan  * Left kidney mass- (present on admission)  Assessment & Plan  Status post partial nephrectomy.  Follow-up with urology.    7/1 pathology: Clear cell renal carcinoma    Hypotension  Assessment & Plan  Concern of volume loss  IV bolus 250cc of LR and monitor  transfuse to keep hemoglobin greater than 7  Pressure support if continues to be hypotensive  Caution as remains on amiodarone drip  Monitor I's and O's, labs, vitals  If continues to be low we will check cortisol level    Hypernatremia  Assessment & Plan  7/15 sodium: 138  7/12  Na:148  On D5W  Monitor labs  Advance diet as tolerates    Aortic thrombus (HCC)  Assessment & Plan  7/12 Restarted heparin and monitoring Hgb and CBI  7/15 heparin drip stopped and reversed protamine given.  Concern of hemoptysis, ongoing hematuria and dropping hemoglobin.    Hypocalcemia  Assessment & Plan  7/15 Ca: 6.8 with an albumin of 1.7  Corrected calcium 8.6    Shock (HCC)- (present on admission)  Assessment & Plan  S/p pressor support  Monitor vitals., I/O's  Care with pain meds    Small bowel obstruction (HCC)  Assessment & Plan  7/15 active bowel sounds and has been tolerating diet.  7/13 active bowel sounds. Advance diet with close monitor.  Antiemetics  mobilize    Leukemoid reaction- (present on admission)  Assessment & Plan  7/13 wbc:22.9 <29.5<30.4 <24.9 while on steroids. On antibiotics  Following up on CBC    Acute blood loss as cause of postoperative anemia- (present on admission)  Assessment & Plan  Continue to monitor closely since patient has ongoing hematuria and on CBI  7/15 Hgb:6.4<6.6 <7.4<7.9<6.7 <7.5  7/15 this morning I stop the heparin drip and gave protamine.  Discussed with urology and they feel that ongoing bleeding potentially around the partial nephrectomy and a will contact interventional radiology.  Patient continues to have clots and pink-tinged urine from his CBI.  The patient was in atrial fibrillation last night he is back in sinus rhythm this morning.  Will continue to monitor hemoglobins and transfuse to keep hemoglobin greater than 7.  I have made him n.p.o. for potential interventional radiology procedure today  7/14 checking CT abdomen and pelvis without contrast to evaluate for perinephric hemorrhage.  Patient continues to drop hemoglobin while on heparin.  May need embolectomy.  Transfuse for hemoglobin less than 7    Hyperkalemia  Assessment & Plan          Left renal mass  Assessment & Plan  Highly concern for malignancy   status post Open left partial nephrectomy  nephrectomy on July 1, 2024  Managed by Urology   Follow-up pathology-Clear-cell carcinoma.  Appreciate urology recommendations  7/4- hopefully JADIEL drain removing tomorrow. Appreciate urology recs   7/5/24- JADIEL removed today     Coronary artery disease involving native coronary artery of native heart without angina pectoris- (present on admission)  Assessment & Plan  NSTEMI 2019 s/p stent placement at RCA at that time  hold ASA due to hematura  Hold oral meds     History of atrial fibrillation- (present on admission)  Assessment & Plan  He is not on anticoagulation  Per chart review has history atrial fibrillation 2019 due to COPD exacerbation one-time, and no indication for anticoagulation since then  7/4- HR increasing, pt did miss couple of dosage of carvedilol. Continue to monitor on telemetry. Resumed carvedilol. Continue to monitor closely.   7/8- continue tele. Resume blood thinners when ok per urology   7/11 on amiodarone drip and 7/12 restarted heparin drip and monitor hgb nd sign of further hematura  (On heparin drip for afib hx but also for aortic thrombus)  7/14 continuing IV amiodarone as patient may require to be n.p.o. for possible embolectomy.  Once able to safely take orals we will switch him over  7/15 was in atrial fibrillation overnight converted to sinus rhythm this morning.  Continue IV until able to take orals safely.  Holding off on anticoagulation currently due to ongoing bleeding    Acute on chronic hypoxic respiratory failure (HCC)- (present on admission)  Assessment & Plan  7/8 CTA chest negative for PE but patchy opacification  RT per protocol  Mobilize  Encourage deep inspirator efforts  Titrate oxygen  7/13 on 3 Liters O2  Keep hemoglobin greater than 7.  Mobilized as best able.    Essential hypertension- (present on admission)  Assessment & Plan  Holding Losartan in the setting of recent partial nephrectomy and renal failure  On amiodarone drip  7/8- pt is hypotensive on shock. Held all  oral meds   Monitor I/O's labs and vitals.    Peripheral vascular disease (HCC)- (present on admission)  Assessment & Plan  History of angioplasty stent placement by Dr. Vital 2012   hold aspirin  Atorvastatin.  Control BP and lipids    COPD exacerbation (HCC)- (present on admission)  Assessment & Plan  Continue with home dose Trelegy Ellipta   Duonebs PRN   Not signs for exacerbation  Follow-up with for allergies as outpatient  RT per protocol.  Wean steroids  Titrate oxygen as able to keep SPO2 >89%  7/15 on 3 to 4 L nasal cannula encourage deep inspiratory efforts keeping hemoglobin greater than 7    Acute renal failure superimposed on stage 3 chronic kidney disease (HCC)- (present on admission)  Assessment & Plan  Cannot rule out obstruction versus renal.  No contrast exposure  Continue to monitor very closely  Continue IV fluid  renal ultrasound  7/3/24-creatinine slight worse again however patient is starting to have better urine output.  Likely this is ATN which has been expected worsening creatinine and then plateau and then improvement later  7/4/24- slight improving. Good urine output. Continue to monitor closely   7/5/24- still good urine output, Cr not improving. Poor oral intake   7/7- worse again. Continue IVF, continue CBI. Continue close monitoring   7/8- prerenal vs obstructive. Nephrology on board. Start pressors. Ok to contrast since pt will likely be started on dialysis   7/11 Cr:4.34 <5.4  7/12 Cr:3.78 but increased BUN (was NPO overnight).  Monitor BMP in am.  7/13 Cr:3.46.  Encourage oral intake.    7/14 Cr: 3.78  7/15 concern of borderline blood pressures and anemia contributing.  7/15 creatinine 3.51 with a BUN of 109.  [facility] [place] nephrology consulting         VTE prophylaxis: VTE Selection    I have performed a physical exam and reviewed and updated ROS and Plan today (7/15/2024). In review of yesterday's note (7/14/2024), there are no changes except as documented above.

## 2024-07-16 NOTE — PROGRESS NOTES
Surgical Progress Note    Author: Adalberto Wilde M.D. Date & Time created: 2024   8:55 AM     Interval Events:  Selective embolization performed yesterday  Increased O2 requirement today  Hematuria is improving/improved  Patient is weak with significant assistance needs    Review of Systems   All other systems reviewed and are negative.    Hemodynamics:  Temp (24hrs), Av.1 °C (98.8 °F), Min:36.7 °C (98 °F), Max:37.5 °C (99.5 °F)  Temperature: 37.5 °C (99.5 °F)  Pulse  Av.8  Min: 47  Max: 156   Blood Pressure : 105/49     Respiratory:    Respiration: (!) 23, Pulse Oximetry: 95 %     Given By:: Mask, Work Of Breathing / Effort: Tachypnea;Mild  RUL Breath Sounds: Crackles;Diminished, RML Breath Sounds: Crackles;Diminished, RLL Breath Sounds: Crackles;Diminished, ALEN Breath Sounds: Crackles;Diminished, LLL Breath Sounds: Crackles;Diminished  Neuro:  GCS       Fluids:    Intake/Output Summary (Last 24 hours) at 2024 0855  Last data filed at 2024 0802  Gross per 24 hour   Intake 3453.22 ml   Output 1750 ml   Net 1703.22 ml     Weight: 91 kg (200 lb 9.9 oz)  Current Diet Order   Procedures    Diet Order Diet: Full Liquid     Physical Exam  Vitals and nursing note reviewed.   HENT:      Head: Normocephalic.      Nose: Nose normal.      Mouth/Throat:      Pharynx: Oropharynx is clear.   Eyes:      Conjunctiva/sclera: Conjunctivae normal.   Cardiovascular:      Rate and Rhythm: Normal rate.   Pulmonary:      Effort: Pulmonary effort is normal.   Abdominal:      Palpations: Abdomen is soft.   Musculoskeletal:      Cervical back: Normal range of motion.   Skin:     General: Skin is warm.   Neurological:      General: No focal deficit present.      Mental Status: He is alert.   Psychiatric:         Mood and Affect: Mood normal.       Labs:  Recent Results (from the past 24 hour(s))   CBC WITHOUT DIFFERENTIAL    Collection Time: 07/15/24  3:56 PM   Result Value Ref Range    WBC 26.7 (H) 4.8 - 10.8 K/uL    RBC  2.19 (L) 4.70 - 6.10 M/uL    Hemoglobin 6.7 (L) 14.0 - 18.0 g/dL    Hematocrit 19.7 (L) 42.0 - 52.0 %    MCV 90.0 81.4 - 97.8 fL    MCH 30.6 27.0 - 33.0 pg    MCHC 34.0 32.3 - 36.5 g/dL    RDW 51.6 (H) 35.9 - 50.0 fL    Platelet Count 121 (L) 164 - 446 K/uL    MPV 12.6 9.0 - 12.9 fL   POCT glucose device results    Collection Time: 07/15/24  8:20 PM   Result Value Ref Range    POC Glucose, Blood 114 (H) 65 - 99 mg/dL   HGB    Collection Time: 07/15/24 11:20 PM   Result Value Ref Range    Hemoglobin 8.1 (L) 14.0 - 18.0 g/dL   POCT glucose device results    Collection Time: 07/15/24 11:37 PM   Result Value Ref Range    POC Glucose, Blood 120 (H) 65 - 99 mg/dL   CBC WITHOUT DIFFERENTIAL    Collection Time: 07/16/24  2:13 AM   Result Value Ref Range    WBC 32.8 (H) 4.8 - 10.8 K/uL    RBC 2.81 (L) 4.70 - 6.10 M/uL    Hemoglobin 8.2 (L) 14.0 - 18.0 g/dL    Hematocrit 24.9 (L) 42.0 - 52.0 %    MCV 88.6 81.4 - 97.8 fL    MCH 29.2 27.0 - 33.0 pg    MCHC 32.9 32.3 - 36.5 g/dL    RDW 55.1 (H) 35.9 - 50.0 fL    Platelet Count 175 164 - 446 K/uL    MPV 12.2 9.0 - 12.9 fL   Basic Metabolic Panel    Collection Time: 07/16/24  2:13 AM   Result Value Ref Range    Sodium 137 135 - 145 mmol/L    Potassium 4.2 3.6 - 5.5 mmol/L    Chloride 103 96 - 112 mmol/L    Co2 18 (L) 20 - 33 mmol/L    Glucose 118 (H) 65 - 99 mg/dL    Bun 105 (H) 8 - 22 mg/dL    Creatinine 4.33 (HH) 0.50 - 1.40 mg/dL    Calcium 6.8 (LL) 8.5 - 10.5 mg/dL    Anion Gap 16.0 7.0 - 16.0   ESTIMATED GFR    Collection Time: 07/16/24  2:13 AM   Result Value Ref Range    GFR (CKD-EPI) 14 (A) >60 mL/min/1.73 m 2   POCT glucose device results    Collection Time: 07/16/24  5:30 AM   Result Value Ref Range    POC Glucose, Blood 116 (H) 65 - 99 mg/dL     Medical Decision Making, by Problem:  Active Hospital Problems    Diagnosis     Hypotension [I95.9]     Hypernatremia [E87.0]     Aortic thrombus (HCC) [I74.10]     Hypocalcemia [E83.51]     Hyperglycemia [R73.9]     Shock (HCC)  [R57.9]     Small bowel obstruction (HCC) [K56.609]     Left kidney mass [N28.89]     Leukemoid reaction [D72.823]     Acute blood loss as cause of postoperative anemia [D62]     Coronary artery disease involving native coronary artery of native heart without angina pectoris [I25.10]     History of atrial fibrillation [Z86.79]      Remote history of atrial fibrillation in 2019 in the setting of an acute COPD exacerbation.  He has had no recurrences.  He did follow with cardiology for a few years after this.  Oral anticoagulant was not indicated at that time.      Insomnia [G47.00]      This is a chronic condition. Takes Ambien 5 mg nightly. Symptoms are well controlled with this. Denies any side effects.    Last UDS appropriate 10/4/23  CS agreement UTD 10/4/23        Acute on chronic hypoxic respiratory failure (HCC) [J96.21]     Essential hypertension [I10]      This is a chronic condition.  Current Meds:   - losartan 100 mg daily  - carvedilol 12.5 mg BID  - amlodipine 10 mg daily  Side effects: none  Home BP Log: Typically 120s/60s  Associated symptoms: Denies chest pain, shortness of breath, headaches, dizziness/lightheadedness           Peripheral vascular disease (HCC) [I73.9]      Angioplasty and stents Dr. Vital November 2012      COPD exacerbation (HCC) [J44.1]      This is a chronic condition.  Managed by pulmonology.  Patient is on Trelegy inhaler daily and albuterol as needed.  He is on supplemental oxygen at night and as needed throughout the day.   Symptoms currently controlled.       Acute renal failure superimposed on stage 3 chronic kidney disease (HCC) [N17.9, N18.30]      Plan:  73 yo M s/p open partial nephrectomy with urine leak and renal bleeding post-operatively, likely exacerbated by need for anticoagulation  1) Hematuria  - Will likely resolve now that embolization has been performed  - Wean CBI today  - Once CBI has been stopped, recommend continued Lauren due to urine leak  2) Urine  leak  - Managed with stent + Lauren  3) Blood loss anemia  - Hgb appears stable after transfusion yesterday  - Recommend repeat Hgb and stable x 24 hours prior to restarting anticoagulation

## 2024-07-16 NOTE — PROGRESS NOTES
Bedside report received, Assume care.     A&O x 4, Able to make needs known.  Pain Assessment: 7/10 to back. Medication provided per MAR.  Continuous cardiac and [place] monitoring in place.      Bed in low position and locked, pt resting comfortably now, call light with in reach, all needs met at this time. Interventions will be executed per plan of care.   0839- Urologist Dr Wilde at bedside. Pt max assist to recliner. 7/10 pain to back, PRN pain medication given.

## 2024-07-16 NOTE — PROGRESS NOTES
Hospital Medicine Daily Progress Note    Date of Service  7/16/2024    Chief Complaint  Here for elective partial nephrectomy    Hospital Course  Luis Sims is a 80-year-old male past medical history of COPD on chronic oxygen 2-3 L at baseline, emphysema, hypertension, CAD, previous atrial fibrillation, PVD, previous rheumatic fever, previous nephrolithiasis status post nephrolithotomy was admitted for elective left partial nephrectomy, JADIEL drain placement.  Postop then, they did with acute kidney injury, bleeding, hyperkalemia.  Nephrology consulted  Hematuria did resolve.  Patient was improving JADIEL drain removed 7/5.  Maintain the Lauren catheter  Creatinine had improvement for couple of day and start to worsen again. Most likely ATN from partial nephrectomy  7/5- pt started to have complication of small bowel obstruction. NG tube placed 7/6 with low suction. He also started to noticed clot on urine. Started CBI. Felt slight better. 7/8- significantly worse. Requiring high flow oxygen, hypotensive. Cr worsen. Because of CBI not sure if pt is anuric. Rapid team and critical care doctor called and pt transfer to ICU for pressors and close monitoring. Stat CT scan ordered   Required intubation but extubated 7/9 but was on pressor support. On 7/10 a CT abd/pelvis showed a left perinephric hematoma.  7/1 pathology showing clear cell renal carcinoma      Interval Problem Update  Patient seen and examined today.  Data, Medication data reviewed.  Case discussed with nursing as available.  Plan of Care reviewed with patient and notified of changes.  7/15: Called by radiology yesterday and slight increase in perinephric hematoma but continues with light pink lemonade colored urine in CBI, decision made yesterday to continue heparin.  Overnight increase in bleeding and drop in Hgb.giving protamine and transfusing pRBCs.  Worsening renal function with Cr:3.51. Hgb:6.4.  7/16 the patient appears lethargic, he has complaints  of back pain, currently afebrile, heart rate in the 90s, respiration unlabored, patient is saturating in the mid 90s on 5 to 7 L nasal cannula oxygen, blood pressure in the 1 teens to 100s over 50s, s/p embolization of 2 small left renal arteries by interventional radiology on 7/15  Urology follow-up overall with a positive report, ongoing CBI  Hemoglobin stabilized  Second 32.8, hemoglobin 8.2, hematocrit 24.9, platelet count 175, sodium 137 potassium 4.2, chloride 103, bicarb 18, glucose 118, BUN is 105, creatinine 4.33,  Nephrology following, ongoing treatment with sodium and bicarbonate, currently no indication for RRT, holding off diuretics at this time, close laboratory follow-up.  I have discussed this patient's plan of care and discharge plan at IDT rounds today with Case Management, Nursing, Nursing leadership, and other members of the IDT team.    Consultants/Specialty  nephrology and urology  Critical care  Interventional radiology    Code Status  Full Code    Disposition  The patient is not medically cleared for discharge to home or a post-acute facility.  Anticipate discharge to: skilled nursing facility    I have placed the appropriate orders for post-discharge needs.    Review of Systems  Review of Systems   Constitutional: Negative.  Negative for malaise/fatigue.   HENT: Negative.     Eyes: Negative.    Respiratory:  Positive for cough and hemoptysis. Negative for shortness of breath.    Cardiovascular: Negative.  Negative for palpitations and leg swelling.   Gastrointestinal: Negative.  Negative for abdominal pain and nausea.   Genitourinary:  Positive for hematuria.   Musculoskeletal:  Positive for back pain and myalgias.   Skin: Negative.    Neurological:  Positive for weakness. Negative for speech change.   Endo/Heme/Allergies: Negative.    Psychiatric/Behavioral: Negative.  The patient is not nervous/anxious.    All other systems reviewed and are negative.       Physical Exam  Temp:  [36.7 °C  (98 °F)-37.5 °C (99.5 °F)] 37.5 °C (99.5 °F)  Pulse:  [] 85  Resp:  [17-66] 27  BP: ()/(37-70) 113/62  SpO2:  [86 %-100 %] 96 %    Physical Exam  Vitals reviewed.   Constitutional:       Appearance: Normal appearance. He is obese. He is ill-appearing.   HENT:      Head: Normocephalic.      Nose: Nose normal.      Mouth/Throat:      Mouth: Mucous membranes are dry.      Pharynx: Oropharynx is clear.   Eyes:      General:         Right eye: No discharge.         Left eye: No discharge.   Cardiovascular:      Rate and Rhythm: Normal rate.   Pulmonary:      Effort: Pulmonary effort is normal. No respiratory distress.      Breath sounds: Rhonchi present.   Abdominal:      General: There is no distension.      Palpations: Abdomen is soft.      Tenderness: There is abdominal tenderness.      Comments: Staples intact and good approximation of surgical edges   Musculoskeletal:         General: Swelling present.      Cervical back: Normal range of motion and neck supple.      Right lower leg: Edema present.      Left lower leg: Edema present.   Skin:     Coloration: Skin is pale. Skin is not jaundiced.   Neurological:      General: No focal deficit present.      Mental Status: He is alert and oriented to person, place, and time.      Motor: Weakness present.   Psychiatric:         Mood and Affect: Mood normal.         Fluids    Intake/Output Summary (Last 24 hours) at 7/16/2024 0738  Last data filed at 7/15/2024 2209  Gross per 24 hour   Intake 3539.46 ml   Output -850 ml   Net 4389.46 ml       Laboratory  Recent Labs     07/15/24  1556 07/15/24  2320 07/16/24  0213   WBC 26.7*  --  32.8*   RBC 2.19*  --  2.81*   HEMOGLOBIN 6.7* 8.1* 8.2*   HEMATOCRIT 19.7*  --  24.9*   MCV 90.0  --  88.6   MCH 30.6  --  29.2   MCHC 34.0  --  32.9   RDW 51.6*  --  55.1*   PLATELETCT 121*  --  175   MPV 12.6  --  12.2     Recent Labs     07/14/24  1809 07/15/24  0535 07/16/24  0213   SODIUM 141 138 137   POTASSIUM 3.5* 3.7 4.2    CHLORIDE 106 104 103   CO2 22 20 18*   GLUCOSE 163* 158* 118*   BUN 95* 109* 105*   CREATININE 2.83* 3.51* 4.33*   CALCIUM 6.6* 6.8* 6.8*                         Imaging  DX-CHEST-PORTABLE (1 VIEW)   Final Result         1.  Bilateral basilar atelectasis, no focal infiltrate   2.  Atherosclerosis      IR-EMBOLIZATION   Final Result      1.  Left renal arteriograms demonstrating 2 sites of active hemorrhage.   2.  Technically successful microcoil embolization of 2 small left renal branch arteries.      DX-CHEST-PORTABLE (1 VIEW)   Final Result      1.  Mild worsening LEFT lung base atelectasis.   2.  No other significant change from prior exam.         CT-ABDOMEN-PELVIS W/O   Final Result   Addendum (preliminary) 1 of 1   Critical value called by Dr. Vincent Alonso to Dr. Riley at 7/14/2024 6:08    PM.      Final      1.  Left perirenal hematoma, mildly increased since prior.   2.  Additional collection medial to the right kidney with small gas focus, stable since prior. Could be seroma, resolving hematoma. Abscess not completely excluded.   3.  Mild peripancreatic fat stranding. Could be related to anasarca. Cannot exclude pancreatitis, recommend laboratory correlation.   4.  7 mm indeterminate right renal cortical lesion, recommend ultrasound assessment to evaluate cyst versus solid.   5.  Other findings are present, refer to above.      Efforts are underway to contact referring physician with results at time of dictation.      DX-CHEST-PORTABLE (1 VIEW)   Final Result      Stable consolidation within the right lung base.      DX-CHEST-PORTABLE (1 VIEW)   Final Result      Increased patchy right greater than left basilar opacities suspicious for pneumonitis.      DX-CHEST-PORTABLE (1 VIEW)   Final Result      1.  Ongoing bibasilar interstitial opacities.   2.  Resolving subsegmental atelectatic change left lung base.   3.  No new abnormality.      DX-CHEST-PORTABLE (1 VIEW)   Final Result      Stable chest.       CT-ABDOMEN-PELVIS W/O   Final Result      1.  Bibasilar airspace infiltrates with air bronchograms which may represent pneumonia.   2.  Postoperative changes left kidney with mild surrounding stranding and an approximately 52 mm x 32 mm perinephric hematoma. No evidence of urinoma. Left ureteral stent in satisfactory position.   3.  Ileus.   4.  Sigmoid diverticulosis.      US-RENAL   Final Result      1.  No evidence of obvious perinephric hematoma, status post left partial nephrectomy.      DX-CHEST-PORTABLE (1 VIEW)   Final Result      Persistent bibasilar infiltrate with removal of the endotracheal tube.      US-EXTREMITY VENOUS LOWER BILAT   Final Result      DX-CHEST-PORTABLE (1 VIEW)   Final Result      1.  No significant change.      DX-CHEST-PORTABLE (1 VIEW)   Final Result      1.  Interval placement of an endotracheal tube which terminates in satisfactory position at the level of the aortic arch.   2.  Interval insertion of a central venous catheter which terminates with the tip projecting over the expected region of the distal brachiocephalic vein or proximal superior vena cava.   3.  Patchy right-sided pulmonary opacities and bilateral basilar atelectasis.      DX-PORTABLE FLUORO > 1 HOUR   Final Result      Portable fluoroscopy utilized for 28 seconds.         INTERPRETING LOCATION: [address] [facility], [id number]      [id number] VIEW   Final Result      Digitized intraoperative radiograph is submitted for review. This examination is not for diagnostic purpose but for guidance during a surgical procedure. Please see the patient's chart for full procedural details.         INTERPRETING LOCATION: [address] , [facility], [address]      [id number] VIEW   Final Result      1.  A left-sided double-J ureteral stent is in place. Due to overlying structures and adjacent contrast opacified bowel loops, extravasation from the renal collecting system cannot be excluded.   2.  Multiple mild to moderately dilated  gas-filled loops of small bowel with enteric contrast present. Findings could be seen in the setting of partial small bowel obstruction or ileus.      EC-ECHOCARDIOGRAM COMPLETE W/ CONT   Final Result      CT-ABDOMEN-PELVIS WITH   Final Result      1.  Bibasilar atelectasis right greater than left.      2.  Hepatic steatosis.      3.  Bandlike area of fluid attenuation coursing through the upper pole of the left kidney which contains multiple air bubbles. This likely represents postoperative change however an infected postoperative tract through the upper pole of the left kidney    should be considered.      4.  Multiple prominent nonobstructing right-sided renal calculi. Atrophic changes of the right kidney.      5.  Mild bilateral hydronephrosis.      6.  Moderate left-sided pelviectasis which likely contains a blood clot.      7.  Thrombosis of the infrarenal aorta with the right-sided iliac stent in place. Thrombotic occlusion of the left iliac arteries.      8.  Colonic diverticulosis.      9.  Acute mid small bowel obstruction.      10.  These findings were Voalted to HERMELINDO WILHELM at 9:30 AM 7/8/2024      CT-CTA CHEST PULMONARY ARTERY W/ RECONS   Final Result      1.  No CT evidence of pulmonary emboli         2. Small multifocal patchy airspace opacities in the right upper lobe and right lower lobe suspicious for pneumonitis possibly Covid.      3. Bibasilar atelectasis.      4. Coarse multifocal nonobstructing right-sided renal calculi and atrophic change.      DX-CHEST-PORTABLE (1 VIEW)   Final Result      1.  Left retrocardiac airspace opacity consistent with atelectasis and/or pneumonitis.      DX-ABDOMEN FOR TUBE PLACEMENT   Final Result      1.  NG tube extends in the fundus of stomach.      DX-CHEST-PORTABLE (1 VIEW)   Final Result      1.  Bibasilar airspace disease, greater on the left.      [id number] VIEW   Final Result      Dilated air-filled small bowel. This may be due to postoperative  ileus or small bowel obstruction.      DX-ABDOMEN FOR TUBE PLACEMENT   Final Result      Nasogastric tube tip projects over the proximal stomach.      [id number] VIEW   Final Result      1.  Stable mild to moderate dilatation of small bowel loops with air seen in the distal rectum.      [id number] VIEW   Final Result      1.  Operative changes of the abdomen.      2.  Evolving small bowel obstruction.      US-RENAL   Final Result      1.  Nonvisualization of the left kidney due to overlying bandages.   2.  Suboptimal visualization of the right kidney due to patient body habitus and ribs demonstrates no definite evidence of hydronephrosis.      [id number] VIEW   Final Result      Paucity of bowel gas with stool present in the colon. No definite evidence of obstruction however evaluation is limited on supine imaging.           Assessment/Plan  * Left kidney mass- (present on admission)  Assessment & Plan  Status post partial nephrectomy.  Follow-up with urology.    Pathology consistent with clear cell renal carcinoma    Hypotension  Assessment & Plan  Concern of volume loss  transfuse to keep hemoglobin greater than 7  Caution as remains on amiodarone drip  Monitor I's and O's, labs, vitals      Hypernatremia  Assessment & Plan  Resolved    Aortic thrombus (HCC)  Assessment & Plan  7/12 Restarted heparin and monitoring Hgb and CBI  7/15 heparin drip stopped and reversed protamine given.  Concern of hemoptysis, ongoing hematuria and dropping hemoglobin.  7/16 continue to hold anticoagulation    Hyperglycemia  Assessment & Plan  A1c 5.7 in May  Hyperglycemia likely due to acute illness  SSI     Hypocalcemia  Assessment & Plan  Monitor corrected calcium    Shock (HCC)- (present on admission)  Assessment & Plan  S/p pressor support  Monitor vitals., I/O's  Care with pain meds    Small bowel obstruction (HCC)  Assessment & Plan  Improved, tolerating a diet  Antiemetics  mobilize    Leukemoid reaction- (present on  admission)  Assessment & Plan  on steroids.  S/p antibiotics  Following up on CBC, monitor closely    Acute blood loss as cause of postoperative anemia- (present on admission)  Assessment & Plan  Continue to monitor closely since patient has ongoing hematuria and on CBI  7/16 stabilizing, monitoring  7/15 this morning I stop the heparin drip and gave protamine.  Discussed with urology and they feel that ongoing bleeding potentially around the partial nephrectomy and a will contact interventional radiology.  Patient continues to have clots and pink-tinged urine from his CBI.  The patient was in atrial fibrillation last night he is back in sinus rhythm this morning.  Will continue to monitor hemoglobins and transfuse to keep hemoglobin greater than 7.  I have made him n.p.o. for potential interventional radiology procedure today  7/14 checking CT abdomen and pelvis without contrast to evaluate for perinephric hemorrhage.  Patient continues to drop hemoglobin while on heparin.  May need embolectomy.  Transfuse for hemoglobin less than 7      Hyperkalemia  Assessment & Plan          Left renal mass  Assessment & Plan  Highly concern for malignancy   status post Open left partial nephrectomy nephrectomy on July 1, 2024  Managed by Urology   Follow-up pathology-Clear-cell carcinoma.  Appreciate urology recommendations  7/4- hopefully JADIEL drain removing tomorrow. Appreciate urology recs   7/5/24- JADIEL removed today     Coronary artery disease involving native coronary artery of native heart without angina pectoris- (present on admission)  Assessment & Plan  NSTEMI 2019 s/p stent placement at RCA at that time  hold ASA due to hematura  Hold oral meds     History of atrial fibrillation- (present on admission)  Assessment & Plan  He is not on anticoagulation  Per chart review has history atrial fibrillation 2019 due to COPD exacerbation one-time, and no indication for anticoagulation since then  7/4- HR increasing, pt did miss  couple of dosage of carvedilol. Continue to monitor on telemetry. Resumed carvedilol. Continue to monitor closely.   7/8- continue tele. Resume blood thinners when ok per urology   7/11 on amiodarone drip and 7/12 restarted heparin drip and monitor hgb nd sign of further hematura  (On heparin drip for afib hx but also for aortic thrombus)  7/14 continuing IV amiodarone as patient may require to be n.p.o. for possible embolectomy.  Once able to safely take orals we will switch him over  7/15 was in atrial fibrillation overnight converted to sinus rhythm this morning.  Continue IV until able to take orals safely.  Holding off on anticoagulation currently due to ongoing bleeding  7/16, continued to have rate controlled A-fib    Acute on chronic hypoxic respiratory failure (HCC)- (present on admission)  Assessment & Plan  7/8 CTA chest negative for PE but patchy opacification  RT per protocol  Mobilize  Encourage deep inspirator efforts  Titrate oxygen  7/13 on 3 Liters O2  Keep hemoglobin greater than 7.  Mobilized as best able.    Essential hypertension- (present on admission)  Assessment & Plan  Holding Losartan in the setting of recent partial nephrectomy and renal failure  On amiodarone drip  Monitor I/O's labs and vitals.    Peripheral vascular disease (HCC)- (present on admission)  Assessment & Plan  History of angioplasty stent placement by Dr. Vital 2012   hold aspirin  Atorvastatin.  Control BP and lipids    COPD exacerbation (HCC)- (present on admission)  Assessment & Plan  Continue with home dose Trelegy Ellipta   Duonebs PRN   Not signs for exacerbation  Follow-up with for allergies as outpatient  RT per protocol.  Wean steroids  Titrate oxygen as able to keep SPO2 >89%  3 to 4 L nasal cannula encourage deep inspiratory efforts keeping hemoglobin greater than 7    Acute renal failure superimposed on stage 3 chronic kidney disease (HCC)- (present on admission)  Assessment & Plan  Cannot rule out obstruction  versus renal.  No contrast exposure  Continue to monitor very closely  Continue IV fluid  renal ultrasound  7/3/24-creatinine slight worse again however patient is starting to have better urine output.  Likely this is ATN which has been expected worsening creatinine and then plateau and then improvement later  7/4/24- slight improving. Good urine output. Continue to monitor closely   7/5/24- still good urine output, Cr not improving. Poor oral intake   7/7- worse again. Continue IVF, continue CBI. Continue close monitoring   7/8- prerenal vs obstructive. Nephrology on board. Start pressors. Ok to contrast since pt will likely be started on dialysis   7/11 Cr:4.34 <5.4  7/12 Cr:3.78 but increased BUN (was NPO overnight).  Monitor BMP in am.  7/13 Cr:3.46.  Encourage oral intake.    7/14 Cr: 3.78  7/15 concern of borderline blood pressures and anemia contributing.  7/15 creatinine 3.51 with a BUN of 109.  [facility] consulting  7/16 ongoing renal failure, slightly worsened, nephrology following, holding off diuretics       Plan  7/16  Rising leukocytosis without other definitive cause, will check C. Difficile  Monitor hemoglobin closely,  Ongoing CBI with titration  Consideration of ongoing anticoagulation on 7/17 if hemoglobin stabilizes  Close monitoring of renal function  Maintain adequate perfusion  See orders  Patient is has a high medical complexity, complex decision making and is at high risk for complication, morbidity, and mortality.  I spent 58 minutes, reviewing the chart, obtaining and/or reviewing separately obtained history. Performing a medically appropriate examination and evaluation.  Counseling and educating the patient. Ordering and reviewing medications, tests, or procedures.   Documenting clinical information in EPIC. Independently interpreting results and communicating results to patient. Discussing future disposition of care with patient, RN and case management.    VTE prophylaxis:     pharmacologic prophylaxis contraindicated due to Bleeding      I have performed a physical exam and reviewed and updated ROS and Plan today (7/16/2024). In review of yesterday's note (7/15/2024), there are no changes except as documented above.      Please note that this dictation was created using voice recognition software. I have made every reasonable attempt to correct obvious errors, but I expect that there are errors of grammar and possibly context that I did not discover before finalizing the note.

## 2024-07-16 NOTE — PROGRESS NOTES
[facility] -  PROGRESS NOTE               Author: Jericho Bethea M.D. Date & Time: 7/16/2024  8:28 AM     HPI:  74 y.o. male with CKD, left renal mass, COPD with chronic hypoxic respiratory failure, atrial fibrillation, hypertension, and coronary artery disease presented yesterday for open left partial nephrectomy nephrectomy. Continued to take losartan until day of surgery. Baseline cr prior to procedure appears ~2. Underwent procedure yesterday.  Op note without complications noted and only 150cc blood loss documented. Potassium elevated to 6.7 last night, temporizing measures given. Cr elevated to 3.2 this AM.  80cc UOP documented despite 3L IVF. Lauren draining with blood tinged urine, no clots. No chest pain. Shortness or breath stable.Previously followed by [facility] nephrology [place] [address] with CKD felt 2/2 HTN at that point in time. Past UAs with blood and protein.      DAILY NEPHROLOGY SUMMARY:  7/2: Consult done  7/3: stable hemodynamics, 2L NC, 1.1L UOP-increasing since midnight. No obstruction on imaging    7/4: 1.4L UOP, cr starting to plateau, potassium stable, feeling improved  7/5: stable hemodynamics, 1.4L UOP, constipation main complaint, minimal PO intake  7/6: Worsening nasuea and emesis, Concern for SBO and then had 2 large Bms, NG tube to suction with significant output, only 350cc UOP documented, cr climbing again, started on IV fluids, feeling slightly improved this AM  7/7: 3.4L gastric output from NG-net negative by IOs, alkalosis, started on CBI for recurrent hematuria/clots  7/8: Increased O2 requirements overnight and rapid response this am, transferred to ICU, BP low with SBP 70's this am and now on levophed drip, on high flow NC O2, concern for aspiration overnight, CTA chest negative for PE, BUN/Cr worse, 3.4L emesis/NG output over past 24hrs, on CBI, CT Abd/Pelvis this am with mild bilateral hydro and moderate left pelviectasis with possible blood clot, to go to OR this  after per urology, pt is lethargic but resting comfortable, denies any pain  7/9: No events, went to OR yest for cystoscopy and left ureteral and bladder clot evacuation, remains intubated since surgery, on pressor support with levophed and vasopressin, stable on vent with 40% FiO2, CBI running, 1.1L NG output, BUN/Cr slightly improved 112/6.45 (was 122/8.46 yest)  7/10: No events, extubated yest am, BP elevated overnight but stable this am, stable on 4L NC O2, CBI off, good UOP 3.7L, received 2 units PRBC transfusion and heparin drip off, BUN/Cr improving, Na elevated 148, denies anyCP/SB/LE edema and is feeling hungry  7/11: No events, BP mildly elevated, currently on 6L NC O2, good UOP, BUN/Cr improving, tolerating PO, Na still elevated at 148, CT Abd/Pelvis shows left perinephric hematoma, tolerating p.o. liquids, denies any CP/SOB/LE edema  7/12: No events, BP elevated at times but stable this am, stable on 5L NC O2, 2.4L UOP, Na still 148, serum chloride and BUN slightly higher today, Cr improved at 3.78, currently n.p.o. for possible procedure but otherwise is tolerating p.o.  7/13: Transferred out of ICU and to IMCU, BP mildly elevated, stable on 3L NC O2, creatinine continues to improve, BUN remains at 95 and NA remains elevated at 148, did not require urologic intervention yesterday, hemoglobin dropped this morning and patient receiving 1 unit PRBC transfusion,  7/14: No events, BP elevated, stable on 3 LNC O2, UOP not recorded, BMP pending, drowsy but arousable, no new complaints   7/15: Pt had bleeding overnight, with bloody CBI and hgb drop to 6.4. BP was low in the 90/60s and Cr worsened to 3.5. Was transfused.  7/16: Creatinine worsened to 4.33. BP was 113/62. 450 mL in Lauren bag, orange tinged, was not to have red flecks and some small clots earlier today. Hgb around 8.0 now after being transfused on 15th.      REVIEW OF SYSTEMS:    10 point ROS performed, negative other than stated above  "    PMH/PSH/SH/FH:   Reviewed and unchanged since admission note    CURRENT MEDICATIONS:   Reviewed from admission to present day    VS:  /62   Pulse 85   Temp 37.5 °C (99.5 °F) (Temporal)   Resp (!) 27   Ht 1.753 m (5' 9.02\")   Wt 91 kg (200 lb 9.9 oz)   SpO2 96%   BMI 29.61 kg/m²     Physical Exam  Vitals and nursing note reviewed.   Constitutional:       General: He is not in acute distress.     Appearance: He is not ill-appearing.      Interventions: He is sedated. He is not intubated.  HENT:      Head: Normocephalic and atraumatic.   Eyes:      General: No scleral icterus.     Extraocular Movements: Extraocular movements intact.   Cardiovascular:      Rate and Rhythm: Normal rate and regular rhythm.   Pulmonary:      Effort: Pulmonary effort is normal. No respiratory distress. He is not intubated.      Breath sounds: Examination of the right-lower field reveals decreased breath sounds. Examination of the left-lower field reveals decreased breath sounds. Decreased breath sounds present.      Comments: Has OxyMask on  Abdominal:      General: There is no distension.   Musculoskeletal:         General: No deformity.      Cervical back: Normal range of motion and neck supple.      Right lower leg: No edema.      Left lower leg: No edema.   Skin:     General: Skin is warm and dry.      Findings: No rash.   Neurological:      General: No focal deficit present.      Mental Status: He is alert and oriented to person, place, and time.   Psychiatric:         Mood and Affect: Mood normal.         Behavior: Behavior normal. Behavior is cooperative.       Fluids:  In: 3539.5 [P.O.:400; I.V.:2594.2; Blood:406.3]  Out: -850     LABS:  Recent Labs     07/14/24  1809 07/15/24  0535 07/16/24  0213   SODIUM 141 138 137   POTASSIUM 3.5* 3.7 4.2   CHLORIDE 106 104 103   CO2 22 20 18*   GLUCOSE 163* 158* 118*   BUN 95* 109* 105*   CREATININE 2.83* 3.51* 4.33*   CALCIUM 6.6* 6.8* 6.8*       IMAGING:   All imaging " reviewed from admission to present day    ASSESSMENT:    # Oliguric SCOTT, recurrent: In setting of surgery/RAASI/decreased nephron mass.     - Was starting to recover then 2nd Scott with bowel obstruction    - SCOTT again due to obstruction from left ureteral clot and left renal extravasation, underwent cysto and clot evacuation 7/8 by Urology    -on CBI    -had bleeding (hgb 6/4) on 7/15 w hypotension, and SCOTT again. Had a recent left renal hemorrhage for which embolization was done, so likely residual kidney injury as a result of this, will likely resolve with time  # CKD Stage 3B: Billed 2/2 HTN in the past. Urine with blood (renal mass) and protein  #NAGMA  CKD and SCOTT so likely as a result of this acute on chronic insult.   # Hyperkalemia--resolved  # Renal mass: s/p partial Left nephrectomy    -left ureteral clot with obstruction and extravasation of left kidney on imaging 7/8    -s/p Cystoscopy and left ureteral clot and bladder clot evacuation 7/8    -CT Abd/Pelvis 7/10 shows left perinephric hematoma  # HTN--shock now resolved, off all pressors    -BP mildly elevated  #Acute Hypoxic Respiratory Failure--now extubated 7/9    -Suspicion for aspiration pneumonia    -CTA chest negative for PE    -Bedside POCUS 7/8 showed easlity collapsible SVC c/w intravascular volume depletion  # COPD  # Anemia: low iron    -Acute blood loss anemia as well secondary to perinephric hematoma  # SBO vs. Ileus: improving, now on clear liquid diet  # gross hematuria: transiently stopped after procedure. Now restarted.   -Urology following    -CBI per urology     PLAN:  -Cr rising, likely due to hypotension and acute anemia, continue trending Cr   -Sodium Bicarbonate 1300 mg TID   -No acute need for RRT, but will eval daily  -Encourage PO fluids  -No diuretics at this time  -Wean O2 as tolerated, O2 requirements improving  -Urology following  -Serial CBC's and transfuse PRN for Hgb less than 7  -Renal diet  -Strict I/Os/continue  [place]  -Dose all meds per eGFR <15     Dispo: cont monitoring JAE as Cr worsened    Please page nephrology with any questions or concerns

## 2024-07-16 NOTE — CARE PLAN
The patient is Watcher - Medium risk of patient condition declining or worsening    Shift Goals  Clinical Goals: hemodynamic stability, stable hemoglobin, monitor CBI, improve respiratory status  Patient Goals: sleep  Family Goals: LAZARO    Progress made toward(s) clinical / shift goals:    Problem: Pain - Standard  Goal: Alleviation of pain or a reduction in pain to the patient’s comfort goal  Outcome: Progressing     Problem: Fall Risk  Goal: Patient will remain free from falls  Outcome: Progressing     Problem: Cardiac - Atrial Fibrillation  Goal: Patient will achieve & maintain adequate cardiac output and rate control  Outcome: Progressing     Problem: Hemodynamics  Goal: Patient's hemodynamics, fluid balance and neurologic status will be stable or improve  Outcome: Progressing       Patient is not progressing towards the following goals:      Problem: Respiratory  Goal: Patient will achieve/maintain optimum respiratory ventilation and gas exchange  Outcome: Not Progressing   Pt was on 4L NC at beginning of shift, pt then desatted to 80s requring titration to 8L oxymask, CXR done, NT suction provided by RT pt able to be weaned down to 5L oxymask, pt unable to perform strongly on IS only pulling 500, pt still tachpnic, MD aware.

## 2024-07-16 NOTE — PROGRESS NOTES
Pt had small black tarry stool and CBI titrated to pink tinge in output with some occasional clots, also during NT suction by RT pt had episode of epistaxis with gera blood in suction tubing, bleeding stopped shortly after but due to pt's weak cough, pressure and ice applied to nose, pt had small amount of blood in back of his throat but denies any increase in breathing. MD Paulino notified of the above, per MD no additional imaging due to pt's worsening kidney function.

## 2024-07-16 NOTE — PROGRESS NOTES
"Radiology Progress Note     Author: Maddy Franco D.N.P. Date & Time created: 7/16/2024  4:03 PM   Date of admission  7/1/2024  Note to reader: this note follows the APSO format rather than the historical SOAP format. Assessment and plan located at the top of the note for ease of use.    Chief Complaint  74 y.o. male admitted 7/1/2024 with elective partial nephrectomy.    HPI  Luis Sims is a 74-year-old male with PMH significant for COPD on chronic oxygen at 2 to 3 L baseline, CAD including NSTEMI, previous rheumatic fever, A-fib, PVD, HTN, HLD, CKD stage III AA, renal calculi and memory loss who presented for an elective partial left nephrectomy.  7/1/2024 Dr. Parisi and Dr. Siddiqi completed a left partial nephrectomy for left renal mass with surgical drain placement.  7/14/2024 CT demonstrates left perirenal hematoma mildly increased since prior imaging.  7/15/2024 IR Dr. Julian completed \"left renal arteriograms demonstrating 2 sites of active hemorrhage with successful microcoil embolization of 2 small left renal branch arteries through right radial artery access\".    Interval History IR  7/16/2024: Right radial artery access site nontender with small area of ecchymosis without hematoma.  No bleeding or oozing from site.  No dressing or TR band noted.  Patient states he feels great, specifically denying abdominal, flank, or right wrist pain.  No nausea or vomiting noted.  7/15/2024 chest x-ray demonstrates \"bilateral basilar atelectasis without focal infiltrate and atherosclerosis\".  I reviewed patient's most recent labs including WBC 32.8<26.7, Hgb 8.2<8.1, CR 4.33<3.51, GFR 14<18.  Coordinated post IR procedure care with hospital nursing staff.    Assessment/Plan     Principal Problem:    Left kidney mass  Active Problems:    Acute renal failure superimposed on stage 3 chronic kidney disease (HCC)    COPD exacerbation (HCC)    Peripheral vascular disease (HCC)    Essential hypertension    Acute on chronic " hypoxic respiratory failure (HCC)    Insomnia    History of atrial fibrillation    Coronary artery disease involving native coronary artery of native heart without angina pectoris    Acute blood loss as cause of postoperative anemia    Leukemoid reaction    Small bowel obstruction (HCC)    Shock (HCC)    Hypocalcemia    Hyperglycemia    Aortic thrombus (HCC)    Hypernatremia    Hypotension      Plan IR  -Post radial artery access instructions: No wrist flexion for 48 hours. No lifting more than 5 pounds and no soaking/ immersing extremity for 48 hours. OK to change dressing to band aid as needed.   - Urology, urology surgical services and nephrology following  - From an Interventional Radiology standpoint, OK to discharge to home when medically cleared by Hospitalist Service.    -Thank you for allowing Interventional Radiology team to participate in the patients care, if any additional care or requests are needed in the future please do not hesitate call or place IR order             Review of Systems  Physical Exam   Review of Systems   Constitutional:  Negative for chills and fever.   Respiratory:  Positive for cough (Productive) and shortness of breath. Negative for hemoptysis.         Audible Rales  Dried blood to lips   Cardiovascular:  Negative for chest pain.   Gastrointestinal:  Negative for abdominal pain, nausea and vomiting.   Genitourinary:  Positive for hematuria.        Blood in CBI   Musculoskeletal:  Positive for back pain.   Neurological:  Negative for sensory change, speech change, focal weakness, weakness and headaches.   Psychiatric/Behavioral: Negative.        Vitals:    07/16/24 1400   BP: 101/49   Pulse: 94   Resp: (!) 22   Temp:    SpO2: 99%        Physical Exam  Vitals and nursing note reviewed.   Constitutional:       Appearance: He is ill-appearing.   HENT:      Head: Atraumatic.      Mouth/Throat:      Comments: Dried blood to cracked dry lips  Cardiovascular:      Rate and Rhythm: Normal  rate.   Pulmonary:      Breath sounds: Rales present.      Comments: Labored respiration with 2-3 word dyspnea  SpO2 91% on 10 L O2 via oxymask  Abdominal:      General: There is no distension.      Tenderness: There is abdominal tenderness.   Genitourinary:     Comments: CBI with blood particles  Skin:     General: Skin is warm and dry.      Capillary Refill: Capillary refill takes 2 to 3 seconds.      Coloration: Skin is pale.   Neurological:      General: No focal deficit present.      Mental Status: He is alert and oriented to person, place, and time.   Psychiatric:         Mood and Affect: Mood normal.         Behavior: Behavior normal.             Labs    Recent Labs     07/15/24  1556 07/15/24  2320 07/16/24  0213 07/16/24  1508   WBC 26.7*  --  32.8*  --    RBC 2.19*  --  2.81*  --    HEMOGLOBIN 6.7* 8.1* 8.2* 7.1*   HEMATOCRIT 19.7*  --  24.9*  --    MCV 90.0  --  88.6  --    MCH 30.6  --  29.2  --    MCHC 34.0  --  32.9  --    RDW 51.6*  --  55.1*  --    PLATELETCT 121*  --  175  --    MPV 12.6  --  12.2  --      Recent Labs     07/14/24  1809 07/15/24  0535 07/16/24  0213   SODIUM 141 138 137   POTASSIUM 3.5* 3.7 4.2   CHLORIDE 106 104 103   CO2 22 20 18*   GLUCOSE 163* 158* 118*   BUN 95* 109* 105*   CREATININE 2.83* 3.51* 4.33*   CALCIUM 6.6* 6.8* 6.8*     Recent Labs     07/14/24  1809 07/15/24  0535 07/16/24  0213   ALBUMIN  --  1.7*  --    TBILIRUBIN  --  0.5  --    ALKPHOSPHAT  --  43  --    TOTPROTEIN  --  3.4*  --    ALTSGPT  --  15  --    ASTSGOT  --  22  --    CREATININE 2.83* 3.51* 4.33*     DX-CHEST-PORTABLE (1 VIEW)   Final Result         1.  Bilateral basilar atelectasis, no focal infiltrate   2.  Atherosclerosis      IR-EMBOLIZATION   Final Result      1.  Left renal arteriograms demonstrating 2 sites of active hemorrhage.   2.  Technically successful microcoil embolization of 2 small left renal branch arteries.      DX-CHEST-PORTABLE (1 VIEW)   Final Result      1.  Mild worsening LEFT  lung base atelectasis.   2.  No other significant change from prior exam.         CT-ABDOMEN-PELVIS W/O   Final Result   Addendum (preliminary) 1 of 1   Critical value called by Dr. Vincent Alonso to Dr. Riley at 7/14/2024 6:08    PM.      Final      1.  Left perirenal hematoma, mildly increased since prior.   2.  Additional collection medial to the right kidney with small gas focus, stable since prior. Could be seroma, resolving hematoma. Abscess not completely excluded.   3.  Mild peripancreatic fat stranding. Could be related to anasarca. Cannot exclude pancreatitis, recommend laboratory correlation.   4.  7 mm indeterminate right renal cortical lesion, recommend ultrasound assessment to evaluate cyst versus solid.   5.  Other findings are present, refer to above.      Efforts are underway to contact referring physician with results at time of dictation.      DX-CHEST-PORTABLE (1 VIEW)   Final Result      Stable consolidation within the right lung base.      DX-CHEST-PORTABLE (1 VIEW)   Final Result      Increased patchy right greater than left basilar opacities suspicious for pneumonitis.      DX-CHEST-PORTABLE (1 VIEW)   Final Result      1.  Ongoing bibasilar interstitial opacities.   2.  Resolving subsegmental atelectatic change left lung base.   3.  No new abnormality.      DX-CHEST-PORTABLE (1 VIEW)   Final Result      Stable chest.      CT-ABDOMEN-PELVIS W/O   Final Result      1.  Bibasilar airspace infiltrates with air bronchograms which may represent pneumonia.   2.  Postoperative changes left kidney with mild surrounding stranding and an approximately 52 mm x 32 mm perinephric hematoma. No evidence of urinoma. Left ureteral stent in satisfactory position.   3.  Ileus.   4.  Sigmoid diverticulosis.      US-RENAL   Final Result      1.  No evidence of obvious perinephric hematoma, status post left partial nephrectomy.      DX-CHEST-PORTABLE (1 VIEW)   Final Result      Persistent bibasilar infiltrate with  removal of the endotracheal tube.      US-EXTREMITY VENOUS LOWER BILAT   Final Result      DX-CHEST-PORTABLE (1 VIEW)   Final Result      1.  No significant change.      DX-CHEST-PORTABLE (1 VIEW)   Final Result      1.  Interval placement of an endotracheal tube which terminates in satisfactory position at the level of the aortic arch.   2.  Interval insertion of a central venous catheter which terminates with the tip projecting over the expected region of the distal brachiocephalic vein or proximal superior vena cava.   3.  Patchy right-sided pulmonary opacities and bilateral basilar atelectasis.      DX-PORTABLE FLUORO > 1 HOUR   Final Result      Portable fluoroscopy utilized for 28 seconds.         INTERPRETING LOCATION: [address], [place], [address]      [id number] VIEW   Final Result      Digitized intraoperative radiograph is submitted for review. This examination is not for diagnostic purpose but for guidance during a surgical procedure. Please see the patient's chart for full procedural details.         INTERPRETING LOCATION: [address], [place], [address]      [id number] VIEW   Final Result      1.  A left-sided double-J ureteral stent is in place. Due to overlying structures and adjacent contrast opacified bowel loops, extravasation from the renal collecting system cannot be excluded.   2.  Multiple mild to moderately dilated gas-filled loops of small bowel with enteric contrast present. Findings could be seen in the setting of partial small bowel obstruction or ileus.      EC-ECHOCARDIOGRAM COMPLETE W/ CONT   Final Result      CT-ABDOMEN-PELVIS WITH   Final Result      1.  Bibasilar atelectasis right greater than left.      2.  Hepatic steatosis.      3.  Bandlike area of fluid attenuation coursing through the upper pole of the left kidney which contains multiple air bubbles. This likely represents postoperative change however an infected postoperative tract through the upper pole of the left kidney     should be considered.      4.  Multiple prominent nonobstructing right-sided renal calculi. Atrophic changes of the right kidney.      5.  Mild bilateral hydronephrosis.      6.  Moderate left-sided pelviectasis which likely contains a blood clot.      7.  Thrombosis of the infrarenal aorta with the right-sided iliac stent in place. Thrombotic occlusion of the left iliac arteries.      8.  Colonic diverticulosis.      9.  Acute mid small bowel obstruction.      10.  These findings were Voalted to HERMELINDO WILHELM at 9:30 AM 7/8/2024      CT-CTA CHEST PULMONARY ARTERY W/ RECONS   Final Result      1.  No CT evidence of pulmonary emboli         2. Small multifocal patchy airspace opacities in the right upper lobe and right lower lobe suspicious for pneumonitis possibly Covid.      3. Bibasilar atelectasis.      4. Coarse multifocal nonobstructing right-sided renal calculi and atrophic change.      DX-CHEST-PORTABLE (1 VIEW)   Final Result      1.  Left retrocardiac airspace opacity consistent with atelectasis and/or pneumonitis.      DX-ABDOMEN FOR TUBE PLACEMENT   Final Result      1.  NG tube extends in the fundus of stomach.      DX-CHEST-PORTABLE (1 VIEW)   Final Result      1.  Bibasilar airspace disease, greater on the left.      [id number] VIEW   Final Result      Dilated air-filled small bowel. This may be due to postoperative ileus or small bowel obstruction.      DX-ABDOMEN FOR TUBE PLACEMENT   Final Result      Nasogastric tube tip projects over the proximal stomach.      [id number] VIEW   Final Result      1.  Stable mild to moderate dilatation of small bowel loops with air seen in the distal rectum.      [id number] VIEW   Final Result      1.  Operative changes of the abdomen.      2.  Evolving small bowel obstruction.      US-RENAL   Final Result      1.  Nonvisualization of the left kidney due to overlying bandages.   2.  Suboptimal visualization of the right kidney due to patient body habitus  "and ribs demonstrates no definite evidence of hydronephrosis.      [id number] VIEW   Final Result      Paucity of bowel gas with stool present in the colon. No definite evidence of obstruction however evaluation is limited on supine imaging.          INR   Date Value Ref Range Status   07/12/2024 1.29 (H) 0.87 - 1.13 Final     Comment:     INR - Non-therapeutic Reference Range: 0.87-1.13  INR - Therapeutic Reference Range: 2.0-4.0       No results found for: \"POCINR\"     Intake/Output Summary (Last 24 hours) at 7/16/2024 1603  Last data filed at 7/16/2024 0802  Gross per 24 hour   Intake 3046.97 ml   Output 1600 ml   Net 1446.97 ml      Labs not explicitly included in this progress note were reviewed by the author. Radiology/imaging not explicitly included in this progress note was reviewed by the author.     I have performed a physical exam and reviewed and updated ROS and Plan today (7/16/2024).     56 minutes in directly providing and coordinating care and extensive data review.  No time overlap and excludes procedures.  "

## 2024-07-16 NOTE — PROGRESS NOTES
Pt began to desat to 82% on 5L nc, pt placed on 8L oxymask, pt tachypnic at 27, pt sounds hoarse when speaking and says its hard for him to speak, pt has sounds diminished on bases with crackles, MD Carlin notified, per MD CXR ordered.     0025: RT at bedside, pt now on 10L oxymask, RT provided NT suctioning, pt tolerated well and small amount of tan mucus suctioned out, pt able to tolerate 5L humidified NC

## 2024-07-16 NOTE — THERAPY
Speech Language Pathology   Clinical Swallow Evaluation     Patient Name: Olman Sims  AGE:  74 y.o., SEX:  male  Medical Record #: [id number]  Date of Service: 2024      History of Present Illness  74M admitted on 24 for elective partial nephrectomy with post-operative JAE, bleeding, and hyperkalemia; pathology found clear cell renal carcinoma. Hospital course complicated by SBO on , shock s/p pressor support, acute respiratory failure requiring intubation -, and aortic thrombus on 7/10.     PMH notable for COPD, CKD III, afib, CAD, HTN, PVD, mild cognitive impairment, nasal septal deformity, NSTEMI. Not previously seen by service per EMR.     Imagin/15/24 CXR:   1.  Bilateral basilar atelectasis, no focal infiltrate  2.  Atherosclerosis        General Information:  Vitals  O2 (LPM): 4  O2 Delivery Device: Oxymask  Vitals Comments: baseline O2 3L  Level of Consciousness: Alert  Patient Behaviors: Fatigue  Orientation: Oriented x 4  Follows Directives: Yes      Prior Living Situation & Level of Function:  Prior Services: None  Housing / Facility: [address] [place] [address]  Lives with - Patient's Self Care Capacity: Alone and Able to Care For Self  Comments: Patient's niece is a RN. Niece mentioned that she and her family and also patient's friend can provide support/ assistance as needed upon DC.  Communication: WFL  Swallowing: Reportedly WFL       Oral Mechanism Evaluation:  Dentition: Poor (upper dentition; lower plate/denture, N/A)   Facial Symmetry: Equal  Facial Sensation: Equal     Labial Observations: WFL   Lingual Observations: Midline, Xerostomia       Laryngeal Function:  Secretion Management: Adequate  Voice Quality: Breathy continuous, Harsh (severe)  Cough: Perceptually weak       Subjective  RN cleared patient for evaluation. She reported noc RN made patient NPO d/t concern for aspiration last night. RT completed suctioning and oxygen needs decreased. RN reported concern for  silent aspiration/wet vocal quality. Patient sleeping but easily wakened for evaluation. Patient reported voice is not typical and has been dysphonic since extubation. He reported no change to swallow function.      Assessment  Current Method of Nutrition: Oral diet (full liquid pending SLP eval; not r/t hx SBO)  Positioning: Lawler's (60-90 degrees)  Bolus Administration: SLP  O2 (LPM): 4 O2 Delivery Device: Oxymask  Factor(s) Affecting Performance: Impaired endurance  Tracheostomy : No       Swallowing Trials:  Ice: WFL  Thin Liquid (TN0): WFL  Liquidised (LQ3): WFL      Comments: Wet breath/cough quality prior to PO. Adequate oral bolus acceptance/containment. Presumed timely oral transit. Complete AP transfer without notable oral bolus residue upon oral inspection. No exacerbation of cough/throat clear appreciated with PO. Vocal quality remained stable throughout PO intake. Single swallow completed per bolus. No signs of esophageal dysfunction. Discussed concern for silent aspiration r/t COPD exacerbation and complicated medical hx with indication for FEES to further evaluate swallow function. Patient agreeable. Discussed with RN - patient has been continuously on diet throughout admission when medically appropriate with exception of being made NPO overnight.       Clinical Impressions  Patient presents with risk for pharyngeal dysphagia with silent aspiration r/t COPD exacerbation and deconditioning. Severe dysphonia with rough, breathy vocal quality. Presentation may be consistent with COPD exacerbation, brief intubation hx, and/or generalized weakness. A swallowing diagnostic is indicated to guide dysphagia management. As patient has been on an oral diet throughout hospital course and is with relatively stable respiratory needs currently, will leave on full liquid diet pending FEES. Plan to complete tomorrow pending scheduling availability. With concern, please make patient NPO.     Recommendations  Diet  "Consistency: full liquid pending diagnostic  Instrumentation: FEES  Medication: Crush with applesauce, as appropriate  Supervision: 1:1 feeding with constant supervision  Positioning: Fully upright and midline during oral intake  Risk Management : Small bites/sips, Slow rate of intake  Oral Care: Q4h         SLP Treatment Plan  Treatment Plan: Dysphagia Treatment  SLP Frequency: 4x Per Week  Estimated Duration: Until Therapy Goals Met      Anticipated Discharge Needs  Discharge Recommendations: Recommend post-acute placement for additional speech therapy services prior to discharge home   Therapy Recommendations Upon DC: Dysphagia Training        Patient / Family Goals  Patient / Family Goal #1: \"it's not any different\" re: swallow  Short Term Goals  Short Term Goal # 1: Patient will participate in swallow diagnostic to guide dysphagia management.      Juliet Albert, SLP   "

## 2024-07-16 NOTE — CARE PLAN
Written in retrospect for 7/15/24.  The patient is Watcher - Medium risk of patient condition declining or worsening    Shift Goals  Clinical Goals: hemodynamic stability, stable hemoglobin, monitor CBI, improve respiratory status  Patient Goals: sleep  Family Goals: LAZARO    Progress made toward(s) clinical / shift goals:        Problem: Knowledge Deficit - Standard  Goal: Patient and family/care givers will demonstrate understanding of plan of care, disease process/condition, diagnostic tests and medications  Outcome: Progressing     Problem: Pain - Standard  Goal: Alleviation of pain or a reduction in pain to the patient’s comfort goal  Outcome: Progressing     Problem: Fall Risk  Goal: Patient will remain free from falls  Outcome: Progressing     Problem: Cardiac - Atrial Fibrillation  Goal: Patient will achieve & maintain adequate cardiac output and rate control  Outcome: Progressing  Goal: Complications related to anticoagulation for unconverted atrial fibrillation will be avoided or minimized  Outcome: Progressing     Problem: Urinary Elimination  Goal: Establish and maintain regular urinary output  Outcome: Progressing     Problem: Bowel Elimination  Goal: Establish and maintain regular bowel function  Outcome: Progressing     Problem: Skin Integrity  Goal: Skin integrity is maintained or improved  Outcome: Progressing     Problem: Knowledge Deficit - COPD  Goal: Patient/significant other demonstrates understanding of disease process, utilization of the Action Plan, medications and discharge instruction  Outcome: Progressing     Problem: Risk for Infection - COPD  Goal: Patient will remain free from signs and symptoms of infection  Outcome: Progressing     Problem: Nutrition - Advanced  Goal: Patient will display progressive weight gain toward goal have adequate food and fluid intake  Outcome: Progressing     Problem: Ineffective Airway Clearance  Goal: Patient will maintain patent airway with clear/clearing  breath sounds  Outcome: Progressing     Problem: Impaired Gas Exchange  Goal: Patient will demonstrate improved ventilation and adequate oxygenation and participate in treatment regimen within the level of ability/situation.  Outcome: Progressing     Problem: Risk for Aspiration  Goal: Patient's risk for aspiration will be absent or decrease  Outcome: Progressing     Problem: Self Care  Goal: Patient will have the ability to perform ADLs independently or with assistance (bathe, groom, dress, toilet and feed)  Outcome: Progressing     Problem: Hemodynamics  Goal: Patient's hemodynamics, fluid balance and neurologic status will be stable or improve  Outcome: Progressing     Problem: Fluid Volume  Goal: Fluid volume balance will be maintained  Outcome: Progressing     Problem: Urinary - Renal Perfusion  Goal: Ability to achieve and maintain adequate renal perfusion and functioning will improve  Outcome: Progressing     Problem: Respiratory  Goal: Patient will achieve/maintain optimum respiratory ventilation and gas exchange  Outcome: Progressing     Problem: Nutrition  Goal: Patient's nutritional and fluid intake will be adequate or improve  Outcome: Progressing       Patient is not progressing towards the following goals:

## 2024-07-17 NOTE — CARE PLAN
The patient is Watcher - Medium risk of patient condition declining or worsening    Shift Goals  Clinical Goals: stable CBI, hemodynamic stability, mobilize  Patient Goals: rest,  Family Goals: LAZARO    Progress made toward(s) clinical / shift goals:    Problem: Knowledge Deficit - Standard  Goal: Patient and family/care givers will demonstrate understanding of plan of care, disease process/condition, diagnostic tests and medications  Outcome: Progressing  Note: Pt educated regarding plan of care and medications. All questions answered.        Problem: Pain - Standard  Goal: Alleviation of pain or a reduction in pain to the patient’s comfort goal  Outcome: Progressing  Note: Patient educated on non-pharmacological and pharmacological pain interventions. Patient verbalized understanding.        Problem: Fall Risk  Goal: Patient will remain free from falls  Outcome: Progressing       Patient is not progressing towards the following goals:

## 2024-07-17 NOTE — THERAPY
"Physical Therapy   Daily Treatment     Patient Name: Olman Sims  Age:  74 y.o., Sex:  male  Medical Record #: [id number]  Today's Date: 7/17/2024     Precautions  Precautions: Fall Risk;Swallow Precautions  Comments: Continuous Bladder Irrigation (CBI) (+)    Assessment    Pt presenting with increased LE and UE weakness, L>R foot drop, impaired balance, activity tolerance, endurance, and overall mobility from baseline, as well as decline from previous session since ICU stay. S/p IR emobolization on 7/15. Pt required Max/Total assist for bed mobility, STS x2 person, attempted tx to recliner with aracelis osborne, unfortunately sera stedy did not fit around/under recliner and pt placed back in bed. Further details listed below. Continue to recommend placement. Pt would benefit from continued acute IP PT services to address said deficits.     Plan    Treatment Plan Status: Continue Current Treatment Plan  Type of Treatment: Bed Mobility, Equipment, Family / Caregiver Training, Gait Training, Neuro Re-Education / Balance, Stair Training, Therapeutic Activities, Therapeutic Exercise  Treatment Frequency: 4 Times per Week  Treatment Duration: Until Therapy Goals Met    DC Equipment Recommendations: Unable to determine at this time  Discharge Recommendations: Recommend post-acute placement for additional physical therapy services prior to discharge home      Subjective    \"I can't stand\" stating due to his legs being weak     Objective     07/17/24 1342   Vitals   Blood Pressure    (stable throughout session)   O2 (LPM) 2   O2 Delivery Device Silicone Nasal Cannula   Vitals Comments RN titrated pt's O2 up to 5L with mobility as he desat to low 80's on 2L NC. Maintainted >88% on 5L   Pain 0 - 10 Group   Therapist Pain Assessment Post Activity Pain Same as Prior to Activity;Nurse Notified;0   Cognition    Cognition / Consciousness X   Speech/ Communication Delayed Responses;Nods Appropriately  (one to two words spoken at " a time with encouragement)   Level of Consciousness Alert   Ability To Follow Commands 1 Step   Safety Awareness Impaired   Sequencing Impaired   Initiation Impaired   Comments Pleasant and cooperative. Unclear if not utilizing UE's due to poor motor planning or if underestimating his UE strength   Passive ROM Lower Body   Passive ROM Lower Body WDL   Active ROM Lower Body    Active ROM Lower Body  X   Comments required assist to scoot legs toward EOB, perform heel slide, lift leg onto mili stedy   Strength Lower Body   Lower Body Strength  X   Comments B foot drop, trace active L ankle DF (1/5); B knee ext 3/5 (relies on hyperextension in standing), B hip flexion 2+/5   Neuro-Muscular Treatments   Neuro-Muscular Treatments Anterior weight shift;Verbal Cuing;Weight Shift Right;Weight Shift Left;Tactile Cuing;Sequencing;Postural Facilitation;Postural Changes;Joint Approximation;Compensatory Strategies   Comments poor EOB balance with R and posterior lean (worsened with fatigue), requiring hand over hand assist to utilize UE's on bed   Other Treatments   Other Treatments Provided difficulty raising arms, poor control; BUE grossly 3 to 3+/5. Pt often stating he cannot use his arms, then able to perform task with encouramgent (i.e. placing hands on walker)   Neurological Concerns   Neurological Concerns Yes   Sitting Posture During ADL's Lateral Lean Right;Posterior Lean   Balance   Sitting Balance (Static) Poor   Sitting Balance (Dynamic) Poor -   Standing Balance (Static) Trace   Standing Balance (Dynamic) Dependent   Weight Shift Sitting Poor   Weight Shift Standing Poor   Skilled Intervention Verbal Cuing;Tactile Cuing;Sequencing;Postural Facilitation;Facilitation;Compensatory Strategies   Comments w/ FWW, then sera stedy   Bed Mobility    Supine to Sit Maximal Assist   Sit to Supine Total Assist   Scooting Maximal Assist   Rolling Maximal Assist to Rt.   Skilled Intervention Verbal Cuing;Tactile  Cuing;Sequencing;Facilitation;Compensatory Strategies;Postural Facilitation   Comments HOB raised, increased time and effort   Gait Analysis   Gait Level Of Assist Unable to Participate   Functional Mobility   Sit to Stand Total Assist  (x2 person)   Bed, Chair, Wheelchair Transfer Unable to Participate  (utilized sera stedy to get pt to chair, once over to chair found sera stedy does not fit under/around recliner. had to place pt back in bed)   Mobility supine, EOB, STS, EOB, STS sera stedy, aborted transfer, return supine   Skilled Intervention Verbal Cuing;Sequencing;Tactile Cuing;Facilitation;Compensatory Strategies;Postural Facilitation   ICU Target Mobility Level   ICU Mobility - Targeted Level Level 3A   6 Clicks Assessment - How much HELP from from another person do you currently need... (If the patient hasn't done an activity recently, how much help from another person do you think he/she would need if he/she tried?)   Turning from your back to your side while in a flat bed without using bedrails? 2   Moving from lying on your back to sitting on the side of a flat bed without using bedrails? 2   Moving to and from a bed to a chair (including a wheelchair)? 1   Standing up from a chair using your arms (e.g., wheelchair, or bedside chair)? 1   Walking in hospital room? 1   Climbing 3-5 steps with a railing? 1   6 clicks Mobility Score 8   Activity Tolerance   Sitting in Chair 3 min on sera steady   Sitting Edge of Bed 10+ minutes   Standing 1 min total   Patient / Family Goals    Patient / Family Goal #1 To breathe better, be able to walk   Goal #1 Outcome Goal not met   Short Term Goals    Short Term Goal # 1 Patient will perform supine-sit, sit-supine with HOB flat without rails with supervision in 6 visits to safely get in & out of bed   Goal Outcome # 1 goal not met   Short Term Goal # 2 Patient will perform sit-stand with FWW with supervision in 6 visits to progress with functional mobility   Goal  Outcome # 2 Goal not met   Short Term Goal # 3 Patient will perform chair transfers with FWW with supervision in 6 visits to safely get OOB to chair   Goal Outcome # 3 Goal not met   Short Term Goal # 4 Patient will ambulate 100 feet with LRAD with supervision in 6 visits to safely ambulate household distance   Goal Outcome # 4 Goal not met   Short Term Goal # 5 Patient will negotiate 1 step without rails with supervision in 6 visits to safely get in & out home   Goal Outcome # 5 Goal not met   Education Group   Education Provided Role of Physical Therapist   Role of Physical Therapist Patient Response Patient;Acceptance;Explanation;Verbal Demonstration   Physical Therapy Treatment Plan   Physical Therapy Treatment Plan Continue Current Treatment Plan   Anticipated Discharge Equipment and Recommendations   DC Equipment Recommendations Unable to determine at this time   Discharge Recommendations Recommend post-acute placement for additional physical therapy services prior to discharge home   Interdisciplinary Plan of Care Collaboration   IDT Collaboration with  Nursing   Patient Position at End of Therapy In Bed;Bed Alarm On;Call Light within Reach;Tray Table within Reach;Phone within Reach   Collaboration Comments RN present for session   Session Information   Date / Session Number  7/17- 4 (1/4, 7/22)

## 2024-07-17 NOTE — CARE PLAN
The patient is Watcher - Medium risk of patient condition declining or worsening    Shift Goals  Clinical Goals: hemodynamic stability, stable hemoglobin, monitor CBI, improve respiratory status  Patient Goals: comfort, sleep  Family Goals: lila    Progress made toward(s) clinical / shift goals:    Problem: Knowledge Deficit - Standard  Goal: Patient and family/care givers will demonstrate understanding of plan of care, disease process/condition, diagnostic tests and medications  Outcome: Progressing     Problem: Pain - Standard  Goal: Alleviation of pain or a reduction in pain to the patient’s comfort goal  Outcome: Progressing     Problem: Fall Risk  Goal: Patient will remain free from falls  Outcome: Progressing     Problem: Cardiac - Atrial Fibrillation  Goal: Patient will achieve & maintain adequate cardiac output and rate control  Outcome: Progressing  Goal: Complications related to anticoagulation for unconverted atrial fibrillation will be avoided or minimized  Outcome: Progressing     Problem: Urinary Elimination  Goal: Establish and maintain regular urinary output  Outcome: Progressing     Problem: Bowel Elimination  Goal: Establish and maintain regular bowel function  Outcome: Progressing     Problem: Skin Integrity  Goal: Skin integrity is maintained or improved  Outcome: Progressing     Problem: Knowledge Deficit - COPD  Goal: Patient/significant other demonstrates understanding of disease process, utilization of the Action Plan, medications and discharge instruction  Outcome: Progressing     Problem: Risk for Infection - COPD  Goal: Patient will remain free from signs and symptoms of infection  Outcome: Progressing     Problem: Nutrition - Advanced  Goal: Patient will display progressive weight gain toward goal have adequate food and fluid intake  Outcome: Progressing     Problem: Impaired Gas Exchange  Goal: Patient will demonstrate improved ventilation and adequate oxygenation and participate in  treatment regimen within the level of ability/situation.  Outcome: Progressing     Problem: Risk for Aspiration  Goal: Patient's risk for aspiration will be absent or decrease  Outcome: Progressing     Problem: Hemodynamics  Goal: Patient's hemodynamics, fluid balance and neurologic status will be stable or improve  Outcome: Progressing     Problem: Urinary - Renal Perfusion  Goal: Ability to achieve and maintain adequate renal perfusion and functioning will improve  Outcome: Progressing     Problem: Respiratory  Goal: Patient will achieve/maintain optimum respiratory ventilation and gas exchange  Outcome: Progressing     Problem: Nutrition  Goal: Patient's nutritional and fluid intake will be adequate or improve  Outcome: Progressing       Patient is not progressing towards the following goals:      Problem: Ineffective Airway Clearance  Goal: Patient will maintain patent airway with clear/clearing breath sounds  Outcome: Not Progressing     Problem: Self Care  Goal: Patient will have the ability to perform ADLs independently or with assistance (bathe, groom, dress, toilet and feed)  Outcome: Not Progressing     Problem: Fluid Volume  Goal: Fluid volume balance will be maintained  Outcome: Not Progressing     Problem: Physical Regulation  Goal: Diagnostic test results will improve  Outcome: Not Progressing  Goal: Signs and symptoms of infection will decrease  Outcome: Not Progressing

## 2024-07-17 NOTE — PROGRESS NOTES
Surgical Progress Note    Author: GIOVANNI Vann Date & Time created: 2024   8:49 AM     Interval Events: POD #16    Decreased O2 to 4LNC, improved work of breathing from yesterday (2-3L baseline)  Hematuria improved, decreased CBI flow, no clots  Patient OOB yesterday am, max assist  Pain in back r/t immobility    On amio gtt, /53  Sats 92-96% chest xray done this am    H/H: 8.2/25.2 today--> 6.6/19.7 yesterday  WBC: 17.8-->17  Creatinine: 5.66-->4.33  BUN: 129->105  K: 4.2-->4.2    ROS  Hemodynamics:  Temp (24hrs), Av.5 °C (97.7 °F), Min:35.9 °C (96.7 °F), Max:36.9 °C (98.4 °F)  Temperature: 36.9 °C (98.4 °F)  Pulse  Av.2  Min: 47  Max: 156   Blood Pressure : 108/53     Respiratory:    Respiration: (!) 22, Pulse Oximetry: 95 %     Work Of Breathing / Effort: Tachypnea  RUL Breath Sounds: Crackles, RML Breath Sounds: Diminished, RLL Breath Sounds: Diminished, ALEN Breath Sounds: Crackles, LLL Breath Sounds: Diminished  Neuro:  GCS       Fluids:    Intake/Output Summary (Last 24 hours) at 2024 0849  Last data filed at 2024 0530  Gross per 24 hour   Intake 441.25 ml   Output -1000 ml   Net 1441.25 ml        Current Diet Order   Procedures    Diet Order Diet: Full Liquid     Physical Exam  Pulmonary:      Effort: Pulmonary effort is normal.      Comments: 4L NC, nonlabored   Abdominal:      General: Abdomen is flat.      Palpations: Abdomen is soft.      Comments: Incision with staples removed       Labs:  Recent Results (from the past 24 hour(s))   POCT glucose device results    Collection Time: 24  1:28 PM   Result Value Ref Range    POC Glucose, Blood 124 (H) 65 - 99 mg/dL   HGB    Collection Time: 24  3:08 PM   Result Value Ref Range    Hemoglobin 7.1 (L) 14.0 - 18.0 g/dL   POCT glucose device results    Collection Time: 24  5:30 PM   Result Value Ref Range    POC Glucose, Blood 119 (H) 65 - 99 mg/dL   HGB    Collection Time: 24 10:15 PM   Result  Value Ref Range    Hemoglobin 6.7 (L) 14.0 - 18.0 g/dL   POCT glucose device results    Collection Time: 07/17/24 12:21 AM   Result Value Ref Range    POC Glucose, Blood 119 (H) 65 - 99 mg/dL   COD (Adult) - Type and Crossmatch only order if transfusing RBC'S    Collection Time: 07/17/24  1:55 AM   Result Value Ref Range    ABO Grouping Only AB (A)     Rh Grouping Only POS (A)     Antibody Screen-Cod NEG     Component R       Red Cells, LR       [id number]   issued       07/17/24 03:07    Product Type R99     Dispense Status issued     Unit Number (Barcoded) [id number]     Product Code (Barcoded) E7485M05     Blood Type (Barcoded) 7300    Renal Function Panel    Collection Time: 07/17/24  2:58 AM   Result Value Ref Range    Sodium 140 135 - 145 mmol/L    Potassium 4.2 3.6 - 5.5 mmol/L    Chloride 104 96 - 112 mmol/L    Co2 17 (L) 20 - 33 mmol/L    Glucose 115 (H) 65 - 99 mg/dL    Creatinine 5.66 (HH) 0.50 - 1.40 mg/dL    Bun 129 (H) 8 - 22 mg/dL    Calcium 6.4 (LL) 8.5 - 10.5 mg/dL    Correct Calcium 8.0 (L) 8.5 - 10.5 mg/dL    Phosphorus 8.2 (H) 2.5 - 4.5 mg/dL    Albumin 2.0 (L) 3.2 - 4.9 g/dL   CBC WITHOUT DIFFERENTIAL    Collection Time: 07/17/24  2:58 AM   Result Value Ref Range    WBC 17.0 (H) 4.8 - 10.8 K/uL    RBC 2.20 (L) 4.70 - 6.10 M/uL    Hemoglobin 6.6 (L) 14.0 - 18.0 g/dL    Hematocrit 19.7 (L) 42.0 - 52.0 %    MCV 89.5 81.4 - 97.8 fL    MCH 30.0 27.0 - 33.0 pg    MCHC 33.5 32.3 - 36.5 g/dL    RDW 57.2 (H) 35.9 - 50.0 fL    Platelet Count 129 (L) 164 - 446 K/uL    MPV 11.4 9.0 - 12.9 fL   Comp Metabolic Panel    Collection Time: 07/17/24  2:58 AM   Result Value Ref Range    Anion Gap 19.0 (H) 7.0 - 16.0    AST(SGOT) 30 12 - 45 U/L    ALT(SGPT) 16 2 - 50 U/L    Alkaline Phosphatase 50 30 - 99 U/L    Total Bilirubin 0.3 0.1 - 1.5 mg/dL    Total Protein 3.9 (L) 6.0 - 8.2 g/dL    Globulin 1.9 1.9 - 3.5 g/dL    A-G Ratio 1.1 g/dL   MAGNESIUM    Collection Time: 07/17/24  2:58 AM   Result Value Ref  Range    Magnesium 1.9 1.5 - 2.5 mg/dL   PROCALCITONIN    Collection Time: 07/17/24  2:58 AM   Result Value Ref Range    Procalcitonin 3.06 (H) <0.25 ng/mL   IONIZED CALCIUM    Collection Time: 07/17/24  2:58 AM   Result Value Ref Range    Ionized Calcium 0.9 (L) 1.1 - 1.3 mmol/L   ESTIMATED GFR    Collection Time: 07/17/24  2:58 AM   Result Value Ref Range    GFR (CKD-EPI) 10 (A) >60 mL/min/1.73 m 2   CBC Without Differential    Collection Time: 07/17/24  6:06 AM   Result Value Ref Range    WBC 17.8 (H) 4.8 - 10.8 K/uL    RBC 2.76 (L) 4.70 - 6.10 M/uL    Hemoglobin 8.2 (L) 14.0 - 18.0 g/dL    Hematocrit 25.2 (L) 42.0 - 52.0 %    MCV 91.3 81.4 - 97.8 fL    MCH 29.7 27.0 - 33.0 pg    MCHC 32.5 32.3 - 36.5 g/dL    RDW 56.0 (H) 35.9 - 50.0 fL    Platelet Count 120 (L) 164 - 446 K/uL    MPV 11.4 9.0 - 12.9 fL     Medical Decision Making, by Problem:  Active Hospital Problems    Diagnosis     Hypotension [I95.9]     Hypernatremia [E87.0]     Aortic thrombus (HCC) [I74.10]     Hypocalcemia [E83.51]     Hyperglycemia [R73.9]     Shock (HCC) [R57.9]     Small bowel obstruction (HCC) [K56.609]     Left kidney mass [N28.89]     Leukemoid reaction [D72.823]     Acute blood loss as cause of postoperative anemia [D62]     Coronary artery disease involving native coronary artery of native heart without angina pectoris [I25.10]     History of atrial fibrillation [Z86.79]      Remote history of atrial fibrillation in 2019 in the setting of an acute COPD exacerbation.  He has had no recurrences.  He did follow with cardiology for a few years after this.  Oral anticoagulant was not indicated at that time.      Insomnia [G47.00]      This is a chronic condition. Takes Ambien 5 mg nightly. Symptoms are well controlled with this. Denies any side effects.    Last UDS appropriate 10/4/23  CS agreement UTD 10/4/23        Acute on chronic hypoxic respiratory failure (HCC) [J96.21]     Essential hypertension [I10]      This is a chronic  condition.  Current Meds:   - losartan 100 mg daily  - carvedilol 12.5 mg BID  - amlodipine 10 mg daily  Side effects: none  Home BP Log: Typically 120s/60s  Associated symptoms: Denies chest pain, shortness of breath, headaches, dizziness/lightheadedness           Peripheral vascular disease ([facility]) [I73.9]      Angioplasty and stents Dr. Vital November 2012      COPD exacerbation ([facility]) [J44.1]      This is a chronic condition.  Managed by pulmonology.  Patient is on Trelegy inhaler daily and albuterol as needed.  He is on supplemental oxygen at night and as needed throughout the day.   Symptoms currently controlled.       Acute renal failure superimposed on stage 3 chronic kidney disease ([facility]) [N17.9, N18.30]      Plan:    Titrate CBI to josey color, decreased flow rate. May turn off if urine pink, and no clots    Plan for patient to get OOB for lunch, activity as tolerated, encourage use IS    Removal of staples at surgical site today, patient tolerated well. Placed some steristrips at some areas of incision. Left FLORECITA. May cover with gauze and medipore tape or tegaderm if draining.       Quality Measures:  Quality-Core Measures    Discussed patient condition with RN

## 2024-07-17 NOTE — PROGRESS NOTES
Hospital Medicine Daily Progress Note    Date of Service  7/17/2024    Chief Complaint  Here for elective partial nephrectomy    Hospital Course  Luis Sims is a 80-year-old male past medical history of COPD on chronic oxygen 2-3 L at baseline, emphysema, hypertension, CAD, previous atrial fibrillation, PVD, previous rheumatic fever, previous nephrolithiasis status post nephrolithotomy was admitted for elective left partial nephrectomy, JADIEL drain placement.  Postop then, they did with acute kidney injury, bleeding, hyperkalemia.  Nephrology consulted  Hematuria did resolve.  Patient was improving JADIEL drain removed 7/5.  Maintain the Lauren catheter  Creatinine had improvement for couple of day and start to worsen again. Most likely ATN from partial nephrectomy  7/5- pt started to have complication of small bowel obstruction. NG tube placed 7/6 with low suction. He also started to noticed clot on urine. Started CBI. Felt slight better. 7/8- significantly worse. Requiring high flow oxygen, hypotensive. Cr worsen. Because of CBI not sure if pt is anuric. Rapid team and critical care doctor called and pt transfer to ICU for pressors and close monitoring. Stat CT scan ordered   Required intubation but extubated 7/9 but was on pressor support. On 7/10 a CT abd/pelvis showed a left perinephric hematoma.  7/1 pathology showing clear cell renal carcinoma      Interval Problem Update  Patient seen and examined today.  Data, Medication data reviewed.  Case discussed with nursing as available.  Plan of Care reviewed with patient and notified of changes.  7/15: Called by radiology yesterday and slight increase in perinephric hematoma but continues with light pink lemonade colored urine in CBI, decision made yesterday to continue heparin.  Overnight increase in bleeding and drop in Hgb.giving protamine and transfusing pRBCs.  Worsening renal function with Cr:3.51. Hgb:6.4.  7/16 the patient appears lethargic, he has complaints  of back pain, currently afebrile, heart rate in the 90s, respiration unlabored, patient is saturating in the mid 90s on 5 to 7 L nasal cannula oxygen, blood pressure in the 1 teens to 100s over 50s, s/p embolization of 2 small left renal arteries by interventional radiology on 7/15  Urology follow-up overall with a positive report, ongoing CBI  Hemoglobin stabilized  Second 32.8, hemoglobin 8.2, hematocrit 24.9, platelet count 175, sodium 137 potassium 4.2, chloride 103, bicarb 18, glucose 118, BUN is 105, creatinine 4.33,  Nephrology following, ongoing treatment with sodium and bicarbonate, currently no indication for RRT, holding off diuretics at this time, close laboratory follow-up.  7/17 patient will repeat need for transfusion overnight, no overt bleeding, no significant bowel movement reported, no GI bleeding reported, urine is slowly clearing, reducing CBI, creatinine worsening, discussed with nephrology, urology, interventional radiology.  Currently afebrile, heart rate in the 70s, respiration unlabored, blood pressure in the 100s to 150s over 60s,  Laboratory data with a white count of 17.8, hemoglobin 8.2, platelet count 120, sodium 140, potassium 4.2, chloride 104, bicarb 17, glucose 115, , creatinine 5.66, corrected calcium 8, albumin 2.0, Phos 5.2,  Elevated procalcitonin, extending antibiotic therapy, somewhat unclear intra-abdominal process with blood loss, pain, leukocytosis  I have discussed this patient's plan of care and discharge plan at IDT rounds today with Case Management, Nursing, Nursing leadership, and other members of the IDT team.    Consultants/Specialty  nephrology and urology  Critical care  Interventional radiology    Code Status  Full Code    Disposition  The patient is not medically cleared for discharge to home or a post-acute facility.  Anticipate discharge to: skilled nursing facility    I have placed the appropriate orders for post-discharge needs.    Review of  Systems  Review of Systems   Constitutional: Negative.  Negative for malaise/fatigue.   HENT: Negative.     Eyes: Negative.    Respiratory:  Positive for cough and hemoptysis. Negative for shortness of breath.    Cardiovascular: Negative.  Negative for palpitations and leg swelling.   Gastrointestinal: Negative.  Negative for abdominal pain and nausea.   Genitourinary:  Positive for hematuria.   Musculoskeletal:  Positive for back pain and myalgias.   Skin: Negative.    Neurological:  Positive for weakness. Negative for speech change.   Endo/Heme/Allergies: Negative.    Psychiatric/Behavioral: Negative.  The patient is not nervous/anxious.    All other systems reviewed and are negative.       Physical Exam  Temp:  [35.9 °C (96.7 °F)-36.9 °C (98.4 °F)] 36.7 °C (98 °F)  Pulse:  [] 73  Resp:  [20-29] 22  BP: ()/(46-75) 155/63  SpO2:  [92 %-100 %] 97 %    Physical Exam  Vitals reviewed.   Constitutional:       Appearance: Normal appearance. He is obese. He is ill-appearing.   HENT:      Head: Normocephalic.      Nose: Nose normal.      Mouth/Throat:      Mouth: Mucous membranes are dry.      Pharynx: Oropharynx is clear.   Eyes:      General:         Right eye: No discharge.         Left eye: No discharge.   Cardiovascular:      Rate and Rhythm: Normal rate.   Pulmonary:      Effort: Pulmonary effort is normal. No respiratory distress.      Breath sounds: Rhonchi present.   Abdominal:      General: There is no distension.      Palpations: Abdomen is soft.      Tenderness: There is abdominal tenderness.      Comments: Staples intact and good approximation of surgical edges   Musculoskeletal:         General: Swelling present.      Cervical back: Normal range of motion and neck supple.      Right lower leg: Edema present.      Left lower leg: Edema present.   Skin:     Coloration: Skin is pale. Skin is not jaundiced.   Neurological:      General: No focal deficit present.      Mental Status: He is alert and  oriented to person, place, and time.      Motor: Weakness present.   Psychiatric:         Mood and Affect: Mood normal.         Fluids    Intake/Output Summary (Last 24 hours) at 7/17/2024 1613  Last data filed at 7/17/2024 1458  Gross per 24 hour   Intake 371.25 ml   Output -1000 ml   Net 1371.25 ml       Laboratory  Recent Labs     07/16/24  0213 07/16/24  1508 07/16/24  2215 07/17/24  0258 07/17/24  0606   WBC 32.8*  --   --  17.0* 17.8*   RBC 2.81*  --   --  2.20* 2.76*   HEMOGLOBIN 8.2*   < > 6.7* 6.6* 8.2*   HEMATOCRIT 24.9*  --   --  19.7* 25.2*   MCV 88.6  --   --  89.5 91.3   MCH 29.2  --   --  30.0 29.7   MCHC 32.9  --   --  33.5 32.5   RDW 55.1*  --   --  57.2* 56.0*   PLATELETCT 175  --   --  129* 120*   MPV 12.2  --   --  11.4 11.4    < > = values in this interval not displayed.     Recent Labs     07/15/24  0535 07/16/24  0213 07/17/24  0258   SODIUM 138 137 140   POTASSIUM 3.7 4.2 4.2   CHLORIDE 104 103 104   CO2 20 18* 17*   GLUCOSE 158* 118* 115*   * 105* 129*   CREATININE 3.51* 4.33* 5.66*   CALCIUM 6.8* 6.8* 6.4*                         Imaging  DX-CHEST-PORTABLE (1 VIEW)   Final Result         1.  Hazy bilateral lower lobe infiltrates, similar to prior study.   2.  Atherosclerosis      DX-CHEST-PORTABLE (1 VIEW)   Final Result         1.  Bilateral basilar atelectasis, no focal infiltrate   2.  Atherosclerosis      IR-EMBOLIZATION   Final Result      1.  Left renal arteriograms demonstrating 2 sites of active hemorrhage.   2.  Technically successful microcoil embolization of 2 small left renal branch arteries.      DX-CHEST-PORTABLE (1 VIEW)   Final Result      1.  Mild worsening LEFT lung base atelectasis.   2.  No other significant change from prior exam.         CT-ABDOMEN-PELVIS W/O   Final Result   Addendum (preliminary) 1 of 1   Critical value called by Dr. Vincent Alonso to Dr. Riley at 7/14/2024 6:08    PM.      Final      1.  Left perirenal hematoma, mildly increased since prior.    2.  Additional collection medial to the right kidney with small gas focus, stable since prior. Could be seroma, resolving hematoma. Abscess not completely excluded.   3.  Mild peripancreatic fat stranding. Could be related to anasarca. Cannot exclude pancreatitis, recommend laboratory correlation.   4.  7 mm indeterminate right renal cortical lesion, recommend ultrasound assessment to evaluate cyst versus solid.   5.  Other findings are present, refer to above.      Efforts are underway to contact referring physician with results at time of dictation.      DX-CHEST-PORTABLE (1 VIEW)   Final Result      Stable consolidation within the right lung base.      DX-CHEST-PORTABLE (1 VIEW)   Final Result      Increased patchy right greater than left basilar opacities suspicious for pneumonitis.      DX-CHEST-PORTABLE (1 VIEW)   Final Result      1.  Ongoing bibasilar interstitial opacities.   2.  Resolving subsegmental atelectatic change left lung base.   3.  No new abnormality.      DX-CHEST-PORTABLE (1 VIEW)   Final Result      Stable chest.      CT-ABDOMEN-PELVIS W/O   Final Result      1.  Bibasilar airspace infiltrates with air bronchograms which may represent pneumonia.   2.  Postoperative changes left kidney with mild surrounding stranding and an approximately 52 mm x 32 mm perinephric hematoma. No evidence of urinoma. Left ureteral stent in satisfactory position.   3.  Ileus.   4.  Sigmoid diverticulosis.      US-RENAL   Final Result      1.  No evidence of obvious perinephric hematoma, status post left partial nephrectomy.      DX-CHEST-PORTABLE (1 VIEW)   Final Result      Persistent bibasilar infiltrate with removal of the endotracheal tube.      US-EXTREMITY VENOUS LOWER BILAT   Final Result      DX-CHEST-PORTABLE (1 VIEW)   Final Result      1.  No significant change.      DX-CHEST-PORTABLE (1 VIEW)   Final Result      1.  Interval placement of an endotracheal tube which terminates in satisfactory position at  the level of the aortic arch.   2.  Interval insertion of a central venous catheter which terminates with the tip projecting over the expected region of the distal brachiocephalic vein or proximal superior vena cava.   3.  Patchy right-sided pulmonary opacities and bilateral basilar atelectasis.      DX-PORTABLE FLUORO > 1 HOUR   Final Result      Portable fluoroscopy utilized for 28 seconds.         INTERPRETING LOCATION: [address], [place], [address]      [id number] VIEW   Final Result      Digitized intraoperative radiograph is submitted for review. This examination is not for diagnostic purpose but for guidance during a surgical procedure. Please see the patient's chart for full procedural details.         INTERPRETING LOCATION: [address], [place], [address]      [id number] VIEW   Final Result      1.  A left-sided double-J ureteral stent is in place. Due to overlying structures and adjacent contrast opacified bowel loops, extravasation from the renal collecting system cannot be excluded.   2.  Multiple mild to moderately dilated gas-filled loops of small bowel with enteric contrast present. Findings could be seen in the setting of partial small bowel obstruction or ileus.      EC-ECHOCARDIOGRAM COMPLETE W/ CONT   Final Result      CT-ABDOMEN-PELVIS WITH   Final Result      1.  Bibasilar atelectasis right greater than left.      2.  Hepatic steatosis.      3.  Bandlike area of fluid attenuation coursing through the upper pole of the left kidney which contains multiple air bubbles. This likely represents postoperative change however an infected postoperative tract through the upper pole of the left kidney    should be considered.      4.  Multiple prominent nonobstructing right-sided renal calculi. Atrophic changes of the right kidney.      5.  Mild bilateral hydronephrosis.      6.  Moderate left-sided pelviectasis which likely contains a blood clot.      7.  Thrombosis of the infrarenal aorta with the  right-sided iliac stent in place. Thrombotic occlusion of the left iliac arteries.      8.  Colonic diverticulosis.      9.  Acute mid small bowel obstruction.      10.  These findings were Voalted to HERMELINDO WILHELM at 9:30 AM 7/8/2024      CT-CTA CHEST PULMONARY ARTERY W/ RECONS   Final Result      1.  No CT evidence of pulmonary emboli         2. Small multifocal patchy airspace opacities in the right upper lobe and right lower lobe suspicious for pneumonitis possibly Covid.      3. Bibasilar atelectasis.      4. Coarse multifocal nonobstructing right-sided renal calculi and atrophic change.      DX-CHEST-PORTABLE (1 VIEW)   Final Result      1.  Left retrocardiac airspace opacity consistent with atelectasis and/or pneumonitis.      DX-ABDOMEN FOR TUBE PLACEMENT   Final Result      1.  NG tube extends in the fundus of stomach.      DX-CHEST-PORTABLE (1 VIEW)   Final Result      1.  Bibasilar airspace disease, greater on the left.      [id number] VIEW   Final Result      Dilated air-filled small bowel. This may be due to postoperative ileus or small bowel obstruction.      DX-ABDOMEN FOR TUBE PLACEMENT   Final Result      Nasogastric tube tip projects over the proximal stomach.      [id number] VIEW   Final Result      1.  Stable mild to moderate dilatation of small bowel loops with air seen in the distal rectum.      [id number] VIEW   Final Result      1.  Operative changes of the abdomen.      2.  Evolving small bowel obstruction.      US-RENAL   Final Result      1.  Nonvisualization of the left kidney due to overlying bandages.   2.  Suboptimal visualization of the right kidney due to patient body habitus and ribs demonstrates no definite evidence of hydronephrosis.      [id number] VIEW   Final Result      Paucity of bowel gas with stool present in the colon. No definite evidence of obstruction however evaluation is limited on supine imaging.           Assessment/Plan  * Left kidney mass- (present on  admission)  Assessment & Plan  Status post partial nephrectomy.  Follow-up with urology.    Pathology consistent with clear cell renal carcinoma    Hypotension  Assessment & Plan  Concern of volume loss  transfuse to keep hemoglobin greater than 7  Caution as remains on amiodarone drip  Monitor I's and O's, labs, vitals      Hypernatremia  Assessment & Plan  Resolved    Aortic thrombus (HCC)  Assessment & Plan  Initially on heparin and then had bleeding episode  heparin drip stopped and reversed protamine given.  Concern of hemoptysis, ongoing hematuria and dropping hemoglobin.  hold anticoagulation    Hyperglycemia  Assessment & Plan  A1c 5.7 in May  Hyperglycemia likely due to acute illness  SSI     Hypocalcemia  Assessment & Plan  Monitor corrected calcium    Shock (HCC)- (present on admission)  Assessment & Plan  S/p pressor support  Monitor vitals., I/O's  Care with pain meds    Small bowel obstruction (HCC)  Assessment & Plan  Improved, tolerating a diet  Antiemetics  mobilize    Leukemoid reaction- (present on admission)  Assessment & Plan  S/p antibiotics, will extend for the time being, reevaluate  Following up on CBC, monitor closely    Acute blood loss as cause of postoperative anemia- (present on admission)  Assessment & Plan  Continue to monitor closely since patient has ongoing hematuria and on CBI  7/16 stabilizing, monitoring  7/15 this morning I stop the heparin drip and gave protamine.  Discussed with urology and they feel that ongoing bleeding potentially around the partial nephrectomy and a will contact interventional radiology.  Patient continues to have clots and pink-tinged urine from his CBI.  The patient was in atrial fibrillation last night he is back in sinus rhythm this morning.  Will continue to monitor hemoglobins and transfuse to keep hemoglobin greater than 7.  I have made him n.p.o. for potential interventional radiology procedure today  7/14 checking CT abdomen and pelvis without  contrast to evaluate for perinephric hemorrhage.  Patient continues to drop hemoglobin while on heparin.  May need embolectomy.  Transfuse for hemoglobin less than 7      Hyperkalemia  Assessment & Plan          Left renal mass  Assessment & Plan  Highly concern for malignancy   status post Open left partial nephrectomy nephrectomy on July 1, 2024  Managed by Urology   Follow-up pathology-Clear-cell carcinoma.  Appreciate urology recommendations  7/4- hopefully JADIEL drain removing tomorrow. Appreciate urology recs   7/5/24- JADIEL removed today     Coronary artery disease involving native coronary artery of native heart without angina pectoris- (present on admission)  Assessment & Plan  NSTEMI 2019 s/p stent placement at RCA at that time  hold ASA due to hematura  Hold oral meds     History of atrial fibrillation- (present on admission)  Assessment & Plan  He is not on anticoagulation  Per chart review has history atrial fibrillation 2019 due to COPD exacerbation one-time, and no indication for anticoagulation since then  7/4- HR increasing, pt did miss couple of dosage of carvedilol. Continue to monitor on telemetry. Resumed carvedilol. Continue to monitor closely.   7/8- continue tele. Resume blood thinners when ok per urology   7/11 on amiodarone drip and 7/12 restarted heparin drip and monitor hgb nd sign of further hematura  (On heparin drip for afib hx but also for aortic thrombus)  7/14 continuing IV amiodarone as patient may require to be n.p.o. for possible embolectomy.  Once able to safely take orals we will switch him over  7/15 was in atrial fibrillation overnight converted to sinus rhythm this morning.  Continue IV until able to take orals safely.  Holding off on anticoagulation currently due to ongoing bleeding  7/16, continued to have rate controlled A-fib  7/17 will transition to oral amiodarone    Acute on chronic hypoxic respiratory failure (HCC)- (present on admission)  Assessment & Plan  7/8 CTA chest  negative for PE but patchy opacification  RT per protocol  Mobilize  Encourage deep inspirator efforts  Titrate oxygen  7/13 on 3 Liters O2  Keep hemoglobin greater than 7.  Mobilized as best able.    Essential hypertension- (present on admission)  Assessment & Plan  Holding Losartan in the setting of recent partial nephrectomy and renal failure  On amiodarone drip  Monitor I/O's labs and vitals.    Peripheral vascular disease (HCC)- (present on admission)  Assessment & Plan  History of angioplasty stent placement by Dr. Vital 2012   hold aspirin  Atorvastatin.  Control BP and lipids    COPD exacerbation (HCC)- (present on admission)  Assessment & Plan  Continue with home dose Trelegy Ellipta   Duonebs PRN   Not signs for exacerbation  Follow-up with for allergies as outpatient  RT per protocol.  Wean steroids  Titrate oxygen as able to keep SPO2 >89%  3 to 4 L nasal cannula encourage deep inspiratory efforts keeping hemoglobin greater than 7    Acute renal failure superimposed on stage 3 chronic kidney disease (HCC)- (present on admission)  Assessment & Plan  Cannot rule out obstruction versus renal.  No contrast exposure  Continue to monitor very closely  Continue IV fluid  renal ultrasound  7/3/24-creatinine slight worse again however patient is starting to have better urine output.  Likely this is ATN which has been expected worsening creatinine and then plateau and then improvement later  7/4/24- slight improving. Good urine output. Continue to monitor closely   7/5/24- still good urine output, Cr not improving. Poor oral intake   7/7- worse again. Continue IVF, continue CBI. Continue close monitoring   7/8- prerenal vs obstructive. Nephrology on board. Start pressors. Ok to contrast since pt will likely be started on dialysis   7/11 Cr:4.34 <5.4  7/12 Cr:3.78 but increased BUN (was NPO overnight).  Monitor BMP in am.  7/13 Cr:3.46.  Encourage oral intake.    7/14 Cr: 3.78  7/15 concern of borderline blood  pressures and anemia contributing.  7/15 creatinine 3.51 with a BUN of 109.  [facility] nephrology consulting  7/16 ongoing renal failure, slightly worsened, nephrology following, holding off diuretics  7/17 still ongoing or worsening renal function, the patient might headed for dialysis soon, Lasix trial as per nephrology       Plan  7/17  Rising leukocytosis without other definitive cause, will check C. Difficile, pending, restart cefepime  Monitor hemoglobin closely, the patient required additional transfusion  Titrate CBI down, monitor urine output, Lasix trial  Close monitoring of renal function, the patient might need RRT soon  Maintain adequate perfusion  See orders  Discussed in detail with nephrology, urology, interventional radiology  Currently would like to avoid further renal injury with contrast, monitor closely for additional blood loss  Patient is has a high medical complexity, complex decision making and is at high risk for complication, morbidity, and mortality.  I spent 56 minutes, reviewing the chart, obtaining and/or reviewing separately obtained history. Performing a medically appropriate examination and evaluation.  Counseling and educating the patient. Ordering and reviewing medications, tests, or procedures.   Documenting clinical information in EPIC. Independently interpreting results and communicating results to patient. Discussing future disposition of care with patient, RN and case management.    VTE prophylaxis:    pharmacologic prophylaxis contraindicated due to Bleeding risk      I have performed a physical exam and reviewed and updated ROS and Plan today (7/17/2024). In review of yesterday's note (7/16/2024), there are no changes except as documented above.      Please note that this dictation was created using voice recognition software. I have made every reasonable attempt to correct obvious errors, but I expect that there are errors of grammar and possibly context that I did not  discover before finalizing the note.

## 2024-07-17 NOTE — THERAPY
Speech Language Pathology   Flexible Endoscopic Evaluation of Swallowing (FEES)        Patient Name: Olman Sims  AGE:  74 y.o., SEX:  male  Medical Record #: [id number]  Date of Service: 2024      History of Present Illness  74M admitted on 24 for elective partial nephrectomy with post-operative JAE, bleeding, and hyperkalemia; pathology found clear cell renal carcinoma. Hospital course complicated by SBO on , shock s/p pressor support, acute respiratory failure requiring intubation -, and aortic thrombus on 7/10.      PMH notable for COPD, CKD III, afib, CAD, HTN, PVD, mild cognitive impairment, nasal septal deformity, NSTEMI. Not previously seen by service per EMR.      Imagin/15/24 CXR:   1.  Bilateral basilar atelectasis, no focal infiltrate  2.  Atherosclerosis      Pertinent Information  Current Method of Nutrition: Oral diet (full liquids)  Patient Behaviors: Fatigue   Dentition: Upper dentition; lower plate/denture, N/A  Feeding Tube: None   Tracheostomy: No   Factor(s) Affecting Performance: Impaired endurance     Discussed the risks, benefits, and alternatives of the FEES procedure. Patient/family acknowledged and agreed to proceed.      Assessment  Flexible Endoscopic Evaluation of Swallowing (FEES) completed at bedside today. The endoscope was passed transnasally via Right nare to evaluate the anatomy and physiology of swallowing. Pt tolerated the procedure with no apparent distress.    Anatomic Findings: Thick, white coating throughout larynx as well as on and below TVF. MD aware.                Vocal Fold Motion: Bilateral movement  Secretion Management: [place] Secretion Scale 6/7   Pt also had thick collection of bloody secretions in his  port and along his PPW; these improved throughout the study  PO Trials: Ice Chips, Thin Liquid, Mildly Thick Liquid, Liquidised, Pudding, Mixed      Consistency PAS Score Timing Residue Comments   Thin Liquid 1 N/A Vallecular  Residue: Mild (5%-25%)  Pyriform Sinus Residue: Mild (5%-25%) Cup x2  Straw x8  Did not coordinate rapid intake   Mildly Thick 1 N/A Vallecular Residue: Mild (5%-25%)  Pyriform Sinus Residue: Mild (5%-25%) Cup x2  Straw x3   Liquidised 1 N/A Vallecular Residue: Mild (5%-25%)  Pyriform Sinus Residue: Trace (1%-5%)    Pudding 1 N/A Vallecular Residue: Mild (5%-25%)  Pyriform Sinus Residue: Trace (1%-5%)    Regular Solid  N/A N/A N/A     Offered, patient declined   Mixed 5 Pre Swallow, During Swallow Vallecular Residue: Severe (>50%)  Pyriform Sinus Residue: Severe (>50%) TN0 juice - PAS 1 x2, PAS 5  SB6 fruit - PAS 1 x2, PAS 2 before     Penetration-Aspiration Scale (PAS)  1     No contrast enters airway  2     Contrast enters the airway, remains above the vocal folds, and is ejected from the airway (not seen in the airway at the end of the swallow).  3     Contrast enters the airway, remains above the vocal folds, and is not ejected from the airway (is seen in the airway after the swallow).  4     Contrast enters the airway, contacts the vocal folds, and is ejected from the airway.  5     Contrast enters the airway, contacts the vocal folds, and is not ejected from the airway  6     Contrast enters the airway, crosses the plane of the vocal folds, and is ejected from the airway.  7     Contrast enters the airway, crosses the plane of the vocal folds, and is not ejected from the airway despite effort.  8     Contrast enters the airway, crosses the plane of the vocal folds, is not ejected from the airway and there is no response to aspiration.      Oral phase:  Incomplete but functional mastication of SB6 peaches during trials of mixed. Declined trials of RG7 due to dryness. Otherwise unremarkable.      Pharyngeal phase:  Pharyngeal phase characterized by impairments in BOT retraction, laryngeal vestibule closure (LVC), and pharyngeal shortening, which resulted in the following:  - Single instance of trace deep  penetration (to the vocal folds) with thin juice from mixed during the swallow (inferred). Penetrate did not completely clear from the airway. Pt's cough also ineffective to reliably clear secretions from laryngeal vestibule.  - Mild vallecular residue with liquids and semi-solids and severe vallecular residue with SB6 peaches during trials of mixed. SB6 residue did not clear spontaneously  - Mild pyriform sinus residue with liquids and severe pyriform sinus residue with SB6 peaches during trials of mixed. SB6 residue did not clear spontaneously      Compensatory Strategies:  Cough - INEFFECTIVE to completely clear deep penetrate  Cleansing swallows - INEFFECTIVE to clear pharyngeal stasis with trials of solids and semi-solids  Liquid wash - EFFECTIVE to reduce or clear pharyngeal stasis with trials of mixed (SB6), PU4, and LQ3      Severity Rating:  SMITH: Mild-Moderate      Clinical Impressions  The pt presents with a mild-moderate oropharyngeal dysphagia, likely acute related to respiratory failure and intubation. Do query chronic component due to anatomical findings pictured above. Swallow safety and efficiency are both impaired. Would recommend a puree / thin liquid diet at this time with use of alternating bites/sips. Patient is a good candidate for behavioral and exercise-based swallow rehabilitation. Consider training the following evidence-based swallowing exercises based on patient-specific pathophysiology: effortful swallow and tongue pull back. Dysphagia outcomes can be maximized with use of mobility as pt is able and frequent, thorough oral care.    Recommend ENT consult. Updated IDT.       Recommendations  Puree solids / thin liquids  Medication: Whole with puree, Crush with pudding/puree, as appropriate  Supervision: Close supervision - patient may be left alone for less than 5 minutes at a time, Feeding assistance as needed  Positioning: Fully upright and midline during oral intake  Strategies: Small  "bites/sips, Slow rate of intake, Alternate bites and sips  Oral Care: Q4h  Additional Instrumentation: Repeat diagnostic study when clinically appropriate  Consult Referral(s): ENT, Dietician      SLP Treatment Plan  Treatment Plan: Dysphagia Treatment  SLP Frequency: 4x Per Week  Estimated Duration: Until Therapy Goals Met      Anticipated Discharge Needs  Discharge Recommendations: Recommend post-acute placement for additional speech therapy services prior to discharge home   Therapy Recommendations Upon DC: Dysphagia Training, Community Re-Integration, Patient / Family / Caregiver Education       Patient / Family Goals  Patient / Family Goal #1: \"it's not any different\" re: swallow  Goal #1 Outcome: Progressing as expected  Short Term Goal # 1: Patient will participate in swallow diagnostic to guide dysphagia management.  Goal Outcome # 1: Goal met, new goal added  Short Term Goal # 1 B : Pt will complete exercises targeting BOT retraction and pharyngeal shortening x50 in a session.  Short Term Goal # 2: Pt will consume diet of PU4/TN0 with no overt s/sx of aspiration or worsening of lung status.      Jeannette Astorga, SLP  "

## 2024-07-17 NOTE — PROGRESS NOTES
[facility] -  PROGRESS NOTE               Author: Jericho Bethea M.D. Date & Time: 7/17/2024  8:42 AM     HPI:  74 y.o. male with CKD, left renal mass, COPD with chronic hypoxic respiratory failure, atrial fibrillation, hypertension, and coronary artery disease presented yesterday for open left partial nephrectomy nephrectomy. Continued to take losartan until day of surgery. Baseline cr prior to procedure appears ~2. Underwent procedure yesterday.  Op note without complications noted and only 150cc blood loss documented. Potassium elevated to 6.7 last night, temporizing measures given. Cr elevated to 3.2 this AM.  80cc UOP documented despite 3L IVF. Lauren draining with blood tinged urine, no clots. No chest pain. Shortness or breath stable.Previously followed by [facility]rology [place] [address] with CKD felt 2/2 HTN at that point in time. Past UAs with blood and protein.      DAILY NEPHROLOGY SUMMARY:  7/2: Consult done  7/3: stable hemodynamics, 2L NC, 1.1L UOP-increasing since midnight. No obstruction on imaging    7/4: 1.4L UOP, cr starting to plateau, potassium stable, feeling improved  7/5: stable hemodynamics, 1.4L UOP, constipation main complaint, minimal PO intake  7/6: Worsening nasuea and emesis, Concern for SBO and then had 2 large Bms, NG tube to suction with significant output, only 350cc UOP documented, cr climbing again, started on IV fluids, feeling slightly improved this AM  7/7: 3.4L gastric output from NG-net negative by IOs, alkalosis, started on CBI for recurrent hematuria/clots  7/8: Increased O2 requirements overnight and rapid response this am, transferred to ICU, BP low with SBP 70's this am and now on levophed drip, on high flow NC O2, concern for aspiration overnight, CTA chest negative for PE, BUN/Cr worse, 3.4L emesis/NG output over past 24hrs, on CBI, CT Abd/Pelvis this am with mild bilateral hydro and moderate left pelviectasis with possible blood clot, to go to OR this  after per urology, pt is lethargic but resting comfortable, denies any pain  7/9: No events, went to OR yest for cystoscopy and left ureteral and bladder clot evacuation, remains intubated since surgery, on pressor support with levophed and vasopressin, stable on vent with 40% FiO2, CBI running, 1.1L NG output, BUN/Cr slightly improved 112/6.45 (was 122/8.46 yest)  7/10: No events, extubated yest am, BP elevated overnight but stable this am, stable on 4L NC O2, CBI off, good UOP 3.7L, received 2 units PRBC transfusion and heparin drip off, BUN/Cr improving, Na elevated 148, denies anyCP/SB/LE edema and is feeling hungry  7/11: No events, BP mildly elevated, currently on 6L NC O2, good UOP, BUN/Cr improving, tolerating PO, Na still elevated at 148, CT Abd/Pelvis shows left perinephric hematoma, tolerating p.o. liquids, denies any CP/SOB/LE edema  7/12: No events, BP elevated at times but stable this am, stable on 5L NC O2, 2.4L UOP, Na still 148, serum chloride and BUN slightly higher today, Cr improved at 3.78, currently n.p.o. for possible procedure but otherwise is tolerating p.o.  7/13: Transferred out of ICU and to IMCU, BP mildly elevated, stable on 3L NC O2, creatinine continues to improve, BUN remains at 95 and NA remains elevated at 148, did not require urologic intervention yesterday, hemoglobin dropped this morning and patient receiving 1 unit PRBC transfusion,  7/14: No events, BP elevated, stable on 3 LNC O2, UOP not recorded, BMP pending, drowsy but arousable, no new complaints   7/15: Pt had bleeding overnight, with bloody CBI and hgb drop to 6.4. BP was low in the 90/60s and Cr worsened to 3.5. Was transfused.  7/16: Creatinine worsened to 4.33. BP was 113/62. 450 mL in Lauren bag, orange tinged, was not to have red flecks and some small clots earlier today. Hgb around 8.0 now after being transfused on 15th. 7/17: Creatinine worsening to 5.66, Corrected calcium is 8.0 and bicarb is 17. Was transfused  "2 units again in the setting of dropping hemoglobin, now at 8.2. However, these labs were drawn prior to transfusion. Unclear urine output due to CBI.     REVIEW OF SYSTEMS:    10 point ROS performed, negative other than stated above     PMH/PSH/SH/FH:   Reviewed and unchanged since admission note    CURRENT MEDICATIONS:   Reviewed from admission to present day    VS:  /53   Pulse 66   Temp 36.9 °C (98.4 °F) (Temporal)   Resp (!) 22   Ht 1.753 m (5' 9.02\")   Wt 91 kg (200 lb 9.9 oz)   SpO2 95%   BMI 29.61 kg/m²     Physical Exam  Vitals and nursing note reviewed.   Constitutional:       General: He is not in acute distress.     Appearance: He is not ill-appearing.      Interventions: He is sedated. He is not intubated.  HENT:      Head: Normocephalic and atraumatic.   Eyes:      General: No scleral icterus.     Extraocular Movements: Extraocular movements intact.   Cardiovascular:      Rate and Rhythm: Normal rate and regular rhythm.   Pulmonary:      Effort: Pulmonary effort is normal. No respiratory distress. He is not intubated.      Breath sounds: Examination of the right-lower field reveals decreased breath sounds. Examination of the left-lower field reveals decreased breath sounds. Decreased breath sounds present.      Comments: Has nasal cannula. Mildly increased respiratory effort, with shorter shallow breaths.   Abdominal:      General: There is no distension.   Musculoskeletal:         General: No deformity.      Cervical back: Normal range of motion and neck supple.      Right lower leg: No edema.      Left lower leg: No edema.   Skin:     General: Skin is warm and dry.      Findings: No rash.   Neurological:      General: No focal deficit present.      Mental Status: He is alert and oriented to person, place, and time.   Psychiatric:         Mood and Affect: Mood normal.         Behavior: Behavior normal. Behavior is cooperative.         Fluids:  In: 665 [P.O.:220; I.V.:163.8; " Blood:281.3]  Out: -1000     LABS:  Recent Labs     07/15/24  0535 07/16/24  0213 07/17/24  0258   SODIUM 138 137 140   POTASSIUM 3.7 4.2 4.2   CHLORIDE 104 103 104   CO2 20 18* 17*   GLUCOSE 158* 118* 115*   * 105* 129*   CREATININE 3.51* 4.33* 5.66*   CALCIUM 6.8* 6.8* 6.4*       IMAGING:   All imaging reviewed from admission to present day    ASSESSMENT:    # Oliguric SCOTT, recurrent: In setting of surgery/RAASI/decreased nephron mass.     - Was starting to recover then 2nd Scott with bowel obstruction    - SCOTT again due to obstruction from left ureteral clot and left renal extravasation, underwent cysto and clot evacuation 7/8 by Urology    -on CBI    -had bleeding (hgb 6/4) on 7/15 w hypotension, and SCOTT again. Had a recent left renal hemorrhage for which embolization was done, so likely residual kidney injury as a result of this, will likely resolve with time  # CKD Stage 3B: Billed 2/2 HTN in the past. Urine with blood (renal mass) and protein  #HAGMA  CKD and SCOTT so likely as a result of this acute on chronic insult. Likely related to infectious etiology, leukocytosis + procal elevation   # Hyperkalemia--resolved  # Renal mass: s/p partial Left nephrectomy    -left ureteral clot with obstruction and extravasation of left kidney on imaging 7/8    -s/p Cystoscopy and left ureteral clot and bladder clot evacuation 7/8    -CT Abd/Pelvis 7/10 shows left perinephric hematoma  # HTN--shock now resolved, off all pressors    -BP mildly elevated  #Acute Hypoxic Respiratory Failure--now on nasal cannula     -Suspicion for aspiration pneumonia    -CTA chest negative for PE    -Bedside POCUS 7/8 showed easlity collapsible SVC c/w intravascular volume depletion  # COPD  # Anemia  Continuing to have acute blood loss    -Acute blood loss anemia as well secondary to perinephric hematoma  # SBO vs. Ileus: improving, now on clear liquid diet  # gross hematuria: transiently stopped after procedure. Now restarted.   -Urology  following    -CBI per urology  #Hyperphosphatemia   No acute indication for phos binder, no hypocalcemia symptoms (unlikely in acute setting)     PLAN:  -Increase Sodium Bicarbonate to 1950 mg TID  -Lasix 80 mg IV x1   -Cr rising, likely due to ongoing blood loss, continue trending Cr   -No indication for Phos Binder at this time  -No acute need for RRT, but will eval daily  -Encourage PO fluids  -No diuretics at this time  -Wean O2 as tolerated, O2 requirements improving  -Urology following  -Serial CBC's and transfuse PRN for Hgb less than 7  -Renal diet  -Strict I/Os/continue coburn  -Dose all meds per eGFR <15     Dispo: cont monitoring JAE as Cr worsened    Please page nephrology with any questions or concerns

## 2024-07-18 NOTE — PROGRESS NOTES
Surgical Progress Note    Author: GIOVANNI Vann Date & Time created: 2024   10:52 AM     Interval Events:    No events overnight. Hgb continuing to drop. O2 decreased to 2LNC, swallow eval done and patient tolerating pureed but decreased diet.    Diuresed yesterday 900 out overnight. CBI clamped at 1130 yesterday.    Incision with steristrips FLORECITA with LLQ FLORECITA JADIEL drain with serous drainage.      Hgb/Hct: 7.7/24.3 today--> 8.2/25.2 yesterday  Cr: 5.96 today --> 5.66 yesterday  BUN: 134 today--> 129 yesterday  WBC 12.8 today --> 17.8 yesterday      ROS  Hemodynamics:  Temp (24hrs), Av.5 °C (97.7 °F), Min:36.2 °C (97.2 °F), Max:36.7 °C (98 °F)  Temperature: 36.6 °C (97.8 °F)  Pulse  Av.5  Min: 47  Max: 156   Blood Pressure : 135/63     Respiratory:    Respiration: (!) 26, Pulse Oximetry: 92 %     Work Of Breathing / Effort: Tachypnea;Increased Work of Breathing  RUL Breath Sounds: Diminished, RML Breath Sounds: Diminished;Coarse Crackles, RLL Breath Sounds: Diminished;Coarse Crackles, ALEN Breath Sounds: Diminished, LLL Breath Sounds: Diminished;Coarse Crackles  Neuro:  GCS       Fluids:    Intake/Output Summary (Last 24 hours) at 2024 1052  Last data filed at 2024 0600  Gross per 24 hour   Intake 30 ml   Output 900 ml   Net -870 ml     Weight: 93.1 kg (205 lb 4 oz)  Current Diet Order   Procedures    Diet Order Diet: Level 4 - Pureed; Liquid level: Level 0 - Thin; Nutrient modifications: (optional): Low Phos     Physical Exam  Labs:  Recent Results (from the past 24 hour(s))   POCT glucose device results    Collection Time: 24  3:22 PM   Result Value Ref Range    POC Glucose, Blood 120 (H) 65 - 99 mg/dL   HGB    Collection Time: 24  3:23 PM   Result Value Ref Range    Hemoglobin 8.7 (L) 14.0 - 18.0 g/dL   POCT glucose device results    Collection Time: 24  8:15 PM   Result Value Ref Range    POC Glucose, Blood 103 (H) 65 - 99 mg/dL   HGB    Collection Time: 24  10:12 PM   Result Value Ref Range    Hemoglobin 8.0 (L) 14.0 - 18.0 g/dL   POCT glucose device results    Collection Time: 07/17/24 10:15 PM   Result Value Ref Range    POC Glucose, Blood 103 (H) 65 - 99 mg/dL   Renal Function Panel    Collection Time: 07/18/24  2:10 AM   Result Value Ref Range    Sodium 142 135 - 145 mmol/L    Potassium 4.1 3.6 - 5.5 mmol/L    Chloride 102 96 - 112 mmol/L    Co2 18 (L) 20 - 33 mmol/L    Glucose 95 65 - 99 mg/dL    Creatinine 5.96 (HH) 0.50 - 1.40 mg/dL    Bun 134 (H) 8 - 22 mg/dL    Calcium 6.6 (LL) 8.5 - 10.5 mg/dL    Correct Calcium 8.3 (L) 8.5 - 10.5 mg/dL    Phosphorus 8.7 (H) 2.5 - 4.5 mg/dL    Albumin 1.9 (L) 3.2 - 4.9 g/dL   CBC WITH DIFFERENTIAL    Collection Time: 07/18/24  2:10 AM   Result Value Ref Range    WBC 12.8 (H) 4.8 - 10.8 K/uL    RBC 2.68 (L) 4.70 - 6.10 M/uL    Hemoglobin 8.0 (L) 14.0 - 18.0 g/dL    Hematocrit 24.3 (L) 42.0 - 52.0 %    MCV 90.7 81.4 - 97.8 fL    MCH 29.9 27.0 - 33.0 pg    MCHC 32.9 32.3 - 36.5 g/dL    RDW 55.3 (H) 35.9 - 50.0 fL    Platelet Count 134 (L) 164 - 446 K/uL    MPV 12.0 9.0 - 12.9 fL    Neutrophils-Polys 93.50 (H) 44.00 - 72.00 %    Lymphocytes 1.60 (L) 22.00 - 41.00 %    Monocytes 4.10 0.00 - 13.40 %    Eosinophils 0.00 0.00 - 6.90 %    Basophils 0.10 0.00 - 1.80 %    Immature Granulocytes 0.70 0.00 - 0.90 %    Nucleated RBC 0.30 (H) 0.00 - 0.20 /100 WBC    Neutrophils (Absolute) 11.96 (H) 1.82 - 7.42 K/uL    Lymphs (Absolute) 0.20 (L) 1.00 - 4.80 K/uL    Monos (Absolute) 0.52 0.00 - 0.85 K/uL    Eos (Absolute) 0.00 0.00 - 0.51 K/uL    Baso (Absolute) 0.01 0.00 - 0.12 K/uL    Immature Granulocytes (abs) 0.09 0.00 - 0.11 K/uL    NRBC (Absolute) 0.04 K/uL   Comp Metabolic Panel    Collection Time: 07/18/24  2:10 AM   Result Value Ref Range    Anion Gap 22.0 (H) 7.0 - 16.0    AST(SGOT) 49 (H) 12 - 45 U/L    ALT(SGPT) 17 2 - 50 U/L    Alkaline Phosphatase 57 30 - 99 U/L    Total Bilirubin 0.3 0.1 - 1.5 mg/dL    Total Protein 4.3 (L) 6.0  - 8.2 g/dL    Globulin 2.4 1.9 - 3.5 g/dL    A-G Ratio 0.8 g/dL   MAGNESIUM    Collection Time: 07/18/24  2:10 AM   Result Value Ref Range    Magnesium 2.3 1.5 - 2.5 mg/dL   ESTIMATED GFR    Collection Time: 07/18/24  2:10 AM   Result Value Ref Range    GFR (CKD-EPI) 9 (A) >60 mL/min/1.73 m 2   HGB    Collection Time: 07/18/24  6:03 AM   Result Value Ref Range    Hemoglobin 7.7 (L) 14.0 - 18.0 g/dL   POCT glucose device results    Collection Time: 07/18/24  8:43 AM   Result Value Ref Range    POC Glucose, Blood 86 65 - 99 mg/dL     Medical Decision Making, by Problem:  Active Hospital Problems    Diagnosis     Hypotension [I95.9]     Hypernatremia [E87.0]     Aortic thrombus (HCC) [I74.10]     Hypocalcemia [E83.51]     Hyperglycemia [R73.9]     Shock (HCC) [R57.9]     Small bowel obstruction (HCC) [K56.609]     Left kidney mass [N28.89]     Leukemoid reaction [D72.823]     Acute blood loss as cause of postoperative anemia [D62]     Coronary artery disease involving native coronary artery of native heart without angina pectoris [I25.10]     History of atrial fibrillation [Z86.79]      Remote history of atrial fibrillation in 2019 in the setting of an acute COPD exacerbation.  He has had no recurrences.  He did follow with cardiology for a few years after this.  Oral anticoagulant was not indicated at that time.      Insomnia [G47.00]      This is a chronic condition. Takes Ambien 5 mg nightly. Symptoms are well controlled with this. Denies any side effects.    Last UDS appropriate 10/4/23  CS agreement UTD 10/4/23        Acute on chronic hypoxic respiratory failure (HCC) [J96.21]     Essential hypertension [I10]      This is a chronic condition.  Current Meds:   - losartan 100 mg daily  - carvedilol 12.5 mg BID  - amlodipine 10 mg daily  Side effects: none  Home BP Log: Typically 120s/60s  Associated symptoms: Denies chest pain, shortness of breath, headaches, dizziness/lightheadedness           Peripheral vascular  disease ([facility]) [I73.9]      Angioplasty and stents Dr. Vital November 2012      COPD exacerbation ([facility]) [J44.1]      This is a chronic condition.  Managed by pulmonology.  Patient is on Trelegy inhaler daily and albuterol as needed.  He is on supplemental oxygen at night and as needed throughout the day.   Symptoms currently controlled.       Acute renal failure superimposed on stage 3 chronic kidney disease ([facility]) [N17.9, N18.30]      Plan:    Keep coburn capped from CBI unless urine develops clot/hematuria, then please reconnect    OOB as tolerated.    May change JADIEL drain incision dressing PRN when soiled.         Quality Measures:  Quality-Core Measures    Discussed patient condition with Hospitalist

## 2024-07-18 NOTE — CARE PLAN
The patient is Watcher - Medium risk of patient condition declining or worsening    Shift Goals  Clinical Goals: decrease o2, pain management, keep CBI pink and clear  Patient Goals: pain control and rest  Family Goals: LAZARO    Progress made toward(s) clinical / shift goals:    Problem: Pain - Standard  Goal: Alleviation of pain or a reduction in pain to the patient’s comfort goal  7/18/2024 1406 by Keshav David RRADHA.  Outcome: Progressing  Note: Patient given information on pharmacological management of pain as well as potential non-pharmacologic tactics.   7/18/2024 1403 by Keshav David R.N.  Outcome: Progressing  Note: Patient given information on pharmacological management of pain as well as potential non-pharmacologic tactics.       Problem: Fall Risk  Goal: Patient will remain free from falls  Outcome: Progressing  Note: Bed alarm on. Patient educated on fall prevention.     Problem: Skin Integrity  Goal: Skin integrity is maintained or improved  Outcome: Progressing  Note: Patient educated on importance of q2hr repositioning. TAPs in place       Patient is not progressing towards the following goals:

## 2024-07-18 NOTE — PROGRESS NOTES
Hospital Medicine Daily Progress Note    Date of Service  7/18/2024    Chief Complaint  Here for elective partial nephrectomy    Hospital Course  Luis Sims is a 80-year-old male past medical history of COPD on chronic oxygen 2-3 L at baseline, emphysema, hypertension, CAD, previous atrial fibrillation, PVD, previous rheumatic fever, previous nephrolithiasis status post nephrolithotomy was admitted for elective left partial nephrectomy, JADIEL drain placement.  Postop then, they did with acute kidney injury, bleeding, hyperkalemia.  Nephrology consulted  Hematuria did resolve.  Patient was improving JADIEL drain removed 7/5.  Maintain the Lauren catheter  Creatinine had improvement for couple of day and start to worsen again. Most likely ATN from partial nephrectomy  7/5- pt started to have complication of small bowel obstruction. NG tube placed 7/6 with low suction. He also started to noticed clot on urine. Started CBI. Felt slight better. 7/8- significantly worse. Requiring high flow oxygen, hypotensive. Cr worsen. Because of CBI not sure if pt is anuric. Rapid team and critical care doctor called and pt transfer to ICU for pressors and close monitoring. Stat CT scan ordered   Required intubation but extubated 7/9 but was on pressor support. On 7/10 a CT abd/pelvis showed a left perinephric hematoma.  7/1 pathology showing clear cell renal carcinoma      Interval Problem Update  Patient seen and examined today.  Data, Medication data reviewed.  Case discussed with nursing as available.  Plan of Care reviewed with patient and notified of changes.  7/15: Called by radiology yesterday and slight increase in perinephric hematoma but continues with light pink lemonade colored urine in CBI, decision made yesterday to continue heparin.  Overnight increase in bleeding and drop in Hgb.giving protamine and transfusing pRBCs.  Worsening renal function with Cr:3.51. Hgb:6.4.  7/16 the patient appears lethargic, he has complaints  of back pain, currently afebrile, heart rate in the 90s, respiration unlabored, patient is saturating in the mid 90s on 5 to 7 L nasal cannula oxygen, blood pressure in the 1 teens to 100s over 50s, s/p embolization of 2 small left renal arteries by interventional radiology on 7/15  Urology follow-up overall with a positive report, ongoing CBI  Hemoglobin stabilized  Second 32.8, hemoglobin 8.2, hematocrit 24.9, platelet count 175, sodium 137 potassium 4.2, chloride 103, bicarb 18, glucose 118, BUN is 105, creatinine 4.33,  Nephrology following, ongoing treatment with sodium and bicarbonate, currently no indication for RRT, holding off diuretics at this time, close laboratory follow-up.  7/17 patient will repeat need for transfusion overnight, no overt bleeding, no significant bowel movement reported, no GI bleeding reported, urine is slowly clearing, reducing CBI, creatinine worsening, discussed with nephrology, urology, interventional radiology.  Currently afebrile, heart rate in the 70s, respiration unlabored, blood pressure in the 100s to 150s over 60s,  Laboratory data with a white count of 17.8, hemoglobin 8.2, platelet count 120, sodium 140, potassium 4.2, chloride 104, bicarb 17, glucose 115, , creatinine 5.66, corrected calcium 8, albumin 2.0, Phos 5.2,  Elevated procalcitonin, extending antibiotic therapy, somewhat unclear intra-abdominal process with blood loss, pain, leukocytosis  7/18 the patient remains lethargic, he appears somewhat improved today, he denies pain, currently afebrile, heart rate in the 80s to 90s, respiration unlabored on 2 to 3 L saturating in the mid 90s, blood pressure in the 120s to 130s over 60s, the patient is off CBI, urine is without clots, urine output 900 cc over the last 24 hours  Hemoglobin decreased to 7.7, transfuse 1 more unit coming up to 9.8, no overt bleeding, sodium 142, potassium 4.1, chloride 102, bicarb 18, glucose 95, , creatinine 5.96, corrected  calcium 8.3, albumin 1.9, chest x-ray read by basilar infiltrates, fairly unchanged, patient has poor p.o. intake overall, JADIEL drain with serosanguineous discharge  Holding hemodialysis for the time being, additional Lasix ordered by nephrology  Overall the patient with very slow progress  I have discussed this patient's plan of care and discharge plan at IDT rounds today with Case Management, Nursing, Nursing leadership, and other members of the IDT team.    Consultants/Specialty  nephrology and urology  Critical care  Interventional radiology    Code Status  Full Code    Disposition  The patient is not medically cleared for discharge to home or a post-acute facility.  Anticipate discharge to: skilled nursing facility    I have placed the appropriate orders for post-discharge needs.    Review of Systems  Review of Systems   Constitutional: Negative.  Negative for malaise/fatigue.   HENT: Negative.     Eyes: Negative.    Respiratory:  Positive for cough and hemoptysis. Negative for shortness of breath.    Cardiovascular: Negative.  Negative for palpitations and leg swelling.   Gastrointestinal: Negative.  Negative for abdominal pain and nausea.   Genitourinary:  Positive for hematuria.   Musculoskeletal:  Positive for back pain and myalgias.   Skin: Negative.    Neurological:  Positive for weakness. Negative for speech change.   Endo/Heme/Allergies: Negative.    Psychiatric/Behavioral: Negative.  The patient is not nervous/anxious.    All other systems reviewed and are negative.       Physical Exam  Temp:  [36.2 °C (97.2 °F)-36.7 °C (98 °F)] 36.2 °C (97.2 °F)  Pulse:  [66-78] 76  Resp:  [2-44] 32  BP: (108-155)/(52-72) 132/61  SpO2:  [91 %-99 %] 96 %    Physical Exam  Vitals reviewed.   Constitutional:       Appearance: Normal appearance. He is obese. He is ill-appearing.   HENT:      Head: Normocephalic.      Nose: Nose normal.      Mouth/Throat:      Mouth: Mucous membranes are dry.      Pharynx: Oropharynx is  clear.   Eyes:      General:         Right eye: No discharge.         Left eye: No discharge.   Cardiovascular:      Rate and Rhythm: Normal rate.   Pulmonary:      Effort: Pulmonary effort is normal. No respiratory distress.      Breath sounds: Rhonchi present.   Abdominal:      General: There is no distension.      Palpations: Abdomen is soft.      Tenderness: There is abdominal tenderness.      Comments: Staples intact and good approximation of surgical edges   Musculoskeletal:         General: Swelling present.      Cervical back: Normal range of motion and neck supple.      Right lower leg: Edema present.      Left lower leg: Edema present.   Skin:     Coloration: Skin is pale. Skin is not jaundiced.   Neurological:      General: No focal deficit present.      Mental Status: He is alert and oriented to person, place, and time.      Motor: Weakness present.   Psychiatric:         Mood and Affect: Mood normal.         Fluids    Intake/Output Summary (Last 24 hours) at 7/18/2024 0848  Last data filed at 7/18/2024 0600  Gross per 24 hour   Intake 30 ml   Output 900 ml   Net -870 ml       Laboratory  Recent Labs     07/17/24  0258 07/17/24  0606 07/17/24  1523 07/17/24  2212 07/18/24  0210 07/18/24  0603   WBC 17.0* 17.8*  --   --  12.8*  --    RBC 2.20* 2.76*  --   --  2.68*  --    HEMOGLOBIN 6.6* 8.2*   < > 8.0* 8.0* 7.7*   HEMATOCRIT 19.7* 25.2*  --   --  24.3*  --    MCV 89.5 91.3  --   --  90.7  --    MCH 30.0 29.7  --   --  29.9  --    MCHC 33.5 32.5  --   --  32.9  --    RDW 57.2* 56.0*  --   --  55.3*  --    PLATELETCT 129* 120*  --   --  134*  --    MPV 11.4 11.4  --   --  12.0  --     < > = values in this interval not displayed.     Recent Labs     07/16/24  0213 07/17/24  0258 07/18/24  0210   SODIUM 137 140 142   POTASSIUM 4.2 4.2 4.1   CHLORIDE 103 104 102   CO2 18* 17* 18*   GLUCOSE 118* 115* 95   * 129* 134*   CREATININE 4.33* 5.66* 5.96*   CALCIUM 6.8* 6.4* 6.6*                          Imaging  DX-CHEST-PORTABLE (1 VIEW)   Final Result         1.  Hazy bilateral lower lobe infiltrates, similar to prior study.   2.  Atherosclerosis      DX-CHEST-PORTABLE (1 VIEW)   Final Result         1.  Hazy bilateral lower lobe infiltrates, similar to prior study.   2.  Atherosclerosis      DX-CHEST-PORTABLE (1 VIEW)   Final Result         1.  Bilateral basilar atelectasis, no focal infiltrate   2.  Atherosclerosis      IR-EMBOLIZATION   Final Result      1.  Left renal arteriograms demonstrating 2 sites of active hemorrhage.   2.  Technically successful microcoil embolization of 2 small left renal branch arteries.      DX-CHEST-PORTABLE (1 VIEW)   Final Result      1.  Mild worsening LEFT lung base atelectasis.   2.  No other significant change from prior exam.         CT-ABDOMEN-PELVIS W/O   Final Result   Addendum (preliminary) 1 of 1   Critical value called by Dr. Vincent Alonso to Dr. Riley at 7/14/2024 6:08    PM.      Final      1.  Left perirenal hematoma, mildly increased since prior.   2.  Additional collection medial to the right kidney with small gas focus, stable since prior. Could be seroma, resolving hematoma. Abscess not completely excluded.   3.  Mild peripancreatic fat stranding. Could be related to anasarca. Cannot exclude pancreatitis, recommend laboratory correlation.   4.  7 mm indeterminate right renal cortical lesion, recommend ultrasound assessment to evaluate cyst versus solid.   5.  Other findings are present, refer to above.      Efforts are underway to contact referring physician with results at time of dictation.      DX-CHEST-PORTABLE (1 VIEW)   Final Result      Stable consolidation within the right lung base.      DX-CHEST-PORTABLE (1 VIEW)   Final Result      Increased patchy right greater than left basilar opacities suspicious for pneumonitis.      DX-CHEST-PORTABLE (1 VIEW)   Final Result      1.  Ongoing bibasilar interstitial opacities.   2.  Resolving subsegmental  atelectatic change left lung base.   3.  No new abnormality.      DX-CHEST-PORTABLE (1 VIEW)   Final Result      Stable chest.      CT-ABDOMEN-PELVIS W/O   Final Result      1.  Bibasilar airspace infiltrates with air bronchograms which may represent pneumonia.   2.  Postoperative changes left kidney with mild surrounding stranding and an approximately 52 mm x 32 mm perinephric hematoma. No evidence of urinoma. Left ureteral stent in satisfactory position.   3.  Ileus.   4.  Sigmoid diverticulosis.      US-RENAL   Final Result      1.  No evidence of obvious perinephric hematoma, status post left partial nephrectomy.      DX-CHEST-PORTABLE (1 VIEW)   Final Result      Persistent bibasilar infiltrate with removal of the endotracheal tube.      US-EXTREMITY VENOUS LOWER BILAT   Final Result      DX-CHEST-PORTABLE (1 VIEW)   Final Result      1.  No significant change.      DX-CHEST-PORTABLE (1 VIEW)   Final Result      1.  Interval placement of an endotracheal tube which terminates in satisfactory position at the level of the aortic arch.   2.  Interval insertion of a central venous catheter which terminates with the tip projecting over the expected region of the distal brachiocephalic vein or proximal superior vena cava.   3.  Patchy right-sided pulmonary opacities and bilateral basilar atelectasis.      DX-PORTABLE FLUORO > 1 HOUR   Final Result      Portable fluoroscopy utilized for 28 seconds.         INTERPRETING LOCATION: [address], [place], [address]      [id number] VIEW   Final Result      Digitized intraoperative radiograph is submitted for review. This examination is not for diagnostic purpose but for guidance during a surgical procedure. Please see the patient's chart for full procedural details.         INTERPRETING LOCATION: [address], [place], [address]      [id number] VIEW   Final Result      1.  A left-sided double-J ureteral stent is in place. Due to overlying structures and adjacent contrast  opacified bowel loops, extravasation from the renal collecting system cannot be excluded.   2.  Multiple mild to moderately dilated gas-filled loops of small bowel with enteric contrast present. Findings could be seen in the setting of partial small bowel obstruction or ileus.      EC-ECHOCARDIOGRAM COMPLETE W/ CONT   Final Result      CT-ABDOMEN-PELVIS WITH   Final Result      1.  Bibasilar atelectasis right greater than left.      2.  Hepatic steatosis.      3.  Bandlike area of fluid attenuation coursing through the upper pole of the left kidney which contains multiple air bubbles. This likely represents postoperative change however an infected postoperative tract through the upper pole of the left kidney    should be considered.      4.  Multiple prominent nonobstructing right-sided renal calculi. Atrophic changes of the right kidney.      5.  Mild bilateral hydronephrosis.      6.  Moderate left-sided pelviectasis which likely contains a blood clot.      7.  Thrombosis of the infrarenal aorta with the right-sided iliac stent in place. Thrombotic occlusion of the left iliac arteries.      8.  Colonic diverticulosis.      9.  Acute mid small bowel obstruction.      10.  These findings were Voalted to HERMELINDO WILHELM at 9:30 AM 7/8/2024      CT-CTA CHEST PULMONARY ARTERY W/ RECONS   Final Result      1.  No CT evidence of pulmonary emboli         2. Small multifocal patchy airspace opacities in the right upper lobe and right lower lobe suspicious for pneumonitis possibly Covid.      3. Bibasilar atelectasis.      4. Coarse multifocal nonobstructing right-sided renal calculi and atrophic change.      DX-CHEST-PORTABLE (1 VIEW)   Final Result      1.  Left retrocardiac airspace opacity consistent with atelectasis and/or pneumonitis.      DX-ABDOMEN FOR TUBE PLACEMENT   Final Result      1.  NG tube extends in the fundus of stomach.      DX-CHEST-PORTABLE (1 VIEW)   Final Result      1.  Bibasilar airspace disease,  greater on the left.      [id number] VIEW   Final Result      Dilated air-filled small bowel. This may be due to postoperative ileus or small bowel obstruction.      DX-ABDOMEN FOR TUBE PLACEMENT   Final Result      Nasogastric tube tip projects over the proximal stomach.      [id number] VIEW   Final Result      1.  Stable mild to moderate dilatation of small bowel loops with air seen in the distal rectum.      [id number]-3 VIEW   Final Result      1.  Operative changes of the abdomen.      2.  Evolving small bowel obstruction.      US-RENAL   Final Result      1.  Nonvisualization of the left kidney due to overlying bandages.   2.  Suboptimal visualization of the right kidney due to patient body habitus and ribs demonstrates no definite evidence of hydronephrosis.      [id number]-7 VIEW   Final Result      Paucity of bowel gas with stool present in the colon. No definite evidence of obstruction however evaluation is limited on supine imaging.           Assessment/Plan  * Left kidney mass- (present on admission)  Assessment & Plan  Status post partial nephrectomy.  Follow-up with urology.    Pathology consistent with clear cell renal carcinoma    Hypotension  Assessment & Plan  Concern of volume loss  transfuse to keep hemoglobin greater than 7  Monitor I's and O's, labs, vitals      Hypernatremia  Assessment & Plan  Resolved    Aortic thrombus (HCC)  Assessment & Plan  Initially on heparin and then had bleeding episode  heparin drip stopped and reversed protamine given.  Concern of hemoptysis, ongoing hematuria and dropping hemoglobin.  hold anticoagulation    Hyperglycemia  Assessment & Plan  A1c 5.7 in May  Hyperglycemia likely due to acute illness  SSI     Hypocalcemia  Assessment & Plan  Monitor corrected calcium    Shock (HCC)- (present on admission)  Assessment & Plan  S/p pressor support  Monitor vitals., I/O's  Care with pain meds    Small bowel obstruction (HCC)  Assessment & Plan  Improved, tolerating  a diet  Antiemetics  mobilize    Leukemoid reaction- (present on admission)  Assessment & Plan  S/p antibiotics, will extend for the time being, reevaluate  Following up on CBC, monitor closely    Acute blood loss as cause of postoperative anemia- (present on admission)  Assessment & Plan    7/18 additional transfusion, no overt blood loss noted, close monitoring  7/17 continue to monitor closely since patient has ongoing hematuria and on CBI  7/16 stabilizing, monitoring  7/15 this morning I stop the heparin drip and gave protamine.  Discussed with urology and they feel that ongoing bleeding potentially around the partial nephrectomy and a will contact interventional radiology.  Patient continues to have clots and pink-tinged urine from his CBI.  The patient was in atrial fibrillation last night he is back in sinus rhythm this morning.  Will continue to monitor hemoglobins and transfuse to keep hemoglobin greater than 7.  I have made him n.p.o. for potential interventional radiology procedure today  7/14 checking CT abdomen and pelvis without contrast to evaluate for perinephric hemorrhage.  Patient continues to drop hemoglobin while on heparin.   Transfuse for hemoglobin less than 7      Hyperkalemia  Assessment & Plan          Left renal mass  Assessment & Plan  Highly concern for malignancy   status post Open left partial nephrectomy nephrectomy on July 1, 2024  Managed by Urology   Follow-up pathology-Clear-cell carcinoma.  Appreciate urology recommendations  7/4- hopefully JADIEL drain removing tomorrow. Appreciate urology recs   7/5/24- JADIEL removed today     Coronary artery disease involving native coronary artery of native heart without angina pectoris- (present on admission)  Assessment & Plan  NSTEMI 2019 s/p stent placement at RCA at that time  hold ASA due to hematura  Hold oral meds     History of atrial fibrillation- (present on admission)  Assessment & Plan  He is not on anticoagulation  Per chart review  has history atrial fibrillation 2019 due to COPD exacerbation one-time, and no indication for anticoagulation since then  7/4- HR increasing, pt did miss couple of dosage of carvedilol. Continue to monitor on telemetry. Resumed carvedilol. Continue to monitor closely.   7/8- continue tele. Resume blood thinners when ok per urology   7/11 on amiodarone drip and 7/12 restarted heparin drip and monitor hgb nd sign of further hematura  (On heparin drip for afib hx but also for aortic thrombus)  7/14 continuing IV amiodarone as patient may require to be n.p.o. for possible embolectomy.  Once able to safely take orals we will switch him over  7/15 was in atrial fibrillation overnight converted to sinus rhythm this morning.  Continue IV until able to take orals safely.  Holding off on anticoagulation currently due to ongoing bleeding  7/16, continued to have rate controlled A-fib  7/17 will transition to oral amiodarone  Maintaining rate control, monitor currently not yet a candidate for anticoagulation    Insomnia- (present on admission)  Assessment & Plan  Chronic, monitor    Acute on chronic hypoxic respiratory failure (HCC)- (present on admission)  Assessment & Plan  7/8 CTA chest negative for PE but patchy opacification  RT per protocol  Mobilize  Encourage deep inspirator efforts  Titrate oxygen  7/13 on 3 Liters O2  Keep hemoglobin greater than 7.  Mobilized as best able.    Essential hypertension- (present on admission)  Assessment & Plan  Holding Losartan in the setting of recent partial nephrectomy and renal failure  On amiodarone drip  Monitor I/O's labs and vitals.    Peripheral vascular disease (HCC)- (present on admission)  Assessment & Plan  History of angioplasty stent placement by Dr. Vital 2012   hold aspirin  Atorvastatin.  Control BP and lipids    COPD exacerbation (HCC)- (present on admission)  Assessment & Plan  Continue with home dose Trelegy Ellipta   Duonebs PRN   Not signs for  exacerbation  Follow-up with for allergies as outpatient  RT per protocol.  Wean steroids  Titrate oxygen as able to keep SPO2 >89%  3 to 4 L nasal cannula encourage deep inspiratory efforts keeping hemoglobin greater than 7    Acute renal failure superimposed on stage 3 chronic kidney disease (HCC)- (present on admission)  Assessment & Plan  Cannot rule out obstruction versus renal.  No contrast exposure  Continue to monitor very closely  Continue IV fluid  renal ultrasound  7/3/24-creatinine slight worse again however patient is starting to have better urine output.  Likely this is ATN which has been expected worsening creatinine and then plateau and then improvement later  7/4/24- slight improving. Good urine output. Continue to monitor closely   7/5/24- still good urine output, Cr not improving. Poor oral intake   7/7- worse again. Continue IVF, continue CBI. Continue close monitoring   7/8- prerenal vs obstructive. Nephrology on board. Start pressors. Ok to contrast since pt will likely be started on dialysis   7/11 Cr:4.34 <5.4  7/12 Cr:3.78 but increased BUN (was NPO overnight).  Monitor BMP in am.  7/13 Cr:3.46.  Encourage oral intake.    7/14 Cr: 3.78  7/15 concern of borderline blood pressures and anemia contributing.  7/15 creatinine 3.51 with a BUN of 109.  [facility] nephrology consulting  7/16 ongoing renal failure, slightly worsened, nephrology following, holding off diuretics  7/17 still ongoing or worsening renal function, the patient might headed for dialysis soon, Lasix trial as per nephrology  7/18 additional Lasix, monitoring       Plan  7/18  Additional blood transfusion, no overt blood loss, monitor  No firm evidence of surgical site bleeding or overt blood loss currently  Additional Lasix trial, appreciate nephrology and urology follow-up  Close monitoring of renal function, the patient might need RRT soon  Maintain adequate perfusion  Encourage p.o. intake  Continue rate and rhythm control for  atrial fibrillation    See orders  Discussed in detail with nephrology, urology, interventional radiology  Currently would like to avoid further renal injury with contrast, monitor closely for additional blood loss  Patient is has a high medical complexity, complex decision making and is at high risk for complication, morbidity, and mortality.  I spent 58 minutes, reviewing the chart, obtaining and/or reviewing separately obtained history. Performing a medically appropriate examination and evaluation.  Counseling and educating the patient. Ordering and reviewing medications, tests, or procedures.   Documenting clinical information in EPIC. Independently interpreting results and communicating results to patient. Discussing future disposition of care with patient, RN and case management.    VTE prophylaxis:    pharmacologic prophylaxis contraindicated due to High bleeding risk scenario      I have performed a physical exam and reviewed and updated ROS and Plan today (7/18/2024). In review of yesterday's note (7/17/2024), there are no changes except as documented above.      Please note that this dictation was created using voice recognition software. I have made every reasonable attempt to correct obvious errors, but I expect that there are errors of grammar and possibly context that I did not discover before finalizing the note.

## 2024-07-18 NOTE — WOUND TEAM
[facility]  Inpatient Services  Wound and Skin Care Follow-up    Admission Date: 7/1/2024     Last order of IP CONSULT TO WOUND CARE was found on 7/17/2024 from Hospital Encounter on 6/3/2024     HPI, PMH, SH: Reviewed    Past Surgical History:   Procedure Laterality Date    OK CYSTOURETHROSCOPY,URETER CATHETER Left 7/8/2024    Procedure: LEFT RETROGRADE PYELOGRAM STENT PLACEMENT;  Surgeon: Gurpreet Siddiqi M.D.;  Location: [facility];  Service: Urology    OK CYSTOURETHROSCOPY N/A 7/8/2024    Procedure: CYSTOSCOPY WITH BLADDER CLOT EVACUATION;  Surgeon: Gurpreet Siddiqi M.D.;  Location: [facility];  Service: Urology    OK PARTIAL REMOVAL OF KIDNEY Left 7/1/2024    Procedure: OPEN LEFT PARTIAL NEPHRECTOMY;  Surgeon: Gaurav Ferguson M.D.;  Location: [facility];  Service: Urology    OTHER ABDOMINAL SURGERY  06/18/2024    hiatal hernia repair 2023    INGUINAL HERNIA REPAIR ROBOTIC XI Bilateral 05/23/2023    Procedure: ROBOTIC BILATERAL INGUINAL HERNIA REPAIR;  Surgeon: Garland Mehta M.D.;  Location: [facility];  Service: Gen Robotic    OK CYSTOSCOPY,INSERT URETERAL STENT Left 01/22/2020    Procedure: CYSTOSCOPY, WITH URETERAL STENT INSERTION;  Surgeon: Paul Quiles M.D.;  Location: [facility];  Service: Urology    OK CYSTO/URETERO/PYELOSCOPY, DX Left 01/22/2020    Procedure: URETEROSCOPY;  Surgeon: Paul Quiles M.D.;  Location: [facility];  Service: Urology    LASERTRIPSY Left 01/22/2020    Procedure: LITHOTRIPSY, USING LASER;  Surgeon: Paul Quiles M.D.;  Location: [facility];  Service: Urology    SEPTOPLASTY N/A 05/04/2018    Procedure: SEPTOPLASTY;  Surgeon: Jesse Saavedra M.D.;  Location: SURGERY SAME DAY [facility];  Service: Ent    TURBINATE REDUCTION Bilateral 05/04/2018    Procedure: TURBINATE REDUCTION- RESECTION WITH SUBMUCOSAL APPROACH;  Surgeon: Jesse Saavedra M.D.;  Location: SURGERY SAME DAY  [facility];  Service: Ent    RECOVERY  2012    Performed by [facility] Surgery at SURGERY SAME DAY [facility]    PERCUTANEOUS NEPHROSTOLITHOTOMY  2012    Performed by KAREN [facility]OLB at SURGERY [facility]    PERCUTANEOUS NEPHROSTOLITHOTOMY  2012    Performed by KAREN KOLB at SURGERY [facility]    RECOVERY  2012    Performed by SURGERY, IR-RECOVERY at SURGERY SAME DAY [facility] ORS    CYSTOSCOPY STENT PLACEMENT  2012    Performed by KAREN KOLB at SURGERY [facility]    APPENDECTOMY  1987    STENT PLACEMENT      heart    [facility]       Social History     Tobacco Use    Smoking status: Former     Current packs/day: 0.00     Average packs/day: 1 pack/day for 25.0 years (25.0 ttl pk-yrs)     Types: Cigarettes     Start date: 1987     Quit date: 2012     Years since quittin.4    Smokeless tobacco: Never    Tobacco comments:     2 ppd times last 6 months of smoking history   Substance Use Topics    Alcohol use: Yes     Comment: occasional on holidays     No chief complaint on file.    Diagnosis: Left renal mass [N28.89]  Shock (HCC) [R57.9]    Unit where seen by Wound Team: [id number]     WOUND FOLLOW UP RELATED TO:  BL heels, BL elbows, sacrum       WOUND TEAM PLAN OF CARE - Frequency of Follow-up:   Nursing to follow dressing orders written for wound care. Contact wound team if area fails to progress, deteriorates or with any questions/concerns if something comes up before next scheduled follow up (See below as to whether wound is following and frequency of wound follow up)  Dressing changes by wound team:                   Weekly - sacrum  Not following, consult as needed  - BL heels, BL elbows    WOUND HISTORY:       Pt is a 80yr old male with history of COPD (on 2-3L at baseline), Emphysema, HTN, CAD, A. Fib and PVD. Pt was admitted with left kidney mass and initially underwent a left partial nephrectomy on , followed by a left stent  placement, Left retrograde pyelogram and bladder clot evacuation on 7/8. Pt has had an extended hospital stay and wound team has been consulted several times regarding sacrococcygeal area.       WOUND ASSESSMENT/LDA  Wound 07/01/24 Pressure Injury Coccyx;Sacrum DTI (Active)   Date First Assessed/Time First Assessed: 07/01/24 2105   Primary Wound Type: Pressure Injury  Location: Coccyx;Sacrum      Assessments 7/18/2024  4:00 PM   Wound Image      Site Assessment Red;Purple   Periwound Assessment Pink;Red   Margins Attached edges   Closure Open to air   Drainage Amount Scant   Drainage Description Serosanguineous   Treatments Cleansed;Site care;Offloading   Wound Cleansing Foam Cleanser/Washcloth   Periwound Protectant Barrier Paste   Dressing Cleansing/Solutions Not Applicable   Dressing Options Open to Air   Dressing Change/Treatment Frequency Every Shift, and As Needed   NEXT Dressing Change/Treatment Date 07/18/24   NEXT Weekly Photo (Inpatient Only) 07/25/24   Wound Team Following Weekly   Wound Length (cm) 6.5 cm   Wound Width (cm) 5 cm   Wound Surface Area (cm^2) 32.5 cm^2   Shape Irregular     Left Heel      Right Heel      Left Elbow      Right Elbow        Vascular:    RENE:   No results found.    Lab Values:    Lab Results   Component Value Date/Time    WBC 12.8 (H) 07/18/2024 02:10 AM    RBC 2.68 (L) 07/18/2024 02:10 AM    HEMOGLOBIN 9.8 (L) 07/18/2024 03:35 PM    HEMATOCRIT 24.3 (L) 07/18/2024 02:10 AM    CREACTPROT 3.80 (H) 05/03/2019 04:30 AM    SEDRATEWES 3 02/24/2012 01:10 AM    HBA1C 5.6 05/07/2024 09:37 AM         Culture Results show:  No results found for this or any previous visit (from the past 720 hour(s)).    Pain Level/Medicated:  Patient denies pain       INTERVENTIONS BY WOUND TEAM:  Chart and images reviewed. Discussed with bedside RN. All areas of concern (based on picture review, LDA review and discussion with bedside RN) have been thoroughly assessed. Documentation of areas based on  significant findings. This RN in to assess patient. Performed standard wound care which includes appropriate positioning, dressing removal and non-selective debridement. Pictures and measurements obtained weekly if/when required.    Wound:  SACROCOCCYGEAL  Preparation for Dressing removal: Removed without difficulty  Cleansed/Non-selectively Debrided with:  Perineal Wipes (Barrier wipes)  Brenda wound: Cleansed with Perineal Wipes (Barrier wipes), Prepped with Barrier paste  Primary Dressing:  FLORECITA, barrier paste applied  Secondary (Outer) Dressing: offloading (TAPS)    Advanced Wound Care Discharge Planning  Number of Clinicians necessary to complete wound care: 1  Is patient requiring IV pain medications for dressing changes:  No   Length of time for dressing change 10 min. (This does not include chart review, pre-medication time, set up, clean up or time spent charting.)    Interdisciplinary consultation: Patient, Bedside RN (Keshav), Regina SANCHEZ (Wound RN).  Pressure injury and staging reviewed with Regina SANCHEZ (Wound RN).    EVALUATION / RATIONALE FOR TREATMENT:     Date:  07/18/24  Wound Status:  Wound deteriorating    Patient sacrococcygeal wound now a deep tissue injury. Wound bed is deep red in color and nonblanching with purple discoloration extending towards right sacral area. Barrier paste applied for protective layer. May use offloading dressing if no problems with incontinence. Continue with Q2h turns using TAPs.    BL elbows and BL heels intact with blanchable redness. Continue with offloading dressings.    Date:  07/15/24  Wound Status:  No change in wound     Pt with known moisture fissure/partial thickness skin loss to sacrococcygeal area. Pt has had an extended hospital stay with significantly decreased mobility. Pt is at high risk for further breakdown. Area is slow to harish and pt is incontinent of stool. Barrier paste applied followed by wedges under the left side.          Goals: Steady decrease in  wound area and depth weekly.    NURSING PLAN OF CARE ORDERS:  Dressing changes: See Dressing Care orders  Skin care: See Skin Care orders    NUTRITION RECOMMENDATIONS   Wound Team Recommendations:  N/A     DIET ORDERS (From admission to next 24h)       Start     Ordered    07/17/24 1121  Diet Order Diet: Level 4 - Pureed; Liquid level: Level 0 - Thin; Nutrient modifications: (optional): Low Phos  ALL MEALS        Question Answer Comment   Diet: Level 4 - Pureed    Liquid level Level 0 - Thin    Nutrient modifications: (optional) Low Phos        07/17/24 1121    07/11/24 1210  Supplements  ALL MEALS        Question Answer Comment   Which Supplement Ensure    Ensure: Ensure Clear Carton        07/11/24 1209                    PREVENTATIVE INTERVENTIONS:   Q shift Dieudonne - performed per nursing policy  Q shift pressure point assessments - performed per nursing policy    Surface/Positioning  Low Airloss - Currently in Place  Reposition q 2 hours - Currently in Place  TAPs Turning system - Currently in Place    Offloading/Redistribution  Heel offloading dressing (Silicone dressing) - changed      Containment/Moisture Prevention    Lauren Catheter - Currently in Place  Barrier wipes - Currently in Place  Barrier paste - Applied this Visit    Anticipated discharge plans:  TBD        Vac Discharge Needs:  Vac Discharge plan is purely a recommendation from wound team and not a requirement for discharge unless otherwise stated by physician.  Not Applicable Pt not on a wound vac

## 2024-07-18 NOTE — CARE PLAN
The patient is Watcher - Medium risk of patient condition declining or worsening    Shift Goals  Clinical Goals: decrease O2, hemodynamic stability, stable CBI  Patient Goals: rest, sleep  Family Goals: lila    Progress made toward(s) clinical / shift goals:    Problem: Knowledge Deficit - Standard  Goal: Patient and family/care givers will demonstrate understanding of plan of care, disease process/condition, diagnostic tests and medications  Outcome: Progressing     Problem: Pain - Standard  Goal: Alleviation of pain or a reduction in pain to the patient’s comfort goal  Outcome: Progressing     Problem: Fall Risk  Goal: Patient will remain free from falls  Outcome: Progressing     Problem: Cardiac - Atrial Fibrillation  Goal: Patient will achieve & maintain adequate cardiac output and rate control  Outcome: Progressing  Goal: Complications related to anticoagulation for unconverted atrial fibrillation will be avoided or minimized  Outcome: Progressing     Problem: Urinary Elimination  Goal: Establish and maintain regular urinary output  Outcome: Progressing     Problem: Bowel Elimination  Goal: Establish and maintain regular bowel function  Outcome: Progressing     Problem: Skin Integrity  Goal: Skin integrity is maintained or improved  Outcome: Progressing     Problem: Knowledge Deficit - COPD  Goal: Patient/significant other demonstrates understanding of disease process, utilization of the Action Plan, medications and discharge instruction  Outcome: Progressing     Problem: Risk for Infection - COPD  Goal: Patient will remain free from signs and symptoms of infection  Outcome: Progressing     Problem: Risk for Aspiration  Goal: Patient's risk for aspiration will be absent or decrease  Outcome: Progressing     Problem: Hemodynamics  Goal: Patient's hemodynamics, fluid balance and neurologic status will be stable or improve  Outcome: Progressing     Problem: Fluid Volume  Goal: Fluid volume balance will be  maintained  Outcome: Progressing     Problem: Urinary - Renal Perfusion  Goal: Ability to achieve and maintain adequate renal perfusion and functioning will improve  Outcome: Progressing     Problem: Respiratory  Goal: Patient will achieve/maintain optimum respiratory ventilation and gas exchange  Outcome: Progressing     Problem: Physical Regulation  Goal: Diagnostic test results will improve  Outcome: Progressing  Goal: Signs and symptoms of infection will decrease  Outcome: Progressing       Patient is not progressing towards the following goals:      Problem: Nutrition - Advanced  Goal: Patient will display progressive weight gain toward goal have adequate food and fluid intake  Outcome: Not Progressing     Problem: Ineffective Airway Clearance  Goal: Patient will maintain patent airway with clear/clearing breath sounds  Outcome: Not Progressing     Problem: Impaired Gas Exchange  Goal: Patient will demonstrate improved ventilation and adequate oxygenation and participate in treatment regimen within the level of ability/situation.  Outcome: Not Progressing     Problem: Self Care  Goal: Patient will have the ability to perform ADLs independently or with assistance (bathe, groom, dress, toilet and feed)  Outcome: Not Progressing     Problem: Nutrition  Goal: Patient's nutritional and fluid intake will be adequate or improve  Outcome: Not Progressing

## 2024-07-18 NOTE — THERAPY
Occupational Therapy  Daily Treatment     Patient Name: Olman Sims  Age:  74 y.o., Sex:  male  Medical Record #: [id number]  Today's Date: 7/18/2024     Precautions  Precautions: Fall Risk, Swallow Precautions  Comments: CBI discontinued    Assessment    Pt seen for OT session. CBI discontinued today. Continues to be limited by decreased functional mobility, activity tolerance, cognition, strength, AROM, coordination, balance, edema, O2 needs, and pain which are currently affecting pt's ability to complete ADLs/IADLs at baseline. Will continue to follow.     Plan    Treatment Plan Status: Continue Current Treatment Plan  Type of Treatment: Self Care / Activities of Daily Living, Adaptive Equipment, Neuro Re-Education / Balance, Therapeutic Exercises, Therapeutic Activity, Family / Caregiver Training  Treatment Frequency: 5 Times per Week  Treatment Duration: Until Therapy Goals Met    DC Equipment Recommendations: Unable to determine at this time  Discharge Recommendations: Recommend post-acute placement for additional occupational therapy services prior to discharge home     Objective     07/18/24 1521   Precautions   Precautions Fall Risk;Swallow Precautions   Comments CBI discontinued   Vitals   O2 (LPM) 3   O2 Delivery Device Oxymask   Vitals Comments RN titrated O2 up to 3L and placed oxymask in place of NC d/t increased WOB with activity   Pain 0 - 10 Group   Therapist Pain Assessment Post Activity Pain Same as Prior to Activity;During Activity;Nurse Notified  (no c/o pain)   Cognition    Cognition / Consciousness X   Speech/ Communication Delayed Responses   Level of Consciousness Alert   Ability To Follow Commands 1 Step   Safety Awareness Impaired   New Learning Impaired   Attention Impaired   Sequencing Impaired   Initiation Impaired   Comments pleasant and cooperatve; poor motor planning, problem solving, and anxiousness w/SOB. hypophonic/weak voice, req max v/cs for sequencing, grossly delayed,  and no recall of AROM exercises from PT   Passive ROM Upper Body   Passive ROM Upper Body WDL   Active ROM Upper Body   Active ROM Upper Body  X   Comments B hands limited by edema and B shoulders limited by weakness/volition   Strength Upper Body   Upper Body Strength  X   Gross Strength Generalized Weakness, Equal Bilaterally.    Comments B hands limited by edema; BUEs grossly 3+/5   Sensation Upper Body   Upper Extremity Sensation  WDL   Supine Upper Body Exercises   Comments Educated on shoulder/hand AROM exercises; recommend reinforcement   Hand Strengthening   Comment Provided with red slofoam therapy sponge and educated on composite flexion, pinches, and adduction exercises. Recommend reinforcement   Neuro-Muscular Treatments   Neuro-Muscular Treatments Anterior weight shift;Compensatory Strategies;Facilitation;Postural Facilitation;Postural Changes;Sequencing;Tactile Cuing;Verbal Cuing   Comments EOB dynamic balance improved w/ v/cs and initially w/posterior lean. Req min A for BUE placement for support at EOB. STS w/x3 assistance and max v/cs for sequencing in sera steady; req max v/cs for pulling torso up enough to rest elbows on front of frame. BUE support throughout; refused to attempt to lift UUE from frame   Home Exercise Program   Comments reinforcement of PT's AROM BLE exercises; Pt w/no retention of previous education   Balance   Sitting Balance (Static) Fair -   Sitting Balance (Dynamic) Poor +   Standing Balance (Static) Trace   Standing Balance (Dynamic) Trace   Weight Shift Sitting Poor   Weight Shift Standing Absent   Skilled Intervention Verbal Cuing;Tactile Cuing;Sequencing;Postural Facilitation;Facilitation;Compensatory Strategies   Comments w/sera steady   Bed Mobility    Supine to Sit Maximal Assist   Sit to Supine Total Assist   Scooting Maximal Assist   Rolling Maximal Assist to Rt.   Skilled Intervention Verbal Cuing;Tactile Cuing;Sequencing;Postural  Facilitation;Facilitation;Compensatory Strategies   Comments HOB elevated; extra time for sequencing   Activities of Daily Living   Eating Minimal Assist   Grooming Minimal Assist  (in sera steady- refused to lift UUE for wiping face)   Lower Body Dressing Maximal Assist   Toileting Total Assist  (catheter and bedpan)   Skilled Intervention Verbal Cuing;Tactile Cuing;Sequencing;Postural Facilitation;Facilitation;Compensatory Strategies   Functional Mobility   Sit to Stand Total Assist  (x3 ppl)   Toilet Transfers Unable to Participate   Mobility sera steady stand for ~5min   Skilled Intervention Verbal Cuing;Tactile Cuing;Sequencing;Postural Facilitation;Facilitation;Compensatory Strategies   ICU Target Mobility Level   ICU Mobility - Targeted Level Level 2   Activity Tolerance   Sitting in Chair ~5min   Comments limited by fatigue, weakness, and increased WOB/SOB   Edema Management   Other Edema Mgmt Treatments educated on continued AROM/elevation of BUEs for edema mgmt   Patient / Family Goals   Patient / Family Goal #1 to go home   Goal #1 Outcome Progressing slower than expected   Short Term Goals   Short Term Goal # 1 complete ADL txf mod A   Goal Outcome # 1 Goal not met   Short Term Goal # 2 complete LBD min A w/ A/E if needed   Goal Outcome # 2 Goal not met   Short Term Goal # 3 complete g/h standing for 5 mins SPV w/ A/E if needed   Goal Outcome # 3 Progressing slower than expected   Short Term Goal # 4 complete UBD SPV   Goal Outcome # 4 Goal not met   Education Group   Education Provided Role of Occupational Therapist;Pathology of bedrest   Role of Occupational Therapist Patient Response Patient;Acceptance;Explanation;Reinforcement Needed;No Learning Evidence   Pathology of Bedrest Patient Response Patient;Acceptance;Explanation;Reinforcement Needed;No Learning Evidence

## 2024-07-18 NOTE — PROGRESS NOTES
BL elbows and BL heels blanchable erythema, foam dressings placed, pillows placed, heel float boots in place.   Wound consult placed per protocol. Pt educated on pressure injury prevention interventions, verbalized understanding.

## 2024-07-18 NOTE — PROGRESS NOTES
[facility] -  PROGRESS NOTE               Author: Kd Pavon D.O. Date & Time: 7/18/2024  1:51 PM     HPI:  74 y.o. male with CKD, left renal mass, COPD with chronic hypoxic respiratory failure, atrial fibrillation, hypertension, and coronary artery disease presented yesterday for open left partial nephrectomy nephrectomy. Continued to take losartan until day of surgery. Baseline cr prior to procedure appears ~2. Underwent procedure yesterday.  Op note without complications noted and only 150cc blood loss documented. Potassium elevated to 6.7 last night, temporizing measures given. Cr elevated to 3.2 this AM.  80cc UOP documented despite 3L IVF. Lauren draining with blood tinged urine, no clots. No chest pain. Shortness or breath stable.Previously followed by [facility] nephrology [place] [address] with CKD felt 2/2 HTN at that point in time. Past UAs with blood and protein.      DAILY NEPHROLOGY SUMMARY:  7/2: Consult done  7/3: stable hemodynamics, 2L NC, 1.1L UOP-increasing since midnight. No obstruction on imaging    7/4: 1.4L UOP, cr starting to plateau, potassium stable, feeling improved  7/5: stable hemodynamics, 1.4L UOP, constipation main complaint, minimal PO intake  7/6: Worsening nasuea and emesis, Concern for SBO and then had 2 large Bms, NG tube to suction with significant output, only 350cc UOP documented, cr climbing again, started on IV fluids, feeling slightly improved this AM  7/7: 3.4L gastric output from NG-net negative by IOs, alkalosis, started on CBI for recurrent hematuria/clots  7/8: Increased O2 requirements overnight and rapid response this am, transferred to ICU, BP low with SBP 70's this am and now on levophed drip, on high flow NC O2, concern for aspiration overnight, CTA chest negative for PE, BUN/Cr worse, 3.4L emesis/NG output over past 24hrs, on CBI, CT Abd/Pelvis this am with mild bilateral hydro and moderate left pelviectasis with possible blood clot, to go to OR this  after per urology, pt is lethargic but resting comfortable, denies any pain  7/9: No events, went to OR yest for cystoscopy and left ureteral and bladder clot evacuation, remains intubated since surgery, on pressor support with levophed and vasopressin, stable on vent with 40% FiO2, CBI running, 1.1L NG output, BUN/Cr slightly improved 112/6.45 (was 122/8.46 yest)  7/10: No events, extubated yest am, BP elevated overnight but stable this am, stable on 4L NC O2, CBI off, good UOP 3.7L, received 2 units PRBC transfusion and heparin drip off, BUN/Cr improving, Na elevated 148, denies anyCP/SB/LE edema and is feeling hungry  7/11: No events, BP mildly elevated, currently on 6L NC O2, good UOP, BUN/Cr improving, tolerating PO, Na still elevated at 148, CT Abd/Pelvis shows left perinephric hematoma, tolerating p.o. liquids, denies any CP/SOB/LE edema  7/12: No events, BP elevated at times but stable this am, stable on 5L NC O2, 2.4L UOP, Na still 148, serum chloride and BUN slightly higher today, Cr improved at 3.78, currently n.p.o. for possible procedure but otherwise is tolerating p.o.  7/13: Transferred out of ICU and to IMCU, BP mildly elevated, stable on 3L NC O2, creatinine continues to improve, BUN remains at 95 and NA remains elevated at 148, did not require urologic intervention yesterday, hemoglobin dropped this morning and patient receiving 1 unit PRBC transfusion,  7/14: No events, BP elevated, stable on 3 LNC O2, UOP not recorded, BMP pending, drowsy but arousable, no new complaints   7/15: Pt had bleeding overnight, with bloody CBI and hgb drop to 6.4. BP was low in the 90/60s and Cr worsened to 3.5. Was transfused.  7/16: Creatinine worsened to 4.33. BP was 113/62. 450 mL in Lauren bag, orange tinged, was not to have red flecks and some small clots earlier today. Hgb around 8.0 now after being transfused on 15th. 7/17: Creatinine worsening to 5.66, Corrected calcium is 8.0 and bicarb is 17. Was transfused  "2 units again in the setting of dropping hemoglobin, now at 8.2. However, these labs were drawn prior to transfusion. Unclear urine output due to CBI.   7/18: CBI stopped. Pt with 1.0L of UOP per chart after lasix yesterday. Cr josey 5.6->5.9. pt mentation remains normal    REVIEW OF SYSTEMS:    10 point ROS performed, negative other than stated above     PMH/PSH/SH/FH:   Reviewed and unchanged since admission note    CURRENT MEDICATIONS:   Reviewed from admission to present day    VS:  /58   Pulse (!) 103   Temp 36.4 °C (97.6 °F) (Temporal)   Resp (!) 22   Ht 1.753 m (5' 9.02\")   Wt 93.1 kg (205 lb 4 oz)   SpO2 90%   BMI 30.30 kg/m²     Physical Exam  Vitals and nursing note reviewed.   Constitutional:       General: He is not in acute distress.     Appearance: He is not ill-appearing.      Interventions: He is sedated. He is not intubated.  HENT:      Head: Normocephalic and atraumatic.   Eyes:      General: No scleral icterus.     Extraocular Movements: Extraocular movements intact.   Cardiovascular:      Rate and Rhythm: Normal rate and regular rhythm.   Pulmonary:      Effort: Pulmonary effort is normal. No respiratory distress. He is not intubated.      Breath sounds: Examination of the right-lower field reveals decreased breath sounds. Examination of the left-lower field reveals decreased breath sounds. Decreased breath sounds present.      Comments: Has nasal cannula. Mildly increased respiratory effort, with shorter shallow breaths.   Abdominal:      General: There is no distension.   Musculoskeletal:         General: Swelling present. No deformity.      Cervical back: Normal range of motion and neck supple.      Comments: Trace+ LE edema   Skin:     General: Skin is warm and dry.      Findings: No rash.   Neurological:      General: No focal deficit present.      Mental Status: He is alert and oriented to person, place, and time.   Psychiatric:         Mood and Affect: Mood normal.         " Behavior: Behavior normal. Behavior is cooperative.         Fluids:  In: 30 [P.O.:30]  Out: 900     LABS:  Recent Labs     07/16/24  0213 07/17/24  0258 07/18/24  0210   SODIUM 137 140 142   POTASSIUM 4.2 4.2 4.1   CHLORIDE 103 104 102   CO2 18* 17* 18*   GLUCOSE 118* 115* 95   * 129* 134*   CREATININE 4.33* 5.66* 5.96*   CALCIUM 6.8* 6.4* 6.6*       IMAGING:   All imaging reviewed from admission to present day    ASSESSMENT:    # Oliguric SCOTT, recurrent: In setting of surgery/RAASI/decreased nephron mass.     - Was starting to recover then 2nd Scott with bowel obstruction    - SCOTT again due to obstruction from left ureteral clot and left renal extravasation, underwent cysto and clot evacuation 7/8 by Urology    -on CBI    -had bleeding (hgb 6/4) on 7/15 w hypotension, and SCOTT again. Had a recent left renal hemorrhage for which embolization was done, so likely residual kidney injury as a result of this, will likely resolve with time  # CKD Stage 3B: Billed 2/2 HTN in the past. Urine with blood (renal mass) and protein  #HAGMA  CKD and SCOTT so likely as a result of this acute on chronic insult. Likely related to infectious etiology, leukocytosis + procal elevation   # Hyperkalemia--resolved  # Renal mass: s/p partial Left nephrectomy    -left ureteral clot with obstruction and extravasation of left kidney on imaging 7/8    -s/p Cystoscopy and left ureteral clot and bladder clot evacuation 7/8    -CT Abd/Pelvis 7/10 shows left perinephric hematoma  # HTN--shock now resolved, off all pressors    -BP mildly elevated  #Acute Hypoxic Respiratory Failure--now on nasal cannula     -Suspicion for aspiration pneumonia    -CTA chest negative for PE    -Bedside POCUS 7/8 showed easlity collapsible SVC c/w intravascular volume depletion  # COPD  # Anemia  Continuing to have acute blood loss    -Acute blood loss anemia as well secondary to perinephric hematoma  # SBO vs. Ileus: improving, now on clear liquid diet  # gross  hematuria: transiently stopped after procedure. Now restarted.   -Urology following    -CBI per urology  #Hyperphosphatemia   No acute indication for phos binder, no hypocalcemia symptoms (unlikely in acute setting)     PLAN:    -no compelling indication for RRT today  -Cr continues to rise, but rate of rise is slowing  -anemia noted to cont to trend down, but slowly  -UOP significant at 1L, so will give additional lasix today  -cont sodium bicarb for acidosis  -Wean O2 as tolerated, O2 requirements improving  -Serial CBC's and transfuse PRN for Hgb less than 7  -Renal diet  -Strict I/Os/continue coburn  -Dose all meds per eGFR <15     Dispo: cont monitoring, may need HD tomorrow if no significant improvement    Please page nephrology with any questions or concerns

## 2024-07-19 NOTE — PROGRESS NOTES
Hospital Medicine Daily Progress Note    Date of Service  7/19/2024    Chief Complaint  Here for elective partial nephrectomy    Hospital Course  Luis Sims is a 80-year-old male past medical history of COPD on chronic oxygen 2-3 L at baseline, emphysema, hypertension, CAD, previous atrial fibrillation, PVD, previous rheumatic fever, previous nephrolithiasis status post nephrolithotomy was admitted for elective left partial nephrectomy, JADIEL drain placement.  Postop then, they did with acute kidney injury, bleeding, hyperkalemia.  Nephrology consulted  Hematuria did resolve.  Patient was improving JADIEL drain removed 7/5.  Maintain the Luaren catheter  Creatinine had improvement for couple of day and start to worsen again. Most likely ATN from partial nephrectomy  7/5- pt started to have complication of small bowel obstruction. NG tube placed 7/6 with low suction. He also started to noticed clot on urine. Started CBI. Felt slight better. 7/8- significantly worse. Requiring high flow oxygen, hypotensive. Cr worsen. Because of CBI not sure if pt is anuric. Rapid team and critical care doctor called and pt transfer to ICU for pressors and close monitoring. Stat CT scan ordered   Required intubation but extubated 7/9 but was on pressor support. On 7/10 a CT abd/pelvis showed a left perinephric hematoma.  7/1 pathology showing clear cell renal carcinoma    Interval Problem Update  Patient seen and examined today.  Data, Medication data reviewed.  Case discussed with nursing as available.  Plan of Care reviewed with patient and notified of changes.  7/15: Called by radiology yesterday and slight increase in perinephric hematoma but continues with light pink lemonade colored urine in CBI, decision made yesterday to continue heparin.  Overnight increase in bleeding and drop in Hgb.giving protamine and transfusing pRBCs.  Worsening renal function with Cr:3.51. Hgb:6.4.  7/16 the patient appears lethargic, he has complaints of  back pain, currently afebrile, heart rate in the 90s, respiration unlabored, patient is saturating in the mid 90s on 5 to 7 L nasal cannula oxygen, blood pressure in the 1 teens to 100s over 50s, s/p embolization of 2 small left renal arteries by interventional radiology on 7/15  Urology follow-up overall with a positive report, ongoing CBI  Hemoglobin stabilized  Second 32.8, hemoglobin 8.2, hematocrit 24.9, platelet count 175, sodium 137 potassium 4.2, chloride 103, bicarb 18, glucose 118, BUN is 105, creatinine 4.33,  Nephrology following, ongoing treatment with sodium and bicarbonate, currently no indication for RRT, holding off diuretics at this time, close laboratory follow-up.  7/17 patient will repeat need for transfusion overnight, no overt bleeding, no significant bowel movement reported, no GI bleeding reported, urine is slowly clearing, reducing CBI, creatinine worsening, discussed with nephrology, urology, interventional radiology.  Currently afebrile, heart rate in the 70s, respiration unlabored, blood pressure in the 100s to 150s over 60s,  Laboratory data with a white count of 17.8, hemoglobin 8.2, platelet count 120, sodium 140, potassium 4.2, chloride 104, bicarb 17, glucose 115, , creatinine 5.66, corrected calcium 8, albumin 2.0, Phos 5.2,  Elevated procalcitonin, extending antibiotic therapy, somewhat unclear intra-abdominal process with blood loss, pain, leukocytosis  7/18 the patient remains lethargic, he appears somewhat improved today, he denies pain, currently afebrile, heart rate in the 80s to 90s, respiration unlabored on 2 to 3 L saturating in the mid 90s, blood pressure in the 120s to 130s over 60s, the patient is off CBI, urine is without clots, urine output 900 cc over the last 24 hours  Hemoglobin decreased to 7.7, transfuse 1 more unit coming up to 9.8, no overt bleeding, sodium 142, potassium 4.1, chloride 102, bicarb 18, glucose 95, , creatinine 5.96, corrected  calcium 8.3, albumin 1.9, chest x-ray read by basilar infiltrates, fairly unchanged, patient has poor p.o. intake overall, JADIEL drain with serosanguineous discharge  Holding hemodialysis for the time being, additional Lasix ordered by nephrology  Overall the patient with very slow progress  7/19 the patient with some respiratory difficulty, wet breath sounds, wet cough, increasing renal parameters, creatinine up to 6.52, no further bleeding noted, afebrile, heart rate in the 80s, respiration unlabored, blood pressure in the 120s to 130s over 60s, remains in atrial fibrillation, discussed with nephrology, need for RRT, asked vascular surgery to kindly place a temporary hemodialysis catheter.  This was later placed at the bedside and the patient started on hemodialysis.  Laboratory data radiologic on 9.3, hemoglobin 9.3, platelet count 127, sodium 144, potassium 3.8, chloride 105, bicarb 20, glucose 124, , creatinine 6.52, calcium 8.2, albumin 2.0, chest x-ray prior to dialysis with bilateral hazy infiltrates  Update given to the patient's POA, niece, voiced concerns about the patient's overall recent course and elected to involve palliative care at this time  The patient with overall very flat and uninvolved affect  I have discussed this patient's plan of care and discharge plan at IDT rounds today with Case Management, Nursing, Nursing leadership, and other members of the IDT team.    Consultants/Specialty  nephrology and urology  Critical care  Interventional radiology  Vascular surgery  Palliative care    Code Status  Full Code    Disposition  The patient is not medically cleared for discharge to home or a post-acute facility.  Anticipate discharge to: skilled nursing facility    I have placed the appropriate orders for post-discharge needs.    Review of Systems  Review of Systems   Constitutional: Negative.  Negative for malaise/fatigue.   HENT: Negative.     Eyes: Negative.    Respiratory:  Positive for  cough and hemoptysis. Negative for shortness of breath.    Cardiovascular: Negative.  Negative for palpitations and leg swelling.   Gastrointestinal: Negative.  Negative for abdominal pain and nausea.   Genitourinary:  Positive for hematuria.   Musculoskeletal:  Positive for back pain and myalgias.   Skin: Negative.    Neurological:  Positive for weakness. Negative for speech change.   Endo/Heme/Allergies: Negative.    Psychiatric/Behavioral: Negative.  The patient is not nervous/anxious.    All other systems reviewed and are negative.       Physical Exam  Temp:  [36.1 °C (97 °F)-36.7 °C (98.1 °F)] 36.7 °C (98.1 °F)  Pulse:  [] 81  Resp:  [20-42] 34  BP: ()/() 150/64  SpO2:  [90 %-100 %] 94 %    Physical Exam  Vitals reviewed.   Constitutional:       General: He is not in acute distress.     Appearance: Normal appearance. He is obese. He is ill-appearing.   HENT:      Head: Normocephalic.      Nose: Nose normal.      Mouth/Throat:      Mouth: Mucous membranes are dry.      Pharynx: Oropharynx is clear.   Eyes:      General:         Right eye: No discharge.         Left eye: No discharge.   Cardiovascular:      Rate and Rhythm: Normal rate.   Pulmonary:      Effort: Pulmonary effort is normal. No respiratory distress.      Breath sounds: Rhonchi and rales present.   Abdominal:      General: There is no distension.      Palpations: Abdomen is soft.      Tenderness: There is abdominal tenderness.   Musculoskeletal:         General: Swelling present.      Cervical back: Normal range of motion and neck supple.      Right lower leg: Edema present.      Left lower leg: Edema present.   Skin:     Coloration: Skin is pale. Skin is not jaundiced.   Neurological:      General: No focal deficit present.      Mental Status: He is alert and oriented to person, place, and time.      Motor: Weakness present.   Psychiatric:      Comments: Withdrawn, depressed appearing         Fluids  No intake or output data in  the 24 hours ending 07/19/24 1538      Laboratory  Recent Labs     07/17/24  0606 07/17/24  1523 07/18/24  0210 07/18/24  0603 07/18/24  2300 07/19/24  0227 07/19/24  0702   WBC 17.8*  --  12.8*  --   --  9.3  --    RBC 2.76*  --  2.68*  --   --  3.02*  --    HEMOGLOBIN 8.2*   < > 8.0*   < > 9.3* 8.9* 9.3*   HEMATOCRIT 25.2*  --  24.3*  --   --  26.9*  --    MCV 91.3  --  90.7  --   --  89.1  --    MCH 29.7  --  29.9  --   --  29.5  --    MCHC 32.5  --  32.9  --   --  33.1  --    RDW 56.0*  --  55.3*  --   --  52.4*  --    PLATELETCT 120*  --  134*  --   --  127*  --    MPV 11.4  --  12.0  --   --  11.7  --     < > = values in this interval not displayed.     Recent Labs     07/17/24  0258 07/18/24  0210 07/19/24  0227   SODIUM 140 142 144   POTASSIUM 4.2 4.1 3.8   CHLORIDE 104 102 105   CO2 17* 18* 20   GLUCOSE 115* 95 124*   * 134* 131*   CREATININE 5.66* 5.96* 6.52*   CALCIUM 6.4* 6.6* 6.6*                         Imaging  DX-CHEST-PORTABLE (1 VIEW)   Final Result      New right IJ dialysis catheter, tip projects over SVC. No pneumothorax.      DX-CHEST-PORTABLE (1 VIEW)   Final Result         1.  Hazy bilateral lower lobe infiltrates, similar to prior study.   2.  Atherosclerosis      DX-CHEST-PORTABLE (1 VIEW)   Final Result         1.  Hazy bilateral lower lobe infiltrates, similar to prior study.   2.  Atherosclerosis      DX-CHEST-PORTABLE (1 VIEW)   Final Result         1.  Hazy bilateral lower lobe infiltrates, similar to prior study.   2.  Atherosclerosis      DX-CHEST-PORTABLE (1 VIEW)   Final Result         1.  Bilateral basilar atelectasis, no focal infiltrate   2.  Atherosclerosis      IR-EMBOLIZATION   Final Result      1.  Left renal arteriograms demonstrating 2 sites of active hemorrhage.   2.  Technically successful microcoil embolization of 2 small left renal branch arteries.      DX-CHEST-PORTABLE (1 VIEW)   Final Result      1.  Mild worsening LEFT lung base atelectasis.   2.  No other  significant change from prior exam.         CT-ABDOMEN-PELVIS W/O   Final Result   Addendum (preliminary) 1 of 1   Critical value called by Dr. Vincent Alonso to Dr. Riley at 7/14/2024 6:08    PM.      Final      1.  Left perirenal hematoma, mildly increased since prior.   2.  Additional collection medial to the right kidney with small gas focus, stable since prior. Could be seroma, resolving hematoma. Abscess not completely excluded.   3.  Mild peripancreatic fat stranding. Could be related to anasarca. Cannot exclude pancreatitis, recommend laboratory correlation.   4.  7 mm indeterminate right renal cortical lesion, recommend ultrasound assessment to evaluate cyst versus solid.   5.  Other findings are present, refer to above.      Efforts are underway to contact referring physician with results at time of dictation.      DX-CHEST-PORTABLE (1 VIEW)   Final Result      Stable consolidation within the right lung base.      DX-CHEST-PORTABLE (1 VIEW)   Final Result      Increased patchy right greater than left basilar opacities suspicious for pneumonitis.      DX-CHEST-PORTABLE (1 VIEW)   Final Result      1.  Ongoing bibasilar interstitial opacities.   2.  Resolving subsegmental atelectatic change left lung base.   3.  No new abnormality.      DX-CHEST-PORTABLE (1 VIEW)   Final Result      Stable chest.      CT-ABDOMEN-PELVIS W/O   Final Result      1.  Bibasilar airspace infiltrates with air bronchograms which may represent pneumonia.   2.  Postoperative changes left kidney with mild surrounding stranding and an approximately 52 mm x 32 mm perinephric hematoma. No evidence of urinoma. Left ureteral stent in satisfactory position.   3.  Ileus.   4.  Sigmoid diverticulosis.      US-RENAL   Final Result      1.  No evidence of obvious perinephric hematoma, status post left partial nephrectomy.      DX-CHEST-PORTABLE (1 VIEW)   Final Result      Persistent bibasilar infiltrate with removal of the endotracheal tube.       US-EXTREMITY VENOUS LOWER BILAT   Final Result      DX-CHEST-PORTABLE (1 VIEW)   Final Result      1.  No significant change.      DX-CHEST-PORTABLE (1 VIEW)   Final Result      1.  Interval placement of an endotracheal tube which terminates in satisfactory position at the level of the aortic arch.   2.  Interval insertion of a central venous catheter which terminates with the tip projecting over the expected region of the distal brachiocephalic vein or proximal superior vena cava.   3.  Patchy right-sided pulmonary opacities and bilateral basilar atelectasis.      DX-PORTABLE FLUORO > 1 HOUR   Final Result      Portable fluoroscopy utilized for 28 seconds.         INTERPRETING LOCATION: [address] , [place], [id number]      [id number] VIEW   Final Result      Digitized intraoperative radiograph is submitted for review. This examination is not for diagnostic purpose but for guidance during a surgical procedure. Please see the patient's chart for full procedural details.         INTERPRETING LOCATION: [address] , [place], [address]      [id number] VIEW   Final Result      1.  A left-sided double-J ureteral stent is in place. Due to overlying structures and adjacent contrast opacified bowel loops, extravasation from the renal collecting system cannot be excluded.   2.  Multiple mild to moderately dilated gas-filled loops of small bowel with enteric contrast present. Findings could be seen in the setting of partial small bowel obstruction or ileus.      EC-ECHOCARDIOGRAM COMPLETE W/ CONT   Final Result      CT-ABDOMEN-PELVIS WITH   Final Result      1.  Bibasilar atelectasis right greater than left.      2.  Hepatic steatosis.      3.  Bandlike area of fluid attenuation coursing through the upper pole of the left kidney which contains multiple air bubbles. This likely represents postoperative change however an infected postoperative tract through the upper pole of the left kidney    should be considered.      4.   Multiple prominent nonobstructing right-sided renal calculi. Atrophic changes of the right kidney.      5.  Mild bilateral hydronephrosis.      6.  Moderate left-sided pelviectasis which likely contains a blood clot.      7.  Thrombosis of the infrarenal aorta with the right-sided iliac stent in place. Thrombotic occlusion of the left iliac arteries.      8.  Colonic diverticulosis.      9.  Acute mid small bowel obstruction.      10.  These findings were Voalted to HERMELINDO WILHELM at 9:30 AM 7/8/2024      CT-CTA CHEST PULMONARY ARTERY W/ RECONS   Final Result      1.  No CT evidence of pulmonary emboli         2. Small multifocal patchy airspace opacities in the right upper lobe and right lower lobe suspicious for pneumonitis possibly Covid.      3. Bibasilar atelectasis.      4. Coarse multifocal nonobstructing right-sided renal calculi and atrophic change.      DX-CHEST-PORTABLE (1 VIEW)   Final Result      1.  Left retrocardiac airspace opacity consistent with atelectasis and/or pneumonitis.      DX-ABDOMEN FOR TUBE PLACEMENT   Final Result      1.  NG tube extends in the fundus of stomach.      DX-CHEST-PORTABLE (1 VIEW)   Final Result      1.  Bibasilar airspace disease, greater on the left.      [id number]-2 VIEW   Final Result      Dilated air-filled small bowel. This may be due to postoperative ileus or small bowel obstruction.      DX-ABDOMEN FOR TUBE PLACEMENT   Final Result      Nasogastric tube tip projects over the proximal stomach.      [id number]-2 VIEW   Final Result      1.  Stable mild to moderate dilatation of small bowel loops with air seen in the distal rectum.      [id number]-0 VIEW   Final Result      1.  Operative changes of the abdomen.      2.  Evolving small bowel obstruction.      US-RENAL   Final Result      1.  Nonvisualization of the left kidney due to overlying bandages.   2.  Suboptimal visualization of the right kidney due to patient body habitus and ribs demonstrates no definite  evidence of hydronephrosis.      [id number]-3 VIEW   Final Result      Paucity of bowel gas with stool present in the colon. No definite evidence of obstruction however evaluation is limited on supine imaging.           Assessment/Plan  * Left kidney mass- (present on admission)  Assessment & Plan  Status post partial nephrectomy.  Follow-up with urology.    Pathology consistent with clear cell renal carcinoma    Hypotension  Assessment & Plan  Concern of volume loss  transfuse to keep hemoglobin greater than 7  Monitor I's and O's, labs, vitals      Hypernatremia  Assessment & Plan  Resolved    Aortic thrombus (HCC)  Assessment & Plan  Initially on heparin and then had bleeding episode  heparin drip stopped and reversed protamine given.  Concern of hemoptysis, ongoing hematuria and dropping hemoglobin.  hold anticoagulation    Hyperglycemia  Assessment & Plan  A1c 5.7 in May  Hyperglycemia likely due to acute illness  SSI     Hypocalcemia  Assessment & Plan  Monitor corrected calcium    Shock (HCC)- (present on admission)  Assessment & Plan  S/p pressor support  Monitor vitals., I/O's      Small bowel obstruction (HCC)  Assessment & Plan  Improved, tolerating a diet  Antiemetics  mobilize    Leukemoid reaction- (present on admission)  Assessment & Plan  S/p antibiotics, will extend for the time being, reevaluate  Following up on CBC, monitor closely    Acute blood loss as cause of postoperative anemia- (present on admission)  Assessment & Plan  7/19, so far holding  7/18 additional transfusion, no overt blood loss noted, close monitoring  7/17 continue to monitor closely since patient has ongoing hematuria and on CBI  7/16 stabilizing, monitoring  7/15 this morning I stop the heparin drip and gave protamine.  Discussed with urology and they feel that ongoing bleeding potentially around the partial nephrectomy and a will contact interventional radiology.  Patient continues to have clots and pink-tinged urine from  his CBI.  The patient was in atrial fibrillation last night he is back in sinus rhythm this morning.  Will continue to monitor hemoglobins and transfuse to keep hemoglobin greater than 7.  I have made him n.p.o. for potential interventional radiology procedure today  7/14 checking CT abdomen and pelvis without contrast to evaluate for perinephric hemorrhage.  Patient continues to drop hemoglobin while on heparin.   Transfuse for hemoglobin less than 7      Hyperkalemia  Assessment & Plan          Left renal mass  Assessment & Plan  Highly concern for malignancy   status post Open left partial nephrectomy nephrectomy on July 1, 2024  Managed by Urology   Follow-up pathology-Clear-cell carcinoma.  Appreciate urology recommendations  7/4- hopefully JADIEL drain removing tomorrow. Appreciate urology recs   7/5/24- JADIEL removed today     Coronary artery disease involving native coronary artery of native heart without angina pectoris- (present on admission)  Assessment & Plan  NSTEMI 2019 s/p stent placement at RCA at that time  hold ASA due to hematura  Hold oral meds     History of atrial fibrillation- (present on admission)  Assessment & Plan  He is not on anticoagulation  Per chart review has history atrial fibrillation 2019 due to COPD exacerbation one-time, and no indication for anticoagulation since then  7/4- HR increasing, pt did miss couple of dosage of carvedilol. Continue to monitor on telemetry. Resumed carvedilol. Continue to monitor closely.   7/8- continue tele. Resume blood thinners when ok per urology   7/11 on amiodarone drip and 7/12 restarted heparin drip and monitor hgb nd sign of further hematura  (On heparin drip for afib hx but also for aortic thrombus)  7/14 continuing IV amiodarone as patient may require to be n.p.o. for possible embolectomy.  Once able to safely take orals we will switch him over  7/15 was in atrial fibrillation overnight converted to sinus rhythm this morning.  Continue IV until  able to take orals safely.  Holding off on anticoagulation currently due to ongoing bleeding  7/16, continued to have rate controlled A-fib  7/17 will transition to oral amiodarone  Maintaining rate control, monitor currently not yet a candidate for anticoagulation    Insomnia- (present on admission)  Assessment & Plan  Chronic, monitor    Acute on chronic hypoxic respiratory failure (HCC)- (present on admission)  Assessment & Plan  7/8 CTA chest negative for PE but patchy opacification  RT per protocol  Mobilize  Encourage deep inspirator efforts  Titrate oxygen  7/13 on 3 Liters O2  Keep hemoglobin greater than 7.  Mobilized as best able.    Essential hypertension- (present on admission)  Assessment & Plan  Holding Losartan in the setting of recent partial nephrectomy and renal failure  On amiodarone drip  Monitor I/O's labs and vitals.    Peripheral vascular disease (HCC)- (present on admission)  Assessment & Plan  History of angioplasty stent placement by Dr. Vital 2012   hold aspirin  Atorvastatin.  Control BP and lipids    COPD exacerbation (HCC)- (present on admission)  Assessment & Plan  Continue with home dose Trelegy Ellipta   Duonebs PRN   Not signs for exacerbation  Follow-up with for allergies as outpatient  RT per protocol.  Wean steroids  Titrate oxygen as able to keep SPO2 >89%  3 to 4 L nasal cannula encourage deep inspiratory efforts keeping hemoglobin greater than 7    Acute renal failure superimposed on stage 3 chronic kidney disease (HCC)- (present on admission)  Assessment & Plan  Cannot rule out obstruction versus renal.  No contrast exposure  Continue to monitor very closely  Continue IV fluid  renal ultrasound  7/3/24-creatinine slight worse again however patient is starting to have better urine output.  Likely this is ATN which has been expected worsening creatinine and then plateau and then improvement later  7/4/24- slight improving. Good urine output. Continue to monitor closely    7/5/24- still good urine output, Cr not improving. Poor oral intake   7/7- worse again. Continue IVF, continue CBI. Continue close monitoring   7/8- prerenal vs obstructive. Nephrology on board. Start pressors. Ok to contrast since pt will likely be started on dialysis   7/11 Cr:4.34 <5.4  7/12 Cr:3.78 but increased BUN (was NPO overnight).  Monitor BMP in am.  7/13 Cr:3.46.  Encourage oral intake.    7/14 Cr: 3.78  7/15 concern of borderline blood pressures and anemia contributing.  7/15 creatinine 3.51 with a BUN of 109.  [facility] consulting  7/16 ongoing renal failure, slightly worsened, nephrology following, holding off diuretics  7/17 still ongoing or worsening renal function, the patient might headed for dialysis soon, Lasix trial as per nephrology  7/18 additional Lasix, monitoring  7/19 beginning RRT       Plan  7/19  Renal function not recovering so far, vascular surgery kindly placed a temporary dialysis catheter, initiating hemodialysis  Currently no additional transfusion needed  No firm evidence of surgical site bleeding or overt blood loss currently  Respiratory care, hopefully can achieve some volume removal and improve the patient's respiratory status  Close monitoring of renal function, the patient might need RRT soon  Maintain adequate perfusion  Encourage p.o. intake  Continue rate and rhythm control for atrial fibrillation  See orders  I spent 56 minutes, reviewing the chart, obtaining and/or reviewing separately obtained history. Performing a medically appropriate examination and evaluation.  Counseling and educating the patient. Ordering and reviewing medications, tests, or procedures.   Documenting clinical information in EPIC. Independently interpreting results and communicating results to patient. Discussing future disposition of care with patient, RN and case management.    VTE prophylaxis:    heparin ppx  Initiated with caution    I have performed a physical exam and reviewed  and updated ROS and Plan today (7/19/2024). In review of yesterday's note (7/18/2024), there are no changes except as documented above.      Please note that this dictation was created using voice recognition software. I have made every reasonable attempt to correct obvious errors, but I expect that there are errors of grammar and possibly context that I did not discover before finalizing the note.

## 2024-07-19 NOTE — CONSULTS
"  VASCULAR SURGERY SERVICE  CONSULT NOTE      Date: 7/19/2024    Referring Provider: William Skaggs M.d.    Consulting Physician: Vincent Vo MD    -------------------------------------------------------------------------------------------------    Reason for consultation: dialysis catheter placement    HPI: This is a 74 y.o. male with renal failure in need of HD access. Temporary non-tunneled catheter placement has been requested at this time. When I came to evaluate the patient he was in no acute distress.     Past Medical History:   Diagnosis Date    Adverse effect of anesthesia     \"stopped breathing/elevated bp\"2012    Anesthesia     \"stopped breathing/elevated bp\"2012    Atrial fibrillation (HCC) 06/18/2024    history of    Breath shortness 06/18/2024    when he quit smoking/with exertion    CAD (coronary artery disease)     COPD     Dental disorder 06/18/2024    lower partial    Emphysema of lung (HCC) 06/18/2024    COPD, inhalers, and O2    Hiatus hernia syndrome 06/18/2024    repaired 2023    High cholesterol 06/18/2024    medicated    Hyperlipidemia     Hypertension 06/18/2024    medicated    Kidney stone     Oxygen dependent     q hs prn 2 ltr    Paroxysmal atrial fibrillation (HCC)     Pneumonia 06/18/2024    history of    PVD (peripheral vascular disease) ([facility])     R iliac artery stent    Renal disorder 06/18/2024    right kidney stone/stent in place    Rheumatic fever 06/18/2024    history of       Past Surgical History:   Procedure Laterality Date    IN CYSTOURETHROSCOPY,URETER CATHETER Left 7/8/2024    Procedure: LEFT RETROGRADE PYELOGRAM STENT PLACEMENT;  Surgeon: Gurpreet Siddiqi M.D.;  Location: SURGERY [facility];  Service: Urology    IN CYSTOURETHROSCOPY N/A 7/8/2024    Procedure: CYSTOSCOPY WITH BLADDER CLOT EVACUATION;  Surgeon: Gurpreet Siddiqi M.D.;  Location: SURGERY [facility];  Service: Urology    IN PARTIAL REMOVAL OF KIDNEY Left 7/1/2024    Procedure: OPEN LEFT " PARTIAL NEPHRECTOMY;  Surgeon: Gaurav Ferguson M.D.;  Location: [facility];  Service: Urology    OTHER ABDOMINAL SURGERY  06/18/2024    hiatal hernia repair 2023    INGUINAL HERNIA REPAIR ROBOTIC XI Bilateral 05/23/2023    Procedure: ROBOTIC BILATERAL INGUINAL HERNIA REPAIR;  Surgeon: Garland Mehta M.D.;  Location: [facility];  Service: Gen Robotic    ND CYSTOSCOPY,INSERT URETERAL STENT Left 01/22/2020    Procedure: CYSTOSCOPY, WITH URETERAL STENT INSERTION;  Surgeon: Karen Quiles M.D.;  Location: [facility];  Service: Urology    ND CYSTO/URETERO/PYELOSCOPY, DX Left 01/22/2020    Procedure: URETEROSCOPY;  Surgeon: Karen Quiles M.D.;  Location: [facility];  Service: Urology    LASERTRIPSY Left 01/22/2020    Procedure: LITHOTRIPSY, USING LASER;  Surgeon: Karen Quiles M.D.;  Location: [facility];  Service: Urology    SEPTOPLASTY N/A 05/04/2018    Procedure: SEPTOPLASTY;  Surgeon: Jesse Saavedra M.D.;  [facility];  Service: Ent    TURBINATE REDUCTION Bilateral 05/04/2018    Procedure: TURBINATE REDUCTION- RESECTION WITH SUBMUCOSAL APPROACH;  Surgeon: Jesse Saavedra M.D.;  [facility];  Service: Ent    RECOVERY  11/01/2012    Performed by [facility] Surgery at [facility]    PERCUTANEOUS NEPHROSTOLITHOTOMY  05/02/2012    Performed by KAREN QUILES at [facility]    PERCUTANEOUS NEPHROSTOLITHOTOMY  04/05/2012    Performed by KAREN QUILES at [facility]    RECOVERY  04/04/2012    Performed by SURGERY, IR[facility]RECOVERY [facility]    CYSTOSCOPY STENT PLACEMENT  02/24/2012    Performed by KAREN QUILES at [facility]    APPENDECTOMY  1987    STENT PLACEMENT      heart    ZZZ CARDIAC CATH         Current Facility-Administered Medications   Medication Dose Route Frequency Provider Last Rate Last Admin    NS (Bolus) 0.9 %  infusion 100 mL  100 mL Intravenous DIALYSIS PRN Kd Pavon D.O.        lidocaine (Xylocaine) 1 % injection 20 mL  20 mL Other Once Vincent Vo M.D.        furosemide (Lasix) injection 80 mg  80 mg Intravenous Once Kd Pavon D.O.        lidocaine (Asperflex) 4 % patch 1 Patch  1 Patch Transdermal Q24HR William Skaggs M.D.   1 Patch at 07/18/24 1420    magnesium hydroxide (Milk Of Magnesia) suspension 30 mL  30 mL Oral QDAY PRN William Skaggs M.D.   30 mL at 07/18/24 1717    sodium bicarbonate tablet 1,950 mg  1,950 mg Oral TID Kd Pavon D.O.   1,950 mg at 07/19/24 0915    amiodarone (Cordarone) tablet 400 mg  400 mg Oral TWICE DAILY William Skaggs M.D.   400 mg at 07/19/24 0534    Followed by    [START ON 7/21/2024] amiodarone (Cordarone) tablet 200 mg  200 mg Oral DAILY William Skaggs M.D.        omeprazole (PriLOSEC) capsule 20 mg  20 mg Oral BID William Skaggs M.D.   20 mg at 07/19/24 0534    oxyCODONE immediate release (Roxicodone) tablet 10 mg  10 mg Oral Q4HRS PRN William Skaggs M.D.   10 mg at 07/18/24 0838    thiamine (Vitamin B-1) tablet 100 mg  100 mg Oral DAILY Trae Riley D.O.   100 mg at 07/19/24 0534    metoclopramide (Reglan) injection 5 mg  5 mg Intravenous Q4HRS PRN Trae Riley D.O.        senna-docusate (Pericolace Or Senokot S) 8.6-50 MG per tablet 2 Tablet  2 Tablet Oral Q EVENING Trae Riley D.O.   2 Tablet at 07/18/24 1716    polyethylene glycol/lytes (Miralax) Packet 1 Packet  1 Packet Oral DAILY Trae Riley D.O.   1 Packet at 07/19/24 0534    atorvastatin (Lipitor) tablet 40 mg  40 mg Oral Q EVENING Trae Riley D.O.   40 mg at 07/18/24 1716    melatonin tablet 5 mg  5 mg Oral AFTER DINNER Trae Riley D.O.   5 mg at 07/18/24 2017    oxyCODONE immediate-release (Roxicodone) tablet 5 mg  5 mg Oral Q4HRS PRN Trae Riley D.O.   5 mg at 07/16/24 1735    simethicone (Mylicon) chewable tablet 125 mg  125 mg Oral TID PRN Feli RAMSAY  MYLA MadrigalRJabierNJabier   125 mg at 24    zolpidem (Ambien) tablet 5 mg  5 mg Oral HS PRN MYLA AlvarengaRJabierNJabier   5 mg at 24 2233    HYDROmorphone (Dilaudid) injection 0.5 mg  0.5 mg Intravenous Q3HRS PRN Chloe Good M.D.   0.5 mg at 24 0228    sodium chloride (Ocean) 0.65 % nasal spray 2 Spray  2 Spray Nasal Q2HRS PRN Iliana Woodall D.N.P.   2 [place] at 24 1215    Respiratory Therapy Consult   Nebulization Continuous RT Trae Allen M.D.        bisacodyl (Dulcolax) suppository 10 mg  10 mg Rectal DAILY Luly Jaquez M.D.   10 mg at 24 0534    ondansetron (Zofran) syringe/vial injection 4 mg  4 mg Intravenous Q4HRS PRN Trae Riley D.O.   4 mg at 248    fluticasone-umeclidinium-vilanterol (Trelegy Ellipta) 100-62.5-25 mcg/act inhaler 1 Puff  1 Puff Inhalation DAILY MYLA AlvarengaRJabierN.   1 Puff at 24 0535    ipratropium-albuterol (DUONEB) nebulizer solution  3 mL Nebulization Q4HRS PRN Gaurav Ferguson M.D.   3 mL at 24 0731    tetrahydrozoline (Visine) 0.05 % ophthalmic solution 1 Drop  1 Drop Both Eyes 4X/DAY PRN Gaurav Ferguson M.D.           Social History     Socioeconomic History    Marital status: Single     Spouse name: Not on file    Number of children: Not on file    Years of education: Not on file    Highest education level: Some college, no degree   Occupational History    Not on file   Tobacco Use    Smoking status: Former     Current packs/day: 0.00     Average packs/day: 1 pack/day for 25.0 years (25.0 ttl pk-yrs)     Types: Cigarettes     Start date: 1987     Quit date: 2012     Years since quittin.4    Smokeless tobacco: Never    Tobacco comments:     2 ppd times last 6 months of smoking history   Vaping Use    Vaping status: Never Used   Substance and Sexual Activity    Alcohol use: Yes     Comment: occasional on holidays    Drug use: No    Sexual activity: Never   Other Topics Concern    Not on file   Social History  Narrative    Not on file     Social Determinants of Health     Financial Resource Strain: Low Risk  (10/1/2023)    Overall Financial Resource Strain (CARDIA)     Difficulty of Paying Living Expenses: Not hard at all   Food Insecurity: No Food Insecurity (7/1/2024)    Hunger Vital Sign     Worried About Running Out of Food in the Last Year: Never true     Ran Out of Food in the Last Year: Never true   Transportation Needs: No Transportation Needs (7/1/2024)    PRAPARE - Transportation     Lack of Transportation (Medical): No     Lack of Transportation (Non-Medical): No   Physical Activity: Insufficiently Active (10/1/2023)    Exercise Vital Sign     Days of Exercise per Week: 7 days     Minutes of Exercise per Session: 20 min   Stress: No Stress Concern Present (10/1/2023)    [place] [employer] - Occupational Stress Questionnaire     Feeling of Stress : Not at all   Social Connections: Socially Isolated (10/1/2023)    Social Connection and Isolation Panel [NHANES]     Frequency of Communication with Friends and Family: More than three times a week     Frequency of Social Gatherings with Friends and Family: Once a week     Attends Zoroastrian Services: Never     Active Member of Clubs or Organizations: No     Attends Club or Organization Meetings: Never     Marital Status:    Intimate Partner Violence: Not At Risk (7/1/2024)    Humiliation, Afraid, Rape, and Kick questionnaire     Fear of Current or Ex-Partner: No     Emotionally Abused: No     Physically Abused: No     Sexually Abused: No   Housing Stability: Low Risk  (7/1/2024)    Housing Stability Vital Sign     Unable to Pay for Housing in the Last Year: No     Number of Places Lived in the Last Year: 1     Unstable Housing in the Last Year: No       Family History   Problem Relation Age of Onset    Lung Disease Maternal Grandmother         empysema- smoker    Cancer Maternal Grandfather 65        colon       Allergies:  Patient has no  "known allergies.    Review of Systems:  Noncontributory except as per HPI    Physical Exam:  BP (!) 150/64   Pulse 81   Temp 36.5 °C (97.7 °F) (Temporal)   Resp (!) 34   Ht 1.753 m (5' 9.02\")   Wt 93.1 kg (205 lb 4 oz)   SpO2 94%     Constitutional: Alert, oriented, no acute distress  HEENT:  Normocephalic and atraumatic, EOMI  Neck:   Supple, no JVD,   Cardiovascular: Regular rate and rhythm,   Pulmonary:  Good air entry bilaterally,   Abdominal:  Soft, non-tender, non-distended  Musculoskeletal: No edema, no tenderness  Neurological:  CN II-XII grossly intact, no focal deficits  Skin:   Skin is warm and dry. No rash noted.    Labs:  Recent Labs     07/17/24  0606 07/17/24  1523 07/18/24  0210 07/18/24  0603 07/18/24  2300 07/19/24 0227 07/19/24 0702   WBC 17.8*  --  12.8*  --   --  9.3  --    RBC 2.76*  --  2.68*  --   --  3.02*  --    HEMOGLOBIN 8.2*   < > 8.0*   < > 9.3* 8.9* 9.3*   HEMATOCRIT 25.2*  --  24.3*  --   --  26.9*  --    MCV 91.3  --  90.7  --   --  89.1  --    MCH 29.7  --  29.9  --   --  29.5  --    MCHC 32.5  --  32.9  --   --  33.1  --    RDW 56.0*  --  55.3*  --   --  52.4*  --    PLATELETCT 120*  --  134*  --   --  127*  --    MPV 11.4  --  12.0  --   --  11.7  --     < > = values in this interval not displayed.     Recent Labs     07/17/24  0258 07/18/24 0210 07/19/24 0227   SODIUM 140 142 144   POTASSIUM 4.2 4.1 3.8   CHLORIDE 104 102 105   CO2 17* 18* 20   GLUCOSE 115* 95 124*   * 134* 131*   CREATININE 5.66* 5.96* 6.52*   CALCIUM 6.4* 6.6* 6.6*         Recent Labs     07/17/24  0258 07/18/24  0210 07/19/24  0227   ASTSGOT 30 49* 40   ALTSGPT 16 17 19   TBILIRUBIN 0.3 0.3 0.3   ALKPHOSPHAT 50 57 61   GLOBULIN 1.9 2.4 2.4         Assessment: This is a 74 y.o. male in need of a non-tunneled dialysis catheter. Will place today.    I explained the details of the operation, alternatives, and potential risks, including but not limited to bleeding, infection, injury to vessels or " nerves, and risks of anesthesia.  All questions were answered. Patient understands and agrees to proceed.      Vincent Vo MD  [facility]  [place] preferred. Otherwise please call my office [phone]  __________________________________________________________________  Patient:Olman Mckinley Sims   MRN:[id number]   CSN:[id number]

## 2024-07-19 NOTE — CARE PLAN
The patient is Watcher - Medium risk of patient condition declining or worsening    Shift Goals  Clinical Goals: decrease O2, stable U/O, hemodynamic stability  Patient Goals: pain control, sleep  Family Goals: lila    Progress made toward(s) clinical / shift goals:    Problem: Knowledge Deficit - Standard  Goal: Patient and family/care givers will demonstrate understanding of plan of care, disease process/condition, diagnostic tests and medications  Outcome: Progressing     Problem: Pain - Standard  Goal: Alleviation of pain or a reduction in pain to the patient’s comfort goal  Outcome: Progressing     Problem: Fall Risk  Goal: Patient will remain free from falls  Outcome: Progressing     Problem: Cardiac - Atrial Fibrillation  Goal: Patient will achieve & maintain adequate cardiac output and rate control  Outcome: Progressing  Goal: Complications related to anticoagulation for unconverted atrial fibrillation will be avoided or minimized  Outcome: Progressing     Problem: Urinary Elimination  Goal: Establish and maintain regular urinary output  Outcome: Progressing     Problem: Bowel Elimination  Goal: Establish and maintain regular bowel function  Outcome: Progressing     Problem: Knowledge Deficit - COPD  Goal: Patient/significant other demonstrates understanding of disease process, utilization of the Action Plan, medications and discharge instruction  Outcome: Progressing     Problem: Risk for Infection - COPD  Goal: Patient will remain free from signs and symptoms of infection  Outcome: Progressing     Problem: Risk for Aspiration  Goal: Patient's risk for aspiration will be absent or decrease  Outcome: Progressing     Problem: Hemodynamics  Goal: Patient's hemodynamics, fluid balance and neurologic status will be stable or improve  Outcome: Progressing     Problem: Fluid Volume  Goal: Fluid volume balance will be maintained  Outcome: Progressing     Problem: Urinary - Renal Perfusion  Goal: Ability to achieve  and maintain adequate renal perfusion and functioning will improve  Outcome: Progressing     Problem: Physical Regulation  Goal: Diagnostic test results will improve  Outcome: Progressing     Problem: Nutrition  Goal: Patient's nutritional and fluid intake will be adequate or improve  Outcome: Progressing       Patient is not progressing towards the following goals:      Problem: Skin Integrity  Goal: Skin integrity is maintained or improved  Outcome: Not Progressing     Problem: Nutrition - Advanced  Goal: Patient will display progressive weight gain toward goal have adequate food and fluid intake  Outcome: Not Progressing     Problem: Ineffective Airway Clearance  Goal: Patient will maintain patent airway with clear/clearing breath sounds  Outcome: Not Progressing     Problem: Impaired Gas Exchange  Goal: Patient will demonstrate improved ventilation and adequate oxygenation and participate in treatment regimen within the level of ability/situation.  Outcome: Not Progressing     Problem: Self Care  Goal: Patient will have the ability to perform ADLs independently or with assistance (bathe, groom, dress, toilet and feed)  Outcome: Not Progressing     Problem: Respiratory  Goal: Patient will achieve/maintain optimum respiratory ventilation and gas exchange  Outcome: Not Progressing

## 2024-07-19 NOTE — DIETARY
Nutrition Update:    Day 18 of admit.  Olman Sims is a 74 y.o. male with admitting DX of Left renal mass [N28.89]  Shock (HCC) [R57.9].  Patient being followed to optimize nutrition.    Current Diet: NPO    Pt previously on PU4 diet initiated 7/17, and full liquids prior to then. Pt with poor PO intake <25% per ADLs over the few days.     Pt continues to be seen by SLP. Anticipate an upgrade in diet texture may lend itself to improved PO intake.    Will adjust supplements from Ensure Clear TID to Novasource BID as pt no longer requiring clear liquids.    Problem: Nutritional:  Goal: Achieve adequate nutritional intake  Description: Patient will consume >50% of meals  Outcome: not met      RD following

## 2024-07-19 NOTE — CARE PLAN
The patient is Watcher - Medium risk of patient condition declining or worsening    Shift Goals  Clinical Goals: decrease O2, stable U/O, hemodynamic stability  Patient Goals: pain control, sleep  Family Goals: lila    Progress made toward(s) clinical / shift goals:  education done on POC, all questions and concerns were addressed. C/o increased SOB and WOB,  notified, additional dose of Lasix administered. Awaiting results.       Problem: Knowledge Deficit - Standard  Goal: Patient and family/care givers will demonstrate understanding of plan of care, disease process/condition, diagnostic tests and medications  Outcome: Progressing     Problem: Pain - Standard  Goal: Alleviation of pain or a reduction in pain to the patient’s comfort goal  Outcome: Progressing       Patient is not progressing towards the following goals:

## 2024-07-19 NOTE — PROGRESS NOTES
[facility] -  PROGRESS NOTE               Author: Jericho Bethea M.D. Date & Time: 7/19/2024  8:31 AM     HPI:  74 y.o. male with CKD, left renal mass, COPD with chronic hypoxic respiratory failure, atrial fibrillation, hypertension, and coronary artery disease presented yesterday for open left partial nephrectomy nephrectomy. Continued to take losartan until day of surgery. Baseline cr prior to procedure appears ~2. Underwent procedure yesterday.  Op note without complications noted and only 150cc blood loss documented. Potassium elevated to 6.7 last night, temporizing measures given. Cr elevated to 3.2 this AM.  80cc UOP documented despite 3L IVF. Lauren draining with blood tinged urine, no clots. No chest pain. Shortness or breath stable.Previously followed by [facility] nephrology [place] [address] with CKD felt 2/2 HTN at that point in time. Past UAs with blood and protein.      DAILY NEPHROLOGY SUMMARY:  7/2: Consult done  7/3: stable hemodynamics, 2L NC, 1.1L UOP-increasing since midnight. No obstruction on imaging    7/4: 1.4L UOP, cr starting to plateau, potassium stable, feeling improved  7/5: stable hemodynamics, 1.4L UOP, constipation main complaint, minimal PO intake  7/6: Worsening nasuea and emesis, Concern for SBO and then had 2 large Bms, NG tube to suction with significant output, only 350cc UOP documented, cr climbing again, started on IV fluids, feeling slightly improved this AM  7/7: 3.4L gastric output from NG-net negative by IOs, alkalosis, started on CBI for recurrent hematuria/clots  7/8: Increased O2 requirements overnight and rapid response this am, transferred to ICU, BP low with SBP 70's this am and now on levophed drip, on high flow NC O2, concern for aspiration overnight, CTA chest negative for PE, BUN/Cr worse, 3.4L emesis/NG output over past 24hrs, on CBI, CT Abd/Pelvis this am with mild bilateral hydro and moderate left pelviectasis with possible blood clot, to go to OR this  after per urology, pt is lethargic but resting comfortable, denies any pain  7/9: No events, went to OR yest for cystoscopy and left ureteral and bladder clot evacuation, remains intubated since surgery, on pressor support with levophed and vasopressin, stable on vent with 40% FiO2, CBI running, 1.1L NG output, BUN/Cr slightly improved 112/6.45 (was 122/8.46 yest)  7/10: No events, extubated yest am, BP elevated overnight but stable this am, stable on 4L NC O2, CBI off, good UOP 3.7L, received 2 units PRBC transfusion and heparin drip off, BUN/Cr improving, Na elevated 148, denies anyCP/SB/LE edema and is feeling hungry  7/11: No events, BP mildly elevated, currently on 6L NC O2, good UOP, BUN/Cr improving, tolerating PO, Na still elevated at 148, CT Abd/Pelvis shows left perinephric hematoma, tolerating p.o. liquids, denies any CP/SOB/LE edema  7/12: No events, BP elevated at times but stable this am, stable on 5L NC O2, 2.4L UOP, Na still 148, serum chloride and BUN slightly higher today, Cr improved at 3.78, currently n.p.o. for possible procedure but otherwise is tolerating p.o.  7/13: Transferred out of ICU and to IMCU, BP mildly elevated, stable on 3L NC O2, creatinine continues to improve, BUN remains at 95 and NA remains elevated at 148, did not require urologic intervention yesterday, hemoglobin dropped this morning and patient receiving 1 unit PRBC transfusion,  7/14: No events, BP elevated, stable on 3 LNC O2, UOP not recorded, BMP pending, drowsy but arousable, no new complaints   7/15: Pt had bleeding overnight, with bloody CBI and hgb drop to 6.4. BP was low in the 90/60s and Cr worsened to 3.5. Was transfused.  7/16: Creatinine worsened to 4.33. BP was 113/62. 450 mL in Lauren bag, orange tinged, was not to have red flecks and some small clots earlier today. Hgb around 8.0 now after being transfused on 15th. 7/17: Creatinine worsening to 5.66, Corrected calcium is 8.0 and bicarb is 17. Was transfused  "2 units again in the setting of dropping hemoglobin, now at 8.2. However, these labs were drawn prior to transfusion. Unclear urine output due to CBI.   7/18: CBI stopped. Pt with 1.0L of UOP per chart after lasix yesterday. Cr josey 5.6->5.9. pt mentation remains normal  7/19: CR josey to 6.52. Acidosis resolving to 19.0. Transfused again, now at around 9.3. Worsening shortness of breath.     REVIEW OF SYSTEMS:    10 point ROS performed, negative other than stated above     PMH/PSH/SH/FH:   Reviewed and unchanged since admission note    CURRENT MEDICATIONS:   Reviewed from admission to present day    VS:  BP (!) 152/67   Pulse 87   Temp 36.1 °C (97 °F)   Resp (!) 41   Ht 1.753 m (5' 9.02\")   Wt 93.1 kg (205 lb 4 oz)   SpO2 91%   BMI 30.30 kg/m²     Physical Exam  Vitals and nursing note reviewed.   Constitutional:       General: He is not in acute distress.     Appearance: He is not ill-appearing.      Interventions: He is sedated. He is not intubated.  HENT:      Head: Normocephalic and atraumatic.   Eyes:      General: No scleral icterus.     Extraocular Movements: Extraocular movements intact.   Cardiovascular:      Rate and Rhythm: Normal rate and regular rhythm.   Pulmonary:      Effort: Pulmonary effort is normal. No respiratory distress. He is not intubated.      Breath sounds: Examination of the right-lower field reveals decreased breath sounds. Examination of the left-lower field reveals decreased breath sounds. Decreased breath sounds present.      Comments: Has nasal cannula. Mildly increased respiratory effort, with shorter shallow breaths.   Abdominal:      General: There is no distension.   Musculoskeletal:         General: Swelling present. No deformity.      Cervical back: Normal range of motion and neck supple.      Comments: Trace+ LE edema   Skin:     General: Skin is warm and dry.      Findings: No rash.   Neurological:      General: No focal deficit present.      Mental Status: He is alert " and oriented to person, place, and time.   Psychiatric:         Mood and Affect: Mood normal.         Behavior: Behavior normal. Behavior is cooperative.       Fluids:  In: 200 [Blood:200]  Out: -     LABS:  Recent Labs     07/17/24  0258 07/18/24  0210 07/19/24  0227   SODIUM 140 142 144   POTASSIUM 4.2 4.1 3.8   CHLORIDE 104 102 105   CO2 17* 18* 20   GLUCOSE 115* 95 124*   * 134* 131*   CREATININE 5.66* 5.96* 6.52*   CALCIUM 6.4* 6.6* 6.6*       IMAGING:   All imaging reviewed from admission to present day    ASSESSMENT:    # Oliguric SCOTT, recurrent: In setting of surgery/RAASI/decreased nephron mass.     - Was starting to recover then 2nd Scott with bowel obstruction    - SCOTT again due to obstruction from left ureteral clot and left renal extravasation, underwent cysto and clot evacuation 7/8 by Urology    -on CBI    -had bleeding (hgb 6/4) on 7/15 w hypotension, and SCOTT again. Had a recent left renal hemorrhage for which embolization was done, so likely residual kidney injury as a result of this, will likely resolve with time. Continuing to worsen. Likely in the setting of fluid overload, also having hemoglobin drops so needing to transfuse frequently.  # CKD Stage 3B: Billed 2/2 HTN in the past. Urine with blood (renal mass) and protein  #HAGMA  CKD and SCOTT so likely as a result of this acute on chronic insult. Likely related to infectious etiology, leukocytosis + procal elevation   # Hyperkalemia--resolved  # Renal mass: s/p partial Left nephrectomy    -left ureteral clot with obstruction and extravasation of left kidney on imaging 7/8    -s/p Cystoscopy and left ureteral clot and bladder clot evacuation 7/8    -CT Abd/Pelvis 7/10 shows left perinephric hematoma  # HTN--shock now resolved, off all pressors    -BP mildly elevated  #Acute Hypoxic Respiratory Failure--on 4.5L NC, worsening SOB. Likely worsening in setting of transfusion    -Suspicion for aspiration pneumonia    -CTA chest negative for PE     -Bedside POCUS 7/8 showed easlity collapsible SVC c/w intravascular volume depletion  # COPD  # Anemia  Continuing to have acute blood loss    -Acute blood loss anemia as well secondary to perinephric hematoma  # SBO vs. Ileus: improving, now on clear liquid diet  # gross hematuria: transiently stopped after procedure. Now restarted.   -Urology following    -CBI per urology  #Hyperphosphatemia   No acute indication for phos binder, no hypocalcemia symptoms (unlikely in acute setting)     PLAN:  -Lasix 80 mg IV x1   -Plan for slow dialysis today and tomorrow.  -Cr continues to rise  -anemia noted to cont to trend down, but slowly  -UOP significant +200 today   -cont sodium bicarb for acidosis  -Wean O2 as tolerated, O2 requirements improving  -Serial CBC's and transfuse PRN for Hgb less than 7  -Renal diet  -Strict I/Os/continue coburn  -Dose all meds per eGFR <15     Dispo: cont monitoring, may need HD tomorrow if no significant improvement    Please page nephrology with any questions or concerns

## 2024-07-19 NOTE — THERAPY
Speech Language Therapy Contact Note    Patient Name: Olman Sims  Age:  74 y.o., Sex:  male  Medical Record #: [id number]  Today's Date: 7/19/2024 07/19/24 1131   Treatment Variance   Reason For Missed Therapy Medical - Other (Please Comment)  (Patient NPO for procedure)   Interdisciplinary Plan of Care Collaboration   IDT Collaboration with  Other (See Comments);Nursing  (EMR)   Collaboration Comments Discussed with RN; patient made NPO for trialysis cath. SLP to hold this date and continue to follow for dyspahgia post-procedurally and as schedule allows.     - Jeannette Astorga, SLP

## 2024-07-19 NOTE — OP REPORT
VASCULAR SURGERY SERVICE                       Operative Note  _____________________________________________________      Date: 2024    Patient: Olman Sims  : 1950  MRN: [id number]      Preoperative Diagnosis:  - Renal failure    Postoperative Diagnosis  - Renal failure    Procedure  60371 and 87626: Percutaneous access of the right internal jugular vein with ultrasound guidance and insertion of right internal jugular non-tunneled veno-venous dialysis catheter    Catheter used:  Trialysis triple lumen dialysis catheter 15cm    Surgeon: Vincent Vo MD    Blood loss: minimal    Disposition: Patient tolerated procedure well    Procedure: The patient was prepped and draped in sterile fashion. Surgical timeout was completed verifying correct patient, procedure, site, positioning, and special equipment if applicable.  1% Lidocaine was used to anesthetize the surrounding skin area. The vein was accessed percutaneously and the J-wire passed easily.  A small puncture incision was made at the insertion site, the tract was dilated, then the catheter was passed over the wire into the vein. Appropriate blood return was obtained. Each lumen of the catheter was evacuated of air, flushed with sterile saline, and capped. The catheter was then sutured in place to the skin and a sterile dressing applied.   The patient tolerated the procedure well and there were no complications.    Vincent Vo MD  [facility]

## 2024-07-19 NOTE — PROGRESS NOTES
MS  SR, converted to afib around 12:50pm, Dr. Skaggs aware.   HR , 110-120 with exertion.   Ectopy: occasional PVCs multi focal

## 2024-07-19 NOTE — PROGRESS NOTES
Surgical Progress Note    Author: GIOVANNI Vann Date & Time created: 2024   10:10 AM     Interval Events:    Transfused yesterday H and H today 9.3/26.9    CBI capped x2 days. Urine clear no clots, hematuria. UO lastnight 950cc, this am 300.     Increased work of breathing and SOB, increased Is from 2Lto 4L    ROS  Hemodynamics:  Temp (24hrs), Av.3 °C (97.4 °F), Min:36.1 °C (97 °F), Max:36.6 °C (97.9 °F)  Temperature: 36.1 °C (97 °F)  Pulse  Av.4  Min: 47  Max: 156   Blood Pressure : (!) 152/67     Respiratory:    Respiration: (!) 41, Pulse Oximetry: 91 %     Given By:: Mask, Work Of Breathing / Effort: Moderate  RUL Breath Sounds: Coarse Crackles, RML Breath Sounds: Coarse Crackles, RLL Breath Sounds: Diminished, ALEN Breath Sounds: Coarse Crackles, LLL Breath Sounds: Diminished  Neuro:  GCS       Fluids:    Intake/Output Summary (Last 24 hours) at 2024 1010  Last data filed at 2024 1415  Gross per 24 hour   Intake 200 ml   Output --   Net 200 ml        Current Diet Order   Procedures    Diet NPO Restrict to: Sips with Medications     Physical Exam  Labs:  Recent Results (from the past 24 hour(s))   HGB    Collection Time: 24  3:35 PM   Result Value Ref Range    Hemoglobin 9.8 (L) 14.0 - 18.0 g/dL   HGB    Collection Time: 24 11:00 PM   Result Value Ref Range    Hemoglobin 9.3 (L) 14.0 - 18.0 g/dL   Renal Function Panel    Collection Time: 24  2:27 AM   Result Value Ref Range    Sodium 144 135 - 145 mmol/L    Potassium 3.8 3.6 - 5.5 mmol/L    Chloride 105 96 - 112 mmol/L    Co2 20 20 - 33 mmol/L    Glucose 124 (H) 65 - 99 mg/dL    Creatinine 6.52 (HH) 0.50 - 1.40 mg/dL    Bun 131 (H) 8 - 22 mg/dL    Calcium 6.6 (LL) 8.5 - 10.5 mg/dL    Correct Calcium 8.2 (L) 8.5 - 10.5 mg/dL    Phosphorus 8.2 (H) 2.5 - 4.5 mg/dL    Albumin 2.0 (L) 3.2 - 4.9 g/dL   CBC WITH DIFFERENTIAL    Collection Time: 24  2:27 AM   Result Value Ref Range    WBC 9.3 4.8 - 10.8 K/uL     RBC 3.02 (L) 4.70 - 6.10 M/uL    Hemoglobin 8.9 (L) 14.0 - 18.0 g/dL    Hematocrit 26.9 (L) 42.0 - 52.0 %    MCV 89.1 81.4 - 97.8 fL    MCH 29.5 27.0 - 33.0 pg    MCHC 33.1 32.3 - 36.5 g/dL    RDW 52.4 (H) 35.9 - 50.0 fL    Platelet Count 127 (L) 164 - 446 K/uL    MPV 11.7 9.0 - 12.9 fL    Neutrophils-Polys 91.80 (H) 44.00 - 72.00 %    Lymphocytes 2.20 (L) 22.00 - 41.00 %    Monocytes 5.00 0.00 - 13.40 %    Eosinophils 0.00 0.00 - 6.90 %    Basophils 0.10 0.00 - 1.80 %    Immature Granulocytes 0.90 0.00 - 0.90 %    Nucleated RBC 0.20 0.00 - 0.20 /100 WBC    Neutrophils (Absolute) 8.50 (H) 1.82 - 7.42 K/uL    Lymphs (Absolute) 0.20 (L) 1.00 - 4.80 K/uL    Monos (Absolute) 0.46 0.00 - 0.85 K/uL    Eos (Absolute) 0.00 0.00 - 0.51 K/uL    Baso (Absolute) 0.01 0.00 - 0.12 K/uL    Immature Granulocytes (abs) 0.08 0.00 - 0.11 K/uL    NRBC (Absolute) 0.02 K/uL   Comp Metabolic Panel    Collection Time: 07/19/24  2:27 AM   Result Value Ref Range    Anion Gap 19.0 (H) 7.0 - 16.0    AST(SGOT) 40 12 - 45 U/L    ALT(SGPT) 19 2 - 50 U/L    Alkaline Phosphatase 61 30 - 99 U/L    Total Bilirubin 0.3 0.1 - 1.5 mg/dL    Total Protein 4.4 (L) 6.0 - 8.2 g/dL    Globulin 2.4 1.9 - 3.5 g/dL    A-G Ratio 0.8 g/dL   MAGNESIUM    Collection Time: 07/19/24  2:27 AM   Result Value Ref Range    Magnesium 2.5 1.5 - 2.5 mg/dL   ESTIMATED GFR    Collection Time: 07/19/24  2:27 AM   Result Value Ref Range    GFR (CKD-EPI) 8 (A) >60 mL/min/1.73 m 2   HGB    Collection Time: 07/19/24  7:02 AM   Result Value Ref Range    Hemoglobin 9.3 (L) 14.0 - 18.0 g/dL     Medical Decision Making, by Problem:  Active Hospital Problems    Diagnosis     Hypotension [I95.9]     Hypernatremia [E87.0]     Aortic thrombus (HCC) [I74.10]     Hypocalcemia [E83.51]     Hyperglycemia [R73.9]     Shock (HCC) [R57.9]     Small bowel obstruction (HCC) [K56.609]     Left kidney mass [N28.89]     Leukemoid reaction [D72.823]     Acute blood loss as cause of postoperative anemia  [D62]     Coronary artery disease involving native coronary artery of native heart without angina pectoris [I25.10]     History of atrial fibrillation [Z86.79]      Remote history of atrial fibrillation in 2019 in the setting of an acute COPD exacerbation.  He has had no recurrences.  He did follow with cardiology for a few years after this.  Oral anticoagulant was not indicated at that time.      Insomnia [G47.00]      This is a chronic condition. Takes Ambien 5 mg nightly. Symptoms are well controlled with this. Denies any side effects.    Last UDS appropriate 10/4/23  CS agreement UTD 10/4/23        Acute on chronic hypoxic respiratory failure (HCC) [J96.21]     Essential hypertension [I10]      This is a chronic condition.  Current Meds:   - losartan 100 mg daily  - carvedilol 12.5 mg BID  - amlodipine 10 mg daily  Side effects: none  Home BP Log: Typically 120s/60s  Associated symptoms: Denies chest pain, shortness of breath, headaches, dizziness/lightheadedness           Peripheral vascular disease (HCC) [I73.9]      Angioplasty and stents Dr. Vital November 2012      COPD exacerbation (HCC) [J44.1]      This is a chronic condition.  Managed by pulmonology.  Patient is on Trelegy inhaler daily and albuterol as needed.  He is on supplemental oxygen at night and as needed throughout the day.   Symptoms currently controlled.       Acute renal failure superimposed on stage 3 chronic kidney disease (HCC) [N17.9, N18.30]      Plan:    Coburn: Keep clamp, continue coburn catheter for observation, along with possible strict I and O, possible DC of catheter in the next few days    HD: Patient NPO at this time, plan for trialysis cath    From Urology standpoint: okay to start anticoagulation as hospitalist sees appropriate  Quality Measures:  Quality-Core Measures    Discussed patient condition with Hospitalist, RN, and Patient

## 2024-07-20 NOTE — PROGRESS NOTES
[facility] Services Progress Note     Hemodialysis treatment  #1 x 2.5 hours as ordered per Dr. goodman .  Treatment initiated at 1424 and ended at 1655 .      Pt BP dropped during dialysis from SBP 140s to 110s.PER Dr. Goodman, only back off from UF goal of 1L if SBP drops under 100. Pt SBP remained over 100 throughout tx. Pt O2 sats were labile. It would drop to under 90 but got better after breathing tx. It dropped again towards end of tx and primary RN had to put simple mask on pt. O2 sat improved to 95-99%. Pt temp dialysis cath was run reversed because red port didn't allow aspiration.   See e-flow sheets for further details.     Net UF : 1000 mL     Post tx CVC care: Right IJ temp cath blue and red HD ports cleansed per protocol, flushed with NS, locked with Heparin 1ml/1000 unit, clamped and capped. CVC dressing assessed, CDI. Aspirate Heparin prior to next CVC use.     Report given to primary RN.

## 2024-07-20 NOTE — PROGRESS NOTES
1655- HD RN contacted this RN to report increased WOB, this RN responded, increased WOB breathing noted, oxygen saturation 86% on 4lpm NC, this RN increased supplemental oxygen to 10lpm via mask, oxygen saturation stable improved >90%, pt continued to appear uncomfortable  notified and RT asked to assess  1715-  to bedside. Palliative NP to bedside, spoke with pt and family regarding goals of care, DPOA was asked to come to bedside as pt continued to decline  1745-Niece arrived at bedside, family and pt decided to pursue comfort care measures at this time  Pt taken off monitor, medicated per MAR

## 2024-07-20 NOTE — DISCHARGE SUMMARY
Death Summary    Cause of Death  Cardiopulmonary arrest due to comfort care status only due to progressive multiorgan system failure due to respiratory failure in conjunction with advancing renal failure secondary to renal cell cancer, s/p partial nephrectomy    Comorbid Conditions at the Time of Death  Principal Problem:    Left kidney mass (POA: Yes)  Active Problems:    Acute renal failure superimposed on stage 3 chronic kidney disease (HCC) (POA: Yes)    COPD   Acute on chronic hypoxic respiratory failure (HCC) (POA: Yes)  History of atrial fibrillation (Chronic) (POA: Yes)         Coronary artery disease involving native coronary artery of native heart without angina pectoris (Chronic) (POA: Yes)    Acute blood loss as cause of postoperative anemia (POA: Yes)    Leukemoid reaction (POA: Yes)    Small bowel obstruction (HCC) (POA: No)    Shock (HCC) (POA: Yes)    Hypocalcemia (POA: Unknown)    Hyperglycemia (POA: Unknown)    Aortic thrombus (HCC) (POA: Unknown)    Hypernatremia (POA: Unknown)    Hypotension (POA: Unknown)  Resolved Problems:    * No resolved hospital problems. *      History of Presenting Illness and Hospital Course  Luis Sims is a 80-year-old male past medical history of COPD on chronic oxygen 2-3 L at baseline, emphysema, hypertension, CAD, previous atrial fibrillation, PVD, previous rheumatic fever, previous nephrolithiasis status post nephrolithotomy was admitted for elective left partial nephrectomy, JADIEL drain placement.  Postop then, they did with acute kidney injury, bleeding, hyperkalemia.  Nephrology consulted  Hematuria did resolve.  Patient was improving JADIEL drain removed 7/5.  Maintain the Lauren catheter  Creatinine had improvement for couple of day and start to worsen again. Most likely ATN from partial nephrectomy  On 7/5- pt started to have complication of small bowel obstruction. NG tube placed 7/6 with low suction. He also started to noticed clot on urine. Started CBI. Felt  slight better. 7/8- significantly worse. Requiring high flow oxygen, hypotensive. Cr worsen. Rapid team and critical care doctor called and pt transfer to ICU for pressors and close monitoring. Stat CT scan ordered   Required intubation but extubated 7/9 but was on pressor support. On 7/10 a CT abd/pelvis showed a left perinephric hematoma.  7/1 pathology showing clear cell renal carcinoma  Secondary to the patient's hematoma, perinephritic, he was treated in conjunction with interventional radiology in form of embolization.  Ongoing efforts were undertaken to improve the patient's renal function including sodium and bicarbonate, fluid support.  Antibiotics were provided, a full complete course was prescribed.  And the following course the patient had difficulty with hematuria, required CBI, he required transfusion on several occasions secondary to blood loss, his renal function gradually worsened and multiple attempts were undertaken to improve his renal function, nephrology was consulted, urology continue to follow.  The patient became more and more deconditioned, lethargic, had fairly poor p.o. intake.  The patient was maximally supported though, his kidney function progressed further to require hemodialysis to remove fluids.  A hemodialysis catheter was placed on 7/19, and the patient had initiation of hemodialysis, over the course of the day the patient progressively worsened in terms of his respiratory status, had wet breath sounds and started to desaturate.  Suctioning was provided, additional volume removal in form of Lasix was provided, unfortunately the patient further declined, possibly suffered a aspiration event versus fluid overload versus possible additional insult possibly cardiac versus other.  In conjunction with palliative care, family members and the patient decision was reached that unfortunately at this juncture the patient was unlikely to recover from his multiple organ system failure and the  patient requested to be comfortable and comfort care measures were ordered and applied.  The patient passes as expected with family at the bedside.  Death Date: 07/19/24   Death Time: 1945         Pronounced By (RN1): Chrissy LUCAS  Pronounced By (RN2): Cathi WILSON

## 2024-07-20 NOTE — CONSULTS
"MRN: [id number]  Date of palliative consult: 24  Reason for consult: ACP  Referring provider: Dr. Skaggs  Location of consult: [id number]  Additional consulting services: hosptial med , nephrology , critical care , vascular surgery     HPI:   Olman Sims is a 74 y.o. male with medical history significant for admitted 2024 for elective open left partial nephrectomy for left renal mass. Biopsy results confirmed clear cell renal carcinoma. Hospital course complicated by JAE, hematuria requiring CBI and blood transfusions, hyperkalemia, SBO requiring NG, and respiratory failure requiring intubation -, 7/10 CT imaging with perinephric hematoma. hyponatremia. Vascular surgery placed HD catheter  due to need for RRT; just completed with 1 L off. Worsening respiratory failure with increase in oxygen up to 10 L/min via non-rebreather mask.     Additional Pertinent Medical History: COPD on chronic oxygen 2-3 L/min at baseline, HTN, HTN, CAD, a-fib, PVD, history of rheumatic fever, nephrolithiasis s/p nephrolithotomy.    ROS:    Review of Systems   Unable to perform ROS: Acuity of condition (limited ROS)   Respiratory:  Positive for shortness of breath.    Genitourinary:  Negative for flank pain.     PE:   Recent vital signs  BMI: Body mass index is 30.3 kg/m².    Temp (24hrs), Av.4 °C (97.6 °F), Min:36.1 °C (97 °F), Max:36.7 °C (98.1 °F)  Temperature: 36.3 °C (97.4 °F)  Pulse  Av.4  Min: 47  Max: 156   Blood Pressure : 130/66       Physical Exam  Constitutional:       Appearance: He is ill-appearing and toxic-appearing.   Pulmonary:      Effort: Tachypnea, accessory muscle usage and respiratory distress present.      Breath sounds: Rhonchi present.   Musculoskeletal:      Comments: BUE edema   Skin:     Coloration: Skin is pale.   Neurological:      Mental Status: He is alert.      Comments: Oriented to person, place, continued to repeat \"[employer]\" when asked about event. " Shook his head yes if he understands he is ill from cancer.   Psychiatric:         Mood and Affect: Mood is anxious.       ASSESSMENT/PLAN WITH SHARED DECISION MAKING:   PHYSICAL ASPECTS OF CARE  Palliative Performance Scale: 20%    # Acute respiratory failure  # Clear renal cell carcinoma  # Left perirenal hematoma  # Anasarca  # JAE requiring RRT  # Hematuria    SOCIAL ASPECTS OF CARE  Single. Lives with niece/healthcare power of  Gita Allen who is a nursing supervisor in our facility. Gita confirms she is an RN on neuro has 5 children with two young twins age 2. Her  is a CNA and working towards nursing school. Gita denied need for work excuse letter. Offered work excuse letters to other family members.     SPIRITUAL ASPECTS OF CARE   Non-Congregation.     GOALS OF CARE/SERIOUS ILLNESS CONVERSATION  Introduced myself to patient. Discussed role of palliative care and reason for consult. He is in respiratory failure following dialysis. He is anxious but agreeable to discuss goals of care. Confirmed I would be calling niece.      Patient initially me that he wanted all life prolonging measures including intubation/CPR in case of respiratory/cardiac arrest. Insight questionable related anxiety and respiratory failure.     Phone call placed to patient's neice. Introduced myself and discussed role of palliative care/reason for consult. She was present earlier today but went home. Asked permission to discus patient condition. Confirmed patient is in respiratory failure and not doing well. She is planning to come to the hospital and will be her in about 20 minutes. At this time Dr. Skaggs discussed GOC with patient who confirmed he wanted all life prolonging measures; Dr. Skaggs reached Dr. Castillo.     Gita, Gita's , and several other family members arrived to bedside. Bedside RN Gifty and I provided updates. Dr. Castillo arrived to evaluate patient. Gita  "discussed goals of care with her uncle and stated she did not think he wanted to go on a ventilator per prior discussion and he confirmed in front of Dr. Castillo. Both myself and Dr. Skaggs clarified GOC. Patient had some inconsistent answers however after many clarifying questions, patient elected for comfort measures only with confirmation that he understands he is dying. Dr. Skaggs placed comfort orders. I stayed bedside for transition and ordered additional morphine when needed.     Provided support and chairs to family. Updated oncoming RN for night shift. Provided palliative care contact information and encouraged family to reach out with any questions/needs.     Code Status: Full; changed to comfort measures only.     ACP Documents: Advance directive on file reviewed. Healthcare agent is pato Allen. Patient completed all statement of desires with conflict in statements.     50 minutes spent discussing advance care planning, this time excludes any other billed services.    Interval diagnostic studies and medical documentation entries pertinent to this case were reviewed independently by me. This patient has at least one acute or chronic illness or injury that poses a threat to life or bodily function. This patient suffers from a high risk of morbidity from additional invasive diagnostic testing or intensive treatment. Discussion of recommendations and coordination of care undertaken with primary provider/treatment team.      Madelin \"Tori\" JORGE Romero, [facility]  Inpatient Palliative Care (service hours Mon-Fri 8AM - 5PM)  [phone]      "

## 2024-07-25 NOTE — H&P
Urology Consultation    Date of Service  7/1/2024      Reason for Admission  Left renal mass    History of Presenting Illness  74 y.o. male who presents today on 7/1/2024 for open left partial nephrectomy for a large superior to mid-pole tumor measuring 4.5x4x3.7 cm abutting the collecting system.    Review of Systems  ROS    Past Medical History   has a past medical history of Adverse effect of anesthesia, Anesthesia, Atrial fibrillation (HCC) (06/18/2024), Breath shortness (06/18/2024), CAD (coronary artery disease), COPD, Dental disorder (06/18/2024), Emphysema of lung (HCC) (06/18/2024), Hiatus hernia syndrome (06/18/2024), High cholesterol (06/18/2024), Hyperlipidemia, Hypertension (06/18/2024), Kidney stone, Oxygen dependent, Paroxysmal atrial fibrillation ([facility]), Pneumonia (06/18/2024), PVD (peripheral vascular disease) ([facility]), Renal disorder (06/18/2024), and Rheumatic fever (06/18/2024).    Surgical History   has a past surgical history that includes cystoscopy stent placement (02/24/2012); recovery (04/04/2012); percutaneous nephrostolithotomy (04/05/2012); percutaneous nephrostolithotomy (05/02/2012); recovery (11/01/2012); appendectomy (1987); septoplasty (N/A, 05/04/2018); turbinate reduction (Bilateral, 05/04/2018); [facility] cardiac cath; stent placement; pr cystoscopy,insert ureteral stent (Left, 01/22/2020); pr cysto/uretero/pyeloscopy, dx (Left, 01/22/2020); lasertripsy (Left, 01/22/2020); inguinal hernia repair robotic xi (Bilateral, 05/23/2023); other abdominal surgery (06/18/2024); pr partial removal of kidney (Left, 7/1/2024); pr cystourethroscopy,ureter catheter (Left, 7/8/2024); and pr cystourethroscopy (N/A, 7/8/2024).    Family History  family history includes Cancer (age of onset: 65) in his maternal grandfather; Lung Disease in his maternal grandmother.    Social History   reports that he quit smoking about 12 years ago. His smoking use included cigarettes. He started smoking about 37 years ago.  He has a 25 pack-year smoking history. He has never used smokeless tobacco. He reports current alcohol use. He reports that he does not use drugs.    Medications  Prior to Admission Medications   Prescriptions Last Dose Informant Patient Reported? Taking?   tetrahydrozoline (VISINE) 0.05 % Solution 7/1/2024 at PRN Patient Yes Yes   Sig: Administer 1 Drop into both eyes 4 times a day as needed (red eyes).      Facility-Administered Medications: None       Allergies  No Known Allergies    Physical Exam       Physical Exam  Constitutional:       Appearance: Normal appearance.   HENT:      Head: Normocephalic and atraumatic.   Skin:     General: Skin is warm and dry.   Neurological:      General: No focal deficit present.      Mental Status: He is alert.   Psychiatric:         Mood and Affect: Mood normal.         Behavior: Behavior normal.         Fluids      Laboratory                        Imaging  5/13/2024 2:49 PM     HISTORY/REASON FOR EXAM: LEFT renal mass, follow-up     TECHNIQUE/EXAM DESCRIPTION: MRI of the kidneys with dynamic IV gadolinium enhancement.     MR imaging of the kidneys was performed.  MR images of the kidneys were obtained with coronal single-shot fast spin-echo T2, axial fat-suppressed FRFSE T2, axial in-phase and out-of-phase T1 FSPGR, dynamic bolus intravenous gadolinium-enhanced   fat-suppressed precontrast, arterial dominant, and nephrographic phase axial T1 FSPGR, and delayed coronal and sagittal fat-suppressed T1 FSPGR sequences.     The study was performed on a Maciej 1.5 Ruth MRI scanner.     15 mL ProHance contrast was administered intravenously.     COMPARISON: Renal ultrasound 3/22/2024     FINDINGS:  The liver is unremarkable.  The spleen is unremarkable.  Adrenal glands are unremarkable.  The pancreas is unremarkable.  Gallbladder shows no signal voids consistent with stones.  Common bile duct measures 7 mm.  No signal void to indicate stone.  RIGHT kidney shows dilated  collecting system and ureter.  Nonenhancing intermediate T2 signal material within the collecting system, likely debris.  Diffuse cortical thinning.  Small excrescence proteinaceous cyst.  LEFT kidney shows heterogeneous T2 hypointense enhancing lesion measuring 4.5 x 4.0 x 3.7 cm located at the upper pole.  No retroperitoneal adenopathy.  Atherosclerotic changes with distal stenosis of the abdominal aorta with probable RIGHT common iliac stent present.  Renal veins enhance normally.     IMPRESSION:     1.  Heterogeneous enhancing mass at the mid to upper pole LEFT kidney measuring 4.5 cm, consistent with renal cell carcinoma.  2.  No evidence to suggest metastatic disease.  3.  Atrophic RIGHT kidney with hydroureteronephrosis, nonobstructing stones and probable debris within the intrarenal collecting system.  4.  Small excrescence proteinaceous cyst RIGHT mid kidney posteriorly, Bosniak 2.  5.  Cholelithiasis.    Assessment/Plan  Mr. Sims is a 74 year old gentleman with a large left mid-upper pole mass measuring 4.5 cm in greatest dimension scheduled for open left partial nephrectomy. We discussed the risks and benefits of surgery including bleeding, infection, urine leak, damage to surrounding structures, complications from surgery, and need for additional surgeries or procedures. Given his CKD a partial nephrectomy is his best chance to avoid dialysis as he would become dialsysis dependent with a radical nephrectomy. He wishes to proceed with the open left partial nephrectomy    Informed consent obtained  OR today  Service: Urology

## 2024-08-01 NOTE — DOCUMENTATION QUERY
"                                                                         [facility]                                                                       Query Response Note      PATIENT:               KEIRA SOTOMAYOR  ACCT #:                  [id number]  MRN:                     [id number]  :                      1950  ADMIT DATE:       2024 11:01 AM  DISCH DATE:        2024 7:45 PM  RESPONDING  PROVIDER #:        [id number]           QUERY TEXT:    The diagnosis of sepsis is documented in the medical record, however, the infectious source is not clear.     Please confirm the status of sepsis.    The patient's Clinical Indicators include:  -  HM Crossover: Increasing O2 demand overnight. CXR showing bibasilar airspace disease, worse on the left. Concern for possible aspiration pneumonia in the setting of nausea/dry heaving earlier in the shift.  -  HM: Acute on chronic hypoxic respiratory failure - COPD exacerbation vs PE vs aspiration pneumonia and sepsis  -  CCM: Shock - multifactorial: hypovolemic, possible septic shock, distributive shock  -  CCM: Shock - hypovolemic from acute blood loss and volume loss as well as sepsis... CTA chest which did not show PE, but multifocal opacities concerning for pneumonia/aspiration  - - Nephrology: Suspicion for aspiration pneumonia  -  DC Summary: ... possibly suffered a aspiration event versus fluid overload versus possible additional insult possibly cardiac versus other.    Clinical Findings:  -  SIRS (Intermittently 4/4, consistently 2/4): WBC 36.7, T: 96.4-97.8, HR , RR 15-52  -  CXR: Left retrocardiac airspace opacity consistent with atelectasis and/or pneumonitis.  - 7/10 CTAP: The lung bases show bilateral airspace infiltrates with air bronchograms consistent with pneumonia    Risk Factors: Possible aspiration pneumonia, s/p partial nephrectomy, renal cell cancer, shock \"hypovolemic vs septic\"   Treatment: Sepsis care " path (initiated 7/8 1354), IVF bolus/infusion, IV abx (Zosyn, Maxipime, Unasyn), pressors (Levophed, vasopressin), lab/imaging     Thank you for your time and attention,  PRABHA Arceo RN CCDS  Available via Voalte    Note: If you agree with a diagnosis listed, please remember to include it in your concurrent daily documentation and onto the Discharge Summary.  Options provided:   -- Sepsis with septic shock exists, source aspiration pneumonia   -- Sepsis does not exist - amended documentation in the medical record and updated problem list   -- Other explanation, Please specify other explanation      Query created by: Wendy Harrell on 8/1/2024 1:50 PM    RESPONSE TEXT:    Sepsis does not exist - amended documentation in the medical record and updated problem list          Electronically signed by:  STAS GUSTAFSON MD 8/1/2024 2:27 PM
